# Patient Record
Sex: MALE | Race: WHITE | NOT HISPANIC OR LATINO | Employment: OTHER | URBAN - METROPOLITAN AREA
[De-identification: names, ages, dates, MRNs, and addresses within clinical notes are randomized per-mention and may not be internally consistent; named-entity substitution may affect disease eponyms.]

---

## 2017-05-10 ENCOUNTER — HISTORICAL (OUTPATIENT)
Dept: ADMINISTRATIVE | Facility: HOSPITAL | Age: 62
End: 2017-05-10

## 2017-05-10 LAB
ALBUMIN SERPL-MCNC: 3.9 GM/DL (ref 3.4–5)
ALBUMIN/GLOB SERPL: 1.2 {RATIO}
ALP SERPL-CCNC: 57 UNIT/L (ref 50–136)
ALT SERPL-CCNC: 56 UNIT/L (ref 12–78)
AST SERPL-CCNC: 36 UNIT/L (ref 15–37)
BILIRUB SERPL-MCNC: 0.5 MG/DL (ref 0.2–1)
BILIRUBIN DIRECT+TOT PNL SERPL-MCNC: 0.1 MG/DL (ref 0–0.2)
BILIRUBIN DIRECT+TOT PNL SERPL-MCNC: 0.4 MG/DL (ref 0–0.8)
BUN SERPL-MCNC: 18 MG/DL (ref 7–18)
CALCIUM SERPL-MCNC: 8.6 MG/DL (ref 8.5–10.1)
CHLORIDE SERPL-SCNC: 105 MMOL/L (ref 98–107)
CHOLEST SERPL-MCNC: 149 MG/DL (ref 0–200)
CHOLEST/HDLC SERPL: 4.7 {RATIO} (ref 0–5)
CO2 SERPL-SCNC: 31 MMOL/L (ref 21–32)
CREAT SERPL-MCNC: 1.21 MG/DL (ref 0.7–1.3)
EST. AVERAGE GLUCOSE BLD GHB EST-MCNC: 148 MG/DL
GLOBULIN SER-MCNC: 3.2 GM/DL (ref 2.4–3.5)
GLUCOSE SERPL-MCNC: 112 MG/DL (ref 74–106)
HBA1C MFR BLD: 6.8 % (ref 4.2–6.3)
HDLC SERPL-MCNC: 32 MG/DL (ref 35–60)
LDLC SERPL CALC-MCNC: 81 MG/DL (ref 0–129)
POTASSIUM SERPL-SCNC: 3.9 MMOL/L (ref 3.5–5.1)
PROT SERPL-MCNC: 7.1 GM/DL (ref 6.4–8.2)
SODIUM SERPL-SCNC: 142 MMOL/L (ref 136–145)
TRIGL SERPL-MCNC: 181 MG/DL (ref 30–150)
VLDLC SERPL CALC-MCNC: 36 MG/DL

## 2017-11-29 ENCOUNTER — HISTORICAL (OUTPATIENT)
Dept: ADMINISTRATIVE | Facility: HOSPITAL | Age: 62
End: 2017-11-29

## 2017-11-29 LAB
ALBUMIN SERPL-MCNC: 3.7 GM/DL (ref 3.4–5)
ALBUMIN/GLOB SERPL: 1 RATIO (ref 1.1–2)
ALP SERPL-CCNC: 58 UNIT/L (ref 50–136)
ALT SERPL-CCNC: 47 UNIT/L (ref 12–78)
APPEARANCE, UA: CLEAR
AST SERPL-CCNC: 29 UNIT/L (ref 15–37)
BACTERIA SPEC CULT: ABNORMAL /HPF
BILIRUB SERPL-MCNC: 0.5 MG/DL (ref 0.2–1)
BILIRUB UR QL STRIP: NEGATIVE
BILIRUBIN DIRECT+TOT PNL SERPL-MCNC: 0.1 MG/DL (ref 0–0.5)
BILIRUBIN DIRECT+TOT PNL SERPL-MCNC: 0.4 MG/DL (ref 0–0.8)
BUN SERPL-MCNC: 23 MG/DL (ref 7–18)
CALCIUM SERPL-MCNC: 8.9 MG/DL (ref 8.5–10.1)
CHLORIDE SERPL-SCNC: 106 MMOL/L (ref 98–107)
CHOLEST SERPL-MCNC: 148 MG/DL (ref 0–200)
CHOLEST/HDLC SERPL: 4.9 {RATIO} (ref 0–5)
CO2 SERPL-SCNC: 28 MMOL/L (ref 21–32)
COLOR UR: YELLOW
CREAT SERPL-MCNC: 1.34 MG/DL (ref 0.7–1.3)
CREAT UR-MCNC: 85.2 MG/DL
ERYTHROCYTE [DISTWIDTH] IN BLOOD BY AUTOMATED COUNT: 12.4 % (ref 11.5–17)
EST. AVERAGE GLUCOSE BLD GHB EST-MCNC: 148 MG/DL
GLOBULIN SER-MCNC: 3.6 GM/DL (ref 2.4–3.5)
GLUCOSE (UA): ABNORMAL
GLUCOSE SERPL-MCNC: 146 MG/DL (ref 74–106)
HBA1C MFR BLD: 6.8 % (ref 4.2–6.3)
HCT VFR BLD AUTO: 49.4 % (ref 42–52)
HDLC SERPL-MCNC: 30 MG/DL (ref 35–60)
HGB BLD-MCNC: 16.2 GM/DL (ref 14–18)
HGB UR QL STRIP: ABNORMAL
KETONES UR QL STRIP: NEGATIVE
LDLC SERPL CALC-MCNC: 82 MG/DL (ref 0–129)
LEUKOCYTE ESTERASE UR QL STRIP: NEGATIVE
MCH RBC QN AUTO: 30.2 PG (ref 27–31)
MCHC RBC AUTO-ENTMCNC: 32.8 GM/DL (ref 33–36)
MCV RBC AUTO: 92 FL (ref 80–94)
MICROALBUMIN UR-MCNC: 1.2 MG/DL
MICROALBUMIN/CREAT RATIO PNL UR: 13.8 MG/GM CR (ref 0–30)
NITRITE UR QL STRIP: NEGATIVE
PH UR STRIP: 5.5 [PH] (ref 5–9)
PLATELET # BLD AUTO: 258 X10(3)/MCL (ref 130–400)
PMV BLD AUTO: 9.2 FL (ref 9.4–12.4)
POTASSIUM SERPL-SCNC: 4.1 MMOL/L (ref 3.5–5.1)
PROT SERPL-MCNC: 7.3 GM/DL (ref 6.4–8.2)
PROT UR QL STRIP: NEGATIVE
PSA SERPL-MCNC: 0.36 NG/ML (ref 0–4)
RBC # BLD AUTO: 5.37 X10(6)/MCL (ref 4.7–6.1)
RBC #/AREA URNS HPF: ABNORMAL /[HPF]
SODIUM SERPL-SCNC: 140 MMOL/L (ref 136–145)
SP GR UR STRIP: 1.02 (ref 1–1.03)
SQUAMOUS EPITHELIAL, UA: ABNORMAL
TRIGL SERPL-MCNC: 178 MG/DL (ref 30–150)
UROBILINOGEN UR STRIP-ACNC: 0.2
VLDLC SERPL CALC-MCNC: 36 MG/DL
WBC # SPEC AUTO: 8.6 X10(3)/MCL (ref 4.5–11.5)
WBC #/AREA URNS HPF: ABNORMAL /HPF

## 2018-10-08 ENCOUNTER — HISTORICAL (OUTPATIENT)
Dept: LAB | Facility: HOSPITAL | Age: 63
End: 2018-10-08

## 2018-10-08 LAB
ABS NEUT (OLG): 4.65 X10(3)/MCL (ref 2.1–9.2)
ALBUMIN SERPL-MCNC: 4.1 GM/DL (ref 3.4–5)
ALBUMIN/GLOB SERPL: 1 RATIO (ref 1.1–2)
ALP SERPL-CCNC: 64 UNIT/L (ref 50–136)
ALT SERPL-CCNC: 82 UNIT/L (ref 12–78)
APPEARANCE, UA: CLEAR
AST SERPL-CCNC: 63 UNIT/L (ref 15–37)
BACTERIA SPEC CULT: ABNORMAL /HPF
BASOPHILS # BLD AUTO: 0.1 X10(3)/MCL (ref 0–0.2)
BASOPHILS NFR BLD AUTO: 1 %
BILIRUB SERPL-MCNC: 0.6 MG/DL (ref 0.2–1)
BILIRUB UR QL STRIP: NEGATIVE
BILIRUBIN DIRECT+TOT PNL SERPL-MCNC: 0.2 MG/DL (ref 0–0.5)
BILIRUBIN DIRECT+TOT PNL SERPL-MCNC: 0.4 MG/DL (ref 0–0.8)
BUN SERPL-MCNC: 17 MG/DL (ref 7–18)
CALCIUM SERPL-MCNC: 9.6 MG/DL (ref 8.5–10.1)
CHLORIDE SERPL-SCNC: 104 MMOL/L (ref 98–107)
CHOLEST SERPL-MCNC: 146 MG/DL (ref 0–200)
CHOLEST/HDLC SERPL: 4.9 {RATIO} (ref 0–5)
CO2 SERPL-SCNC: 29 MMOL/L (ref 21–32)
COLOR UR: YELLOW
CREAT SERPL-MCNC: 1.46 MG/DL (ref 0.7–1.3)
CREAT UR-MCNC: 295 MG/DL
EOSINOPHIL # BLD AUTO: 0.4 X10(3)/MCL (ref 0–0.9)
EOSINOPHIL NFR BLD AUTO: 4 %
ERYTHROCYTE [DISTWIDTH] IN BLOOD BY AUTOMATED COUNT: 12.2 % (ref 11.5–17)
EST. AVERAGE GLUCOSE BLD GHB EST-MCNC: 180 MG/DL
GLOBULIN SER-MCNC: 4.1 GM/DL (ref 2.4–3.5)
GLUCOSE (UA): ABNORMAL
GLUCOSE SERPL-MCNC: 137 MG/DL (ref 74–106)
HBA1C MFR BLD: 7.9 % (ref 4.2–6.3)
HCT VFR BLD AUTO: 53.7 % (ref 42–52)
HDLC SERPL-MCNC: 30 MG/DL (ref 35–60)
HGB BLD-MCNC: 17.7 GM/DL (ref 14–18)
HGB UR QL STRIP: ABNORMAL
KETONES UR QL STRIP: NEGATIVE
LDLC SERPL CALC-MCNC: 80 MG/DL (ref 0–129)
LEUKOCYTE ESTERASE UR QL STRIP: NEGATIVE
LYMPHOCYTES # BLD AUTO: 2.8 X10(3)/MCL (ref 0.6–4.6)
LYMPHOCYTES NFR BLD AUTO: 32 %
MCH RBC QN AUTO: 30.6 PG (ref 27–31)
MCHC RBC AUTO-ENTMCNC: 33 GM/DL (ref 33–36)
MCV RBC AUTO: 92.9 FL (ref 80–94)
MICROALBUMIN UR-MCNC: 9.2 MG/DL
MICROALBUMIN/CREAT RATIO PNL UR: 31.2 MG/GM CR (ref 0–30)
MONOCYTES # BLD AUTO: 0.9 X10(3)/MCL (ref 0.1–1.3)
MONOCYTES NFR BLD AUTO: 11 %
NEUTROPHILS # BLD AUTO: 4.65 X10(3)/MCL (ref 2.1–9.2)
NEUTROPHILS NFR BLD AUTO: 52 %
NITRITE UR QL STRIP: NEGATIVE
PH UR STRIP: 5 [PH] (ref 5–9)
PLATELET # BLD AUTO: 287 X10(3)/MCL (ref 130–400)
PMV BLD AUTO: 10 FL (ref 9.4–12.4)
POTASSIUM SERPL-SCNC: 4.2 MMOL/L (ref 3.5–5.1)
PROT SERPL-MCNC: 8.2 GM/DL (ref 6.4–8.2)
PROT UR QL STRIP: ABNORMAL
PSA SERPL-MCNC: 0.38 NG/ML (ref 0–4)
RBC # BLD AUTO: 5.78 X10(6)/MCL (ref 4.7–6.1)
RBC #/AREA URNS HPF: ABNORMAL /[HPF]
SODIUM SERPL-SCNC: 140 MMOL/L (ref 136–145)
SP GR UR STRIP: 1.02 (ref 1–1.03)
SQUAMOUS EPITHELIAL, UA: ABNORMAL
TRIGL SERPL-MCNC: 178 MG/DL (ref 30–150)
UROBILINOGEN UR STRIP-ACNC: 0.2
VLDLC SERPL CALC-MCNC: 36 MG/DL
WBC # SPEC AUTO: 8.9 X10(3)/MCL (ref 4.5–11.5)
WBC #/AREA URNS HPF: ABNORMAL /HPF

## 2019-01-09 ENCOUNTER — HISTORICAL (OUTPATIENT)
Dept: LAB | Facility: HOSPITAL | Age: 64
End: 2019-01-09

## 2019-01-09 LAB
ALBUMIN SERPL-MCNC: 3.9 GM/DL (ref 3.4–5)
ALBUMIN/GLOB SERPL: 1.1 {RATIO}
ALP SERPL-CCNC: 61 UNIT/L (ref 50–136)
ALT SERPL-CCNC: 59 UNIT/L (ref 12–78)
AST SERPL-CCNC: 51 UNIT/L (ref 15–37)
BILIRUB SERPL-MCNC: 0.6 MG/DL (ref 0.2–1)
BILIRUBIN DIRECT+TOT PNL SERPL-MCNC: 0.2 MG/DL (ref 0–0.2)
BILIRUBIN DIRECT+TOT PNL SERPL-MCNC: 0.4 MG/DL (ref 0–0.8)
BUN SERPL-MCNC: 14 MG/DL (ref 7–18)
CALCIUM SERPL-MCNC: 8.9 MG/DL (ref 8.5–10.1)
CHLORIDE SERPL-SCNC: 104 MMOL/L (ref 98–107)
CO2 SERPL-SCNC: 30 MMOL/L (ref 21–32)
CREAT SERPL-MCNC: 1.45 MG/DL (ref 0.7–1.3)
EST. AVERAGE GLUCOSE BLD GHB EST-MCNC: 154 MG/DL
GLOBULIN SER-MCNC: 3.5 GM/DL (ref 2.4–3.5)
GLUCOSE SERPL-MCNC: 117 MG/DL (ref 74–106)
HBA1C MFR BLD: 7 % (ref 4.2–6.3)
POTASSIUM SERPL-SCNC: 4.2 MMOL/L (ref 3.5–5.1)
PROT SERPL-MCNC: 7.4 GM/DL (ref 6.4–8.2)
SODIUM SERPL-SCNC: 140 MMOL/L (ref 136–145)

## 2019-07-18 LAB — NONINV COLON CA DNA+OCC BLD SCRN STL QL: NEGATIVE

## 2019-08-05 ENCOUNTER — HISTORICAL (OUTPATIENT)
Dept: LAB | Facility: HOSPITAL | Age: 64
End: 2019-08-05

## 2019-08-05 LAB
ABS NEUT (OLG): 4.35 X10(3)/MCL (ref 2.1–9.2)
ALBUMIN SERPL-MCNC: 3.8 GM/DL (ref 3.4–5)
ALBUMIN/GLOB SERPL: 1 RATIO (ref 1.1–2)
ALP SERPL-CCNC: 64 UNIT/L (ref 50–136)
ALT SERPL-CCNC: 80 UNIT/L (ref 12–78)
AST SERPL-CCNC: 49 UNIT/L (ref 15–37)
BASOPHILS # BLD AUTO: 0.1 X10(3)/MCL (ref 0–0.2)
BASOPHILS NFR BLD AUTO: 1 %
BILIRUB SERPL-MCNC: 0.5 MG/DL (ref 0.2–1)
BILIRUBIN DIRECT+TOT PNL SERPL-MCNC: 0.2 MG/DL (ref 0–0.5)
BILIRUBIN DIRECT+TOT PNL SERPL-MCNC: 0.3 MG/DL (ref 0–0.8)
BUN SERPL-MCNC: 15 MG/DL (ref 7–18)
CALCIUM SERPL-MCNC: 9.3 MG/DL (ref 8.5–10.1)
CHLORIDE SERPL-SCNC: 107 MMOL/L (ref 98–107)
CHOLEST SERPL-MCNC: 152 MG/DL (ref 0–200)
CHOLEST/HDLC SERPL: 4.5 {RATIO} (ref 0–5)
CO2 SERPL-SCNC: 26 MMOL/L (ref 21–32)
CREAT SERPL-MCNC: 1.38 MG/DL (ref 0.7–1.3)
CREAT UR-MCNC: 113 MG/DL
EOSINOPHIL # BLD AUTO: 0.4 X10(3)/MCL (ref 0–0.9)
EOSINOPHIL NFR BLD AUTO: 4 %
ERYTHROCYTE [DISTWIDTH] IN BLOOD BY AUTOMATED COUNT: 12.5 % (ref 11.5–17)
EST. AVERAGE GLUCOSE BLD GHB EST-MCNC: 131 MG/DL
GLOBULIN SER-MCNC: 3.7 GM/DL (ref 2.4–3.5)
GLUCOSE SERPL-MCNC: 95 MG/DL (ref 74–106)
HBA1C MFR BLD: 6.2 % (ref 4.2–6.3)
HCT VFR BLD AUTO: 52.5 % (ref 42–52)
HDLC SERPL-MCNC: 34 MG/DL (ref 35–60)
HGB BLD-MCNC: 17.2 GM/DL (ref 14–18)
LDLC SERPL CALC-MCNC: 79 MG/DL (ref 0–129)
LYMPHOCYTES # BLD AUTO: 3.1 X10(3)/MCL (ref 0.6–4.6)
LYMPHOCYTES NFR BLD AUTO: 35 %
MCH RBC QN AUTO: 30.2 PG (ref 27–31)
MCHC RBC AUTO-ENTMCNC: 32.8 GM/DL (ref 33–36)
MCV RBC AUTO: 92.1 FL (ref 80–94)
MICROALBUMIN UR-MCNC: 2.6 MG/DL
MICROALBUMIN/CREAT RATIO PNL UR: 23 MG/GM CR (ref 0–30)
MONOCYTES # BLD AUTO: 0.9 X10(3)/MCL (ref 0.1–1.3)
MONOCYTES NFR BLD AUTO: 10 %
NEUTROPHILS # BLD AUTO: 4.35 X10(3)/MCL (ref 2.1–9.2)
NEUTROPHILS NFR BLD AUTO: 49 %
NRBC BLD AUTO-RTO: 0 % (ref 0–0.2)
PLATELET # BLD AUTO: 274 X10(3)/MCL (ref 130–400)
PMV BLD AUTO: 10.1 FL (ref 9.4–12.4)
POTASSIUM SERPL-SCNC: 4 MMOL/L (ref 3.5–5.1)
PROT SERPL-MCNC: 7.5 GM/DL (ref 6.4–8.2)
RBC # BLD AUTO: 5.7 X10(6)/MCL (ref 4.7–6.1)
SODIUM SERPL-SCNC: 142 MMOL/L (ref 136–145)
TRIGL SERPL-MCNC: 196 MG/DL (ref 30–150)
VLDLC SERPL CALC-MCNC: 39 MG/DL
WBC # SPEC AUTO: 8.9 X10(3)/MCL (ref 4.5–11.5)

## 2020-05-14 ENCOUNTER — HOSPITAL ENCOUNTER (OUTPATIENT)
Dept: MEDSURG UNIT | Facility: HOSPITAL | Age: 65
End: 2020-05-16
Attending: SURGERY | Admitting: SURGERY

## 2020-05-14 LAB
ABS NEUT (OLG): 7.16 X10(3)/MCL (ref 2.1–9.2)
ALBUMIN SERPL-MCNC: 4.3 GM/DL (ref 3.4–4.8)
ALBUMIN/GLOB SERPL: 1.3 RATIO (ref 1.1–2)
ALP SERPL-CCNC: 76 UNIT/L (ref 40–150)
ALT SERPL-CCNC: 35 UNIT/L (ref 0–55)
APPEARANCE, UA: CLEAR
AST SERPL-CCNC: 30 UNIT/L (ref 5–34)
BACTERIA SPEC CULT: ABNORMAL /HPF
BASOPHILS # BLD AUTO: 0.1 X10(3)/MCL (ref 0–0.2)
BASOPHILS NFR BLD AUTO: 1 %
BILIRUB SERPL-MCNC: 0.6 MG/DL
BILIRUB UR QL STRIP: NEGATIVE
BILIRUBIN DIRECT+TOT PNL SERPL-MCNC: 0.3 MG/DL (ref 0–0.5)
BILIRUBIN DIRECT+TOT PNL SERPL-MCNC: 0.3 MG/DL (ref 0–0.8)
BUN SERPL-MCNC: 16.2 MG/DL (ref 8.4–25.7)
CALCIUM SERPL-MCNC: 9.3 MG/DL (ref 8.8–10)
CHLORIDE SERPL-SCNC: 102 MMOL/L (ref 98–107)
CO2 SERPL-SCNC: 27 MMOL/L (ref 23–31)
COLOR UR: YELLOW
CREAT SERPL-MCNC: 1.17 MG/DL (ref 0.73–1.18)
EOSINOPHIL # BLD AUTO: 0 X10(3)/MCL (ref 0–0.9)
EOSINOPHIL NFR BLD AUTO: 0 %
ERYTHROCYTE [DISTWIDTH] IN BLOOD BY AUTOMATED COUNT: 13 % (ref 11.5–17)
GLOBULIN SER-MCNC: 3.4 GM/DL (ref 2.4–3.5)
GLUCOSE (UA): ABNORMAL
GLUCOSE SERPL-MCNC: 128 MG/DL (ref 82–115)
HCT VFR BLD AUTO: 52.3 % (ref 42–52)
HGB BLD-MCNC: 16.7 GM/DL (ref 14–18)
HGB UR QL STRIP: ABNORMAL
KETONES UR QL STRIP: NEGATIVE
LEUKOCYTE ESTERASE UR QL STRIP: NEGATIVE
LIPASE SERPL-CCNC: 17 U/L
LYMPHOCYTES # BLD AUTO: 1.1 X10(3)/MCL (ref 0.6–4.6)
LYMPHOCYTES NFR BLD AUTO: 12 %
MCH RBC QN AUTO: 29.9 PG (ref 27–31)
MCHC RBC AUTO-ENTMCNC: 31.9 GM/DL (ref 33–36)
MCV RBC AUTO: 93.7 FL (ref 80–94)
MONOCYTES # BLD AUTO: 0.7 X10(3)/MCL (ref 0.1–1.3)
MONOCYTES NFR BLD AUTO: 8 %
NEUTROPHILS # BLD AUTO: 7.16 X10(3)/MCL (ref 2.1–9.2)
NEUTROPHILS NFR BLD AUTO: 79 %
NITRITE UR QL STRIP: NEGATIVE
PH UR STRIP: 5 [PH] (ref 5–9)
PLATELET # BLD AUTO: 274 X10(3)/MCL (ref 130–400)
PMV BLD AUTO: 9.6 FL (ref 9.4–12.4)
POTASSIUM SERPL-SCNC: 4.2 MMOL/L (ref 3.5–5.1)
PROT SERPL-MCNC: 7.7 GM/DL (ref 5.8–7.6)
PROT UR QL STRIP: ABNORMAL
RBC # BLD AUTO: 5.58 X10(6)/MCL (ref 4.7–6.1)
RBC #/AREA URNS HPF: ABNORMAL /[HPF]
SODIUM SERPL-SCNC: 138 MMOL/L (ref 136–145)
SP GR UR STRIP: 1.02 (ref 1–1.03)
SQUAMOUS EPITHELIAL, UA: ABNORMAL
TROPONIN I SERPL-MCNC: 0 NG/ML (ref 0–0.04)
UROBILINOGEN UR STRIP-ACNC: 0.2
WBC # SPEC AUTO: 9.1 X10(3)/MCL (ref 4.5–11.5)
WBC #/AREA URNS HPF: ABNORMAL /HPF

## 2020-05-15 LAB
ABS NEUT (OLG): 15.69 X10(3)/MCL (ref 2.1–9.2)
ALBUMIN SERPL-MCNC: 3.9 GM/DL (ref 3.4–4.8)
ALBUMIN/GLOB SERPL: 1.3 RATIO (ref 1.1–2)
ALP SERPL-CCNC: 69 UNIT/L (ref 40–150)
ALT SERPL-CCNC: 71 UNIT/L (ref 0–55)
AST SERPL-CCNC: 57 UNIT/L (ref 5–34)
BASOPHILS # BLD AUTO: 0 X10(3)/MCL (ref 0–0.2)
BASOPHILS NFR BLD AUTO: 0 %
BILIRUB SERPL-MCNC: 1 MG/DL
BILIRUBIN DIRECT+TOT PNL SERPL-MCNC: 0.5 MG/DL (ref 0–0.5)
BILIRUBIN DIRECT+TOT PNL SERPL-MCNC: 0.5 MG/DL (ref 0–0.8)
BUN SERPL-MCNC: 17.9 MG/DL (ref 8.4–25.7)
CALCIUM SERPL-MCNC: 8.9 MG/DL (ref 8.8–10)
CHLORIDE SERPL-SCNC: 100 MMOL/L (ref 98–107)
CO2 SERPL-SCNC: 26 MMOL/L (ref 23–31)
CREAT SERPL-MCNC: 1.27 MG/DL (ref 0.73–1.18)
EOSINOPHIL # BLD AUTO: 0.1 X10(3)/MCL (ref 0–0.9)
EOSINOPHIL NFR BLD AUTO: 1 %
ERYTHROCYTE [DISTWIDTH] IN BLOOD BY AUTOMATED COUNT: 13.1 % (ref 11.5–17)
GLOBULIN SER-MCNC: 2.9 GM/DL (ref 2.4–3.5)
GLUCOSE SERPL-MCNC: 102 MG/DL (ref 82–115)
HCT VFR BLD AUTO: 48.3 % (ref 42–52)
HGB BLD-MCNC: 15.8 GM/DL (ref 14–18)
LYMPHOCYTES # BLD AUTO: 0.7 X10(3)/MCL (ref 0.6–4.6)
LYMPHOCYTES NFR BLD AUTO: 4 %
MCH RBC QN AUTO: 29.8 PG (ref 27–31)
MCHC RBC AUTO-ENTMCNC: 32.7 GM/DL (ref 33–36)
MCV RBC AUTO: 91.1 FL (ref 80–94)
MONOCYTES # BLD AUTO: 1.2 X10(3)/MCL (ref 0.1–1.3)
MONOCYTES NFR BLD AUTO: 6 %
NEUTROPHILS # BLD AUTO: 15.69 X10(3)/MCL (ref 2.1–9.2)
NEUTROPHILS NFR BLD AUTO: 88 %
PLATELET # BLD AUTO: 265 X10(3)/MCL (ref 130–400)
PMV BLD AUTO: 9.9 FL (ref 9.4–12.4)
POTASSIUM SERPL-SCNC: 3.8 MMOL/L (ref 3.5–5.1)
PROT SERPL-MCNC: 6.8 GM/DL (ref 5.8–7.6)
RBC # BLD AUTO: 5.3 X10(6)/MCL (ref 4.7–6.1)
SODIUM SERPL-SCNC: 137 MMOL/L (ref 136–145)
WBC # SPEC AUTO: 17.8 X10(3)/MCL (ref 4.5–11.5)

## 2020-07-16 ENCOUNTER — HISTORICAL (OUTPATIENT)
Dept: RADIOLOGY | Facility: HOSPITAL | Age: 65
End: 2020-07-16

## 2020-08-26 ENCOUNTER — HISTORICAL (OUTPATIENT)
Dept: ADMINISTRATIVE | Facility: HOSPITAL | Age: 65
End: 2020-08-26

## 2020-08-26 LAB
ABS NEUT (OLG): 4.91 X10(3)/MCL (ref 2.1–9.2)
ALBUMIN SERPL-MCNC: 4 GM/DL (ref 3.4–5)
ALBUMIN/GLOB SERPL: 1.4 RATIO (ref 1.1–2)
ALP SERPL-CCNC: 68 UNIT/L (ref 40–150)
ALT SERPL-CCNC: 31 UNIT/L (ref 0–55)
AST SERPL-CCNC: 29 UNIT/L (ref 5–34)
BASOPHILS # BLD AUTO: 0.1 X10(3)/MCL (ref 0–0.2)
BASOPHILS NFR BLD AUTO: 1 %
BILIRUB SERPL-MCNC: 0.7 MG/DL
BILIRUBIN DIRECT+TOT PNL SERPL-MCNC: 0.3 MG/DL (ref 0–0.5)
BILIRUBIN DIRECT+TOT PNL SERPL-MCNC: 0.4 MG/DL (ref 0–0.8)
BUN SERPL-MCNC: 17.9 MG/DL (ref 8.4–25.7)
CALCIUM SERPL-MCNC: 8.7 MG/DL (ref 8.8–10)
CHLORIDE SERPL-SCNC: 105 MMOL/L (ref 98–107)
CHOLEST SERPL-MCNC: 122 MG/DL
CHOLEST/HDLC SERPL: 4 {RATIO} (ref 0–5)
CO2 SERPL-SCNC: 28 MMOL/L (ref 23–31)
CREAT SERPL-MCNC: 1.33 MG/DL (ref 0.73–1.18)
CREAT UR-MCNC: 172.1 MG/DL (ref 58–161)
EOSINOPHIL # BLD AUTO: 0.4 X10(3)/MCL (ref 0–0.9)
EOSINOPHIL NFR BLD AUTO: 5 %
ERYTHROCYTE [DISTWIDTH] IN BLOOD BY AUTOMATED COUNT: 12.8 % (ref 11.5–17)
EST. AVERAGE GLUCOSE BLD GHB EST-MCNC: 125.5 MG/DL
GLOBULIN SER-MCNC: 2.9 GM/DL (ref 2.4–3.5)
GLUCOSE SERPL-MCNC: 94 MG/DL (ref 82–115)
HBA1C MFR BLD: 6 %
HCT VFR BLD AUTO: 48.5 % (ref 42–52)
HDLC SERPL-MCNC: 33 MG/DL (ref 35–60)
HGB BLD-MCNC: 15.7 GM/DL (ref 14–18)
LDLC SERPL CALC-MCNC: 74 MG/DL (ref 50–140)
LYMPHOCYTES # BLD AUTO: 2.3 X10(3)/MCL (ref 0.6–4.6)
LYMPHOCYTES NFR BLD AUTO: 27 %
MCH RBC QN AUTO: 30.2 PG (ref 27–31)
MCHC RBC AUTO-ENTMCNC: 32.4 GM/DL (ref 33–36)
MCV RBC AUTO: 93.3 FL (ref 80–94)
MICROALBUMIN UR-MCNC: 13 UG/ML
MICROALBUMIN/CREAT RATIO PNL UR: 7.6 MG/GM CR (ref 0–30)
MONOCYTES # BLD AUTO: 0.8 X10(3)/MCL (ref 0.1–1.3)
MONOCYTES NFR BLD AUTO: 10 %
NEUTROPHILS # BLD AUTO: 4.91 X10(3)/MCL (ref 2.1–9.2)
NEUTROPHILS NFR BLD AUTO: 58 %
PLATELET # BLD AUTO: 266 X10(3)/MCL (ref 130–400)
PMV BLD AUTO: 9.8 FL (ref 9.4–12.4)
POTASSIUM SERPL-SCNC: 3.8 MMOL/L (ref 3.5–5.1)
PROT SERPL-MCNC: 6.9 GM/DL (ref 5.8–7.6)
PSA SERPL-MCNC: 0.34 NG/ML
RBC # BLD AUTO: 5.2 X10(6)/MCL (ref 4.7–6.1)
SODIUM SERPL-SCNC: 141 MMOL/L (ref 136–145)
TRIGL SERPL-MCNC: 76 MG/DL (ref 34–140)
VLDLC SERPL CALC-MCNC: 15 MG/DL
WBC # SPEC AUTO: 8.5 X10(3)/MCL (ref 4.5–11.5)

## 2021-02-23 ENCOUNTER — HISTORICAL (OUTPATIENT)
Dept: ADMINISTRATIVE | Facility: HOSPITAL | Age: 66
End: 2021-02-23

## 2021-02-23 LAB
ABS NEUT (OLG): 4.03 X10(3)/MCL (ref 2.1–9.2)
ALBUMIN SERPL-MCNC: 4.2 GM/DL (ref 3.4–4.8)
ALBUMIN/GLOB SERPL: 1.2 RATIO (ref 1.1–2)
ALP SERPL-CCNC: 62 UNIT/L (ref 40–150)
ALT SERPL-CCNC: 53 UNIT/L (ref 0–55)
APPEARANCE, UA: CLEAR
AST SERPL-CCNC: 36 UNIT/L (ref 5–34)
BACTERIA SPEC CULT: ABNORMAL /HPF
BASOPHILS # BLD AUTO: 0.1 X10(3)/MCL (ref 0–0.2)
BASOPHILS NFR BLD AUTO: 1 %
BILIRUB SERPL-MCNC: 0.8 MG/DL
BILIRUB UR QL STRIP: NEGATIVE
BILIRUBIN DIRECT+TOT PNL SERPL-MCNC: 0.3 MG/DL (ref 0–0.5)
BILIRUBIN DIRECT+TOT PNL SERPL-MCNC: 0.5 MG/DL (ref 0–0.8)
BUN SERPL-MCNC: 14.9 MG/DL (ref 8.4–25.7)
CALCIUM SERPL-MCNC: 9.5 MG/DL (ref 8.8–10)
CHLORIDE SERPL-SCNC: 104 MMOL/L (ref 98–107)
CHOLEST SERPL-MCNC: 143 MG/DL
CHOLEST/HDLC SERPL: 4 {RATIO} (ref 0–5)
CO2 SERPL-SCNC: 29 MMOL/L (ref 23–31)
COLOR UR: YELLOW
CREAT SERPL-MCNC: 1.39 MG/DL (ref 0.73–1.18)
CREAT UR-MCNC: 107.4 MG/DL (ref 58–161)
EOSINOPHIL # BLD AUTO: 0.5 X10(3)/MCL (ref 0–0.9)
EOSINOPHIL NFR BLD AUTO: 6 %
ERYTHROCYTE [DISTWIDTH] IN BLOOD BY AUTOMATED COUNT: 12.7 % (ref 11.5–17)
EST. AVERAGE GLUCOSE BLD GHB EST-MCNC: 131.2 MG/DL
GLOBULIN SER-MCNC: 3.4 GM/DL (ref 2.4–3.5)
GLUCOSE (UA): NEGATIVE
GLUCOSE SERPL-MCNC: 97 MG/DL (ref 82–115)
HBA1C MFR BLD: 6.2 %
HCT VFR BLD AUTO: 56.3 % (ref 42–52)
HDLC SERPL-MCNC: 32 MG/DL (ref 35–60)
HGB BLD-MCNC: 17.9 GM/DL (ref 14–18)
HGB UR QL STRIP: ABNORMAL
KETONES UR QL STRIP: NEGATIVE
LDLC SERPL CALC-MCNC: 76 MG/DL (ref 50–140)
LEUKOCYTE ESTERASE UR QL STRIP: NEGATIVE
LYMPHOCYTES # BLD AUTO: 2.7 X10(3)/MCL (ref 0.6–4.6)
LYMPHOCYTES NFR BLD AUTO: 34 %
MCH RBC QN AUTO: 30.6 PG (ref 27–31)
MCHC RBC AUTO-ENTMCNC: 31.8 GM/DL (ref 33–36)
MCV RBC AUTO: 96.2 FL (ref 80–94)
MICROALBUMIN UR-MCNC: 24.2 UG/ML
MICROALBUMIN/CREAT RATIO PNL UR: 22.5 MG/GM CR (ref 0–30)
MONOCYTES # BLD AUTO: 0.8 X10(3)/MCL (ref 0.1–1.3)
MONOCYTES NFR BLD AUTO: 10 %
NEUTROPHILS # BLD AUTO: 4.03 X10(3)/MCL (ref 2.1–9.2)
NEUTROPHILS NFR BLD AUTO: 49 %
NITRITE UR QL STRIP: NEGATIVE
PH UR STRIP: 6 [PH] (ref 5–9)
PLATELET # BLD AUTO: 294 X10(3)/MCL (ref 130–400)
PMV BLD AUTO: 10.3 FL (ref 9.4–12.4)
POTASSIUM SERPL-SCNC: 4.5 MMOL/L (ref 3.5–5.1)
PROT SERPL-MCNC: 7.6 GM/DL (ref 5.8–7.6)
PROT UR QL STRIP: NEGATIVE
PSA SERPL-MCNC: 0.42 NG/ML
RBC # BLD AUTO: 5.85 X10(6)/MCL (ref 4.7–6.1)
RBC #/AREA URNS HPF: 7 /HPF (ref 0–2)
SODIUM SERPL-SCNC: 142 MMOL/L (ref 136–145)
SP GR UR STRIP: 1.02 (ref 1–1.03)
SQUAMOUS EPITHELIAL, UA: ABNORMAL
TRIGL SERPL-MCNC: 173 MG/DL (ref 34–140)
UROBILINOGEN UR STRIP-ACNC: 0.2
VLDLC SERPL CALC-MCNC: 35 MG/DL
WBC # SPEC AUTO: 8.2 X10(3)/MCL (ref 4.5–11.5)
WBC #/AREA URNS HPF: ABNORMAL /HPF

## 2021-10-14 LAB
LEFT EYE DM RETINOPATHY: NEGATIVE
RIGHT EYE DM RETINOPATHY: NEGATIVE

## 2021-11-04 ENCOUNTER — HISTORICAL (OUTPATIENT)
Dept: ADMINISTRATIVE | Facility: HOSPITAL | Age: 66
End: 2021-11-04

## 2021-11-04 LAB
ABS NEUT (OLG): 4.35 X10(3)/MCL (ref 2.1–9.2)
ALBUMIN SERPL-MCNC: 4.1 GM/DL (ref 3.4–4.8)
ALBUMIN/GLOB SERPL: 1.2 RATIO (ref 1.1–2)
ALP SERPL-CCNC: 79 UNIT/L (ref 40–150)
ALT SERPL-CCNC: 93 UNIT/L (ref 0–55)
AST SERPL-CCNC: 68 UNIT/L (ref 5–34)
BASOPHILS # BLD AUTO: 0.1 X10(3)/MCL (ref 0–0.2)
BASOPHILS NFR BLD AUTO: 1 %
BILIRUB SERPL-MCNC: 1.1 MG/DL
BILIRUBIN DIRECT+TOT PNL SERPL-MCNC: 0.5 MG/DL (ref 0–0.5)
BILIRUBIN DIRECT+TOT PNL SERPL-MCNC: 0.6 MG/DL (ref 0–0.8)
BUN SERPL-MCNC: 16.4 MG/DL (ref 8.4–25.7)
CALCIUM SERPL-MCNC: 10 MG/DL (ref 8.7–10.5)
CHLORIDE SERPL-SCNC: 102 MMOL/L (ref 98–107)
CO2 SERPL-SCNC: 26 MMOL/L (ref 23–31)
CREAT SERPL-MCNC: 1.37 MG/DL (ref 0.73–1.18)
EOSINOPHIL # BLD AUTO: 0.4 X10(3)/MCL (ref 0–0.9)
EOSINOPHIL NFR BLD AUTO: 4 %
ERYTHROCYTE [DISTWIDTH] IN BLOOD BY AUTOMATED COUNT: 12.6 % (ref 11.5–17)
EST. AVERAGE GLUCOSE BLD GHB EST-MCNC: 142.7 MG/DL
GLOBULIN SER-MCNC: 3.3 GM/DL (ref 2.4–3.5)
GLUCOSE SERPL-MCNC: 120 MG/DL (ref 82–115)
HBA1C MFR BLD: 6.6 %
HCT VFR BLD AUTO: 52.5 % (ref 42–52)
HGB BLD-MCNC: 17.6 GM/DL (ref 14–18)
LYMPHOCYTES # BLD AUTO: 3.3 X10(3)/MCL (ref 0.6–4.6)
LYMPHOCYTES NFR BLD AUTO: 37 %
MCH RBC QN AUTO: 30.6 PG (ref 27–31)
MCHC RBC AUTO-ENTMCNC: 33.5 GM/DL (ref 33–36)
MCV RBC AUTO: 91.3 FL (ref 80–94)
MONOCYTES # BLD AUTO: 0.8 X10(3)/MCL (ref 0.1–1.3)
MONOCYTES NFR BLD AUTO: 9 %
NEUTROPHILS # BLD AUTO: 4.35 X10(3)/MCL (ref 2.1–9.2)
NEUTROPHILS NFR BLD AUTO: 49 %
PLATELET # BLD AUTO: 289 X10(3)/MCL (ref 130–400)
PMV BLD AUTO: 10 FL (ref 9.4–12.4)
POTASSIUM SERPL-SCNC: 4.3 MMOL/L (ref 3.5–5.1)
PROT SERPL-MCNC: 7.4 GM/DL (ref 5.8–7.6)
RBC # BLD AUTO: 5.75 X10(6)/MCL (ref 4.7–6.1)
SODIUM SERPL-SCNC: 140 MMOL/L (ref 136–145)
WBC # SPEC AUTO: 8.9 X10(3)/MCL (ref 4.5–11.5)

## 2021-12-02 ENCOUNTER — HISTORICAL (OUTPATIENT)
Dept: RADIOLOGY | Facility: HOSPITAL | Age: 66
End: 2021-12-02

## 2022-04-11 ENCOUNTER — HISTORICAL (OUTPATIENT)
Dept: ADMINISTRATIVE | Facility: HOSPITAL | Age: 67
End: 2022-04-11

## 2022-04-26 VITALS
BODY MASS INDEX: 26.13 KG/M2 | SYSTOLIC BLOOD PRESSURE: 139 MMHG | DIASTOLIC BLOOD PRESSURE: 72 MMHG | OXYGEN SATURATION: 97 % | WEIGHT: 162.56 LBS | HEIGHT: 66 IN

## 2022-04-30 NOTE — ED PROVIDER NOTES
Patient:   Franklin Macias            MRN: [id number]            FIN: [id number]               Age:   65 years     Sex:  Male     :  1955   Associated Diagnoses:   Epigastric abdominal pain; Epigastric abdominal pain   Author:   Moshe Elise PA-C      Basic Information   Time seen: Date & time 2020 10:07:00.   History source: Patient.   Arrival mode: Private vehicle.   Additional information: Chief Complaint from Nursing Triage Note : Chief Complaint   2020 10:02 CDT      Chief Complaint           c/o epigastric pain N/V x1 started 0330 this morning, hx GERD and states this feels the same, states he missed a dose of his omeprazole yesterday but took it this morning. denies cardiac hx.  .      History of Present Illness   The patient presents with 66 y/o male who presents with epigastric pain which started 0330 this AM with one episode vomiting after taking meds on empty stomach. hx gerd, on omeprazole and missed dose yesterday. no sob. harpal orlando and   I, Moshe Elise PA-C have assumed care of the patient. 65 year old male with hx of GERD presents to ED for epigastric pain with nausea and vomiting that started at 3:30 AM today. Patient reports he missed his dose of Omeprazole yesterday, but took it this AM. Patient reports pain radiates to back. Patient reports mild dysuria. Patient denies chest pain, fever, chills, diarrhea. Patient denies caridac history .  The onset was 3:30 AM today .  The course/duration of symptoms is fluctuating in intensity.  The character of symptoms is crampy.  Associated symptoms: nausea, vomiting, back pain and denies fever.        Review of Systems   Constitutional symptoms:  No fever, no chills.    Skin symptoms:  No rash,    Eye symptoms:  Negative except as documented in HPI.   ENMT symptoms:  Negative except as documented in HPI.   Respiratory symptoms:  No shortness of breath, no cough.    Cardiovascular symptoms:  No chest pain,    Gastrointestinal  symptoms:  Abdominal pain, epigastric, nausea, vomiting, no diarrhea, no constipation.    Genitourinary symptoms:  Dysuria, No hematuria,    Musculoskeletal symptoms:  Back pain.   Neurologic symptoms:  No headache, no dizziness, no altered level of consciousness.    Psychiatric symptoms:  Negative except as documented in HPI.   Endocrine symptoms:  Negative except as documented in HPI.   Hematologic/Lymphatic symptoms:  Negative except as documented in HPI.   Allergy/immunologic symptoms:  Negative except as documented in HPI.             Additional review of systems information: All other systems reviewed and otherwise negative.      Health Status   Allergies:    Allergic Reactions (Selected)  No Known Medication Allergies,    Allergies (1) Active Reaction  No Known Medication Allergies None Documented  .   Medications:  (Selected)   Prescriptions  Prescribed  Glucometer: Glucometer, See Instructions, Glucometer to check blood sugar and treat hyperglycemia in DM-II (ICD-10 E11.65). Give one glucometer allowed by insurance., # 1 EA, 0 Refill(s), Pharmacy: [facility]/pharmacy #[id number]  Glucose Testing Strips: Glucose Testing Strips, See Instructions, testing strips to check blood sugar and treat hyperglycemia in DM-II (ICD-10 E11.65). Give maximum amount allowed by insurance, # 100 kit(s), 11 Refill(s), Pharmacy: [facility]/pharmacy #[id number]  Lancets: Lancets, See Instructions, testing strips to check blood sugar and treat hyperglycemia in DM-II (ICD-10 E11.65). Give maximum amount allowed by insurance, # 100 kit(s), 11 Refill(s), Pharmacy: [facility]/pharmacy #[id number]  Wellbutrin  mg/24 hours oral tablet, extended release: 150 mg = 1 tab(s), Oral, q24hr, # 30 tab(s), 0 Refill(s), Pharmacy: [facility]  amlodipine-benazepril 10 mg-20 mg oral capsule: 1 cap(s), Oral, Daily, # 90 cap(s), 3 Refill(s), Pharmacy: [facility]  aspirin 81 mg oral Delayed Release (EC) tablet: 81 mg = 1 tab(s), Oral, Daily, # 90  tab(s), 3 Refill(s), Pharmacy: [facility]  atorvastatin 20 mg oral tablet: 20 mg = 1 tab(s), Oral, Daily, # 90 tab(s), 3 Refill(s), Pharmacy: [facility]  fenofibrate 145 mg oral tablet: 145 mg = 1 tab(s), Oral, Daily, # 90 tab(s), 3 Refill(s), Pharmacy: [facility]  glimepiride 2 mg oral tablet: See Instructions, TAKE 1 TABLET EVERY DAY, # 90 tab(s), 3 Refill(s), Pharmacy: [facility]  metoprolol succinate 25 mg oral tablet extended release: 25 mg = 1 tab(s), Oral, Daily, # 90 tab(s), 3 Refill(s), Pharmacy: [facility]  omeprazole 20 mg oral DR capsule: 20 mg = 1 cap(s), Oral, Daily, # 90 cap(s), 3 Refill(s), Pharmacy: [facility]  Documented Medications  Documented  BD SINGLE USE SWAB: .      Past Medical/ Family/ Social History   Medical history:    No active or resolved past medical history items have been selected or recorded..   Surgical history:    Colonoscopy (SNOMED CT 103205696) performed by Brandon Flores MD.   Family history:    Heart disease  Mother  Primary malignant neoplasm of lung  Father  .   Social history: Reviewed as documented in chart.   Problem list:    Active Problems (5)  Cigarette smoker   Diabetes mellitus   GERD - Gastro-esophageal reflux disease   Hyperlipidemia   Hypertension   .      Physical Examination               Vital Signs      No qualifying data available.   General:  Alert, no acute distress.    Skin:  Warm, dry.    Ears, nose, mouth and throat:  Oral mucosa moist.   Neck:  Supple, trachea midline.    Cardiovascular:  Regular rate and rhythm, Normal peripheral perfusion, No edema.    Respiratory:  Lungs are clear to auscultation, respirations are non-labored, breath sounds are equal, Symmetrical chest wall expansion.    Gastrointestinal:  Tenderness: Mild, epigastric, Guarding: Negative, Rebound: Negative, Bowel sounds: Normal.    Musculoskeletal:  Normal  strength.   Neurological:  Alert and oriented to person, place, time, and situation.   Psychiatric:  Cooperative.      Medical Decision Making   Differential Diagnosis:  Abdominal pain.   Documents reviewed:  Emergency department nurses' notes.   Orders  Launch Orders   Laboratory:  Lipase Level (Order): Stat collect, 5/14/2020 10:09 CDT, Blood, Lab Collect, Print Label By Order Location, 5/14/2020 10:09 CDT  Troponin-I (Order): Stat collect, 5/14/2020 10:09 CDT, Blood, Lab Collect, Print Label By Order Location, 5/14/2020 10:09 CDT  CMP (Order): Stat collect, 5/14/2020 10:09 CDT, Blood, Lab Collect, Print Label By Order Location, 5/14/2020 10:09 CDT  CBC w/ Auto Diff (Order): Now collect, 5/14/2020 10:09 CDT, Blood, Lab Collect, Print Label By Order Location, 5/14/2020 10:09 CDT  Patient Care:  GI Cocktail - 50mL (ED ONLY) (Order)  Pharmacy:  [facility] with Simethicone  (400/400/40mg per 5mL) UD Susp (Order): 30 mL, form: Susp, Oral, Once, first dose 5/14/2020 10:09 CDT, stop date 5/14/2020 10:09 CDT  Lidocaine Viscous (Order): 10 mL, form: Soln, Oral, Once, first dose 5/14/2020 10:09 CDT, stop date 5/14/2020 10:09 CDT  hyoscyamine 0.25mg/10mL UD syringe (Order): 0.25 mg, form: Liquid, Oral, Once, first dose 5/14/2020 10:09 CDT, stop date 5/14/2020 10:09 CDT, STAT  Cardiology:  EKG (Order): 5/14/2020 10:08 CDT, NOW, -1, -1, 5/14/2020 10:08 CDT, Launch Orders   Pharmacy:  Bentyl (Order): 20 mg, form: Injection, IM, Once, first dose 5/14/2020 11:06 CDT, stop date 5/14/2020 11:06 CDT, STAT  , Launch Orders   Laboratory:  Urinalysis with Reflex (Order): Stat collect, Urine, 5/14/2020 11:16 CDT, Nurse collect, Print Label By Order Location  , Launch Orders   Radiology:  US Abdomen Limited (Order): Stat, 5/14/2020 11:47 CDT, Epigastric Pain, None, Stretcher, epigastric pain, nausea, vomiting, history of GERD, Rad Type, Schedule this test  , Launch Orders   Pharmacy:  Protonix (Order): 40 mg, IV Piggyback, Once, first  dose 5/14/2020 12:31 CDT, stop date 5/14/2020 12:31 CDT, STAT  , Launch Orders   Radiology:  CT Abdomen and Pelvis W Contrast (Order): Stat, 5/14/2020 13:32 CDT, Abdominal Pain, None, Stretcher, upper abdominal pain, calcified GB on ultrasound, Rad Type  .    Results review:  Lab results : Lab View   5/14/2020 11:25 CDT      UA Appear                 CLEAR                             UA Color                  YELLOW                             UA Spec Grav              1.018                             UA Bili                   Negative                             UA pH                     5.0                             UA Urobilinogen           0.2                             UA Blood                  1+                             UA Glucose                Trace                             UA Ketones                Negative                             UA Protein                Trace                             UA Nitrite                Negative                             UA Leuk Est               Negative                             UA WBC                    NONE SEEN /HPF                             UA RBC                    NONE SEEN                             UA Bacteria               NONE SEEN /HPF                             UA Squam Epithelial       NONE SEEN    5/14/2020 10:38 CDT      Sodium Lvl                138 mmol/L                             Potassium Lvl             4.2 mmol/L                             Chloride                  102 mmol/L                             CO2                       27 mmol/L                             Calcium Lvl               9.3 mg/dL                             Glucose Lvl               128 mg/dL  HI                             BUN                       16.2 mg/dL                             Creatinine                1.17 mg/dL                             eGFR-AA                   >60  NA                             eGFR-JULIUS                  >60  NA                              Bili Total                0.6 mg/dL                             Bili Direct               0.3 mg/dL                             Bili Indirect             0.30 mg/dL                             AST                       30 unit/L                             ALT                       35 unit/L                             Alk Phos                  76 unit/L                             Total Protein             7.7 gm/dL  HI                             Albumin Lvl               4.3 gm/dL                             Globulin                  3.4 gm/dL                             A/G Ratio                 1.3 ratio                             Lipase Lvl                17 U/L                             Troponin-I                0.003 ng/mL                             WBC                       9.1 x10(3)/mcL                             RBC                       5.58 x10(6)/mcL                             Hgb                       16.7 gm/dL                             Hct                       52.3 %  HI                             Platelet                  274 x10(3)/mcL                             MCV                       93.7 fL                             MCH                       29.9 pg                             MCHC                      31.9 gm/dL  LOW                             RDW                       13.0 %                             MPV                       9.6 fL                             Abs Neut                  7.16 x10(3)/mcL                             Neutro Auto               79 %  NA                             Lymph Auto                12 %  NA                             Mono Auto                 8 %  NA                             Eos Auto                  0 %  NA                             Abs Eos                   0.0 x10(3)/mcL                             Basophil Auto             1 %  NA                             Abs Neutro                7.16 x10(3)/mcL                              Abs Lymph                 1.1 x10(3)/mcL                             Abs Mono                  0.7 x10(3)/mcL                             Abs Baso                  0.1 x10(3)/mcL    ,    No qualifying data available.    Radiology results:  Rad Results (ST)  < 12 hrs   Accession: [id number]  Order: CT Abdomen and Pelvis W Contrast  Report Dt/Tm: 05/14/2020 13:57  Report:   CT Abdomen and Pelvis W Contrast     REASON FOR STUDY: Abdominal Pain      TECHNIQUE: CT imaging was performed of the abdomen and pelvis after  the administration of intravenous contrast. Dose length product is 489  mGycm. Automatic exposure control, adjustment of mA/kV or iterative  reconstruction technique was used to limit radiation dose.     COMPARISON: CT chest abdomen pelvis dated 8/5/2011, abdominal  ultrasound dated 5/14/2020      FINDINGS:   Evaluation is limited by motion artifact.     Liver: No focal liver mass identified.     Gallbladder and biliary tree: The gallbladder is nondistended and  contains multiple calcified stones. The gallbladder wall appears  thickened with an increased density. There are no significant  surrounding inflammatory changes.     Pancreas: Normal.     Spleen: Normal.     Adrenals: Normal.     Kidneys and ureters: Normal.     Bladder: Normal.     Reproductive organs: No pelvic masses.     Stomach/bowel: There is scattered diverticula without inflammatory  changes. There is no bowel obstruction. The appendix is not definitely  visualized.     Lymph nodes: No pathologically enlarged lymph node identified.     Peritoneum: No ascites or free air. No fluid collection.     Vessels: Atherosclerotic changes of the abdominal aorta without  aneurysm.     Abdominal wall: Normal.     Lung bases: No consolidation or pleural effusion.     Bones: No destructive bone lesion.     IMPRESSION:   1.  Nondistended gallbladder with cholelithiasis and thickened  gallbladder wall. No significant surrounding inflammatory  changes.  2.  Diverticulosis without evidence of acute diverticulitis.    Accession: [id number]  Order: US Abdomen Limited  Report Dt/Tm: 05/14/2020 12:41  Report:   Clinical History  Pain     Technique  Limited abdominal Ultrasound Images obtained in grayscale and color.     Comparison  CT dated 8/5/2011     Findings  The LIVER is normal in size and contour at 16.3 cm (sagittal right  lobe). Hepatic parenchyma has normal echogenicity with normal  delineation of the periportal triads. No definite intrahepatic masses  are noted. The portal vein is patent with hepatopetal flow.     The BILIARY SYSTEM is normal in caliber; the common hepatic duct  measures 3.5 mm. The gallbladder wall appears calcified with  surrounding. Pericholecystic fluid. Calcifications limit sonographic  evaluation. Acute cholecystitis is not excluded, however recommend  further evaluation with CT the abdomen for better visualization. There  were no gallbladder wall calcifications on prior CT, however this is  from 2011.     The PANCREATIC head and body are partially visualized but grossly  normal in appearance. The pancreatic duct is not dilated.     The RIGHT KIDNEY is normal in size at 10.5 x 4.8 x 4 cm and  echogenicity/texture. No stones or hydronephrosis seen. No solid  masses are noted.      The AORTA is obscured by gas and IVC is partially visualized and  unremarkable.     Impression  The gallbladder wall appears calcified with surrounding.  Pericholecystic fluid. Calcifications limit sonographic evaluation.  Acute cholecystitis is not excluded, however recommend further  evaluation with CT the abdomen for better visualization. There were no  gallbladder wall calcifications on prior CT, however this is from  2011.           .      Impression and Plan   Diagnosis   Epigastric abdominal pain (PAL21-TM R10.13)    Diagnosis code search       Calls-Consults   -  Surgery consulted- recommend CT to further evaluate gallbladder. CT showed no  inflammatory surrounding changes- surgery recommends GI cocktail and if pain not improved/worsens patient will be taken for gallbladder removal. After morphine and GI cocktail no improvement in symptoms- surgery to admit     Spoke with Dr. Rodgers-she is aware of the patient's admission and has made face to face contact with the patient. .   Plan   Condition: Stable.    Disposition: Admit time  5/14/2020 15:00:00, Gilbert HUBBARD MD, Osiel RODRIGUEZ    Counseled      Addendum      Teaching-Supervisory Addendum-Brief   I participated in the following activities of this patients care: the medical history, the physical exam, medical decision making.   I personally performed: supervision of the patient's care, the medical history, the physical exam, the medical decision making.   The case was discussed with: the physician assistant.   Evaluation and management service: I agree with the evaluation and management decisions made in this patient's care.   Results interpretation: I agree with the study interpretation in this patient's care.   Notes: I conducted my own face-to-face evaluation of the patient and performed an independent history and physical examination of this patient. I discussed the MDM and reviewed the results with the PA.     Briefly, this is a 75-year-old male who presented to the emergency department for evaluation of epigastric abdominal pain.  States that he missed his omeprazole yesterday.  On exam, he has moderate epigastric tenderness to palpation.  Labs unrevealing.  Ultrasound obtained tonight if possible cholelithiasis/biliary disease demonstrates findings concerning for calcifications or cholecystic fluid, consider wall echo shadow sign.  Case discussed with surgical hospitalist requested that we obtain a CT scan of the abdomen and pelvis to further evaluate.  CT scan similarly with cholelithiasis though reported no evidence of cholecystitis.  They requested that we give the patient GI cocktail see if this  improved his symptoms.  The patient was given the medications and states his pain actually worsened significantly after GI cocktail in the emergency department.  Surgical hospitalist has agreed to admit the patient for further evaluation/management. Findings and plan discussed with the patient, and he is agreeable to admission at this time.     Zenia Rodgers MD  .

## 2022-05-03 ENCOUNTER — APPOINTMENT (OUTPATIENT)
Dept: LAB | Facility: HOSPITAL | Age: 67
End: 2022-05-03
Attending: FAMILY MEDICINE
Payer: MEDICARE

## 2022-05-03 DIAGNOSIS — E07.9 DISORDER OF THYROID GLAND: ICD-10-CM

## 2022-05-03 DIAGNOSIS — E78.5 HYPERLIPIDEMIA, UNSPECIFIED HYPERLIPIDEMIA TYPE: ICD-10-CM

## 2022-05-03 DIAGNOSIS — E55.9 VITAMIN D DEFICIENCY DISEASE: ICD-10-CM

## 2022-05-03 DIAGNOSIS — E11.9 DIABETES MELLITUS WITHOUT COMPLICATION: Primary | ICD-10-CM

## 2022-05-03 DIAGNOSIS — Z12.5 SPECIAL SCREENING FOR MALIGNANT NEOPLASM OF PROSTATE: ICD-10-CM

## 2022-05-03 DIAGNOSIS — Z00.00 WELL ADULT EXAM: ICD-10-CM

## 2022-05-03 LAB
ALBUMIN SERPL-MCNC: 4.2 GM/DL (ref 3.4–4.8)
ALBUMIN/GLOB SERPL: 1.2 RATIO (ref 1.1–2)
ALP SERPL-CCNC: 78 UNIT/L (ref 40–150)
ALT SERPL-CCNC: 68 UNIT/L (ref 0–55)
APPEARANCE UR: CLEAR
AST SERPL-CCNC: 61 UNIT/L (ref 5–34)
BACTERIA #/AREA URNS AUTO: NORMAL /HPF
BASOPHILS # BLD AUTO: 0.07 X10(3)/MCL (ref 0–0.2)
BASOPHILS NFR BLD AUTO: 0.8 %
BILIRUB UR QL STRIP.AUTO: NEGATIVE MG/DL
BILIRUBIN DIRECT+TOT PNL SERPL-MCNC: 0.4 MG/DL (ref 0–0.5)
BILIRUBIN DIRECT+TOT PNL SERPL-MCNC: 0.5 MG/DL (ref 0–0.8)
BILIRUBIN DIRECT+TOT PNL SERPL-MCNC: 0.9 MG/DL
BUN SERPL-MCNC: 18.4 MG/DL (ref 8.4–25.7)
CALCIUM SERPL-MCNC: 10 MG/DL (ref 8.8–10)
CHLORIDE SERPL-SCNC: 103 MMOL/L (ref 98–107)
CHOLEST SERPL-MCNC: 149 MG/DL
CHOLEST/HDLC SERPL: 5 {RATIO} (ref 0–5)
CO2 SERPL-SCNC: 30 MMOL/L (ref 23–31)
COLOR UR AUTO: YELLOW
CREAT SERPL-MCNC: 1.37 MG/DL (ref 0.73–1.18)
DEPRECATED CALCIDIOL+CALCIFEROL SERPL-MC: 17.2 NG/ML (ref 30–80)
EOSINOPHIL # BLD AUTO: 0.28 X10(3)/MCL (ref 0–0.9)
EOSINOPHIL NFR BLD AUTO: 3.2 %
ERYTHROCYTE [DISTWIDTH] IN BLOOD BY AUTOMATED COUNT: 12.4 % (ref 11.5–17)
EST. AVERAGE GLUCOSE BLD GHB EST-MCNC: 148.5 MG/DL
GLOBULIN SER-MCNC: 3.5 GM/DL (ref 2.4–3.5)
GLUCOSE SERPL-MCNC: 109 MG/DL (ref 82–115)
GLUCOSE UR QL STRIP.AUTO: NEGATIVE MG/DL
HBA1C MFR BLD: 6.8 %
HCT VFR BLD AUTO: 54.9 % (ref 42–52)
HDLC SERPL-MCNC: 33 MG/DL (ref 35–60)
HGB BLD-MCNC: 17.7 GM/DL (ref 14–18)
IMM GRANULOCYTES # BLD AUTO: 0.02 X10(3)/MCL (ref 0–0.02)
IMM GRANULOCYTES NFR BLD AUTO: 0.2 % (ref 0–0.43)
KETONES UR QL STRIP.AUTO: NEGATIVE MG/DL
LDLC SERPL CALC-MCNC: 82 MG/DL (ref 50–140)
LEUKOCYTE ESTERASE UR QL STRIP.AUTO: NEGATIVE UNIT/L
LYMPHOCYTES # BLD AUTO: 2.58 X10(3)/MCL (ref 0.6–4.6)
LYMPHOCYTES NFR BLD AUTO: 29.7 %
MCH RBC QN AUTO: 30.5 PG (ref 27–31)
MCHC RBC AUTO-ENTMCNC: 32.2 MG/DL (ref 33–36)
MCV RBC AUTO: 94.5 FL (ref 80–94)
MONOCYTES # BLD AUTO: 0.89 X10(3)/MCL (ref 0.1–1.3)
MONOCYTES NFR BLD AUTO: 10.3 %
NEUTROPHILS # BLD AUTO: 4.8 X10(3)/MCL (ref 2.1–9.2)
NEUTROPHILS NFR BLD AUTO: 55.8 %
NITRITE UR QL STRIP.AUTO: NEGATIVE
NRBC BLD AUTO-RTO: 0 %
PH UR STRIP.AUTO: 5.5 [PH]
PLATELET # BLD AUTO: 308 X10(3)/MCL (ref 130–400)
PMV BLD AUTO: 9.8 FL (ref 9.4–12.4)
POTASSIUM SERPL-SCNC: 4.7 MMOL/L (ref 3.5–5.1)
PROT SERPL-MCNC: 7.7 GM/DL (ref 5.8–7.6)
PROT UR QL STRIP.AUTO: NEGATIVE MG/DL
PSA SERPL-MCNC: 0.59 NG/ML
RBC # BLD AUTO: 5.81 X10(6)/MCL (ref 4.7–6.1)
RBC #/AREA URNS AUTO: 1 /HPF
RBC UR QL AUTO: ABNORMAL UNIT/L
SODIUM SERPL-SCNC: 142 MMOL/L (ref 136–145)
SP GR UR STRIP.AUTO: 1.02 (ref 1–1.03)
SQUAMOUS #/AREA URNS AUTO: 1 /LPF
TRIGL SERPL-MCNC: 172 MG/DL (ref 34–140)
TSH SERPL-ACNC: 1.35 UIU/ML (ref 0.35–4.94)
UROBILINOGEN UR STRIP-ACNC: 0.2 MG/DL
VLDLC SERPL CALC-MCNC: 34 MG/DL
WBC # SPEC AUTO: 8.7 X10(3)/MCL (ref 4.5–11.5)
WBC #/AREA URNS AUTO: 1 /HPF

## 2022-05-03 PROCEDURE — 84153 ASSAY OF PSA TOTAL: CPT

## 2022-05-03 PROCEDURE — 81015 MICROSCOPIC EXAM OF URINE: CPT | Mod: XB

## 2022-05-03 PROCEDURE — 81003 URINALYSIS AUTO W/O SCOPE: CPT

## 2022-05-03 PROCEDURE — 84443 ASSAY THYROID STIM HORMONE: CPT

## 2022-05-03 PROCEDURE — 80053 COMPREHEN METABOLIC PANEL: CPT

## 2022-05-03 PROCEDURE — 36415 COLL VENOUS BLD VENIPUNCTURE: CPT

## 2022-05-03 PROCEDURE — 82306 VITAMIN D 25 HYDROXY: CPT

## 2022-05-03 PROCEDURE — 80061 LIPID PANEL: CPT

## 2022-05-03 PROCEDURE — 85025 COMPLETE CBC W/AUTO DIFF WBC: CPT

## 2022-05-03 PROCEDURE — 82043 UR ALBUMIN QUANTITATIVE: CPT

## 2022-05-03 PROCEDURE — 83036 HEMOGLOBIN GLYCOSYLATED A1C: CPT

## 2022-05-04 LAB
CREAT UR-MCNC: 191.8 MG/DL (ref 63–166)
MICROALBUMIN UR-MCNC: 23.4 UG/ML
MICROALBUMIN/CREAT RATIO PNL UR: 12.2 MG/GM CR (ref 0–30)

## 2022-05-04 NOTE — HISTORICAL [facility]ER
This is a historical note converted from Adam. Formatting and pictures may have been removed.  Please reference Adam for original formatting and attached multimedia. Chief Complaint  c/o epigastric pain N/V x1 started 0330 this morning, hx GERD and states this feels the same, states he missed a dose of his omeprazole yesterday but took it this morning. denies cardiac hx.  History of Present Illness  65-year-old male with a history of diabetes, hypertension, and GERD presents emergency department with?complaint of epigastric pain?since early this morning.? States that it feels similar to?his normal GERD pain?but has not relented.? One episode of emesis?early morning with initiation of pain. ?Urinating without issue. ?No bowel movement since?pain began.? No fevers or chills.? Denies any recent weight loss.? States that he was due for his next colonoscopy last year.  Review of Systems  Gen: Denies any weight loss, fevers, or chills  Skin: No rashes or skin changes  HEENT: Denies any headaches or dysphagia  CV: Denies any chest pain, palpitations, or orthopnea  Resp: Denies any shortness of breath, wheezing, or cough  GI: As above  : Denies any pain or difficulty urinating. No hematuria  MSK: Denies any weakness or joint pain  Neuro: No seizures or tingling in extremities  Physical Exam  Vitals & Measurements  T:?36.4? ?C (Oral)? HR:?61(Peripheral)? RR:?18? BP:?160/73? SpO2:?98%? WT:?73?kg?  Gen: Awake, alert, oriented; appears in NAD  HEENT: Atraumatic, MMM, EOMI  CV: Regular rate?and rhythm on monitor, radial pulses 2+, no murmurs appreciated  Resp: CTAB, no wheezes or rales appreciated  Abd: Soft, nondistended.? Tender to palpation in epigastric region. ?Negative Baker sign.  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 ?N.p.o. at midnight  -Plan for?surgery  tomorrow  -Discussed risk, benefits, and alternatives of procedure with patient. ?Patient agrees to proceed with surgery.  -IV fluids  -Morphine for pain control while n.p.o.  ?  Dennis Sanchez MD  PGY-1, [employer] [facility]   Problem List/Past Medical History  Ongoing  Cigarette smoker  Diabetes mellitus  GERD - Gastro-esophageal reflux disease  Hyperlipidemia  Hypertension  Historical  No qualifying data  Procedure/Surgical History  Colonoscopy   Medications  Inpatient  Lactated Ringers Injection intravenous solution 1,000 mL, 1000 mL, IV  Lovenox, 40 mg= 0.4 mL, Subcutaneous, Daily  metoprolol succinate 25 mg oral tablet, extended release, 25 mg= 1 tab(s), Oral, Daily  morphine 4 mg/mL preservative-free injectable solution, 4 mg= 1 mL, IV, q2hr, PRN  morphine 4 mg/mL preservative-free injectable solution, 2 mg= 0.5 mL, IV, q2hr, PRN  Norvasc 5 mg oral tablet, 5 mg= 1 tab(s), Oral, Daily  Wellbutrin XL, 150 mg= 1 tab(s), Oral, q24hr  Zestril 10 mg oral tablet, 10 mg= 1 tab(s), Oral, Daily  Home  acetaminophen-hydrocodone 325 mg-10 mg oral tablet, 1 tab(s), Oral, q4hr  amlodipine-benazepril 10 mg-20 mg oral capsule, 1 cap(s), Oral, Daily, 3 refills  aspirin 81 mg oral Delayed Release (EC) tablet, 81 mg= 1 tab(s), Oral, Daily, 3 refills  atorvastatin 20 mg oral tablet, 20 mg= 1 tab(s), Oral, Daily, 3 refills  BD SINGLE USE SWAB  dextromethorphan-promethazine 15 mg-6.25 mg/5 mL oral syrup, 5 mL, Oral, q6hr,? ?Not Taking, Completed Rx  fenofibrate 145 mg oral tablet, 145 mg= 1 tab(s), Oral, Daily, 3 refills  glimepiride 2 mg oral tablet, See Instructions, 3 refills  Glucometer, See Instructions  Glucose Testing Strips, See Instructions, 11 refills  Lancets, See Instructions, 11 refills  metoprolol succinate 25 mg oral tablet extended release, 25 mg= 1 tab(s), Oral, Daily, 3 refills  omeprazole 20 mg oral DR capsule, 20 mg= 1 cap(s), Oral, Daily, 3 refills  Wellbutrin  mg/24 hours oral tablet, extended release, 150 mg= 1  tab(s), Oral, q24hr  Allergies  No Known Medication Allergies  Social History  Abuse/Neglect  No, No, Yes, 03/17/2020  Alcohol  Never, 03/17/2020  Employment/School  disabled, 04/13/2018  Exercise  Exercise duration: 0., 04/13/2018  Home/Environment  Lives with Spouse. Living situation: Home/Independent., 01/15/2019  Nutrition/Health  Type of diet: no fried foods. Regular, 04/13/2018  Sexual  Sexually active: Yes. Gender Identity Identifies as male., 01/15/2019  Sexually active: No., 04/13/2018  Substance Use  Never, 04/13/2018  Tobacco  10 or more cigarettes (1/2 pack or more)/day in last 30 days, No, 03/17/2020  Family History  Endometriosis: Negative: Father.  Heart disease: Mother.  Primary malignant neoplasm of lung: Father.  Immunizations  Vaccine Date Status Comments   influenza virus vaccine, inactivated 10/21/2019 Recorded [facility] pharmacy   influenza virus vaccine, inactivated 10/15/2018 Given    influenza virus vaccine, inactivated 10/15/2018 Given Manuf: ?Seqirus   Lab Results  Labs Last 24 Hours?  ?Chemistry? Hematology/Coagulation?   Sodium Lvl: 138 mmol/L (05/14/20 10:38:00) WBC: 9.1 x10(3)/mcL (05/14/20 10:38:00)   Potassium Lvl: 4.2 mmol/L (05/14/20 10:38:00) RBC: 5.58 x10(6)/mcL (05/14/20 10:38:00)   Chloride: 102 mmol/L (05/14/20 10:38:00) Hgb: 16.7 gm/dL (05/14/20 10:38:00)   CO2: 27 mmol/L (05/14/20 10:38:00) Hct:?52.3 %?High (05/14/20 10:38:00)   Calcium Lvl: 9.3 mg/dL (05/14/20 10:38:00) Platelet: 274 x10(3)/mcL (05/14/20 10:38:00)   Glucose Lvl:?128 mg/dL?High (05/14/20 10:38:00) MCV: 93.7 fL (05/14/20 10:38:00)   BUN: 16.2 mg/dL (05/14/20 10:38:00) MCH: 29.9 pg (05/14/20 10:38:00)   Creatinine: 1.17 mg/dL (05/14/20 10:38:00) MCHC:?31.9 gm/dL?Low (05/14/20 10:38:00)   eGFR-AA: >60 (05/14/20 10:38:00) RDW: 13 % (05/14/20 10:38:00)   eGFR-JULIUS: >60 (05/14/20 10:38:00) MPV: 9.6 fL (05/14/20 10:38:00)   Bili Total: 0.6 mg/dL (05/14/20 10:38:00) Abs Neut: 7.16 x10(3)/mcL (05/14/20 10:38:00)   Bili  Direct: 0.3 mg/dL (05/14/20 10:38:00) Neutro Auto: 79 % (05/14/20 10:38:00)   Bili Indirect: 0.3 mg/dL (05/14/20 10:38:00) Lymph Auto: 12 % (05/14/20 10:38:00)   AST: 30 unit/L (05/14/20 10:38:00) Mono Auto: 8 % (05/14/20 10:38:00)   ALT: 35 unit/L (05/14/20 10:38:00) Eos Auto: 0 % (05/14/20 10:38:00)   Alk Phos: 76 unit/L (05/14/20 10:38:00) Abs Eos: 0 x10(3)/mcL (05/14/20 10:38:00)   Total Protein:?7.7 gm/dL?High (05/14/20 10:38:00) Basophil Auto: 1 % (05/14/20 10:38:00)   Albumin Lvl: 4.3 gm/dL (05/14/20 10:38:00) Abs Neutro: 7.16 x10(3)/mcL (05/14/20 10:38:00)   Globulin: 3.4 gm/dL (05/14/20 10:38:00) Abs Lymph: 1.1 x10(3)/mcL (05/14/20 10:38:00)   A/G Ratio: 1.3 ratio (05/14/20 10:38:00) Abs Mono: 0.7 x10(3)/mcL (05/14/20 10:38:00)   Lipase Lvl: 17 U/L (05/14/20 10:38:00) Abs Baso: 0.1 x10(3)/mcL (05/14/20 10:38:00)   Troponin-I: 0.003 ng/mL (05/14/20 10:38:00)    Diagnostic Results  Accession:?[id number]  Order:?CT Abdomen and Pelvis W Contrast  Report Dt/Tm:?05/14/2020 13:57  Report:?  CT Abdomen and Pelvis W Contrast  ?  REASON FOR STUDY: Abdominal Pain?  ?  TECHNIQUE: CT imaging was performed of the abdomen and pelvis after  the administration of intravenous contrast. Dose length product is 489  mGycm. Automatic exposure control, adjustment of mA/kV or iterative  reconstruction technique was used to limit radiation dose.  ?  COMPARISON: CT chest abdomen pelvis dated 8/5/2011, abdominal  ultrasound dated 5/14/2020?  ?  FINDINGS:?  Evaluation is limited by motion artifact.  ?  Liver: No focal liver mass identified.  ?  Gallbladder and biliary tree: The gallbladder is nondistended and  contains multiple calcified stones. The gallbladder wall appears  thickened with an increased density. There are no significant  surrounding inflammatory changes.  ?  Pancreas: Normal.  ?  Spleen: Normal.  ?  Adrenals: Normal.  ?  Kidneys and ureters: Normal.  ?  Bladder: Normal.  ?  Reproductive organs: No pelvic  masses.  ?  Stomach/bowel: There is scattered diverticula without inflammatory  changes. There is no bowel obstruction. The appendix is not definitely  visualized.  ?  Lymph nodes: No pathologically enlarged lymph node identified.  ?  Peritoneum: No ascites or free air. No fluid collection.  ?  Vessels: Atherosclerotic changes of the abdominal aorta without  aneurysm.  ?  Abdominal wall: Normal.  ?  Lung bases: No consolidation or pleural effusion.  ?  Bones: No destructive bone lesion.  ?  IMPRESSION:?  1. ?Nondistended gallbladder with cholelithiasis and thickened  gallbladder wall. No significant surrounding inflammatory changes.  2. ?Diverticulosis without evidence of acute diverticulitis.  ?  Accession:?[id number]  Order:?US Abdomen Limited  Report Dt/Tm:?05/14/2020 12:41  Report:?  Clinical History  Pain  ?  Technique  Limited abdominal Ultrasound Images obtained in grayscale and color.  ?  Comparison  CT dated 8/5/2011  ?  Findings  The LIVER is normal in size and contour at 16.3 cm (sagittal right  lobe). Hepatic parenchyma has normal echogenicity with normal  delineation of the periportal triads. No definite intrahepatic masses  are noted. The portal vein is patent with hepatopetal flow.  ?  The BILIARY SYSTEM is normal in caliber; the common hepatic duct  measures 3.5 mm. The gallbladder wall appears calcified with  surrounding. Pericholecystic fluid. Calcifications limit sonographic  evaluation. Acute cholecystitis is not excluded, however recommend  further evaluation with CT the abdomen for better visualization. There  were no gallbladder wall calcifications on prior CT, however this is  from 2011.  ?  The PANCREATIC head and body are partially visualized but grossly  normal in appearance. The pancreatic duct is not dilated.  ?  The RIGHT KIDNEY is normal in size at 10.5 x 4.8 x 4 cm and  echogenicity/texture. No stones or hydronephrosis seen. No solid  masses are noted.?  ?  The AORTA is obscured by  gas and IVC is partially visualized and  unremarkable.  ?  Impression  The gallbladder wall appears calcified with surrounding.  Pericholecystic fluid. Calcifications limit sonographic evaluation.  Acute cholecystitis is not excluded, however recommend further  evaluation with CT the abdomen for better visualization. There were no  gallbladder wall calcifications on prior CT, however this is from  2011.  ?  ?  ?      Agree with above assessment and plan. Symptomatic cholelithiasis vs. cholecystitis. To OR in AM for cholecystectomy.

## 2022-05-04 NOTE — HISTORICAL [facility] C[facility]
This is a historical note converted from Adam. Formatting and pictures may have been removed.  Please reference [facility] for original formatting and attached multimedia. Admit and Discharge Dates  Admit Date: 05/14/2020  Discharge Date: 05/16/2020  Physicians  Attending Physician - Felice HUBBARD MD, Osiel DON  Admitting Physician - Felice HUBBARD MD, Osiel DON  Primary Care Physician - Elvis PULIDO, Trevor SEGOVIA  Discharge Diagnosis  Abdominal pain?[id number]  Cigarette smoker?F17.210  Epigastric abdominal pain?R10.13,?Epigastric abdominal pain?R10.13  Hypertension?I10  Surgical Procedures  05/15/2020 - [id number] - Cholecystectomy Laparoscopic  Immunizations  No immunizations recorded for this visit.  Admission Information  65-year-old male with a history of diabetes, hypertension, and GERD presents emergency department with?complaint of epigastric pain?since early this morning.? States that it feels similar to?his normal GERD pain?but has not relented.? One episode of emesis?early morning with initiation of pain. ?Urinating without issue. ?No bowel movement since?pain began.? No fevers or chills.? Denies any recent weight loss.? States that he was due for his next colonoscopy last year.  Hospital Course  65-year-old male?admitted to Surgery with?porcelain gallbladder and symptomatic cholelithiasis. He was provided supportive care and taken to the OR the next day for laparoscopic cholecystectomy (see operative note for full details). The patient did well postoperatively, was tolerating a regular diet ambulate. ?His pain was well controlled.? At this time he had received maximum benefit from hospital stay and was deemed stable for discharge.  Significant Findings  (05/14/2020 13:54 CDT CT Abdomen and Pelvis W Contrast)  IMPRESSION:?  1. ?Nondistended gallbladder with cholelithiasis and thickened  gallbladder wall. No significant surrounding inflammatory changes.  2. ?Diverticulosis without evidence of  acute diverticulitis. [1]  (05/14/2020 12:41 CDT US Abdomen Limited)  Impression  The gallbladder wall appears calcified with surrounding.  Pericholecystic fluid. Calcifications limit sonographic evaluation.  Acute cholecystitis is not excluded, however recommend further  evaluation with CT the abdomen for better visualization. There were no  gallbladder wall calcifications on prior CT, however this is from  2011. [2]  Time Spent on discharge  Greater than 30 minutes  Objective  Vitals & Measurements  T:?36.9? ?C (Oral)? TMIN:?36.7? ?C (Oral)? TMAX:?37.3? ?C (Oral)? HR:?95(Peripheral)? RR:?16? BP:?167/87? SpO2:?90%?  Physical Exam  Gen: Awake, alert, oriented; appears in NAD  HEENT: Atraumatic, MMM, EOMI  CV: Regular rate?and rhythm on monitor, radial pulses 2+, no murmurs appreciated  Resp: CTAB, no wheezes or rales appreciated  Abd: Soft, nondistended.? Tender to palpation in midline. ?Negative Baker sign. Lap incisions with steristrips in place c/d/i  Ext: Strength 5/5 throughout bilateral upper and lower extremities  Patient Discharge Condition  Stable  Discharge Disposition  Home   Discharge Medication Reconciliation  Prescribed  oxyCODONE (oxycodone 5 mg oral tablet)?5 mg, Oral, q6hr, PRN as needed for pain  Continue  Misc Prescription (Glucometer)?See Instructions  Misc Prescription (Glucose Testing Strips)?See Instructions  Misc Prescription (Lancets)?See Instructions  acetaminophen-HYDROcodone (acetaminophen-hydrocodone 325 mg-10 mg oral tablet)?1 tab(s), Oral, q4hr  amlodipine-benazepril (amlodipine-benazepril 10 mg-20 mg oral capsule)?1 cap(s), Oral, Daily  aspirin (aspirin 81 mg oral Delayed Release (EC) tablet)?81 mg, Oral, Daily  atorvastatin (atorvastatin 20 mg oral tablet)?20 mg, Oral, Daily  buPROPion (Wellbutrin  mg/24 hours oral tablet, extended release)?150 mg, Oral, q24hr  dextromethorphan-promethazine (dextromethorphan-promethazine 15 mg-6.25 mg/5 mL oral syrup)?5 mL, Oral,  q6hr  fenofibrate (fenofibrate 145 mg oral tablet)?145 mg, Oral, Daily  glimepiride (glimepiride 2 mg oral tablet)?See Instructions  isopropyl alcohol topical (BD SINGLE USE SWAB)  metoprolol (metoprolol succinate 25 mg oral tablet extended release)?25 mg, Oral, Daily  omeprazole (omeprazole 20 mg oral DR capsule)?20 mg, Oral, Daily  Education and Orders Provided  Laparoscopic Cholecystectomy, Care After, Easy-to-Read  Gallbladder Eating Plan  Discharge - 05/16/20 11:37:00 CDT, Home, Give all scheduled vaccinations prior to discharge.?  Discharge Activity - No Heavy Lifting, No heavy lifting for 4 weeks?  Discharge Diet - Regular?  Discharge Wound Care - 05/16/20 11:37:00 CDT, 1-2x/Day, Okay to shower. Let bandaids fall off on their own. No baths for 2 weeks?  Follow up  Surgical Hospitalist Clinic Appointment  ????  Office will call to check on you. Please call with any questions or problems  Trevor Leal, within 1 to 2 weeks  ????  Due to your afterhours discharge, we were unable to schedule your follow up appointment at this time. Someone from [address] [place] [address] will call your physician office to schedule your appointment and notify you on the next business day.     [1]?CT Abdomen and Pelvis W Contrast; Chelsey Munoz MD 05/14/2020 13:54 CDT  [2]?US Abdomen Limited; Marco PULIDO, Cristhian Naylor 05/14/2020 12:41 CDT

## 2022-05-07 DIAGNOSIS — Z12.5 SCREENING FOR PROSTATE CANCER: ICD-10-CM

## 2022-05-07 DIAGNOSIS — E78.5 HYPERLIPIDEMIA, UNSPECIFIED HYPERLIPIDEMIA TYPE: ICD-10-CM

## 2022-05-07 DIAGNOSIS — E11.9 DIABETES MELLITUS: ICD-10-CM

## 2022-05-07 DIAGNOSIS — E07.9 THYROID DYSFUNCTION: ICD-10-CM

## 2022-05-07 DIAGNOSIS — Z00.00 WELLNESS EXAMINATION: Primary | ICD-10-CM

## 2022-05-10 RX ORDER — METOPROLOL SUCCINATE 25 MG/1
TABLET, EXTENDED RELEASE ORAL
COMMUNITY
Start: 2022-01-18 | End: 2023-02-20

## 2022-05-10 RX ORDER — FENOFIBRATE 145 MG/1
TABLET, FILM COATED ORAL
COMMUNITY
Start: 2022-01-17 | End: 2022-10-10 | Stop reason: SDUPTHER

## 2022-05-10 RX ORDER — GLIMEPIRIDE 2 MG/1
TABLET ORAL
COMMUNITY
Start: 2022-03-23 | End: 2022-08-17

## 2022-05-10 RX ORDER — ATORVASTATIN CALCIUM 20 MG/1
TABLET, FILM COATED ORAL
COMMUNITY
Start: 2022-03-23 | End: 2022-08-17

## 2022-05-10 RX ORDER — AMLODIPINE AND BENAZEPRIL HYDROCHLORIDE 10; 20 MG/1; MG/1
CAPSULE ORAL
COMMUNITY
Start: 2021-10-27 | End: 2022-08-17

## 2022-05-10 RX ORDER — OMEPRAZOLE 20 MG/1
CAPSULE, DELAYED RELEASE ORAL
COMMUNITY
Start: 2022-04-21 | End: 2022-10-10 | Stop reason: SDUPTHER

## 2022-05-10 RX ORDER — FLUTICASONE PROPIONATE 50 MCG
SPRAY, SUSPENSION (ML) NASAL
COMMUNITY
Start: 2021-05-12 | End: 2023-08-08 | Stop reason: SDUPTHER

## 2022-07-14 ENCOUNTER — PATIENT OUTREACH (OUTPATIENT)
Dept: ADMINISTRATIVE | Facility: HOSPITAL | Age: 67
End: 2022-07-14
Payer: MEDICARE

## 2022-07-14 NOTE — PROGRESS NOTES
Population Health. Out Reach.  The following record(s)  below were uploaded for Health Maintenance .    7/10/19 GEORGE

## 2022-08-01 ENCOUNTER — OFFICE VISIT (OUTPATIENT)
Dept: PRIMARY CARE CLINIC | Facility: CLINIC | Age: 67
End: 2022-08-01
Payer: MEDICARE

## 2022-08-01 VITALS
BODY MASS INDEX: 25.23 KG/M2 | HEART RATE: 82 BPM | WEIGHT: 157 LBS | SYSTOLIC BLOOD PRESSURE: 120 MMHG | OXYGEN SATURATION: 95 % | DIASTOLIC BLOOD PRESSURE: 71 MMHG

## 2022-08-01 DIAGNOSIS — N18.31 CHRONIC KIDNEY DISEASE, STAGE 3A: ICD-10-CM

## 2022-08-01 DIAGNOSIS — E55.9 VITAMIN D DEFICIENCY: ICD-10-CM

## 2022-08-01 DIAGNOSIS — M25.511 CHRONIC RIGHT SHOULDER PAIN: ICD-10-CM

## 2022-08-01 DIAGNOSIS — Z12.11 COLON CANCER SCREENING: ICD-10-CM

## 2022-08-01 DIAGNOSIS — G89.29 CHRONIC RIGHT SHOULDER PAIN: ICD-10-CM

## 2022-08-01 DIAGNOSIS — E11.9 TYPE 2 DIABETES MELLITUS WITHOUT COMPLICATION, WITHOUT LONG-TERM CURRENT USE OF INSULIN: Primary | ICD-10-CM

## 2022-08-01 PROCEDURE — 3066F PR DOCUMENTATION OF TREATMENT FOR NEPHROPATHY: ICD-10-PCS | Mod: CPTII,,, | Performed by: FAMILY MEDICINE

## 2022-08-01 PROCEDURE — 4010F ACE/ARB THERAPY RXD/TAKEN: CPT | Mod: CPTII,,, | Performed by: FAMILY MEDICINE

## 2022-08-01 PROCEDURE — 3061F PR NEG MICROALBUMINURIA RESULT DOCUMENTED/REVIEW: ICD-10-PCS | Mod: CPTII,,, | Performed by: FAMILY MEDICINE

## 2022-08-01 PROCEDURE — 3066F NEPHROPATHY DOC TX: CPT | Mod: CPTII,,, | Performed by: FAMILY MEDICINE

## 2022-08-01 PROCEDURE — 1160F RVW MEDS BY RX/DR IN RCRD: CPT | Mod: CPTII,,, | Performed by: FAMILY MEDICINE

## 2022-08-01 PROCEDURE — 3061F NEG MICROALBUMINURIA REV: CPT | Mod: CPTII,,, | Performed by: FAMILY MEDICINE

## 2022-08-01 PROCEDURE — 3074F PR MOST RECENT SYSTOLIC BLOOD PRESSURE < 130 MM HG: ICD-10-PCS | Mod: CPTII,,, | Performed by: FAMILY MEDICINE

## 2022-08-01 PROCEDURE — 1126F AMNT PAIN NOTED NONE PRSNT: CPT | Mod: CPTII,,, | Performed by: FAMILY MEDICINE

## 2022-08-01 PROCEDURE — 99214 PR OFFICE/OUTPT VISIT, EST, LEVL IV, 30-39 MIN: ICD-10-PCS | Mod: ,,, | Performed by: FAMILY MEDICINE

## 2022-08-01 PROCEDURE — 4010F PR ACE/ARB THEARPY RXD/TAKEN: ICD-10-PCS | Mod: CPTII,,, | Performed by: FAMILY MEDICINE

## 2022-08-01 PROCEDURE — 1159F PR MEDICATION LIST DOCUMENTED IN MEDICAL RECORD: ICD-10-PCS | Mod: CPTII,,, | Performed by: FAMILY MEDICINE

## 2022-08-01 PROCEDURE — 1126F PR PAIN SEVERITY QUANTIFIED, NO PAIN PRESENT: ICD-10-PCS | Mod: CPTII,,, | Performed by: FAMILY MEDICINE

## 2022-08-01 PROCEDURE — 3288F PR FALLS RISK ASSESSMENT DOCUMENTED: ICD-10-PCS | Mod: CPTII,,, | Performed by: FAMILY MEDICINE

## 2022-08-01 PROCEDURE — 3008F PR BODY MASS INDEX (BMI) DOCUMENTED: ICD-10-PCS | Mod: CPTII,,, | Performed by: FAMILY MEDICINE

## 2022-08-01 PROCEDURE — 1159F MED LIST DOCD IN RCRD: CPT | Mod: CPTII,,, | Performed by: FAMILY MEDICINE

## 2022-08-01 PROCEDURE — 3078F PR MOST RECENT DIASTOLIC BLOOD PRESSURE < 80 MM HG: ICD-10-PCS | Mod: CPTII,,, | Performed by: FAMILY MEDICINE

## 2022-08-01 PROCEDURE — 3288F FALL RISK ASSESSMENT DOCD: CPT | Mod: CPTII,,, | Performed by: FAMILY MEDICINE

## 2022-08-01 PROCEDURE — 1101F PR PT FALLS ASSESS DOC 0-1 FALLS W/OUT INJ PAST YR: ICD-10-PCS | Mod: CPTII,,, | Performed by: FAMILY MEDICINE

## 2022-08-01 PROCEDURE — 3078F DIAST BP <80 MM HG: CPT | Mod: CPTII,,, | Performed by: FAMILY MEDICINE

## 2022-08-01 PROCEDURE — 99214 OFFICE O/P EST MOD 30 MIN: CPT | Mod: ,,, | Performed by: FAMILY MEDICINE

## 2022-08-01 PROCEDURE — 1160F PR REVIEW ALL MEDS BY PRESCRIBER/CLIN PHARMACIST DOCUMENTED: ICD-10-PCS | Mod: CPTII,,, | Performed by: FAMILY MEDICINE

## 2022-08-01 PROCEDURE — 1101F PT FALLS ASSESS-DOCD LE1/YR: CPT | Mod: CPTII,,, | Performed by: FAMILY MEDICINE

## 2022-08-01 PROCEDURE — 3008F BODY MASS INDEX DOCD: CPT | Mod: CPTII,,, | Performed by: FAMILY MEDICINE

## 2022-08-01 PROCEDURE — 3074F SYST BP LT 130 MM HG: CPT | Mod: CPTII,,, | Performed by: FAMILY MEDICINE

## 2022-08-01 RX ORDER — HYDROCODONE BITARTRATE AND ACETAMINOPHEN 10; 325 MG/1; MG/1
1 TABLET ORAL EVERY 4 HOURS PRN
Qty: 42 TABLET | Refills: 0 | Status: SHIPPED | OUTPATIENT
Start: 2022-08-01 | End: 2022-08-08

## 2022-08-01 RX ORDER — HYDROCODONE BITARTRATE AND ACETAMINOPHEN 10; 325 MG/1; MG/1
1 TABLET ORAL EVERY 4 HOURS PRN
Qty: 42 TABLET | Refills: 0 | Status: SHIPPED | OUTPATIENT
Start: 2022-08-01 | End: 2022-08-01 | Stop reason: SDUPTHER

## 2022-08-01 RX ORDER — ERGOCALCIFEROL 1.25 MG/1
50000 CAPSULE ORAL
Qty: 12 CAPSULE | Refills: 3 | Status: SHIPPED | OUTPATIENT
Start: 2022-08-01 | End: 2022-08-04 | Stop reason: SDUPTHER

## 2022-08-01 NOTE — ASSESSMENT & PLAN NOTE
Stable renal indices.  No significant proteinuria.  Continue risk factor management and periodic monitoring with renal sparing activities including:  -Follow low sodium diet (2 grams a day)   -Control high blood pressure ( goal BP < 130/80, please record BP at home every day and bring log to next office visit to assure that home cuff is calibrated at minimum every 12 months)   -Exercise at least 30 minutes a day, 5 days a week.   -Maintain healthy weight.   -Stay well hydrated   -Receive Pneumovax, Flu, and HBV vaccines if indicated.   -Do not take NSAIDs (Ibuprofen, Naproxen, Aleve, Advil, Toradol, Mobic), may take only Tylenol as needed for pain/headaches.   -Take cholesterol-lowering medications as prescribed (LDL goal <100)

## 2022-08-01 NOTE — ASSESSMENT & PLAN NOTE
Continue ADA diet with repeat hemoglobin A1c at routine interval, recommend yearly eye exams to screen for diabetic retinopathy, yearly microalbumin/creatinine ratio with urine to screen for nephropathy and/or renal protection with ACE-I/ARB, and yearly foot exams with monofilament to screen for neuropathy or progression of any of these issues.  Continue current medication regimen.

## 2022-08-01 NOTE — PROGRESS NOTES
Chief Complaint  Chief Complaint   Patient presents with    Medicare AWV       History of Present Illness  Patient presents to clinic for routine follow-up with blood work performed 3 months ago at the time of the original scheduled follow-up appointment which had to be missed at the time.  Labs included CBC with no overly concerning findings with CMP revealing elevated creatinine at 1.37 which was identical to prior check in the year before.  Other labs include FLP with triglycerides elevated at 172 with LDL at 82 with vitamin-D decreased at 17 and 1 hemoglobin A1c at 6.8% which is slightly higher than previous check of 6.6%.  PSA within normal limits.  TSH within normal limits. He continues to complain of chronic pain in the right shoulder without acute trauma or injury and is avoidant of NSAIDs d/t renal dysfunction.    PMH:  Hypertension  Hyperlipidemia  Jose Alberto  CKD 3-creatinine average 1.15-1.45  Tobacco abuse -45 pack year previously intolerant to Chantix  Diabetes mellitus    Review of Systems  Review of Systems   Constitutional: Negative for fatigue and fever.   HENT: Negative for congestion and sore throat.    Eyes: Negative for redness and visual disturbance.   Respiratory: Negative for cough and shortness of breath.    Cardiovascular: Negative for chest pain and palpitations.   Gastrointestinal: Negative for nausea and vomiting.   Musculoskeletal: Positive for arthralgias. Negative for myalgias.   Neurological: Negative for dizziness and headaches.   Psychiatric/Behavioral: Negative for agitation and confusion.       Physical Exam  Physical Exam  Constitutional:       Appearance: Normal appearance.   HENT:      Head: Normocephalic and atraumatic.   Eyes:      General: No scleral icterus.     Conjunctiva/sclera: Conjunctivae normal.   Cardiovascular:      Rate and Rhythm: Normal rate and regular rhythm.   Pulmonary:      Effort: Pulmonary effort is normal.      Breath sounds: Normal breath sounds.    Abdominal:      Palpations: Abdomen is soft.      Tenderness: There is no abdominal tenderness.   Musculoskeletal:         General: No swelling or deformity.   Skin:     General: Skin is warm and dry.   Neurological:      General: No focal deficit present.      Mental Status: He is alert and oriented to person, place, and time.   Psychiatric:         Mood and Affect: Mood normal.         Behavior: Behavior normal.         Problem List/Past Medical History  Past Medical History:   Diagnosis Date    DM (diabetes mellitus)     GERD (gastroesophageal reflux disease)     HLD (hyperlipidemia)     HTN (hypertension)        Procedure/Surgical History  Past Surgical History:   Procedure Laterality Date    CHOLECYSTECTOMY         Medications  Current Outpatient Medications on File Prior to Visit   Medication Sig Dispense Refill    amlodipine-benazepril 10-20mg (LOTREL) 10-20 mg per capsule   See Instructions, TAKE 1 CAPSULE EVERY DAY, # 90 cap(s), 3 Refill(s), Pharmacy: [facility], 168, cm, Height/Length Dosing, 03/04/21 9:57:00 CST, 73, kg, Weight Dosing, 03/04/21 9:57:00 CST      atorvastatin (LIPITOR) 20 MG tablet       fenofibrate (TRICOR) 145 MG tablet   See Instructions, TAKE 1 TABLET EVERY DAY, # 90 tab(s), 3 Refill(s), Pharmacy: [facility], 168, cm, Height/Length Dosing, 11/11/21 9:32:00 CST, 73.75, kg, Weight Dosing, 11/11/21 9:32:00 CST      fluticasone propionate (FLONASE) 50 mcg/actuation nasal spray   See Instructions, USE 1 SPRAY IN EACH NOSTRIL TWICE DAILY, # 48 gm, 3 Refill(s), Pharmacy: [facility], 168, cm, Height/Length Dosing, 11/11/21 9:32:00 CST, 73.75, kg, Weight Dosing, 11/11/21 9:32:00 CST      glimepiride (AMARYL) 2 MG tablet       metoprolol succinate (TOPROL-XL) 12.5 MG 24 hr split tablet   See Instructions, TAKE 1 TABLET EVERY DAY, # 90 tab(s), 3 Refill(s), Pharmacy: [facility], 168, cm, Height/Length Dosing,  03/04/21 9:57:00 CST, 73, kg, Weight Dosing, 03/04/21 9:57:00 CST      metoprolol succinate (TOPROL-XL) 25 MG 24 hr tablet   See Instructions, TAKE 1 TABLET EVERY DAY, # 90 tab(s), 3 Refill(s), Pharmacy: [facility], 168, cm, Height/Length Dosing, 11/11/21 9:32:00 CST, 73.75, kg, Weight Dosing, 11/11/21 9:32:00 CST      omeprazole (PRILOSEC) 20 MG capsule        No current facility-administered medications on file prior to visit.       Allegies  Review of patient's allergies indicates:  Not on File     Social History  Social History     Socioeconomic History    Marital status:    Tobacco Use    Smoking status: Never Smoker    Smokeless tobacco: Never Used       Family History  History reviewed. No pertinent family history.      Immunizations    There is no immunization history on file for this patient.    Health Maintenance  Health Maintenance   Topic Date Due    Hepatitis C Screening  Never done    Foot Exam  Never done    TETANUS VACCINE  Never done    Eye Exam  10/18/2022    Hemoglobin A1c  11/03/2022    Lipid Panel  05/03/2023    Low Dose Statin  08/01/2023        Assessment / Plan  Problem List Items Addressed This Visit        Renal/    Chronic kidney disease, stage 3a    Current Assessment & Plan     Stable renal indices.  No significant proteinuria.  Continue risk factor management and periodic monitoring with renal sparing activities including:  -Follow low sodium diet (2 grams a day)   -Control high blood pressure ( goal BP < 130/80, please record BP at home every day and bring log to next office visit to assure that home cuff is calibrated at minimum every 12 months)   -Exercise at least 30 minutes a day, 5 days a week.   -Maintain healthy weight.   -Stay well hydrated   -Receive Pneumovax, Flu, and HBV vaccines if indicated.   -Do not take NSAIDs (Ibuprofen, Naproxen, Aleve, Advil, Toradol, Mobic), may take only Tylenol as needed for pain/headaches.   -Take  cholesterol-lowering medications as prescribed (LDL goal <100)              Endocrine    Type 2 diabetes mellitus without complication, without long-term current use of insulin - Primary    Current Assessment & Plan     Continue ADA diet with repeat hemoglobin A1c at routine interval, recommend yearly eye exams to screen for diabetic retinopathy, yearly microalbumin/creatinine ratio with urine to screen for nephropathy and/or renal protection with ACE-I/ARB, and yearly foot exams with monofilament to screen for neuropathy or progression of any of these issues.  Continue current medication regimen.              Orthopedic    Chronic right shoulder pain    Overview     Continue otc tylenol for pain management with short course hydrocodone only PRN for BT pain for short course             Other Visit Diagnoses     Vitamin D deficiency        Colon cancer screening          Vitamin D Def  - 50K IU qweek and recheck level at f/u.    R Shoulder pain    RTC 6 months wellness    Trevor Leal

## 2022-08-04 ENCOUNTER — TELEPHONE (OUTPATIENT)
Dept: PRIMARY CARE CLINIC | Facility: CLINIC | Age: 67
End: 2022-08-04

## 2022-08-04 DIAGNOSIS — E55.9 VITAMIN D DEFICIENCY: Primary | ICD-10-CM

## 2022-08-04 RX ORDER — ERGOCALCIFEROL 1.25 MG/1
50000 CAPSULE ORAL
Qty: 12 CAPSULE | Refills: 3 | Status: SHIPPED | OUTPATIENT
Start: 2022-08-04 | End: 2022-08-04 | Stop reason: SDUPTHER

## 2022-08-04 RX ORDER — ERGOCALCIFEROL 1.25 MG/1
50000 CAPSULE ORAL
Qty: 12 CAPSULE | Refills: 3 | Status: SHIPPED | OUTPATIENT
Start: 2022-08-04 | End: 2022-11-29

## 2022-08-28 LAB — NONINV COLON CA DNA+OCC BLD SCRN STL QL: NEGATIVE

## 2022-10-10 DIAGNOSIS — K21.9 GASTROESOPHAGEAL REFLUX DISEASE, UNSPECIFIED WHETHER ESOPHAGITIS PRESENT: ICD-10-CM

## 2022-10-10 DIAGNOSIS — E78.5 HYPERLIPIDEMIA, UNSPECIFIED HYPERLIPIDEMIA TYPE: Primary | ICD-10-CM

## 2022-10-10 RX ORDER — FENOFIBRATE 145 MG/1
TABLET, FILM COATED ORAL
Qty: 90 TABLET | Refills: 3 | Status: SHIPPED | OUTPATIENT
Start: 2022-10-10 | End: 2023-10-10 | Stop reason: SDUPTHER

## 2022-10-10 RX ORDER — OMEPRAZOLE 20 MG/1
20 CAPSULE, DELAYED RELEASE ORAL DAILY
Qty: 90 CAPSULE | Refills: 3 | Status: SHIPPED | OUTPATIENT
Start: 2022-10-10 | End: 2023-10-10 | Stop reason: SDUPTHER

## 2022-12-22 ENCOUNTER — DOCUMENTATION ONLY (OUTPATIENT)
Dept: ADMINISTRATIVE | Facility: HOSPITAL | Age: 67
End: 2022-12-22
Payer: MEDICARE

## 2023-01-10 ENCOUNTER — TELEPHONE (OUTPATIENT)
Dept: PRIMARY CARE CLINIC | Facility: CLINIC | Age: 68
End: 2023-01-10
Payer: MEDICARE

## 2023-01-10 RX ORDER — DIPHENOXYLATE HYDROCHLORIDE AND ATROPINE SULFATE 2.5; .025 MG/1; MG/1
1 TABLET ORAL 4 TIMES DAILY PRN
Qty: 20 TABLET | Refills: 0 | Status: SHIPPED | OUTPATIENT
Start: 2023-01-10 | End: 2023-01-20

## 2023-01-10 NOTE — TELEPHONE ENCOUNTER
Pt stated that he is recovering from Covid, he stated that he does not have a fever, however he does have diarrhea. He would like to know if he can something sent in to his pharmacy. -Becca

## 2023-01-30 ENCOUNTER — LAB VISIT (OUTPATIENT)
Dept: LAB | Facility: HOSPITAL | Age: 68
End: 2023-01-30
Attending: FAMILY MEDICINE
Payer: MEDICARE

## 2023-01-30 DIAGNOSIS — E11.9 TYPE 2 DIABETES MELLITUS WITHOUT COMPLICATION, WITHOUT LONG-TERM CURRENT USE OF INSULIN: ICD-10-CM

## 2023-01-30 DIAGNOSIS — N18.31 CHRONIC KIDNEY DISEASE, STAGE 3A: ICD-10-CM

## 2023-01-30 DIAGNOSIS — E55.9 VITAMIN D DEFICIENCY: ICD-10-CM

## 2023-01-30 LAB
ALBUMIN SERPL-MCNC: 3.8 G/DL (ref 3.4–4.8)
ALBUMIN/GLOB SERPL: 1.2 RATIO (ref 1.1–2)
ALP SERPL-CCNC: 72 UNIT/L (ref 40–150)
ALT SERPL-CCNC: 61 UNIT/L (ref 0–55)
APPEARANCE UR: CLEAR
AST SERPL-CCNC: 51 UNIT/L (ref 5–34)
BACTERIA #/AREA URNS AUTO: NORMAL /HPF
BASOPHILS # BLD AUTO: 0.08 X10(3)/MCL (ref 0–0.2)
BASOPHILS NFR BLD AUTO: 1 %
BILIRUB UR QL STRIP.AUTO: NEGATIVE MG/DL
BILIRUBIN DIRECT+TOT PNL SERPL-MCNC: 0.7 MG/DL
BUN SERPL-MCNC: 15.4 MG/DL (ref 8.4–25.7)
CALCIUM SERPL-MCNC: 9.7 MG/DL (ref 8.8–10)
CHLORIDE SERPL-SCNC: 104 MMOL/L (ref 98–107)
CO2 SERPL-SCNC: 29 MMOL/L (ref 23–31)
COLOR UR AUTO: YELLOW
CREAT SERPL-MCNC: 1.33 MG/DL (ref 0.73–1.18)
CREAT UR-MCNC: 129 MG/DL (ref 63–166)
DEPRECATED CALCIDIOL+CALCIFEROL SERPL-MC: 38.3 NG/ML (ref 30–80)
EOSINOPHIL # BLD AUTO: 0.35 X10(3)/MCL (ref 0–0.9)
EOSINOPHIL NFR BLD AUTO: 4.2 %
ERYTHROCYTE [DISTWIDTH] IN BLOOD BY AUTOMATED COUNT: 12.9 % (ref 11.5–17)
EST. AVERAGE GLUCOSE BLD GHB EST-MCNC: 134.1 MG/DL
GFR SERPLBLD CREATININE-BSD FMLA CKD-EPI: 59 MLS/MIN/1.73/M2
GLOBULIN SER-MCNC: 3.2 GM/DL (ref 2.4–3.5)
GLUCOSE SERPL-MCNC: 121 MG/DL (ref 82–115)
GLUCOSE UR QL STRIP.AUTO: NEGATIVE MG/DL
HBA1C MFR BLD: 6.3 %
HCT VFR BLD AUTO: 50.6 % (ref 42–52)
HGB BLD-MCNC: 16.3 GM/DL (ref 14–18)
IMM GRANULOCYTES # BLD AUTO: 0.04 X10(3)/MCL (ref 0–0.04)
IMM GRANULOCYTES NFR BLD AUTO: 0.5 %
KETONES UR QL STRIP.AUTO: NEGATIVE MG/DL
LEUKOCYTE ESTERASE UR QL STRIP.AUTO: NEGATIVE UNIT/L
LYMPHOCYTES # BLD AUTO: 2.89 X10(3)/MCL (ref 0.6–4.6)
LYMPHOCYTES NFR BLD AUTO: 35 %
MCH RBC QN AUTO: 29.9 PG
MCHC RBC AUTO-ENTMCNC: 32.2 MG/DL (ref 33–36)
MCV RBC AUTO: 92.7 FL (ref 80–94)
MICROALBUMIN UR-MCNC: 20.4 UG/ML
MICROALBUMIN/CREAT RATIO PNL UR: 15.8 MG/GM CR (ref 0–30)
MONOCYTES # BLD AUTO: 0.9 X10(3)/MCL (ref 0.1–1.3)
MONOCYTES NFR BLD AUTO: 10.9 %
NEUTROPHILS # BLD AUTO: 4 X10(3)/MCL (ref 2.1–9.2)
NEUTROPHILS NFR BLD AUTO: 48.4 %
NITRITE UR QL STRIP.AUTO: NEGATIVE
NRBC BLD AUTO-RTO: 0 %
PH UR STRIP.AUTO: 6.5 [PH]
PLATELET # BLD AUTO: 261 X10(3)/MCL (ref 130–400)
PMV BLD AUTO: 9.7 FL (ref 7.4–10.4)
POTASSIUM SERPL-SCNC: 4.2 MMOL/L (ref 3.5–5.1)
PROT SERPL-MCNC: 7 GM/DL (ref 5.8–7.6)
PROT UR QL STRIP.AUTO: NEGATIVE MG/DL
RBC # BLD AUTO: 5.46 X10(6)/MCL (ref 4.7–6.1)
RBC #/AREA URNS AUTO: 5 /HPF
RBC UR QL AUTO: ABNORMAL UNIT/L
SODIUM SERPL-SCNC: 142 MMOL/L (ref 136–145)
SP GR UR STRIP.AUTO: 1.02 (ref 1–1.03)
SQUAMOUS #/AREA URNS AUTO: <5 /HPF
UROBILINOGEN UR STRIP-ACNC: 1 MG/DL
WBC # SPEC AUTO: 8.3 X10(3)/MCL (ref 4.5–11.5)
WBC #/AREA URNS AUTO: <5 /HPF

## 2023-01-30 PROCEDURE — 85025 COMPLETE CBC W/AUTO DIFF WBC: CPT

## 2023-01-30 PROCEDURE — 82043 UR ALBUMIN QUANTITATIVE: CPT

## 2023-01-30 PROCEDURE — 81001 URINALYSIS AUTO W/SCOPE: CPT

## 2023-01-30 PROCEDURE — 83036 HEMOGLOBIN GLYCOSYLATED A1C: CPT

## 2023-01-30 PROCEDURE — 36415 COLL VENOUS BLD VENIPUNCTURE: CPT

## 2023-01-30 PROCEDURE — 82306 VITAMIN D 25 HYDROXY: CPT

## 2023-01-30 PROCEDURE — 80053 COMPREHEN METABOLIC PANEL: CPT

## 2023-02-03 ENCOUNTER — OFFICE VISIT (OUTPATIENT)
Dept: PRIMARY CARE CLINIC | Facility: CLINIC | Age: 68
End: 2023-02-03
Payer: MEDICARE

## 2023-02-03 VITALS
HEART RATE: 109 BPM | OXYGEN SATURATION: 95 % | SYSTOLIC BLOOD PRESSURE: 108 MMHG | DIASTOLIC BLOOD PRESSURE: 74 MMHG | WEIGHT: 155.69 LBS | TEMPERATURE: 99 F | BODY MASS INDEX: 25.02 KG/M2

## 2023-02-03 DIAGNOSIS — Z00.00 WELLNESS EXAMINATION: Primary | ICD-10-CM

## 2023-02-03 DIAGNOSIS — N18.31 CHRONIC KIDNEY DISEASE, STAGE 3A: ICD-10-CM

## 2023-02-03 DIAGNOSIS — N18.31 TYPE 2 DIABETES MELLITUS WITH STAGE 3A CHRONIC KIDNEY DISEASE, WITHOUT LONG-TERM CURRENT USE OF INSULIN: ICD-10-CM

## 2023-02-03 DIAGNOSIS — R11.0 NAUSEA: ICD-10-CM

## 2023-02-03 DIAGNOSIS — N34.2 INFECTIVE URETHRITIS: ICD-10-CM

## 2023-02-03 DIAGNOSIS — N34.2 INFECTIVE URETHRITIS: Primary | ICD-10-CM

## 2023-02-03 DIAGNOSIS — E11.22 TYPE 2 DIABETES MELLITUS WITH STAGE 3A CHRONIC KIDNEY DISEASE, WITHOUT LONG-TERM CURRENT USE OF INSULIN: ICD-10-CM

## 2023-02-03 DIAGNOSIS — E78.5 HYPERLIPIDEMIA, UNSPECIFIED HYPERLIPIDEMIA TYPE: ICD-10-CM

## 2023-02-03 LAB
APPEARANCE UR: ABNORMAL
BACTERIA #/AREA URNS AUTO: ABNORMAL /HPF
BILIRUB UR QL STRIP.AUTO: ABNORMAL MG/DL
COLOR UR AUTO: ABNORMAL
GLUCOSE UR QL STRIP.AUTO: ABNORMAL MG/DL
KETONES UR QL STRIP.AUTO: NEGATIVE MG/DL
LEUKOCYTE ESTERASE UR QL STRIP.AUTO: ABNORMAL UNIT/L
NITRITE UR QL STRIP.AUTO: NEGATIVE
PH UR STRIP.AUTO: 5.5 [PH]
PROT UR QL STRIP.AUTO: ABNORMAL MG/DL
RBC #/AREA URNS AUTO: <5 /HPF
RBC UR QL AUTO: ABNORMAL UNIT/L
SP GR UR STRIP.AUTO: 1.02 (ref 1–1.03)
SQUAMOUS #/AREA URNS AUTO: <5 /HPF
UROBILINOGEN UR STRIP-ACNC: 1 MG/DL
WBC #/AREA URNS AUTO: 76 /HPF

## 2023-02-03 PROCEDURE — 1160F RVW MEDS BY RX/DR IN RCRD: CPT | Mod: CPTII,,, | Performed by: FAMILY MEDICINE

## 2023-02-03 PROCEDURE — 1125F PR PAIN SEVERITY QUANTIFIED, PAIN PRESENT: ICD-10-PCS | Mod: CPTII,,, | Performed by: FAMILY MEDICINE

## 2023-02-03 PROCEDURE — 3066F NEPHROPATHY DOC TX: CPT | Mod: CPTII,,, | Performed by: FAMILY MEDICINE

## 2023-02-03 PROCEDURE — 3078F PR MOST RECENT DIASTOLIC BLOOD PRESSURE < 80 MM HG: ICD-10-PCS | Mod: CPTII,,, | Performed by: FAMILY MEDICINE

## 2023-02-03 PROCEDURE — 3288F FALL RISK ASSESSMENT DOCD: CPT | Mod: CPTII,,, | Performed by: FAMILY MEDICINE

## 2023-02-03 PROCEDURE — 1159F MED LIST DOCD IN RCRD: CPT | Mod: CPTII,,, | Performed by: FAMILY MEDICINE

## 2023-02-03 PROCEDURE — 3078F DIAST BP <80 MM HG: CPT | Mod: CPTII,,, | Performed by: FAMILY MEDICINE

## 2023-02-03 PROCEDURE — 4010F ACE/ARB THERAPY RXD/TAKEN: CPT | Mod: CPTII,,, | Performed by: FAMILY MEDICINE

## 2023-02-03 PROCEDURE — 1101F PT FALLS ASSESS-DOCD LE1/YR: CPT | Mod: CPTII,,, | Performed by: FAMILY MEDICINE

## 2023-02-03 PROCEDURE — 3061F PR NEG MICROALBUMINURIA RESULT DOCUMENTED/REVIEW: ICD-10-PCS | Mod: CPTII,,, | Performed by: FAMILY MEDICINE

## 2023-02-03 PROCEDURE — 1159F PR MEDICATION LIST DOCUMENTED IN MEDICAL RECORD: ICD-10-PCS | Mod: CPTII,,, | Performed by: FAMILY MEDICINE

## 2023-02-03 PROCEDURE — 3008F BODY MASS INDEX DOCD: CPT | Mod: CPTII,,, | Performed by: FAMILY MEDICINE

## 2023-02-03 PROCEDURE — 1160F PR REVIEW ALL MEDS BY PRESCRIBER/CLIN PHARMACIST DOCUMENTED: ICD-10-PCS | Mod: CPTII,,, | Performed by: FAMILY MEDICINE

## 2023-02-03 PROCEDURE — 3074F PR MOST RECENT SYSTOLIC BLOOD PRESSURE < 130 MM HG: ICD-10-PCS | Mod: CPTII,,, | Performed by: FAMILY MEDICINE

## 2023-02-03 PROCEDURE — 3061F NEG MICROALBUMINURIA REV: CPT | Mod: CPTII,,, | Performed by: FAMILY MEDICINE

## 2023-02-03 PROCEDURE — 3288F PR FALLS RISK ASSESSMENT DOCUMENTED: ICD-10-PCS | Mod: CPTII,,, | Performed by: FAMILY MEDICINE

## 2023-02-03 PROCEDURE — 4010F PR ACE/ARB THEARPY RXD/TAKEN: ICD-10-PCS | Mod: CPTII,,, | Performed by: FAMILY MEDICINE

## 2023-02-03 PROCEDURE — 1125F AMNT PAIN NOTED PAIN PRSNT: CPT | Mod: CPTII,,, | Performed by: FAMILY MEDICINE

## 2023-02-03 PROCEDURE — G0439 PR MEDICARE ANNUAL WELLNESS SUBSEQUENT VISIT: ICD-10-PCS | Mod: ,,, | Performed by: FAMILY MEDICINE

## 2023-02-03 PROCEDURE — 3074F SYST BP LT 130 MM HG: CPT | Mod: CPTII,,, | Performed by: FAMILY MEDICINE

## 2023-02-03 PROCEDURE — 3008F PR BODY MASS INDEX (BMI) DOCUMENTED: ICD-10-PCS | Mod: CPTII,,, | Performed by: FAMILY MEDICINE

## 2023-02-03 PROCEDURE — 1101F PR PT FALLS ASSESS DOC 0-1 FALLS W/OUT INJ PAST YR: ICD-10-PCS | Mod: CPTII,,, | Performed by: FAMILY MEDICINE

## 2023-02-03 PROCEDURE — G0439 PPPS, SUBSEQ VISIT: HCPCS | Mod: ,,, | Performed by: FAMILY MEDICINE

## 2023-02-03 PROCEDURE — 3066F PR DOCUMENTATION OF TREATMENT FOR NEPHROPATHY: ICD-10-PCS | Mod: CPTII,,, | Performed by: FAMILY MEDICINE

## 2023-02-03 RX ORDER — ONDANSETRON 4 MG/1
4 TABLET, ORALLY DISINTEGRATING ORAL EVERY 8 HOURS PRN
Qty: 15 TABLET | Refills: 0 | Status: SHIPPED | OUTPATIENT
Start: 2023-02-03 | End: 2023-02-03 | Stop reason: SDUPTHER

## 2023-02-03 RX ORDER — ONDANSETRON 4 MG/1
4 TABLET, ORALLY DISINTEGRATING ORAL EVERY 8 HOURS PRN
Qty: 15 TABLET | Refills: 0 | Status: SHIPPED | OUTPATIENT
Start: 2023-02-03 | End: 2023-09-26 | Stop reason: SDUPTHER

## 2023-02-03 RX ORDER — CIPROFLOXACIN 500 MG/1
500 TABLET ORAL 2 TIMES DAILY
Qty: 10 TABLET | Refills: 0 | Status: SHIPPED | OUTPATIENT
Start: 2023-02-03 | End: 2023-02-03 | Stop reason: SDUPTHER

## 2023-02-03 RX ORDER — CIPROFLOXACIN 500 MG/1
500 TABLET ORAL 2 TIMES DAILY
Qty: 10 TABLET | Refills: 0 | OUTPATIENT
Start: 2023-02-03 | End: 2023-08-04

## 2023-02-03 NOTE — PROGRESS NOTES
Patient ID: [id number]     Chief Complaint: Follow-up (Covid pos 2 weeks ago, throwing up water last night, dry vomit, urnine burning , throwing up meds this morning , coughing , generally feeling bad. Everything hurts. Head hurts, joints pain, goes to restroom often.  Fever yesterday low grade today.), Fatigue, Cough, Fever, Headache, and Nausea        HPI:     Franklin Macias is a 68 y.o. male here today for a Medicare Wellness. Lab work performed prior to this visit includes CBC with no concerning findings and CMP with elevated creatinine at 1.33 with EGFR at 59 with a known history of CKD 3A with findings similar to previous checks.  Fasting glucose at 121 with hemoglobin A1c at 6.3% improved from previous check of 6.8%.  Vitamin-D now within normal range at 38.3 after initiating of high-dose vitamin-D supplement and negative Cologuard since last visit with LFTs mildly 51/61 respectively AST/AST with no other concerning findings on lab review.  Blood pressure within normal limits in clinic at this time. Of note his tested positive at home for COVID19 on 1/20/23 and is still having a residual; cough and congestion and last night experienced burning with urination.        A separate E/M code has been provided to evaluate additional complaints that the patient would like addressed during the dedicated Medicare Wellness Exam.        ----------------------------  DM (diabetes mellitus)  GERD (gastroesophageal reflux disease)  HLD (hyperlipidemia)  HTN (hypertension)     Past Surgical History:   Procedure Laterality Date    CHOLECYSTECTOMY         Review of patient's allergies indicates:  No Known Allergies    Outpatient Medications Marked as Taking for the 2/3/23 encounter (Office Visit) with Trevor Leal MD   Medication Sig Dispense Refill    amlodipine-benazepril 10-20mg (LOTREL) 10-20 mg per capsule TAKE 1 CAPSULE EVERY DAY 90 capsule 3    atorvastatin (LIPITOR) 20 MG tablet TAKE 1 TABLET EVERY DAY 90 tablet  3    ergocalciferol (ERGOCALCIFEROL) 50,000 unit Cap Take 1 capsule (50,000 Units total) by mouth every 7 days. 30 capsule 0    fenofibrate (TRICOR) 145 MG tablet See Instructions, TAKE 1 TABLET EVERY DAY, # 90 tab(s), 3 Refill(s), Pharmacy: [facility], 168, cm, Height/Length Dosing, 11/11/21 9:32:00 CST, 73.75, kg, Weight Dosing, 11/11/21 9:32:00 CST Strength: 145 mg 90 tablet 3    fluticasone propionate (FLONASE) 50 mcg/actuation nasal spray   See Instructions, USE 1 SPRAY IN EACH NOSTRIL TWICE DAILY, # 48 gm, 3 Refill(s), Pharmacy: [facility], 168, cm, Height/Length Dosing, 11/11/21 9:32:00 CST, 73.75, kg, Weight Dosing, 11/11/21 9:32:00 CST      glimepiride (AMARYL) 2 MG tablet TAKE 1 TABLET EVERY DAY 90 tablet 3    metoprolol succinate (TOPROL-XL) 12.5 MG 24 hr split tablet   See Instructions, TAKE 1 TABLET EVERY DAY, # 90 tab(s), 3 Refill(s), Pharmacy: [facility], 168, cm, Height/Length Dosing, 03/04/21 9:57:00 CST, 73, kg, Weight Dosing, 03/04/21 9:57:00 CST      metoprolol succinate (TOPROL-XL) 25 MG 24 hr tablet   See Instructions, TAKE 1 TABLET EVERY DAY, # 90 tab(s), 3 Refill(s), Pharmacy: [facility], 168, cm, Height/Length Dosing, 11/11/21 9:32:00 CST, 73.75, kg, Weight Dosing, 11/11/21 9:32:00 CST      omeprazole (PRILOSEC) 20 MG capsule Take 1 capsule (20 mg total) by mouth once daily. 90 capsule 3       Social History     Socioeconomic History    Marital status:    Tobacco Use    Smoking status: Every Day     Packs/day: 1.00     Types: Cigarettes    Smokeless tobacco: Never   Substance and Sexual Activity    Alcohol use: Never    Drug use: Never    Sexual activity: Yes        History reviewed. No pertinent family history.     Patient Care Team:  Trevor Leal MD as PCP - General (Family Medicine)       Subjective:     Review of Systems   Constitutional:  Negative for chills and fever.   HENT:  Negative for  congestion and sore throat.    Respiratory:  Negative for cough and wheezing.    Cardiovascular:  Negative for chest pain and palpitations.   Gastrointestinal:  Negative for nausea and vomiting.   Genitourinary:  Positive for dysuria and urgency.   Musculoskeletal:  Positive for joint pain. Negative for myalgias.   Skin:  Negative for itching and rash.   Neurological:  Negative for dizziness and weakness.   Psychiatric/Behavioral:  Negative for depression. The patient is not nervous/anxious.        Patient Reported Health Risk Assessments:       Objective:     /74   Pulse 109   Temp 99.3 °F (37.4 °C)   Wt 70.6 kg (155 lb 11.2 oz)   SpO2 95%   BMI 25.02 kg/m²     Physical Exam  Constitutional:       Appearance: Normal appearance.   HENT:      Head: Normocephalic and atraumatic.   Eyes:      General:         Right eye: No discharge.         Left eye: No discharge.      Conjunctiva/sclera: Conjunctivae normal.   Cardiovascular:      Rate and Rhythm: Normal rate and regular rhythm.   Pulmonary:      Effort: Pulmonary effort is normal.      Breath sounds: Normal breath sounds.   Abdominal:      Palpations: Abdomen is soft.      Tenderness: There is no abdominal tenderness.   Skin:     General: Skin is warm and dry.   Neurological:      General: No focal deficit present.      Mental Status: He is alert and oriented to person, place, and time.   Psychiatric:         Mood and Affect: Mood normal.         Behavior: Behavior normal.         Assessment:       ICD-10-CM ICD-9-CM   1. Wellness examination  Z00.00 V70.0   2. Chronic kidney disease, stage 3a  N18.31 585.3   3. Type 2 diabetes mellitus with stage 3a chronic kidney disease, without long-term current use of insulin  E11.22 250.40    N18.31 585.3   4. Hyperlipidemia, unspecified hyperlipidemia type  E78.5 272.4   5. Infective urethritis  N34.2 597.80        Plan:       Medicare Annual Wellness and Personalized Prevention Plan:     Fall Risk + Home Safety +  Living Situation + Whisper Test + Depression Screen + CAGE + Cognitive Impairment Screen + ADL Screen + Timed Get Up and Go + Nutrition Screen + PAQ Screen + Health Risk Assessment all reviewed.     No flowsheet data found.  Fall Risk Assessment - Outpatient 2/3/2023 8/1/2022   Mobility Status Ambulatory Ambulatory   Number of falls 0 0   Identified as fall risk 0 0                             Alcohol/Tobacco Use - Stressed importance of smoking cessation and limiting alcohol intake.  CVD Risk Factors - Reviewed  Obesity/Physical Activity - Normal BMI. Encouraged daily 30 minute physical activity x 5 days per week.    Opioid Screening: Patient medication list reviewed, patient is not taking prescription opioids. Patient is not using additional opioids than prescribed. Patient is at low risk of substance abuse based on this opioid use history.       Health Maintenance Topics with due status: Not Due       Topic Last Completion Date    Lipid Panel 05/03/2022    Low Dose Statin 08/17/2022    Colorectal Cancer Screening 08/20/2022    Eye Exam 08/25/2022    Diabetes Urine Screening 01/30/2023    Hemoglobin A1c 01/30/2023        AAA Screening - No prior screening. Will order  AAA for Welcome to Medicare.  Prostate Cancer Screening - No prior screening. Will order PSA today. 1/2018. Follow up annually.  Cervical Cancer Screening - Last Pap on - 1/2018. NIL. Follow up with GYN Clinic for Pap/Pelvic.   Breast Cancer Screening - Last Mammogram on 1/2018. Normal. Follow up in 1 year    Colon Cancer Screening - Colonoscopy on 1/2018. Follow up in 1 year . FIT Negative on 1/2018.  Osteoporosis Screening - Last DEXA on 1/2018. Results show Normal Bone Density. Follow up in 1 year.  Eye Exam - Last Eye Exam - 1/2018.  Dental Exam - Recommend biannually  Vaccinations -   There is no immunization history on file for this patient.     Advance Directives:   Living Will: No        Oral Declaration: No    [place]: No    Do Not  Resuscitate Status: No    Medical Power of : No        Oral Declaration: No      Decision Making:  Patient answered questions  Goals of Care: The patient endorses that what is most important right now is to focus on symptom/pain control    Accordingly, we have decided that the best plan to meet the patient's goals includes continuing with treatment  Advance Care Planning   Advanced Care Planning: I offered to discuss Advanced Care Planning, which consists of how you would like to be cared for as you end the near of your natural life.  This includes how to pick a person who would make decisions for you if you were unable to make them for yourself, which is called a Medical Power of . These discussions include what kind of decisions you will make regarding life sustaining measures, such as ventilators and feeding tubes, when faced with a life limiting illness recorded on a living will that they will need to know. Advanced Directive discussion declined by patient.  Spent 20 minutes with the patient/family on the importance of Advanced Care Planning.          1. Wellness examination  Overview:  Discussed routine annual health maintenance and screening in addition to acute issues noted below.      2. Chronic kidney disease, stage 3a  Overview:  Stable renal indices.  No significant proteinuria.  Continue risk factor management and periodic monitoring with renal sparing activities including:  -Follow low sodium diet (2 grams a day)   -Control high blood pressure ( goal BP < 130/80, please record BP at home every day and bring log to next office visit to assure that home cuff is calibrated at minimum every 12 months)   -Exercise at least 30 minutes a day, 5 days a week.   -Maintain healthy weight.   -Stay well hydrated   -Receive Pneumovax, Flu, and HBV vaccines if indicated.   -Do not take NSAIDs (Ibuprofen, Naproxen, Aleve, Advil, Toradol, Mobic), may take only Tylenol as needed for pain/headaches.    -Take cholesterol-lowering medications as prescribed (LDL goal <100)    Orders:  -     Comprehensive Metabolic Panel; Future; Expected date: 08/03/2023  -     Urinalysis; Future; Expected date: 08/03/2023    3. Type 2 diabetes mellitus with stage 3a chronic kidney disease, without long-term current use of insulin  Overview:  Continue ADA diet with repeat hemoglobin A1c at routine interval, recommend yearly eye exams to screen for diabetic retinopathy, yearly microalbumin/creatinine ratio with urine to screen for nephropathy and/or renal protection with ACE-I/ARB, and yearly foot exams with monofilament to screen for neuropathy or progression of any of these issues.  Continue current medication regimen.    Orders:  -     Hemoglobin A1C; Future; Expected date: 08/03/2023    4. Hyperlipidemia, unspecified hyperlipidemia type  Overview:  Recommend continuation of dyslipidemia diet with statin medication and repeat FLP at routine interval.      5. Infective urethritis  Overview:  Dip performed in clinic today reveals trace microscopic hematuria with leukocytes present.  We will treat for UTI empirically      Other orders  -     ciprofloxacin HCl (CIPRO) 500 MG tablet; Take 1 tablet (500 mg total) by mouth 2 (two) times daily.  Dispense: 10 tablet; Refill: 0  -     ondansetron (ZOFRAN-ODT) 4 MG TbDL; Take 1 tablet (4 mg total) by mouth every 8 (eight) hours as needed (nausea).  Dispense: 15 tablet; Refill: 0           Medication List with Changes/Refills   New Medications    CIPROFLOXACIN HCL (CIPRO) 500 MG TABLET    Take 1 tablet (500 mg total) by mouth 2 (two) times daily.       Start Date: 2/3/2023  End Date: --    ONDANSETRON (ZOFRAN-ODT) 4 MG TBDL    Take 1 tablet (4 mg total) by mouth every 8 (eight) hours as needed (nausea).       Start Date: 2/3/2023  End Date: --   Current Medications    AMLODIPINE-BENAZEPRIL 10-20MG (LOTREL) 10-20 MG PER CAPSULE    TAKE 1 CAPSULE EVERY DAY       Start Date: 8/17/2022 End Date:  --    ATORVASTATIN (LIPITOR) 20 MG TABLET    TAKE 1 TABLET EVERY DAY       Start Date: 8/17/2022 End Date: --    ERGOCALCIFEROL (ERGOCALCIFEROL) 50,000 UNIT CAP    Take 1 capsule (50,000 Units total) by mouth every 7 days.       Start Date: 11/29/2022End Date: --    FENOFIBRATE (TRICOR) 145 MG TABLET    See Instructions, TAKE 1 TABLET EVERY DAY, # 90 tab(s), 3 Refill(s), Pharmacy: [facility], 168, cm, Height/Length Dosing, 11/11/21 9:32:00 CST, 73.75, kg, Weight Dosing, 11/11/21 9:32:00 CST Strength: 145 mg       Start Date: 10/10/2022End Date: --    FLUTICASONE PROPIONATE (FLONASE) 50 MCG/ACTUATION NASAL SPRAY      See Instructions, USE 1 SPRAY IN EACH NOSTRIL TWICE DAILY, # 48 gm, 3 Refill(s), Pharmacy: [facility], 168, cm, Height/Length Dosing, 11/11/21 9:32:00 CST, 73.75, kg, Weight Dosing, 11/11/21 9:32:00 CST       Start Date: 5/12/2021 End Date: --    GLIMEPIRIDE (AMARYL) 2 MG TABLET    TAKE 1 TABLET EVERY DAY       Start Date: 8/17/2022 End Date: --    METOPROLOL SUCCINATE (TOPROL-XL) 12.5 MG 24 HR SPLIT TABLET      See Instructions, TAKE 1 TABLET EVERY DAY, # 90 tab(s), 3 Refill(s), Pharmacy: [facility], 168, cm, Height/Length Dosing, 03/04/21 9:57:00 CST, 73, kg, Weight Dosing, 03/04/21 9:57:00 CST       Start Date: 10/20/2021End Date: --    METOPROLOL SUCCINATE (TOPROL-XL) 25 MG 24 HR TABLET      See Instructions, TAKE 1 TABLET EVERY DAY, # 90 tab(s), 3 Refill(s), Pharmacy: [facility], 168, cm, Height/Length Dosing, 11/11/21 9:32:00 CST, 73.75, kg, Weight Dosing, 11/11/21 9:32:00 CST       Start Date: 1/18/2022 End Date: --    OMEPRAZOLE (PRILOSEC) 20 MG CAPSULE    Take 1 capsule (20 mg total) by mouth once daily.       Start Date: 10/10/2022End Date: --          The patient's Health Maintenance was reviewed and the following appears to be due at this time:   Health Maintenance Due   Topic Date Due    Hepatitis C Screening  Never  done    Foot Exam  Never done    TETANUS VACCINE  Never done    Shingles Vaccine (1 of 2) Never done    COVID-19 Vaccine (3 - Booster for Pfizer series) 05/21/2021           Follow up in about 6 months (around 8/3/2023) for wellness, lab review. In addition to their scheduled follow up, the patient has also been instructed to follow up on as needed basis.     Provided patient with a 5-10 year written screening schedule and personal prevention plan. Recommendations were developed using the USPSTF age appropriate recommendations. Education, counseling, and referrals were provided as needed. After Visit Summary printed and given to patient which includes a list of additional screenings\tests needed.

## 2023-02-05 LAB — BACTERIA UR CULT: ABNORMAL

## 2023-05-08 PROBLEM — N34.2 INFECTIVE URETHRITIS: Status: RESOLVED | Noted: 2023-02-03 | Resolved: 2023-05-08

## 2023-05-08 PROBLEM — Z00.00 WELLNESS EXAMINATION: Status: RESOLVED | Noted: 2023-02-03 | Resolved: 2023-05-08

## 2023-06-08 DIAGNOSIS — E11.22 TYPE 2 DIABETES MELLITUS WITH STAGE 3A CHRONIC KIDNEY DISEASE, WITHOUT LONG-TERM CURRENT USE OF INSULIN: Primary | ICD-10-CM

## 2023-06-08 DIAGNOSIS — E78.5 HYPERLIPIDEMIA, UNSPECIFIED HYPERLIPIDEMIA TYPE: ICD-10-CM

## 2023-06-08 DIAGNOSIS — I10 HYPERTENSION, UNSPECIFIED TYPE: ICD-10-CM

## 2023-06-08 DIAGNOSIS — N18.31 TYPE 2 DIABETES MELLITUS WITH STAGE 3A CHRONIC KIDNEY DISEASE, WITHOUT LONG-TERM CURRENT USE OF INSULIN: Primary | ICD-10-CM

## 2023-06-08 RX ORDER — AMLODIPINE AND BENAZEPRIL HYDROCHLORIDE 10; 20 MG/1; MG/1
CAPSULE ORAL
Qty: 90 CAPSULE | Refills: 3 | Status: SHIPPED | OUTPATIENT
Start: 2023-06-08 | End: 2023-10-19 | Stop reason: SDUPTHER

## 2023-06-08 RX ORDER — ATORVASTATIN CALCIUM 20 MG/1
TABLET, FILM COATED ORAL
Qty: 90 TABLET | Refills: 3 | Status: SHIPPED | OUTPATIENT
Start: 2023-06-08 | End: 2023-10-19 | Stop reason: SDUPTHER

## 2023-06-08 RX ORDER — GLIMEPIRIDE 2 MG/1
TABLET ORAL
Qty: 90 TABLET | Refills: 3 | Status: SHIPPED | OUTPATIENT
Start: 2023-06-08 | End: 2023-10-19 | Stop reason: SDUPTHER

## 2023-07-31 ENCOUNTER — LAB VISIT (OUTPATIENT)
Dept: LAB | Facility: HOSPITAL | Age: 68
End: 2023-07-31
Attending: STUDENT IN AN ORGANIZED HEALTH CARE EDUCATION/TRAINING PROGRAM
Payer: MEDICARE

## 2023-07-31 DIAGNOSIS — N18.31 TYPE 2 DIABETES MELLITUS WITH STAGE 3A CHRONIC KIDNEY DISEASE, WITHOUT LONG-TERM CURRENT USE OF INSULIN: ICD-10-CM

## 2023-07-31 DIAGNOSIS — N18.31 CHRONIC KIDNEY DISEASE, STAGE 3A: ICD-10-CM

## 2023-07-31 DIAGNOSIS — E11.22 TYPE 2 DIABETES MELLITUS WITH STAGE 3A CHRONIC KIDNEY DISEASE, WITHOUT LONG-TERM CURRENT USE OF INSULIN: ICD-10-CM

## 2023-07-31 LAB
ALBUMIN SERPL-MCNC: 4.1 G/DL (ref 3.4–4.8)
ALBUMIN/GLOB SERPL: 1.2 RATIO (ref 1.1–2)
ALP SERPL-CCNC: 61 UNIT/L (ref 40–150)
ALT SERPL-CCNC: 65 UNIT/L (ref 0–55)
AST SERPL-CCNC: 49 UNIT/L (ref 5–34)
BILIRUBIN DIRECT+TOT PNL SERPL-MCNC: 1.1 MG/DL
BUN SERPL-MCNC: 17.1 MG/DL (ref 8.4–25.7)
CALCIUM SERPL-MCNC: 9.7 MG/DL (ref 8.8–10)
CHLORIDE SERPL-SCNC: 105 MMOL/L (ref 98–107)
CO2 SERPL-SCNC: 29 MMOL/L (ref 23–31)
CREAT SERPL-MCNC: 1.56 MG/DL (ref 0.73–1.18)
EST. AVERAGE GLUCOSE BLD GHB EST-MCNC: 131.2 MG/DL
GFR SERPLBLD CREATININE-BSD FMLA CKD-EPI: 48 MLS/MIN/1.73/M2
GLOBULIN SER-MCNC: 3.3 GM/DL (ref 2.4–3.5)
GLUCOSE SERPL-MCNC: 101 MG/DL (ref 82–115)
HBA1C MFR BLD: 6.2 %
POTASSIUM SERPL-SCNC: 4.3 MMOL/L (ref 3.5–5.1)
PROT SERPL-MCNC: 7.4 GM/DL (ref 5.8–7.6)
SODIUM SERPL-SCNC: 142 MMOL/L (ref 136–145)

## 2023-07-31 PROCEDURE — 80053 COMPREHEN METABOLIC PANEL: CPT

## 2023-07-31 PROCEDURE — 36415 COLL VENOUS BLD VENIPUNCTURE: CPT

## 2023-07-31 PROCEDURE — 83036 HEMOGLOBIN GLYCOSYLATED A1C: CPT

## 2023-08-04 ENCOUNTER — HOSPITAL ENCOUNTER (EMERGENCY)
Facility: HOSPITAL | Age: 68
Discharge: HOME | End: 2023-08-04
Attending: EMERGENCY MEDICINE
Payer: MEDICARE

## 2023-08-04 VITALS
BODY MASS INDEX: 25.71 KG/M2 | OXYGEN SATURATION: 98 % | HEIGHT: 66 IN | RESPIRATION RATE: 20 BRPM | WEIGHT: 160 LBS | TEMPERATURE: 98 F | DIASTOLIC BLOOD PRESSURE: 87 MMHG | HEART RATE: 89 BPM | SYSTOLIC BLOOD PRESSURE: 135 MMHG

## 2023-08-04 DIAGNOSIS — N30.01 ACUTE CYSTITIS WITH HEMATURIA: Primary | ICD-10-CM

## 2023-08-04 LAB
ALBUMIN SERPL-MCNC: 3.9 G/DL (ref 3.4–4.8)
ALBUMIN/GLOB SERPL: 1.1 RATIO (ref 1.1–2)
ALP SERPL-CCNC: 64 UNIT/L (ref 40–150)
ALT SERPL-CCNC: 36 UNIT/L (ref 0–55)
APPEARANCE UR: ABNORMAL
AST SERPL-CCNC: 31 UNIT/L (ref 5–34)
BACTERIA #/AREA URNS AUTO: ABNORMAL /HPF
BASOPHILS # BLD AUTO: 0.04 X10(3)/MCL
BASOPHILS NFR BLD AUTO: 0.3 %
BILIRUB UR QL STRIP.AUTO: ABNORMAL
BILIRUBIN DIRECT+TOT PNL SERPL-MCNC: 1.6 MG/DL
BUN SERPL-MCNC: 20.6 MG/DL (ref 8.4–25.7)
CALCIUM SERPL-MCNC: 9.6 MG/DL (ref 8.8–10)
CHLORIDE SERPL-SCNC: 104 MMOL/L (ref 98–107)
CO2 SERPL-SCNC: 23 MMOL/L (ref 23–31)
COLOR UR: ABNORMAL
CREAT SERPL-MCNC: 1.35 MG/DL (ref 0.73–1.18)
EOSINOPHIL # BLD AUTO: 0.12 X10(3)/MCL (ref 0–0.9)
EOSINOPHIL NFR BLD AUTO: 0.9 %
ERYTHROCYTE [DISTWIDTH] IN BLOOD BY AUTOMATED COUNT: 12.8 % (ref 11.5–17)
GFR SERPLBLD CREATININE-BSD FMLA CKD-EPI: 58 MLS/MIN/1.73/M2
GLOBULIN SER-MCNC: 3.7 GM/DL (ref 2.4–3.5)
GLUCOSE SERPL-MCNC: 137 MG/DL (ref 82–115)
GLUCOSE UR QL STRIP.AUTO: NEGATIVE
HCT VFR BLD AUTO: 49.4 % (ref 42–52)
HGB BLD-MCNC: 16.6 G/DL (ref 14–18)
IMM GRANULOCYTES # BLD AUTO: 0.06 X10(3)/MCL (ref 0–0.04)
IMM GRANULOCYTES NFR BLD AUTO: 0.5 %
KETONES UR QL STRIP.AUTO: ABNORMAL
LEUKOCYTE ESTERASE UR QL STRIP.AUTO: ABNORMAL
LYMPHOCYTES # BLD AUTO: 1.65 X10(3)/MCL (ref 0.6–4.6)
LYMPHOCYTES NFR BLD AUTO: 12.5 %
MCH RBC QN AUTO: 30.1 PG (ref 27–31)
MCHC RBC AUTO-ENTMCNC: 33.6 G/DL (ref 33–36)
MCV RBC AUTO: 89.7 FL (ref 80–94)
MONOCYTES # BLD AUTO: 1.1 X10(3)/MCL (ref 0.1–1.3)
MONOCYTES NFR BLD AUTO: 8.3 %
NEUTROPHILS # BLD AUTO: 10.24 X10(3)/MCL (ref 2.1–9.2)
NEUTROPHILS NFR BLD AUTO: 77.5 %
NITRITE UR QL STRIP.AUTO: POSITIVE
NRBC BLD AUTO-RTO: 0 %
PH UR STRIP.AUTO: 5.5 [PH]
PLATELET # BLD AUTO: 248 X10(3)/MCL (ref 130–400)
PMV BLD AUTO: 9.4 FL (ref 7.4–10.4)
POTASSIUM SERPL-SCNC: 3.8 MMOL/L (ref 3.5–5.1)
PROT SERPL-MCNC: 7.6 GM/DL (ref 5.8–7.6)
PROT UR QL STRIP.AUTO: ABNORMAL
RBC # BLD AUTO: 5.51 X10(6)/MCL (ref 4.7–6.1)
RBC #/AREA URNS AUTO: >100 /HPF
RBC UR QL AUTO: ABNORMAL
RENAL EPI CELLS #/AREA UR COMP ASSIST: ABNORMAL /HPF
SODIUM SERPL-SCNC: 138 MMOL/L (ref 136–145)
SP GR UR STRIP.AUTO: >=1.03 (ref 1–1.03)
SQUAMOUS #/AREA URNS AUTO: ABNORMAL /HPF
UROBILINOGEN UR STRIP-ACNC: 2
WBC # SPEC AUTO: 13.21 X10(3)/MCL (ref 4.5–11.5)
WBC #/AREA URNS AUTO: ABNORMAL /HPF

## 2023-08-04 PROCEDURE — 87088 URINE BACTERIA CULTURE: CPT | Performed by: EMERGENCY MEDICINE

## 2023-08-04 PROCEDURE — 81001 URINALYSIS AUTO W/SCOPE: CPT | Performed by: EMERGENCY MEDICINE

## 2023-08-04 PROCEDURE — P9612 CATHETERIZE FOR URINE SPEC: HCPCS

## 2023-08-04 PROCEDURE — 85025 COMPLETE CBC W/AUTO DIFF WBC: CPT | Performed by: EMERGENCY MEDICINE

## 2023-08-04 PROCEDURE — 99284 EMERGENCY DEPT VISIT MOD MDM: CPT | Mod: 25

## 2023-08-04 PROCEDURE — 87077 CULTURE AEROBIC IDENTIFY: CPT | Mod: 59 | Performed by: EMERGENCY MEDICINE

## 2023-08-04 PROCEDURE — 80053 COMPREHEN METABOLIC PANEL: CPT | Performed by: EMERGENCY MEDICINE

## 2023-08-04 RX ORDER — CIPROFLOXACIN 500 MG/1
500 TABLET ORAL 2 TIMES DAILY
Qty: 20 TABLET | Refills: 0 | Status: SHIPPED | OUTPATIENT
Start: 2023-08-04 | End: 2023-08-11

## 2023-08-04 NOTE — PROGRESS NOTES
Date: 08/08/2023  Patient ID: [id number]   Chief Complaint: Medicare AWV (Was in hospital this past Friday, he was peeing blood and had a bladder infection. )    HPI:   Franklin Macias is a 68 y.o. male here today for a Medicare Wellness.     Opioid Screening: Patient medication list reviewed, patient is not taking prescription opioids. Patient is using additional opioids than prescribed. Patient is not at low risk of substance abuse based on this opioid use history.     Labs wnl and stable. Of note, patient went to ER for gross hematuria, and pt was treated with Cipro for ESBL UTI. Patient is now feeling well. Patient has decreased smoking on his own. Pt didn't tolerate oral medication previously.    Diet: healthy but had teeth removed, so diet is restricted. Drinks water  Activity level: stays active. Enjoys swimming. Pt would like CT chest screening    Patient Active Problem List   Diagnosis    Type 2 diabetes mellitus with stage 3a chronic kidney disease, without long-term current use of insulin    Chronic kidney disease, stage 3a    Chronic right shoulder pain    Wellness examination    Hyperlipidemia     Outpatient Medications Marked as Taking for the 8/8/23 encounter (Office Visit) with Snehal Arroyo MD   Medication Sig Dispense Refill    amlodipine-benazepril 10-20mg (LOTREL) 10-20 mg per capsule TAKE 1 CAPSULE EVERY DAY 90 capsule 3    atorvastatin (LIPITOR) 20 MG tablet TAKE 1 TABLET EVERY DAY 90 tablet 3    ergocalciferol (ERGOCALCIFEROL) 50,000 unit Cap Take 1 capsule (50,000 Units total) by mouth every 7 days. 30 capsule 0    fenofibrate (TRICOR) 145 MG tablet See Instructions, TAKE 1 TABLET EVERY DAY, # 90 tab(s), 3 Refill(s), Pharmacy: [facility], 168, cm, Height/Length Dosing, 11/11/21 9:32:00 CST, 73.75, kg, Weight Dosing, 11/11/21 9:32:00 CST Strength: 145 mg 90 tablet 3    glimepiride (AMARYL) 2 MG tablet TAKE 1 TABLET EVERY DAY 90 tablet 3    metoprolol succinate (TOPROL-XL)  12.5 MG 24 hr split tablet   See Instructions, TAKE 1 TABLET EVERY DAY, # 90 tab(s), 3 Refill(s), Pharmacy: [facility], 168, cm, Height/Length Dosing, 03/04/21 9:57:00 CST, 73, kg, Weight Dosing, 03/04/21 9:57:00 CST      metoprolol succinate (TOPROL-XL) 25 MG 24 hr tablet TAKE 1 TABLET EVERY DAY 90 tablet 3    omeprazole (PRILOSEC) 20 MG capsule Take 1 capsule (20 mg total) by mouth once daily. 90 capsule 3    ondansetron (ZOFRAN-ODT) 4 MG TbDL Take 1 tablet (4 mg total) by mouth every 8 (eight) hours as needed (nausea). 15 tablet 0    [DISCONTINUED] fluticasone propionate (FLONASE) 50 mcg/actuation nasal spray   See Instructions, USE 1 SPRAY IN EACH NOSTRIL TWICE DAILY, # 48 gm, 3 Refill(s), Pharmacy: [facility], 168, cm, Height/Length Dosing, 11/11/21 9:32:00 CST, 73.75, kg, Weight Dosing, 11/11/21 9:32:00 CST       Past Medical History:   Diagnosis Date    DM (diabetes mellitus)     GERD (gastroesophageal reflux disease)     HLD (hyperlipidemia)     HTN (hypertension)      Past Surgical History:   Procedure Laterality Date    CHOLECYSTECTOMY       Review of patient's allergies indicates:  No Known Allergies  History reviewed. No pertinent family history.   Social History     Socioeconomic History    Marital status:    Tobacco Use    Smoking status: Every Day     Current packs/day: 1.00     Types: Cigarettes    Smokeless tobacco: Never   Substance and Sexual Activity    Alcohol use: Never    Drug use: Never    Sexual activity: Yes     Social Determinants of Health     Financial Resource Strain: Low Risk  (8/8/2023)    Overall Financial Resource Strain (CARDIA)     Difficulty of Paying Living Expenses: Not hard at all   Food Insecurity: No Food Insecurity (8/8/2023)    Hunger Vital Sign     Worried About Running Out of Food in the Last Year: Never true     Ran Out of Food in the Last Year: Never true   Transportation Needs: No Transportation Needs (8/8/2023)    [place]  - Transportation     Lack of Transportation (Medical): No     Lack of Transportation (Non-Medical): No   Stress: No Stress Concern Present (8/8/2023)    [employer] - Occupational Stress Questionnaire     Feeling of Stress : Only a little   Housing Stability: Low Risk  (8/8/2023)    Housing Stability Vital Sign     Unable to Pay for Housing in the Last Year: No     Number of Places Lived in the Last Year: 1     Unstable Housing in the Last Year: No     Patient Care Team:  Snehal Arroyo MD as PCP - General (Family Medicine)   Subjective:   Review of Systems   Constitutional: Negative.  Negative for fever and weight loss.   HENT:  Negative for congestion, hearing loss and sore throat.    Eyes:  Negative for blurred vision.   Respiratory:  Negative for cough and shortness of breath.    Cardiovascular:  Negative for chest pain, palpitations and leg swelling.   Gastrointestinal:  Negative for abdominal pain, blood in stool, constipation, diarrhea, nausea and vomiting.   Genitourinary:  Negative for dysuria, frequency, hematuria and urgency.   Musculoskeletal:  Negative for joint pain.   Skin:  Negative for rash.   Neurological:  Negative for weakness and headaches.   Psychiatric/Behavioral:  Negative for depression. The patient is not nervous/anxious and does not have insomnia.      See HPI for details  All Other ROS: Negative except as stated in HPI  Patient Reported Health Risk Assessment  What [age] 65-69  Are you male or female?: Male  During the past four weeks, how much have you been bothered by emotional problems such as feeling anxious, depressed, irritable, sad, or downhearted and blue?: Not at all  During the past five weeks, has your physical and/or emotional health limited your social activities with family, friends, neighbors, or groups?: Not at all  During the past four weeks, how much bodily pain have you generally had?: No pain  During the past four weeks, was  someone available to help if you needed and wanted help?: Yes, as much as I wanted  During the past four weeks, what was the hardest physical activity you could do for at least two minutes?: Moderate  Can you get to places out of walking distance without help?  (For example, can you travel alone on buses or taxis, or drive your own car?): Yes  Can you go shopping for groceries or clothes without someone's help?: Yes  Can you prepare your own meals?: Yes  Can you do your own housework without help?: Yes  Because of any health problems, do you need the help of another person with your personal care needs such as eating, bathing, dressing, or getting around the house?: No  Can you handle your own money without help?: Yes  During the past four weeks, how would you rate your health in general?: Very good  How have things been going for you during the past four weeks?: Pretty well  Are you having difficulties driving your car?: No  Do you always fasten your seat belt when you are in a car?: Yes, usually  How often in the past four weeks have you been bothered by falling or dizzy when standing up?: Never  How often in the past four weeks have you been bothered by sexual problems?: Never  How often in the past four weeks have you been bothered by trouble eating well?: Never  How often in the past four weeks have you been bothered by teeth or denture problems?: Never  How often in the past four weeks have you been bothered with problems using the telephone?: Never  How often in the past four weeks have you been bothered by tiredness or fatigue?: Never  Have you fallen two or more times in the past year?: No  Are you afraid of falling?: No  Are you a smoker?: No  During the past four weeks, how many drinks of wine, beer, or other alcoholic beverages did you have?: No alcohol at all  Do you exercise for about 20 minutes three or more days a week?: Yes, most of the time  Have you been given any information to help you with  "hazards in your house that might hurt you?: Yes  Have you been given any information to help you with keeping track of your medications?: Yes  How often do you have trouble taking medicines the way you've been told to take them?: I always take them as prescribed  How confident are you that you can control and manage most of your health problems?: Very confident  What is your race? (Check all that apply.):   Objective:   /82   Pulse 89   Ht 5' 6" (1.676 m)   Wt 68.1 kg (150 lb 3.2 oz)   SpO2 97%   BMI 24.24 kg/m²   Physical Exam  Constitutional:       Appearance: Normal appearance.   HENT:      Head: Normocephalic and atraumatic.      Right Ear: External ear normal.      Left Ear: External ear normal.      Nose: No congestion or rhinorrhea.      Mouth/Throat:      Pharynx: No oropharyngeal exudate or posterior oropharyngeal erythema.   Eyes:      General: No scleral icterus.        Right eye: No discharge.         Left eye: No discharge.      Extraocular Movements: Extraocular movements intact.      Conjunctiva/sclera: Conjunctivae normal.   Cardiovascular:      Rate and Rhythm: Normal rate and regular rhythm.      Pulses: Normal pulses.      Heart sounds: Normal heart sounds.   Pulmonary:      Effort: Pulmonary effort is normal.      Breath sounds: Normal breath sounds.   Abdominal:      General: Abdomen is flat. Bowel sounds are normal.      Palpations: Abdomen is soft.   Musculoskeletal:         General: No swelling or deformity. Normal range of motion.      Cervical back: Normal range of motion and neck supple.   Skin:     General: Skin is warm and dry.   Neurological:      Mental Status: He is alert and oriented to person, place, and time.   Psychiatric:         Mood and Affect: Mood normal.         Behavior: Behavior normal.         Thought Content: Thought content normal.         Judgment: Judgment normal.       No flowsheet data found.  Fall Risk Assessment - Outpatient 8/8/2023 2/3/2023 " 8/1/2022   Mobility Status Ambulatory Ambulatory Ambulatory   Number of falls 0 0 0   Identified as fall risk 0 0 0           Depression Screening  Over the past two weeks, has the patient felt down, depressed, or hopeless?: No  Over the past two weeks, has the patient felt little interest or pleasure in doing things?: No  Functional Ability/Safety Screening  Was the patient's timed Up & Go test unsteady or longer than 30 seconds?: No  Does the patient need help with phone, transportation, shopping, preparing meals, housework, laundry, meds, or managing money?: No  Does the patient's home have rugs in the hallway, lack grab bars in the bathroom, lack handrails on the stairs or have poor lighting?: No  Have you noticed any hearing difficulties?: No  Cognitive Function (Assessed through direct observation with due consideration of information obtained by way of patient reports and/or concerns raised by family, friends, caretakers, or others)    Does the patient repeat questions/statements in the same day?: No  Does the patient have trouble remembering the date, year, and time?: No  Does the patient have difficulty managing finances?: No  Does the patient have a decreased sense of direction?: No  Assessment:       ICD-10-CM ICD-9-CM   1. Wellness examination  Z00.00 V70.0   2. Rhinitis, unspecified type  J31.0 472.0   3. Personal history of nicotine dependence  Z87.891 V15.82      Plan:   1. Wellness examination  Overview:  Routine labs and preventative care discussed.  Continue healthy lifestyle modifications        2. Rhinitis, unspecified type  -     fluticasone propionate (FLONASE) 50 mcg/actuation nasal spray; 2 sprays (100 mcg total) by Each Nostril route once daily.  Dispense: 16 g; Refill: 11    3. Personal history of nicotine dependence  -     CT Chest Lung Screening Low Dose; Future; Expected date: 08/08/2023         Medicare Annual Wellness and Personalized Prevention Plan:   Fall Risk + Home Safety +  Hearing Impairment + Depression Screen + Opioid and Substance Abuse Screening + Cognitive Impairment Screen + Health Risk Assessment all reviewed.     Health Maintenance Topics with due status: Not Due       Topic Last Completion Date    Colorectal Cancer Screening 08/20/2022    Influenza Vaccine 10/13/2022    Diabetes Urine Screening 01/30/2023    Low Dose Statin 06/08/2023    Hemoglobin A1c 07/31/2023      The patient's Health Maintenance was reviewed and the following appears to be due at this time:   Health Maintenance Due   Topic Date Due    Hepatitis C Screening  Never done    Foot Exam  Never done    TETANUS VACCINE  Never done    Shingles Vaccine (1 of 2) Never done    COVID-19 Vaccine (3 - Pfizer series) 05/21/2021    Lipid Panel  05/03/2023    Eye Exam  08/25/2023       Advance Care Planning     Date: 08/04/2023  future       Follow up in about 6 months (around 2/8/2024) for DM, a1c, needs foot exam and eye exam. In addition to their scheduled follow up, the patient has also been instructed to follow up on as needed basis.

## 2023-08-04 NOTE — ED PROVIDER NOTES
Encounter Date: 8/4/2023    SCRIBE #1 NOTE: I, Candicenajma Valenzuela, am scribing for, and in the presence of,  Dewayne Mas III, MD. I have scribed the following portions of the note - Other sections scribed: HPI, ROS, PE.       History     Chief Complaint   Patient presents with    Hematuria     Pt reports dysuria, retention, hematuria x 2 days. Pt report chills, sweats but unsure if he was running fever. Denies n/v or other symptoms. Also reports LLQ pain x 2 days.      68 year old male with a history of GERD, DM, HLD, HTN, and cholecystectomy presents to ED complaining of hematuria and dysuria that began 2 days ago.  Pt is also complaining of sweats, chills, left flank pain and neck pain.  He does not think he takes a blood thinner.    The history is provided by the patient. No  was used.   Hematuria  This is a new problem. Episode onset: 2 days ago. The problem is unchanged. He describes the hematuria as gross hematuria. Associated symptoms include flank pain. Pertinent negatives include no abdominal pain, dysuria, fever, nausea or vomiting.     Review of patient's allergies indicates:  No Known Allergies  Past Medical History:   Diagnosis Date    DM (diabetes mellitus)     GERD (gastroesophageal reflux disease)     HLD (hyperlipidemia)     HTN (hypertension)      Past Surgical History:   Procedure Laterality Date    CHOLECYSTECTOMY       No family history on file.  Social History     Tobacco Use    Smoking status: Every Day     Current packs/day: 1.00     Types: Cigarettes    Smokeless tobacco: Never   Substance Use Topics    Alcohol use: Never    Drug use: Never     Review of Systems   Constitutional:  Negative for fatigue, fever and unexpected weight change.   HENT:  Negative for congestion and rhinorrhea.    Eyes:  Negative for pain.   Respiratory:  Negative for chest tightness, shortness of breath and wheezing.    Cardiovascular:  Negative for chest pain.   Gastrointestinal:  Negative  for abdominal pain, constipation, diarrhea, nausea and vomiting.   Genitourinary:  Positive for flank pain and hematuria. Negative for dysuria.   Musculoskeletal:  Negative for back pain and neck pain.   Skin:  Negative for rash.   Allergic/Immunologic: Negative for environmental allergies, food allergies and immunocompromised state.   Neurological:  Negative for dizziness and speech difficulty.   Hematological:  Does not bruise/bleed easily.   Psychiatric/Behavioral:  Negative for sleep disturbance and suicidal ideas.        Physical Exam     Initial Vitals [08/04/23 0817]   BP Pulse Resp Temp SpO2   135/87 89 20 97.5 °F (36.4 °C) 98 %      MAP       --         Physical Exam    Nursing note and vitals reviewed.  Constitutional: No distress.   HENT:   Head: Normocephalic and atraumatic.   Neck: Trachea normal.   Cardiovascular:  Normal rate and regular rhythm.           No murmur heard.  Pulmonary/Chest: Breath sounds normal. No respiratory distress.   Abdominal: Abdomen is soft. Bowel sounds are normal. He exhibits no distension. There is no abdominal tenderness.   Musculoskeletal:         General: Normal range of motion.      Lumbar back: Normal.     Neurological: He is alert and oriented to person, place, and time. He has normal strength. No cranial nerve deficit.   Skin: Skin is warm and dry. No rash noted.   Psychiatric: He has a normal mood and affect. Judgment normal.         ED Course   Procedures  Labs Reviewed   COMPREHENSIVE METABOLIC PANEL - Abnormal; Notable for the following components:       Result Value    Glucose Level 137 (*)     Creatinine 1.35 (*)     Globulin 3.7 (*)     Bilirubin Total 1.6 (*)     All other components within normal limits   URINALYSIS, REFLEX TO URINE CULTURE - Abnormal; Notable for the following components:    Color, UA Red-brown (*)     Appearance, UA Cloudy (*)     Protein, UA 3+ (*)     Ketones, UA Trace (*)     Blood, UA 3+ (*)     Bilirubin, UA 2+ (*)     Urobilinogen, UA  2.0 (*)     Nitrites, UA Positive (*)     Leukocyte Esterase, UA 1+ (*)     All other components within normal limits   CBC WITH DIFFERENTIAL - Abnormal; Notable for the following components:    WBC 13.21 (*)     Neut # 10.24 (*)     IG# 0.06 (*)     All other components within normal limits   URINALYSIS, MICROSCOPIC - Abnormal; Notable for the following components:    RBC, UA >100 (*)     WBC, UA 21-50 (*)     Squamous Epithelial Cells, UA 5-10 (*)     Bacteria, UA 1+ (*)     Renal Epithelial Cells, UA Few (*)     All other components within normal limits   CULTURE, URINE   CBC W/ AUTO DIFFERENTIAL    Narrative:     The following orders were created for panel order CBC auto differential.  Procedure                               Abnormality         Status                     ---------                               -----------         ------                     CBC with Differential[[id number]]        Abnormal            Final result                 Please view results for these tests on the individual orders.          Imaging Results               CT Abdomen Pelvis  Without Contrast (Final result)  Result time 08/04/23 11:04:43      Final result by Frederic Chou MD (08/04/23 11:04:43)                   Narrative:    EXAMINATION  CT ABDOMEN PELVIS WITHOUT CONTRAST    CLINICAL HISTORY  Flank pain, kidney stone suspected;LLQ abdominal pain;    TECHNIQUE  Non-contrast helical-acquisition CT images were obtained and multiplanar reformats accomplished by a CT technologist at a separate workstation, pushed to PACS for physician review.    Enteric contrast: none    COMPARISON  14 May 2020    FINDINGS  Images were reviewed in soft tissue, lung, and bone windows.    Exam quality: Inherently limited evaluation of the abdominopelvic organs and vasculature secondary to non-contrast protocol.    Lines/tubes: none visualized    No acute or suspicious focal abnormality of the included lower thoracic cavity.  Chronic bilateral  lung parenchymal changes are similar.  The lower thoracic and abdominopelvic vascular structures are similar in appearance, with widespread atherosclerotic calcification and mildly ectatic infrarenal aorta.    Interval changes of cholecystectomy noted.  No evidence of high-grade biliary obstruction.  However, there is notable density within the distal CBD suggestive of choledocholithiasis (series 4, image 53; series 5, image 69).  Diffuse hypoattenuation of the liver suggests steatosis.  No convincing acute abnormality involving the upper abdominal solid organs.    No radiodense caliceal or ureteral stone is identified.  There are no findings of distal obstructive uropathy.  The urinary bladder is minimally distended, but the bladder wall is diffusely thickened greater than expected for degree of luminal expansion.  There is similar enlarged, heterogeneous appearance of the prostate.    The stomach is nondistended, limiting overall assessment.  No evidence of high-grade mechanical small bowel obstruction or acute gastrointestinal inflammatory changes.  There is no focal mural lesion involving the large bowel.  Scattered, uncomplicated appearance of descending and sigmoid colon diverticulosis is similar in comparison.  No free intra-abdominal air or fluid.  No drainable collections.    No pathologic lymph node enlargement or necrotic adenopathy.    Body wall subcutaneous tissues and regional muscular structures are without acute abnormality.  Degenerative alterations of the spinal column and bony pelvis are again appreciated.  No acute or destructive osseous process.    IMPRESSION  1. Diffusely thickened appearance of the urinary bladder wall is greater than expected for degree of luminal distension, element of cystitis suspected.  2. No visualized radiodense urolithiasis or findings of obstructive uropathy.  3. Interval changes of cholecystectomy with incidental appearance of distal choledocholithiasis; no CT  findings of high-grade biliary obstruction.  4. Additional chronic secondary details discussed above, similar in the interval.  ==========    This report was flagged in Epic as abnormal.    RADIATION DOSE  Automated tube current modulation, weight-based exposure dosing, and/or iterative reconstruction technique utilized to reach lowest reasonably achievable exposure rate.    DLP: 488 mGy*cm      Electronically signed by: Frederic Chou  Date:    08/04/2023  Time:    11:04                                     Medications - No data to display  Medical Decision Making  Differential diagnosis includes, but is not limited to:  UTI, kidney stones, hemorrhagic cystitis    Some element of renal colic patient abdomen soft with hematuria.  CT without abnormality other than edematous bladder and combined with the UA patient most likely has cystitis will place on ciprofloxacin CBC chemistry without abnormality patient nontoxic outpatient management would be preferable for patient    Problems Addressed:  Acute cystitis with hematuria: complicated acute illness or injury    Amount and/or Complexity of Data Reviewed  Labs: ordered.  Radiology: ordered.    Risk  Prescription drug management.                Scribe Attestation:   Scribe #1: I performed the above scribed service and the documentation accurately describes the services I performed. I attest to the accuracy of the note.    Attending Attestation:           Physician Attestation for Scribe:  Physician Attestation Statement for Scribe #1: I, Dewayne Mas III, MD, reviewed documentation, as scribed by Candice Valenzuela in my presence, and it is both accurate and complete.                          Clinical Impression:   Final diagnoses:  [N30.01] Acute cystitis with hematuria (Primary)        ED Disposition Condition    Discharge Stable          ED Prescriptions       Medication Sig Dispense Start Date End Date Auth. Provider    ciprofloxacin HCl (CIPRO) 500 MG tablet Take  1 tablet (500 mg total) by mouth 2 (two) times daily. for 7 days 20 tablet 8/4/2023 8/11/2023 Dewayne Mas III, MD          Follow-up Information       Follow up With Specialties Details Why Contact Info    Snehal Arroyo MD Family Medicine In 1 week  [address] [address]  [phone]      Walter Mansfield MD Urology Schedule an appointment as soon as possible for a visit in 2 weeks  [address] [facility] [address]  [place] [id number]  [phone]               Dewayne Mas III, MD  08/04/23 1901

## 2023-08-06 LAB — BACTERIA UR CULT: ABNORMAL

## 2023-08-08 ENCOUNTER — OFFICE VISIT (OUTPATIENT)
Dept: PRIMARY CARE CLINIC | Facility: CLINIC | Age: 68
End: 2023-08-08
Payer: MEDICARE

## 2023-08-08 VITALS
SYSTOLIC BLOOD PRESSURE: 131 MMHG | OXYGEN SATURATION: 97 % | WEIGHT: 150.19 LBS | BODY MASS INDEX: 24.14 KG/M2 | DIASTOLIC BLOOD PRESSURE: 82 MMHG | HEIGHT: 66 IN | HEART RATE: 89 BPM

## 2023-08-08 DIAGNOSIS — Z00.00 WELLNESS EXAMINATION: Primary | ICD-10-CM

## 2023-08-08 DIAGNOSIS — Z87.891 PERSONAL HISTORY OF NICOTINE DEPENDENCE: ICD-10-CM

## 2023-08-08 DIAGNOSIS — J31.0 RHINITIS, UNSPECIFIED TYPE: ICD-10-CM

## 2023-08-08 PROCEDURE — 3288F PR FALLS RISK ASSESSMENT DOCUMENTED: ICD-10-PCS | Mod: CPTII,,, | Performed by: STUDENT IN AN ORGANIZED HEALTH CARE EDUCATION/TRAINING PROGRAM

## 2023-08-08 PROCEDURE — 3075F SYST BP GE 130 - 139MM HG: CPT | Mod: CPTII,,, | Performed by: STUDENT IN AN ORGANIZED HEALTH CARE EDUCATION/TRAINING PROGRAM

## 2023-08-08 PROCEDURE — 1101F PT FALLS ASSESS-DOCD LE1/YR: CPT | Mod: CPTII,,, | Performed by: STUDENT IN AN ORGANIZED HEALTH CARE EDUCATION/TRAINING PROGRAM

## 2023-08-08 PROCEDURE — 3008F BODY MASS INDEX DOCD: CPT | Mod: CPTII,,, | Performed by: STUDENT IN AN ORGANIZED HEALTH CARE EDUCATION/TRAINING PROGRAM

## 2023-08-08 PROCEDURE — 3288F FALL RISK ASSESSMENT DOCD: CPT | Mod: CPTII,,, | Performed by: STUDENT IN AN ORGANIZED HEALTH CARE EDUCATION/TRAINING PROGRAM

## 2023-08-08 PROCEDURE — 3075F PR MOST RECENT SYSTOLIC BLOOD PRESS GE 130-139MM HG: ICD-10-PCS | Mod: CPTII,,, | Performed by: STUDENT IN AN ORGANIZED HEALTH CARE EDUCATION/TRAINING PROGRAM

## 2023-08-08 PROCEDURE — 1101F PR PT FALLS ASSESS DOC 0-1 FALLS W/OUT INJ PAST YR: ICD-10-PCS | Mod: CPTII,,, | Performed by: STUDENT IN AN ORGANIZED HEALTH CARE EDUCATION/TRAINING PROGRAM

## 2023-08-08 PROCEDURE — 4010F ACE/ARB THERAPY RXD/TAKEN: CPT | Mod: CPTII,,, | Performed by: STUDENT IN AN ORGANIZED HEALTH CARE EDUCATION/TRAINING PROGRAM

## 2023-08-08 PROCEDURE — 3079F PR MOST RECENT DIASTOLIC BLOOD PRESSURE 80-89 MM HG: ICD-10-PCS | Mod: CPTII,,, | Performed by: STUDENT IN AN ORGANIZED HEALTH CARE EDUCATION/TRAINING PROGRAM

## 2023-08-08 PROCEDURE — 1160F PR REVIEW ALL MEDS BY PRESCRIBER/CLIN PHARMACIST DOCUMENTED: ICD-10-PCS | Mod: CPTII,,, | Performed by: STUDENT IN AN ORGANIZED HEALTH CARE EDUCATION/TRAINING PROGRAM

## 2023-08-08 PROCEDURE — 3066F NEPHROPATHY DOC TX: CPT | Mod: CPTII,,, | Performed by: STUDENT IN AN ORGANIZED HEALTH CARE EDUCATION/TRAINING PROGRAM

## 2023-08-08 PROCEDURE — 3079F DIAST BP 80-89 MM HG: CPT | Mod: CPTII,,, | Performed by: STUDENT IN AN ORGANIZED HEALTH CARE EDUCATION/TRAINING PROGRAM

## 2023-08-08 PROCEDURE — 3044F HG A1C LEVEL LT 7.0%: CPT | Mod: CPTII,,, | Performed by: STUDENT IN AN ORGANIZED HEALTH CARE EDUCATION/TRAINING PROGRAM

## 2023-08-08 PROCEDURE — 1160F RVW MEDS BY RX/DR IN RCRD: CPT | Mod: CPTII,,, | Performed by: STUDENT IN AN ORGANIZED HEALTH CARE EDUCATION/TRAINING PROGRAM

## 2023-08-08 PROCEDURE — 4010F PR ACE/ARB THEARPY RXD/TAKEN: ICD-10-PCS | Mod: CPTII,,, | Performed by: STUDENT IN AN ORGANIZED HEALTH CARE EDUCATION/TRAINING PROGRAM

## 2023-08-08 PROCEDURE — 3061F PR NEG MICROALBUMINURIA RESULT DOCUMENTED/REVIEW: ICD-10-PCS | Mod: CPTII,,, | Performed by: STUDENT IN AN ORGANIZED HEALTH CARE EDUCATION/TRAINING PROGRAM

## 2023-08-08 PROCEDURE — 1159F MED LIST DOCD IN RCRD: CPT | Mod: CPTII,,, | Performed by: STUDENT IN AN ORGANIZED HEALTH CARE EDUCATION/TRAINING PROGRAM

## 2023-08-08 PROCEDURE — G0439 PR MEDICARE ANNUAL WELLNESS SUBSEQUENT VISIT: ICD-10-PCS | Mod: ,,, | Performed by: STUDENT IN AN ORGANIZED HEALTH CARE EDUCATION/TRAINING PROGRAM

## 2023-08-08 PROCEDURE — G0439 PPPS, SUBSEQ VISIT: HCPCS | Mod: ,,, | Performed by: STUDENT IN AN ORGANIZED HEALTH CARE EDUCATION/TRAINING PROGRAM

## 2023-08-08 PROCEDURE — 3044F PR MOST RECENT HEMOGLOBIN A1C LEVEL <7.0%: ICD-10-PCS | Mod: CPTII,,, | Performed by: STUDENT IN AN ORGANIZED HEALTH CARE EDUCATION/TRAINING PROGRAM

## 2023-08-08 PROCEDURE — 3066F PR DOCUMENTATION OF TREATMENT FOR NEPHROPATHY: ICD-10-PCS | Mod: CPTII,,, | Performed by: STUDENT IN AN ORGANIZED HEALTH CARE EDUCATION/TRAINING PROGRAM

## 2023-08-08 PROCEDURE — 3061F NEG MICROALBUMINURIA REV: CPT | Mod: CPTII,,, | Performed by: STUDENT IN AN ORGANIZED HEALTH CARE EDUCATION/TRAINING PROGRAM

## 2023-08-08 PROCEDURE — 1159F PR MEDICATION LIST DOCUMENTED IN MEDICAL RECORD: ICD-10-PCS | Mod: CPTII,,, | Performed by: STUDENT IN AN ORGANIZED HEALTH CARE EDUCATION/TRAINING PROGRAM

## 2023-08-08 PROCEDURE — 3008F PR BODY MASS INDEX (BMI) DOCUMENTED: ICD-10-PCS | Mod: CPTII,,, | Performed by: STUDENT IN AN ORGANIZED HEALTH CARE EDUCATION/TRAINING PROGRAM

## 2023-08-08 RX ORDER — FLUTICASONE PROPIONATE 50 MCG
2 SPRAY, SUSPENSION (ML) NASAL DAILY
Qty: 16 G | Refills: 11 | Status: SHIPPED | OUTPATIENT
Start: 2023-08-08 | End: 2024-03-06

## 2023-08-15 ENCOUNTER — HOSPITAL ENCOUNTER (OUTPATIENT)
Dept: RADIOLOGY | Facility: HOSPITAL | Age: 68
Discharge: HOME | End: 2023-08-15
Attending: STUDENT IN AN ORGANIZED HEALTH CARE EDUCATION/TRAINING PROGRAM
Payer: MEDICARE

## 2023-08-15 DIAGNOSIS — Z87.891 PERSONAL HISTORY OF NICOTINE DEPENDENCE: ICD-10-CM

## 2023-08-15 PROCEDURE — 71271 CT THORAX LUNG CANCER SCR C-: CPT | Mod: TC

## 2023-08-21 ENCOUNTER — TELEPHONE (OUTPATIENT)
Dept: PRIMARY CARE CLINIC | Facility: CLINIC | Age: 68
End: 2023-08-21
Payer: MEDICARE

## 2023-08-21 NOTE — TELEPHONE ENCOUNTER
----- Message from Snehal Arroyo MD sent at 8/21/2023  8:39 AM CDT -----  Please inform patient that lung cancer screening was benign. It did show severe COPD and coronary atherosclerosis (plaque buildup in heart vessels). Repeat in 1yr    Thank you,    Dr. ALEXIS

## 2023-09-25 ENCOUNTER — TELEPHONE (OUTPATIENT)
Dept: PRIMARY CARE CLINIC | Facility: CLINIC | Age: 68
End: 2023-09-25
Payer: MEDICARE

## 2023-09-25 DIAGNOSIS — E55.9 VITAMIN D DEFICIENCY: ICD-10-CM

## 2023-09-25 RX ORDER — ERGOCALCIFEROL 1.25 MG/1
50000 CAPSULE ORAL
Qty: 30 CAPSULE | Refills: 0 | Status: SHIPPED | OUTPATIENT
Start: 2023-09-25 | End: 2024-03-06

## 2023-09-25 NOTE — TELEPHONE ENCOUNTER
----- Message from [facility] sent at 9/25/2023  1:34 PM CDT -----  .Type:  RX Refill Request    Who Called:  pt    Refill or New Rx: VITAMIN D-2     RX Name and Strength:    How is the patient currently taking it? (ex. 1XDay) one week      Is this a 30 day or 90 day RX: 30    Preferred Pharmacy with phone number: [facility]     Local or Mail Order: mail    Ordering Provider: Elvis    Would the patient rather a call back? Yes    Best Call Back Number: [phone]    Additional Information:  pt is trying to refill this I did not see it in his chart

## 2023-09-26 ENCOUNTER — TELEPHONE (OUTPATIENT)
Dept: PRIMARY CARE CLINIC | Facility: CLINIC | Age: 68
End: 2023-09-26
Payer: MEDICARE

## 2023-09-26 DIAGNOSIS — R11.0 NAUSEA: ICD-10-CM

## 2023-09-26 RX ORDER — ONDANSETRON 4 MG/1
4 TABLET, ORALLY DISINTEGRATING ORAL EVERY 8 HOURS PRN
Qty: 15 TABLET | Refills: 0 | Status: ON HOLD | OUTPATIENT
Start: 2023-09-26 | End: 2024-01-04 | Stop reason: HOSPADM

## 2023-09-28 ENCOUNTER — TELEPHONE (OUTPATIENT)
Dept: PRIMARY CARE CLINIC | Facility: CLINIC | Age: 68
End: 2023-09-28
Payer: MEDICARE

## 2023-09-28 NOTE — TELEPHONE ENCOUNTER
----- Message from Eufemia Jeronimo sent at 9/28/2023 10:01 AM CDT -----  Regarding: refill  .Type:  RX Refill Request    Who Called: pt  Refill or New Rx:refill  RX Name and Strength:ciprofloxacin HCl (CIPRO) 500 MG tablet  How is the patient currently taking it? (ex. 1XDay):Take 1 tablet (500 mg total) by mouth 2 (two) times daily. for 7 days -   Is this a 30 day or 90 day RX:  Preferred Pharmacy with phone number:[facility] - [place], [place] - [address]   Phone:  [phone]  Fax:  [phone]  Local or Mail Order:  Ordering Provider:  Would the patient rather a call back or a response via [facility]?   Best Call Back Number:  Additional Information:

## 2023-10-10 ENCOUNTER — TELEPHONE (OUTPATIENT)
Dept: PRIMARY CARE CLINIC | Facility: CLINIC | Age: 68
End: 2023-10-10
Payer: MEDICARE

## 2023-10-10 DIAGNOSIS — E78.5 HYPERLIPIDEMIA, UNSPECIFIED HYPERLIPIDEMIA TYPE: ICD-10-CM

## 2023-10-10 DIAGNOSIS — K21.9 GASTROESOPHAGEAL REFLUX DISEASE, UNSPECIFIED WHETHER ESOPHAGITIS PRESENT: ICD-10-CM

## 2023-10-10 RX ORDER — OMEPRAZOLE 20 MG/1
20 CAPSULE, DELAYED RELEASE ORAL DAILY
Qty: 90 CAPSULE | Refills: 3 | Status: ON HOLD | OUTPATIENT
Start: 2023-10-10 | End: 2024-01-04 | Stop reason: HOSPADM

## 2023-10-10 RX ORDER — FENOFIBRATE 145 MG/1
TABLET, FILM COATED ORAL
Qty: 90 TABLET | Refills: 3 | Status: SHIPPED | OUTPATIENT
Start: 2023-10-10

## 2023-10-10 NOTE — TELEPHONE ENCOUNTER
----- Message from Jackie Urias sent at 10/10/2023 11:41 AM CDT -----  Regarding: Refill  .Type:  RX Refill Request    Who Called: Teena  Refill or New Rx:Refill  RX Name and Strength:omeprazole (PRILOSEC) 20 MG capsule  How is the patient currently taking it? (ex. 1XDay):1/ day  Is this a 30 day or 90 day RX:90  Preferred Pharmacy with phone number:[facility] or Mail Order:[facility] Order  Ordering Provider:Cruz  Would the patient rather a call back or a response via [facility]? CB  Best Call Back Number:[phone]  Additional Information: omeprazole (PRILOSEC) 20 MG capsule       .Type:  RX Refill Request    Who Called: Teena  Refill or New Rx:Refill  RX Name and Strength:fenofibrate (TRICOR) 145 MG tablet  How is the patient currently taking it? (ex. 1XDay):1/ day  Is this a 30 day or 90 day RX:90  Preferred Pharmacy with phone number:[facility] or Mail Order:Mail Order  Ordering Provider:Cruz  Would the patient rather a call back or a response via [facility]? CB  Best Call Back Number:[phone]  Additional Information: fenofibrate (TRICOR) 145 MG tablet

## 2023-10-19 DIAGNOSIS — N18.31 TYPE 2 DIABETES MELLITUS WITH STAGE 3A CHRONIC KIDNEY DISEASE, WITHOUT LONG-TERM CURRENT USE OF INSULIN: ICD-10-CM

## 2023-10-19 DIAGNOSIS — E11.22 TYPE 2 DIABETES MELLITUS WITH STAGE 3A CHRONIC KIDNEY DISEASE, WITHOUT LONG-TERM CURRENT USE OF INSULIN: ICD-10-CM

## 2023-10-19 DIAGNOSIS — E78.5 HYPERLIPIDEMIA, UNSPECIFIED HYPERLIPIDEMIA TYPE: ICD-10-CM

## 2023-10-19 DIAGNOSIS — I10 HYPERTENSION, UNSPECIFIED TYPE: ICD-10-CM

## 2023-10-19 RX ORDER — METOPROLOL SUCCINATE 25 MG/1
25 TABLET, EXTENDED RELEASE ORAL DAILY
Qty: 90 TABLET | Refills: 3 | Status: SHIPPED | OUTPATIENT
Start: 2023-10-19

## 2023-10-19 RX ORDER — ATORVASTATIN CALCIUM 20 MG/1
20 TABLET, FILM COATED ORAL DAILY
Qty: 90 TABLET | Refills: 3 | Status: ON HOLD | OUTPATIENT
Start: 2023-10-19 | End: 2024-01-26 | Stop reason: HOSPADM

## 2023-10-19 RX ORDER — AMLODIPINE AND BENAZEPRIL HYDROCHLORIDE 10; 20 MG/1; MG/1
1 CAPSULE ORAL DAILY
Qty: 90 CAPSULE | Refills: 3 | Status: SHIPPED | OUTPATIENT
Start: 2023-10-19 | End: 2024-03-06

## 2023-10-19 RX ORDER — GLIMEPIRIDE 2 MG/1
2 TABLET ORAL DAILY
Qty: 90 TABLET | Refills: 3 | Status: SHIPPED | OUTPATIENT
Start: 2023-10-19 | End: 2024-03-06

## 2023-11-13 PROBLEM — Z00.00 WELLNESS EXAMINATION: Status: RESOLVED | Noted: 2023-02-03 | Resolved: 2023-11-13

## 2023-12-16 ENCOUNTER — HOSPITAL ENCOUNTER (INPATIENT)
Facility: HOSPITAL | Age: 68
LOS: 19 days | Discharge: HOME | DRG: 871 | End: 2024-01-04
Attending: EMERGENCY MEDICINE | Admitting: INTERNAL MEDICINE
Payer: MEDICARE

## 2023-12-16 DIAGNOSIS — R10.13 EPIGASTRIC PAIN: ICD-10-CM

## 2023-12-16 DIAGNOSIS — M54.2 CHRONIC NECK AND BACK PAIN: ICD-10-CM

## 2023-12-16 DIAGNOSIS — N17.9 AKI (ACUTE KIDNEY INJURY): ICD-10-CM

## 2023-12-16 DIAGNOSIS — K80.50 CHOLEDOCHOLITHIASIS: ICD-10-CM

## 2023-12-16 DIAGNOSIS — N50.89 SCROTAL SWELLING: ICD-10-CM

## 2023-12-16 DIAGNOSIS — N18.31 TYPE 2 DIABETES MELLITUS WITH STAGE 3A CHRONIC KIDNEY DISEASE, WITHOUT LONG-TERM CURRENT USE OF INSULIN: ICD-10-CM

## 2023-12-16 DIAGNOSIS — R09.02 HYPOXIA: ICD-10-CM

## 2023-12-16 DIAGNOSIS — K85.10 ACUTE BILIARY PANCREATITIS, UNSPECIFIED COMPLICATION STATUS: Primary | ICD-10-CM

## 2023-12-16 DIAGNOSIS — R65.10 SIRS (SYSTEMIC INFLAMMATORY RESPONSE SYNDROME): ICD-10-CM

## 2023-12-16 DIAGNOSIS — K85.90 ACUTE PANCREATITIS, UNSPECIFIED COMPLICATION STATUS, UNSPECIFIED PANCREATITIS TYPE: ICD-10-CM

## 2023-12-16 DIAGNOSIS — R07.9 CHEST PAIN: ICD-10-CM

## 2023-12-16 DIAGNOSIS — E11.69 HYPERLIPIDEMIA ASSOCIATED WITH TYPE 2 DIABETES MELLITUS: ICD-10-CM

## 2023-12-16 DIAGNOSIS — G89.29 CHRONIC NECK AND BACK PAIN: ICD-10-CM

## 2023-12-16 DIAGNOSIS — N43.3 HYDROCELE OF TESTIS: ICD-10-CM

## 2023-12-16 DIAGNOSIS — E11.22 TYPE 2 DIABETES MELLITUS WITH STAGE 3A CHRONIC KIDNEY DISEASE, WITHOUT LONG-TERM CURRENT USE OF INSULIN: ICD-10-CM

## 2023-12-16 DIAGNOSIS — R74.01 TRANSAMINITIS: ICD-10-CM

## 2023-12-16 DIAGNOSIS — M54.9 CHRONIC NECK AND BACK PAIN: ICD-10-CM

## 2023-12-16 DIAGNOSIS — E78.5 HYPERLIPIDEMIA ASSOCIATED WITH TYPE 2 DIABETES MELLITUS: ICD-10-CM

## 2023-12-16 DIAGNOSIS — K85.90 ACUTE PANCREATITIS: ICD-10-CM

## 2023-12-16 LAB
ABS NEUT (OLG): 21.22 X10(3)/MCL (ref 2.1–9.2)
ALBUMIN SERPL-MCNC: 4.1 G/DL (ref 3.4–4.8)
ALBUMIN/GLOB SERPL: 1.1 RATIO (ref 1.1–2)
ALP SERPL-CCNC: 85 UNIT/L (ref 40–150)
ALT SERPL-CCNC: 147 UNIT/L (ref 0–55)
APPEARANCE UR: ABNORMAL
AST SERPL-CCNC: 145 UNIT/L (ref 5–34)
BACTERIA #/AREA URNS AUTO: ABNORMAL /HPF
BILIRUB SERPL-MCNC: 2.7 MG/DL
BILIRUB UR QL STRIP.AUTO: NEGATIVE
BILIRUBIN DIRECT+TOT PNL SERPL-MCNC: 1.9 MG/DL (ref 0–?)
BUN SERPL-MCNC: 16.5 MG/DL (ref 8.4–25.7)
CALCIUM SERPL-MCNC: 9.8 MG/DL (ref 8.8–10)
CHLORIDE SERPL-SCNC: 101 MMOL/L (ref 98–107)
CO2 SERPL-SCNC: 26 MMOL/L (ref 23–31)
COLOR UR AUTO: YELLOW
CREAT SERPL-MCNC: 2 MG/DL (ref 0.73–1.18)
ERYTHROCYTE [DISTWIDTH] IN BLOOD BY AUTOMATED COUNT: 12.8 % (ref 11.5–17)
GFR SERPLBLD CREATININE-BSD FMLA CKD-EPI: 36 MLS/MIN/1.73/M2
GLOBULIN SER-MCNC: 3.6 GM/DL (ref 2.4–3.5)
GLUCOSE SERPL-MCNC: 234 MG/DL (ref 82–115)
GLUCOSE UR QL STRIP.AUTO: ABNORMAL
GRAN CASTS #/AREA URNS LPF: >20 /LPF
HCT VFR BLD AUTO: 55.3 % (ref 42–52)
HGB BLD-MCNC: 18.2 G/DL (ref 14–18)
HYALINE CASTS #/AREA URNS LPF: ABNORMAL /LPF
INSTRUMENT WBC (OLG): 24.97 X10(3)/MCL
KETONES UR QL STRIP.AUTO: NEGATIVE
LEUKOCYTE ESTERASE UR QL STRIP.AUTO: NEGATIVE
LIPASE SERPL-CCNC: >3000 U/L
LYMPHOCYTES NFR BLD MANUAL: 1.75 X10(3)/MCL
LYMPHOCYTES NFR BLD MANUAL: 7 %
MCH RBC QN AUTO: 30.3 PG (ref 27–31)
MCHC RBC AUTO-ENTMCNC: 32.9 G/DL (ref 33–36)
MCV RBC AUTO: 92 FL (ref 80–94)
MONOCYTES NFR BLD MANUAL: 2 X10(3)/MCL (ref 0.1–1.3)
MONOCYTES NFR BLD MANUAL: 8 %
MUCOUS THREADS URNS QL MICRO: ABNORMAL /LPF
NEUTROPHILS NFR BLD MANUAL: 85 %
NITRITE UR QL STRIP.AUTO: NEGATIVE
NRBC BLD AUTO-RTO: 0 %
PH UR STRIP.AUTO: 5 [PH]
PLATELET # BLD AUTO: 321 X10(3)/MCL (ref 130–400)
PLATELET # BLD EST: NORMAL 10*3/UL
PMV BLD AUTO: 9.7 FL (ref 7.4–10.4)
POTASSIUM SERPL-SCNC: 3.7 MMOL/L (ref 3.5–5.1)
PROT SERPL-MCNC: 7.7 GM/DL (ref 5.8–7.6)
PROT UR QL STRIP.AUTO: ABNORMAL
RBC # BLD AUTO: 6.01 X10(6)/MCL (ref 4.7–6.1)
RBC #/AREA URNS AUTO: ABNORMAL /HPF
RBC MORPH BLD: NORMAL
RBC UR QL AUTO: ABNORMAL
SODIUM SERPL-SCNC: 143 MMOL/L (ref 136–145)
SP GR UR STRIP.AUTO: 1.01 (ref 1–1.03)
SQUAMOUS #/AREA URNS LPF: ABNORMAL /HPF
TROPONIN I SERPL-MCNC: 0.02 NG/ML (ref 0–0.04)
UROBILINOGEN UR STRIP-ACNC: NORMAL
WBC # SPEC AUTO: 24.97 X10(3)/MCL (ref 4.5–11.5)
WBC #/AREA URNS AUTO: ABNORMAL /HPF

## 2023-12-16 PROCEDURE — 96365 THER/PROPH/DIAG IV INF INIT: CPT

## 2023-12-16 PROCEDURE — 80053 COMPREHEN METABOLIC PANEL: CPT | Performed by: NURSE PRACTITIONER

## 2023-12-16 PROCEDURE — 63600175 PHARM REV CODE 636 W HCPCS: Performed by: INTERNAL MEDICINE

## 2023-12-16 PROCEDURE — 11000001 HC ACUTE MED/SURG PRIVATE ROOM

## 2023-12-16 PROCEDURE — 96376 TX/PRO/DX INJ SAME DRUG ADON: CPT

## 2023-12-16 PROCEDURE — 87154 CUL TYP ID BLD PTHGN 6+ TRGT: CPT | Performed by: NURSE PRACTITIONER

## 2023-12-16 PROCEDURE — 25000003 PHARM REV CODE 250: Performed by: NURSE PRACTITIONER

## 2023-12-16 PROCEDURE — 84484 ASSAY OF TROPONIN QUANT: CPT | Performed by: NURSE PRACTITIONER

## 2023-12-16 PROCEDURE — 96375 TX/PRO/DX INJ NEW DRUG ADDON: CPT

## 2023-12-16 PROCEDURE — 83690 ASSAY OF LIPASE: CPT | Performed by: NURSE PRACTITIONER

## 2023-12-16 PROCEDURE — 93005 ELECTROCARDIOGRAM TRACING: CPT

## 2023-12-16 PROCEDURE — 63600175 PHARM REV CODE 636 W HCPCS: Performed by: NURSE PRACTITIONER

## 2023-12-16 PROCEDURE — 96361 HYDRATE IV INFUSION ADD-ON: CPT

## 2023-12-16 PROCEDURE — 81001 URINALYSIS AUTO W/SCOPE: CPT | Performed by: NURSE PRACTITIONER

## 2023-12-16 PROCEDURE — 87077 CULTURE AEROBIC IDENTIFY: CPT | Performed by: NURSE PRACTITIONER

## 2023-12-16 PROCEDURE — 84478 ASSAY OF TRIGLYCERIDES: CPT | Performed by: INTERNAL MEDICINE

## 2023-12-16 PROCEDURE — 85027 COMPLETE CBC AUTOMATED: CPT | Performed by: NURSE PRACTITIONER

## 2023-12-16 PROCEDURE — 99291 CRITICAL CARE FIRST HOUR: CPT

## 2023-12-16 PROCEDURE — 93010 ELECTROCARDIOGRAM REPORT: CPT | Mod: ,,, | Performed by: INTERNAL MEDICINE

## 2023-12-16 PROCEDURE — 82248 BILIRUBIN DIRECT: CPT | Performed by: NURSE PRACTITIONER

## 2023-12-16 PROCEDURE — 25500020 PHARM REV CODE 255: Performed by: NURSE PRACTITIONER

## 2023-12-16 RX ORDER — METRONIDAZOLE 500 MG/100ML
500 INJECTION, SOLUTION INTRAVENOUS
Status: DISCONTINUED | OUTPATIENT
Start: 2023-12-17 | End: 2023-12-26

## 2023-12-16 RX ORDER — ONDANSETRON 2 MG/ML
4 INJECTION INTRAMUSCULAR; INTRAVENOUS
Status: COMPLETED | OUTPATIENT
Start: 2023-12-16 | End: 2023-12-16

## 2023-12-16 RX ORDER — GLUCAGON 1 MG
1 KIT INJECTION
Status: DISCONTINUED | OUTPATIENT
Start: 2023-12-17 | End: 2024-01-04 | Stop reason: HOSPADM

## 2023-12-16 RX ORDER — ACETAMINOPHEN 500 MG
1000 TABLET ORAL EVERY 6 HOURS PRN
Status: DISCONTINUED | OUTPATIENT
Start: 2023-12-17 | End: 2024-01-04 | Stop reason: HOSPADM

## 2023-12-16 RX ORDER — PROCHLORPERAZINE EDISYLATE 5 MG/ML
5 INJECTION INTRAMUSCULAR; INTRAVENOUS EVERY 6 HOURS PRN
Status: DISCONTINUED | OUTPATIENT
Start: 2023-12-17 | End: 2024-01-04 | Stop reason: HOSPADM

## 2023-12-16 RX ORDER — HYDRALAZINE HYDROCHLORIDE 20 MG/ML
10 INJECTION INTRAMUSCULAR; INTRAVENOUS EVERY 4 HOURS PRN
Status: DISCONTINUED | OUTPATIENT
Start: 2023-12-16 | End: 2024-01-04 | Stop reason: HOSPADM

## 2023-12-16 RX ORDER — HYDROMORPHONE HYDROCHLORIDE 2 MG/ML
1 INJECTION, SOLUTION INTRAMUSCULAR; INTRAVENOUS; SUBCUTANEOUS
Status: COMPLETED | OUTPATIENT
Start: 2023-12-16 | End: 2023-12-16

## 2023-12-16 RX ORDER — HYDROMORPHONE HYDROCHLORIDE 2 MG/ML
1 INJECTION, SOLUTION INTRAMUSCULAR; INTRAVENOUS; SUBCUTANEOUS EVERY 4 HOURS PRN
Status: DISCONTINUED | OUTPATIENT
Start: 2023-12-17 | End: 2024-01-04 | Stop reason: HOSPADM

## 2023-12-16 RX ORDER — OXYCODONE HYDROCHLORIDE 5 MG/1
5 TABLET ORAL EVERY 4 HOURS PRN
Status: DISCONTINUED | OUTPATIENT
Start: 2023-12-17 | End: 2024-01-04 | Stop reason: HOSPADM

## 2023-12-16 RX ORDER — MAG HYDROX/ALUMINUM HYD/SIMETH 200-200-20
30 SUSPENSION, ORAL (FINAL DOSE FORM) ORAL 4 TIMES DAILY PRN
Status: DISCONTINUED | OUTPATIENT
Start: 2023-12-17 | End: 2024-01-04 | Stop reason: HOSPADM

## 2023-12-16 RX ORDER — METOPROLOL SUCCINATE 25 MG/1
25 TABLET, EXTENDED RELEASE ORAL DAILY
Status: DISCONTINUED | OUTPATIENT
Start: 2023-12-17 | End: 2024-01-04 | Stop reason: HOSPADM

## 2023-12-16 RX ORDER — SODIUM CHLORIDE 9 MG/ML
1000 INJECTION, SOLUTION INTRAVENOUS
Status: COMPLETED | OUTPATIENT
Start: 2023-12-16 | End: 2023-12-16

## 2023-12-16 RX ORDER — AMOXICILLIN 250 MG
2 CAPSULE ORAL 2 TIMES DAILY PRN
Status: DISCONTINUED | OUTPATIENT
Start: 2023-12-17 | End: 2024-01-04 | Stop reason: HOSPADM

## 2023-12-16 RX ORDER — MORPHINE SULFATE 4 MG/ML
4 INJECTION, SOLUTION INTRAMUSCULAR; INTRAVENOUS
Status: COMPLETED | OUTPATIENT
Start: 2023-12-16 | End: 2023-12-16

## 2023-12-16 RX ORDER — INSULIN ASPART 100 [IU]/ML
1-10 INJECTION, SOLUTION INTRAVENOUS; SUBCUTANEOUS EVERY 6 HOURS PRN
Status: DISCONTINUED | OUTPATIENT
Start: 2023-12-17 | End: 2024-01-04 | Stop reason: HOSPADM

## 2023-12-16 RX ORDER — PANTOPRAZOLE SODIUM 40 MG/1
40 TABLET, DELAYED RELEASE ORAL DAILY
Status: DISCONTINUED | OUTPATIENT
Start: 2023-12-17 | End: 2023-12-17

## 2023-12-16 RX ORDER — ENOXAPARIN SODIUM 100 MG/ML
40 INJECTION SUBCUTANEOUS EVERY 24 HOURS
Status: DISCONTINUED | OUTPATIENT
Start: 2023-12-17 | End: 2023-12-18

## 2023-12-16 RX ORDER — ACETAMINOPHEN 325 MG/1
650 TABLET ORAL EVERY 4 HOURS PRN
Status: DISCONTINUED | OUTPATIENT
Start: 2023-12-17 | End: 2024-01-04 | Stop reason: HOSPADM

## 2023-12-16 RX ORDER — TALC
6 POWDER (GRAM) TOPICAL NIGHTLY PRN
Status: DISCONTINUED | OUTPATIENT
Start: 2023-12-17 | End: 2024-01-04 | Stop reason: HOSPADM

## 2023-12-16 RX ORDER — SODIUM CHLORIDE, SODIUM LACTATE, POTASSIUM CHLORIDE, CALCIUM CHLORIDE 600; 310; 30; 20 MG/100ML; MG/100ML; MG/100ML; MG/100ML
INJECTION, SOLUTION INTRAVENOUS CONTINUOUS
Status: DISCONTINUED | OUTPATIENT
Start: 2023-12-16 | End: 2023-12-18

## 2023-12-16 RX ORDER — ONDANSETRON 2 MG/ML
4 INJECTION INTRAMUSCULAR; INTRAVENOUS EVERY 4 HOURS PRN
Status: DISCONTINUED | OUTPATIENT
Start: 2023-12-17 | End: 2024-01-04 | Stop reason: HOSPADM

## 2023-12-16 RX ORDER — SODIUM CHLORIDE 0.9 % (FLUSH) 0.9 %
10 SYRINGE (ML) INJECTION
Status: DISCONTINUED | OUTPATIENT
Start: 2023-12-17 | End: 2024-01-04 | Stop reason: HOSPADM

## 2023-12-16 RX ORDER — POLYETHYLENE GLYCOL 3350 17 G/17G
17 POWDER, FOR SOLUTION ORAL 2 TIMES DAILY PRN
Status: DISCONTINUED | OUTPATIENT
Start: 2023-12-17 | End: 2024-01-04 | Stop reason: HOSPADM

## 2023-12-16 RX ORDER — LABETALOL HYDROCHLORIDE 5 MG/ML
10 INJECTION, SOLUTION INTRAVENOUS EVERY 4 HOURS PRN
Status: DISCONTINUED | OUTPATIENT
Start: 2023-12-16 | End: 2024-01-04 | Stop reason: HOSPADM

## 2023-12-16 RX ADMIN — HYDROMORPHONE HYDROCHLORIDE 1 MG: 2 INJECTION INTRAMUSCULAR; INTRAVENOUS; SUBCUTANEOUS at 05:12

## 2023-12-16 RX ADMIN — METRONIDAZOLE 500 MG: 5 INJECTION, SOLUTION INTRAVENOUS at 11:12

## 2023-12-16 RX ADMIN — PIPERACILLIN AND TAZOBACTAM 4.5 G: 4; .5 INJECTION, POWDER, LYOPHILIZED, FOR SOLUTION INTRAVENOUS; PARENTERAL at 08:12

## 2023-12-16 RX ADMIN — SODIUM CHLORIDE 1000 ML: 9 INJECTION, SOLUTION INTRAVENOUS at 07:12

## 2023-12-16 RX ADMIN — SODIUM CHLORIDE, POTASSIUM CHLORIDE, SODIUM LACTATE AND CALCIUM CHLORIDE 1000 ML: 600; 310; 30; 20 INJECTION, SOLUTION INTRAVENOUS at 11:12

## 2023-12-16 RX ADMIN — IOPAMIDOL 100 ML: 755 INJECTION, SOLUTION INTRAVENOUS at 07:12

## 2023-12-16 RX ADMIN — MORPHINE SULFATE 4 MG: 4 INJECTION, SOLUTION INTRAMUSCULAR; INTRAVENOUS at 07:12

## 2023-12-16 RX ADMIN — HYDROMORPHONE HYDROCHLORIDE 1 MG: 2 INJECTION INTRAMUSCULAR; INTRAVENOUS; SUBCUTANEOUS at 08:12

## 2023-12-16 RX ADMIN — ONDANSETRON 4 MG: 2 INJECTION INTRAMUSCULAR; INTRAVENOUS at 05:12

## 2023-12-16 RX ADMIN — HYDROMORPHONE HYDROCHLORIDE 1 MG: 2 INJECTION INTRAMUSCULAR; INTRAVENOUS; SUBCUTANEOUS at 11:12

## 2023-12-16 RX ADMIN — SODIUM CHLORIDE 1000 ML: 9 INJECTION, SOLUTION INTRAVENOUS at 05:12

## 2023-12-16 NOTE — ED PROVIDER NOTES
Encounter Date: 12/16/2023       History     Chief Complaint   Patient presents with    Abdominal Pain     C/o generalized abdominal pain radiating to lower back with vomiting since this afternoon; denies dysuria, diarrhea, constipation, or known fever.      See MDM    The history is provided by the patient. No  was used.     Review of patient's allergies indicates:  No Known Allergies  Past Medical History:   Diagnosis Date    DM (diabetes mellitus)     GERD (gastroesophageal reflux disease)     HLD (hyperlipidemia)     HTN (hypertension)      Past Surgical History:   Procedure Laterality Date    APPENDECTOMY      CHOLECYSTECTOMY       History reviewed. No pertinent family history.  Social History     Tobacco Use    Smoking status: Every Day     Current packs/day: 1.00     Types: Cigarettes    Smokeless tobacco: Never   Substance Use Topics    Alcohol use: Never    Drug use: Never     Review of Systems   Constitutional:  Negative for fever.   Respiratory:  Negative for cough and shortness of breath.    Cardiovascular:  Negative for chest pain.   Gastrointestinal:  Positive for abdominal pain and diarrhea.   Genitourinary:  Negative for difficulty urinating and dysuria.   Musculoskeletal:  Negative for gait problem.   Skin:  Negative for color change.   Neurological:  Negative for dizziness, speech difficulty and headaches.   Psychiatric/Behavioral:  Negative for hallucinations and suicidal ideas.    All other systems reviewed and are negative.      Physical Exam     Initial Vitals [12/16/23 1651]   BP Pulse Resp Temp SpO2   112/66 104 20 97.5 °F (36.4 °C) 96 %      MAP       --         Physical Exam    Nursing note and vitals reviewed.  Constitutional: He appears well-developed and well-nourished. He is not diaphoretic. No distress.   HENT:   Head: Normocephalic and atraumatic.   Nose: Nose normal.   Mouth/Throat: Oropharynx is clear and moist.   Eyes: Conjunctivae and EOM are normal. Pupils are  equal, round, and reactive to light.   Neck: Trachea normal. Neck supple.   Normal range of motion.  Cardiovascular:  Normal rate, regular rhythm, normal heart sounds and intact distal pulses.     Exam reveals no gallop and no friction rub.       No murmur heard.  Pulmonary/Chest: Breath sounds normal. No respiratory distress. He has no wheezes. He has no rhonchi. He has no rales. He exhibits no tenderness.   Abdominal: Abdomen is soft. Bowel sounds are normal. He exhibits no distension and no mass. There is abdominal tenderness. There is guarding. There is no rebound.   Genitourinary:    Genitourinary Comments: Chaperone: Camella  Scrotal swelling     Musculoskeletal:         General: No tenderness or edema. Normal range of motion.      Cervical back: Normal range of motion and neck supple.      Lumbar back: Normal. No tenderness. Normal range of motion.     Neurological: He is alert and oriented to person, place, and time. He has normal strength. No cranial nerve deficit or sensory deficit.   Skin: Skin is warm and dry. Capillary refill takes less than 2 seconds. No rash and no abscess noted. No erythema. No pallor.   Psychiatric: He has a normal mood and affect. His behavior is normal. Judgment and thought content normal.         ED Course   Critical Care    Date/Time: 12/17/2023 12:22 AM    Performed by: Lily Daly MD  Authorized by: Lily Daly MD  Direct patient critical care time: 45 minutes  Total critical care time (exclusive of procedural time) : 45 minutes  Critical care was necessary to treat or prevent imminent or life-threatening deterioration of the following conditions: dehydration, sepsis and renal failure.  Critical care was time spent personally by me on the following activities: development of treatment plan with patient or surrogate, discussions with consultants, evaluation of patient's response to treatment, examination of patient, obtaining history from patient or surrogate,  ordering and performing treatments and interventions, ordering and review of radiographic studies, ordering and review of laboratory studies, pulse oximetry, re-evaluation of patient's condition and review of old charts.        Labs Reviewed   COMPREHENSIVE METABOLIC PANEL - Abnormal; Notable for the following components:       Result Value    Glucose Level 234 (*)     Creatinine 2.00 (*)     Protein Total 7.7 (*)     Globulin 3.6 (*)     Bilirubin Total 2.7 (*)     Alanine Aminotransferase 147 (*)     Aspartate Aminotransferase 145 (*)     All other components within normal limits   URINALYSIS, REFLEX TO URINE CULTURE - Abnormal; Notable for the following components:    Appearance, UA Turbid (*)     Protein, UA 1+ (*)     Glucose, UA 2+ (*)     Blood, UA 1+ (*)     WBC, UA 6-10 (*)     Mucous, UA Trace (*)     Hyaline Casts, UA 6-10 (*)     Granular Casts >20 (*)     All other components within normal limits   LIPASE - Abnormal; Notable for the following components:    Lipase Level >3,000 (*)     All other components within normal limits   CBC WITH DIFFERENTIAL - Abnormal; Notable for the following components:    WBC 24.97 (*)     Hgb 18.2 (*)     Hct 55.3 (*)     MCHC 32.9 (*)     All other components within normal limits   MANUAL DIFFERENTIAL - Abnormal; Notable for the following components:    Neutrophils Abs 21.2245 (*)     Monocytes Abs 1.9976 (*)     All other components within normal limits   BILIRUBIN, DIRECT - Abnormal; Notable for the following components:    Bilirubin Direct 1.9 (*)     All other components within normal limits   TROPONIN I - Normal   BLOOD CULTURE [facility]   BLOOD CULTURE [facility]   CBC W/ AUTO DIFFERENTIAL    Narrative:     The following orders were created for panel order CBC Auto Differential.  Procedure                               Abnormality         Status                     ---------                               -----------         ------                     CBC with  Differential[[id number]]       Abnormal            Final result               Manual Differential[[id number]]         Abnormal            Final result                 Please view results for these tests on the individual orders.   TRIGLYCERIDES   POCT GLUCOSE MONITORING CONTINUOUS     EKG Readings: (Independently Interpreted)   Rhythm: Normal Sinus Rhythm. Ectopy: No Ectopy. Conduction: Normal. ST Segments: Normal ST Segments. T Waves: Normal. Axis: Normal. Clinical Impression: Normal Sinus Rhythm       Imaging Results              CT Abdomen Pelvis With IV Contrast NO Oral Contrast (Final result)  Result time 12/16/23 20:08:18      Final result by Denver Wagner MD (12/16/23 20:08:18)                   Impression:      1. Severe acute pancreatitis.    2. Gastric antrum and duodenal mural thickening likely represents reactive change for acute pancreatitis.    3. Right large hydrocele.      Electronically signed by: Denver Wagner  Date:    12/16/2023  Time:    20:08               Narrative:    EXAMINATION:  CT ABDOMEN PELVIS WITH IV CONTRAST    CLINICAL HISTORY:  Abdominal pain, acute, nonlocalized;    TECHNIQUE:  Multidetector axial images were obtained of the abdomen and pelvis following the administration of IV contrast. Oral contrast was not administered.    Dose length product of 578 mGycm. Automated exposure control was utilized to minimize radiation dose.    COMPARISON:  August 4, 2023.    FINDINGS:  Included portion of the lungs show dependent hypoventilatory changes without acute air space infiltrates or fluid within the pleural spaces.    Liver is remarkable for steatosis without focal space occupying lesion.  There is small amount of free fluid around the anterolateral surface of the liver.  Gallbladder is surgically absent.  Pancreas is surrounded by considerable phlegmons and fluid which extend into the anterior pararenal spaces and further into the lower abdomen consistent with acute pancreatitis.   There is no suggestion of pancreatic necrosis, pseudocyst or abscess formation.  Spleen is unremarkable.    The adrenal glands appear within normal limits. The kidneys are unremarkable in size and contour. No solid or cystic renal lesion identified. There is no hydronephrosis. No perinephric fluid strandings or collections identified.    Stomach is nondistended.  There is distal esophageal mural thickening which probably result of reflux disease with about similar appearance.  There is some mural thickening of the gastric antrum and the descending colon which may represent reactive change from acute pancreatitis.  Possible primary gastritis and duodenitis is not excluded.  No abnormal dilatation of loops of small bowel.  Colon is nondistended.  No bowel obstruction.  Distal descending and sigmoid colon noninflamed diverticulosis coli.    Urinary bladder appears within normal limits.  Prostate is enlarged in size and contains few calcifications.  There is large right hydrocele which extends into right inguinal canal.  Is no pelvic free fluid.    No acute or otherwise osseous abnormality identified.                                       US Scrotum And Testicles (Final result)  Result time 12/16/23 19:21:53      Final result by Denver Wagner MD (12/16/23 19:21:53)                   Impression:      1. Unremarkable right testicle    2. Large right hydrocele which crosses the midline and causes compressive effect upon the left testicle which is inferiorly deviated.  Due to pressure caused by the hydrocele upon the left testicle optimal Doppler flow to the left testicle could not be obtained.  Only limited arterial flow to the peripheral aspect left testicle could be visualized.  Please correlate clinically.  Oneoption is to perform a follow-up study post drainage of large right hydrocele.      Electronically signed by: Denver Wagner  Date:    12/16/2023  Time:    19:21               Narrative:    EXAMINATION:  US  SCROTUM AND TESTICLES    CLINICAL HISTORY:  Other specified disorders of the male genital organs    TECHNIQUE:  Multiple real-time images were performed of the scrotum in various planes by the sonographer.    COMPARISON:  None available    FINDINGS:  Right testicle measures 3.3 x 2.4 x 2.4 cm.  There is unremarkable echotexture of the right testicle.  Unremarkable arteriovenous flow to the right testicle.    There is large right scrotal hydrocele which measures 10.6 x 7.2 x 8.5 cm.  Right hydrocele causes mass effect upon left testicle which is deviated inferiorly.  This made it difficult to optimally assess vascularity to the left testicle due to pressure caused by the hydrocele.  Some vascularity along the peripheral aspect of less testicle was visualized.  Left testicle measures 3.4 x 2.0 x 2.0 cm and no abnormality of the parenchymal echotexture identified.                                       Medications   lactated ringers bolus 1,000 mL (1,000 mLs Intravenous New Bag 12/16/23 8326)   lactated ringers infusion (has no administration in time range)   sodium chloride 0.9% flush 10 mL (has no administration in time range)   melatonin tablet 6 mg (has no administration in time range)   ondansetron injection 4 mg (has no administration in time range)   prochlorperazine injection Soln 5 mg (has no administration in time range)   polyethylene glycol packet 17 g (has no administration in time range)   senna-docusate 8.6-50 mg per tablet 2 tablet (has no administration in time range)   acetaminophen tablet 650 mg (has no administration in time range)   aluminum-magnesium hydroxide-simethicone 200-200-20 mg/5 mL suspension 30 mL (has no administration in time range)   acetaminophen tablet 1,000 mg (has no administration in time range)   HYDROmorphone (PF) injection 1 mg (1 mg Intravenous Given 12/16/23 1347)   oxyCODONE immediate release tablet 5 mg (has no administration in time range)   insulin aspart U-100 injection  1-10 Units (has no administration in time range)   glucagon (human recombinant) injection 1 mg (has no administration in time range)   dextrose 10% bolus 125 mL 125 mL (has no administration in time range)   dextrose 10% bolus 250 mL 250 mL (has no administration in time range)   ceFEPIme (MAXIPIME) 2 g in dextrose 5 % in water (D5W) 100 mL IVPB (MB+) (has no administration in time range)   metronidazole IVPB 500 mg (500 mg Intravenous New Bag 12/16/23 2352)   hydrALAZINE injection 10 mg (has no administration in time range)   labetaloL injection 10 mg (has no administration in time range)   enoxaparin injection 40 mg (has no administration in time range)   metoprolol succinate (TOPROL-XL) 24 hr tablet 25 mg (has no administration in time range)   pantoprazole EC tablet 40 mg (has no administration in time range)   ondansetron injection 4 mg (4 mg Intravenous Given 12/16/23 1711)   0.9%  NaCl infusion ( Intravenous Stopped 12/16/23 1823)   HYDROmorphone (PF) injection 1 mg (1 mg Intravenous Given 12/16/23 1711)   piperacillin-tazobactam (ZOSYN) 4.5 g in dextrose 5 % in water (D5W) 100 mL IVPB (MB+) (0 g Intravenous Stopped 12/16/23 2049)   morphine injection 4 mg (4 mg Intravenous Given 12/16/23 1941)   sodium chloride 0.9% bolus 1,000 mL 1,000 mL (0 mLs Intravenous Stopped 12/16/23 2041)   iopamidoL (ISOVUE-370) injection 100 mL (100 mLs Intravenous Given 12/16/23 1928)   HYDROmorphone (PF) injection 1 mg (1 mg Intravenous Given 12/16/23 2055)     Medical Decision Making  Historian:  Patient.  Patient is a 68-year-old male  that presents with abdominal pain that has been present today. Associated symptoms vomiting. Surrounding information is nothing. Exacerbated by nothing. Relieved by nothing. Patient treatment prior to arrival none. Risk factors include none. Other history pertaining to this complaint previous cholecystectomy and appendectomy.   Assessment:  See physical exam.  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     Details: Reviewed PCP records from 08/08/2023 and 08/21/2023.   Labs: ordered. Decision-making details documented in ED Course.     Details: Labs are charted under ED course  Radiology: ordered.     Details: Large hydrocele noted on testicular ultrasound.  Acute pancreatitis noted on CT scan  Discussion of management or test interpretation with external provider(s): History was obtained.  Physical was performed.  I did speak to Urology Dr. Blake.  He feels the patient does not need any intervention for the hydrocele.  He also feels he does not need to be consulted on the patient.  I did speak to Dr. Daly, emergency room physician, she agrees with admission of this patient and performed a face-to-face interaction with the patient.  Hospital Medicine was paged.  Spoke to Karla nurse practitioner with Hospital Medicine and she accepts the admission.  No social determinants that affect healthcare were noted.    Risk  OTC drugs.  Prescription drug management.  Parenteral controlled substances.  Decision regarding hospitalization.    Critical Care  Total time providing critical care: 45 minutes               Attending Attestation:     Physician Attestation Statement for NP/PA:   I have directed and reviewed the workup performed by the PA/NP.  I performed the substantive portion of the medical decision making.     Other NP/PA Attestation Additions:    History of Present Illness: See ed course for attestation               ED Course as of 12/17/23 0024   Sat Dec 16, 2023   2009 Lipase(!): >3,000 [CL]   2009 WBC(!): 24.97 [CL]   2009 Creatinine(!): 2.00 [CL]   2009 BILIRUBIN TOTAL(!): 2.7 [CL]   2009 ALT(!): 147 [CL]   2009 AST(!): 145 [CL]   2015 Paged urology. [CL]   2049 Dr. Daly supervisory attestation  I performed substantive portion of MDM. I performed a history, physical exam, reviewed pertinent available previous records and discussed plan of care with NP I had face to face time evaluation of the patient    HPI:  68-year-old male with a history of cholecystectomy presents the ED for evaluation of sudden-onset epigastric and then generalized abdominal pain with nausea beginning late this morning. He has had no fever, chills, no previous episodes of similar symptoms since cholecystectomy. Denies new medications or etoh use  PE:normocephalic, atraumatic  Regular rate, lungs clear  Abd soft, normal bs, generalizd tenderness to palpation without rebound or guarding  Large hydrocele without any surrounding skin changes, intact cremasteric reflex  MDM:large hydrocele sounds chronic, can be f/u as outpaient  Pt has severe acute pancreatitis with leukocytosis and transaminitis, may be choledocho although no evidence of that on imaging. Ivf resuscitation, iv analgesia and admit   [BS]   2056 Repaged urology. [CL]   2120 Sepsis perfusion exam performed [BS]      ED Course User Index  [BS] Lily Daly MD  [CL] Bharathi Pat, MALI                             Clinical Impression:  Final diagnoses:  [R10.13] Epigastric pain  [N50.89] Scrotal swelling  [N43.3] Hydrocele of testis - right (Primary)  [K85.90]  Acute pancreatitis, unspecified complication status, unspecified pancreatitis type  [R65.10] SIRS (systemic inflammatory response syndrome)  [R74.01] Transaminitis  [N17.9] SHA (acute kidney injury)          ED Disposition Condition    Admit Stable                Bharathi Pat FNP  12/16/23 2115       Bharathi Pat FNP  12/16/23 0641       Lily Daly MD  12/17/23 0024

## 2023-12-16 NOTE — FIRST PROVIDER EVALUATION
Medical screening examination initiated.  I have conducted a focused provider triage encounter, findings are as follows:    Brief history of present illness:  Patient states abdominal pain starting this afternoon. States nausea and vomiting.     There were no vitals filed for this visit.    Pertinent physical exam:  Awake, alert    Brief workup plan:  Labs    Preliminary workup initiated; this workup will be continued and followed by the physician or advanced practice provider that is assigned to the patient when roomed.

## 2023-12-17 ENCOUNTER — ANESTHESIA EVENT (OUTPATIENT)
Dept: SURGERY | Facility: HOSPITAL | Age: 68
DRG: 871 | End: 2023-12-17
Payer: MEDICARE

## 2023-12-17 ENCOUNTER — ANESTHESIA (OUTPATIENT)
Dept: SURGERY | Facility: HOSPITAL | Age: 68
DRG: 871 | End: 2023-12-17
Payer: MEDICARE

## 2023-12-17 LAB
ACINETOBACTER CALCOACETICUS-BAUMANNII COMPLEX (OHS): NOT DETECTED
ALBUMIN SERPL-MCNC: 3.4 G/DL (ref 3.4–4.8)
ALBUMIN/GLOB SERPL: 1.1 RATIO (ref 1.1–2)
ALP SERPL-CCNC: 77 UNIT/L (ref 40–150)
ALT SERPL-CCNC: 217 UNIT/L (ref 0–55)
APTT PPP: 25 SECONDS (ref 23.2–33.7)
AST SERPL-CCNC: 229 UNIT/L (ref 5–34)
BACTEROIDES FRAGILIS (OHS): NOT DETECTED
BASOPHILS # BLD AUTO: 0.04 X10(3)/MCL
BASOPHILS NFR BLD AUTO: 0.2 %
BILIRUB SERPL-MCNC: 3.5 MG/DL
BILIRUBIN DIRECT+TOT PNL SERPL-MCNC: 2.7 MG/DL (ref 0–?)
BUN SERPL-MCNC: 19.9 MG/DL (ref 8.4–25.7)
C AURIS DNA BLD POS QL NAA+NON-PROBE: NOT DETECTED
C GATTII+NEOFOR DNA CSF QL NAA+NON-PROBE: NOT DETECTED
CALCIUM SERPL-MCNC: 8.5 MG/DL (ref 8.8–10)
CANDIDA ALBICANS (OHS): NOT DETECTED
CANDIDA GLABRATA (OHS): NOT DETECTED
CANDIDA KRUSEI (OHS): NOT DETECTED
CANDIDA PARAPSILOSIS (OHS): NOT DETECTED
CANDIDA TROPICALIS (OHS): NOT DETECTED
CHLORIDE SERPL-SCNC: 105 MMOL/L (ref 98–107)
CO2 SERPL-SCNC: 19 MMOL/L (ref 23–31)
CREAT SERPL-MCNC: 2.29 MG/DL (ref 0.73–1.18)
CTX-M (OHS): ABNORMAL
ENTEROBACTER CLOACAE COMPLEX (OHS): NOT DETECTED
ENTEROBACTERALES (OHS): NOT DETECTED
ENTEROCOCCUS FAECALIS (OHS): DETECTED
ENTEROCOCCUS FAECIUM (OHS): NOT DETECTED
EOSINOPHIL # BLD AUTO: 0 X10(3)/MCL (ref 0–0.9)
EOSINOPHIL NFR BLD AUTO: 0 %
ERYTHROCYTE [DISTWIDTH] IN BLOOD BY AUTOMATED COUNT: 13 % (ref 11.5–17)
ESCHERICHIA COLI (OHS): NOT DETECTED
EST. AVERAGE GLUCOSE BLD GHB EST-MCNC: 131.2 MG/DL
GFR SERPLBLD CREATININE-BSD FMLA CKD-EPI: 31 MLS/MIN/1.73/M2
GLOBULIN SER-MCNC: 3 GM/DL (ref 2.4–3.5)
GLUCOSE SERPL-MCNC: 269 MG/DL (ref 82–115)
GP B STREP DNA CSF QL NAA+NON-PROBE: NOT DETECTED
HAEM INFLU DNA CSF QL NAA+NON-PROBE: NOT DETECTED
HBA1C MFR BLD: 6.2 %
HCT VFR BLD AUTO: 54.3 % (ref 42–52)
HGB BLD-MCNC: 18.1 G/DL (ref 14–18)
IMM GRANULOCYTES # BLD AUTO: 0.1 X10(3)/MCL (ref 0–0.04)
IMM GRANULOCYTES NFR BLD AUTO: 0.5 %
IMP (OHS): ABNORMAL
INR PPP: 1.1
KLEBSIELLA AEROGENES (OHS): NOT DETECTED
KLEBSIELLA OXYTOCA (OHS): NOT DETECTED
KLEBSIELLA PNEUMONIAE GROUP (OHS): NOT DETECTED
KPC (OHS): ABNORMAL
L MONOCYTOG DNA CSF QL NAA+NON-PROBE: NOT DETECTED
LIPASE SERPL-CCNC: 1330 U/L
LYMPHOCYTES # BLD AUTO: 0.82 X10(3)/MCL (ref 0.6–4.6)
LYMPHOCYTES NFR BLD AUTO: 4.4 %
MAGNESIUM SERPL-MCNC: 1.6 MG/DL (ref 1.6–2.6)
MCH RBC QN AUTO: 30.9 PG (ref 27–31)
MCHC RBC AUTO-ENTMCNC: 33.3 G/DL (ref 33–36)
MCR-1 (OHS): ABNORMAL
MCV RBC AUTO: 92.7 FL (ref 80–94)
MECA/C (OHS): ABNORMAL
MECA/C AND MREJ (MRSA)(OHS): ABNORMAL
MONOCYTES # BLD AUTO: 1.14 X10(3)/MCL (ref 0.1–1.3)
MONOCYTES NFR BLD AUTO: 6.1 %
N MEN DNA CSF QL NAA+NON-PROBE: NOT DETECTED
NDM (OHS): ABNORMAL
NEUTROPHILS # BLD AUTO: 16.44 X10(3)/MCL (ref 2.1–9.2)
NEUTROPHILS NFR BLD AUTO: 88.8 %
NRBC BLD AUTO-RTO: 0 %
OXA-48-LIKE (OHS): ABNORMAL
PHOSPHATE SERPL-MCNC: 4 MG/DL (ref 2.3–4.7)
PLATELET # BLD AUTO: 262 X10(3)/MCL (ref 130–400)
PMV BLD AUTO: 9.3 FL (ref 7.4–10.4)
POCT GLUCOSE: 166 MG/DL (ref 70–110)
POTASSIUM SERPL-SCNC: 3.9 MMOL/L (ref 3.5–5.1)
PROT SERPL-MCNC: 6.4 GM/DL (ref 5.8–7.6)
PROTEUS SPP. (OHS): NOT DETECTED
PROTHROMBIN TIME: 13.7 SECONDS (ref 12.5–14.5)
PSEUDOMONAS AERUGINOSA (OHS): NOT DETECTED
RBC # BLD AUTO: 5.86 X10(6)/MCL (ref 4.7–6.1)
S ENT+BONG DNA STL QL NAA+NON-PROBE: NOT DETECTED
S PNEUM DNA CSF QL NAA+NON-PROBE: NOT DETECTED
SERRATIA MARCESCENS (OHS): NOT DETECTED
SODIUM SERPL-SCNC: 141 MMOL/L (ref 136–145)
STAPHYLOCOCCUS AUREUS (OHS): NOT DETECTED
STAPHYLOCOCCUS EPIDERMIDIS (OHS): NOT DETECTED
STAPHYLOCOCCUS LUGDUNENSIS (OHS): NOT DETECTED
STAPHYLOCOCCUS SPP. (OHS): NOT DETECTED
STENOTROPHOMONAS MALTOPHILIA (OHS): NOT DETECTED
STREPTOCOCCUS PYOGENES (GROUP A)(OHS): NOT DETECTED
STREPTOCOCCUS SPP. (OHS): NOT DETECTED
TRIGL SERPL-MCNC: 183 MG/DL (ref 34–140)
VANA/B (OHS): NOT DETECTED
VIM (OHS): ABNORMAL
WBC # SPEC AUTO: 18.54 X10(3)/MCL (ref 4.5–11.5)

## 2023-12-17 PROCEDURE — 0FC98ZZ EXTIRPATION OF MATTER FROM COMMON BILE DUCT, VIA NATURAL OR ARTIFICIAL OPENING ENDOSCOPIC: ICD-10-PCS | Performed by: INTERNAL MEDICINE

## 2023-12-17 PROCEDURE — 82248 BILIRUBIN DIRECT: CPT | Performed by: INTERNAL MEDICINE

## 2023-12-17 PROCEDURE — 25500020 PHARM REV CODE 255: Performed by: INTERNAL MEDICINE

## 2023-12-17 PROCEDURE — 63600175 PHARM REV CODE 636 W HCPCS

## 2023-12-17 PROCEDURE — 85730 THROMBOPLASTIN TIME PARTIAL: CPT | Performed by: INTERNAL MEDICINE

## 2023-12-17 PROCEDURE — 43262 ENDO CHOLANGIOPANCREATOGRAPH: CPT | Performed by: INTERNAL MEDICINE

## 2023-12-17 PROCEDURE — 80053 COMPREHEN METABOLIC PANEL: CPT | Performed by: INTERNAL MEDICINE

## 2023-12-17 PROCEDURE — C9113 INJ PANTOPRAZOLE SODIUM, VIA: HCPCS

## 2023-12-17 PROCEDURE — 25000003 PHARM REV CODE 250: Performed by: INTERNAL MEDICINE

## 2023-12-17 PROCEDURE — 85025 COMPLETE CBC W/AUTO DIFF WBC: CPT | Performed by: INTERNAL MEDICINE

## 2023-12-17 PROCEDURE — 43264 ERCP REMOVE DUCT CALCULI: CPT | Performed by: INTERNAL MEDICINE

## 2023-12-17 PROCEDURE — 11000001 HC ACUTE MED/SURG PRIVATE ROOM

## 2023-12-17 PROCEDURE — 83690 ASSAY OF LIPASE: CPT | Performed by: INTERNAL MEDICINE

## 2023-12-17 PROCEDURE — D9220A PRA ANESTHESIA: ICD-10-PCS | Mod: ANES,,, | Performed by: ANESTHESIOLOGY

## 2023-12-17 PROCEDURE — D9220A PRA ANESTHESIA: Mod: ANES,,, | Performed by: ANESTHESIOLOGY

## 2023-12-17 PROCEDURE — 63600175 PHARM REV CODE 636 W HCPCS: Performed by: INTERNAL MEDICINE

## 2023-12-17 PROCEDURE — 21400001 HC TELEMETRY ROOM

## 2023-12-17 PROCEDURE — 74328 X-RAY BILE DUCT ENDOSCOPY: CPT | Mod: TC | Performed by: INTERNAL MEDICINE

## 2023-12-17 PROCEDURE — 37000009 HC ANESTHESIA EA ADD 15 MINS: Performed by: INTERNAL MEDICINE

## 2023-12-17 PROCEDURE — 27201423 OPTIME MED/SURG SUP & DEVICES STERILE SUPPLY: Performed by: INTERNAL MEDICINE

## 2023-12-17 PROCEDURE — 83735 ASSAY OF MAGNESIUM: CPT | Performed by: INTERNAL MEDICINE

## 2023-12-17 PROCEDURE — 63600175 PHARM REV CODE 636 W HCPCS: Performed by: HOSPITALIST

## 2023-12-17 PROCEDURE — 25000003 PHARM REV CODE 250

## 2023-12-17 PROCEDURE — 83036 HEMOGLOBIN GLYCOSYLATED A1C: CPT | Performed by: INTERNAL MEDICINE

## 2023-12-17 PROCEDURE — 84100 ASSAY OF PHOSPHORUS: CPT | Performed by: INTERNAL MEDICINE

## 2023-12-17 PROCEDURE — D9220A PRA ANESTHESIA: ICD-10-PCS | Mod: CRNA,,,

## 2023-12-17 PROCEDURE — 37000008 HC ANESTHESIA 1ST 15 MINUTES: Performed by: INTERNAL MEDICINE

## 2023-12-17 PROCEDURE — D9220A PRA ANESTHESIA: Mod: CRNA,,,

## 2023-12-17 PROCEDURE — C1748 ENDOSCOPE, SINGLE, UGI: HCPCS | Performed by: INTERNAL MEDICINE

## 2023-12-17 PROCEDURE — 85610 PROTHROMBIN TIME: CPT | Performed by: INTERNAL MEDICINE

## 2023-12-17 RX ORDER — ONDANSETRON 2 MG/ML
INJECTION INTRAMUSCULAR; INTRAVENOUS
Status: DISCONTINUED | OUTPATIENT
Start: 2023-12-17 | End: 2023-12-17

## 2023-12-17 RX ORDER — PANTOPRAZOLE SODIUM 40 MG/10ML
40 INJECTION, POWDER, LYOPHILIZED, FOR SOLUTION INTRAVENOUS
Status: DISCONTINUED | OUTPATIENT
Start: 2023-12-17 | End: 2024-01-04 | Stop reason: HOSPADM

## 2023-12-17 RX ORDER — PROCHLORPERAZINE EDISYLATE 5 MG/ML
5 INJECTION INTRAMUSCULAR; INTRAVENOUS EVERY 30 MIN PRN
Status: CANCELLED | OUTPATIENT
Start: 2023-12-17

## 2023-12-17 RX ORDER — DEXAMETHASONE SODIUM PHOSPHATE 4 MG/ML
INJECTION, SOLUTION INTRA-ARTICULAR; INTRALESIONAL; INTRAMUSCULAR; INTRAVENOUS; SOFT TISSUE
Status: DISCONTINUED | OUTPATIENT
Start: 2023-12-17 | End: 2023-12-17

## 2023-12-17 RX ORDER — LIDOCAINE HYDROCHLORIDE 20 MG/ML
INJECTION INTRAVENOUS
Status: DISCONTINUED | OUTPATIENT
Start: 2023-12-17 | End: 2023-12-17

## 2023-12-17 RX ORDER — ESMOLOL HYDROCHLORIDE 10 MG/ML
INJECTION INTRAVENOUS
Status: DISCONTINUED | OUTPATIENT
Start: 2023-12-17 | End: 2023-12-17

## 2023-12-17 RX ORDER — ROCURONIUM BROMIDE 10 MG/ML
INJECTION, SOLUTION INTRAVENOUS
Status: DISCONTINUED | OUTPATIENT
Start: 2023-12-17 | End: 2023-12-17

## 2023-12-17 RX ORDER — LIDOCAINE HYDROCHLORIDE 10 MG/ML
1 INJECTION, SOLUTION EPIDURAL; INFILTRATION; INTRACAUDAL; PERINEURAL ONCE
Status: DISCONTINUED | OUTPATIENT
Start: 2023-12-17 | End: 2023-12-17 | Stop reason: HOSPADM

## 2023-12-17 RX ORDER — FENTANYL CITRATE 50 UG/ML
INJECTION, SOLUTION INTRAMUSCULAR; INTRAVENOUS
Status: DISCONTINUED | OUTPATIENT
Start: 2023-12-17 | End: 2023-12-17

## 2023-12-17 RX ORDER — DICYCLOMINE HYDROCHLORIDE 10 MG/ML
20 INJECTION INTRAMUSCULAR 4 TIMES DAILY
Status: DISCONTINUED | OUTPATIENT
Start: 2023-12-17 | End: 2023-12-22

## 2023-12-17 RX ORDER — INDOMETHACIN 50 MG/1
100 SUPPOSITORY RECTAL ONCE
Status: COMPLETED | OUTPATIENT
Start: 2023-12-17 | End: 2023-12-17

## 2023-12-17 RX ORDER — PHENYLEPHRINE HYDROCHLORIDE 10 MG/ML
INJECTION INTRAVENOUS
Status: DISCONTINUED | OUTPATIENT
Start: 2023-12-17 | End: 2023-12-17

## 2023-12-17 RX ORDER — HYDROMORPHONE HYDROCHLORIDE 2 MG/ML
0.2 INJECTION, SOLUTION INTRAMUSCULAR; INTRAVENOUS; SUBCUTANEOUS EVERY 5 MIN PRN
Status: CANCELLED | OUTPATIENT
Start: 2023-12-17

## 2023-12-17 RX ORDER — MIDAZOLAM HYDROCHLORIDE 1 MG/ML
2 INJECTION INTRAMUSCULAR; INTRAVENOUS
Status: DISCONTINUED | OUTPATIENT
Start: 2023-12-17 | End: 2023-12-17

## 2023-12-17 RX ORDER — DIPHENHYDRAMINE HYDROCHLORIDE 50 MG/ML
25 INJECTION, SOLUTION INTRAMUSCULAR; INTRAVENOUS EVERY 6 HOURS PRN
Status: CANCELLED | OUTPATIENT
Start: 2023-12-17

## 2023-12-17 RX ORDER — ONDANSETRON 2 MG/ML
4 INJECTION INTRAMUSCULAR; INTRAVENOUS DAILY PRN
Status: CANCELLED | OUTPATIENT
Start: 2023-12-17

## 2023-12-17 RX ORDER — SODIUM CITRATE AND CITRIC ACID MONOHYDRATE 334; 500 MG/5ML; MG/5ML
30 SOLUTION ORAL ONCE
Status: DISCONTINUED | OUTPATIENT
Start: 2023-12-17 | End: 2023-12-17 | Stop reason: HOSPADM

## 2023-12-17 RX ORDER — IPRATROPIUM BROMIDE AND ALBUTEROL SULFATE 2.5; .5 MG/3ML; MG/3ML
3 SOLUTION RESPIRATORY (INHALATION)
Status: CANCELLED | OUTPATIENT
Start: 2023-12-17

## 2023-12-17 RX ORDER — MEPERIDINE HYDROCHLORIDE 25 MG/ML
12.5 INJECTION INTRAMUSCULAR; INTRAVENOUS; SUBCUTANEOUS EVERY 10 MIN PRN
Status: CANCELLED | OUTPATIENT
Start: 2023-12-17 | End: 2023-12-18

## 2023-12-17 RX ORDER — SODIUM CHLORIDE, SODIUM GLUCONATE, SODIUM ACETATE, POTASSIUM CHLORIDE AND MAGNESIUM CHLORIDE 30; 37; 368; 526; 502 MG/100ML; MG/100ML; MG/100ML; MG/100ML; MG/100ML
INJECTION, SOLUTION INTRAVENOUS CONTINUOUS
Status: DISCONTINUED | OUTPATIENT
Start: 2023-12-17 | End: 2023-12-18

## 2023-12-17 RX ORDER — PROPOFOL 10 MG/ML
VIAL (ML) INTRAVENOUS
Status: DISCONTINUED | OUTPATIENT
Start: 2023-12-17 | End: 2023-12-17

## 2023-12-17 RX ADMIN — DICYCLOMINE HYDROCHLORIDE 20 MG: 20 INJECTION, SOLUTION INTRAMUSCULAR at 09:12

## 2023-12-17 RX ADMIN — ONDANSETRON 4 MG: 2 INJECTION INTRAMUSCULAR; INTRAVENOUS at 12:12

## 2023-12-17 RX ADMIN — CEFEPIME 2 G: 2 INJECTION, POWDER, FOR SOLUTION INTRAVENOUS at 11:12

## 2023-12-17 RX ADMIN — HYDROMORPHONE HYDROCHLORIDE 1 MG: 2 INJECTION INTRAMUSCULAR; INTRAVENOUS; SUBCUTANEOUS at 09:12

## 2023-12-17 RX ADMIN — ENOXAPARIN SODIUM 40 MG: 40 INJECTION SUBCUTANEOUS at 04:12

## 2023-12-17 RX ADMIN — PROCHLORPERAZINE EDISYLATE 5 MG: 5 INJECTION INTRAMUSCULAR; INTRAVENOUS at 03:12

## 2023-12-17 RX ADMIN — DEXAMETHASONE SODIUM PHOSPHATE 4 MG: 4 INJECTION, SOLUTION INTRA-ARTICULAR; INTRALESIONAL; INTRAMUSCULAR; INTRAVENOUS; SOFT TISSUE at 12:12

## 2023-12-17 RX ADMIN — INSULIN ASPART 4 UNITS: 100 INJECTION, SOLUTION INTRAVENOUS; SUBCUTANEOUS at 06:12

## 2023-12-17 RX ADMIN — LIDOCAINE HYDROCHLORIDE 80 MG: 20 INJECTION INTRAVENOUS at 12:12

## 2023-12-17 RX ADMIN — PANTOPRAZOLE SODIUM 40 MG: 40 INJECTION, POWDER, FOR SOLUTION INTRAVENOUS at 04:12

## 2023-12-17 RX ADMIN — DICYCLOMINE HYDROCHLORIDE 20 MG: 20 INJECTION, SOLUTION INTRAMUSCULAR at 08:12

## 2023-12-17 RX ADMIN — ROCURONIUM BROMIDE 50 MG: 10 SOLUTION INTRAVENOUS at 12:12

## 2023-12-17 RX ADMIN — SODIUM CHLORIDE, POTASSIUM CHLORIDE, SODIUM LACTATE AND CALCIUM CHLORIDE: 600; 310; 30; 20 INJECTION, SOLUTION INTRAVENOUS at 02:12

## 2023-12-17 RX ADMIN — PHENYLEPHRINE HYDROCHLORIDE 100 MCG: 10 INJECTION INTRAVENOUS at 12:12

## 2023-12-17 RX ADMIN — FENTANYL CITRATE 100 MCG: 50 INJECTION, SOLUTION INTRAMUSCULAR; INTRAVENOUS at 12:12

## 2023-12-17 RX ADMIN — PHENYLEPHRINE HYDROCHLORIDE 200 MCG: 10 INJECTION INTRAVENOUS at 12:12

## 2023-12-17 RX ADMIN — HYDROMORPHONE HYDROCHLORIDE 1 MG: 2 INJECTION INTRAMUSCULAR; INTRAVENOUS; SUBCUTANEOUS at 03:12

## 2023-12-17 RX ADMIN — CEFEPIME 2 G: 2 INJECTION, POWDER, FOR SOLUTION INTRAVENOUS at 01:12

## 2023-12-17 RX ADMIN — PANTOPRAZOLE SODIUM 40 MG: 40 INJECTION, POWDER, FOR SOLUTION INTRAVENOUS at 09:12

## 2023-12-17 RX ADMIN — CEFEPIME 2 G: 2 INJECTION, POWDER, FOR SOLUTION INTRAVENOUS at 02:12

## 2023-12-17 RX ADMIN — SODIUM CHLORIDE, SODIUM GLUCONATE, SODIUM ACETATE, POTASSIUM CHLORIDE AND MAGNESIUM CHLORIDE: 526; 502; 368; 37; 30 INJECTION, SOLUTION INTRAVENOUS at 11:12

## 2023-12-17 RX ADMIN — METRONIDAZOLE 500 MG: 5 INJECTION, SOLUTION INTRAVENOUS at 04:12

## 2023-12-17 RX ADMIN — METOPROLOL SUCCINATE 25 MG: 25 TABLET, EXTENDED RELEASE ORAL at 08:12

## 2023-12-17 RX ADMIN — METRONIDAZOLE 500 MG: 5 INJECTION, SOLUTION INTRAVENOUS at 08:12

## 2023-12-17 RX ADMIN — ESMOLOL HYDROCHLORIDE 20 MG: 100 INJECTION, SOLUTION INTRAVENOUS at 12:12

## 2023-12-17 RX ADMIN — PROPOFOL 120 MG: 10 INJECTION, EMULSION INTRAVENOUS at 12:12

## 2023-12-17 RX ADMIN — SODIUM CHLORIDE, POTASSIUM CHLORIDE, SODIUM LACTATE AND CALCIUM CHLORIDE: 600; 310; 30; 20 INJECTION, SOLUTION INTRAVENOUS at 09:12

## 2023-12-17 RX ADMIN — INDOMETHACIN 100 MG: 50 SUPPOSITORY RECTAL at 12:12

## 2023-12-17 RX ADMIN — HYDROMORPHONE HYDROCHLORIDE 1 MG: 2 INJECTION INTRAMUSCULAR; INTRAVENOUS; SUBCUTANEOUS at 05:12

## 2023-12-17 RX ADMIN — SUGAMMADEX 200 MG: 100 INJECTION, SOLUTION INTRAVENOUS at 12:12

## 2023-12-17 NOTE — PROVATION PATIENT INSTRUCTIONS
Discharge Summary/Instructions after an Endoscopic Procedure  Patient Name: Franklin Macias  Patient MRN: [id number]  Patient YOB: 1955 Sunday, December 17, 2023  Flako Kerns MD  Dear patient,  As a result of recent federal legislation (The Federal Cures Act), you may   receive lab or pathology results from your procedure in your [facility]   account before your physician is able to contact you. Your physician or   their representative will relay the results to you with their   recommendations at their soonest availability.  Thank you,  RESTRICTIONS:  During your procedure today, you received medications for sedation.  These   medications may affect your judgment, balance and coordination.  Therefore,   for 24 hours, you have the following restrictions:   - DO NOT drive a car, operate machinery, make legal/financial decisions,   sign important papers or drink alcohol.    ACTIVITY:  Today: no heavy lifting, straining or running due to procedural   sedation/anesthesia.  The following day: return to full activity including work.  DIET:  Eat and drink normally unless instructed otherwise.     TREATMENT FOR COMMON SIDE EFFECTS:  - Mild abdominal pain, nausea, belching, bloating or excessive gas:  rest,   eat lightly and use a heating pad.  - Sore Throat: treat with throat lozenges and/or gargle with warm salt   water.  - Because air was used during the procedure, expelling large amounts of air   from your rectum or belching is normal.  - If a bowel prep was taken, you may not have a bowel movement for 1-3 days.    This is normal.  SYMPTOMS TO WATCH FOR AND REPORT TO YOUR PHYSICIAN:  1. Abdominal pain or bloating, other than gas cramps.  2. Chest pain.  3. Back pain.  4. Signs of infection such as: chills or fever occurring within 24 hours   after the procedure.  5. Rectal bleeding, which would show as bright red, maroon, or black stools.   (A tablespoon of blood from the rectum is not serious, especially  if   hemorrhoids are present.)  6. Vomiting.  7. Weakness or dizziness.  GO DIRECTLY TO THE NEAREST EMERGENCY ROOM IF YOU HAVE ANY OF THE FOLLOWING:      Difficulty breathing              Chills and/or fever over 101 F   Persistent vomiting and/or vomiting blood   Severe abdominal pain   Severe chest pain   Black, tarry stools   Bleeding- more than one tablespoon   Any other symptom or condition that you feel may need urgent attention  Your doctor recommends these additional instructions:  If any biopsies were taken, your doctors clinic will contact you in 1 to 2   weeks with any results.  Watch post-ercp pancreatitis  Ok for clears if no pain in4-6 hours then ok advance to bland diet  - Return patient to hospital [facility] for ongoing care.  For questions, problems or results please call your physician - Flako Kerns MD at Work:  [phone].  [facility] PHONE NUMBER: [phone]  IF A COMPLICATION OR EMERGENCY SITUATION ARISES AND YOU ARE UNABLE TO REACH   YOUR PHYSICIAN - GO DIRECTLY TO THE EMERGENCY ROOM.  MD Flako Linder MD  12/17/2023 1:23:10 PM  This report has been verified and signed electronically.  Dear patient,  As a result of recent federal legislation (The Federal Cures Act), you may   receive lab or pathology results from your procedure in your [facility]   account before your physician is able to contact you. Your physician or   their representative will relay the results to you with their   recommendations at their soonest availability.  Thank you,  PROVATION

## 2023-12-17 NOTE — ANESTHESIA POSTPROCEDURE EVALUATION
Anesthesia Post Evaluation    Patient: Franklin Macias    Procedure(s) Performed: Procedure(s) (LRB):  ERCP (ENDOSCOPIC RETROGRADE CHOLANGIOPANCREATOGRAPHY) (N/A)  ERCP, WITH SPHINCTEROTOMY  ERCP, WITH CALCULUS REMOVAL    Final Anesthesia Type: general      Patient location during evaluation: PACU  Patient participation: Yes- Able to Participate  Level of consciousness: awake and alert and oriented  Post-procedure vital signs: reviewed and stable  Pain management: adequate  Airway patency: patent    PONV status at discharge: No PONV  Anesthetic complications: no      Cardiovascular status: hemodynamically stable  Respiratory status: unassisted  Hydration status: euvolemic  Follow-up not needed.              Vitals Value Taken Time   /96 12/17/23 1332   Temp 37.3 °C (99.1 °F) 12/17/23 1303   Pulse 104 12/17/23 1332   Resp 20 12/17/23 1332   SpO2 96 % 12/17/23 1332         Event Time   Out of Recovery 12/17/2023 13:17:07         Pain/Lamonte Score: Pain Rating Prior to Med Admin: 8 (12/17/2023  5:33 AM)  Lamonte Score: 9 (12/17/2023  1:03 PM)

## 2023-12-17 NOTE — NURSING
Nurses Note -- 4 Eyes      12/17/2023   2:17 PM      Skin assessed during: Admit      [x] No Altered Skin Integrity Present    []Prevention Measures Documented      [] Yes- Altered Skin Integrity Present or Discovered   [] LDA Added if Not in Epic (Describe Wound)   [] New Altered Skin Integrity was Present on Admit and Documented in LDA   [] Wound Image Taken    Wound Care Consulted? No    Attending Nurse:  Rekha Harrell RN/Staff Member:   Anita Escudero LPN

## 2023-12-17 NOTE — TRANSFER OF CARE
"Anesthesia Transfer of Care Note    Patient: Franklin Macias    Procedure(s) Performed: Procedure(s) (LRB):  ERCP (ENDOSCOPIC RETROGRADE CHOLANGIOPANCREATOGRAPHY) (N/A)  ERCP, WITH SPHINCTEROTOMY  ERCP, WITH CALCULUS REMOVAL    Patient location: PACU    Anesthesia Type: general    Transport from OR: Transported from OR on room air with adequate spontaneous ventilation    Post pain: adequate analgesia    Post assessment: no apparent anesthetic complications    Post vital signs: stable    Level of consciousness: awake    Nausea/Vomiting: no nausea/vomiting    Complications: none    Transfer of care protocol was followed      Last vitals: Visit Vitals  BP (!) 151/100   Pulse (!) 112   Temp 37.3 °C (99.1 °F)   Resp 20   Ht 5' 6" (1.676 m)   Wt 68 kg (150 lb)   SpO2 (!) 92%   BMI 24.21 kg/m²     "

## 2023-12-17 NOTE — PROGRESS NOTES
[facility] Hospital Medicine Progress Note        Chief Complaint: Inpatient Follow-up     Interval Hx:  Patient with continued abdominal pain.  Reports that Dilaudid is not really working for him.  Discussed with nursing and states that he was getting 1 mg every 4 hour.  Still complaining of cramping.  Nausea is improved though.  No vomiting episodes in the last few hours.  His wife is at the bedside.  We discussed the findings of his MRCP.  Recommending GI follow up.  Patient reports that he had his gallbladder out about a year and a half ago.    Objective/physical exam:  General: In no acute distress, afebrile  Chest: Clear to auscultation bilaterally  Heart: RRR, +S1, S2, no appreciable murmur  Abdomen: Soft, nontender, BS +  MSK: Warm, no lower extremity edema, no clubbing or cyanosis  Neurologic: Alert and oriented x4, Cranial nerve II-XII intact, Strength 5/5 in all 4 extremities    VITAL SIGNS: 24 HRS MIN & MAX LAST   Temp  Min: 97.5 °F (36.4 °C)  Max: 98.4 °F (36.9 °C) 98.4 °F (36.9 °C)   BP  Min: 112/66  Max: 165/87 (!) 147/95   Pulse  Min: 87  Max: 123  (!) 116   Resp  Min: 16  Max: 22 20   SpO2  Min: 94 %  Max: 99 % 95 %       Recent Labs   Lab 12/16/23  1710 12/17/23  0352   WBC 24.97  24.97* 18.54*   RBC 6.01 5.86   HGB 18.2* 18.1*   HCT 55.3* 54.3*   MCV 92.0 92.7   MCH 30.3 30.9   MCHC 32.9* 33.3   RDW 12.8 13.0    262   MPV 9.7 9.3       Recent Labs   Lab 12/16/23  1710 12/17/23  0352    141   K 3.7 3.9   CO2 26 19*   BUN 16.5 19.9   CREATININE 2.00* 2.29*   CALCIUM 9.8 8.5*   MG  --  1.60   ALBUMIN 4.1 3.4   ALKPHOS 85 77   * 217*   * 229*   BILITOT 2.7* 3.5*          Microbiology Results (last 7 days)       Procedure Component Value Units Date/Time    Blood culture #1 **CANNOT BE ORDERED STAT** [[id number]] Collected: 12/16/23 2017    Order Status: Resulted Specimen: Blood from Antecubital, Right Updated: 12/16/23 2017    Blood culture #2  **CANNOT BE ORDERED STAT** [[id number]] Collected: 12/16/23 2017    Order Status: Resulted Specimen: Blood from Arm, Right Updated: 12/16/23 2017             Radiology:  MRI MRCP Without Contrast  START OF REPORT:  Technique: Multiplanar, multisequence magnetic resonance imaging of the abdomen without intravenous contrast and with mrcp.    Comparison: Correlation with study dated 2023-12-16 19:18:17.    Clinical History: Abdomen pain.    Findings:  Lung bases: Moderate streaky is present in the visualized lung bases consistent with nonspecific dependent changes and scarring.  Liver: Liver appears normal in size.  Portal venous system: Normal.  Gallbladder: Gallbladder is surgically absent.  Biliary tree intrahepatic ducts: Unremarkable.  Biliary tree extrahepatic ducts: There appear to be numerous intraluminal filling defects in the mid to distal common bile duct series image 16 series 3.  Ampullary region: No obvious obstructive mass or stone.  Spleen: Unremarkable.  Pancreas: There is intermediate intrasubstance T2 signal in the pancreatic body (image 19 series 4 and 6) with corresponding restricted diffusion signal on DWI (image 136 series 7), relating to edema changes. There is stable free fluid collection in the bilateral anterior pararenal space extending to the adjacent mesocolon, small bowel mesentery, perihepatic and perisplenic regions. T2 stranding intensities are seen along the lesser sac. These findings are reflective of acute interstitial pancreatitis.  Pancreatic duct: Unremarkable.  Adrenal glands: Unremarkable.  Kidneys: There are probable cysts in both kidneys, with the larger seen in the right mid pole region measuring 1.0 cm (image 29 series 4).  Ureters: Unremarkable.  Stomach and distal esophagus: Normal.  Small bowel: Normal.  Colon: Normal.  Lymph nodes: A 1cm lymph node is demonstrated in the mo hepatis region (image 18 series 4), likely reactive in nature.  Abdominal wall:  Normal.  Systemic arteries and veins: Unremarkable.  Osseous structures: There is mild spondylolytic changes in the imaged spine.    Miscellaneous: There are motion artifacts in some of the sequences limiting its evaluation.    Impression:  1. There appear to be numerous intraluminal filling defects in the mid to distal common bile duct series image 16 series 3. These are suggestive of impacted gallstones and sludge. Correlate clinically as regards additional evaluation and follow-up possibly with ERCP.  2. There is intermediate intrasubstance T2 signal in the pancreatic body (image 19 series 4 and 6) with corresponding restricted diffusion signal on DWI (image 136 series 7), relating to edema changes. There is stable free fluid collection in the bilateral anterior pararenal space extending to the adjacent mesocolon, small bowel mesentery, perihepatic and perisplenic regions. T2 stranding intensities are seen along the lesser sac. These findings are reflective of acute interstitial pancreatitis. Correlate with clinical and laboratory findings as regards additional evaluation and follow-up to full resolution as indicated.  3. A 1cm lymph node is demonstrated in the mo hepatis region (image 18 series 4), likely reactive in nature.      Scheduled Med:   ceFEPime (MAXIPIME) IVPB  2 g Intravenous Q12H    enoxparin  40 mg Subcutaneous Q24H (prophylaxis, 1700)    metoprolol succinate  25 mg Oral Daily    metronidazole  500 mg Intravenous Q8H    pantoprazole  40 mg Oral Daily        Continuous Infusions:   lactated ringers 150 mL/hr at 12/17/23 0225        PRN Meds:  acetaminophen, acetaminophen, aluminum-magnesium hydroxide-simethicone, dextrose 10%, dextrose 10%, glucagon (human recombinant), hydrALAZINE, HYDROmorphone, insulin aspart U-100, labetalol, melatonin, ondansetron, oxyCODONE, polyethylene glycol, prochlorperazine, senna-docusate 8.6-50 mg, sodium chloride 0.9%       Assessment/Plan:    Choledocholithiasis  Severe sepsis   Acute Cholangitis     Will keep the patient NPO for GI evaluation.  He does have a history of a cholecystectomy but looks like he may have a stone or stricture on MRCP.  Will likely need ERCP.    Trending on his bilirubin which is up this morning.  Also transaminases and elevated lipase.    Continue with IV Dilaudid for pain control while NPO.  Will add some IV Bentyl as well.    Agree with empiric antibiotics with cefepime and Flagyl for now.  His leukocytosis is somewhat improved.  Continue to monitor his vital signs closely.  Continue with IV fluids.  Monitoring electrolytes.      Critical care diagnosis: sepsis, iv antibiotics  Critical care interventions: hands on evaluation, review of labs/radiographs/records and discussions with family  Critical care time spent: >32 minutes        Kingsley Blank MD   12/17/2023     All diagnosis and differential diagnosis have been reviewed; assessment and plan has been documented; I have personally reviewed the labs and test results that are presently available; I have reviewed the patients medication list; I have reviewed the consulting providers response and recommendations. I have reviewed or attempted to review medical records based upon their availability    All of the patient's questions have been  addressed and answered. Patient's is agreeable to the above stated plan. I will continue to monitor closely and make adjustments to medical management as needed.  _____________________________________________________________________

## 2023-12-17 NOTE — CONSULTS
Gastroenterology Consultation Note    Reason for Consult:  choledocholithiasis    PCP:   Snehal Arroyo MD    Referring MD:  Orion Morel MD    Hospital Day: 1     Initial History of Present Illness (HPI):  This is a 68 y.o. male unknown to our group with PMH of T2DM, HTN, HLD, chronic scrotal hydrocele, vitamin D deficiency, CKD 3a. Presented to the ED 12/16/23 with severe epigastric pain onset same day with radiation to back, associated n/v and chills also reported.     Upon arrival, VSS - afebrile. Labs revealed WBC 24, Tbili 1.9, , , lipase >3000.   CT abd/pel with IV: severe acute pancreatitis, gastric antrum and duodenal mural thickening likely represents reactive change for acute pancreatitis.  Preliminary MRCP: numerous intraluminal filling defects in mid to distal CBD suggestive of impacted gallstones and sludge; acute interstitial pancreatitis, lymph node in mo hepatis region likely reactive in nature.  LR, cefepime, flagyl initiated. GI consulted for choledocholithiasis.    Tbili and transaminases increased today. Tbili 3.5, Dbili 2.7. Transaminases in the 200s.    Patient resting in bed. He states symptoms started before eating, however after eating shrimp stew and potato salad his symptoms worsened. Severe abd pain, n/v - denies hematemesis or coffee ground emesis. He states even sipping water would cause vomiting. He has never had anything like this happen previously. He had his gallbladder removed around 2020 or so for cholelithiasis and biliary colic but he had no issues post op until now. Bms are normal and without any evidence of overt bleeding. Even now he gags with sips of water, he is c/o abd cramping unrelieved by dilaudid.     He is unsure if he takes blood thinners - per chart review there are none listed in home meds. He denies frequent NSAID use. Denies ETOH or recreational drug use. He smokes 1PPD for many years. Surgical history includes cholecystectomy as above and  "appendectomy - he denies gastric bypass history.     When asked about previous endoscopy/GI history he states he saw a doctor off of [facility] but when asked about any workup he states he was told he had hematuria. No records of him being seen in either of our offices although he was referred to us in 2016 for screening colonoscopy - unable to contact patient despite multiple attempts to schedule.    Per chart review, patient saw PCP in Feb 2023 for wellness visit. He was being treated for vitamin D deficiency. He had a negative cologuard in the past year or so. Some mild transaminitis noted on routine labs.    ROS:  Review of Systems   Constitutional:  Negative for malaise/fatigue.   Respiratory:  Negative for cough and shortness of breath.    Cardiovascular:  Negative for chest pain.   Gastrointestinal:  Positive for abdominal pain, nausea and vomiting. Negative for blood in stool, constipation, diarrhea and melena.   Neurological:  Negative for weakness.       Medical History:   Past Medical History:   Diagnosis Date    DM (diabetes mellitus)     GERD (gastroesophageal reflux disease)     HLD (hyperlipidemia)     HTN (hypertension)        Surgical History:   Past Surgical History:   Procedure Laterality Date    APPENDECTOMY      CHOLECYSTECTOMY         Family History:   History reviewed. No pertinent family history..     Social History:   Social History     Tobacco Use    Smoking status: Every Day     Current packs/day: 1.00     Types: Cigarettes    Smokeless tobacco: Never   Substance Use Topics    Alcohol use: Never       Allergies:  Review of patient's allergies indicates:  No Known Allergies    (Not in a hospital admission)        Objective Findings:    Vital Signs:  BP (!) 147/95   Pulse (!) 116   Temp 98.4 °F (36.9 °C) (Oral)   Resp 20   Ht 5' 6" (1.676 m)   Wt 68 kg (150 lb)   SpO2 95%   BMI 24.21 kg/m²   Body mass index is 24.21 kg/m².    Physical Exam:  Physical Exam  Constitutional:       " General: He is not in acute distress.     Appearance: He is normal weight. He is not ill-appearing.   HENT:      Head: Normocephalic and atraumatic.      Right Ear: External ear normal.      Left Ear: External ear normal.      Nose: Nose normal.      Mouth/Throat:      Pharynx: Oropharynx is clear.   Eyes:      General: No scleral icterus.     Conjunctiva/sclera: Conjunctivae normal.   Pulmonary:      Effort: Pulmonary effort is normal. No respiratory distress.   Abdominal:      General: Abdomen is flat. Bowel sounds are normal. There is no distension.      Palpations: Abdomen is soft.      Tenderness: There is generalized abdominal tenderness. There is guarding.   Musculoskeletal:         General: Normal range of motion.      Cervical back: Normal range of motion.   Skin:     General: Skin is warm and dry.      Coloration: Skin is not jaundiced or pale.   Neurological:      Mental Status: He is alert and oriented to person, place, and time. Mental status is at baseline.      Motor: No weakness.   Psychiatric:         Mood and Affect: Mood normal.         Behavior: Behavior normal.         Thought Content: Thought content normal.         Labs:  Recent Results (from the past 24 hour(s))   Comprehensive Metabolic Panel    Collection Time: 12/16/23  5:10 PM   Result Value Ref Range    Sodium Level 143 136 - 145 mmol/L    Potassium Level 3.7 3.5 - 5.1 mmol/L    Chloride 101 98 - 107 mmol/L    Carbon Dioxide 26 23 - 31 mmol/L    Glucose Level 234 (H) 82 - 115 mg/dL    Blood Urea Nitrogen 16.5 8.4 - 25.7 mg/dL    Creatinine 2.00 (H) 0.73 - 1.18 mg/dL    Calcium Level Total 9.8 8.8 - 10.0 mg/dL    Protein Total 7.7 (H) 5.8 - 7.6 gm/dL    Albumin Level 4.1 3.4 - 4.8 g/dL    Globulin 3.6 (H) 2.4 - 3.5 gm/dL    Albumin/Globulin Ratio 1.1 1.1 - 2.0 ratio    Bilirubin Total 2.7 (H) <=1.5 mg/dL    Alkaline Phosphatase 85 40 - 150 unit/L    Alanine Aminotransferase 147 (H) 0 - 55 unit/L    Aspartate Aminotransferase 145 (H) 5 -  34 unit/L    eGFR 36 mls/min/1.73/m2   Lipase    Collection Time: 12/16/23  5:10 PM   Result Value Ref Range    Lipase Level >3,000 (H) <=60 U/L   CBC with Differential    Collection Time: 12/16/23  5:10 PM   Result Value Ref Range    WBC 24.97 (H) 4.50 - 11.50 x10(3)/mcL    RBC 6.01 4.70 - 6.10 x10(6)/mcL    Hgb 18.2 (H) 14.0 - 18.0 g/dL    Hct 55.3 (H) 42.0 - 52.0 %    MCV 92.0 80.0 - 94.0 fL    MCH 30.3 27.0 - 31.0 pg    MCHC 32.9 (L) 33.0 - 36.0 g/dL    RDW 12.8 11.5 - 17.0 %    Platelet 321 130 - 400 x10(3)/mcL    MPV 9.7 7.4 - 10.4 fL    NRBC% 0.0 %   Troponin I    Collection Time: 12/16/23  5:10 PM   Result Value Ref Range    Troponin-I 0.024 0.000 - 0.045 ng/mL   Manual Differential    Collection Time: 12/16/23  5:10 PM   Result Value Ref Range    WBC 24.97 x10(3)/mcL    Neutrophils % 85 %    Lymphs % 7 %    Monocytes % 8 %    Neutrophils Abs 21.2245 (H) 2.1 - 9.2 x10(3)/mcL    Lymphs Abs 1.7479 0.6 - 4.6 x10(3)/mcL    Monocytes Abs 1.9976 (H) 0.1 - 1.3 x10(3)/mcL    Platelets Normal Normal, Adequate    RBC Morph Normal Normal   Bilirubin, Direct    Collection Time: 12/16/23  5:10 PM   Result Value Ref Range    Bilirubin Direct 1.9 (H) 0.0 - <0.5 mg/dL   Triglycerides    Collection Time: 12/16/23  5:10 PM   Result Value Ref Range    Triglyceride 183 (H) 34 - 140 mg/dL   Urinalysis, Reflex to Urine Culture    Collection Time: 12/16/23  6:32 PM    Specimen: Urine   Result Value Ref Range    Color, UA Yellow Yellow, Light-Yellow, Colorless, Straw, Dark-Yellow    Appearance, UA Turbid (A) Clear    Specific Gravity, UA 1.010 1.005 - 1.030    pH, UA 5.0 5.0 - 8.5    Protein, UA 1+ (A) Negative    Glucose, UA 2+ (A) Negative, Normal    Ketones, UA Negative Negative    Blood, UA 1+ (A) Negative    Bilirubin, UA Negative Negative    Urobilinogen, UA Normal 0.2, 1.0, Normal    Nitrites, UA Negative Negative    Leukocyte Esterase, UA Negative Negative    WBC, UA 6-10 (A) None Seen, 0-2, 3-5, 0-5 /HPF    Bacteria, UA  None Seen None Seen, Trace /HPF    Squamous Epithelial Cells, UA Trace None Seen /HPF    Mucous, UA Trace (A) None Seen /LPF    Hyaline Casts, UA 6-10 (A) None Seen /lpf    Granular Casts >20 (A) None Seen /lpf    RBC, UA 0-5 None Seen, 0-2, 3-5, 0-5 /HPF   Hemoglobin A1c if not done in past 3 months    Collection Time: 12/17/23  3:52 AM   Result Value Ref Range    Hemoglobin A1c 6.2 <=7.0 %    Estimated Average Glucose 131.2 mg/dL   Comprehensive Metabolic Panel    Collection Time: 12/17/23  3:52 AM   Result Value Ref Range    Sodium Level 141 136 - 145 mmol/L    Potassium Level 3.9 3.5 - 5.1 mmol/L    Chloride 105 98 - 107 mmol/L    Carbon Dioxide 19 (L) 23 - 31 mmol/L    Glucose Level 269 (H) 82 - 115 mg/dL    Blood Urea Nitrogen 19.9 8.4 - 25.7 mg/dL    Creatinine 2.29 (H) 0.73 - 1.18 mg/dL    Calcium Level Total 8.5 (L) 8.8 - 10.0 mg/dL    Protein Total 6.4 5.8 - 7.6 gm/dL    Albumin Level 3.4 3.4 - 4.8 g/dL    Globulin 3.0 2.4 - 3.5 gm/dL    Albumin/Globulin Ratio 1.1 1.1 - 2.0 ratio    Bilirubin Total 3.5 (H) <=1.5 mg/dL    Alkaline Phosphatase 77 40 - 150 unit/L    Alanine Aminotransferase 217 (H) 0 - 55 unit/L    Aspartate Aminotransferase 229 (H) 5 - 34 unit/L    eGFR 31 mls/min/1.73/m2   Magnesium    Collection Time: 12/17/23  3:52 AM   Result Value Ref Range    Magnesium Level 1.60 1.60 - 2.60 mg/dL   Phosphorus    Collection Time: 12/17/23  3:52 AM   Result Value Ref Range    Phosphorus Level 4.0 2.3 - 4.7 mg/dL   Bilirubin, Direct    Collection Time: 12/17/23  3:52 AM   Result Value Ref Range    Bilirubin Direct 2.7 (H) 0.0 - <0.5 mg/dL   APTT    Collection Time: 12/17/23  3:52 AM   Result Value Ref Range    PTT 25.0 23.2 - 33.7 seconds   Protime-INR    Collection Time: 12/17/23  3:52 AM   Result Value Ref Range    PT 13.7 12.5 - 14.5 seconds    INR 1.1 <=1.3   Lipase    Collection Time: 12/17/23  3:52 AM   Result Value Ref Range    Lipase Level 1,330 (H) <=60 U/L   CBC with Differential    Collection  Time: 12/17/23  3:52 AM   Result Value Ref Range    WBC 18.54 (H) 4.50 - 11.50 x10(3)/mcL    RBC 5.86 4.70 - 6.10 x10(6)/mcL    Hgb 18.1 (H) 14.0 - 18.0 g/dL    Hct 54.3 (H) 42.0 - 52.0 %    MCV 92.7 80.0 - 94.0 fL    MCH 30.9 27.0 - 31.0 pg    MCHC 33.3 33.0 - 36.0 g/dL    RDW 13.0 11.5 - 17.0 %    Platelet 262 130 - 400 x10(3)/mcL    MPV 9.3 7.4 - 10.4 fL    Neut % 88.8 %    Lymph % 4.4 %    Mono % 6.1 %    Eos % 0.0 %    Basophil % 0.2 %    Lymph # 0.82 0.6 - 4.6 x10(3)/mcL    Neut # 16.44 (H) 2.1 - 9.2 x10(3)/mcL    Mono # 1.14 0.1 - 1.3 x10(3)/mcL    Eos # 0.00 0 - 0.9 x10(3)/mcL    Baso # 0.04 <=0.2 x10(3)/mcL    IG# 0.10 (H) 0 - 0.04 x10(3)/mcL    IG% 0.5 %    NRBC% 0.0 %       MRI MRCP Without Contrast         CT Abdomen Pelvis With IV Contrast NO Oral Contrast   Final Result      1. Severe acute pancreatitis.      2. Gastric antrum and duodenal mural thickening likely represents reactive change for acute pancreatitis.      3. Right large hydrocele.         Electronically signed by: Denver Wagner   Date:    12/16/2023   Time:    20:08      US Scrotum And Testicles   Final Result      1. Unremarkable right testicle      2. Large right hydrocele which crosses the midline and causes compressive effect upon the left testicle which is inferiorly deviated.  Due to pressure caused by the hydrocele upon the left testicle optimal Doppler flow to the left testicle could not be obtained.  Only limited arterial flow to the peripheral aspect left testicle could be visualized.  Please correlate clinically.  Oneoption is to perform a follow-up study post drainage of large right hydrocele.         Electronically signed by: Denver Wagner   Date:    12/16/2023   Time:    19:21            Assessment/Plan:  This is a 68 y.o. male unknown to our group with PMH of T2DM, HTN, HLD, chronic scrotal hydrocele, vitamin D deficiency, CKD 3a. Presented to the ED 12/16/23 with severe epigastric pain onset same day with radiation to back,  associated n/v and chills also reported.   GI consulted for choledocholithiasis.    Choledocholithiasis  Gallstone pancreatitis  Hyperbilirubinemia  Transaminitis  - patient had cholecystectomy around 2020 d/t gallstones and biliary colic. No issues following cholecystectomy until current events  - CT abd/pel with IV: severe acute pancreatitis, gastric antrum and duodenal mural thickening likely represents reactive change for acute pancreatitis  - Preliminary MRCP: numerous intraluminal filling defects in mid to distal CBD suggestive of impacted gallstones and sludge; acute interstitial pancreatitis, lymph node in mo hepatis region likely reactive in nature  - Tbili 2.7 -- 3.5; direct predominant  -  -- 229  -  -- 217  - alk phos wnl  - lipase >3000  - WBC 24 upon arrival - on cefepime and flagyl. Afebrile  - supportive care, continue LR  - continue bentyl for abd cramping  - PPI BID  - NPO  - ERCP today    Thank you for allowing us to participate in the care of Franklin Macias.    Oneida Ellison PAAfshanC  Gastroenterology  [facility]

## 2023-12-17 NOTE — PLAN OF CARE
Problem: Adult Inpatient Plan of Care  Goal: Plan of Care Review  Outcome: Ongoing, Progressing  Goal: Patient-Specific Goal (Individualized)  Outcome: Ongoing, Progressing  Goal: Absence of Hospital-Acquired Illness or Injury  Outcome: Ongoing, Progressing  Goal: Optimal Comfort and Wellbeing  Outcome: Ongoing, Progressing  Goal: Readiness for Transition of Care  Outcome: Ongoing, Progressing     Problem: Diabetes Comorbidity  Goal: Blood Glucose Level Within Targeted Range  Outcome: Ongoing, Progressing     Problem: Pain Acute  Goal: Acceptable Pain Control and Functional Ability  Outcome: Ongoing, Progressing     Problem: Fluid Imbalance (Pancreatitis)  Goal: Fluid Balance  Outcome: Ongoing, Progressing     Problem: Infection (Pancreatitis)  Goal: Infection Symptom Resolution  Outcome: Ongoing, Progressing     Problem: Nutrition Impaired (Pancreatitis)  Goal: Optimal Nutrition Intake  Outcome: Ongoing, Progressing     Problem: Pain (Pancreatitis)  Goal: Acceptable Pain Control  Outcome: Ongoing, Progressing     Problem: Respiratory Compromise (Pancreatitis)  Goal: Effective Oxygenation and Ventilation  Outcome: Ongoing, Progressing

## 2023-12-17 NOTE — ANESTHESIA PROCEDURE NOTES
Intubation    Date/Time: 12/17/2023 12:04 PM    Performed by: Prudencio Abreu CRNA  Authorized by: Holger Corbett MD    Intubation:     Induction:  Rapid sequence induction    Intubated:  Postinduction    Mask Ventilation:  Not attempted    Attempts:  1    Attempted By:  CRNA    Method of Intubation:  Direct    Blade:  Urias 2    Laryngeal View Grade: Grade I - full view of cords      Difficult Airway Encountered?: No      Complications:  None    Airway Device:  Oral endotracheal tube    Airway Device Size:  7.5    Style/Cuff Inflation:  Cuffed    Inflation Amount (mL):  10    Tube secured:  22    Secured at:  The lips    Placement Verified By:  Capnometry    Complicating Factors:  None    Findings Post-Intubation:  BS equal bilateral and atraumatic/condition of teeth unchanged

## 2023-12-17 NOTE — H&P
[facility] Hospital Medicine - H&P Note    Patient Name: Franklin Macias  MRN: [id number]  PCP: Snehal Arroyo MD  Admitting Physician: Orion Morel MD  Admission Class: IP- Inpatient   Date of Service: 12/16/2023  Code status: Full    Chief Complaint   Abdominal pain    History of Present Illness   This is a 68-year-old male with medical history of T2DM, hypertension, hyperlipidemia and prior cholecystectomy present to the ED with complaint of severe epigastric abdominal pain that started today around 9:00 a.m. after breakfast, the pain is severe 10/10 and radiate to his back associated with nausea and vomiting.  Report last bowel movement was this morning.  Reports chills but denies fever.  He has chronic scrotal hydrocele that has not changed or painful.    On arrival to ED he was afebrile, tachycardic, normotensive and saturating 96% on room air.  Labs notable for WBC 24.97, hemoglobin 18.2, creatinine 2.0, BUN 16.5, total bilirubin 2.7, direct 1.9, , , lipase more than 3000, triglyceride 183.  CT abdomen and pelvis show finding of severe acute pancreatitis without evidence of necrosis or abscess or fluid collection.  Liver remarkable for steatosis, gallbladder surgically absent, no comment on biliary tree.    He was given 2 L of IV fluids, IV Zosyn, IV analgesics and referred to hospital medicine service for further evaluation and management.    ROS   Except as documented, all other systems reviewed and negative     Past Medical History     Past Medical History:   Diagnosis Date    DM (diabetes mellitus)     GERD (gastroesophageal reflux disease)     HLD (hyperlipidemia)     HTN (hypertension)        Past Surgical History     Past Surgical History:   Procedure Laterality Date    APPENDECTOMY      CHOLECYSTECTOMY         Social History     Social History     Tobacco Use    Smoking status: Every Day     Current packs/day: 1.00     Types: Cigarettes    Smokeless  tobacco: Never   Substance Use Topics    Alcohol use: Never        Family History   Reviewed and negative    Allergies   Patient has no known allergies.    Home Medications     Prior to Admission medications    Medication Sig Start Date End Date Taking? Authorizing Provider   amlodipine-benazepril 10-20mg (LOTREL) 10-20 mg per capsule Take 1 capsule by mouth once daily. 10/19/23  Yes Snehal Arroyo MD   atorvastatin (LIPITOR) 20 MG tablet Take 1 tablet (20 mg total) by mouth once daily. 10/19/23  Yes Snehal Arroyo MD   ergocalciferol (ERGOCALCIFEROL) 50,000 unit Cap Take 1 capsule (50,000 Units total) by mouth every 7 days. 9/25/23  Yes Snehal Arroyo MD   fenofibrate (TRICOR) 145 MG tablet See Instructions, TAKE 1 TABLET EVERY DAY, # 90 tab(s), 3 Refill(s), Pharmacy: [facility], 168, cm, Height/Length Dosing, 11/11/21 9:32:00 CST, 73.75, kg, Weight Dosing, 11/11/21 9:32:00 CST Strength: 145 mg 10/10/23  Yes Snehal Arroyo MD   fluticasone propionate (FLONASE) 50 mcg/actuation nasal spray 2 sprays (100 mcg total) by Each Nostril route once daily. 8/8/23  Yes Snehal Arroyo MD   glimepiride (AMARYL) 2 MG tablet Take 1 tablet (2 mg total) by mouth once daily. 10/19/23  Yes Snehal Arroyo MD   metoprolol succinate (TOPROL-XL) 25 MG 24 hr tablet Take 1 tablet (25 mg total) by mouth once daily. 10/19/23  Yes Snehal Arroyo MD   omeprazole (PRILOSEC) 20 MG capsule Take 1 capsule (20 mg total) by mouth once daily. 10/10/23  Yes Snehal Arroyo MD   metoprolol succinate (TOPROL-XL) 12.5 MG 24 hr split tablet   See Instructions, TAKE 1 TABLET EVERY DAY, # 90 tab(s), 3 Refill(s), Pharmacy: [facility], 168, cm, Height/Length Dosing, 03/04/21 9:57:00 CST, 73, kg, Weight Dosing, 03/04/21 9:57:00 CST 10/20/21   Provider, Historical   ondansetron (ZOFRAN-ODT) 4 MG TbDL Take 1 tablet (4 mg total) by mouth every 8 (eight) hours as needed (nausea). 9/26/23   Arroyo,  "MD Snehal        Physical Exam   Vital Signs  Temp:  [97.5 °F (36.4 °C)]   Pulse:  []   Resp:  [18-20]   BP: (112-159)/(66-97)   SpO2:  [94 %-99 %]    General: Appears restless  HEENT: NC/AT  Neck:  No JVD  Chest: CTABL  CVS: Regular rhythm. Normal S1/S2.  Abdomen: nondistended, normoactive BS, soft and severe epigastric and right upper quadrant tenderness with guarding and rebound  MSK: No obvious deformity or joint swelling  Skin: Warm and dry  Neuro: AAOx3, no focal neurological deficit  Psych: Cooperative    Labs     Recent Labs     12/16/23  1710   WBC 24.97  24.97*   RBC 6.01   HGB 18.2*   HCT 55.3*   MCV 92.0   MCH 30.3   MCHC 32.9*   RDW 12.8      ABSNEUT 21.2245*        Recent Labs     12/16/23  1710      K 3.7   CHLORIDE 101   CO2 26   BUN 16.5   CREATININE 2.00*   EGFRNORACEVR 36   GLUCOSE 234*   CALCIUM 9.8   ALBUMIN 4.1   GLOBULIN 3.6*   ALKPHOS 85   *   *   BILITOT 2.7*   BILIDIR 1.9*   LIPASE >3,000*     No results for input(s): "LACTIC" in the last 72 hours.  Recent Labs     12/16/23  1710   TROPONINI 0.024        Microbiology Results (last 7 days)       Procedure Component Value Units Date/Time    Blood culture #1 **CANNOT BE ORDERED STAT** [[id number]] Collected: 12/16/23 2017    Order Status: Resulted Specimen: Blood from Antecubital, Right Updated: 12/16/23 2017    Blood culture #2 **CANNOT BE ORDERED STAT** [[id number]] Collected: 12/16/23 2017    Order Status: Resulted Specimen: Blood from Arm, Right Updated: 12/16/23 2017           Imaging     CT Abdomen Pelvis With IV Contrast NO Oral Contrast   Final Result      1. Severe acute pancreatitis.      2. Gastric antrum and duodenal mural thickening likely represents reactive change for acute pancreatitis.      3. Right large hydrocele.         Electronically signed by: Denver Wagner   Date:    12/16/2023   Time:    20:08      US Scrotum And Testicles   Final Result      1. Unremarkable right testicle      2. " Large right hydrocele which crosses the midline and causes compressive effect upon the left testicle which is inferiorly deviated.  Due to pressure caused by the hydrocele upon the left testicle optimal Doppler flow to the left testicle could not be obtained.  Only limited arterial flow to the peripheral aspect left testicle could be visualized.  Please correlate clinically.  Oneoption is to perform a follow-up study post drainage of large right hydrocele.         Electronically signed by: Denver Wagner   Date:    12/16/2023   Time:    19:21      MRI MRCP Without Contrast    (Results Pending)     Assessment   Acute severe pancreatitis- suspect biliary  Suspect choledocholithiasis  SIRS/sepsis secondary to pancreatitis and possible cholangitis  SHA superimposed on CKD 3A    History of T2DM, hypertension, hyperlipidemia, GERD    Plan   Aggressive IV fluid resuscitation, additional LR 1 L bolus followed by 150 mL/hour  Multimodal pain control  Blood cultures x2  Given prior ESBL with intermediate sensitivity to Zosyn, will change antibiotic to cefepime and metronidazole  MRCP W/O -- if positive for choledocholithiasis will consult GI.  Keep NPO except ice chips, sips/medications  VTE Prophylaxis:  Enoxaparin 40 mg subQ daily     Critical care time:  35 minutes  Critical care diagnosis:  Sepsis secondary to possible cholangitis/pancreatitis    Orion Morel MD  Internal Medicine

## 2023-12-17 NOTE — ANESTHESIA PREPROCEDURE EVALUATION
12/17/2023  Franklin Macias is a 68 y.o., male.    Pre-op Diagnosis: Acute biliary pancreatitis, unspecified complication status [K85.10]  Choledocholithiasis [K80.50]    Procedure(s):  ERCP (ENDOSCOPIC RETROGRADE CHOLANGIOPANCREATOGRAPHY)     Assessment/Plan:This is a 68 y.o. male unknown to our group with PMH of T2DM, HTN, HLD, chronic scrotal hydrocele, vitamin D deficiency, CKD 3a. Presented to the ED 12/16/23 with severe epigastric pain onset same day with radiation to back, associated n/v and chills also reported.   GI consulted for choledocholithiasis.   Choledocholithiasis  Gallstone pancreatitis  Hyperbilirubinemia  Transaminitis  - patient had cholecystectomy around 2020 d/t gallstones and biliary colic. No issues following cholecystectomy until current events  - CT abd/pel with IV: severe acute pancreatitis, gastric antrum and duodenal mural thickening likely represents reactive change for acute pancreatitis  - Preliminary MRCP: numerous intraluminal filling defects in mid to distal CBD suggestive of impacted gallstones and sludge; acute interstitial pancreatitis, lymph node in mo hepatis region likely reactive in nature  - Tbili 2.7 -- 3.5; direct predominant  -  -- 229  -  -- 217  - alk phos wnl  - lipase >3000  - WBC 24 upon arrival - on cefepime and flagyl. Afebrile  - supportive care, continue LR  - continue bentyl for abd cramping  - PPI BID  - NPO  - ERCP today  Review of patient's allergies indicates:  No Known Allergies    Current Outpatient Medications   Medication Instructions    amlodipine-benazepril 10-20mg (LOTREL) 10-20 mg per capsule 1 capsule, Oral, Daily    atorvastatin (LIPITOR) 20 mg, Oral, Daily    ergocalciferol (ERGOCALCIFEROL) 50,000 Units, Oral, Every 7 days    fenofibrate (TRICOR) 145 MG tablet See Instructions, TAKE 1 TABLET EVERY DAY, # 90 tab(s), 3  Refill(s), Pharmacy: [facility], 168, cm, Height/Length Dosing, 11/11/21 9:32:00 CST, 73.75, kg, Weight Dosing, 11/11/21 9:32:00 CST Strength: 145 mg    fluticasone propionate (FLONASE) 100 mcg, Each Nostril, Daily    glimepiride (AMARYL) 2 mg, Oral, Daily    metoprolol succinate (TOPROL-XL) 12.5 MG 24 hr split tablet   See Instructions, TAKE 1 TABLET EVERY DAY, # 90 tab(s), 3 Refill(s), Pharmacy: [facility], 168, cm, Height/Length Dosing, 03/04/21 9:57:00 CST, 73, kg, Weight Dosing, 03/04/21 9:57:00 CST    metoprolol succinate (TOPROL-XL) 25 mg, Oral, Daily    omeprazole (PRILOSEC) 20 mg, Oral, Daily    ondansetron (ZOFRAN-ODT) 4 mg, Oral, Every 8 hours PRN           Past Medical History:   Diagnosis Date    DM (diabetes mellitus)     GERD (gastroesophageal reflux disease)     HLD (hyperlipidemia)     HTN (hypertension)        Past Surgical History:   Procedure Laterality Date    APPENDECTOMY      CHOLECYSTECTOMY       Recent Labs   Lab 12/16/23  1710 12/17/23  0352   WBC 24.97  24.97* 18.54*   RBC 6.01 5.86   HGB 18.2* 18.1*   HCT 55.3* 54.3*   MCV 92.0 92.7   MCH 30.3 30.9   MCHC 32.9* 33.3   RDW 12.8 13.0    262   MPV 9.7 9.3     ECG - NSR non specific ST T changes (consider inferior ischemia)    Recent Labs   Lab 12/16/23  1710 12/17/23  0352    141   K 3.7 3.9   CO2 26 19*   BUN 16.5 19.9   CREATININE 2.00* 2.29*   CALCIUM 9.8 8.5*   MG  --  1.60   ALBUMIN 4.1 3.4   ALKPHOS 85 77   * 217*   * 229*   BILITOT 2.7* 3.5*       Recent Labs   Lab 12/17/23  0352   PTT 25.0   INR 1.1      Pre-op Assessment    I have reviewed the Patient Summary Reports.    I have reviewed the NPO Status.   I have reviewed the Medications.     Review of Systems  Anesthesia Hx:  No problems with previous Anesthesia             Denies Family Hx of Anesthesia complications.    Denies Personal Hx of Anesthesia complications.                    Social:  Non-Smoker        Cardiovascular:  Exercise tolerance: good   Hypertension       Denies Angina.  Orthopnea (Occasional orthopnea) PND (occasional PND)  hyperlipidemia  Denies STEPHEN.    Functional Capacity good / => 4 METS                         Renal/:  Chronic Renal Disease, CKD                Hepatic/GI:     GERD             Musculoskeletal:  Musculoskeletal Normal                Neurological:  Denies TIA.  Denies CVA.                                    Endocrine:  Diabetes           Psych:  Psychiatric Normal                    Physical Exam  General: Well nourished, Alert and Oriented    Airway:  Mallampati: III   Mouth Opening: Normal  TM Distance: Normal  Tongue: Normal  Neck ROM: Normal ROM    Dental:  Edentulous    Chest/Lungs:  Clear to auscultation  Decreased Breath Sounds: left and right  DECREASED BS BASES  Heart:  Rate: Normal  Rhythm: Regular Rhythm  No pretibial edema  No carotid bruits      Anesthesia Plan  Type of Anesthesia, risks & benefits discussed:    Anesthesia Type: Gen ETT  Intra-op Monitoring Plan: Standard ASA Monitors  Post Op Pain Control Plan: multimodal analgesia  Induction:  IV and rapid sequence  Airway Plan: Direct, Post-Induction  Informed Consent: Informed consent signed with the Patient and all parties understand the risks and agree with anesthesia plan.  All questions answered. Patient consented to blood products? Yes  ASA Score: 3 Emergent  Day of Surgery Review of History & Physical: H&P Update referred to the surgeon/provider.    Ready For Surgery From Anesthesia Perspective.     .

## 2023-12-18 LAB
ALBUMIN SERPL-MCNC: 2.4 G/DL (ref 3.4–4.8)
ALBUMIN SERPL-MCNC: 2.6 G/DL (ref 3.4–4.8)
ALBUMIN/GLOB SERPL: 0.8 RATIO (ref 1.1–2)
ALBUMIN/GLOB SERPL: 1 RATIO (ref 1.1–2)
ALLENS TEST BLOOD GAS (OHS): YES
ALP SERPL-CCNC: 66 UNIT/L (ref 40–150)
ALP SERPL-CCNC: 66 UNIT/L (ref 40–150)
ALT SERPL-CCNC: 182 UNIT/L (ref 0–55)
ALT SERPL-CCNC: 231 UNIT/L (ref 0–55)
AST SERPL-CCNC: 151 UNIT/L (ref 5–34)
AST SERPL-CCNC: 225 UNIT/L (ref 5–34)
BASE EXCESS BLD CALC-SCNC: -3.7 MMOL/L (ref -2–2)
BASOPHILS # BLD AUTO: 0.05 X10(3)/MCL
BASOPHILS NFR BLD AUTO: 0.2 %
BILIRUB SERPL-MCNC: 4.8 MG/DL
BILIRUB SERPL-MCNC: 6 MG/DL
BLOOD GAS SAMPLE TYPE (OHS): ABNORMAL
BUN SERPL-MCNC: 42.5 MG/DL (ref 8.4–25.7)
BUN SERPL-MCNC: 49.6 MG/DL (ref 8.4–25.7)
CA-I BLD-SCNC: 0.93 MMOL/L (ref 1.12–1.23)
CALCIUM SERPL-MCNC: 7.5 MG/DL (ref 8.8–10)
CALCIUM SERPL-MCNC: 7.6 MG/DL (ref 8.8–10)
CHLORIDE SERPL-SCNC: 104 MMOL/L (ref 98–107)
CHLORIDE SERPL-SCNC: 107 MMOL/L (ref 98–107)
CO2 BLDA-SCNC: 22.7 MMOL/L
CO2 SERPL-SCNC: 20 MMOL/L (ref 23–31)
CO2 SERPL-SCNC: 24 MMOL/L (ref 23–31)
COHGB MFR BLDA: 2.7 % (ref 0.5–1.5)
CREAT SERPL-MCNC: 2.88 MG/DL (ref 0.73–1.18)
CREAT SERPL-MCNC: 2.92 MG/DL (ref 0.73–1.18)
DRAWN BY BLOOD GAS (OHS): ABNORMAL
EOSINOPHIL # BLD AUTO: 0.09 X10(3)/MCL (ref 0–0.9)
EOSINOPHIL NFR BLD AUTO: 0.4 %
ERYTHROCYTE [DISTWIDTH] IN BLOOD BY AUTOMATED COUNT: 13.8 % (ref 11.5–17)
GFR SERPLBLD CREATININE-BSD FMLA CKD-EPI: 23 MLS/MIN/1.73/M2
GFR SERPLBLD CREATININE-BSD FMLA CKD-EPI: 23 MLS/MIN/1.73/M2
GLOBULIN SER-MCNC: 2.6 GM/DL (ref 2.4–3.5)
GLOBULIN SER-MCNC: 3.2 GM/DL (ref 2.4–3.5)
GLUCOSE SERPL-MCNC: 129 MG/DL (ref 82–115)
GLUCOSE SERPL-MCNC: 153 MG/DL (ref 82–115)
HCO3 BLDA-SCNC: 21.5 MMOL/L (ref 22–26)
HCT VFR BLD AUTO: 48.9 % (ref 42–52)
HGB BLD-MCNC: 16.4 G/DL (ref 14–18)
IMM GRANULOCYTES # BLD AUTO: 0.12 X10(3)/MCL (ref 0–0.04)
IMM GRANULOCYTES NFR BLD AUTO: 0.6 %
LIPASE SERPL-CCNC: 1236 U/L
LYMPHOCYTES # BLD AUTO: 1.26 X10(3)/MCL (ref 0.6–4.6)
LYMPHOCYTES NFR BLD AUTO: 5.9 %
MCH RBC QN AUTO: 30.8 PG (ref 27–31)
MCHC RBC AUTO-ENTMCNC: 33.5 G/DL (ref 33–36)
MCV RBC AUTO: 91.7 FL (ref 80–94)
METHGB MFR BLDA: 1.1 % (ref 0.4–1.5)
MONOCYTES # BLD AUTO: 1.71 X10(3)/MCL (ref 0.1–1.3)
MONOCYTES NFR BLD AUTO: 8 %
NEUTROPHILS # BLD AUTO: 18.22 X10(3)/MCL (ref 2.1–9.2)
NEUTROPHILS NFR BLD AUTO: 84.9 %
NRBC BLD AUTO-RTO: 0 %
O2 HB BLOOD GAS (OHS): 89.3 % (ref 94–97)
OXYGEN DEVICE BLOOD GAS (OHS): ABNORMAL
OXYHGB MFR BLDA: 16.5 G/DL (ref 12–16)
PCO2 BLDA: 39 MMHG (ref 35–45)
PH BLDA: 7.35 [PH] (ref 7.35–7.45)
PLATELET # BLD AUTO: 163 X10(3)/MCL (ref 130–400)
PLATELETS.RETICULATED NFR BLD AUTO: 3.2 % (ref 0.9–11.2)
PMV BLD AUTO: 10.3 FL (ref 7.4–10.4)
PO2 BLDA: 59 MMHG (ref 80–100)
POCT GLUCOSE: 134 MG/DL (ref 70–110)
POCT GLUCOSE: 136 MG/DL (ref 70–110)
POCT GLUCOSE: 138 MG/DL (ref 70–110)
POCT GLUCOSE: 145 MG/DL (ref 70–110)
POCT GLUCOSE: 237 MG/DL (ref 70–110)
POTASSIUM BLOOD GAS (OHS): 3.8 MMOL/L (ref 3.5–5)
POTASSIUM SERPL-SCNC: 3.9 MMOL/L (ref 3.5–5.1)
POTASSIUM SERPL-SCNC: 4.1 MMOL/L (ref 3.5–5.1)
PROT SERPL-MCNC: 5.2 GM/DL (ref 5.8–7.6)
PROT SERPL-MCNC: 5.6 GM/DL (ref 5.8–7.6)
RBC # BLD AUTO: 5.33 X10(6)/MCL (ref 4.7–6.1)
SAMPLE SITE BLOOD GAS (OHS): ABNORMAL
SAO2 % BLDA: 88.7 %
SODIUM BLOOD GAS (OHS): 132 MMOL/L (ref 137–145)
SODIUM SERPL-SCNC: 138 MMOL/L (ref 136–145)
SODIUM SERPL-SCNC: 140 MMOL/L (ref 136–145)
WBC # SPEC AUTO: 21.45 X10(3)/MCL (ref 4.5–11.5)

## 2023-12-18 PROCEDURE — 83690 ASSAY OF LIPASE: CPT | Performed by: HOSPITALIST

## 2023-12-18 PROCEDURE — 11000001 HC ACUTE MED/SURG PRIVATE ROOM

## 2023-12-18 PROCEDURE — 63600175 PHARM REV CODE 636 W HCPCS: Performed by: HOSPITALIST

## 2023-12-18 PROCEDURE — 36600 WITHDRAWAL OF ARTERIAL BLOOD: CPT

## 2023-12-18 PROCEDURE — 85025 COMPLETE CBC W/AUTO DIFF WBC: CPT | Performed by: HOSPITALIST

## 2023-12-18 PROCEDURE — 82803 BLOOD GASES ANY COMBINATION: CPT

## 2023-12-18 PROCEDURE — C9113 INJ PANTOPRAZOLE SODIUM, VIA: HCPCS

## 2023-12-18 PROCEDURE — 27000221 HC OXYGEN, UP TO 24 HOURS

## 2023-12-18 PROCEDURE — 63600175 PHARM REV CODE 636 W HCPCS: Performed by: INTERNAL MEDICINE

## 2023-12-18 PROCEDURE — 25000003 PHARM REV CODE 250: Performed by: HOSPITALIST

## 2023-12-18 PROCEDURE — 21400001 HC TELEMETRY ROOM

## 2023-12-18 PROCEDURE — 25000003 PHARM REV CODE 250: Performed by: INTERNAL MEDICINE

## 2023-12-18 PROCEDURE — 63600175 PHARM REV CODE 636 W HCPCS

## 2023-12-18 PROCEDURE — 99900035 HC TECH TIME PER 15 MIN (STAT)

## 2023-12-18 PROCEDURE — 80053 COMPREHEN METABOLIC PANEL: CPT | Performed by: HOSPITALIST

## 2023-12-18 RX ORDER — FUROSEMIDE 10 MG/ML
40 INJECTION INTRAMUSCULAR; INTRAVENOUS DAILY
Status: DISCONTINUED | OUTPATIENT
Start: 2023-12-19 | End: 2023-12-20

## 2023-12-18 RX ORDER — ENOXAPARIN SODIUM 100 MG/ML
30 INJECTION SUBCUTANEOUS EVERY 24 HOURS
Status: DISCONTINUED | OUTPATIENT
Start: 2023-12-18 | End: 2024-01-02

## 2023-12-18 RX ORDER — FUROSEMIDE 10 MG/ML
40 INJECTION INTRAMUSCULAR; INTRAVENOUS ONCE
Status: COMPLETED | OUTPATIENT
Start: 2023-12-18 | End: 2023-12-18

## 2023-12-18 RX ADMIN — AMPICILLIN 2 G: 2 INJECTION, POWDER, FOR SOLUTION INTRAVENOUS at 10:12

## 2023-12-18 RX ADMIN — ENOXAPARIN SODIUM 30 MG: 30 INJECTION SUBCUTANEOUS at 04:12

## 2023-12-18 RX ADMIN — FUROSEMIDE 40 MG: 10 INJECTION, SOLUTION INTRAMUSCULAR; INTRAVENOUS at 11:12

## 2023-12-18 RX ADMIN — SODIUM CHLORIDE, POTASSIUM CHLORIDE, SODIUM LACTATE AND CALCIUM CHLORIDE: 600; 310; 30; 20 INJECTION, SOLUTION INTRAVENOUS at 05:12

## 2023-12-18 RX ADMIN — DICYCLOMINE HYDROCHLORIDE 20 MG: 20 INJECTION, SOLUTION INTRAMUSCULAR at 01:12

## 2023-12-18 RX ADMIN — PANTOPRAZOLE SODIUM 40 MG: 40 INJECTION, POWDER, FOR SOLUTION INTRAVENOUS at 05:12

## 2023-12-18 RX ADMIN — HYDROMORPHONE HYDROCHLORIDE 1 MG: 2 INJECTION INTRAMUSCULAR; INTRAVENOUS; SUBCUTANEOUS at 09:12

## 2023-12-18 RX ADMIN — DICYCLOMINE HYDROCHLORIDE 20 MG: 20 INJECTION, SOLUTION INTRAMUSCULAR at 08:12

## 2023-12-18 RX ADMIN — METRONIDAZOLE 500 MG: 5 INJECTION, SOLUTION INTRAVENOUS at 12:12

## 2023-12-18 RX ADMIN — DICYCLOMINE HYDROCHLORIDE 20 MG: 20 INJECTION, SOLUTION INTRAMUSCULAR at 09:12

## 2023-12-18 RX ADMIN — AMPICILLIN 2 G: 2 INJECTION, POWDER, FOR SOLUTION INTRAVENOUS at 04:12

## 2023-12-18 RX ADMIN — METRONIDAZOLE 500 MG: 5 INJECTION, SOLUTION INTRAVENOUS at 05:12

## 2023-12-18 RX ADMIN — METOPROLOL SUCCINATE 25 MG: 25 TABLET, EXTENDED RELEASE ORAL at 08:12

## 2023-12-18 RX ADMIN — DICYCLOMINE HYDROCHLORIDE 20 MG: 20 INJECTION, SOLUTION INTRAMUSCULAR at 05:12

## 2023-12-18 RX ADMIN — HYDROMORPHONE HYDROCHLORIDE 1 MG: 2 INJECTION INTRAMUSCULAR; INTRAVENOUS; SUBCUTANEOUS at 10:12

## 2023-12-18 RX ADMIN — AMPICILLIN 2 G: 2 INJECTION, POWDER, FOR SOLUTION INTRAVENOUS at 09:12

## 2023-12-18 RX ADMIN — HYDROMORPHONE HYDROCHLORIDE 1 MG: 2 INJECTION INTRAMUSCULAR; INTRAVENOUS; SUBCUTANEOUS at 05:12

## 2023-12-18 RX ADMIN — PANTOPRAZOLE SODIUM 40 MG: 40 INJECTION, POWDER, FOR SOLUTION INTRAVENOUS at 04:12

## 2023-12-18 RX ADMIN — OXYCODONE HYDROCHLORIDE 5 MG: 5 TABLET ORAL at 08:12

## 2023-12-18 RX ADMIN — METRONIDAZOLE 500 MG: 5 INJECTION, SOLUTION INTRAVENOUS at 08:12

## 2023-12-18 NOTE — CONSULTS
[facility] - Observation Unit  Nephrology  Consult Note    Patient Name: Franklin Macias  MRN: [id number]  Admission Date: 12/16/2023  Hospital Length of Stay: 2 days  Attending Provider: Kingsley Blank MD   Primary Care Physician: Snehal Arroyo MD  Principal Problem:Acute pancreatitis    Consults  Subjective:     HPI:  This is a 68-year-old male admitted on 12/16/2023 with acute pancreatitis. He underwent emergent ERCP on 12/17 for choledocholithiasis. Patient did have some renal insufficiency on admission which has continued to worsen. He has been hydrated and now noted to have pulmonary vascular congestion per CXR done this morning. Patient is voiding well. He was evaluated by nephrology once many years ago for gross hematuria which has not been a recurring issue. No stones, UTI or NSAID use. He is now endorsing 7/10 pain which is improving. No respiratory distress. NC at 3L, SPO2 87%. Lungs CTA. Current smoker. PMH also significant for HTN and DM.      Past Medical History:   Diagnosis Date    DM (diabetes mellitus)     GERD (gastroesophageal reflux disease)     HLD (hyperlipidemia)     HTN (hypertension)        Past Surgical History:   Procedure Laterality Date    APPENDECTOMY      CHOLECYSTECTOMY      ERCP N/A 12/17/2023    Procedure: ERCP (ENDOSCOPIC RETROGRADE CHOLANGIOPANCREATOGRAPHY);  Surgeon: Flako Kerns MD;  Location: [facility];  Service: Gastroenterology;  Laterality: N/A;    ERCP W/ SPHICTEROTOMY  12/17/2023    Procedure: ERCP, WITH SPHINCTEROTOMY;  Surgeon: Flako Kerns MD;  Location: [facility] OR;  Service: Gastroenterology;;    ERCP, WITH CALCULUS REMOVAL  12/17/2023    Procedure: ERCP, WITH CALCULUS REMOVAL;  Surgeon: Flako Kerns MD;  Location: [facility] OR;  Service: Gastroenterology;;       Review of patient's allergies indicates:  No Known Allergies  Current Facility-Administered Medications   Medication Frequency    acetaminophen tablet 1,000 mg Q6H PRN     acetaminophen tablet 650 mg Q4H PRN    aluminum-magnesium hydroxide-simethicone 200-200-20 mg/5 mL suspension 30 mL QID PRN    ampicillin (OMNIPEN) 2 g in sodium chloride 0.9 % 100 mL IVPB (MB+) Q6H    dextrose 10% bolus 125 mL 125 mL PRN    dextrose 10% bolus 250 mL 250 mL PRN    dicyclomine injection 20 mg QID    enoxaparin injection 40 mg Q24H (prophylaxis, 1700)    glucagon (human recombinant) injection 1 mg PRN    hydrALAZINE injection 10 mg Q4H PRN    HYDROmorphone (PF) injection 1 mg Q4H PRN    insulin aspart U-100 injection 1-10 Units Q6H PRN    labetaloL injection 10 mg Q4H PRN    melatonin tablet 6 mg Nightly PRN    metoprolol succinate (TOPROL-XL) 24 hr tablet 25 mg Daily    metronidazole IVPB 500 mg Q8H    ondansetron injection 4 mg Q4H PRN    oxyCODONE immediate release tablet 5 mg Q4H PRN    pantoprazole injection 40 mg BID AC    polyethylene glycol packet 17 g BID PRN    prochlorperazine injection Soln 5 mg Q6H PRN    senna-docusate 8.6-50 mg per tablet 2 tablet BID PRN    sodium chloride 0.9% flush 10 mL PRN     Family History    None       Tobacco Use    Smoking status: Every Day     Current packs/day: 1.00     Types: Cigarettes    Smokeless tobacco: Never   Substance and Sexual Activity    Alcohol use: Never    Drug use: Never    Sexual activity: Yes     Review of Systems   Cardiovascular:  Negative for leg swelling.   Gastrointestinal:  Positive for abdominal pain, nausea and vomiting.   Genitourinary: Negative.      Objective:     Vital Signs (Most Recent):  Temp: 98 °F (36.7 °C) (12/18/23 0705)  Pulse: (!) 114 (12/18/23 1052)  Resp: 18 (12/18/23 1051)  BP: 133/75 (12/18/23 1052)  SpO2: (!) 90 % (12/18/23 1052) Vital Signs (24h Range):  Temp:  [98 °F (36.7 °C)-99.1 °F (37.3 °C)] 98 °F (36.7 °C)  Pulse:  [102-117] 114  Resp:  [13-20] 18  SpO2:  [88 %-97 %] 90 %  BP: (125-149)/() 133/75     Weight: 68 kg (150 lb) (12/17/23 1747)  Body mass index is 24.21 kg/m².  Body surface area is 1.78  meters squared.    I/O last 3 completed shifts:  In: 3290 [I.V.:3002.5; IV Piggyback:287.4]  Out: -     Physical Exam  Constitutional:       General: He is not in acute distress.  HENT:      Head: Atraumatic.      Nose: Nose normal.      Mouth/Throat:      Mouth: Mucous membranes are dry.   Eyes:      Extraocular Movements: Extraocular movements intact.   Cardiovascular:      Rate and Rhythm: Regular rhythm. Tachycardia present.   Pulmonary:      Effort: Pulmonary effort is normal.      Breath sounds: Normal breath sounds.      Comments: NC  Abdominal:      General: There is no distension.      Palpations: Abdomen is soft.   Musculoskeletal:         General: No swelling.   Skin:     General: Skin is warm.   Neurological:      Mental Status: He is alert and oriented to person, place, and time.         Significant Labs:  BMP:   Recent Labs   Lab 12/17/23 0352 12/18/23  0357    138   K 3.9 4.1   CO2 19* 20*   BUN 19.9 42.5*   CREATININE 2.29* 2.88*   CALCIUM 8.5* 7.5*   MG 1.60  --      CBC:   Recent Labs   Lab 12/18/23 0357   WBC 21.45*   RBC 5.33   HGB 16.4   HCT 48.9      MCV 91.7   MCH 30.8   MCHC 33.5     Microbiology Results (last 7 days)       Procedure Component Value Units Date/Time    Blood culture #2 **CANNOT BE ORDERED STAT** [[id number]]  (Abnormal) Collected: 12/16/23 2017    Order Status: Completed Specimen: Blood from Arm, Right Updated: 12/18/23 0637     CULTURE, BLOOD ([facility]) Susceptibility To Follow      Gram-positive coccus probable strep     GRAM STAIN Gram Positive Cocci, probable Streptococcus      Seen in gram stain of broth only      1 of 2 Anaerobic bottles positive    Blood culture #1 **CANNOT BE ORDERED STAT** [[id number]]  (Normal) Collected: 12/16/23 2017    Order Status: Completed Specimen: Blood from Antecubital, Right Updated: 12/17/23 2101     CULTURE, BLOOD ([facility]) No Growth At 24 Hours    BCID2 Panel [[id number]]  (Abnormal) Collected: 12/16/23 2017    Order Status:  Completed Specimen: Blood from Arm, Right Updated: 12/17/23 4020     CTX-M (ESBL ) N/A     IMP (Cabapenemase ) N/A     KPC resistance gene (Carbapenemase ) N/A     mcr-1 N/A     mecA ID N/A     Comment: Note: Antimicrobial resistance can occur via multiple mechanisms. A Not Detected result for antimicrobial resistance gene(s) does not indicate antimicrobial susceptibility. Subculturing is required for species identification and susceptibility testing of   isolates.        mecA/C and MREJ (MRSA) gene N/A     NDM (Carbapenemase ) N/A     OXA-48-like (Carbapenemase ) N/A     Yola/B (VRE gene) Not Detected     VIM (Carbapenemase ) N/A     Enterococcus faecalis Detected     Enterococcus faecium Not Detected     Listeria monocytogenes Not Detected     Staphylococcus spp. Not Detected     Staphylococcus aureus Not Detected     Staphylococcus epidermidis Not Detected     Staphylococcus lugdunensis Not Detected     Streptococcus spp. Not Detected     Streptococcus agalactiae (Group B) Not Detected     Streptococcus pneumoniae Not Detected     Streptococcus pyogenes (Group A) Not Detected     Acinetobacter calcoaceticus/baumannii complex Not Detected     Bacteroides fragilis Not Detected     Enterobacterales Not Detected     Enterobacter cloacae complex Not Detected     Escherichia coli Not Detected     Klebsiella aerogenes Not Detected     Klebsiella oxytoca Not Detected     Klebsiella pneumoniae group Not Detected     Proteus spp. Not Detected     Salmonella spp. Not Detected     Serratia marcescens Not Detected     Haemophilus influenzae Not Detected     Neisseria meningitidis Not Detected     Pseudomonas aeruginosa Not Detected     Stenotrophomonas maltophilia Not Detected     Candida albicans Not Detected     Candida auris Not Detected     Candida glabrata Not Detected     Candida krusei Not Detected     Candida parapsilosis Not Detected     Candida tropicalis Not Detected      Cryptococcus neoformans/gattii Not Detected    Narrative:      The [employer] BCID2 Panel is a multiplexed nucleic acid test intended for the use with [employer]® 2.0 or BioFire® FilmArray® [employer] Systems for the simultaneous qualitative detection and identification of multiple bacterial and yeast nucleic acids and select genetic determinants associated with antimicrobial resistance.  The BioFire BCID2 Panel test is performed directly on blood culture samples identified as positive by a continuous monitoring blood culture system.  Results are intended to be interpreted in conjunction with Gram stain results.          Recent Labs   Lab 12/16/23  1832   PROTEINUA 1+*   BACTERIA None Seen   LEUKOCYTESUR Negative   UROBILINOGEN Normal   HYALINECASTS 6-10*       Significant Imaging:  Procedure Component Value Units Date/Time   CT Abdomen Pelvis Without Contrast [[id number]] Resulted: 12/18/23 1212   Order Status: Completed Updated: 12/18/23 1215   Narrative:     EXAMINATION:  CT ABDOMEN PELVIS WITHOUT CONTRAST    CLINICAL HISTORY:  rise in tbili and pain s/p ERCP;    TECHNIQUE:  Low dose axial images, sagittal and coronal reformations were obtained from the lung bases to the pubic symphysis.  No contrast was administered.  Automatic exposure control is utilized to reduce patient radiation exposure.    COMPARISON:  12/16/2023    FINDINGS:  There are changes seen consistent with emphysema and COPD in the lungs.  There is  bibasilar atelectasis and bilateral pleural effusions.  This is a new finding..    The liver is hypoattenuated consistent with hepatic steatosis.  The patient is status post cholecystectomy.  There is evidence of ascites.  This is more prominent than the prior examination.    There are inflammatory changes seen around the pancreas consistent with pancreatitis.  This was seen on the prior examination as well.  Previous examination showed incomplete enhancement of the pancreas concerning for  developing necrotizing pancreatitis.  No free intraperitoneal air is seen on this examination.    Adrenal glands appear normal.    Kidneys appear normal.  No hydronephrosis is seen.  No hydroureter seen.    No colitis is seen.  No diverticulitis is seen.  No colonic mass is seen.    Urinary bladder appears grossly unremarkable.    Atherosclerotic changes are seen within the abdominal aorta and its branches.    Bones appear grossly unremarkable.   Impression:       Interval worsening of the ascites    Persistent inflammatory changes around the pancreas consistent patient's history of pancreatitis.  Previous examination showed incomplete enhancement of the pancreas suggesting possible developing necrotizing pancreatitis.    Interval development of bibasilar atelectasis and moderate to large pleural effusions      Electronically signed by: Wayne Gee  Date: 12/18/2023  Time: 12:12       Assessment/Plan:   SHA s/t ATN s/t bacteremia and acute pancreatitis   Acute pancreatitis   Choledocholithiasis s/p emergent ERCP and sphincterotomy   Bacteremia     -Agree with IV Lasix today. I will order to repeat daily   -Patient still encouraged to drink water. Discussed with patient and family.   -Hold benazepril for now until SHA resolved         BASIL Montiel  Nephrology  [facility] - [facility]

## 2023-12-18 NOTE — AI DETERIORATION ALERT
Artificial Intelligence Notification  [facility]  [address] [place] [address]  Phone: [phone]    This documentation was triggered by an Artificial Intelligence Notification:    Admit Date: 2023   LOS: 2  Code Status: Full Code  : 1955  Age: 68 y.o.  Weight:   Wt Readings from Last 1 Encounters:   23 68 kg (150 lb)        Sex: male  Bed: [address] [place] [address] A  MRN: [id number]  Attending Physician: Kingsley Blank MD     Date of Alert: 2023  Time AI Alert Received: 2023 @ 1620            Vitals:    23 1558   BP: 115/73   Pulse: (!) 123   Resp:    Temp: 97.8 °F (36.6 °C)     SpO2: (!) 92 %      Patient Condition: AI alert completed this morning for similar vitals signs. Spoke with nurse, no major changes in patient's status; she states pt actually looks better than he did this morning. CXR from this morning showed pulmonary congestion and bilateral pleural effusions; MD aware. ABGs unremarkable, not requiring O2 changes. Nephrology has since been consulted on the patient and initiated diuresis. Labs are ordered for tomorrow morning. Advised nurse to call with any changes.

## 2023-12-18 NOTE — AI DETERIORATION ALERT
Artificial Intelligence Notification  [facility]  [address] [place] [address]  Phone: [phone]    This documentation was triggered by an Artificial Intelligence Notification:    Admit Date: 2023   LOS: 2  Code Status: Full Code  : 1955  Age: 68 y.o.  Weight:   Wt Readings from Last 1 Encounters:   23 68 kg (150 lb)        Sex: male  Bed: [facility] A  MRN: [id number]  Attending Physician: Kingsley Blank MD     Date of Alert: 2023  Time AI Alert Received: 2023 at 0720            Vitals:    23 0707   BP:    Pulse: (!) 113   Resp:    Temp:      SpO2: (!) 90 %      Artificial Intelligence alert discussed with Provider:     Name: Kingsley Blank MD   Date/Time of Provider Notification:   2023 @ 0756      Patient Condition: Patient admitted with acute pancreatitis, post ERCP and active sepsis on cefepime and Flagyl. Alert received by ICU response nurse regarding patient's elevated heart rate (113) and low SpO2 (90%). Upon seeing patient, HR was actually higher in the 120s and SpO2 89-90% while present in the patient's room. Patient appeared to be in no acute distress at that time. Review of patient's current labs showed elevated WBC at 21.45, which is increased from 18.54 yesterday. Patient also with elevated kidney function and multiple positive organisms in his blood culture. After speaking with patient's nurse, she has reached out to call the hospitalist seeing the patient and update him on this current situation. Will continue to monitor patient and necessary interventions as needed. Please call response nurse at [phone] if needed.     Dr. Blank notified. He has ordered CXR and ABGs and states pt is likely in pain contributing to his current vitals. Please call response nurse as needed.

## 2023-12-18 NOTE — PROGRESS NOTES
Pharmacist Renal Dose Adjustment Note    Franklin Macias is a 68 y.o. male being treated with the medication enoxaparin.    Patient Data:    Vital Signs (Most Recent):  Temp: 98 °F (36.7 °C) (12/18/23 0705)  Pulse: (!) 114 (12/18/23 1052)  Resp: 18 (12/18/23 1051)  BP: 133/75 (12/18/23 1052)  SpO2: (!) 90 % (12/18/23 1052) Vital Signs (72h Range):  Temp:  [97.5 °F (36.4 °C)-99.1 °F (37.3 °C)]   Pulse:  []   Resp:  [13-22]   BP: (112-165)/()   SpO2:  [88 %-99 %]      Recent Labs   Lab 12/17/23  0352 12/18/23  0357 12/18/23  1253   CREATININE 2.29* 2.88* 2.92*     Serum creatinine: 2.92 mg/dL (H) 12/18/23 1253  Estimated creatinine clearance: 22.2 mL/min (A)    Enoxaparin 40 mg will be changed to 30mg q24h for creatinine clearance of 22.2 ml/min.    Pharmacist's Name: Alyson Estrada

## 2023-12-18 NOTE — PROGRESS NOTES
"Gastroenterology Progress Note    Subjective:    Patient with persistent abdominal pain that is slightly worse today. No BM since yesterday but passing flatus. Not eating due to abdominal pain. Some nausea but no vomiting. Daughter concerned about scrotal swelling and dyspnea.    Leukocytosis worsened today to 21,450. Tbili at 6.0. AST/ALT Hypocalcemia worsening to 7.5. BUN 42.5/ Cr 2.88. Tachycardic at 114, O2 sat 90% on RA, otherwise afebrile and VSS. On ampicillin and flagyl.     ROS:    Review of Systems   Constitutional:  Negative for chills, fever and malaise/fatigue.   HENT:  Negative for sinus pain.    Eyes:  Negative for redness.   Respiratory:  Positive for shortness of breath. Negative for wheezing.    Cardiovascular:  Negative for chest pain.   Gastrointestinal:  Positive for abdominal pain and nausea. Negative for blood in stool, constipation, diarrhea, heartburn, melena and vomiting.   Genitourinary:  Positive for hematuria.   Musculoskeletal:  Negative for neck pain.   Skin:  Negative for rash.   Neurological:  Negative for dizziness, weakness and headaches.   Psychiatric/Behavioral:  Negative for memory loss.          Vital Signs:  /75   Pulse (!) 114   Temp 98 °F (36.7 °C) (Oral)   Resp 18   Ht 5' 6" (1.676 m)   Wt 68 kg (150 lb)   SpO2 (!) 90%   BMI 24.21 kg/m²   Body mass index is 24.21 kg/m².    Physical Exam:    Physical Exam  Constitutional:       General: He is not in acute distress.     Appearance: Normal appearance. He is not ill-appearing.   HENT:      Head: Normocephalic and atraumatic.   Eyes:      General: Scleral icterus present.      Extraocular Movements: Extraocular movements intact.   Cardiovascular:      Rate and Rhythm: Normal rate and regular rhythm.   Pulmonary:      Effort: No respiratory distress.      Breath sounds: No wheezing or rales.   Abdominal:      General: Bowel sounds are normal.      Comments: Slightly firm.   Musculoskeletal:      Cervical back: Normal " range of motion.      Right lower leg: No edema.      Left lower leg: No edema.   Skin:     General: Skin is warm and dry.      Coloration: Skin is not jaundiced.   Neurological:      Mental Status: He is alert and oriented to person, place, and time.   Psychiatric:         Mood and Affect: Mood normal.         Behavior: Behavior normal.         Labs:  Recent Results (from the past 24 hour(s))   POCT glucose    Collection Time: 12/17/23  8:07 PM   Result Value Ref Range    POCT Glucose 166 (H) 70 - 110 mg/dL   Comprehensive Metabolic Panel    Collection Time: 12/18/23  3:57 AM   Result Value Ref Range    Sodium Level 138 136 - 145 mmol/L    Potassium Level 4.1 3.5 - 5.1 mmol/L    Chloride 107 98 - 107 mmol/L    Carbon Dioxide 20 (L) 23 - 31 mmol/L    Glucose Level 129 (H) 82 - 115 mg/dL    Blood Urea Nitrogen 42.5 (H) 8.4 - 25.7 mg/dL    Creatinine 2.88 (H) 0.73 - 1.18 mg/dL    Calcium Level Total 7.5 (L) 8.8 - 10.0 mg/dL    Protein Total 5.2 (L) 5.8 - 7.6 gm/dL    Albumin Level 2.6 (L) 3.4 - 4.8 g/dL    Globulin 2.6 2.4 - 3.5 gm/dL    Albumin/Globulin Ratio 1.0 (L) 1.1 - 2.0 ratio    Bilirubin Total 6.0 (H) <=1.5 mg/dL    Alkaline Phosphatase 66 40 - 150 unit/L    Alanine Aminotransferase 231 (H) 0 - 55 unit/L    Aspartate Aminotransferase 225 (H) 5 - 34 unit/L    eGFR 23 mls/min/1.73/m2   Lipase    Collection Time: 12/18/23  3:57 AM   Result Value Ref Range    Lipase Level 1,236 (H) <=60 U/L   CBC with Differential    Collection Time: 12/18/23  3:57 AM   Result Value Ref Range    WBC 21.45 (H) 4.50 - 11.50 x10(3)/mcL    RBC 5.33 4.70 - 6.10 x10(6)/mcL    Hgb 16.4 14.0 - 18.0 g/dL    Hct 48.9 42.0 - 52.0 %    MCV 91.7 80.0 - 94.0 fL    MCH 30.8 27.0 - 31.0 pg    MCHC 33.5 33.0 - 36.0 g/dL    RDW 13.8 11.5 - 17.0 %    Platelet 163 130 - 400 x10(3)/mcL    MPV 10.3 7.4 - 10.4 fL    Neut % 84.9 %    Lymph % 5.9 %    Mono % 8.0 %    Eos % 0.4 %    Basophil % 0.2 %    Lymph # 1.26 0.6 - 4.6 x10(3)/mcL    Neut # 18.22 (H)  2.1 - 9.2 x10(3)/mcL    Mono # 1.71 (H) 0.1 - 1.3 x10(3)/mcL    Eos # 0.09 0 - 0.9 x10(3)/mcL    Baso # 0.05 <=0.2 x10(3)/mcL    IG# 0.12 (H) 0 - 0.04 x10(3)/mcL    IG% 0.6 %    NRBC% 0.0 %    IPF 3.2 0.9 - 11.2 %   POCT glucose    Collection Time: 12/18/23  4:31 AM   Result Value Ref Range    POCT Glucose 136 (H) 70 - 110 mg/dL   RT Blood Gas    Collection Time: 12/18/23  8:59 AM   Result Value Ref Range    Sample Type Arterial Blood     Sample site Right Brachial Artery     Drawn by cw rrt     pH, Blood gas 7.350 7.350 - 7.450    pCO2, Blood gas 39.0 35.0 - 45.0 mmHg    pO2, Blood gas 59.0 (L) 80.0 - 100.0 mmHg    Sodium, Blood Gas 132 (L) 137 - 145 mmol/L    Potassium, Blood Gas 3.8 3.5 - 5.0 mmol/L    Calcium Level Ionized 0.93 (L) 1.12 - 1.23 mmol/L    TOC2, Blood gas 22.7 mmol/L    Base Excess, Blood gas -3.70 (L) -2.00 - 2.00 mmol/L    sO2, Blood gas 88.7 %    HCO3, Blood gas 21.5 (L) 22.0 - 26.0 mmol/L    THb, Blood gas 16.5 (H) 12 - 16 g/dL    O2 Hb, Blood Gas 89.3 (L) 94.0 - 97.0 %    CO Hgb 2.7 (H) 0.5 - 1.5 %    Met Hgb 1.1 0.4 - 1.5 %    Allens Test Yes     Oxygen Device, Blood gas Cannula        Assessment/Plan:  This is a 68 y.o. male unknown to our group with PMH of T2DM, HTN, HLD, chronic scrotal hydrocele, vitamin D deficiency, CKD 3a. Presented to the ED 12/16/23 with severe epigastric pain onset same day with radiation to back, associated n/v and chills also reported.   GI consulted for choledocholithiasis. Underwent ERCP yesterday with sludge and stone removal.     Choledocholithiasis  Gallstone pancreatitis  Hyperbilirubinemia  Transaminitis  Tbili 6.0   -- 229--225   -- 217--231  alk phos wnl    S/p ERCP 12/17: lower 3rd of main bile duct mildky dilated with stone causing obstruiction. Choledocholithiasis found. Complete removal with biliary sphincterotomy and balloon extraction. Biliary tree swept and sludge found.     - supportive care, continue LR  - continue bentyl for abd  cramping  - PPI BID  - CT abdomen ordered due to increased tbili and pain s/p ERCP. Will F/u with recs to follow  - replace calcium  - would recommend renal consultation    Kalyn Willard PA-C  Gastroenterology  [facility]

## 2023-12-18 NOTE — PROGRESS NOTES
[facility]  Hospital Medicine Progress Note        Chief Complaint: Inpatient Follow-up     HPI:    68-year-old male with medical history of T2DM, hypertension, hyperlipidemia and prior cholecystectomy present to the ED with complaint of severe epigastric abdominal pain that started today around 9:00 a.m. after breakfast, the pain is severe 10/10 and radiate to his back associated with nausea and vomiting.  Report last bowel movement was this morning.  Reports chills but denies fever.  He has chronic scrotal hydrocele that has not changed or painful.     On arrival to ED he was afebrile, tachycardic, normotensive and saturating 96% on room air.  Labs notable for WBC 24.97, hemoglobin 18.2, creatinine 2.0, BUN 16.5, total bilirubin 2.7, direct 1.9, , , lipase more than 3000, triglyceride 183.  CT abdomen and pelvis show finding of severe acute pancreatitis without evidence of necrosis or abscess or fluid collection.  Liver remarkable for steatosis, gallbladder surgically absent, no comment on biliary tree.     He was given 2 L of IV fluids, IV Zosyn, IV analgesics and referred to hospital medicine service for further evaluation and management.  Patient was taken for ERCP on 12/17.  Choledocholithiasis was resolved.  Also had sphincterotomy performed.  Returned to the floor in stable condition.  GI recommending advancement of diet to clear liquids in 4-6 hours    Interval Hx:  Patient doing okay this morning.  He does still have a significant amount of pain requiring IV narcotics.  He was not had a bowel movement but reports passing flatus.  His wife is at the bedside.  He was not tried to eat anything yet.  He has been cleared for clear liquids though.    Objective/physical exam:  General: In no acute distress, afebrile  Chest: Clear to auscultation bilaterally  Heart: RRR, +S1, S2, no appreciable murmur  Abdomen: Soft, nontender, BS +  MSK: Warm, no lower extremity edema,  no clubbing or cyanosis  Neurologic: Alert and oriented x4, Cranial nerve II-XII intact, Strength 5/5 in all 4 extremities    VITAL SIGNS: 24 HRS MIN & MAX LAST   Temp  Min: 98.1 °F (36.7 °C)  Max: 99.1 °F (37.3 °C) 98.4 °F (36.9 °C)   BP  Min: 125/79  Max: 165/102 (!) 149/85   Pulse  Min: 102  Max: 119  (!) 111   Resp  Min: 13  Max: 20 16   SpO2  Min: 91 %  Max: 97 % (!) 92 %       Recent Labs   Lab 12/16/23  1710 12/17/23  0352   WBC 24.97  24.97* 18.54*   RBC 6.01 5.86   HGB 18.2* 18.1*   HCT 55.3* 54.3*   MCV 92.0 92.7   MCH 30.3 30.9   MCHC 32.9* 33.3   RDW 12.8 13.0    262   MPV 9.7 9.3       Recent Labs   Lab 12/16/23 1710 12/17/23  0352 12/18/23  0357    141 138   K 3.7 3.9 4.1   CO2 26 19* 20*   BUN 16.5 19.9 42.5*   CREATININE 2.00* 2.29* 2.88*   CALCIUM 9.8 8.5* 7.5*   MG  --  1.60  --    ALBUMIN 4.1 3.4 2.6*   ALKPHOS 85 77 66   * 217* 231*   * 229* 225*   BILITOT 2.7* 3.5* 6.0*          Microbiology Results (last 7 days)       Procedure Component Value Units Date/Time    Blood culture #1 **CANNOT BE ORDERED STAT** [[id number]]  (Normal) Collected: 12/16/23 2017    Order Status: Completed Specimen: Blood from Antecubital, Right Updated: 12/17/23 2101     CULTURE, BLOOD ([facility]) No Growth At 24 Hours    BCID2 Panel [[id number]]  (Abnormal) Collected: 12/16/23 2017    Order Status: Completed Specimen: Blood from Arm, Right Updated: 12/17/23 1720     CTX-M (ESBL ) N/A     IMP (Cabapenemase ) N/A     KPC resistance gene (Carbapenemase ) N/A     mcr-1 N/A     mecA ID N/A     Comment: Note: Antimicrobial resistance can occur via multiple mechanisms. A Not Detected result for antimicrobial resistance gene(s) does not indicate antimicrobial susceptibility. Subculturing is required for species identification and susceptibility testing of   isolates.        mecA/C and MREJ (MRSA) gene N/A     NDM (Carbapenemase ) N/A     OXA-48-like (Carbapenemase )  N/A     Yola/B (VRE gene) Not Detected     VIM (Carbapenemase ) N/A     Enterococcus faecalis Detected     Enterococcus faecium Not Detected     Listeria monocytogenes Not Detected     Staphylococcus spp. Not Detected     Staphylococcus aureus Not Detected     Staphylococcus epidermidis Not Detected     Staphylococcus lugdunensis Not Detected     Streptococcus spp. Not Detected     Streptococcus agalactiae (Group B) Not Detected     Streptococcus pneumoniae Not Detected     Streptococcus pyogenes (Group A) Not Detected     Acinetobacter calcoaceticus/baumannii complex Not Detected     Bacteroides fragilis Not Detected     Enterobacterales Not Detected     Enterobacter cloacae complex Not Detected     Escherichia coli Not Detected     Klebsiella aerogenes Not Detected     Klebsiella oxytoca Not Detected     Klebsiella pneumoniae group Not Detected     Proteus spp. Not Detected     Salmonella spp. Not Detected     Serratia marcescens Not Detected     Haemophilus influenzae Not Detected     Neisseria meningitidis Not Detected     Pseudomonas aeruginosa Not Detected     Stenotrophomonas maltophilia Not Detected     Candida albicans Not Detected     Candida auris Not Detected     Candida glabrata Not Detected     Candida krusei Not Detected     Candida parapsilosis Not Detected     Candida tropicalis Not Detected     Cryptococcus neoformans/gattii Not Detected    Narrative:      The [employer] BCID2 Panel is a multiplexed nucleic acid test intended for the use with Grouper® 2.0 or Grouper® Gemvara Systems for the simultaneous qualitative detection and identification of multiple bacterial and yeast nucleic acids and select genetic determinants associated with antimicrobial resistance.  The [employer] BCID2 Panel test is performed directly on blood culture samples identified as positive by a continuous monitoring blood culture system.  Results are intended to be interpreted in conjunction with Gram  stain results.    Blood culture #2 **CANNOT BE ORDERED STAT** [[id number]]  (Abnormal) Collected: 12/16/23 2017    Order Status: Completed Specimen: Blood from Arm, Right Updated: 12/17/23 8777     GRAM STAIN Gram Positive Cocci, probable Streptococcus      Seen in gram stain of broth only      1 of 2 Anaerobic bottles positive             Radiology:  FL ERCP Biliary And Pancreatic By Rad Tech  Narrative: EXAMINATION:  FL ERCP BILIARY AND PANCREATIC    CLINICAL HISTORY:  biliary stone;    TECHNIQUE:  52.8mGy of fluoroscopic support was utilized for procedural support during procedure.  Please refer to performing provider dictation.    COMPARISON:  None    FINDINGS:  Fluoroscopic support. Please refer to procedure of this dictation.  Impression: Fluoroscopic support.  Please refer to procedure of this dictation.    Electronically signed by: Cristhian Lara  Date:    12/17/2023  Time:    13:04  MRI MRCP Without Contrast  Narrative: EXAMINATION:  MRI ABDOMEN WITHOUT CONTRAST MRCP    CLINICAL HISTORY:  Post cholecystectomy pancreatitis -- ?  Choledocholithiases;    TECHNIQUE:  Multiplanar, multisequence images are performed through the liver and upper abdomen.  Additionally 2D and 3D MRCP sequences are performed as well as MIP images.    COMPARISON:  None.    FINDINGS:  Lung bases: Moderate streaky is present in the visualized lung bases consistent with nonspecific dependent changes and scarring.    Liver: Liver appears normal in size.    Portal venous system: Normal.    Gallbladder: Gallbladder is surgically absent.    Biliary tree intrahepatic ducts: Unremarkable.    Biliary tree extrahepatic ducts: There appear to be numerous intraluminal filling defects in the mid to distal common bile duct series image 16 series 3.    Ampullary region: No obvious obstructive mass or stone.    Spleen: Unremarkable.    Pancreas: There is intermediate intrasubstance T2 signal in the pancreatic body (image 19 series 4 and 6) with  corresponding restricted diffusion signal on DWI (image 136 series 7), relating to edema changes. There is stable free fluid collection in the bilateral anterior pararenal space extending to the adjacent mesocolon, small bowel mesentery, perihepatic and perisplenic regions. T2 stranding intensities are seen along the lesser sac. These findings are reflective of acute interstitial pancreatitis.    Pancreatic duct: Unremarkable.    Adrenal glands: Unremarkable.    Kidneys: There are probable cysts in both kidneys, with the larger seen in the right mid pole region measuring 1.0 cm (image 29 series 4).Ureters: Unremarkable.    Stomach and distal esophagus: Normal.    Small bowel: Normal.    Colon: Normal.    Lymph nodes: A 1cm lymph node is demonstrated in the mo hepatis region (image 18 series 4), likely reactive in nature.    Abdominal wall: Normal.    Systemic arteries and veins: Unremarkable.    Osseous structures: There is mild spondylolytic changes in the imaged spine.    Miscellaneous: There are motion artifacts in some of the sequences limiting its evaluation.  Impression: 1. There appear to be numerous intraluminal filling defects in the mid to distal common bile duct series image 16 series 3. These are suggestive of impacted gallstones and sludge. Correlate clinically as regards additional evaluation and follow-up possibly with ERCP.    2. There is intermediate intrasubstance T2 signal in the pancreatic body (image 19 series 4 and 6) with corresponding restricted diffusion signal on DWI (image 136 series 7), relating to edema changes. There is stable free fluid collection in the bilateral anterior pararenal space extending to the adjacent mesocolon, small bowel mesentery, perihepatic and perisplenic regions. T2 stranding intensities are seen along the lesser sac. These findings are reflective of acute interstitial pancreatitis. Correlate with clinical and laboratory findings as regards additional evaluation  and follow-up to full resolution as indicated.    3. A 1cm lymph node is demonstrated in the mo hepatis region (image 18 series 4), likely reactive in nature.    I concur with the preliminary report above.    Electronically signed by: Wayne Gee  Date:    12/17/2023  Time:    12:12      Scheduled Med:   ceFEPime (MAXIPIME) IVPB  2 g Intravenous Q12H    dicyclomine  20 mg Intramuscular QID    enoxparin  40 mg Subcutaneous Q24H (prophylaxis, 1700)    metoprolol succinate  25 mg Oral Daily    metronidazole  500 mg Intravenous Q8H    pantoprazole  40 mg Intravenous BID AC        Continuous Infusions:   electrolyte-A      lactated ringers 150 mL/hr at 12/18/23 0528        PRN Meds:  acetaminophen, acetaminophen, aluminum-magnesium hydroxide-simethicone, dextrose 10%, dextrose 10%, glucagon (human recombinant), hydrALAZINE, HYDROmorphone, insulin aspart U-100, labetalol, melatonin, ondansetron, oxyCODONE, polyethylene glycol, prochlorperazine, senna-docusate 8.6-50 mg, sodium chloride 0.9%       Assessment/Plan:   Acute severe pancreatitis- suspect biliary  Cholangitis, acute  choledocholithiasis  S severe sepsis  SHA superimposed on CKD 3A     History of T2DM, hypertension, hyperlipidemia, GERD    Patient with choledocholithiasis status post ERCP with sphincterotomy.  Stones have been relieved but still significant amount of abdominal pain.  His transaminases are essentially unchanged this morning but this can be expected within 24 hours of the procedure.  His T bili is significantly more elevated at 6.0 though.  Follow up further GI recommendations.    He is currently on a clear liquid diet.  Creatinine unfortunately bumped up again.  He was still on IV fluids at 150 cc/hour.  Will get a renal ultrasound today as well.  No urine output since admission.  Looks like he did not get IV fluids through most of the day possibly secondary to his procedure.  Will continue with lactated ringer for now.    Continue empiric  cefepime as well.  CBC pending.  Will follow up leukocytosis.  No bowel movement.  A bowel regimen is already ordered.  Still little tachycardic, possibly secondary to pain.  He does still meet sepsis criteria.    Critical care diagnosis: sepsis, iv antibiotics  Critical care interventions: hands on evaluation, review of labs/radiographs/records and discussions with family  Critical care time spent: >32 minutes      Kingsley Blank MD   12/18/2023     All diagnosis and differential diagnosis have been reviewed; assessment and plan has been documented; I have personally reviewed the labs and test results that are presently available; I have reviewed the patients medication list; I have reviewed the consulting providers response and recommendations. I have reviewed or attempted to review medical records based upon their availability    All of the patient's questions have been  addressed and answered. Patient's is agreeable to the above stated plan. I will continue to monitor closely and make adjustments to medical management as needed.  _____________________________________________________________________

## 2023-12-19 LAB
ALBUMIN SERPL-MCNC: 2.3 G/DL (ref 3.4–4.8)
ALBUMIN/GLOB SERPL: 0.9 RATIO (ref 1.1–2)
ALP SERPL-CCNC: 70 UNIT/L (ref 40–150)
ALT SERPL-CCNC: 129 UNIT/L (ref 0–55)
AST SERPL-CCNC: 94 UNIT/L (ref 5–34)
BACTERIA BLD CULT: ABNORMAL
BASOPHILS # BLD AUTO: 0.05 X10(3)/MCL
BASOPHILS NFR BLD AUTO: 0.3 %
BILIRUB SERPL-MCNC: 4.3 MG/DL
BUN SERPL-MCNC: 53 MG/DL (ref 8.4–25.7)
CALCIUM SERPL-MCNC: 6.9 MG/DL (ref 8.8–10)
CHLORIDE SERPL-SCNC: 103 MMOL/L (ref 98–107)
CO2 SERPL-SCNC: 23 MMOL/L (ref 23–31)
CREAT SERPL-MCNC: 2.16 MG/DL (ref 0.73–1.18)
EOSINOPHIL # BLD AUTO: 0.06 X10(3)/MCL (ref 0–0.9)
EOSINOPHIL NFR BLD AUTO: 0.3 %
ERYTHROCYTE [DISTWIDTH] IN BLOOD BY AUTOMATED COUNT: 14 % (ref 11.5–17)
GFR SERPLBLD CREATININE-BSD FMLA CKD-EPI: 33 MLS/MIN/1.73/M2
GLOBULIN SER-MCNC: 2.6 GM/DL (ref 2.4–3.5)
GLUCOSE SERPL-MCNC: 139 MG/DL (ref 82–115)
GRAM STN SPEC: ABNORMAL
HCT VFR BLD AUTO: 48.1 % (ref 42–52)
HGB BLD-MCNC: 15.7 G/DL (ref 14–18)
IMM GRANULOCYTES # BLD AUTO: 0.26 X10(3)/MCL (ref 0–0.04)
IMM GRANULOCYTES NFR BLD AUTO: 1.5 %
INR PPP: 1.4
LIPASE SERPL-CCNC: 315 U/L
LIPASE SERPL-CCNC: 378 U/L
LYMPHOCYTES # BLD AUTO: 0.97 X10(3)/MCL (ref 0.6–4.6)
LYMPHOCYTES NFR BLD AUTO: 5.4 %
MCH RBC QN AUTO: 30.4 PG (ref 27–31)
MCHC RBC AUTO-ENTMCNC: 32.6 G/DL (ref 33–36)
MCV RBC AUTO: 93 FL (ref 80–94)
MONOCYTES # BLD AUTO: 1.3 X10(3)/MCL (ref 0.1–1.3)
MONOCYTES NFR BLD AUTO: 7.3 %
NEUTROPHILS # BLD AUTO: 15.22 X10(3)/MCL (ref 2.1–9.2)
NEUTROPHILS NFR BLD AUTO: 85.2 %
NRBC BLD AUTO-RTO: 0 %
PLATELET # BLD AUTO: 148 X10(3)/MCL (ref 130–400)
PLATELETS.RETICULATED NFR BLD AUTO: 5.6 % (ref 0.9–11.2)
PMV BLD AUTO: 10.8 FL (ref 7.4–10.4)
POCT GLUCOSE: 127 MG/DL (ref 70–110)
POCT GLUCOSE: 144 MG/DL (ref 70–110)
POCT GLUCOSE: 148 MG/DL (ref 70–110)
POTASSIUM SERPL-SCNC: 3.5 MMOL/L (ref 3.5–5.1)
PROT SERPL-MCNC: 4.9 GM/DL (ref 5.8–7.6)
PROTHROMBIN TIME: 16.6 SECONDS (ref 12.5–14.5)
RBC # BLD AUTO: 5.17 X10(6)/MCL (ref 4.7–6.1)
SODIUM SERPL-SCNC: 137 MMOL/L (ref 136–145)
WBC # SPEC AUTO: 17.86 X10(3)/MCL (ref 4.5–11.5)

## 2023-12-19 PROCEDURE — C9113 INJ PANTOPRAZOLE SODIUM, VIA: HCPCS

## 2023-12-19 PROCEDURE — 25000003 PHARM REV CODE 250: Performed by: INTERNAL MEDICINE

## 2023-12-19 PROCEDURE — 63600175 PHARM REV CODE 636 W HCPCS: Performed by: HOSPITALIST

## 2023-12-19 PROCEDURE — 27000221 HC OXYGEN, UP TO 24 HOURS

## 2023-12-19 PROCEDURE — 25000003 PHARM REV CODE 250: Performed by: HOSPITALIST

## 2023-12-19 PROCEDURE — 21400001 HC TELEMETRY ROOM

## 2023-12-19 PROCEDURE — 85610 PROTHROMBIN TIME: CPT | Performed by: HOSPITALIST

## 2023-12-19 PROCEDURE — 85025 COMPLETE CBC W/AUTO DIFF WBC: CPT | Performed by: HOSPITALIST

## 2023-12-19 PROCEDURE — 80053 COMPREHEN METABOLIC PANEL: CPT | Performed by: HOSPITALIST

## 2023-12-19 PROCEDURE — 25000003 PHARM REV CODE 250: Performed by: NURSE PRACTITIONER

## 2023-12-19 PROCEDURE — 63600175 PHARM REV CODE 636 W HCPCS: Performed by: NURSE PRACTITIONER

## 2023-12-19 PROCEDURE — 83690 ASSAY OF LIPASE: CPT | Performed by: HOSPITALIST

## 2023-12-19 PROCEDURE — 63600175 PHARM REV CODE 636 W HCPCS: Performed by: INTERNAL MEDICINE

## 2023-12-19 PROCEDURE — 63600175 PHARM REV CODE 636 W HCPCS

## 2023-12-19 PROCEDURE — 11000001 HC ACUTE MED/SURG PRIVATE ROOM

## 2023-12-19 RX ORDER — CALCIUM GLUCONATE 20 MG/ML
1 INJECTION, SOLUTION INTRAVENOUS ONCE
Status: COMPLETED | OUTPATIENT
Start: 2023-12-19 | End: 2023-12-19

## 2023-12-19 RX ADMIN — MELATONIN TAB 3 MG 6 MG: 3 TAB at 12:12

## 2023-12-19 RX ADMIN — HYDROMORPHONE HYDROCHLORIDE 1 MG: 2 INJECTION INTRAMUSCULAR; INTRAVENOUS; SUBCUTANEOUS at 02:12

## 2023-12-19 RX ADMIN — METRONIDAZOLE 500 MG: 5 INJECTION, SOLUTION INTRAVENOUS at 09:12

## 2023-12-19 RX ADMIN — MELATONIN TAB 3 MG 6 MG: 3 TAB at 10:12

## 2023-12-19 RX ADMIN — METRONIDAZOLE 500 MG: 5 INJECTION, SOLUTION INTRAVENOUS at 04:12

## 2023-12-19 RX ADMIN — METOPROLOL SUCCINATE 25 MG: 25 TABLET, EXTENDED RELEASE ORAL at 09:12

## 2023-12-19 RX ADMIN — OXYCODONE HYDROCHLORIDE 5 MG: 5 TABLET ORAL at 06:12

## 2023-12-19 RX ADMIN — PANTOPRAZOLE SODIUM 40 MG: 40 INJECTION, POWDER, FOR SOLUTION INTRAVENOUS at 04:12

## 2023-12-19 RX ADMIN — DICYCLOMINE HYDROCHLORIDE 20 MG: 20 INJECTION, SOLUTION INTRAMUSCULAR at 09:12

## 2023-12-19 RX ADMIN — CALCIUM GLUCONATE 1 G: 20 INJECTION, SOLUTION INTRAVENOUS at 07:12

## 2023-12-19 RX ADMIN — ENOXAPARIN SODIUM 30 MG: 30 INJECTION SUBCUTANEOUS at 04:12

## 2023-12-19 RX ADMIN — AMPICILLIN 2 G: 2 INJECTION, POWDER, FOR SOLUTION INTRAVENOUS at 05:12

## 2023-12-19 RX ADMIN — DICYCLOMINE HYDROCHLORIDE 20 MG: 20 INJECTION, SOLUTION INTRAMUSCULAR at 12:12

## 2023-12-19 RX ADMIN — OXYCODONE HYDROCHLORIDE 5 MG: 5 TABLET ORAL at 12:12

## 2023-12-19 RX ADMIN — PROCHLORPERAZINE EDISYLATE 5 MG: 5 INJECTION INTRAMUSCULAR; INTRAVENOUS at 12:12

## 2023-12-19 RX ADMIN — METRONIDAZOLE 500 MG: 5 INJECTION, SOLUTION INTRAVENOUS at 12:12

## 2023-12-19 RX ADMIN — HYDROMORPHONE HYDROCHLORIDE 1 MG: 2 INJECTION INTRAMUSCULAR; INTRAVENOUS; SUBCUTANEOUS at 09:12

## 2023-12-19 RX ADMIN — DICYCLOMINE HYDROCHLORIDE 20 MG: 20 INJECTION, SOLUTION INTRAMUSCULAR at 04:12

## 2023-12-19 RX ADMIN — AMPICILLIN 2 G: 2 INJECTION, POWDER, FOR SOLUTION INTRAVENOUS at 12:12

## 2023-12-19 RX ADMIN — HYDROMORPHONE HYDROCHLORIDE 1 MG: 2 INJECTION INTRAMUSCULAR; INTRAVENOUS; SUBCUTANEOUS at 10:12

## 2023-12-19 RX ADMIN — AMPICILLIN 2 G: 2 INJECTION, POWDER, FOR SOLUTION INTRAVENOUS at 06:12

## 2023-12-19 RX ADMIN — FUROSEMIDE 40 MG: 10 INJECTION, SOLUTION INTRAMUSCULAR; INTRAVENOUS at 09:12

## 2023-12-19 RX ADMIN — PANTOPRAZOLE SODIUM 40 MG: 40 INJECTION, POWDER, FOR SOLUTION INTRAVENOUS at 05:12

## 2023-12-19 NOTE — NURSING
Artificial Intelligence Notification  [facility]  [address]  [place] [address]  Phone: [phone]     This documentation was triggered by an Artificial Intelligence Notification.     Admit Date: 2023   LOS: 3  Code Status: Full Code  : 1955  Age: 68 y.o.  Weight:   Wt Readings from Last 1 Encounters:   23 68 kg (150 lb)        Sex: male  Bed: [address] [place] [address] A  MRN: [id number]  Attending Physician: Kingsley Blank MD     Date of Alert: 2023  Time AI Alert Received: 313             Vitals:    23 0320   BP: 127/77   Pulse: (!) 115   Resp:    Temp: 97.9 °F (36.6 °C)       Artificial Intelligence alert discussed with Provider:     Name: Daphne Ahn RN   Date/Time of Provider Notification: 2023 @ 0316      Patient Condition: Pt admitted w/acute pancreatitis s/p ERCP. Two AI alerts done on patient yesterday. At this time, HR is slightly elevated and O2 is decreased. After speaking with patient's nurse she informed me that the patients is having excruciating flank pain, which is being treated with dilaudid. O2 has been increased to 5L at this time. Otherwise, patient is stable. Call ICU with further concerns or if patient's condition deteriorates.

## 2023-12-19 NOTE — PROGRESS NOTES
"Gastroenterology Progress Note    Subjective:  Patient with less abdominal pain today. One BM yesterday. Nausea with water but no vomiting.     Leukocytosis improved to 17.86. AST and ALT improving. T bili lower at 4.3. Alk phos remains wnl. Some tachycardia up to 115. On 5L O2.    CT abdomen w/o 12/18 showed Interval worsening of ascites. Persistent inflammatory changes around pancreas consistent patient's history of pancreatitis. Interval development of bibasilar atelectasis and moderate to large pleural effusions.     Objective:  ROS:    Review of Systems   Constitutional:  Negative for chills, fever and malaise/fatigue.   HENT:  Negative for sinus pain.    Eyes:  Negative for redness.   Respiratory:  Positive for shortness of breath. Negative for wheezing.    Cardiovascular:  Negative for chest pain.   Gastrointestinal:  Positive for abdominal pain and nausea. Negative for blood in stool, constipation, diarrhea, heartburn, melena and vomiting.   Musculoskeletal:  Negative for neck pain.   Skin:  Negative for rash.   Neurological:  Negative for dizziness, weakness and headaches.   Psychiatric/Behavioral:  Negative for memory loss.          Vital Signs:  /87   Pulse 104   Temp 97.5 °F (36.4 °C) (Oral)   Resp 18   Ht 5' 6" (1.676 m)   Wt 68 kg (150 lb)   SpO2 (!) 91%   BMI 24.21 kg/m²   Body mass index is 24.21 kg/m².    Physical Exam:    Physical Exam  Constitutional:       General: He is not in acute distress.     Appearance: Normal appearance. He is not ill-appearing.   Eyes:      General: Scleral icterus (improving) present.      Extraocular Movements: Extraocular movements intact.      Conjunctiva/sclera: Conjunctivae normal.      Pupils: Pupils are equal, round, and reactive to light.   Cardiovascular:      Rate and Rhythm: Normal rate and regular rhythm.   Pulmonary:      Effort: No respiratory distress.      Breath sounds: Normal breath sounds. No wheezing.   Abdominal:      General: Bowel " sounds are normal. There is no distension.      Palpations: Abdomen is soft. There is no mass.      Tenderness: There is abdominal tenderness (mild to mid epigastrium). There is no guarding.   Musculoskeletal:      Right lower leg: No edema.      Left lower leg: No edema.   Skin:     General: Skin is warm and dry.      Coloration: Skin is not jaundiced.   Neurological:      Mental Status: He is alert and oriented to person, place, and time.   Psychiatric:         Mood and Affect: Mood normal.         Behavior: Behavior normal.         Labs:  Recent Results (from the past 24 hour(s))   POCT glucose    Collection Time: 12/18/23  4:29 PM   Result Value Ref Range    POCT Glucose 134 (H) 70 - 110 mg/dL   POCT glucose    Collection Time: 12/18/23  8:14 PM   Result Value Ref Range    POCT Glucose 145 (H) 70 - 110 mg/dL   Comprehensive Metabolic Panel    Collection Time: 12/19/23  4:29 AM   Result Value Ref Range    Sodium Level 137 136 - 145 mmol/L    Potassium Level 3.5 3.5 - 5.1 mmol/L    Chloride 103 98 - 107 mmol/L    Carbon Dioxide 23 23 - 31 mmol/L    Glucose Level 139 (H) 82 - 115 mg/dL    Blood Urea Nitrogen 53.0 (H) 8.4 - 25.7 mg/dL    Creatinine 2.16 (H) 0.73 - 1.18 mg/dL    Calcium Level Total 6.9 (LL) 8.8 - 10.0 mg/dL    Protein Total 4.9 (L) 5.8 - 7.6 gm/dL    Albumin Level 2.3 (L) 3.4 - 4.8 g/dL    Globulin 2.6 2.4 - 3.5 gm/dL    Albumin/Globulin Ratio 0.9 (L) 1.1 - 2.0 ratio    Bilirubin Total 4.3 (H) <=1.5 mg/dL    Alkaline Phosphatase 70 40 - 150 unit/L    Alanine Aminotransferase 129 (H) 0 - 55 unit/L    Aspartate Aminotransferase 94 (H) 5 - 34 unit/L    eGFR 33 mls/min/1.73/m2   Protime-INR    Collection Time: 12/19/23  4:29 AM   Result Value Ref Range    PT 16.6 (H) 12.5 - 14.5 seconds    INR 1.4 (H) <=1.3   Lipase    Collection Time: 12/19/23  4:29 AM   Result Value Ref Range    Lipase Level 378 (H) <=60 U/L   CBC with Differential    Collection Time: 12/19/23  4:29 AM   Result Value Ref Range    WBC  17.86 (H) 4.50 - 11.50 x10(3)/mcL    RBC 5.17 4.70 - 6.10 x10(6)/mcL    Hgb 15.7 14.0 - 18.0 g/dL    Hct 48.1 42.0 - 52.0 %    MCV 93.0 80.0 - 94.0 fL    MCH 30.4 27.0 - 31.0 pg    MCHC 32.6 (L) 33.0 - 36.0 g/dL    RDW 14.0 11.5 - 17.0 %    Platelet 148 130 - 400 x10(3)/mcL    MPV 10.8 (H) 7.4 - 10.4 fL    Neut % 85.2 %    Lymph % 5.4 %    Mono % 7.3 %    Eos % 0.3 %    Basophil % 0.3 %    Lymph # 0.97 0.6 - 4.6 x10(3)/mcL    Neut # 15.22 (H) 2.1 - 9.2 x10(3)/mcL    Mono # 1.30 0.1 - 1.3 x10(3)/mcL    Eos # 0.06 0 - 0.9 x10(3)/mcL    Baso # 0.05 <=0.2 x10(3)/mcL    IG# 0.26 (H) 0 - 0.04 x10(3)/mcL    IG% 1.5 %    NRBC% 0.0 %    IPF 5.6 0.9 - 11.2 %   POCT glucose    Collection Time: 12/19/23  5:48 AM   Result Value Ref Range    POCT Glucose 148 (H) 70 - 110 mg/dL   Lipase    Collection Time: 12/19/23  7:25 AM   Result Value Ref Range    Lipase Level 315 (H) <=60 U/L   POCT glucose    Collection Time: 12/19/23 11:37 AM   Result Value Ref Range    POCT Glucose 144 (H) 70 - 110 mg/dL         Assessment/Plan:  This is a 68 y.o. male unknown to our group with PMH of T2DM, HTN, HLD, chronic scrotal hydrocele, vitamin D deficiency, CKD 3a. Presented to the ED 12/16/23 with severe epigastric pain onset same day with radiation to back, associated n/v and chills also reported.   GI consulted for choledocholithiasis. Underwent ERCP yesterday with sludge and stone removal.     Choledocholithiasis  Gallstone pancreatitis  Hyperbilirubinemia  Transaminitis--improving  Tbili 6.0--4.3   -- 229--225--151--94   -- 217--231--182--129  alk phos wnl     S/p ERCP 12/17: lower 3rd of main bile duct mildky dilated with stone causing obstruiction. Choledocholithiasis found. Complete removal with biliary sphincterotomy and balloon extraction. Biliary tree swept and sludge found.      - supportive care, continue LR when able  - continue bentyl for abd cramping  - PPI BID  - agree with calcium replacement   - recommend incentive  spirometer in setting of worsening atelectasis and pleural effusions     Kalyn Willard PA-C  Gastroenterology  [facility]

## 2023-12-19 NOTE — PROGRESS NOTES
[facility] Hospital Medicine Progress Note        Chief Complaint: Inpatient Follow-up     HPI:   68-year-old male with medical history of T2DM, hypertension, hyperlipidemia and prior cholecystectomy present to the ED with complaint of severe epigastric abdominal pain that started today around 9:00 a.m. after breakfast, the pain is severe 10/10 and radiate to his back associated with nausea and vomiting.  Report last bowel movement was this morning.  Reports chills but denies fever.  He has chronic scrotal hydrocele that has not changed or painful.     On arrival to ED he was afebrile, tachycardic, normotensive and saturating 96% on room air.  Labs notable for WBC 24.97, hemoglobin 18.2, creatinine 2.0, BUN 16.5, total bilirubin 2.7, direct 1.9, , , lipase more than 3000, triglyceride 183.  CT abdomen and pelvis show finding of severe acute pancreatitis without evidence of necrosis or abscess or fluid collection.  Liver remarkable for steatosis, gallbladder surgically absent, no comment on biliary tree.     He was given 2 L of IV fluids, IV Zosyn, IV analgesics and referred to hospital medicine service for further evaluation and management.  Patient was taken for ERCP on 12/17.  Choledocholithiasis was resolved.  Also had sphincterotomy performed.  Returned to the floor in stable condition.  GI recommending advancement of diet to clear liquids in 4-6 hours     Interval Hx:  Patient looks little better this morning.  He was still on 4 L nasal cannula.  He did have some moderate diuresis with getting IV Lasix yesterday.  Abdominal pain is little bit better as well.  Currently rates it a 6 or 7.  Only taken some sips of water.  He was not tried to eat or drink anything else.     Objective/physical exam:  General: In no acute distress, afebrile  Chest: Clear to auscultation bilaterally  Heart: RRR, +S1, S2, no appreciable murmur  Abdomen: Soft, nontender, BS +  MSK: Warm, no  lower extremity edema, no clubbing or cyanosis  Neurologic: Alert and oriented x4, Cranial nerve II-XII intact, Strength 5/5 in all 4 extremities       VITAL SIGNS: 24 HRS MIN & MAX LAST   Temp  Min: 97.8 °F (36.6 °C)  Max: 98.1 °F (36.7 °C) 97.9 °F (36.6 °C)   BP  Min: 115/73  Max: 161/76 127/77   Pulse  Min: 105  Max: 123  (!) 115   Resp  Min: 18  Max: 22 20   SpO2  Min: 88 %  Max: 92 % (!) 90 %       Recent Labs   Lab 12/17/23  0352 12/18/23  0357 12/19/23  0429   WBC 18.54* 21.45* 17.86*   RBC 5.86 5.33 5.17   HGB 18.1* 16.4 15.7   HCT 54.3* 48.9 48.1   MCV 92.7 91.7 93.0   MCH 30.9 30.8 30.4   MCHC 33.3 33.5 32.6*   RDW 13.0 13.8 14.0    163 148   MPV 9.3 10.3 10.8*       Recent Labs   Lab 12/17/23  0352 12/18/23  0357 12/18/23  0859 12/18/23  1253 12/19/23  0429    138  --  140 137   K 3.9 4.1  --  3.9 3.5   CO2 19* 20*  --  24 23   BUN 19.9 42.5*  --  49.6* 53.0*   CREATININE 2.29* 2.88*  --  2.92* 2.16*   CALCIUM 8.5* 7.5*  --  7.6* 6.9*   PH  --   --  7.350  --   --    MG 1.60  --   --   --   --    ALBUMIN 3.4 2.6*  --  2.4* 2.3*   ALKPHOS 77 66  --  66 70   * 231*  --  182* 129*   * 225*  --  151* 94*   BILITOT 3.5* 6.0*  --  4.8* 4.3*          Microbiology Results (last 7 days)       Procedure Component Value Units Date/Time    Blood culture #2 **CANNOT BE ORDERED STAT** [[id number]]  (Abnormal)  (Susceptibility) Collected: 12/16/23 2017    Order Status: Completed Specimen: Blood from Arm, Right Updated: 12/19/23 0658     CULTURE, BLOOD ([facility]) Enterococcus faecalis     Comment: Ampicillin results may be used to predict susceptibility to amoxicillin-clavulanate, ampicillin-sulbactam, and piperacillin-tazobactam.        GRAM STAIN Gram Positive Cocci, probable Streptococcus      Seen in gram stain of broth only      1 of 2 Anaerobic bottles positive    Blood culture #1 **CANNOT BE ORDERED STAT** [[id number]]  (Normal) Collected: 12/16/23 2017    Order Status: Completed Specimen:  Blood from Antecubital, Right Updated: 12/18/23 2100     CULTURE, BLOOD ([facility]) No Growth At 48 Hours    BCID2 Panel [[id number]]  (Abnormal) Collected: 12/16/23 2017    Order Status: Completed Specimen: Blood from Arm, Right Updated: 12/17/23 1720     CTX-M (ESBL ) N/A     IMP (Cabapenemase ) N/A     KPC resistance gene (Carbapenemase ) N/A     mcr-1 N/A     mecA ID N/A     Comment: Note: Antimicrobial resistance can occur via multiple mechanisms. A Not Detected result for antimicrobial resistance gene(s) does not indicate antimicrobial susceptibility. Subculturing is required for species identification and susceptibility testing of   isolates.        mecA/C and MREJ (MRSA) gene N/A     NDM (Carbapenemase ) N/A     OXA-48-like (Carbapenemase ) N/A     Yola/B (VRE gene) Not Detected     VIM (Carbapenemase ) N/A     Enterococcus faecalis Detected     Enterococcus faecium Not Detected     Listeria monocytogenes Not Detected     Staphylococcus spp. Not Detected     Staphylococcus aureus Not Detected     Staphylococcus epidermidis Not Detected     Staphylococcus lugdunensis Not Detected     Streptococcus spp. Not Detected     Streptococcus agalactiae (Group B) Not Detected     Streptococcus pneumoniae Not Detected     Streptococcus pyogenes (Group A) Not Detected     Acinetobacter calcoaceticus/baumannii complex Not Detected     Bacteroides fragilis Not Detected     Enterobacterales Not Detected     Enterobacter cloacae complex Not Detected     Escherichia coli Not Detected     Klebsiella aerogenes Not Detected     Klebsiella oxytoca Not Detected     Klebsiella pneumoniae group Not Detected     Proteus spp. Not Detected     Salmonella spp. Not Detected     Serratia marcescens Not Detected     Haemophilus influenzae Not Detected     Neisseria meningitidis Not Detected     Pseudomonas aeruginosa Not Detected     Stenotrophomonas maltophilia Not Detected     Candida albicans  Not Detected     Candida auris Not Detected     Candida glabrata Not Detected     Candida krusei Not Detected     Candida parapsilosis Not Detected     Candida tropicalis Not Detected     Cryptococcus neoformans/gattii Not Detected    Narrative:      The [employer] BCID2 Panel is a multiplexed nucleic acid test intended for the use with [employer]® 2.0 or BioFire® Film[employer]® [employer] Systems for the simultaneous qualitative detection and identification of multiple bacterial and yeast nucleic acids and select genetic determinants associated with antimicrobial resistance.  The [employer] BCID2 Panel test is performed directly on blood culture samples identified as positive by a continuous monitoring blood culture system.  Results are intended to be interpreted in conjunction with Gram stain results.             Radiology:  CT Abdomen Pelvis  Without Contrast  Narrative: EXAMINATION:  CT ABDOMEN PELVIS WITHOUT CONTRAST    CLINICAL HISTORY:  rise in tbili and pain s/p ERCP;    TECHNIQUE:  Low dose axial images, sagittal and coronal reformations were obtained from the lung bases to the pubic symphysis.  No contrast was administered.  Automatic exposure control is utilized to reduce patient radiation exposure.    COMPARISON:  12/16/2023    FINDINGS:  There are changes seen consistent with emphysema and COPD in the lungs.  There is  bibasilar atelectasis and bilateral pleural effusions.  This is a new finding..    The liver is hypoattenuated consistent with hepatic steatosis.  The patient is status post cholecystectomy.  There is evidence of ascites.  This is more prominent than the prior examination.    There are inflammatory changes seen around the pancreas consistent with pancreatitis.  This was seen on the prior examination as well.  Previous examination showed incomplete enhancement of the pancreas concerning for developing necrotizing pancreatitis.  No free intraperitoneal air is seen on this examination.    Adrenal  glands appear normal.    Kidneys appear normal.  No hydronephrosis is seen.  No hydroureter seen.    No colitis is seen.  No diverticulitis is seen.  No colonic mass is seen.    Urinary bladder appears grossly unremarkable.    Atherosclerotic changes are seen within the abdominal aorta and its branches.    Bones appear grossly unremarkable.  Impression: Interval worsening of the ascites    Persistent inflammatory changes around the pancreas consistent patient's history of pancreatitis.  Previous examination showed incomplete enhancement of the pancreas suggesting possible developing necrotizing pancreatitis.    Interval development of bibasilar atelectasis and moderate to large pleural effusions    Electronically signed by: Wayne Gee  Date:    12/18/2023  Time:    12:12  X-Ray Chest 1 View  Narrative: EXAMINATION:  XR CHEST 1 VIEW    CPT 99974    CLINICAL HISTORY:  Hypoxemia;    FINDINGS:  Examination reveals mediastinal silhouette to be within normal limits cardiac silhouette is not enlarged.  There is some increase interstitial and pulmonary vascular markings which may indicate a degree of pulmonary vascular congestion.    There is blunting of both costophrenic angles indicating the presence of bilateral pleural effusions.    Slightly more confluent opacities identified at the left base superimposed infiltrate cannot be completely excluded.    Atelectatic changes identified at the right base  Impression: Changes of pulmonary vascular congestion.    Bilateral pleural effusions    Electronically signed by: Radu Edwards  Date:    12/18/2023  Time:    09:43  US Retroperitoneal Complete  Narrative: EXAMINATION:  US RETROPERITONEAL COMPLETE    CLINICAL HISTORY:  Acute on chronic kidney disease.    COMPARISON:  CT 16 December 2023    FINDINGS:  Grayscale and color Doppler sonographic evaluation of the kidneys and urinary bladder.    The right kidney measures 11 cm. The left kidney measures 11 cm.   No  hydronephrosis.  Grossly normal renal parenchymal echogenicity.    No significant abnormality of the urinary bladder.    There is small volume ascites.  Impression: No significant sonographic abnormality of the kidneys.    Electronically signed by: Osiel Whitehead  Date:    12/18/2023  Time:    09:09      Scheduled Med:   ampicillin IVPB  2 g Intravenous Q6H    calcium gluconate IVPB  1 g Intravenous Once    dicyclomine  20 mg Intramuscular QID    enoxparin  30 mg Subcutaneous Q24H (prophylaxis, 1700)    furosemide (LASIX) injection  40 mg Intravenous Daily    metoprolol succinate  25 mg Oral Daily    metronidazole  500 mg Intravenous Q8H    pantoprazole  40 mg Intravenous BID AC        Continuous Infusions:       PRN Meds:  acetaminophen, acetaminophen, aluminum-magnesium hydroxide-simethicone, dextrose 10%, dextrose 10%, glucagon (human recombinant), hydrALAZINE, HYDROmorphone, insulin aspart U-100, labetalol, melatonin, ondansetron, oxyCODONE, polyethylene glycol, prochlorperazine, senna-docusate 8.6-50 mg, sodium chloride 0.9%       Assessment/Plan:   Acute severe pancreatitis- suspect biliary  Cholangitis, acute  choledocholithiasis  S severe sepsis  SHA superimposed on CKD 3A     History of T2DM, hypertension, hyperlipidemia, GERD    Leukocytosis trending down.  T bili and transaminases also improving.  Continue to monitor his lipase.    He got a dose of IV Lasix yesterday.  Nephrology was consulted as well.  Will get another dose today.  Noted low calcium.  Will give a g calcium gluconate this morning.  He remains on ampicillin for GI coverage.  Possible cholangitis.  GI continues to follow along as well.    Renal function slightly improved.  Will follow up any further recommendations from Nephrology.    Critical care diagnosis: sepsis, iv antibiotics  Critical care interventions: hands on evaluation, review of labs/radiographs/records and discussions with family  Critical care time spent: >32  minutes        Kingsley Blank MD   12/19/2023     All diagnosis and differential diagnosis have been reviewed; assessment and plan has been documented; I have personally reviewed the labs and test results that are presently available; I have reviewed the patients medication list; I have reviewed the consulting providers response and recommendations. I have reviewed or attempted to review medical records based upon their availability    All of the patient's questions have been  addressed and answered. Patient's is agreeable to the above stated plan. I will continue to monitor closely and make adjustments to medical management as needed.  _____________________________________________________________________

## 2023-12-19 NOTE — PLAN OF CARE
Will disch home when stable, wife Yaz resides with him, she is employed, however pt is retired.  Pt remains on O@, will need to wean off or arrange home O2.  He has no equip. At home, indep. With ADL's

## 2023-12-19 NOTE — PLAN OF CARE
12/19/23 1159   Discharge Assessment   Assessment Type Discharge Planning Assessment   Confirmed/corrected address, phone number and insurance Yes   Confirmed Demographics Correct on Facesheet   Source of Information patient   Does patient/caregiver understand observation status   (inpatient)   Communicated CARMITA with patient/caregiver Date not available/Unable to determine   Reason For Admission acute pancreatitis   People in Home spouse   Facility Arrived From: home   Do you expect to return to your current living situation? Yes   Do you have help at home or someone to help you manage your care at home? Yes   Who are your caregiver(s) and their phone number(s)? wife Melvina/[phone] ([phone]   Prior to hospitilization cognitive status: Unable to Assess   Current cognitive status: Alert/Oriented   Walking or Climbing Stairs Difficulty no   Dressing/Bathing Difficulty no   Equipment Currently Used at Home none   Readmission within 30 days? No   Patient currently being followed by outpatient case management? No   Do you currently have service(s) that help you manage your care at home? No   Do you take prescription medications? Yes   Do you have prescription coverage? Yes   Coverage [facility] order pharmacy.    [employer]   Do you have any problems affording any of your prescribed medications? No   Who is going to help you get home at discharge? wife, Melvina   How do you get to doctors appointments? car, drives self;family or friend will provide   Are you on dialysis? No   Discharge Plan A Home with family   Discharge Plan B Home with family   DME Needed Upon Discharge  none   Transition of Care Barriers None   Housing Stability   In the last 12 months, was there a time when you did not have a steady place to sleep or slept in a shelter (including now)? N   Transportation Needs   In the past 12 months, has lack of transportation kept you from medical appointments or from getting medications? no   In the past 12 months, has  lack of transportation kept you from meetings, work, or from getting things needed for daily living? No   OTHER   Name(s) of People in Home Yaz, pt     Will disch home when stable, wife Yaz resides with him, she is employed, however pt is retired.  Pt remains on O@, will need to wean off or arrange home O2.  He has no equip. At home, indep. With ADL's

## 2023-12-19 NOTE — PROGRESS NOTES
Nephrology consult follow up note    HPI:      Franklin Macias is a 68 y.o. male admitted on 12/16/2023 with acute pancreatitis. He underwent emergent ERCP on 12/17 for choledocholithiasis. Patient did have some renal insufficiency on admission which has continued to worsen. He has been hydrated and now noted to have pulmonary vascular congestion per CXR done this morning. Patient is voiding well. He was evaluated by nephrology once many years ago for gross hematuria which has not been a recurring issue. No stones, UTI or NSAID use. He was given IVF, but developed pulmonary edema. Nephrology consulted for SHA.     Interval history:     No acute events overnight. Pt continues to have abdominal pain and nausea. He is able to drink water but has not been able to eat yet. He is still requiring 5L NC O2 and having SOB. No CP, vomiting, or LE edema.      Review of Systems:       Past medical, family, surgical, and social history reviewed and unchanged from initial consult note.     Objective:       VITAL SIGNS: 24 HR MIN & MAX LAST    Temp  Min: 97.7 °F (36.5 °C)  Max: 98.1 °F (36.7 °C)  97.7 °F (36.5 °C)        BP  Min: 115/73  Max: 161/76  (!) 160/88     Pulse  Min: 105  Max: 123  110     Resp  Min: 18  Max: 22  18    SpO2  Min: 90 %  Max: 92 %  (!) 91 %      GEN: Well appearing WM  HEENT: Conjunctiva anicteric, pupils equal   CV: RRR +S1,S2 without murmur  PULM: CTAB, unlabored, 5L NC O2  ABD: Soft, tender abdomen with hypoactive bowel sounds  EXT: No cyanosis or edema  SKIN: Warm and dry  PSYCH: Awake, alert and appropriately conversant.   Dialysis access: No dialysis access            Component Value Date/Time     12/19/2023 0429     12/18/2023 1253    K 3.5 12/19/2023 0429    K 3.9 12/18/2023 1253    CHLORIDE 103 12/19/2023 0429    CHLORIDE 104 12/18/2023 1253    CO2 23 12/19/2023 0429    CO2 24 12/18/2023 1253    BUN 53.0 (H) 12/19/2023 0429    BUN 49.6 (H) 12/18/2023 1253    CREATININE 2.16 (H)  12/19/2023 0429    CREATININE 2.92 (H) 12/18/2023 1253    CALCIUM 6.9 (LL) 12/19/2023 0429    CALCIUM 7.6 (L) 12/18/2023 1253    PHOS 4.0 12/17/2023 0352            Component Value Date/Time    WBC 17.86 (H) 12/19/2023 0429    WBC 21.45 (H) 12/18/2023 0357    WBC 24.97 12/16/2023 1710    HGB 15.7 12/19/2023 0429    HGB 16.4 12/18/2023 0357    HCT 48.1 12/19/2023 0429    HCT 48.9 12/18/2023 0357     12/19/2023 0429     12/18/2023 0357         Imaging reviewed      Assessment / Plan:       Active Hospital Problems    Diagnosis  POA    *Acute pancreatitis [K85.90]  Yes    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     SHA s/t ATN s/t bacteremia and acute pancreatitis   Acute pancreatitis   Choledocholithiasis s/p emergent ERCP and sphincterotomy   Bacteremia   Pulmonary edema    Plan:  Renal function improved. He still appears hypervolemic on exam. Continue IV lasix 40mg daily for now. Labs in AM. Will follow.     Erik Hummel DO  Nephrology  [facility]  Clinic number: [phone]

## 2023-12-20 LAB
ALBUMIN SERPL-MCNC: 2.1 G/DL (ref 3.4–4.8)
ALBUMIN/GLOB SERPL: 0.8 RATIO (ref 1.1–2)
ALLENS TEST BLOOD GAS (OHS): YES
ALP SERPL-CCNC: 67 UNIT/L (ref 40–150)
ALT SERPL-CCNC: 75 UNIT/L (ref 0–55)
AST SERPL-CCNC: 63 UNIT/L (ref 5–34)
AV INDEX (PROSTH): 0.81
AV MEAN GRADIENT: 4 MMHG
AV PEAK GRADIENT: 8 MMHG
AV VALVE AREA BY VELOCITY RATIO: 2.61 CM²
AV VALVE AREA: 3.07 CM²
AV VELOCITY RATIO: 0.69
BASE EXCESS BLD CALC-SCNC: 4.2 MMOL/L
BASOPHILS # BLD AUTO: 0.11 X10(3)/MCL
BASOPHILS NFR BLD AUTO: 0.6 %
BILIRUB SERPL-MCNC: 3.6 MG/DL
BLOOD GAS SAMPLE TYPE (OHS): ABNORMAL
BSA FOR ECHO PROCEDURE: 1.78 M2
BUN SERPL-MCNC: 44.9 MG/DL (ref 8.4–25.7)
CA-I BLD-SCNC: 0.99 MMOL/L (ref 1.12–1.23)
CALCIUM SERPL-MCNC: 7.3 MG/DL (ref 8.8–10)
CHLORIDE SERPL-SCNC: 100 MMOL/L (ref 98–107)
CO2 BLDA-SCNC: 28.4 MMOL/L
CO2 SERPL-SCNC: 26 MMOL/L (ref 23–31)
CREAT SERPL-MCNC: 1.41 MG/DL (ref 0.73–1.18)
CV ECHO LV RWT: 0.41 CM
DOP CALC AO PEAK VEL: 1.4 M/S
DOP CALC AO VTI: 19.2 CM
DOP CALC LVOT AREA: 3.8 CM2
DOP CALC LVOT DIAMETER: 2.2 CM
DOP CALC LVOT PEAK VEL: 0.96 M/S
DOP CALC LVOT STROKE VOLUME: 58.89 CM3
DOP CALC MV VTI: 28.1 CM
DOP CALCLVOT PEAK VEL VTI: 15.5 CM
DRAWN BY BLOOD GAS (OHS): ABNORMAL
E WAVE DECELERATION TIME: 145 MSEC
E/A RATIO: 0.69
E/E' RATIO: 8 M/S
ECHO LV POSTERIOR WALL: 1 CM (ref 0.6–1.1)
EOSINOPHIL # BLD AUTO: 0.01 X10(3)/MCL (ref 0–0.9)
EOSINOPHIL NFR BLD AUTO: 0.1 %
ERYTHROCYTE [DISTWIDTH] IN BLOOD BY AUTOMATED COUNT: 13.9 % (ref 11.5–17)
FRACTIONAL SHORTENING: 24 % (ref 28–44)
GFR SERPLBLD CREATININE-BSD FMLA CKD-EPI: 55 MLS/MIN/1.73/M2
GLOBULIN SER-MCNC: 2.7 GM/DL (ref 2.4–3.5)
GLUCOSE SERPL-MCNC: 120 MG/DL (ref 82–115)
HCO3 BLDA-SCNC: 27.3 MMOL/L (ref 22–26)
HCT VFR BLD AUTO: 42.4 % (ref 42–52)
HGB BLD-MCNC: 13.9 G/DL (ref 14–18)
IMM GRANULOCYTES # BLD AUTO: 0.14 X10(3)/MCL (ref 0–0.04)
IMM GRANULOCYTES NFR BLD AUTO: 0.8 %
INTERVENTRICULAR SEPTUM: 1 CM (ref 0.6–1.1)
LEFT ATRIUM SIZE: 2.9 CM
LEFT ATRIUM VOLUME INDEX MOD: 21.1 ML/M2
LEFT ATRIUM VOLUME MOD: 37.3 CM3
LEFT INTERNAL DIMENSION IN SYSTOLE: 3.7 CM (ref 2.1–4)
LEFT VENTRICLE DIASTOLIC VOLUME INDEX: 63.84 ML/M2
LEFT VENTRICLE DIASTOLIC VOLUME: 113 ML
LEFT VENTRICLE MASS INDEX: 99 G/M2
LEFT VENTRICLE SYSTOLIC VOLUME INDEX: 32.8 ML/M2
LEFT VENTRICLE SYSTOLIC VOLUME: 58.1 ML
LEFT VENTRICULAR INTERNAL DIMENSION IN DIASTOLE: 4.9 CM (ref 3.5–6)
LEFT VENTRICULAR MASS: 176.04 G
LIPASE SERPL-CCNC: 81 U/L
LPM (OHS): 5
LV LATERAL E/E' RATIO: 6.55 M/S
LV SEPTAL E/E' RATIO: 10.29 M/S
LVOT MG: 2 MMHG
LVOT MV: 0.66 CM/S
LYMPHOCYTES # BLD AUTO: 0.95 X10(3)/MCL (ref 0.6–4.6)
LYMPHOCYTES NFR BLD AUTO: 5.2 %
MCH RBC QN AUTO: 30 PG (ref 27–31)
MCHC RBC AUTO-ENTMCNC: 32.8 G/DL (ref 33–36)
MCV RBC AUTO: 91.6 FL (ref 80–94)
MONOCYTES # BLD AUTO: 1.82 X10(3)/MCL (ref 0.1–1.3)
MONOCYTES NFR BLD AUTO: 9.9 %
MV MEAN GRADIENT: 2 MMHG
MV PEAK A VEL: 1.04 M/S
MV PEAK E VEL: 0.72 M/S
MV PEAK GRADIENT: 5 MMHG
MV STENOSIS PRESSURE HALF TIME: 141 MS
MV VALVE AREA BY CONTINUITY EQUATION: 2.1 CM2
MV VALVE AREA P 1/2 METHOD: 1.56 CM2
NEUTROPHILS # BLD AUTO: 15.31 X10(3)/MCL (ref 2.1–9.2)
NEUTROPHILS NFR BLD AUTO: 83.4 %
NRBC BLD AUTO-RTO: 0 %
OHS LV EJECTION FRACTION SIMPSONS BIPLANE MOD: 59 %
OXYGEN DEVICE BLOOD GAS (OHS): ABNORMAL
PCO2 BLDA: 35 MMHG (ref 35–45)
PH BLDA: 7.5 [PH] (ref 7.35–7.45)
PISA TR MAX VEL: 1.36 M/S
PLATELET # BLD AUTO: 136 X10(3)/MCL (ref 130–400)
PMV BLD AUTO: 10.5 FL (ref 7.4–10.4)
PO2 BLDA: 59 MMHG (ref 80–100)
POCT GLUCOSE: 110 MG/DL (ref 70–110)
POCT GLUCOSE: 115 MG/DL (ref 70–110)
POCT GLUCOSE: 118 MG/DL (ref 70–110)
POTASSIUM BLOOD GAS (OHS): 3.1 MMOL/L (ref 3.5–5)
POTASSIUM SERPL-SCNC: 3.3 MMOL/L (ref 3.5–5.1)
PROT SERPL-MCNC: 4.8 GM/DL (ref 5.8–7.6)
PV PEAK GRADIENT: 6 MMHG
PV PEAK VELOCITY: 1.18 M/S
RBC # BLD AUTO: 4.63 X10(6)/MCL (ref 4.7–6.1)
SAMPLE SITE BLOOD GAS (OHS): ABNORMAL
SAO2 % BLDA: 93 %
SODIUM BLOOD GAS (OHS): 131 MMOL/L (ref 137–145)
SODIUM SERPL-SCNC: 137 MMOL/L (ref 136–145)
TDI LATERAL: 0.11 M/S
TDI SEPTAL: 0.07 M/S
TDI: 0.09 M/S
TR MAX PG: 7 MMHG
TRICUSPID ANNULAR PLANE SYSTOLIC EXCURSION: 1.88 CM
WBC # SPEC AUTO: 18.34 X10(3)/MCL (ref 4.5–11.5)
Z-SCORE OF LEFT VENTRICULAR DIMENSION IN END DIASTOLE: 0.01
Z-SCORE OF LEFT VENTRICULAR DIMENSION IN END SYSTOLE: 1.59

## 2023-12-20 PROCEDURE — 87040 BLOOD CULTURE FOR BACTERIA: CPT | Performed by: INTERNAL MEDICINE

## 2023-12-20 PROCEDURE — C9113 INJ PANTOPRAZOLE SODIUM, VIA: HCPCS

## 2023-12-20 PROCEDURE — 63600175 PHARM REV CODE 636 W HCPCS: Performed by: NURSE PRACTITIONER

## 2023-12-20 PROCEDURE — 11000001 HC ACUTE MED/SURG PRIVATE ROOM

## 2023-12-20 PROCEDURE — 83690 ASSAY OF LIPASE: CPT | Performed by: INTERNAL MEDICINE

## 2023-12-20 PROCEDURE — 25000003 PHARM REV CODE 250: Performed by: INTERNAL MEDICINE

## 2023-12-20 PROCEDURE — 25000003 PHARM REV CODE 250: Performed by: NURSE PRACTITIONER

## 2023-12-20 PROCEDURE — 99900035 HC TECH TIME PER 15 MIN (STAT)

## 2023-12-20 PROCEDURE — 82803 BLOOD GASES ANY COMBINATION: CPT

## 2023-12-20 PROCEDURE — 63600175 PHARM REV CODE 636 W HCPCS: Performed by: INTERNAL MEDICINE

## 2023-12-20 PROCEDURE — P9047 ALBUMIN (HUMAN), 25%, 50ML: HCPCS | Mod: JZ,JG | Performed by: NURSE PRACTITIONER

## 2023-12-20 PROCEDURE — 63600175 PHARM REV CODE 636 W HCPCS: Performed by: HOSPITALIST

## 2023-12-20 PROCEDURE — 85025 COMPLETE CBC W/AUTO DIFF WBC: CPT | Performed by: HOSPITALIST

## 2023-12-20 PROCEDURE — 25000003 PHARM REV CODE 250: Performed by: HOSPITALIST

## 2023-12-20 PROCEDURE — 63600175 PHARM REV CODE 636 W HCPCS

## 2023-12-20 PROCEDURE — 21400001 HC TELEMETRY ROOM

## 2023-12-20 PROCEDURE — 36600 WITHDRAWAL OF ARTERIAL BLOOD: CPT

## 2023-12-20 PROCEDURE — 80053 COMPREHEN METABOLIC PANEL: CPT | Performed by: HOSPITALIST

## 2023-12-20 RX ORDER — POTASSIUM CHLORIDE 20 MEQ/1
40 TABLET, EXTENDED RELEASE ORAL ONCE
Status: COMPLETED | OUTPATIENT
Start: 2023-12-20 | End: 2023-12-20

## 2023-12-20 RX ORDER — FUROSEMIDE 10 MG/ML
40 INJECTION INTRAMUSCULAR; INTRAVENOUS
Status: DISCONTINUED | OUTPATIENT
Start: 2023-12-20 | End: 2023-12-20

## 2023-12-20 RX ORDER — ALBUMIN HUMAN 250 G/1000ML
12.5 SOLUTION INTRAVENOUS EVERY 12 HOURS
Status: DISCONTINUED | OUTPATIENT
Start: 2023-12-20 | End: 2023-12-20

## 2023-12-20 RX ORDER — FUROSEMIDE 10 MG/ML
20 INJECTION INTRAMUSCULAR; INTRAVENOUS 2 TIMES DAILY
Status: DISCONTINUED | OUTPATIENT
Start: 2023-12-20 | End: 2023-12-21

## 2023-12-20 RX ADMIN — FUROSEMIDE 20 MG: 10 INJECTION, SOLUTION INTRAMUSCULAR; INTRAVENOUS at 02:12

## 2023-12-20 RX ADMIN — AMPICILLIN 2 G: 2 INJECTION, POWDER, FOR SOLUTION INTRAVENOUS at 01:12

## 2023-12-20 RX ADMIN — METRONIDAZOLE 500 MG: 5 INJECTION, SOLUTION INTRAVENOUS at 02:12

## 2023-12-20 RX ADMIN — AMPICILLIN 2 G: 2 INJECTION, POWDER, FOR SOLUTION INTRAVENOUS at 12:12

## 2023-12-20 RX ADMIN — PANTOPRAZOLE SODIUM 40 MG: 40 INJECTION, POWDER, FOR SOLUTION INTRAVENOUS at 05:12

## 2023-12-20 RX ADMIN — POTASSIUM CHLORIDE 40 MEQ: 1500 TABLET, EXTENDED RELEASE ORAL at 09:12

## 2023-12-20 RX ADMIN — DICYCLOMINE HYDROCHLORIDE 20 MG: 20 INJECTION, SOLUTION INTRAMUSCULAR at 09:12

## 2023-12-20 RX ADMIN — HYDROMORPHONE HYDROCHLORIDE 1 MG: 2 INJECTION INTRAMUSCULAR; INTRAVENOUS; SUBCUTANEOUS at 03:12

## 2023-12-20 RX ADMIN — FUROSEMIDE 40 MG: 10 INJECTION, SOLUTION INTRAMUSCULAR; INTRAVENOUS at 09:12

## 2023-12-20 RX ADMIN — FUROSEMIDE 20 MG: 10 INJECTION, SOLUTION INTRAMUSCULAR; INTRAVENOUS at 09:12

## 2023-12-20 RX ADMIN — AMPICILLIN 2 G: 2 INJECTION, POWDER, FOR SOLUTION INTRAVENOUS at 05:12

## 2023-12-20 RX ADMIN — METOPROLOL SUCCINATE 25 MG: 25 TABLET, EXTENDED RELEASE ORAL at 09:12

## 2023-12-20 RX ADMIN — MELATONIN TAB 3 MG 6 MG: 3 TAB at 09:12

## 2023-12-20 RX ADMIN — METRONIDAZOLE 500 MG: 5 INJECTION, SOLUTION INTRAVENOUS at 09:12

## 2023-12-20 RX ADMIN — ALBUMIN (HUMAN) 12.5 G: 12.5 SOLUTION INTRAVENOUS at 11:12

## 2023-12-20 RX ADMIN — HYDROMORPHONE HYDROCHLORIDE 1 MG: 2 INJECTION INTRAMUSCULAR; INTRAVENOUS; SUBCUTANEOUS at 09:12

## 2023-12-20 NOTE — PROGRESS NOTES
[facility]  Hospital Medicine Progress Note        Chief Complaint: Inpatient Follow-up abdominal pain    HPI:   68-year-old male with medical history of T2DM, hypertension, hyperlipidemia and prior cholecystectomy present to the ED with complaint of severe epigastric abdominal pain that started today around 9:00 a.m. after breakfast, the pain is severe 10/10 and radiate to his back associated with nausea and vomiting.  Report last bowel movement was this morning.  Reports chills but denies fever.  He has chronic scrotal hydrocele that has not changed or painful.     On arrival to ED he was afebrile, tachycardic, normotensive and saturating 96% on room air.  Labs notable for WBC 24.97, hemoglobin 18.2, creatinine 2.0, BUN 16.5, total bilirubin 2.7, direct 1.9, , , lipase more than 3000, triglyceride 183.  CT abdomen and pelvis show finding of severe acute pancreatitis without evidence of necrosis or abscess or fluid collection.  Liver remarkable for steatosis, gallbladder surgically absent, no comment on biliary tree.     He was given 2 L of IV fluids, IV Zosyn, IV analgesics and referred to hospital medicine service for further evaluation and management.  Patient was taken for ERCP on 12/17.  Choledocholithiasis was resolved.  Also had sphincterotomy performed.  Returned to the floor in stable condition.  GI recommending advancement of diet to clear liquids in 4-6 hours     Interval Hx:  Patient was seen and examined at bedside, denied any complaints of chest pain or dyspnea currently denies any fevers or chills, has some distress on ambulation, continues to be requiring oxygen.  Abdominal discomfort is improving, able to tolerate the above.  Able to pass gas.  No nausea/vomiting or diarrhea.  Continues to be on IV Lasix.  No acute overnight events reported by the staff    Chart was reviewed, afebrile, slight intermittent elevation in heart rate and blood pressure noted.   Most recent labs were reviewed.  WBC-17> 18.  Hemoglobin 13.9.  Potassium 3.8.  Creatinine 2.1> 1.4 transaminitis improving.    Objective/physical exam:  General: In no acute distress, afebrile  Chest:  Crackles  Heart: RRR, +S1, S2, no appreciable murmur  Abdomen: Soft, nontender, BS +  MSK: Warm, no lower extremity edema, no clubbing or cyanosis  Neurologic: Alert and oriented x4, Cranial nerve II-XII intact, Strength 5/5 in all 4 extremities       VITAL SIGNS: 24 HRS MIN & MAX LAST   Temp  Min: 97.4 °F (36.3 °C)  Max: 98.6 °F (37 °C) 98 °F (36.7 °C)   BP  Min: 135/77  Max: 170/89 (!) 143/75   Pulse  Min: 99  Max: 115  (!) 115   Resp  Min: 16  Max: 20 20   SpO2  Min: 93 %  Max: 98 % (!) 93 %       Recent Labs   Lab 12/18/23  0357 12/19/23  0429 12/20/23  0645   WBC 21.45* 17.86* 18.34*   RBC 5.33 5.17 4.63*   HGB 16.4 15.7 13.9*   HCT 48.9 48.1 42.4   MCV 91.7 93.0 91.6   MCH 30.8 30.4 30.0   MCHC 33.5 32.6* 32.8*   RDW 13.8 14.0 13.9    148 136   MPV 10.3 10.8* 10.5*       Recent Labs   Lab 12/17/23  0352 12/18/23  0357 12/18/23  0859 12/18/23  1253 12/19/23  0429 12/20/23  0645 12/20/23  1015      < >  --  140 137 137  --    K 3.9   < >  --  3.9 3.5 3.3*  --    CO2 19*   < >  --  24 23 26  --    BUN 19.9   < >  --  49.6* 53.0* 44.9*  --    CREATININE 2.29*   < >  --  2.92* 2.16* 1.41*  --    CALCIUM 8.5*   < >  --  7.6* 6.9* 7.3*  --    PH  --   --  7.350  --   --   --  7.500*   MG 1.60  --   --   --   --   --   --    ALBUMIN 3.4   < >  --  2.4* 2.3* 2.1*  --    ALKPHOS 77   < >  --  66 70 67  --    *   < >  --  182* 129* 75*  --    *   < >  --  151* 94* 63*  --    BILITOT 3.5*   < >  --  4.8* 4.3* 3.6*  --     < > = values in this interval not displayed.          Microbiology Results (last 7 days)       Procedure Component Value Units Date/Time    Blood Culture [[id number]] Collected: 12/20/23 1046    Order Status: Resulted Specimen: Blood from Arm, Right Updated: 12/20/23 1057     Blood Culture [[id number]] Collected: 12/20/23 1045    Order Status: Resulted Specimen: Blood from Antecubital, Left Updated: 12/20/23 1051    Blood culture #1 **CANNOT BE ORDERED STAT** [[id number]]  (Normal) Collected: 12/16/23 2017    Order Status: Completed Specimen: Blood from Antecubital, Right Updated: 12/19/23 2100     CULTURE, BLOOD ([facility]) No Growth At 72 Hours    Blood culture #2 **CANNOT BE ORDERED STAT** [[id number]]  (Abnormal)  (Susceptibility) Collected: 12/16/23 2017    Order Status: Completed Specimen: Blood from Arm, Right Updated: 12/19/23 0658     CULTURE, BLOOD ([facility]) Enterococcus faecalis     Comment: Ampicillin results may be used to predict susceptibility to amoxicillin-clavulanate, ampicillin-sulbactam, and piperacillin-tazobactam.        GRAM STAIN Gram Positive Cocci, probable Streptococcus      Seen in gram stain of broth only      1 of 2 Anaerobic bottles positive    BCID2 Panel [[id number]]  (Abnormal) Collected: 12/16/23 2017    Order Status: Completed Specimen: Blood from Arm, Right Updated: 12/17/23 1720     CTX-M (ESBL ) N/A     IMP (Cabapenemase ) N/A     KPC resistance gene (Carbapenemase ) N/A     mcr-1 N/A     mecA ID N/A     Comment: Note: Antimicrobial resistance can occur via multiple mechanisms. A Not Detected result for antimicrobial resistance gene(s) does not indicate antimicrobial susceptibility. Subculturing is required for species identification and susceptibility testing of   isolates.        mecA/C and MREJ (MRSA) gene N/A     NDM (Carbapenemase ) N/A     OXA-48-like (Carbapenemase ) N/A     Yola/B (VRE gene) Not Detected     VIM (Carbapenemase ) N/A     Enterococcus faecalis Detected     Enterococcus faecium Not Detected     Listeria monocytogenes Not Detected     Staphylococcus spp. Not Detected     Staphylococcus aureus Not Detected     Staphylococcus epidermidis Not Detected     Staphylococcus lugdunensis Not Detected      Streptococcus spp. Not Detected     Streptococcus agalactiae (Group B) Not Detected     Streptococcus pneumoniae Not Detected     Streptococcus pyogenes (Group A) Not Detected     Acinetobacter calcoaceticus/baumannii complex Not Detected     Bacteroides fragilis Not Detected     Enterobacterales Not Detected     Enterobacter cloacae complex Not Detected     Escherichia coli Not Detected     Klebsiella aerogenes Not Detected     Klebsiella oxytoca Not Detected     Klebsiella pneumoniae group Not Detected     Proteus spp. Not Detected     Salmonella spp. Not Detected     Serratia marcescens Not Detected     Haemophilus influenzae Not Detected     Neisseria meningitidis Not Detected     Pseudomonas aeruginosa Not Detected     Stenotrophomonas maltophilia Not Detected     Candida albicans Not Detected     Candida auris Not Detected     Candida glabrata Not Detected     Candida krusei Not Detected     Candida parapsilosis Not Detected     Candida tropicalis Not Detected     Cryptococcus neoformans/gattii Not Detected    Narrative:      The [employer] BCID2 Panel is a multiplexed nucleic acid test intended for the use with [employer]® 2.0 or [employer]® [employer] for the simultaneous qualitative detection and identification of multiple bacterial and yeast nucleic acids and select genetic determinants associated with antimicrobial resistance.  The [employer] BCID2 Panel test is performed directly on blood culture samples identified as positive by a continuous monitoring blood culture system.  Results are intended to be interpreted in conjunction with Gram stain results.             Radiology:  Echo    Left Ventricle: The left ventricle is normal in size. Normal wall   thickness. Normal wall motion. There is normal systolic function with a   visually estimated ejection fraction of 55 - 60%. Grade I diastolic   dysfunction.    Right Ventricle: Normal right ventricular cavity size. Systolic   function is  normal.    Aortic Valve: The aortic valve is a trileaflet valve.    Pericardium: There is a trivial effusion. No indication of cardiac   tamponade.    Technically difficult study due to lung interference.  X-Ray Chest 1 View  Narrative: EXAMINATION:  XR CHEST 1 VIEW    CLINICAL HISTORY:  follow up;    TECHNIQUE:  Single view of the chest    COMPARISON:  12/18/2023    FINDINGS:  Cardiomegaly is unchanged.  Small right effusion is slightly increased in the interval.  No acute osseous abnormality.  Impression: As above.    Electronically signed by: Cristhian Lara  Date:    12/20/2023  Time:    09:53      Scheduled Med:   ampicillin IVPB  2 g Intravenous Q6H    dicyclomine  20 mg Intramuscular QID    enoxparin  30 mg Subcutaneous Q24H (prophylaxis, 1700)    furosemide (LASIX) injection  20 mg Intravenous BID    metoprolol succinate  25 mg Oral Daily    metronidazole  500 mg Intravenous Q8H    pantoprazole  40 mg Intravenous BID AC        Continuous Infusions:       PRN Meds:  acetaminophen, acetaminophen, aluminum-magnesium hydroxide-simethicone, dextrose 10%, dextrose 10%, glucagon (human recombinant), hydrALAZINE, HYDROmorphone, insulin aspart U-100, labetalol, melatonin, ondansetron, oxyCODONE, polyethylene glycol, prochlorperazine, senna-docusate 8.6-50 mg, sodium chloride 0.9%       Assessment/Plan:   Acute severe pancreatitis- suspect biliary  Cholangitis, acute  choledocholithiasis  Severe sepsis  SHA superimposed on CKD 3A  Acute hypoxic respiratory failure requiring supplemental oxygen secondary to pulmonary edema/pleural effusions  Bacteremia     History of T2DM, hypertension, hyperlipidemia, GERD    Continue current antibiotics.  Monitor fever curve.  Continue to monitor his lipase.  Continue to monitor leukocytosis.  Transaminitis improving.  GI following .  Advance diet as on tolerated  Nephrology following.  On IV diuretics.  Strict Is&Os, monitor urine output, daily weights.  Monitor electrolytes and  replete them as needed while on IV diuretics..  Continue oxygen supplementation.  Encourage incentive spirometry. Follow up on echocardiogram.  Follow up Chest x-ray. Follow up on ABG  Blood cultures noted to be positive, ordered repeat cultures.   Continue supportive care appropriate home medications.  On Sliding scale with fingersticks a.c. HS for management of diabetes in-house.      Critical care diagnosis: sepsis, iv antibiotics  Critical care interventions: hands on evaluation, review of labs/radiographs/records and discussions with family  Critical care time spent: >32 minutes        Teresa Anthony MD   12/20/2023     All diagnosis and differential diagnosis have been reviewed; assessment and plan has been documented; I have personally reviewed the labs and test results that are presently available; I have reviewed the patients medication list; I have reviewed the consulting providers response and recommendations. I have reviewed or attempted to review medical records based upon their availability    All of the patient's questions have been  addressed and answered. Patient's is agreeable to the above stated plan. I will continue to monitor closely and make adjustments to medical management as needed.  _____________________________________________________________________

## 2023-12-20 NOTE — PROGRESS NOTES
"Gastroenterology Progress Note    Subjective:  Patient with significantly less abdominal pain today. Tolerated chicken broth last night with abdominal pain or n/v. Main complaint now is "kidney pain".     AST and ALT improving. T bili lower at 3.6. Alk phos remains wnl. Leukocytosis slightly worse at 18.34. Hypocalcemia improving. Some hypertension up to 170/89. On 5L O2. Afebrile.     Objective:  ROS:    Review of Systems   Constitutional:  Negative for chills, fever and malaise/fatigue.   HENT:  Negative for sinus pain.    Eyes:  Negative for redness.   Respiratory:  Positive for shortness of breath. Negative for wheezing.    Cardiovascular:  Negative for chest pain.   Gastrointestinal:  Negative for abdominal pain, blood in stool, constipation, diarrhea, heartburn, melena, nausea and vomiting.   Musculoskeletal:  Negative for neck pain.   Skin:  Negative for rash.   Neurological:  Negative for dizziness, weakness and headaches.   Psychiatric/Behavioral:  Negative for memory loss.          Vital Signs:  BP (!) 160/87   Pulse 106   Temp 98.6 °F (37 °C) (Oral)   Resp 20   Ht 5' 6" (1.676 m)   Wt 68 kg (150 lb)   SpO2 98%   BMI 24.21 kg/m²   Body mass index is 24.21 kg/m².    Physical Exam:    Physical Exam  Constitutional:       General: He is not in acute distress.     Appearance: Normal appearance. He is not ill-appearing.   Eyes:      General: Scleral icterus: improving.      Extraocular Movements: Extraocular movements intact.      Conjunctiva/sclera: Conjunctivae normal.      Pupils: Pupils are equal, round, and reactive to light.   Cardiovascular:      Rate and Rhythm: Normal rate and regular rhythm.   Pulmonary:      Effort: No respiratory distress.      Breath sounds: Normal breath sounds. No wheezing.   Abdominal:      General: Bowel sounds are normal. There is no distension.      Palpations: Abdomen is soft. There is no mass.      Tenderness: There is no abdominal tenderness (mild to mid " epigastrium). There is no guarding.   Musculoskeletal:      Right lower leg: No edema.      Left lower leg: No edema.   Skin:     General: Skin is warm and dry.      Coloration: Skin is not jaundiced.   Neurological:      Mental Status: He is alert and oriented to person, place, and time.   Psychiatric:         Mood and Affect: Mood normal.         Behavior: Behavior normal.         Labs:  Recent Results (from the past 24 hour(s))   POCT glucose    Collection Time: 12/19/23  4:24 PM   Result Value Ref Range    POCT Glucose 127 (H) 70 - 110 mg/dL   POCT glucose    Collection Time: 12/20/23 12:44 AM   Result Value Ref Range    POCT Glucose 118 (H) 70 - 110 mg/dL   POCT glucose    Collection Time: 12/20/23  6:04 AM   Result Value Ref Range    POCT Glucose 110 70 - 110 mg/dL   Comprehensive Metabolic Panel    Collection Time: 12/20/23  6:45 AM   Result Value Ref Range    Sodium Level 137 136 - 145 mmol/L    Potassium Level 3.3 (L) 3.5 - 5.1 mmol/L    Chloride 100 98 - 107 mmol/L    Carbon Dioxide 26 23 - 31 mmol/L    Glucose Level 120 (H) 82 - 115 mg/dL    Blood Urea Nitrogen 44.9 (H) 8.4 - 25.7 mg/dL    Creatinine 1.41 (H) 0.73 - 1.18 mg/dL    Calcium Level Total 7.3 (L) 8.8 - 10.0 mg/dL    Protein Total 4.8 (L) 5.8 - 7.6 gm/dL    Albumin Level 2.1 (L) 3.4 - 4.8 g/dL    Globulin 2.7 2.4 - 3.5 gm/dL    Albumin/Globulin Ratio 0.8 (L) 1.1 - 2.0 ratio    Bilirubin Total 3.6 (H) <=1.5 mg/dL    Alkaline Phosphatase 67 40 - 150 unit/L    Alanine Aminotransferase 75 (H) 0 - 55 unit/L    Aspartate Aminotransferase 63 (H) 5 - 34 unit/L    eGFR 55 mls/min/1.73/m2   CBC with Differential    Collection Time: 12/20/23  6:45 AM   Result Value Ref Range    WBC 18.34 (H) 4.50 - 11.50 x10(3)/mcL    RBC 4.63 (L) 4.70 - 6.10 x10(6)/mcL    Hgb 13.9 (L) 14.0 - 18.0 g/dL    Hct 42.4 42.0 - 52.0 %    MCV 91.6 80.0 - 94.0 fL    MCH 30.0 27.0 - 31.0 pg    MCHC 32.8 (L) 33.0 - 36.0 g/dL    RDW 13.9 11.5 - 17.0 %    Platelet 136 130 - 400  x10(3)/mcL    MPV 10.5 (H) 7.4 - 10.4 fL    Neut % 83.4 %    Lymph % 5.2 %    Mono % 9.9 %    Eos % 0.1 %    Basophil % 0.6 %    Lymph # 0.95 0.6 - 4.6 x10(3)/mcL    Neut # 15.31 (H) 2.1 - 9.2 x10(3)/mcL    Mono # 1.82 (H) 0.1 - 1.3 x10(3)/mcL    Eos # 0.01 0 - 0.9 x10(3)/mcL    Baso # 0.11 <=0.2 x10(3)/mcL    IG# 0.14 (H) 0 - 0.04 x10(3)/mcL    IG% 0.8 %    NRBC% 0.0 %   RT Blood Gas    Collection Time: 12/20/23 10:15 AM   Result Value Ref Range    Sample Type Arterial Blood     Sample site Left Radial Artery     Drawn by sd rrt     pH, Blood gas 7.500 (H) 7.350 - 7.450    pCO2, Blood gas 35.0 35.0 - 45.0 mmHg    pO2, Blood gas 59.0 (L) 80.0 - 100.0 mmHg    Sodium, Blood Gas 131 (L) 137 - 145 mmol/L    Potassium, Blood Gas 3.1 (L) 3.5 - 5.0 mmol/L    Calcium Level Ionized 0.99 (L) 1.12 - 1.23 mmol/L    TOC2, Blood gas 28.4 mmol/L    Base Excess, Blood gas 4.20 mmol/L    sO2, Blood gas 93.0 %    HCO3, Blood gas 27.3 (H) 22.0 - 26.0 mmol/L    Allens Test Yes     Oxygen Device, Blood gas Cannula     LPM 5    Echo    Collection Time: 12/20/23 11:45 AM   Result Value Ref Range    BSA 1.78 m2    Giles's Biplane MOD Ejection Fraction 59 %    LVOT stroke volume 58.89 cm3    LVIDd 4.90 3.5 - 6.0 cm    LV Systolic Volume 58.10 mL    LV Systolic Volume Index 32.8 mL/m2    LVIDs 3.70 2.1 - 4.0 cm    LV Diastolic Volume 113.00 mL    LV Diastolic Volume Index 63.84 mL/m2    IVS 1.00 0.6 - 1.1 cm    LVOT diameter 2.20 cm    LVOT area 3.8 cm2    FS 24 (A) 28 - 44 %    Left Ventricle Relative Wall Thickness 0.41 cm    Posterior Wall 1.00 0.6 - 1.1 cm    LV mass 176.04 g    LV Mass Index 99 g/m2    MV Peak E Luciano 0.72 m/s    TDI LATERAL 0.11 m/s    TDI SEPTAL 0.07 m/s    E/E' ratio 8.00 m/s    MV Peak A Luciano 1.04 m/s    TR Max Luciano 1.36 m/s    E/A ratio 0.69     E wave deceleration time 145.00 msec    LV SEPTAL E/E' RATIO 10.29 m/s    LV LATERAL E/E' RATIO 6.55 m/s    LVOT peak luciano 0.96 m/s    Left Ventricular Outflow Tract Mean  Velocity 0.66 cm/s    Left Ventricular Outflow Tract Mean Gradient 2.00 mmHg    TAPSE 1.88 cm    LA size 2.90 cm    LA volume (mod) 37.30 cm3    LA Volume Index (Mod) 21.1 mL/m2    AV mean gradient 4 mmHg    AV peak gradient 8 mmHg    Ao peak harshil 1.40 m/s    Ao VTI 19.20 cm    LVOT peak VTI 15.50 cm    AV valve area 3.07 cm²    AV Velocity Ratio 0.69     AV index (prosthetic) 0.81     FREDI by Velocity Ratio 2.61 cm²    MV mean gradient 2 mmHg    MV peak gradient 5 mmHg    MV stenosis pressure 1/2 time 141.00 ms    MV valve area p 1/2 method 1.56 cm2    MV valve area by continuity eq 2.10 cm2    MV VTI 28.1 cm    Triscuspid Valve Regurgitation Peak Gradient 7 mmHg    PV PEAK VELOCITY 1.18 m/s    PV peak gradient 6 mmHg    Mean e' 0.09 m/s    ZLVIDS 1.59     ZLVIDD 0.01          Assessment/Plan:  This is a 68 y.o. male unknown to our group with PMH of T2DM, HTN, HLD, chronic scrotal hydrocele, vitamin D deficiency, CKD 3a. Presented to the ED 12/16/23 with severe epigastric pain onset same day with radiation to back, associated n/v and chills also reported.   GI consulted for choledocholithiasis. Underwent ERCP yesterday with sludge and stone removal.     Choledocholithiasis s/p ERCP  Gallstone pancreatitis  Hyperbilirubinemia  Transaminitis--improving  Tbili 6.0--4.3--3.6   -- 229--225--151--94--63   -- 217--231--182--129--75  alk phos wnl     S/p ERCP 12/17: lower 3rd of main bile duct mildky dilated with stone causing obstruiction. Choledocholithiasis found. Complete removal with biliary sphincterotomy and balloon extraction. Biliary tree swept and sludge found.      - supportive care, continue LR when able  - continue bentyl for abd cramping  - PPI BID  - agree with calcium replacement   - recommend incentive spirometer in setting of worsening atelectasis and pleural effusions     Kalyn Willard PA-C  Gastroenterology  [facility]

## 2023-12-20 NOTE — PROGRESS NOTES
Nephrology consult follow up note    HPI:      Franklin Macias is a 68 y.o. male admitted on 12/16/2023 with acute pancreatitis. He underwent emergent ERCP on 12/17 for choledocholithiasis. Patient did have some renal insufficiency on admission which has continued to worsen. He has been hydrated and now noted to have pulmonary vascular congestion per CXR done this morning. Patient is voiding well. He was evaluated by nephrology once many years ago for gross hematuria which has not been a recurring issue. No stones, UTI or NSAID use. He was given IVF, but developed pulmonary edema. Nephrology consulted for SHA.     Interval history:      Patient was able to eat chicken broth last night with no resulting abdominal pain. He is still requiring 5 LPM NC. No home oxygen prior to admission. Ambulating around room without distress. Standing weight at time of my exam 165 # with baseline about 150 #. Evidence of hypervolemia on exam.   Also complains of gagging every time he likes to drink water and some back pain.    Objective:       VITAL SIGNS: 24 HR MIN & MAX LAST    Temp  Min: 97.4 °F (36.3 °C)  Max: 98.4 °F (36.9 °C)  97.4 °F (36.3 °C)        BP  Min: 135/77  Max: 170/89  (!) 170/89     Pulse  Min: 104  Max: 115  108     Resp  Min: 16  Max: 20  20    SpO2  Min: 91 %  Max: 95 %  95 %      GEN: Well appearing WM, wife present.  HEENT: Conjunctiva anicteric, pupils equal   CV: RRR  without rub.  PULM: CTAB, unlabored, 5L NC O2  ABD: Soft, tender abdomen with hypoactive bowel sounds  EXT: no edema to BLE, edematous to lower back/hips  SKIN: Warm and dry  PSYCH: Awake, alert and appropriately conversant.               Component Value Date/Time     12/20/2023 0645     12/19/2023 0429    K 3.3 (L) 12/20/2023 0645    K 3.5 12/19/2023 0429    CHLORIDE 100 12/20/2023 0645    CHLORIDE 103 12/19/2023 0429    CO2 26 12/20/2023 0645    CO2 23 12/19/2023 0429    BUN 44.9 (H) 12/20/2023 0645    BUN 53.0 (H) 12/19/2023 0429     CREATININE 1.41 (H) 12/20/2023 0645    CREATININE 2.16 (H) 12/19/2023 0429    CALCIUM 7.3 (L) 12/20/2023 0645    CALCIUM 6.9 (LL) 12/19/2023 0429    PHOS 4.0 12/17/2023 0352            Component Value Date/Time    WBC 18.34 (H) 12/20/2023 0645    WBC 17.86 (H) 12/19/2023 0429    WBC 24.97 12/16/2023 1710    HGB 13.9 (L) 12/20/2023 0645    HGB 15.7 12/19/2023 0429    HCT 42.4 12/20/2023 0645    HCT 48.1 12/19/2023 0429     12/20/2023 0645     12/19/2023 0429         Imaging reviewed      Assessment / Plan:     SHA s/t ATN s/t bacteremia and acute pancreatitis   Acute pancreatitis   Choledocholithiasis s/p emergent ERCP and sphincterotomy   Bacteremia   Pulmonary edema    Plan:  Renal function improving well.   Will continue Lasix 20 mg IV push b.i.d..  Check labs tomorrow.

## 2023-12-21 LAB
ALBUMIN SERPL-MCNC: 2.2 G/DL (ref 3.4–4.8)
ALBUMIN/GLOB SERPL: 0.8 RATIO (ref 1.1–2)
ALP SERPL-CCNC: 74 UNIT/L (ref 40–150)
ALT SERPL-CCNC: 55 UNIT/L (ref 0–55)
AST SERPL-CCNC: 58 UNIT/L (ref 5–34)
BACTERIA BLD CULT: NORMAL
BASOPHILS # BLD AUTO: 0.07 X10(3)/MCL
BASOPHILS NFR BLD AUTO: 0.4 %
BILIRUB SERPL-MCNC: 2.8 MG/DL
BUN SERPL-MCNC: 44.6 MG/DL (ref 8.4–25.7)
C DIFF TOX A+B STL QL IA: NEGATIVE
CALCIUM SERPL-MCNC: 7.5 MG/DL (ref 8.8–10)
CHLORIDE SERPL-SCNC: 97 MMOL/L (ref 98–107)
CLOSTRIDIUM DIFFICILE GDH ANTIGEN (OHS): NEGATIVE
CO2 SERPL-SCNC: 27 MMOL/L (ref 23–31)
CREAT SERPL-MCNC: 1.32 MG/DL (ref 0.73–1.18)
EOSINOPHIL # BLD AUTO: 0.02 X10(3)/MCL (ref 0–0.9)
EOSINOPHIL NFR BLD AUTO: 0.1 %
ERYTHROCYTE [DISTWIDTH] IN BLOOD BY AUTOMATED COUNT: 14 % (ref 11.5–17)
GFR SERPLBLD CREATININE-BSD FMLA CKD-EPI: 59 MLS/MIN/1.73/M2
GLOBULIN SER-MCNC: 2.6 GM/DL (ref 2.4–3.5)
GLUCOSE SERPL-MCNC: 119 MG/DL (ref 82–115)
HCT VFR BLD AUTO: 39.2 % (ref 42–52)
HGB BLD-MCNC: 13.6 G/DL (ref 14–18)
IMM GRANULOCYTES # BLD AUTO: 0.37 X10(3)/MCL (ref 0–0.04)
IMM GRANULOCYTES NFR BLD AUTO: 2 %
LYMPHOCYTES # BLD AUTO: 1.24 X10(3)/MCL (ref 0.6–4.6)
LYMPHOCYTES NFR BLD AUTO: 6.7 %
MCH RBC QN AUTO: 30.7 PG (ref 27–31)
MCHC RBC AUTO-ENTMCNC: 34.7 G/DL (ref 33–36)
MCV RBC AUTO: 88.5 FL (ref 80–94)
MONOCYTES # BLD AUTO: 1.97 X10(3)/MCL (ref 0.1–1.3)
MONOCYTES NFR BLD AUTO: 10.7 %
NEUTROPHILS # BLD AUTO: 14.75 X10(3)/MCL (ref 2.1–9.2)
NEUTROPHILS NFR BLD AUTO: 80.1 %
NRBC BLD AUTO-RTO: 0 %
PLATELET # BLD AUTO: 139 X10(3)/MCL (ref 130–400)
PMV BLD AUTO: 10.8 FL (ref 7.4–10.4)
POCT GLUCOSE: 123 MG/DL (ref 70–110)
POCT GLUCOSE: 127 MG/DL (ref 70–110)
POCT GLUCOSE: 129 MG/DL (ref 70–110)
POTASSIUM SERPL-SCNC: 3.2 MMOL/L (ref 3.5–5.1)
PROT SERPL-MCNC: 4.8 GM/DL (ref 5.8–7.6)
RBC # BLD AUTO: 4.43 X10(6)/MCL (ref 4.7–6.1)
SODIUM SERPL-SCNC: 136 MMOL/L (ref 136–145)
WBC # SPEC AUTO: 18.42 X10(3)/MCL (ref 4.5–11.5)

## 2023-12-21 PROCEDURE — 63600175 PHARM REV CODE 636 W HCPCS: Performed by: HOSPITALIST

## 2023-12-21 PROCEDURE — 80053 COMPREHEN METABOLIC PANEL: CPT

## 2023-12-21 PROCEDURE — 25000003 PHARM REV CODE 250: Performed by: HOSPITALIST

## 2023-12-21 PROCEDURE — 25000003 PHARM REV CODE 250: Performed by: STUDENT IN AN ORGANIZED HEALTH CARE EDUCATION/TRAINING PROGRAM

## 2023-12-21 PROCEDURE — 21400001 HC TELEMETRY ROOM

## 2023-12-21 PROCEDURE — 11000001 HC ACUTE MED/SURG PRIVATE ROOM

## 2023-12-21 PROCEDURE — 63600175 PHARM REV CODE 636 W HCPCS: Performed by: INTERNAL MEDICINE

## 2023-12-21 PROCEDURE — 86318 IA INFECTIOUS AGENT ANTIBODY: CPT | Performed by: NURSE PRACTITIONER

## 2023-12-21 PROCEDURE — 85025 COMPLETE CBC W/AUTO DIFF WBC: CPT | Performed by: NURSE PRACTITIONER

## 2023-12-21 PROCEDURE — 25000003 PHARM REV CODE 250: Performed by: INTERNAL MEDICINE

## 2023-12-21 PROCEDURE — 63600175 PHARM REV CODE 636 W HCPCS

## 2023-12-21 PROCEDURE — 27000207 HC ISOLATION

## 2023-12-21 PROCEDURE — C9113 INJ PANTOPRAZOLE SODIUM, VIA: HCPCS

## 2023-12-21 PROCEDURE — 25000003 PHARM REV CODE 250: Performed by: NURSE PRACTITIONER

## 2023-12-21 RX ORDER — LOPERAMIDE HYDROCHLORIDE 2 MG/1
2 CAPSULE ORAL ONCE
Status: COMPLETED | OUTPATIENT
Start: 2023-12-21 | End: 2023-12-21

## 2023-12-21 RX ORDER — POTASSIUM CHLORIDE 20 MEQ/1
40 TABLET, EXTENDED RELEASE ORAL 2 TIMES DAILY
Status: COMPLETED | OUTPATIENT
Start: 2023-12-21 | End: 2023-12-21

## 2023-12-21 RX ORDER — MAGNESIUM SULFATE 1 G/100ML
1 INJECTION INTRAVENOUS ONCE
Status: COMPLETED | OUTPATIENT
Start: 2023-12-21 | End: 2023-12-21

## 2023-12-21 RX ORDER — FUROSEMIDE 40 MG/1
40 TABLET ORAL DAILY
Status: DISCONTINUED | OUTPATIENT
Start: 2023-12-21 | End: 2023-12-24

## 2023-12-21 RX ORDER — POTASSIUM CHLORIDE 14.9 MG/ML
20 INJECTION INTRAVENOUS
Status: COMPLETED | OUTPATIENT
Start: 2023-12-21 | End: 2023-12-22

## 2023-12-21 RX ORDER — POTASSIUM CHLORIDE 20 MEQ/1
20 TABLET, EXTENDED RELEASE ORAL ONCE
Status: COMPLETED | OUTPATIENT
Start: 2023-12-21 | End: 2023-12-21

## 2023-12-21 RX ADMIN — POTASSIUM CHLORIDE 40 MEQ: 1500 TABLET, EXTENDED RELEASE ORAL at 12:12

## 2023-12-21 RX ADMIN — LOPERAMIDE HYDROCHLORIDE 2 MG: 2 CAPSULE ORAL at 11:12

## 2023-12-21 RX ADMIN — FUROSEMIDE 20 MG: 10 INJECTION, SOLUTION INTRAMUSCULAR; INTRAVENOUS at 08:12

## 2023-12-21 RX ADMIN — DICYCLOMINE HYDROCHLORIDE 20 MG: 20 INJECTION, SOLUTION INTRAMUSCULAR at 08:12

## 2023-12-21 RX ADMIN — DICYCLOMINE HYDROCHLORIDE 20 MG: 20 INJECTION, SOLUTION INTRAMUSCULAR at 04:12

## 2023-12-21 RX ADMIN — ENOXAPARIN SODIUM 30 MG: 30 INJECTION SUBCUTANEOUS at 04:12

## 2023-12-21 RX ADMIN — MELATONIN TAB 3 MG 6 MG: 3 TAB at 10:12

## 2023-12-21 RX ADMIN — AMPICILLIN 2 G: 2 INJECTION, POWDER, FOR SOLUTION INTRAVENOUS at 06:12

## 2023-12-21 RX ADMIN — PANTOPRAZOLE SODIUM 40 MG: 40 INJECTION, POWDER, FOR SOLUTION INTRAVENOUS at 05:12

## 2023-12-21 RX ADMIN — METRONIDAZOLE 500 MG: 5 INJECTION, SOLUTION INTRAVENOUS at 02:12

## 2023-12-21 RX ADMIN — PANTOPRAZOLE SODIUM 40 MG: 40 INJECTION, POWDER, FOR SOLUTION INTRAVENOUS at 04:12

## 2023-12-21 RX ADMIN — POTASSIUM CHLORIDE 20 MEQ: 14.9 INJECTION, SOLUTION INTRAVENOUS at 11:12

## 2023-12-21 RX ADMIN — AMPICILLIN 2 G: 2 INJECTION, POWDER, FOR SOLUTION INTRAVENOUS at 12:12

## 2023-12-21 RX ADMIN — DICYCLOMINE HYDROCHLORIDE 20 MG: 20 INJECTION, SOLUTION INTRAMUSCULAR at 09:12

## 2023-12-21 RX ADMIN — METRONIDAZOLE 500 MG: 5 INJECTION, SOLUTION INTRAVENOUS at 08:12

## 2023-12-21 RX ADMIN — OXYCODONE HYDROCHLORIDE 5 MG: 5 TABLET ORAL at 10:12

## 2023-12-21 RX ADMIN — OXYCODONE HYDROCHLORIDE 5 MG: 5 TABLET ORAL at 02:12

## 2023-12-21 RX ADMIN — DICYCLOMINE HYDROCHLORIDE 20 MG: 20 INJECTION, SOLUTION INTRAMUSCULAR at 01:12

## 2023-12-21 RX ADMIN — MAGNESIUM SULFATE IN DEXTROSE 1 G: 10 INJECTION, SOLUTION INTRAVENOUS at 09:12

## 2023-12-21 RX ADMIN — METOPROLOL SUCCINATE 25 MG: 25 TABLET, EXTENDED RELEASE ORAL at 08:12

## 2023-12-21 RX ADMIN — AMPICILLIN 2 G: 2 INJECTION, POWDER, FOR SOLUTION INTRAVENOUS at 05:12

## 2023-12-21 RX ADMIN — POTASSIUM CHLORIDE 40 MEQ: 1500 TABLET, EXTENDED RELEASE ORAL at 09:12

## 2023-12-21 RX ADMIN — OXYCODONE HYDROCHLORIDE 5 MG: 5 TABLET ORAL at 12:12

## 2023-12-21 RX ADMIN — AMPICILLIN 2 G: 2 INJECTION, POWDER, FOR SOLUTION INTRAVENOUS at 01:12

## 2023-12-21 RX ADMIN — METRONIDAZOLE 500 MG: 5 INJECTION, SOLUTION INTRAVENOUS at 04:12

## 2023-12-21 RX ADMIN — FUROSEMIDE 40 MG: 40 TABLET ORAL at 04:12

## 2023-12-21 RX ADMIN — POTASSIUM CHLORIDE 20 MEQ: 1500 TABLET, EXTENDED RELEASE ORAL at 09:12

## 2023-12-21 NOTE — PROGRESS NOTES
Inpatient Nutrition Evaluation    Admit Date: 12/16/2023   Total duration of encounter: 5 days   Patient Age: 68 y.o.    Nutrition Recommendation/Prescription     -Continue CLD as tolerated. Goal Diet: Diabetic Diet    Nutrition Assessment     Chart Review    Reason Seen: continuous nutrition monitoring    Malnutrition Screening Tool Results   Have you recently lost weight without trying?: No  Have you been eating poorly because of a decreased appetite?: No   MST Score: 0   Diagnosis:  Acute severe pancreatitis- suspect biliary  Suspect choledocholithiasis  SIRS/sepsis secondary to pancreatitis and possible cholangitis  SHA superimposed on CKD 3A    Relevant Medical History:   T2DM, HTN, HLD, GERD    Scheduled Medications:  ampicillin IVPB, 2 g, Q6H  dicyclomine, 20 mg, QID  enoxparin, 30 mg, Q24H (prophylaxis, 1700)  furosemide, 40 mg, Daily  metoprolol succinate, 25 mg, Daily  metronidazole, 500 mg, Q8H  pantoprazole, 40 mg, BID AC  potassium chloride, 40 mEq, BID    Continuous Infusions:   PRN Medications: acetaminophen, acetaminophen, aluminum-magnesium hydroxide-simethicone, dextrose 10%, dextrose 10%, glucagon (human recombinant), hydrALAZINE, HYDROmorphone, insulin aspart U-100, labetalol, melatonin, ondansetron, oxyCODONE, polyethylene glycol, prochlorperazine, senna-docusate 8.6-50 mg, sodium chloride 0.9%    Recent Labs   Lab 12/16/23  1710 12/17/23  0352 12/18/23  0357 12/18/23  1253 12/19/23  0429 12/19/23  0725 12/20/23  0645 12/21/23  0443    141 138 140 137  --  137 136   K 3.7 3.9 4.1 3.9 3.5  --  3.3* 3.2*   CALCIUM 9.8 8.5* 7.5* 7.6* 6.9*  --  7.3* 7.5*   PHOS  --  4.0  --   --   --   --   --   --    MG  --  1.60  --   --   --   --   --   --    CHLORIDE 101 105 107 104 103  --  100 97*   CO2 26 19* 20* 24 23  --  26 27   BUN 16.5 19.9 42.5* 49.6* 53.0*  --  44.9* 44.6*   CREATININE 2.00* 2.29* 2.88* 2.92* 2.16*  --  1.41* 1.32*   EGFRNORACEVR 36 31 23 23 33  --  55 59   GLUCOSE 234* 269* 129*  "153* 139*  --  120* 119*   BILITOT 2.7* 3.5* 6.0* 4.8* 4.3*  --  3.6* 2.8*   ALKPHOS 85 77 66 66 70  --  67 74   * 217* 231* 182* 129*  --  75* 55   * 229* 225* 151* 94*  --  63* 58*   ALBUMIN 4.1 3.4 2.6* 2.4* 2.3*  --  2.1* 2.2*   TRIG 183*  --   --   --   --   --   --   --    HGBA1C  --  6.2  --   --   --   --   --   --    LIPASE >3,000* 1,330* 1,236*  --  378* 315* 81*  --    WBC 24.97  24.97* 18.54* 21.45*  --  17.86*  --  18.34* 18.42*   HGB 18.2* 18.1* 16.4  --  15.7  --  13.9* 13.6*   HCT 55.3* 54.3* 48.9  --  48.1  --  42.4 39.2*     Nutrition Orders:  Diet clear liquid      Appetite/Oral Intake: poor/0-25% of meals  Factors Affecting Nutritional Intake: altered gastrointestinal function, clear liquid diet, decreased appetite, and diarrhea  Food/Congregational/Cultural Preferences: none reported  Food Allergies: none reported  Last Bowel Movement: 23  Wound(s):  Skin intact per EMR    Comments    23: Pt reports very poor appetite for the past week but states he had no issues prior. Currently he is only drinking water d/t ongoing diarrhea. He states that N/V subsided on . He denies any unintended wt loss. Appears well nourished per [facility].     Anthropometrics    Height: 5' 6" (167.6 cm), Height Method: Stated  Last Weight: 73.2 kg (161 lb 6 oz) (23 0600), Weight Method: Standard Scale  BMI (Calculated): 26.1  BMI Classification: overweight (BMI 25-29.9)        Ideal Body Weight (IBW), Male: 142 lb     % Ideal Body Weight, Male (lb): 105.63 %                 Usual Body Weight (UBW), k.7 kg  % Usual Body Weight: 100.9     Usual Weight Provided By: patient    Wt Readings from Last 5 Encounters:   23 73.2 kg (161 lb 6 oz)   23 68.1 kg (150 lb 3.2 oz)   23 72.6 kg (160 lb)   23 70.6 kg (155 lb 11.2 oz)   22 71.2 kg (157 lb)     Weight Change(s) Since Admission: 11 lb wt gain noted  Wt Readings from Last 1 Encounters:   23 0600 73.2 kg (161 lb " 6 oz)   12/17/23 1747 68 kg (150 lb)   12/16/23 1651 68 kg (150 lb)   Admit Weight: 68 kg (150 lb) (12/16/23 1651), Weight Method: Stated    Patient Education     Not applicable.    Nutrition Goals & Monitoring     Dietitian will monitor: energy intake and weight change    Nutrition Risk/Follow-Up: low (follow-up in 5-7 days)  Patients assigned 'low nutrition risk' status do not qualify for a full nutritional assessment but will be monitored and re-evaluated in a 5-7 day time period. Please consult if re-evaluation needed sooner.

## 2023-12-21 NOTE — NURSING
Patient having multiple episodes of diarrhea. Patient requesting Imodium. NP Kate Estes was notified. NP was also asked for stool collection order for next diarrhea episode. NP ordered Stool sample to be collected to test for C.Diff.

## 2023-12-21 NOTE — CONSULTS
Infectious Diseases Consultation       Consults    Inpatient consult to Infectious Diseases  Consult performed by: Crystal Pedersen MD  Consult ordered by: Teresa Anthony MD      HPI:   68-year-old male with past medical history of GERD, HTN, HLD, DM type 2, is admitted to [facility], presenting through the ED on 12/16/2023 with abdominal pain.  On admission he was noted to be afebrile with leukocytosis of 24.97.  Lipase >3,000,  and .  Urinalysis not impressive.  Blood cultures revealed Enterococcus in 1 of 2 sets.  CT scan of the abdomen and pelvis with contrast revealed CVA otitis, gastric antrum and duodenal mural thickening likely representing reactive change for acute pancreatitis, right large hydrocele.  Scrotal ultrasound with unremarkable right testicle, large right hydrocele which crosses the midline causes compressive effect upon the left testicle which is inferiorly deviated.  MRI of the abdomen without contrast showed what appears to be numerous intraluminal filling defects in the mild to distal common bile duct, suggestive of impacted gallstones and sludge.  On 12/17 he had ERCP with sludge and stone removal.  Repeat CT of abdomen and pelvis without contrast showed interval worsening of the ascites, persistent inflammatory changes around the pancreas consistent with patient's history of pancreatitis, previous examination showed incomplete enhancement of the pancreas suggesting possible developing necrotizing pancreatitis, interval development of bibasilar atelectasis and moderate-to-large pleural effusions.  Renal ultrasound unremarkable.  2D echocardiogram with no mention of vegetation.  Stool C difficile testing negative.  He is currently on antibiotic coverage with ampicillin and Flagyl.    Past Medical and Surgical History  Allergies :   Patient has no known allergies.    Medical :   He has a past medical history of DM (diabetes mellitus), GERD  (gastroesophageal reflux disease), HLD (hyperlipidemia), and HTN (hypertension).    Surgical :   He has a past surgical history that includes Cholecystectomy; Appendectomy; ERCP (N/A, 12/17/2023); ERCP w/ sphicterotomy (12/17/2023); and ercp, with calculus removal (12/17/2023).     Family History  His family history is not on file.    Social History  He reports that he has been smoking cigarettes. He has never used smokeless tobacco. He reports that he does not drink alcohol and does not use drugs.     Review of Systems   Constitutional:  Positive for malaise/fatigue.   HENT: Negative.     Respiratory: Negative.     Gastrointestinal:  Positive for abdominal pain.   Genitourinary: Negative.    Musculoskeletal: Negative.    Neurological:  Positive for weakness.   Endo/Heme/Allergies: Negative.    Psychiatric/Behavioral: Negative.     All other Systems review done and negative.    Objective   Physical Exam  Vitals reviewed.   Constitutional:       General: He is not in acute distress.     Appearance: He is not toxic-appearing.      Comments: Lying in bed.  Wife at the bedside   HENT:      Head: Normocephalic and atraumatic.   Eyes:      Pupils: Pupils are equal, round, and reactive to light.   Cardiovascular:      Rate and Rhythm: Normal rate and regular rhythm.      Heart sounds: Normal heart sounds.   Pulmonary:      Effort: No respiratory distress.      Breath sounds: Normal breath sounds.      Comments: O2 via NC  Abdominal:      General: Bowel sounds are normal. There is no distension.      Palpations: Abdomen is soft.      Tenderness: There is abdominal tenderness.      Comments: RUQ and epigastric tenderness   Genitourinary:     Comments: No suprapubic tenderness  Musculoskeletal:         General: No deformity.      Cervical back: Neck supple.   Skin:     Findings: No erythema or rash.   Neurological:      Mental Status: He is alert and oriented to person, place, and time.   Psychiatric:      Comments: Calm and  "cooperative       VITAL SIGNS: 24 HR MIN & MAX LAST    Temp  Min: 97.4 °F (36.3 °C)  Max: 98.6 °F (37 °C)  98.4 °F (36.9 °C)        BP  Min: 131/82  Max: 170/89  131/82     Pulse  Min: 99  Max: 115  109     Resp  Min: 16  Max: 20  18    SpO2  Min: 93 %  Max: 98 %  (!) 93 %      HT: 5' 6" (167.6 cm)  WT: 68 kg (150 lb)  BMI: 24.2     Recent Results (from the past 24 hour(s))   POCT glucose    Collection Time: 12/20/23 12:44 AM   Result Value Ref Range    POCT Glucose 118 (H) 70 - 110 mg/dL   POCT glucose    Collection Time: 12/20/23  6:04 AM   Result Value Ref Range    POCT Glucose 110 70 - 110 mg/dL   Comprehensive Metabolic Panel    Collection Time: 12/20/23  6:45 AM   Result Value Ref Range    Sodium Level 137 136 - 145 mmol/L    Potassium Level 3.3 (L) 3.5 - 5.1 mmol/L    Chloride 100 98 - 107 mmol/L    Carbon Dioxide 26 23 - 31 mmol/L    Glucose Level 120 (H) 82 - 115 mg/dL    Blood Urea Nitrogen 44.9 (H) 8.4 - 25.7 mg/dL    Creatinine 1.41 (H) 0.73 - 1.18 mg/dL    Calcium Level Total 7.3 (L) 8.8 - 10.0 mg/dL    Protein Total 4.8 (L) 5.8 - 7.6 gm/dL    Albumin Level 2.1 (L) 3.4 - 4.8 g/dL    Globulin 2.7 2.4 - 3.5 gm/dL    Albumin/Globulin Ratio 0.8 (L) 1.1 - 2.0 ratio    Bilirubin Total 3.6 (H) <=1.5 mg/dL    Alkaline Phosphatase 67 40 - 150 unit/L    Alanine Aminotransferase 75 (H) 0 - 55 unit/L    Aspartate Aminotransferase 63 (H) 5 - 34 unit/L    eGFR 55 mls/min/1.73/m2   CBC with Differential    Collection Time: 12/20/23  6:45 AM   Result Value Ref Range    WBC 18.34 (H) 4.50 - 11.50 x10(3)/mcL    RBC 4.63 (L) 4.70 - 6.10 x10(6)/mcL    Hgb 13.9 (L) 14.0 - 18.0 g/dL    Hct 42.4 42.0 - 52.0 %    MCV 91.6 80.0 - 94.0 fL    MCH 30.0 27.0 - 31.0 pg    MCHC 32.8 (L) 33.0 - 36.0 g/dL    RDW 13.9 11.5 - 17.0 %    Platelet 136 130 - 400 x10(3)/mcL    MPV 10.5 (H) 7.4 - 10.4 fL    Neut % 83.4 %    Lymph % 5.2 %    Mono % 9.9 %    Eos % 0.1 %    Basophil % 0.6 %    Lymph # 0.95 0.6 - 4.6 x10(3)/mcL    Neut # 15.31 (H) " 2.1 - 9.2 x10(3)/mcL    Mono # 1.82 (H) 0.1 - 1.3 x10(3)/mcL    Eos # 0.01 0 - 0.9 x10(3)/mcL    Baso # 0.11 <=0.2 x10(3)/mcL    IG# 0.14 (H) 0 - 0.04 x10(3)/mcL    IG% 0.8 %    NRBC% 0.0 %   Lipase    Collection Time: 12/20/23  6:45 AM   Result Value Ref Range    Lipase Level 81 (H) <=60 U/L   RT Blood Gas    Collection Time: 12/20/23 10:15 AM   Result Value Ref Range    Sample Type Arterial Blood     Sample site Left Radial Artery     Drawn by sd rrt     pH, Blood gas 7.500 (H) 7.350 - 7.450    pCO2, Blood gas 35.0 35.0 - 45.0 mmHg    pO2, Blood gas 59.0 (L) 80.0 - 100.0 mmHg    Sodium, Blood Gas 131 (L) 137 - 145 mmol/L    Potassium, Blood Gas 3.1 (L) 3.5 - 5.0 mmol/L    Calcium Level Ionized 0.99 (L) 1.12 - 1.23 mmol/L    TOC2, Blood gas 28.4 mmol/L    Base Excess, Blood gas 4.20 mmol/L    sO2, Blood gas 93.0 %    HCO3, Blood gas 27.3 (H) 22.0 - 26.0 mmol/L    Allens Test Yes     Oxygen Device, Blood gas Cannula     LPM 5    Echo    Collection Time: 12/20/23 11:45 AM   Result Value Ref Range    BSA 1.78 m2    Giles's Biplane MOD Ejection Fraction 59 %    LVOT stroke volume 58.89 cm3    LVIDd 4.90 3.5 - 6.0 cm    LV Systolic Volume 58.10 mL    LV Systolic Volume Index 32.8 mL/m2    LVIDs 3.70 2.1 - 4.0 cm    LV Diastolic Volume 113.00 mL    LV Diastolic Volume Index 63.84 mL/m2    IVS 1.00 0.6 - 1.1 cm    LVOT diameter 2.20 cm    LVOT area 3.8 cm2    FS 24 (A) 28 - 44 %    Left Ventricle Relative Wall Thickness 0.41 cm    Posterior Wall 1.00 0.6 - 1.1 cm    LV mass 176.04 g    LV Mass Index 99 g/m2    MV Peak E Luciano 0.72 m/s    TDI LATERAL 0.11 m/s    TDI SEPTAL 0.07 m/s    E/E' ratio 8.00 m/s    MV Peak A Luciano 1.04 m/s    TR Max Luciano 1.36 m/s    E/A ratio 0.69     E wave deceleration time 145.00 msec    LV SEPTAL E/E' RATIO 10.29 m/s    LV LATERAL E/E' RATIO 6.55 m/s    LVOT peak luciano 0.96 m/s    Left Ventricular Outflow Tract Mean Velocity 0.66 cm/s    Left Ventricular Outflow Tract Mean Gradient 2.00 mmHg     TAPSE 1.88 cm    LA size 2.90 cm    LA volume (mod) 37.30 cm3    LA Volume Index (Mod) 21.1 mL/m2    AV mean gradient 4 mmHg    AV peak gradient 8 mmHg    Ao peak harshil 1.40 m/s    Ao VTI 19.20 cm    LVOT peak VTI 15.50 cm    AV valve area 3.07 cm²    AV Velocity Ratio 0.69     AV index (prosthetic) 0.81     FREDI by Velocity Ratio 2.61 cm²    MV mean gradient 2 mmHg    MV peak gradient 5 mmHg    MV stenosis pressure 1/2 time 141.00 ms    MV valve area p 1/2 method 1.56 cm2    MV valve area by continuity eq 2.10 cm2    MV VTI 28.1 cm    Triscuspid Valve Regurgitation Peak Gradient 7 mmHg    PV PEAK VELOCITY 1.18 m/s    PV peak gradient 6 mmHg    Mean e' 0.09 m/s    ZLVIDS 1.59     ZLVIDD 0.01    POCT glucose    Collection Time: 12/20/23  5:09 PM   Result Value Ref Range    POCT Glucose 115 (H) 70 - 110 mg/dL       Imaging  Imaging Results              MRI MRCP Without Contrast (Final result)  Result time 12/17/23 12:12:07      Final result by George Gee MD (12/17/23 12:12:07)                   Impression:      1. There appear to be numerous intraluminal filling defects in the mid to distal common bile duct series image 16 series 3. These are suggestive of impacted gallstones and sludge. Correlate clinically as regards additional evaluation and follow-up possibly with ERCP.    2. There is intermediate intrasubstance T2 signal in the pancreatic body (image 19 series 4 and 6) with corresponding restricted diffusion signal on DWI (image 136 series 7), relating to edema changes. There is stable free fluid collection in the bilateral anterior pararenal space extending to the adjacent mesocolon, small bowel mesentery, perihepatic and perisplenic regions. T2 stranding intensities are seen along the lesser sac. These findings are reflective of acute interstitial pancreatitis. Correlate with clinical and laboratory findings as regards additional evaluation and follow-up to full resolution as indicated.    3. A 1cm  lymph node is demonstrated in the mo hepatis region (image 18 series 4), likely reactive in nature.    I concur with the preliminary report above.      Electronically signed by: Wayne Gee  Date:    12/17/2023  Time:    12:12               Narrative:    EXAMINATION:  MRI ABDOMEN WITHOUT CONTRAST MRCP    CLINICAL HISTORY:  Post cholecystectomy pancreatitis -- ?  Choledocholithiases;    TECHNIQUE:  Multiplanar, multisequence images are performed through the liver and upper abdomen.  Additionally 2D and 3D MRCP sequences are performed as well as MIP images.    COMPARISON:  None.    FINDINGS:  Lung bases: Moderate streaky is present in the visualized lung bases consistent with nonspecific dependent changes and scarring.    Liver: Liver appears normal in size.    Portal venous system: Normal.    Gallbladder: Gallbladder is surgically absent.    Biliary tree intrahepatic ducts: Unremarkable.    Biliary tree extrahepatic ducts: There appear to be numerous intraluminal filling defects in the mid to distal common bile duct series image 16 series 3.    Ampullary region: No obvious obstructive mass or stone.    Spleen: Unremarkable.    Pancreas: There is intermediate intrasubstance T2 signal in the pancreatic body (image 19 series 4 and 6) with corresponding restricted diffusion signal on DWI (image 136 series 7), relating to edema changes. There is stable free fluid collection in the bilateral anterior pararenal space extending to the adjacent mesocolon, small bowel mesentery, perihepatic and perisplenic regions. T2 stranding intensities are seen along the lesser sac. These findings are reflective of acute interstitial pancreatitis.    Pancreatic duct: Unremarkable.    Adrenal glands: Unremarkable.    Kidneys: There are probable cysts in both kidneys, with the larger seen in the right mid pole region measuring 1.0 cm (image 29 series 4).Ureters: Unremarkable.    Stomach and distal esophagus: Normal.    Small bowel:  Normal.    Colon: Normal.    Lymph nodes: A 1cm lymph node is demonstrated in the mo hepatis region (image 18 series 4), likely reactive in nature.    Abdominal wall: Normal.    Systemic arteries and veins: Unremarkable.    Osseous structures: There is mild spondylolytic changes in the imaged spine.    Miscellaneous: There are motion artifacts in some of the sequences limiting its evaluation.                        Preliminary result by Phil Cazares MD (12/17/23 02:47:18)                   Impression:    1. There appear to be numerous intraluminal filling defects in the mid to distal common bile duct series image 16 series 3. These are suggestive of impacted gallstones and sludge. Correlate clinically as regards additional evaluation and follow-up possibly with ERCP.  2. There is intermediate intrasubstance T2 signal in the pancreatic body (image 19 series 4 and 6) with corresponding restricted diffusion signal on DWI (image 136 series 7), relating to edema changes. There is stable free fluid collection in the bilateral anterior pararenal space extending to the adjacent mesocolon, small bowel mesentery, perihepatic and perisplenic regions. T2 stranding intensities are seen along the lesser sac. These findings are reflective of acute interstitial pancreatitis. Correlate with clinical and laboratory findings as regards additional evaluation and follow-up to full resolution as indicated.  3. A 1cm lymph node is demonstrated in the mo hepatis region (image 18 series 4), likely reactive in nature.               Narrative:    START OF REPORT:  Technique: Multiplanar, multisequence magnetic resonance imaging of the abdomen without intravenous contrast and with mrcp.    Comparison: Correlation with study dated 2023-12-16 19:18:17.    Clinical History: Abdomen pain.    Findings:  Lung bases: Moderate streaky is present in the visualized lung bases consistent with nonspecific dependent changes and scarring.  Liver:  Liver appears normal in size.  Portal venous system: Normal.  Gallbladder: Gallbladder is surgically absent.  Biliary tree intrahepatic ducts: Unremarkable.  Biliary tree extrahepatic ducts: There appear to be numerous intraluminal filling defects in the mid to distal common bile duct series image 16 series 3.  Ampullary region: No obvious obstructive mass or stone.  Spleen: Unremarkable.  Pancreas: There is intermediate intrasubstance T2 signal in the pancreatic body (image 19 series 4 and 6) with corresponding restricted diffusion signal on DWI (image 136 series 7), relating to edema changes. There is stable free fluid collection in the bilateral anterior pararenal space extending to the adjacent mesocolon, small bowel mesentery, perihepatic and perisplenic regions. T2 stranding intensities are seen along the lesser sac. These findings are reflective of acute interstitial pancreatitis.  Pancreatic duct: Unremarkable.  Adrenal glands: Unremarkable.  Kidneys: There are probable cysts in both kidneys, with the larger seen in the right mid pole region measuring 1.0 cm (image 29 series 4).  Ureters: Unremarkable.  Stomach and distal esophagus: Normal.  Small bowel: Normal.  Colon: Normal.  Lymph nodes: A 1cm lymph node is demonstrated in the mo hepatis region (image 18 series 4), likely reactive in nature.  Abdominal wall: Normal.  Systemic arteries and veins: Unremarkable.  Osseous structures: There is mild spondylolytic changes in the imaged spine.    Miscellaneous: There are motion artifacts in some of the sequences limiting its evaluation.                                         CT Abdomen Pelvis With IV Contrast NO Oral Contrast (Final result)  Result time 12/16/23 20:08:18      Final result by Denver Wagner MD (12/16/23 20:08:18)                   Impression:      1. Severe acute pancreatitis.    2. Gastric antrum and duodenal mural thickening likely represents reactive change for acute pancreatitis.    3.  Right large hydrocele.      Electronically signed by: Denver Wagner  Date:    12/16/2023  Time:    20:08               Narrative:    EXAMINATION:  CT ABDOMEN PELVIS WITH IV CONTRAST    CLINICAL HISTORY:  Abdominal pain, acute, nonlocalized;    TECHNIQUE:  Multidetector axial images were obtained of the abdomen and pelvis following the administration of IV contrast. Oral contrast was not administered.    Dose length product of 578 mGycm. Automated exposure control was utilized to minimize radiation dose.    COMPARISON:  August 4, 2023.    FINDINGS:  Included portion of the lungs show dependent hypoventilatory changes without acute air space infiltrates or fluid within the pleural spaces.    Liver is remarkable for steatosis without focal space occupying lesion.  There is small amount of free fluid around the anterolateral surface of the liver.  Gallbladder is surgically absent.  Pancreas is surrounded by considerable phlegmons and fluid which extend into the anterior pararenal spaces and further into the lower abdomen consistent with acute pancreatitis.  There is no suggestion of pancreatic necrosis, pseudocyst or abscess formation.  Spleen is unremarkable.    The adrenal glands appear within normal limits. The kidneys are unremarkable in size and contour. No solid or cystic renal lesion identified. There is no hydronephrosis. No perinephric fluid strandings or collections identified.    Stomach is nondistended.  There is distal esophageal mural thickening which probably result of reflux disease with about similar appearance.  There is some mural thickening of the gastric antrum and the descending colon which may represent reactive change from acute pancreatitis.  Possible primary gastritis and duodenitis is not excluded.  No abnormal dilatation of loops of small bowel.  Colon is nondistended.  No bowel obstruction.  Distal descending and sigmoid colon noninflamed diverticulosis coli.    Urinary bladder appears within  normal limits.  Prostate is enlarged in size and contains few calcifications.  There is large right hydrocele which extends into right inguinal canal.  Is no pelvic free fluid.    No acute or otherwise osseous abnormality identified.                                       US Scrotum And Testicles (Final result)  Result time 12/16/23 19:21:53      Final result by Denver Wagner MD (12/16/23 19:21:53)                   Impression:      1. Unremarkable right testicle    2. Large right hydrocele which crosses the midline and causes compressive effect upon the left testicle which is inferiorly deviated.  Due to pressure caused by the hydrocele upon the left testicle optimal Doppler flow to the left testicle could not be obtained.  Only limited arterial flow to the peripheral aspect left testicle could be visualized.  Please correlate clinically.  Oneoption is to perform a follow-up study post drainage of large right hydrocele.      Electronically signed by: Denver Wagner  Date:    12/16/2023  Time:    19:21               Narrative:    EXAMINATION:  US SCROTUM AND TESTICLES    CLINICAL HISTORY:  Other specified disorders of the male genital organs    TECHNIQUE:  Multiple real-time images were performed of the scrotum in various planes by the sonographer.    COMPARISON:  None available    FINDINGS:  Right testicle measures 3.3 x 2.4 x 2.4 cm.  There is unremarkable echotexture of the right testicle.  Unremarkable arteriovenous flow to the right testicle.    There is large right scrotal hydrocele which measures 10.6 x 7.2 x 8.5 cm.  Right hydrocele causes mass effect upon left testicle which is deviated inferiorly.  This made it difficult to optimally assess vascularity to the left testicle due to pressure caused by the hydrocele.  Some vascularity along the peripheral aspect of less testicle was visualized.  Left testicle measures 3.4 x 2.0 x 2.0 cm and no abnormality of the parenchymal echotexture identified.                                        Impression  1. Enterococcus sepsis  2. Severe acute pancreatitis  3.  Cholelithiasis/cholangitis   4.  Obstructive jaundice  5.  Acute kidney injury/chronic kidney disease  6.  Severe leukocytosis   7. Ascites/pleural effusions      Recommendations  I agree with the management of this patient.  History of multiple medical problems, presenting with abdominal pain and associated sepsis syndrome with severe leukocytosis as well as findings of cholestasis and CT imaging of pancreatitis, consistent with a likely cholangitis as a source of infection.  He has been seen by the GI team and is status post source control by way of ERCP with removal of stones and sludge.  We will follow repeat blood cultures and continue antibiotic coverage with ampicillin with a optimized dosing and can discontinue Flagyl.  Renal impairment noted and he has been seen by Nephrology as well with inputs noted.  We will follow with labs in a.m.  Guarded prognosis.  Case is discussed at length with patient and wife, questions solicited and answered.  I have also discussed the case with nursing staff.  I would like to thank the team very much for the opportunity to assist in the care of this patient.

## 2023-12-21 NOTE — PROGRESS NOTES
"Gastroenterology Progress Note    Subjective:  Patient with continues with improvement in abdominal pain. Ate broth today with no n/v or pain. Complaining of hiccups.     AST and ALT improving. T bili lower at 2.8. Alk phos remains wnl. Leukocytosis stable at 18.42. Hypocalcemia improving. Some hypertension up to 156/83. On 5L O2. Afebrile.     Objective:  ROS:    Review of Systems   Constitutional:  Negative for chills, fever and malaise/fatigue.   HENT:  Negative for sinus pain.    Eyes:  Negative for redness.   Respiratory:  Positive for shortness of breath. Negative for wheezing.    Cardiovascular:  Negative for chest pain.   Gastrointestinal:  Negative for abdominal pain, blood in stool, constipation, diarrhea, heartburn, melena, nausea and vomiting.   Musculoskeletal:  Negative for neck pain.   Skin:  Negative for rash.   Neurological:  Negative for dizziness, weakness and headaches.   Psychiatric/Behavioral:  Negative for memory loss.          Vital Signs:  BP (!) 154/78   Pulse 98   Temp 98.1 °F (36.7 °C) (Oral)   Resp 20   Ht 5' 6" (1.676 m)   Wt 73.2 kg (161 lb 6 oz)   SpO2 96%   BMI 26.05 kg/m²   Body mass index is 26.05 kg/m².    Physical Exam:    Physical Exam  Constitutional:       General: He is not in acute distress.     Appearance: Normal appearance. He is not ill-appearing.   Eyes:      General: No scleral icterus.     Extraocular Movements: Extraocular movements intact.      Conjunctiva/sclera: Conjunctivae normal.      Pupils: Pupils are equal, round, and reactive to light.   Cardiovascular:      Rate and Rhythm: Normal rate and regular rhythm.   Pulmonary:      Effort: No respiratory distress.      Breath sounds: Normal breath sounds. No wheezing.   Abdominal:      General: Bowel sounds are normal. There is no distension.      Palpations: Abdomen is soft. There is no mass.      Tenderness: There is no abdominal tenderness. There is no guarding.   Musculoskeletal:      Right lower leg: No " edema.      Left lower leg: No edema.   Skin:     General: Skin is warm and dry.      Coloration: Skin is not jaundiced.   Neurological:      Mental Status: He is alert and oriented to person, place, and time.   Psychiatric:         Mood and Affect: Mood normal.         Behavior: Behavior normal.         Labs:  Recent Results (from the past 24 hour(s))   POCT glucose    Collection Time: 12/20/23  5:09 PM   Result Value Ref Range    POCT Glucose 115 (H) 70 - 110 mg/dL   POCT glucose    Collection Time: 12/21/23  1:05 AM   Result Value Ref Range    POCT Glucose 123 (H) 70 - 110 mg/dL   Comprehensive Metabolic Panel    Collection Time: 12/21/23  4:43 AM   Result Value Ref Range    Sodium Level 136 136 - 145 mmol/L    Potassium Level 3.2 (L) 3.5 - 5.1 mmol/L    Chloride 97 (L) 98 - 107 mmol/L    Carbon Dioxide 27 23 - 31 mmol/L    Glucose Level 119 (H) 82 - 115 mg/dL    Blood Urea Nitrogen 44.6 (H) 8.4 - 25.7 mg/dL    Creatinine 1.32 (H) 0.73 - 1.18 mg/dL    Calcium Level Total 7.5 (L) 8.8 - 10.0 mg/dL    Protein Total 4.8 (L) 5.8 - 7.6 gm/dL    Albumin Level 2.2 (L) 3.4 - 4.8 g/dL    Globulin 2.6 2.4 - 3.5 gm/dL    Albumin/Globulin Ratio 0.8 (L) 1.1 - 2.0 ratio    Bilirubin Total 2.8 (H) <=1.5 mg/dL    Alkaline Phosphatase 74 40 - 150 unit/L    Alanine Aminotransferase 55 0 - 55 unit/L    Aspartate Aminotransferase 58 (H) 5 - 34 unit/L    eGFR 59 mls/min/1.73/m2   CBC with Differential    Collection Time: 12/21/23  4:43 AM   Result Value Ref Range    WBC 18.42 (H) 4.50 - 11.50 x10(3)/mcL    RBC 4.43 (L) 4.70 - 6.10 x10(6)/mcL    Hgb 13.6 (L) 14.0 - 18.0 g/dL    Hct 39.2 (L) 42.0 - 52.0 %    MCV 88.5 80.0 - 94.0 fL    MCH 30.7 27.0 - 31.0 pg    MCHC 34.7 33.0 - 36.0 g/dL    RDW 14.0 11.5 - 17.0 %    Platelet 139 130 - 400 x10(3)/mcL    MPV 10.8 (H) 7.4 - 10.4 fL    Neut % 80.1 %    Lymph % 6.7 %    Mono % 10.7 %    Eos % 0.1 %    Basophil % 0.4 %    Lymph # 1.24 0.6 - 4.6 x10(3)/mcL    Neut # 14.75 (H) 2.1 - 9.2  x10(3)/mcL    Mono # 1.97 (H) 0.1 - 1.3 x10(3)/mcL    Eos # 0.02 0 - 0.9 x10(3)/mcL    Baso # 0.07 <=0.2 x10(3)/mcL    IG# 0.37 (H) 0 - 0.04 x10(3)/mcL    IG% 2.0 %    NRBC% 0.0 %   Clostridium Diff Toxin, A & B, EIA    Collection Time: 12/21/23  9:02 AM    Specimen: Stool   Result Value Ref Range    Clostridium Difficile GDH Antigen Negative Negative    Clostridium Difficile Toxin A/B Negative Negative   POCT glucose    Collection Time: 12/21/23 11:54 AM   Result Value Ref Range    POCT Glucose 129 (H) 70 - 110 mg/dL         Assessment/Plan:  This is a 68 y.o. male unknown to our group with PMH of T2DM, HTN, HLD, chronic scrotal hydrocele, vitamin D deficiency, CKD 3a. Presented to the ED 12/16/23 with severe epigastric pain onset same day with radiation to back, associated n/v and chills also reported.   GI consulted for choledocholithiasis. Underwent ERCP yesterday with sludge and stone removal.     Choledocholithiasis s/p ERCP  Gallstone pancreatitis  Hyperbilirubinemia  Transaminitis--improving  Tbili 6.0--4.3--3.6--2.8   -- 229--225--151--94--63--58   -- 217--231--182--129--75--55  alk phos wnl     S/p ERCP 12/17: lower 3rd of main bile duct mildky dilated with stone causing obstruiction. Choledocholithiasis found. Complete removal with biliary sphincterotomy and balloon extraction. Biliary tree swept and sludge found.     - continue supportive care  - PPI BID  - ADAT  - doing much better in regards to pancreatitis. LFTs almost normalized.    No further GI recs at this time. GI will sign off. Please call us back as needed.      Kalyn Willard PA-C  Gastroenterology  [facility]

## 2023-12-22 LAB
ABS NEUT (OLG): 16.01 X10(3)/MCL (ref 2.1–9.2)
ALBUMIN SERPL-MCNC: 2 G/DL (ref 3.4–4.8)
BASOPHILS NFR BLD MANUAL: 0.2 X10(3)/MCL (ref 0–0.2)
BASOPHILS NFR BLD MANUAL: 1 %
BUN SERPL-MCNC: 33.3 MG/DL (ref 8.4–25.7)
CALCIUM SERPL-MCNC: 7.3 MG/DL (ref 8.8–10)
CHLORIDE SERPL-SCNC: 99 MMOL/L (ref 98–107)
CO2 SERPL-SCNC: 26 MMOL/L (ref 23–31)
CREAT SERPL-MCNC: 1.05 MG/DL (ref 0.73–1.18)
ERYTHROCYTE [DISTWIDTH] IN BLOOD BY AUTOMATED COUNT: 14.3 % (ref 11.5–17)
GFR SERPLBLD CREATININE-BSD FMLA CKD-EPI: >60 MLS/MIN/1.73/M2
GLUCOSE SERPL-MCNC: 110 MG/DL (ref 82–115)
HCT VFR BLD AUTO: 41 % (ref 42–52)
HGB BLD-MCNC: 13.8 G/DL (ref 14–18)
INSTRUMENT WBC (OLG): 19.53 X10(3)/MCL
LYMPHOCYTES NFR BLD MANUAL: 0.59 X10(3)/MCL
LYMPHOCYTES NFR BLD MANUAL: 3 %
MACROCYTES BLD QL SMEAR: ABNORMAL
MAGNESIUM SERPL-MCNC: 2.1 MG/DL (ref 1.6–2.6)
MCH RBC QN AUTO: 31 PG (ref 27–31)
MCHC RBC AUTO-ENTMCNC: 33.7 G/DL (ref 33–36)
MCV RBC AUTO: 92.1 FL (ref 80–94)
MONOCYTES NFR BLD MANUAL: 11 %
MONOCYTES NFR BLD MANUAL: 2.15 X10(3)/MCL (ref 0.1–1.3)
MYELOCYTES NFR BLD MANUAL: 4 %
NEUTROPHILS NFR BLD MANUAL: 82 %
NRBC BLD AUTO-RTO: 0 %
PHOSPHATE SERPL-MCNC: 2 MG/DL (ref 2.3–4.7)
PLATELET # BLD AUTO: 168 X10(3)/MCL (ref 130–400)
PLATELET # BLD EST: ADEQUATE 10*3/UL
PMV BLD AUTO: 10.9 FL (ref 7.4–10.4)
POCT GLUCOSE: 110 MG/DL (ref 70–110)
POCT GLUCOSE: 119 MG/DL (ref 70–110)
POTASSIUM SERPL-SCNC: 4.5 MMOL/L (ref 3.5–5.1)
RBC # BLD AUTO: 4.45 X10(6)/MCL (ref 4.7–6.1)
RBC MORPH BLD: ABNORMAL
SODIUM SERPL-SCNC: 135 MMOL/L (ref 136–145)
WBC # SPEC AUTO: 19.53 X10(3)/MCL (ref 4.5–11.5)

## 2023-12-22 PROCEDURE — 85027 COMPLETE CBC AUTOMATED: CPT | Performed by: INTERNAL MEDICINE

## 2023-12-22 PROCEDURE — 25000003 PHARM REV CODE 250: Performed by: HOSPITALIST

## 2023-12-22 PROCEDURE — 21400001 HC TELEMETRY ROOM

## 2023-12-22 PROCEDURE — 11000001 HC ACUTE MED/SURG PRIVATE ROOM

## 2023-12-22 PROCEDURE — 63600175 PHARM REV CODE 636 W HCPCS: Performed by: HOSPITALIST

## 2023-12-22 PROCEDURE — 63600175 PHARM REV CODE 636 W HCPCS

## 2023-12-22 PROCEDURE — 80069 RENAL FUNCTION PANEL: CPT | Performed by: STUDENT IN AN ORGANIZED HEALTH CARE EDUCATION/TRAINING PROGRAM

## 2023-12-22 PROCEDURE — C9113 INJ PANTOPRAZOLE SODIUM, VIA: HCPCS

## 2023-12-22 PROCEDURE — 27000221 HC OXYGEN, UP TO 24 HOURS

## 2023-12-22 PROCEDURE — 27000207 HC ISOLATION

## 2023-12-22 PROCEDURE — 25000003 PHARM REV CODE 250: Performed by: INTERNAL MEDICINE

## 2023-12-22 PROCEDURE — 25000003 PHARM REV CODE 250: Performed by: STUDENT IN AN ORGANIZED HEALTH CARE EDUCATION/TRAINING PROGRAM

## 2023-12-22 PROCEDURE — 83735 ASSAY OF MAGNESIUM: CPT | Performed by: INTERNAL MEDICINE

## 2023-12-22 PROCEDURE — 25000003 PHARM REV CODE 250: Performed by: NURSE PRACTITIONER

## 2023-12-22 PROCEDURE — 63600175 PHARM REV CODE 636 W HCPCS: Performed by: INTERNAL MEDICINE

## 2023-12-22 RX ORDER — LOPERAMIDE HYDROCHLORIDE 2 MG/1
2 CAPSULE ORAL ONCE
Status: COMPLETED | OUTPATIENT
Start: 2023-12-22 | End: 2023-12-22

## 2023-12-22 RX ORDER — DICYCLOMINE HYDROCHLORIDE 10 MG/ML
20 INJECTION INTRAMUSCULAR 4 TIMES DAILY PRN
Status: DISCONTINUED | OUTPATIENT
Start: 2023-12-22 | End: 2024-01-04 | Stop reason: HOSPADM

## 2023-12-22 RX ORDER — DICYCLOMINE HYDROCHLORIDE 10 MG/ML
20 INJECTION INTRAMUSCULAR 4 TIMES DAILY
Status: DISCONTINUED | OUTPATIENT
Start: 2023-12-22 | End: 2023-12-22

## 2023-12-22 RX ADMIN — AMPICILLIN 2 G: 2 INJECTION, POWDER, FOR SOLUTION INTRAVENOUS at 01:12

## 2023-12-22 RX ADMIN — METRONIDAZOLE 500 MG: 5 INJECTION, SOLUTION INTRAVENOUS at 02:12

## 2023-12-22 RX ADMIN — METRONIDAZOLE 500 MG: 5 INJECTION, SOLUTION INTRAVENOUS at 06:12

## 2023-12-22 RX ADMIN — AMPICILLIN 2 G: 2 INJECTION, POWDER, FOR SOLUTION INTRAVENOUS at 06:12

## 2023-12-22 RX ADMIN — METOPROLOL SUCCINATE 25 MG: 25 TABLET, EXTENDED RELEASE ORAL at 09:12

## 2023-12-22 RX ADMIN — PANTOPRAZOLE SODIUM 40 MG: 40 INJECTION, POWDER, FOR SOLUTION INTRAVENOUS at 05:12

## 2023-12-22 RX ADMIN — AMPICILLIN 2 G: 2 INJECTION, POWDER, FOR SOLUTION INTRAVENOUS at 11:12

## 2023-12-22 RX ADMIN — FUROSEMIDE 40 MG: 40 TABLET ORAL at 09:12

## 2023-12-22 RX ADMIN — OXYCODONE HYDROCHLORIDE 5 MG: 5 TABLET ORAL at 01:12

## 2023-12-22 RX ADMIN — POTASSIUM CHLORIDE 20 MEQ: 14.9 INJECTION, SOLUTION INTRAVENOUS at 04:12

## 2023-12-22 RX ADMIN — AMPICILLIN 2 G: 2 INJECTION, POWDER, FOR SOLUTION INTRAVENOUS at 07:12

## 2023-12-22 RX ADMIN — TRAZODONE HYDROCHLORIDE 25 MG: 50 TABLET ORAL at 09:12

## 2023-12-22 RX ADMIN — METRONIDAZOLE 500 MG: 5 INJECTION, SOLUTION INTRAVENOUS at 09:12

## 2023-12-22 RX ADMIN — PANTOPRAZOLE SODIUM 40 MG: 40 INJECTION, POWDER, FOR SOLUTION INTRAVENOUS at 06:12

## 2023-12-22 RX ADMIN — LOPERAMIDE HYDROCHLORIDE 2 MG: 2 CAPSULE ORAL at 05:12

## 2023-12-22 NOTE — PHYSICIAN QUERY
PT Name: Franklin Macias  MR #: [id number]     DOCUMENTATION CLARIFICATION      CDS/: Liudmila Ramirez               Contact information: [email or web address]  This form is a permanent document in the medical record.    Query Date: December 22, 2023    By submitting this query, we are merely seeking further clarification of documentation to reflect the severity of illness of your patient. Please utilize your independent clinical judgment when addressing the question(s) below.     The Medical Record contains the following:   Indicators   Supporting Clinical Findings Location in Medical Record   x Documentation of Sepsis, Septic Shock Severe sepsis   Bacteremia, probable Streptococcus    [facility] PN 12/22     x Blood Culture Blood cultures noted to be positive, ordered repeat cultures, so far negative       Blood Culture: Enterococcus faecalis  Gram Stain: Gram Positive Cocci, probable Streptococcus  Seen in gram stain of broth only  1 of 2 Anaerobic bottles positive   [facility] PN 12/22      Blood Culture              Respiratory Culture      Urine Culture      Other Culture      Acute/Chronic Illness     x Medication/Treatment Continue current antibiotics.  Monitor fever curve.  Continue to monitor leukocytosis.   Infectious Disease was consulted    [facility] PN 12/22      Other        Provider, please further specify the _______ diagnosis.  Select all that apply:   [   ] Due to Enterococcus faecalis   [   ] Due to Streptococcus   [   ] Due to (please specify): __________________________________   [   ] Other specification (please specify): ____________________   [   x] Clinically Undetermined         Please document in your progress notes daily for the duration of treatment until resolved, and include in your discharge summary.  Form No. [id number]

## 2023-12-22 NOTE — NURSING
Patient had diarrhea accident on himself and asked for Imodium. Patient is negative for C.Diff. NP Kate Estes was notified. NP ordered Imodium x 1 dose.

## 2023-12-22 NOTE — PROGRESS NOTES
[facility]  Hospital Medicine Progress Note        Chief Complaint: Inpatient Follow-up abdominal pain    HPI:   68-year-old male with medical history of T2DM, hypertension, hyperlipidemia and prior cholecystectomy present to the ED with complaint of severe epigastric abdominal pain that started today around 9:00 a.m. after breakfast, the pain is severe 10/10 and radiate to his back associated with nausea and vomiting.  Report last bowel movement was this morning.  Reports chills but denies fever.  He has chronic scrotal hydrocele that has not changed or painful.     On arrival to ED he was afebrile, tachycardic, normotensive and saturating 96% on room air.  Labs notable for WBC 24.97, hemoglobin 18.2, creatinine 2.0, BUN 16.5, total bilirubin 2.7, direct 1.9, , , lipase more than 3000, triglyceride 183.  CT abdomen and pelvis show finding of severe acute pancreatitis without evidence of necrosis or abscess or fluid collection.  Liver remarkable for steatosis, gallbladder surgically absent, no comment on biliary tree.     He was given 2 L of IV fluids, IV Zosyn, IV analgesics and referred to hospital medicine service for further evaluation and management.  Patient was taken for ERCP on 12/17.  Choledocholithiasis was resolved.  Also had sphincterotomy performed.  Returned to the floor in stable condition.  GI recommending advancement of diet to clear liquids in 4-6 hours     Interval Hx:  Patient was seen and examined.  Abdominal discomfort is improving, no nausea or vomiting, able to have bowel movements.  GI continues to follow.  Denies any fevers or chills.  Continues to be requiring oxygen.  Nephrology continues to follow,  Continues to be on  Lasix.    Echocardiogram ordered-EF 55- 60 %, grade 1 diastolic dysfunction.  Follow up chest x-ray-Small right effusion is slightly increased in the interval.  Repeat blood cultures so far negative.      Chart was reviewed,  afebrile, slight intermittent elevation in heart rate and blood pressure noted.  Most recent labs were reviewed.  WBC-17> 18.  Hemoglobin 13.  Potassium 3.2.  Creatinine 2.1> 1.3; transaminitis improving.    Objective/physical exam:  General: In no acute distress, afebrile  Chest:  Crackles  Heart: RRR, +S1, S2, no appreciable murmur  Abdomen: Soft, nontender, BS +  MSK: Warm, no lower extremity edema, no clubbing or cyanosis  Neurologic: Alert and oriented x4, Cranial nerve II-XII intact, Strength 5/5 in all 4 extremities       VITAL SIGNS: 24 HRS MIN & MAX LAST   Temp  Min: 98.1 °F (36.7 °C)  Max: 98.4 °F (36.9 °C) 98.1 °F (36.7 °C)   BP  Min: 131/82  Max: 156/83 (!) 154/78   Pulse  Min: 95  Max: 109  98   Resp  Min: 18  Max: 20 20   SpO2  Min: 93 %  Max: 97 % 96 %       Recent Labs   Lab 12/19/23  0429 12/20/23  0645 12/21/23  0443   WBC 17.86* 18.34* 18.42*   RBC 5.17 4.63* 4.43*   HGB 15.7 13.9* 13.6*   HCT 48.1 42.4 39.2*   MCV 93.0 91.6 88.5   MCH 30.4 30.0 30.7   MCHC 32.6* 32.8* 34.7   RDW 14.0 13.9 14.0    136 139   MPV 10.8* 10.5* 10.8*       Recent Labs   Lab 12/17/23  0352 12/18/23  0357 12/18/23  0859 12/18/23  1253 12/19/23  0429 12/20/23  0645 12/20/23  1015 12/21/23  0443      < >  --    < > 137 137  --  136   K 3.9   < >  --    < > 3.5 3.3*  --  3.2*   CO2 19*   < >  --    < > 23 26  --  27   BUN 19.9   < >  --    < > 53.0* 44.9*  --  44.6*   CREATININE 2.29*   < >  --    < > 2.16* 1.41*  --  1.32*   CALCIUM 8.5*   < >  --    < > 6.9* 7.3*  --  7.5*   PH  --   --  7.350  --   --   --  7.500*  --    MG 1.60  --   --   --   --   --   --   --    ALBUMIN 3.4   < >  --    < > 2.3* 2.1*  --  2.2*   ALKPHOS 77   < >  --    < > 70 67  --  74   *   < >  --    < > 129* 75*  --  55   *   < >  --    < > 94* 63*  --  58*   BILITOT 3.5*   < >  --    < > 4.3* 3.6*  --  2.8*    < > = values in this interval not displayed.          Microbiology Results (last 7 days)       Procedure  Component Value Units Date/Time    Blood Culture [[id number]]  (Normal) Collected: 12/20/23 1046    Order Status: Completed Specimen: Blood from Arm, Right Updated: 12/21/23 1201     CULTURE, BLOOD ([facility]) No Growth At 24 Hours    Blood Culture [[id number]]  (Normal) Collected: 12/20/23 1045    Order Status: Completed Specimen: Blood from Antecubital, Left Updated: 12/21/23 1201     CULTURE, BLOOD ([facility]) No Growth At 24 Hours    Clostridium Diff Toxin, A & B, EIA [[id number]]  (Normal) Collected: 12/21/23 0902    Order Status: Completed Specimen: Stool Updated: 12/21/23 1101     Clostridium Difficile GDH Antigen Negative     Clostridium Difficile Toxin A/B Negative    Blood culture #1 **CANNOT BE ORDERED STAT** [[id number]]  (Normal) Collected: 12/16/23 2017    Order Status: Completed Specimen: Blood from Antecubital, Right Updated: 12/20/23 2100     CULTURE, BLOOD ([facility]) No Growth At 96 Hours    Blood culture #2 **CANNOT BE ORDERED STAT** [[id number]]  (Abnormal)  (Susceptibility) Collected: 12/16/23 2017    Order Status: Completed Specimen: Blood from Arm, Right Updated: 12/19/23 0658     CULTURE, BLOOD ([facility]) Enterococcus faecalis     Comment: Ampicillin results may be used to predict susceptibility to amoxicillin-clavulanate, ampicillin-sulbactam, and piperacillin-tazobactam.        GRAM STAIN Gram Positive Cocci, probable Streptococcus      Seen in gram stain of broth only      1 of 2 Anaerobic bottles positive    BCID2 Panel [[id number]]  (Abnormal) Collected: 12/16/23 2017    Order Status: Completed Specimen: Blood from Arm, Right Updated: 12/17/23 1720     CTX-M (ESBL ) N/A     IMP (Cabapenemase ) N/A     KPC resistance gene (Carbapenemase ) N/A     mcr-1 N/A     mecA ID N/A     Comment: Note: Antimicrobial resistance can occur via multiple mechanisms. A Not Detected result for antimicrobial resistance gene(s) does not indicate antimicrobial susceptibility. Subculturing is required for  species identification and susceptibility testing of   isolates.        mecA/C and MREJ (MRSA) gene N/A     NDM (Carbapenemase ) N/A     OXA-48-like (Carbapenemase ) N/A     Yola/B (VRE gene) Not Detected     VIM (Carbapenemase ) N/A     Enterococcus faecalis Detected     Enterococcus faecium Not Detected     Listeria monocytogenes Not Detected     Staphylococcus spp. Not Detected     Staphylococcus aureus Not Detected     Staphylococcus epidermidis Not Detected     Staphylococcus lugdunensis Not Detected     Streptococcus spp. Not Detected     Streptococcus agalactiae (Group B) Not Detected     Streptococcus pneumoniae Not Detected     Streptococcus pyogenes (Group A) Not Detected     Acinetobacter calcoaceticus/baumannii complex Not Detected     Bacteroides fragilis Not Detected     Enterobacterales Not Detected     Enterobacter cloacae complex Not Detected     Escherichia coli Not Detected     Klebsiella aerogenes Not Detected     Klebsiella oxytoca Not Detected     Klebsiella pneumoniae group Not Detected     Proteus spp. Not Detected     Salmonella spp. Not Detected     Serratia marcescens Not Detected     Haemophilus influenzae Not Detected     Neisseria meningitidis Not Detected     Pseudomonas aeruginosa Not Detected     Stenotrophomonas maltophilia Not Detected     Candida albicans Not Detected     Candida auris Not Detected     Candida glabrata Not Detected     Candida krusei Not Detected     Candida parapsilosis Not Detected     Candida tropicalis Not Detected     Cryptococcus neoformans/gattii Not Detected    Narrative:      The [facility] eto BCID2 Panel is a multiplexed nucleic acid test intended for the use with [employer]® 2.0 or [employer]® [employer] for the simultaneous qualitative detection and identification of multiple bacterial and yeast nucleic acids and select genetic determinants associated with antimicrobial resistance.  The [facility] eto BCID2 Panel test is  performed directly on blood culture samples identified as positive by a continuous monitoring blood culture system.  Results are intended to be interpreted in conjunction with Gram stain results.             Radiology:  Echo    Left Ventricle: The left ventricle is normal in size. Normal wall   thickness. Normal wall motion. There is normal systolic function with a   visually estimated ejection fraction of 55 - 60%. Grade I diastolic   dysfunction.    Right Ventricle: Normal right ventricular cavity size. Systolic   function is normal.    Aortic Valve: The aortic valve is a trileaflet valve.    Pericardium: There is a trivial effusion. No indication of cardiac   tamponade.    Technically difficult study due to lung interference.  X-Ray Chest 1 View  Narrative: EXAMINATION:  XR CHEST 1 VIEW    CLINICAL HISTORY:  follow up;    TECHNIQUE:  Single view of the chest    COMPARISON:  12/18/2023    FINDINGS:  Cardiomegaly is unchanged.  Small right effusion is slightly increased in the interval.  No acute osseous abnormality.  Impression: As above.    Electronically signed by: Cristhian Lara  Date:    12/20/2023  Time:    09:53      Scheduled Med:   ampicillin IVPB  2 g Intravenous Q6H    dicyclomine  20 mg Intramuscular QID    enoxparin  30 mg Subcutaneous Q24H (prophylaxis, 1700)    furosemide  40 mg Oral Daily    metoprolol succinate  25 mg Oral Daily    metronidazole  500 mg Intravenous Q8H    pantoprazole  40 mg Intravenous BID AC    potassium chloride  40 mEq Oral BID        Continuous Infusions:       PRN Meds:  acetaminophen, acetaminophen, aluminum-magnesium hydroxide-simethicone, dextrose 10%, dextrose 10%, glucagon (human recombinant), hydrALAZINE, HYDROmorphone, insulin aspart U-100, labetalol, melatonin, ondansetron, oxyCODONE, polyethylene glycol, prochlorperazine, senna-docusate 8.6-50 mg, sodium chloride 0.9%       Assessment/Plan:   Acute severe pancreatitis- suspect biliary  Cholangitis,  acute  choledocholithiasis status post ERCP  Severe sepsis  SHA superimposed on CKD 3A  Acute hypoxic respiratory failure requiring supplemental oxygen secondary to pulmonary edema/pleural effusions  Bacteremia, probable Streptococcus     History of T2DM, hypertension, hyperlipidemia, GERD    GI following.  Underwent ERCP with sludge and stone removal.  Lipase improving.  Symptoms improving.  Able to tolerate the diet.    Transaminitis improving.Continue current antibiotics.  Monitor fever curve.  Continue to monitor leukocytosis.    Advance diet as on tolerated  Nephrology following.  On diuretics, now switching to oral.  Strict Is&Os, monitor urine output, daily weights.  Monitor electrolytes and replete them as needed while on diuretics..  Ordered potassium and magnesium today.  Continue oxygen supplementation.  Encourage incentive spirometry. Followed up on echocardiogram.  Followed up Chest x-ray.   Blood cultures noted to be positive, ordered repeat cultures, so far negative..  Infectious Disease was consulted, on further recommendations/investigations if any  Continue supportive care appropriate home medications.  On Sliding scale with fingersticks a.c. HS for management of diabetes in-house.  Therapy services if able to wean down on oxygen    DVT prophylaxis-Lovenox      Anticipated discharge-to be decided, once able to wean off of oxygen and able to tolerate the diet, pending ID recommendations and normalization of leukocytosis.  Per PT evaluation    Critical care diagnosis: sepsis, iv antibiotics, acute hypoxic respiratory failure requiring oxygen  Critical care interventions: hands on evaluation, review of labs/radiographs/records and discussions with family  Critical care time spent: >32 minutes        Teresa Anthony MD   12/21/2023     All diagnosis and differential diagnosis have been reviewed; assessment and plan has been documented; I have personally reviewed the labs and test results that are  presently available; I have reviewed the patients medication list; I have reviewed the consulting providers response and recommendations. I have reviewed or attempted to review medical records based upon their availability    All of the patient's questions have been  addressed and answered. Patient's is agreeable to the above stated plan. I will continue to monitor closely and make adjustments to medical management as needed.  _____________________________________________________________________

## 2023-12-22 NOTE — NURSING
Patient had another diarrhea episode and requested more than one dose Imodium prn. PETERSON Estes was notified. NP ordered Imodium x 1 dose.

## 2023-12-22 NOTE — PROGRESS NOTES
Nephrology consult follow up note    HPI:      Franklin Macias is a 68 y.o. male admitted on 12/16/2023 with acute pancreatitis. He underwent emergent ERCP on 12/17 for choledocholithiasis. Patient did have some renal insufficiency on admission which has continued to worsen. He has been hydrated and now noted to have pulmonary vascular congestion per CXR done this morning. Patient is voiding well. He was evaluated by nephrology once many years ago for gross hematuria which has not been a recurring issue. No stones, UTI or NSAID use. He was given IVF, but developed pulmonary edema. Nephrology consulted for SHA.     Interval history:     No acute events overnight.  No new complaint is still making fair urine output.  Feels like his respiratory status has improved, however, he remains on 5 L nasal cannula O2.  No chest pain, abdominal pain, nausea, vomiting.    Objective:       VITAL SIGNS: 24 HR MIN & MAX LAST    Temp  Min: 98 °F (36.7 °C)  Max: 98.4 °F (36.9 °C)  98.3 °F (36.8 °C)        BP  Min: 129/87  Max: 155/84  (!) 155/84     Pulse  Min: 96  Max: 106  96     Resp  Min: 18  Max: 20  18    SpO2  Min: 95 %  Max: 98 %  98 %      GEN: Well appearing WM in NAD  HEENT: Conjunctiva anicteric, pupils equal   CV: RRR  without rub.  PULM: CTAB, unlabored, 5L NC O2  ABD: Soft, tender abdomen with hypoactive bowel sounds  EXT:  Trace lower extremity edema  SKIN: Warm and dry  PSYCH: Awake, alert and appropriately conversant.               Component Value Date/Time     (L) 12/22/2023 0417     12/21/2023 0443    K 4.5 12/22/2023 0417    K 3.2 (L) 12/21/2023 0443    CHLORIDE 99 12/22/2023 0417    CHLORIDE 97 (L) 12/21/2023 0443    CO2 26 12/22/2023 0417    CO2 27 12/21/2023 0443    BUN 33.3 (H) 12/22/2023 0417    BUN 44.6 (H) 12/21/2023 0443    CREATININE 1.05 12/22/2023 0417    CREATININE 1.32 (H) 12/21/2023 0443    CALCIUM 7.3 (L) 12/22/2023 0417    CALCIUM 7.5 (L) 12/21/2023 0443    PHOS 2.0 (L) 12/22/2023 0417             Component Value Date/Time    WBC 19.53 (H) 12/22/2023 0417    WBC 19.53 12/22/2023 0417    WBC 18.42 (H) 12/21/2023 0443    WBC 24.97 12/16/2023 1710    HGB 13.8 (L) 12/22/2023 0417    HGB 13.6 (L) 12/21/2023 0443    HCT 41.0 (L) 12/22/2023 0417    HCT 39.2 (L) 12/21/2023 0443     12/22/2023 0417     12/21/2023 0443         Imaging reviewed      Assessment / Plan:     SHA s/t ATN s/t bacteremia and acute pancreatitis   Acute pancreatitis   Choledocholithiasis s/p emergent ERCP and sphincterotomy   Bacteremia   Pulmonary edema    Plan:  Renal function continues to improve, now normalized.  Continue p.o. Lasix 40 mg daily.  Recommend discharging with this dose of diuretics.  Okay to discharge from a Nephrology standpoint if his respiratory status improves, or he is able to get oxygen at home..  We will get him follow-up in my clinic in a few weeks.    We will sign off at this time.  Please call if we can be of further assistance.

## 2023-12-22 NOTE — PROGRESS NOTES
[facility]  Hospital Medicine Progress Note        Chief Complaint: Inpatient Follow-up abdominal pain    HPI:   68-year-old male with medical history of T2DM, hypertension, hyperlipidemia and prior cholecystectomy present to the ED with complaint of severe epigastric abdominal pain that started today around 9:00 a.m. after breakfast, the pain is severe 10/10 and radiate to his back associated with nausea and vomiting.  Report last bowel movement was this morning.  Reports chills but denies fever.  He has chronic scrotal hydrocele that has not changed or painful.     On arrival to ED he was afebrile, tachycardic, normotensive and saturating 96% on room air.  Labs notable for WBC 24.97, hemoglobin 18.2, creatinine 2.0, BUN 16.5, total bilirubin 2.7, direct 1.9, , , lipase more than 3000, triglyceride 183.  CT abdomen and pelvis show finding of severe acute pancreatitis without evidence of necrosis or abscess or fluid collection.  Liver remarkable for steatosis, gallbladder surgically absent, no comment on biliary tree.     He was given 2 L of IV fluids, IV Zosyn, IV analgesics and referred to hospital medicine service for further evaluation and management.  Patient was taken for ERCP on 12/17.  Choledocholithiasis was resolved.  Also had sphincterotomy performed.  Returned to the floor in stable condition.  GI recommending advancement of diet to clear liquids in 4-6 hours     Interval Hx:  Patient  continues to be requiring oxygen.  Echo with grade 1 diastolic dysfunction, normal systolic function.  Follow up chest x-ray obtained  with small right pleural effusion slightly increased. Nephrology okay to transition to oral Lasix.  GI signed off.Continues to be on antibiotics.Awaiting ID recommendations  for bacteremia.Repeat blood cultures so far negative.    Patient was seen and examined, continues to feel better in terms of GI symptoms except for intermittent diarrhea.   Denies any respiratory complaints, wishes to go home as early as possible.    Chart was reviewed, afebrile, slight intermittent elevation in blood pressure noted.  Most recent labs were reviewed.  WBC-17> 18>19.  Creatinine normalized. transaminitis improving.    Objective/physical exam:  General: In no acute distress, afebrile  Chest:  Crackles, on nasal cannula  Heart: RRR, +S1, S2, no appreciable murmur  Abdomen: Soft, nontender, BS +  MSK: Warm, no lower extremity edema, no clubbing or cyanosis  Neurologic: Alert and oriented x4, Cranial nerve II-XII intact, Strength 5/5 in all 4 extremities       VITAL SIGNS: 24 HRS MIN & MAX LAST   Temp  Min: 98 °F (36.7 °C)  Max: 98.8 °F (37.1 °C) 98.8 °F (37.1 °C)   BP  Min: 129/87  Max: 155/84 (!) 147/87   Pulse  Min: 96  Max: 104  104   Resp  Min: 18  Max: 20 18   SpO2  Min: 95 %  Max: 98 % 98 %       Recent Labs   Lab 12/20/23  0645 12/21/23  0443 12/22/23  0417   WBC 18.34* 18.42* 19.53  19.53*   RBC 4.63* 4.43* 4.45*   HGB 13.9* 13.6* 13.8*   HCT 42.4 39.2* 41.0*   MCV 91.6 88.5 92.1   MCH 30.0 30.7 31.0   MCHC 32.8* 34.7 33.7   RDW 13.9 14.0 14.3    139 168   MPV 10.5* 10.8* 10.9*       Recent Labs   Lab 12/17/23  0352 12/18/23  0357 12/18/23  0859 12/18/23  1253 12/19/23  0429 12/20/23  0645 12/20/23  1015 12/21/23  0443 12/22/23  0417      < >  --    < > 137 137  --  136 135*   K 3.9   < >  --    < > 3.5 3.3*  --  3.2* 4.5   CO2 19*   < >  --    < > 23 26  --  27 26   BUN 19.9   < >  --    < > 53.0* 44.9*  --  44.6* 33.3*   CREATININE 2.29*   < >  --    < > 2.16* 1.41*  --  1.32* 1.05   CALCIUM 8.5*   < >  --    < > 6.9* 7.3*  --  7.5* 7.3*   PH  --   --  7.350  --   --   --  7.500*  --   --    MG 1.60  --   --   --   --   --   --   --  2.10   ALBUMIN 3.4   < >  --    < > 2.3* 2.1*  --  2.2* 2.0*   ALKPHOS 77   < >  --    < > 70 67  --  74  --    *   < >  --    < > 129* 75*  --  55  --    *   < >  --    < > 94* 63*  --  58*  --    BILITOT  3.5*   < >  --    < > 4.3* 3.6*  --  2.8*  --     < > = values in this interval not displayed.          Microbiology Results (last 7 days)       Procedure Component Value Units Date/Time    Blood Culture [[id number]]  (Normal) Collected: 12/20/23 1046    Order Status: Completed Specimen: Blood from Arm, Right Updated: 12/22/23 1201     CULTURE, BLOOD ([facility]) No Growth At 48 Hours    Blood Culture [[id number]]  (Normal) Collected: 12/20/23 1045    Order Status: Completed Specimen: Blood from Antecubital, Left Updated: 12/22/23 1201     CULTURE, BLOOD ([facility]) No Growth At 48 Hours    Stool Culture [[id number]]     Order Status: Sent Specimen: Stool     Blood culture #1 **CANNOT BE ORDERED STAT** [[id number]]  (Normal) Collected: 12/16/23 2017    Order Status: Completed Specimen: Blood from Antecubital, Right Updated: 12/21/23 2100     CULTURE, BLOOD ([facility]) No Growth at 5 days    Clostridium Diff Toxin, A & B, EIA [[id number]]  (Normal) Collected: 12/21/23 0902    Order Status: Completed Specimen: Stool Updated: 12/21/23 1101     Clostridium Difficile GDH Antigen Negative     Clostridium Difficile Toxin A/B Negative    Blood culture #2 **CANNOT BE ORDERED STAT** [[id number]]  (Abnormal)  (Susceptibility) Collected: 12/16/23 2017    Order Status: Completed Specimen: Blood from Arm, Right Updated: 12/19/23 0658     CULTURE, BLOOD ([facility]) Enterococcus faecalis     Comment: Ampicillin results may be used to predict susceptibility to amoxicillin-clavulanate, ampicillin-sulbactam, and piperacillin-tazobactam.        GRAM STAIN Gram Positive Cocci, probable Streptococcus      Seen in gram stain of broth only      1 of 2 Anaerobic bottles positive    BCID2 Panel [[id number]]  (Abnormal) Collected: 12/16/23 2017    Order Status: Completed Specimen: Blood from Arm, Right Updated: 12/17/23 1720     CTX-M (ESBL ) N/A     IMP (Cabapenemase ) N/A     KPC resistance gene (Carbapenemase ) N/A     mcr-1 N/A     mecA ID  N/A     Comment: Note: Antimicrobial resistance can occur via multiple mechanisms. A Not Detected result for antimicrobial resistance gene(s) does not indicate antimicrobial susceptibility. Subculturing is required for species identification and susceptibility testing of   isolates.        mecA/C and MREJ (MRSA) gene N/A     NDM (Carbapenemase ) N/A     OXA-48-like (Carbapenemase ) N/A     Yola/B (VRE gene) Not Detected     VIM (Carbapenemase ) N/A     Enterococcus faecalis Detected     Enterococcus faecium Not Detected     Listeria monocytogenes Not Detected     Staphylococcus spp. Not Detected     Staphylococcus aureus Not Detected     Staphylococcus epidermidis Not Detected     Staphylococcus lugdunensis Not Detected     Streptococcus spp. Not Detected     Streptococcus agalactiae (Group B) Not Detected     Streptococcus pneumoniae Not Detected     Streptococcus pyogenes (Group A) Not Detected     Acinetobacter calcoaceticus/baumannii complex Not Detected     Bacteroides fragilis Not Detected     Enterobacterales Not Detected     Enterobacter cloacae complex Not Detected     Escherichia coli Not Detected     Klebsiella aerogenes Not Detected     Klebsiella oxytoca Not Detected     Klebsiella pneumoniae group Not Detected     Proteus spp. Not Detected     Salmonella spp. Not Detected     Serratia marcescens Not Detected     Haemophilus influenzae Not Detected     Neisseria meningitidis Not Detected     Pseudomonas aeruginosa Not Detected     Stenotrophomonas maltophilia Not Detected     Candida albicans Not Detected     Candida auris Not Detected     Candida glabrata Not Detected     Candida krusei Not Detected     Candida parapsilosis Not Detected     Candida tropicalis Not Detected     Cryptococcus neoformans/gattii Not Detected    Narrative:      The [employer] BCID2 Panel is a multiplexed nucleic acid test intended for the use with [employer]® 2.0 or EidoSearch® Bimb[employer]  Systems for the simultaneous qualitative detection and identification of multiple bacterial and yeast nucleic acids and select genetic determinants associated with antimicrobial resistance.  The [employer] BCID2 Panel test is performed directly on blood culture samples identified as positive by a continuous monitoring blood culture system.  Results are intended to be interpreted in conjunction with Gram stain results.             Radiology:  Echo    Left Ventricle: The left ventricle is normal in size. Normal wall   thickness. Normal wall motion. There is normal systolic function with a   visually estimated ejection fraction of 55 - 60%. Grade I diastolic   dysfunction.    Right Ventricle: Normal right ventricular cavity size. Systolic   function is normal.    Aortic Valve: The aortic valve is a trileaflet valve.    Pericardium: There is a trivial effusion. No indication of cardiac   tamponade.    Technically difficult study due to lung interference.  X-Ray Chest 1 View  Narrative: EXAMINATION:  XR CHEST 1 VIEW    CLINICAL HISTORY:  follow up;    TECHNIQUE:  Single view of the chest    COMPARISON:  12/18/2023    FINDINGS:  Cardiomegaly is unchanged.  Small right effusion is slightly increased in the interval.  No acute osseous abnormality.  Impression: As above.    Electronically signed by: Cristhian Lara  Date:    12/20/2023  Time:    09:53      Scheduled Med:   ampicillin IVPB  2 g Intravenous Q6H    dicyclomine  20 mg Intramuscular QID    enoxparin  30 mg Subcutaneous Q24H (prophylaxis, 1700)    furosemide  40 mg Oral Daily    metoprolol succinate  25 mg Oral Daily    metronidazole  500 mg Intravenous Q8H    pantoprazole  40 mg Intravenous BID AC        Continuous Infusions:       PRN Meds:  acetaminophen, acetaminophen, aluminum-magnesium hydroxide-simethicone, dextrose 10%, dextrose 10%, glucagon (human recombinant), hydrALAZINE, HYDROmorphone, insulin aspart U-100, labetalol, melatonin, ondansetron, oxyCODONE,  polyethylene glycol, prochlorperazine, senna-docusate 8.6-50 mg, sodium chloride 0.9%       Assessment/Plan:   Acute severe pancreatitis- suspect biliary  Cholangitis, acute  choledocholithiasis status post ERCP  Severe sepsis  SHA superimposed on CKD 3A  Acute hypoxic respiratory failure requiring supplemental oxygen secondary to pulmonary edema/pleural effusion  , history of smoking, questionable COPD?  Bacteremia, probable Streptococcus     History of T2DM, hypertension, hyperlipidemia, GERD    Plan  GI following.  Underwent ERCP with sludge and stone removal.  Lipase improving.  Symptoms improving.  Able to tolerate the diet.    Transaminitis improving.Continue current antibiotics.  Monitor fever curve.  Continue to monitor leukocytosis.    Advance diet as on tolerated  Nephrology following for SHA, creatinine improved.  On diuretics, now switching to oral.  Strict Is&Os, monitor urine output, daily weights.  Monitor electrolytes and replete them as needed while on diuretics.   Wean oxygen as tolerated.  Consider bronchodilators p.r.n..  Encourage incentive spirometry. Followed up on echocardiogram.  Followed up Chest x-ray.   Blood cultures noted to be positive, ordered repeat cultures, so far negative..  Infectious Disease was consulted, on further recommendations/investigations if any  Continue supportive care appropriate home medications.  On Sliding scale with fingersticks a.c. HS for management of diabetes in-house.  Therapy services    DVT prophylaxis-Lovenox      Anticipated discharge-to be decided, once able to wean off of oxygen and able to tolerate the diet, pending ID recommendations and normalization of leukocytosis.  Per PT evaluation    Critical care diagnosis: sepsis, iv antibiotics, acute hypoxic respiratory failure requiring oxygen  Critical care interventions: hands on evaluation, review of labs/radiographs/records and discussions with family  Critical care time spent: >32  minutes        Teresa Anthony MD   12/22/2023     All diagnosis and differential diagnosis have been reviewed; assessment and plan has been documented; I have personally reviewed the labs and test results that are presently available; I have reviewed the patients medication list; I have reviewed the consulting providers response and recommendations. I have reviewed or attempted to review medical records based upon their availability    All of the patient's questions have been  addressed and answered. Patient's is agreeable to the above stated plan. I will continue to monitor closely and make adjustments to medical management as needed.  _____________________________________________________________________

## 2023-12-23 LAB
ABS NEUT (OLG): 22.7 X10(3)/MCL (ref 2.1–9.2)
ANION GAP SERPL CALC-SCNC: 11 MEQ/L
ANISOCYTOSIS BLD QL SMEAR: SLIGHT
APPEARANCE UR: CLEAR
BACTERIA #/AREA URNS AUTO: ABNORMAL /HPF
BILIRUB UR QL STRIP.AUTO: NEGATIVE
BUN SERPL-MCNC: 26.2 MG/DL (ref 8.4–25.7)
CALCIUM SERPL-MCNC: 7.4 MG/DL (ref 8.8–10)
CHLORIDE SERPL-SCNC: 98 MMOL/L (ref 98–107)
CO2 SERPL-SCNC: 26 MMOL/L (ref 23–31)
COLOR UR AUTO: YELLOW
CREAT SERPL-MCNC: 1.03 MG/DL (ref 0.73–1.18)
CREAT/UREA NIT SERPL: 25
ERYTHROCYTE [DISTWIDTH] IN BLOOD BY AUTOMATED COUNT: 14.6 % (ref 11.5–17)
GFR SERPLBLD CREATININE-BSD FMLA CKD-EPI: >60 MLS/MIN/1.73/M2
GLUCOSE SERPL-MCNC: 109 MG/DL (ref 82–115)
GLUCOSE UR QL STRIP.AUTO: ABNORMAL
HCT VFR BLD AUTO: 37.1 % (ref 42–52)
HGB BLD-MCNC: 12.5 G/DL (ref 14–18)
INSTRUMENT WBC (OLG): 25.22 X10(3)/MCL
KETONES UR QL STRIP.AUTO: NEGATIVE
LEUKOCYTE ESTERASE UR QL STRIP.AUTO: NEGATIVE
LYMPHOCYTES NFR BLD MANUAL: 0.5 X10(3)/MCL
LYMPHOCYTES NFR BLD MANUAL: 2 %
MACROCYTES BLD QL SMEAR: SLIGHT
MAGNESIUM SERPL-MCNC: 1.8 MG/DL (ref 1.6–2.6)
MCH RBC QN AUTO: 30.6 PG (ref 27–31)
MCHC RBC AUTO-ENTMCNC: 33.7 G/DL (ref 33–36)
MCV RBC AUTO: 90.7 FL (ref 80–94)
MONOCYTES NFR BLD MANUAL: 2.27 X10(3)/MCL (ref 0.1–1.3)
MONOCYTES NFR BLD MANUAL: 9 %
MRSA PCR SCRN (OHS): NOT DETECTED
NEUTROPHILS NFR BLD MANUAL: 90 %
NITRITE UR QL STRIP.AUTO: NEGATIVE
NRBC BLD AUTO-RTO: 0 %
PH UR STRIP.AUTO: 5.5 [PH]
PHOSPHATE SERPL-MCNC: 2.2 MG/DL (ref 2.3–4.7)
PLATELET # BLD AUTO: 208 X10(3)/MCL (ref 130–400)
PLATELET # BLD EST: NORMAL 10*3/UL
PMV BLD AUTO: 10.6 FL (ref 7.4–10.4)
POCT GLUCOSE: 115 MG/DL (ref 70–110)
POCT GLUCOSE: 133 MG/DL (ref 70–110)
POCT GLUCOSE: 165 MG/DL (ref 70–110)
POTASSIUM SERPL-SCNC: 4.2 MMOL/L (ref 3.5–5.1)
PROT UR QL STRIP.AUTO: NEGATIVE
RBC # BLD AUTO: 4.09 X10(6)/MCL (ref 4.7–6.1)
RBC #/AREA URNS AUTO: ABNORMAL /HPF
RBC MORPH BLD: ABNORMAL
RBC UR QL AUTO: ABNORMAL
SODIUM SERPL-SCNC: 135 MMOL/L (ref 136–145)
SP GR UR STRIP.AUTO: 1.02 (ref 1–1.03)
SQUAMOUS #/AREA URNS LPF: ABNORMAL /HPF
UROBILINOGEN UR STRIP-ACNC: NORMAL
WBC # SPEC AUTO: 25.22 X10(3)/MCL (ref 4.5–11.5)
WBC #/AREA URNS AUTO: ABNORMAL /HPF

## 2023-12-23 PROCEDURE — 63600175 PHARM REV CODE 636 W HCPCS: Performed by: INTERNAL MEDICINE

## 2023-12-23 PROCEDURE — 21400001 HC TELEMETRY ROOM

## 2023-12-23 PROCEDURE — 97161 PT EVAL LOW COMPLEX 20 MIN: CPT

## 2023-12-23 PROCEDURE — 80048 BASIC METABOLIC PNL TOTAL CA: CPT | Performed by: INTERNAL MEDICINE

## 2023-12-23 PROCEDURE — 63600175 PHARM REV CODE 636 W HCPCS

## 2023-12-23 PROCEDURE — 27000207 HC ISOLATION

## 2023-12-23 PROCEDURE — 25000003 PHARM REV CODE 250: Performed by: HOSPITALIST

## 2023-12-23 PROCEDURE — 83735 ASSAY OF MAGNESIUM: CPT | Performed by: INTERNAL MEDICINE

## 2023-12-23 PROCEDURE — 87045 FECES CULTURE AEROBIC BACT: CPT | Performed by: INTERNAL MEDICINE

## 2023-12-23 PROCEDURE — C1751 CATH, INF, PER/CENT/MIDLINE: HCPCS

## 2023-12-23 PROCEDURE — C9113 INJ PANTOPRAZOLE SODIUM, VIA: HCPCS

## 2023-12-23 PROCEDURE — 02HV33Z INSERTION OF INFUSION DEVICE INTO SUPERIOR VENA CAVA, PERCUTANEOUS APPROACH: ICD-10-PCS | Performed by: INTERNAL MEDICINE

## 2023-12-23 PROCEDURE — 84100 ASSAY OF PHOSPHORUS: CPT | Performed by: INTERNAL MEDICINE

## 2023-12-23 PROCEDURE — 63600175 PHARM REV CODE 636 W HCPCS: Performed by: HOSPITALIST

## 2023-12-23 PROCEDURE — 85027 COMPLETE CBC AUTOMATED: CPT | Performed by: INTERNAL MEDICINE

## 2023-12-23 PROCEDURE — 11000001 HC ACUTE MED/SURG PRIVATE ROOM

## 2023-12-23 PROCEDURE — 25000003 PHARM REV CODE 250: Performed by: INTERNAL MEDICINE

## 2023-12-23 PROCEDURE — 81001 URINALYSIS AUTO W/SCOPE: CPT | Performed by: INTERNAL MEDICINE

## 2023-12-23 PROCEDURE — 27000221 HC OXYGEN, UP TO 24 HOURS

## 2023-12-23 PROCEDURE — 25000003 PHARM REV CODE 250: Performed by: STUDENT IN AN ORGANIZED HEALTH CARE EDUCATION/TRAINING PROGRAM

## 2023-12-23 PROCEDURE — 94761 N-INVAS EAR/PLS OXIMETRY MLT: CPT

## 2023-12-23 PROCEDURE — 87641 MR-STAPH DNA AMP PROBE: CPT | Performed by: INTERNAL MEDICINE

## 2023-12-23 PROCEDURE — 36569 INSJ PICC 5 YR+ W/O IMAGING: CPT

## 2023-12-23 RX ORDER — FUROSEMIDE 10 MG/ML
10 INJECTION INTRAMUSCULAR; INTRAVENOUS ONCE
Status: COMPLETED | OUTPATIENT
Start: 2023-12-23 | End: 2023-12-23

## 2023-12-23 RX ORDER — SODIUM CHLORIDE 9 MG/ML
INJECTION, SOLUTION INTRAVENOUS
Status: DISCONTINUED | OUTPATIENT
Start: 2023-12-23 | End: 2024-01-04 | Stop reason: HOSPADM

## 2023-12-23 RX ORDER — SODIUM CHLORIDE 0.9 % (FLUSH) 0.9 %
10 SYRINGE (ML) INJECTION EVERY 6 HOURS
Status: DISCONTINUED | OUTPATIENT
Start: 2023-12-24 | End: 2024-01-04 | Stop reason: HOSPADM

## 2023-12-23 RX ORDER — SODIUM CHLORIDE 0.9 % (FLUSH) 0.9 %
10 SYRINGE (ML) INJECTION
Status: DISCONTINUED | OUTPATIENT
Start: 2023-12-23 | End: 2024-01-04 | Stop reason: HOSPADM

## 2023-12-23 RX ADMIN — FUROSEMIDE 10 MG: 10 INJECTION, SOLUTION INTRAMUSCULAR; INTRAVENOUS at 12:12

## 2023-12-23 RX ADMIN — MEROPENEM 500 MG: 500 INJECTION, POWDER, FOR SOLUTION INTRAVENOUS at 09:12

## 2023-12-23 RX ADMIN — SODIUM CHLORIDE: 9 INJECTION, SOLUTION INTRAVENOUS at 12:12

## 2023-12-23 RX ADMIN — POTASSIUM PHOSPHATE, MONOBASIC AND POTASSIUM PHOSPHATE, DIBASIC 30 MMOL: 224; 236 INJECTION, SOLUTION, CONCENTRATE INTRAVENOUS at 02:12

## 2023-12-23 RX ADMIN — AMPICILLIN 2 G: 2 INJECTION, POWDER, FOR SOLUTION INTRAVENOUS at 12:12

## 2023-12-23 RX ADMIN — PANTOPRAZOLE SODIUM 40 MG: 40 INJECTION, POWDER, FOR SOLUTION INTRAVENOUS at 06:12

## 2023-12-23 RX ADMIN — METOPROLOL SUCCINATE 25 MG: 25 TABLET, EXTENDED RELEASE ORAL at 08:12

## 2023-12-23 RX ADMIN — TRAZODONE HYDROCHLORIDE 25 MG: 50 TABLET ORAL at 09:12

## 2023-12-23 RX ADMIN — OXYCODONE HYDROCHLORIDE 5 MG: 5 TABLET ORAL at 05:12

## 2023-12-23 RX ADMIN — FUROSEMIDE 40 MG: 40 TABLET ORAL at 08:12

## 2023-12-23 RX ADMIN — SODIUM CHLORIDE: 9 INJECTION, SOLUTION INTRAVENOUS at 06:12

## 2023-12-23 RX ADMIN — METRONIDAZOLE 500 MG: 5 INJECTION, SOLUTION INTRAVENOUS at 01:12

## 2023-12-23 RX ADMIN — METRONIDAZOLE 500 MG: 5 INJECTION, SOLUTION INTRAVENOUS at 06:12

## 2023-12-23 NOTE — PROGRESS NOTES
Infectious Diseases Progress Note  69 y/o male with Hx of GERD, HTN, HLD and DM Type II who presented to ER on 12/16 with abdominal pain. On admit he was afebrile with leukocytosis, WBC 24.97. Lipase >3,000,  and . U/A not impressive. Blood cultures revealed Enterococcus 1/2 sets. CT abdomen/pelvis with contrast revealed severe acute pancreatitis, gastric antrum and duodenal mural thickening likely represents reactive change for acute pancreatitis, right large hydrocele. Scrotal ultrasound with unremarkable right testicle, large right hydrocele which crosses the midline and causes compressive effect upon the left testicle which is inferiorly deviated. MRI Abdomen without contrast showed there appears to be numerous intraluminal filling defects in the mid to distal common bile duct series image 16 series 3, these are suggestive of impacted gallstones and sludge. On 12/17 he underwent ERCP with sludge and stone removal. Repeat CT abdomen/pelvis without contrast showed interval worsening of the ascites, persistent inflammatory changes around the pancreas consistent patient's history of pancreatitis, previous examination showed incomplete enhancement of the pancreas suggesting possible developing necrotizing pancreatitis, interval development of bibasilar atelectasis and moderate to large pleural effusions. Renal ultrasound unremarkable. 2D Echo with no mention of vegetations. 12/20 blood cultures negative thus far. During his stay he was noted to have diarrhea, stool for C-Diff PCR (-).   He is currently on Ampicillin and Flagyl     Subjective:  Lying in bed in no acute distress. No new complaints voiced. Afebrile    Review of Systems   Constitutional:  Positive for malaise/fatigue.   HENT: Negative.     Eyes: Negative.    Respiratory: Negative.     Cardiovascular: Negative.    Gastrointestinal:  Positive for abdominal pain.   Genitourinary: Negative.    Musculoskeletal: Negative.    Skin: Negative.     Neurological: Negative.    All other systems reviewed and are negative.      Review of patient's allergies indicates:  No Known Allergies    Past Medical History:   Diagnosis Date    DM (diabetes mellitus)     GERD (gastroesophageal reflux disease)     HLD (hyperlipidemia)     HTN (hypertension)        Past Surgical History:   Procedure Laterality Date    APPENDECTOMY      CHOLECYSTECTOMY      ERCP N/A 12/17/2023    Procedure: ERCP (ENDOSCOPIC RETROGRADE CHOLANGIOPANCREATOGRAPHY);  Surgeon: Flako Kerns MD;  Location: [facility];  Service: Gastroenterology;  Laterality: N/A;    ERCP W/ SPHICTEROTOMY  12/17/2023    Procedure: ERCP, WITH SPHINCTEROTOMY;  Surgeon: Flako Kerns MD;  Location: [facility] OR;  Service: Gastroenterology;;    ERCP, WITH CALCULUS REMOVAL  12/17/2023    Procedure: ERCP, WITH CALCULUS REMOVAL;  Surgeon: Flako Kerns MD;  Location: [facility];  Service: Gastroenterology;;       Social History     Socioeconomic History    Marital status:    Tobacco Use    Smoking status: Every Day     Current packs/day: 1.00     Types: Cigarettes    Smokeless tobacco: Never   Substance and Sexual Activity    Alcohol use: Never    Drug use: Never    Sexual activity: Yes     Social Determinants of Health     Financial Resource Strain: Low Risk  (8/8/2023)    Overall Financial Resource Strain (CARDIA)     Difficulty of Paying Living Expenses: Not hard at all   Food Insecurity: No Food Insecurity (8/8/2023)    Hunger Vital Sign     Worried About Running Out of Food in the Last Year: Never true     Ran Out of Food in the Last Year: Never true   Transportation Needs: No Transportation Needs (12/19/2023)    PRAPARE - Transportation     Lack of Transportation (Medical): No     Lack of Transportation (Non-Medical): No   Stress: No Stress Concern Present (8/8/2023)    [employer] [place] [employer] - Occupational Stress Questionnaire     Feeling of Stress : Only a little   Housing  "Stability: Low Risk  (12/19/2023)    Housing Stability Vital Sign     Unable to Pay for Housing in the Last Year: No     Number of Places Lived in the Last Year: 1     Unstable Housing in the Last Year: No         Scheduled Meds:   ampicillin IVPB  2 g Intravenous Q6H    enoxparin  30 mg Subcutaneous Q24H (prophylaxis, 1700)    furosemide  40 mg Oral Daily    metoprolol succinate  25 mg Oral Daily    metronidazole  500 mg Intravenous Q8H    pantoprazole  40 mg Intravenous BID AC    potassium phosphate IVPB  30 mmol Intravenous Once    traZODone  25 mg Oral QHS     Continuous Infusions:  PRN Meds:acetaminophen, acetaminophen, aluminum-magnesium hydroxide-simethicone, dextrose 10%, dextrose 10%, dicyclomine, glucagon (human recombinant), hydrALAZINE, HYDROmorphone, insulin aspart U-100, labetalol, melatonin, ondansetron, oxyCODONE, polyethylene glycol, prochlorperazine, senna-docusate 8.6-50 mg, sodium chloride 0.9%    Objective:  /76   Pulse 102   Temp 98.4 °F (36.9 °C) (Oral)   Resp 18   Ht 5' 6" (1.676 m)   Wt 73.2 kg (161 lb 6 oz)   SpO2 95%   BMI 26.05 kg/m²     Physical Exam:   Physical Exam  Vitals reviewed.   HENT:      Head: Normocephalic.   Cardiovascular:      Rate and Rhythm: Normal rate and regular rhythm.   Pulmonary:      Effort: Pulmonary effort is normal. No respiratory distress.      Breath sounds: Normal breath sounds. No wheezing.   Abdominal:      General: Bowel sounds are normal. There is no distension.      Palpations: Abdomen is soft.      Tenderness: There is no abdominal tenderness.   Musculoskeletal:         General: Normal range of motion.      Cervical back: Normal range of motion.   Skin:     Findings: No rash.   Neurological:      Mental Status: He is alert and oriented to person, place, and time.   Psychiatric:         Mood and Affect: Mood normal.         Behavior: Behavior normal.       Imaging      Lab Review   Recent Results (from the past 24 hour(s))   POCT glucose    " Collection Time: 12/22/23  8:09 PM   Result Value Ref Range    POCT Glucose 110 70 - 110 mg/dL   Basic Metabolic Panel    Collection Time: 12/23/23  5:21 AM   Result Value Ref Range    Sodium Level 135 (L) 136 - 145 mmol/L    Potassium Level 4.2 3.5 - 5.1 mmol/L    Chloride 98 98 - 107 mmol/L    Carbon Dioxide 26 23 - 31 mmol/L    Glucose Level 109 82 - 115 mg/dL    Blood Urea Nitrogen 26.2 (H) 8.4 - 25.7 mg/dL    Creatinine 1.03 0.73 - 1.18 mg/dL    BUN/Creatinine Ratio 25     Calcium Level Total 7.4 (L) 8.8 - 10.0 mg/dL    Anion Gap 11.0 mEq/L    eGFR >60 mls/min/1.73/m2   Magnesium    Collection Time: 12/23/23  5:21 AM   Result Value Ref Range    Magnesium Level 1.80 1.60 - 2.60 mg/dL   Phosphorus    Collection Time: 12/23/23  5:21 AM   Result Value Ref Range    Phosphorus Level 2.2 (L) 2.3 - 4.7 mg/dL   CBC with Differential    Collection Time: 12/23/23  5:21 AM   Result Value Ref Range    WBC 25.22 (H) 4.50 - 11.50 x10(3)/mcL    RBC 4.09 (L) 4.70 - 6.10 x10(6)/mcL    Hgb 12.5 (L) 14.0 - 18.0 g/dL    Hct 37.1 (L) 42.0 - 52.0 %    MCV 90.7 80.0 - 94.0 fL    MCH 30.6 27.0 - 31.0 pg    MCHC 33.7 33.0 - 36.0 g/dL    RDW 14.6 11.5 - 17.0 %    Platelet 208 130 - 400 x10(3)/mcL    MPV 10.6 (H) 7.4 - 10.4 fL    NRBC% 0.0 %   Manual Differential    Collection Time: 12/23/23  5:21 AM   Result Value Ref Range    WBC 25.22 x10(3)/mcL    Neutrophils % 90 %    Lymphs % 2 %    Monocytes % 9 %    Neutrophils Abs 22.698 (H) 2.1 - 9.2 x10(3)/mcL    Lymphs Abs 0.5044 (L) 0.6 - 4.6 x10(3)/mcL    Monocytes Abs 2.2698 (H) 0.1 - 1.3 x10(3)/mcL    Platelets Normal Normal, Adequate    RBC Morph Abnormal (A) Normal    Anisocytosis Slight (A) (none)    Macrocytosis Slight (A) (none)   POCT glucose    Collection Time: 12/23/23  5:33 AM   Result Value Ref Range    POCT Glucose 115 (H) 70 - 110 mg/dL       Assessment/Plan:  Enterococcus sepsis  Severe acute pancreatitis  Cholelithiasis / Cholangitis  Obstructive jaundice  SHA / CKD  Severe  leukocytosis  Ascites / Pleural effusions    -Continue Ampicillin #5 and Flagyl #7  -Will need a 14 day course from the negative blood cultures  -Afebrile with leukocytosis trending up, follow  -Blood cultures from admit revealed Enterococcus 1/2 sets. Repeat blood cultures on 12/20 negative thus far, follow  -2D Echo with no mention of vegetations  -Renal dysfunction improved with Crea normalized  -Transaminases trending down  -Stool for c-diff PCR and toxin negative  -Discussed with patient and nursing     conducted a detailed discussion... I had a detailed discussion with the patient and/or guardian regarding the historical points, exam findings, and any diagnostic results supporting the discharge/admit diagnosis.

## 2023-12-23 NOTE — CONSULTS
Franklin Macias   MRN: [id number]   ADMISSION DATE: 2023  : 1955  AGE: 68 y.o.    DATE :  2023     PROVIDER: ROMAN ROJAS    REASON FOR REFERRAL   This is a 68 y.o. male unknown to our group with PMH of T2DM, HTN, HLD, chronic scrotal hydrocele, vitamin D deficiency, CKD 3a. Presented to the ED 23 with severe epigastric pain onset same day with radiation to back, associated n/v and chills also reported.      Upon arrival, VSS - afebrile. Labs revealed WBC 24, Tbili 1.9, , , lipase >3000.   CT abd/pel with IV: severe acute pancreatitis, gastric antrum and duodenal mural thickening likely represents reactive change for acute pancreatitis.  Preliminary MRCP: numerous intraluminal filling defects in mid to distal CBD suggestive of impacted gallstones and sludge; acute interstitial pancreatitis, lymph node in mo hepatis region likely reactive in nature.  LR, cefepime, flagyl initiated. GI consulted for choledocholithiasis.     Tbili and transaminases increased today. Tbili 3.5, Dbili 2.7. Transaminases in the 200s.     Patient resting in bed. He states symptoms started before eating, however after eating shrimp stew and potato salad his symptoms worsened. Severe abd pain, n/v - denies hematemesis or coffee ground emesis. He states even sipping water would cause vomiting. He has never had anything like this happen previously. He had his gallbladder removed around  or so for cholelithiasis and biliary colic but he had no issues post op until now. Bms are normal and without any evidence of overt bleeding. Even now he gags with sips of water, he is c/o abd cramping unrelieved by dilaudid.      He is unsure if he takes blood thinners - per chart review there are none listed in home meds. He denies frequent NSAID use. Denies ETOH or recreational drug use. He smokes 1PPD for many years. Surgical history includes cholecystectomy as above and appendectomy - he denies gastric bypass  history.      When asked about previous endoscopy/GI history he states he saw a doctor off of [facility] but when asked about any workup he states he was told he had hematuria. No records of him being seen in either of our offices although he was referred to us in 2016 for screening colonoscopy - unable to contact patient despite multiple attempts to schedule.     Per chart review, patient saw PCP in Feb 2023 for wellness visit. He was being treated for vitamin D deficiency. He had a negative cologuard in the past year or so. Some mild transaminitis noted on routine labs.     GI was reconsulted today for worsening pancreatitis imaging study.  As per my discussion with Dr. Anthony earlier today, he was requiring supplemental oxygenation up to 5 L. He was also noted to have worsening leukocytosis.  Infectious disease service on follow up.  He was on ampicillin.  Merrem was added per primary service.    SUBJECTIVE:    Review of Systems   Constitutional:  Negative for chills and fever.   HENT:  Negative for congestion and nosebleeds.    Eyes:  Negative for redness.   Respiratory:  Negative for cough, chest tightness and shortness of breath.    Cardiovascular:  Negative for chest pain, palpitations and leg swelling.   Gastrointestinal:  Negative for abdominal distention, abdominal pain, blood in stool, constipation, diarrhea, nausea and vomiting.        Patient reported some abdominal discomfort after he ate breakfast.  Feeling somewhat better now.     Endocrine: Negative for cold intolerance and polydipsia.   Genitourinary:  Negative for dysuria and flank pain.   Musculoskeletal:  Negative for arthralgias.   Skin:  Negative for pallor.   Allergic/Immunologic: Negative for food allergies.   Neurological:  Negative for dizziness and headaches.   Hematological:  Does not bruise/bleed easily.   Psychiatric/Behavioral:  Negative for agitation and confusion.         Review of patient's allergies indicates:  No Known Allergies       ampicillin IVPB  2 g Intravenous Q6H    enoxparin  30 mg Subcutaneous Q24H (prophylaxis, 1700)    furosemide  40 mg Oral Daily    meropenem (MERREM) IVPB  500 mg Intravenous Q8H    metoprolol succinate  25 mg Oral Daily    metronidazole  500 mg Intravenous Q8H    pantoprazole  40 mg Intravenous BID AC    potassium phosphate IVPB  30 mmol Intravenous Once    traZODone  25 mg Oral QHS       Medications Discontinued During This Encounter   Medication Reason    pantoprazole EC tablet 40 mg     iohexoL (OMNIPAQUE 300) injection Patient Discharge    LIDOcaine (PF) 10 mg/ml (1%) injection 10 mg Patient Transfer    sodium citrate-citric acid 500-334 mg/5 ml solution 30 mL Patient Transfer    midazolam (VERSED) 1 mg/mL injection 2 mg     electrolyte-A infusion     ceFEPIme (MAXIPIME) 2 g in dextrose 5 % in water (D5W) 100 mL IVPB (MB+)     lactated ringers infusion     enoxaparin injection 40 mg     furosemide injection 40 mg     furosemide injection 40 mg     albumin human 25% bottle 12.5 g     furosemide injection 20 mg     dicyclomine injection 20 mg     dicyclomine injection 20 mg              Past Medical History:   Diagnosis Date    DM (diabetes mellitus)     GERD (gastroesophageal reflux disease)     HLD (hyperlipidemia)     HTN (hypertension)       Social History     Socioeconomic History    Marital status:    Tobacco Use    Smoking status: Every Day     Current packs/day: 1.00     Types: Cigarettes    Smokeless tobacco: Never   Substance and Sexual Activity    Alcohol use: Never    Drug use: Never    Sexual activity: Yes     Social Determinants of Health     Financial Resource Strain: Low Risk  (8/8/2023)    Overall Financial Resource Strain (CARDIA)     Difficulty of Paying Living Expenses: Not hard at all   Food Insecurity: No Food Insecurity (8/8/2023)    Hunger Vital Sign     Worried About Running Out of Food in the Last Year: Never true     Ran Out of Food in the Last Year: Never true    Transportation Needs: No Transportation Needs (12/19/2023)    PRAPARE - Transportation     Lack of Transportation (Medical): No     Lack of Transportation (Non-Medical): No   Stress: No Stress Concern Present (8/8/2023)    [employer] - Occupational Stress Questionnaire     Feeling of Stress : Only a little   Housing Stability: Low Risk  (12/19/2023)    Housing Stability Vital Sign     Unable to Pay for Housing in the Last Year: No     Number of Places Lived in the Last Year: 1     Unstable Housing in the Last Year: No      Past Surgical History:   Procedure Laterality Date    APPENDECTOMY      CHOLECYSTECTOMY      ERCP N/A 12/17/2023    Procedure: ERCP (ENDOSCOPIC RETROGRADE CHOLANGIOPANCREATOGRAPHY);  Surgeon: Flako Kerns MD;  Location: [facility] OR;  Service: Gastroenterology;  Laterality: N/A;    ERCP W/ SPHICTEROTOMY  12/17/2023    Procedure: ERCP, WITH SPHINCTEROTOMY;  Surgeon: Flako Kerns MD;  Location: [facility] OR;  Service: Gastroenterology;;    ERCP, WITH CALCULUS REMOVAL  12/17/2023    Procedure: ERCP, WITH CALCULUS REMOVAL;  Surgeon: Flako Kerns MD;  Location: [facility] OR;  Service: Gastroenterology;;        History reviewed. No pertinent family history.       OBJECTIVE:     Vitals:    12/23/23 0559 12/23/23 0719 12/23/23 0900 12/23/23 1146   BP:  138/76  128/79   Pulse:  102  100   Resp: 18      Temp:  98.4 °F (36.9 °C)  98.8 °F (37.1 °C)   TempSrc:  Oral  Oral   SpO2:  95% 95% 95%   Weight: 73.2 kg (161 lb 6 oz)      Height:             Physical Exam  HENT:      Head: Normocephalic and atraumatic.      Nose: Nose normal.      Mouth/Throat:      Pharynx: Oropharynx is clear.   Eyes:      Extraocular Movements: Extraocular movements intact.      Conjunctiva/sclera: Conjunctivae normal.      Pupils: Pupils are equal, round, and reactive to light.   Cardiovascular:      Rate and Rhythm: Normal rate and regular rhythm.   Pulmonary:      Effort: Pulmonary  "effort is normal.      Breath sounds: Normal breath sounds.   Abdominal:      General: Bowel sounds are normal.      Palpations: Abdomen is soft.      Tenderness: There is abdominal tenderness (Mild generalized abdominal tenderness.  Limited exam due to body habitus.  Obese.).   Musculoskeletal:         General: Normal range of motion.      Cervical back: Neck supple.   Skin:     General: Skin is warm and dry.   Neurological:      Mental Status: He is alert and oriented to person, place, and time.   Psychiatric:         Mood and Affect: Mood normal.            LABS    Recent Labs   Lab 12/21/23 0443 12/22/23 0417 12/23/23  0521   WBC 18.42* 19.53  19.53* 25.22  25.22*   HGB 13.6* 13.8* 12.5*   HCT 39.2* 41.0* 37.1*    168 208      Recent Labs   Lab 12/19/23 0429 12/20/23  0645 12/21/23 0443 12/22/23 0417 12/23/23  0521    137 136 135* 135*   K 3.5 3.3* 3.2* 4.5 4.2   CO2 23 26 27 26 26   BUN 53.0* 44.9* 44.6* 33.3* 26.2*   CREATININE 2.16* 1.41* 1.32* 1.05 1.03   CALCIUM 6.9* 7.3* 7.5* 7.3* 7.4*   BILITOT 4.3* 3.6* 2.8*  --   --    ALKPHOS 70 67 74  --   --    * 75* 55  --   --    AST 94* 63* 58*  --   --    GLUCOSE 139* 120* 119* 110 109      Recent Labs   Lab 12/19/23 0429   INR 1.4*    No results for input(s): "AMYLASE" in the last 168 hours.   Recent Labs   Lab 12/17/23  0352 12/19/23 0429   INR 1.1 1.4*   PTT 25.0  --            RESULTS: CT Chest Abdomen Pelvis Without Contrast (XPD)    Result Date: 12/23/2023  EXAMINATION: CT CHEST ABDOMEN PELVIS WITHOUT CONTRAST(XPD) CLINICAL HISTORY: follow up; TECHNIQUE: Helical acquisition from the thoracic inlet through the ischia without IV contrast.  Lack of contrast limits evaluation of solid organs and vascular structures.  Three plane reconstructions made available for review. DLP 1042 mGycm. Automatic exposure control, adjustment of mA/kV or iterative reconstruction technique was used to reduce radiation. COMPARISON: 18 December 2023, 15 " August 2023 FINDINGS: Chest. Heart is not significantly enlarged.  There are coronary artery calcifications.  No pericardial effusion. No mediastinal, hilar or axillary adenopathy by CT size criteria. There are small bilateral pleural effusions slightly improved compared to prior.  Mild bibasilar atelectasis.  Moderate to severe emphysema. Abdomen and pelvis. No acute findings noncontrast liver, spleen, adrenals or kidneys.  Gallbladder surgically absent. There is increased peripancreatic inflammation compared to prior.  Question a developing collection anterior and superior to the portion of the pancreas which had suggestion of necrosis on prior imaging.  This collection is seen on images 104-110 series 2.  There is small volume ascites. There is no bowel obstruction or free air.  There is colonic diverticulosis. Urinary bladder unremarkable.  Prostate mildly enlarged.  Abdominal aorta mildly ectatic with moderate atherosclerotic disease. There are no acute osseous findings.     1. Worsened appearance of pancreatitis with possible collection developing anterior and superior to the pancreas. 2. Small volume ascites. 3. Small bilateral pleural effusions. Electronically signed by: Osiel Whitehead Date:    12/23/2023 Time:    10:59    Echo    Result Date: 12/20/2023    Left Ventricle: The left ventricle is normal in size. Normal wall thickness. Normal wall motion. There is normal systolic function with a visually estimated ejection fraction of 55 - 60%. Grade I diastolic dysfunction.   Right Ventricle: Normal right ventricular cavity size. Systolic function is normal.   Aortic Valve: The aortic valve is a trileaflet valve.   Pericardium: There is a trivial effusion. No indication of cardiac tamponade.   Technically difficult study due to lung interference.     X-Ray Chest 1 View    Result Date: 12/20/2023  EXAMINATION: XR CHEST 1 VIEW CLINICAL HISTORY: follow up; TECHNIQUE: Single view of the chest COMPARISON: 12/18/2023  FINDINGS: Cardiomegaly is unchanged.  Small right effusion is slightly increased in the interval.  No acute osseous abnormality.     As above. Electronically signed by: Cristhian Lara Date:    12/20/2023 Time:    09:53    CT Abdomen Pelvis  Without Contrast    Result Date: 12/18/2023  EXAMINATION: CT ABDOMEN PELVIS WITHOUT CONTRAST CLINICAL HISTORY: rise in tbili and pain s/p ERCP; TECHNIQUE: Low dose axial images, sagittal and coronal reformations were obtained from the lung bases to the pubic symphysis.  No contrast was administered.  Automatic exposure control is utilized to reduce patient radiation exposure. COMPARISON: 12/16/2023 FINDINGS: There are changes seen consistent with emphysema and COPD in the lungs.  There is  bibasilar atelectasis and bilateral pleural effusions.  This is a new finding.. The liver is hypoattenuated consistent with hepatic steatosis.  The patient is status post cholecystectomy.  There is evidence of ascites.  This is more prominent than the prior examination. There are inflammatory changes seen around the pancreas consistent with pancreatitis.  This was seen on the prior examination as well.  Previous examination showed incomplete enhancement of the pancreas concerning for developing necrotizing pancreatitis.  No free intraperitoneal air is seen on this examination. Adrenal glands appear normal. Kidneys appear normal.  No hydronephrosis is seen.  No hydroureter seen. No colitis is seen.  No diverticulitis is seen.  No colonic mass is seen. Urinary bladder appears grossly unremarkable. Atherosclerotic changes are seen within the abdominal aorta and its branches. Bones appear grossly unremarkable.     Interval worsening of the ascites Persistent inflammatory changes around the pancreas consistent patient's history of pancreatitis.  Previous examination showed incomplete enhancement of the pancreas suggesting possible developing necrotizing pancreatitis. Interval development of  bibasilar atelectasis and moderate to large pleural effusions Electronically signed by: Wayne Gee Date:    12/18/2023 Time:    12:12    X-Ray Chest 1 View    Result Date: 12/18/2023  EXAMINATION: XR CHEST 1 VIEW CPT 02787 CLINICAL HISTORY: Hypoxemia; FINDINGS: Examination reveals mediastinal silhouette to be within normal limits cardiac silhouette is not enlarged.  There is some increase interstitial and pulmonary vascular markings which may indicate a degree of pulmonary vascular congestion. There is blunting of both costophrenic angles indicating the presence of bilateral pleural effusions. Slightly more confluent opacities identified at the left base superimposed infiltrate cannot be completely excluded. Atelectatic changes identified at the right base     Changes of pulmonary vascular congestion. Bilateral pleural effusions Electronically signed by: Radu Edwards Date:    12/18/2023 Time:    09:43    US Retroperitoneal Complete    Result Date: 12/18/2023  EXAMINATION: US RETROPERITONEAL COMPLETE CLINICAL HISTORY: Acute on chronic kidney disease. COMPARISON: CT 16 December 2023 FINDINGS: Grayscale and color Doppler sonographic evaluation of the kidneys and urinary bladder. The right kidney measures 11 cm. The left kidney measures 11 cm.   No hydronephrosis.  Grossly normal renal parenchymal echogenicity. No significant abnormality of the urinary bladder. There is small volume ascites.     No significant sonographic abnormality of the kidneys. Electronically signed by: Osiel Whitehead Date:    12/18/2023 Time:    09:09    FL ERCP Biliary And Pancreatic By Rad Tech    Result Date: 12/17/2023  EXAMINATION: FL ERCP BILIARY AND PANCREATIC CLINICAL HISTORY: biliary stone; TECHNIQUE: 52.8mGy of fluoroscopic support was utilized for procedural support during procedure.  Please refer to performing provider dictation. COMPARISON: None FINDINGS: Fluoroscopic support. Please refer to procedure of this dictation.      Fluoroscopic support.  Please refer to procedure of this dictation. Electronically signed by: Cristhian Lara Date:    12/17/2023 Time:    13:04    MRI MRCP Without Contrast    Result Date: 12/17/2023  EXAMINATION: MRI ABDOMEN WITHOUT CONTRAST MRCP CLINICAL HISTORY: Post cholecystectomy pancreatitis -- ?  Choledocholithiases; TECHNIQUE: Multiplanar, multisequence images are performed through the liver and upper abdomen.  Additionally 2D and 3D MRCP sequences are performed as well as MIP images. COMPARISON: None. FINDINGS: Lung bases: Moderate streaky is present in the visualized lung bases consistent with nonspecific dependent changes and scarring. Liver: Liver appears normal in size. Portal venous system: Normal. Gallbladder: Gallbladder is surgically absent. Biliary tree intrahepatic ducts: Unremarkable. Biliary tree extrahepatic ducts: There appear to be numerous intraluminal filling defects in the mid to distal common bile duct series image 16 series 3. Ampullary region: No obvious obstructive mass or stone. Spleen: Unremarkable. Pancreas: There is intermediate intrasubstance T2 signal in the pancreatic body (image 19 series 4 and 6) with corresponding restricted diffusion signal on DWI (image 136 series 7), relating to edema changes. There is stable free fluid collection in the bilateral anterior pararenal space extending to the adjacent mesocolon, small bowel mesentery, perihepatic and perisplenic regions. T2 stranding intensities are seen along the lesser sac. These findings are reflective of acute interstitial pancreatitis. Pancreatic duct: Unremarkable. Adrenal glands: Unremarkable. Kidneys: There are probable cysts in both kidneys, with the larger seen in the right mid pole region measuring 1.0 cm (image 29 series 4).Ureters: Unremarkable. Stomach and distal esophagus: Normal. Small bowel: Normal. Colon: Normal. Lymph nodes: A 1cm lymph node is demonstrated in the mo hepatis region (image 18 series  4), likely reactive in nature. Abdominal wall: Normal. Systemic arteries and veins: Unremarkable. Osseous structures: There is mild spondylolytic changes in the imaged spine. Miscellaneous: There are motion artifacts in some of the sequences limiting its evaluation.     1. There appear to be numerous intraluminal filling defects in the mid to distal common bile duct series image 16 series 3. These are suggestive of impacted gallstones and sludge. Correlate clinically as regards additional evaluation and follow-up possibly with ERCP. 2. There is intermediate intrasubstance T2 signal in the pancreatic body (image 19 series 4 and 6) with corresponding restricted diffusion signal on DWI (image 136 series 7), relating to edema changes. There is stable free fluid collection in the bilateral anterior pararenal space extending to the adjacent mesocolon, small bowel mesentery, perihepatic and perisplenic regions. T2 stranding intensities are seen along the lesser sac. These findings are reflective of acute interstitial pancreatitis. Correlate with clinical and laboratory findings as regards additional evaluation and follow-up to full resolution as indicated. 3. A 1cm lymph node is demonstrated in the mo hepatis region (image 18 series 4), likely reactive in nature. I concur with the preliminary report above. Electronically signed by: Wayne Gee Date:    12/17/2023 Time:    12:12    CT Abdomen Pelvis With IV Contrast NO Oral Contrast    Result Date: 12/16/2023  EXAMINATION: CT ABDOMEN PELVIS WITH IV CONTRAST CLINICAL HISTORY: Abdominal pain, acute, nonlocalized; TECHNIQUE: Multidetector axial images were obtained of the abdomen and pelvis following the administration of IV contrast. Oral contrast was not administered. Dose length product of 578 mGycm. Automated exposure control was utilized to minimize radiation dose. COMPARISON: August 4, 2023. FINDINGS: Included portion of the lungs show dependent hypoventilatory  changes without acute air space infiltrates or fluid within the pleural spaces. Liver is remarkable for steatosis without focal space occupying lesion.  There is small amount of free fluid around the anterolateral surface of the liver.  Gallbladder is surgically absent.  Pancreas is surrounded by considerable phlegmons and fluid which extend into the anterior pararenal spaces and further into the lower abdomen consistent with acute pancreatitis.  There is no suggestion of pancreatic necrosis, pseudocyst or abscess formation.  Spleen is unremarkable. The adrenal glands appear within normal limits. The kidneys are unremarkable in size and contour. No solid or cystic renal lesion identified. There is no hydronephrosis. No perinephric fluid strandings or collections identified. Stomach is nondistended.  There is distal esophageal mural thickening which probably result of reflux disease with about similar appearance.  There is some mural thickening of the gastric antrum and the descending colon which may represent reactive change from acute pancreatitis.  Possible primary gastritis and duodenitis is not excluded.  No abnormal dilatation of loops of small bowel.  Colon is nondistended.  No bowel obstruction.  Distal descending and sigmoid colon noninflamed diverticulosis coli. Urinary bladder appears within normal limits.  Prostate is enlarged in size and contains few calcifications.  There is large right hydrocele which extends into right inguinal canal.  Is no pelvic free fluid. No acute or otherwise osseous abnormality identified.     1. Severe acute pancreatitis. 2. Gastric antrum and duodenal mural thickening likely represents reactive change for acute pancreatitis. 3. Right large hydrocele. Electronically signed by: Denver Wagner Date:    12/16/2023 Time:    20:08    US Scrotum And Testicles    Result Date: 12/16/2023  EXAMINATION: US SCROTUM AND TESTICLES CLINICAL HISTORY: Other specified disorders of the male  genital organs TECHNIQUE: Multiple real-time images were performed of the scrotum in various planes by the sonographer. COMPARISON: None available FINDINGS: Right testicle measures 3.3 x 2.4 x 2.4 cm.  There is unremarkable echotexture of the right testicle.  Unremarkable arteriovenous flow to the right testicle. There is large right scrotal hydrocele which measures 10.6 x 7.2 x 8.5 cm.  Right hydrocele causes mass effect upon left testicle which is deviated inferiorly.  This made it difficult to optimally assess vascularity to the left testicle due to pressure caused by the hydrocele.  Some vascularity along the peripheral aspect of less testicle was visualized.  Left testicle measures 3.4 x 2.0 x 2.0 cm and no abnormality of the parenchymal echotexture identified.     1. Unremarkable right testicle 2. Large right hydrocele which crosses the midline and causes compressive effect upon the left testicle which is inferiorly deviated.  Due to pressure caused by the hydrocele upon the left testicle optimal Doppler flow to the left testicle could not be obtained.  Only limited arterial flow to the peripheral aspect left testicle could be visualized.  Please correlate clinically.  Oneoption is to perform a follow-up study post drainage of large right hydrocele. Electronically signed by: Denver Wagner Date:    12/16/2023 Time:    19:21      ICD-10-CM ICD-9-CM   1. Hydrocele of testis  N43.3 603.9   2. Epigastric pain  R10.13 789.06   3. Scrotal swelling  N50.89 608.86   4. Acute pancreatitis, unspecified complication status, unspecified pancreatitis type  K85.90 577.0   5. Chest pain  R07.9 786.50   6. SIRS (systemic inflammatory response syndrome)  R65.10 995.90   7. Transaminitis  R74.01 790.4   8. SHA (acute kidney injury)  N17.9 584.9   9. Acute biliary pancreatitis, unspecified complication status  K85.10 577.0   10. Choledocholithiasis  K80.50 574.50   11. Hyperlipidemia associated with type 2 diabetes mellitus   E11.69 250.80    E78.5 272.4   12. Hypoxia  R09.02 799.02     ASSESSMENT & PLAN:   This is a 68 y.o. male unknown to our group with PMH of T2DM, HTN, HLD, chronic scrotal hydrocele, vitamin D deficiency, CKD 3a. Presented to the ED 12/16/23 with severe epigastric pain onset same day with radiation to back, associated n/v and chills also reported.      Upon arrival, VSS - afebrile. Labs revealed WBC 24, Tbili 1.9, , , lipase >3000.   CT abd/pel with IV: severe acute pancreatitis, gastric antrum and duodenal mural thickening likely represents reactive change for acute pancreatitis.  Preliminary MRCP: numerous intraluminal filling defects in mid to distal CBD suggestive of impacted gallstones and sludge; acute interstitial pancreatitis, lymph node in mo hepatis region likely reactive in nature.  LR, cefepime, flagyl initiated. GI consulted for choledocholithiasis.     Tbili and transaminases increased today. Tbili 3.5, Dbili 2.7. Transaminases in the 200s.     Patient resting in bed. He states symptoms started before eating, however after eating shrimp stew and potato salad his symptoms worsened. Severe abd pain, n/v - denies hematemesis or coffee ground emesis. He states even sipping water would cause vomiting. He has never had anything like this happen previously. He had his gallbladder removed around 2020 or so for cholelithiasis and biliary colic but he had no issues post op until now. Bms are normal and without any evidence of overt bleeding. Even now he gags with sips of water, he is c/o abd cramping unrelieved by dilaudid.      He is unsure if he takes blood thinners - per chart review there are none listed in home meds. He denies frequent NSAID use. Denies ETOH or recreational drug use. He smokes 1PPD for many years. Surgical history includes cholecystectomy as above and appendectomy - he denies gastric bypass history.      When asked about previous endoscopy/GI history he states he saw a  doctor off of [facility] but when asked about any workup he states he was told he had hematuria. No records of him being seen in either of our offices although he was referred to us in 2016 for screening colonoscopy - unable to contact patient despite multiple attempts to schedule.     Per chart review, patient saw PCP in Feb 2023 for wellness visit. He was being treated for vitamin D deficiency. He had a negative cologuard in the past year or so. Some mild transaminitis noted on routine labs.     Patient underwent ERCP with Dr. Kerns on 12/17/2023.  At least 3 stones removed from common bile duct.  There was complete clearance of the duct reported.  Liver and pancreatic enzymes significantly improved since then.  GI was reconsulted today for worsening pancreatitis imaging study.  As per my discussion with Dr. Anthony earlier today, he was requiring.  supplemental oxygenation up to 5 L. He was also noted to have worsening leukocytosis.  Infectious disease service on follow up.  He was on ampicillin.  Merrem was added per primary service.  Findings were discussed in detail with the Radiology on call, Dr. Whitehead who read the abdominal and pelvic CT scan today.    Recommendations:  1. Continue IV antibiotics.  Infectious disease service on follow up.    2. Clear liquid diet.  3. Serial abdominal exams.  Discussed imaging study with Dr. Whitehead, radiologist on call.  Concern for necrosis around neck of the pancreas.  Increase fluid collection with inflammatory response.  Would be difficult to reach via image guided approach per Dr. Whitehead.  Recommend surgery consult in view of above findings.  May need repeat imaging study in next 7-10 days pending course in hospital.   4. Patient needs close monitoring of cardiopulmonary status in view of severe necrotizing pancreatitis with some hypoxemia reported.  These patients are at high risk of going into ARDS.    Discussed with the primary service, Dr. Anthony today.  Thank  you for the consult.  Patient is known to Dr. Kerns

## 2023-12-23 NOTE — PROGRESS NOTES
[facility]  Hospital Medicine Progress Note        Chief Complaint: Inpatient Follow-up abdominal pain    HPI:   68-year-old male with medical history of T2DM, hypertension, hyperlipidemia and prior cholecystectomy present to the ED with complaint of severe epigastric abdominal pain that started today around 9:00 a.m. after breakfast, the pain is severe 10/10 and radiate to his back associated with nausea and vomiting.  Report last bowel movement was this morning.  Reports chills but denies fever.  He has chronic scrotal hydrocele that has not changed or painful.     On arrival to ED he was afebrile, tachycardic, normotensive and saturating 96% on room air.  Labs notable for WBC 24.97, hemoglobin 18.2, creatinine 2.0, BUN 16.5, total bilirubin 2.7, direct 1.9, , , lipase more than 3000, triglyceride 183.  CT abdomen and pelvis show finding of severe acute pancreatitis without evidence of necrosis or abscess or fluid collection.  Liver remarkable for steatosis, gallbladder surgically absent, no comment on biliary tree.     He was given 2 L of IV fluids, IV Zosyn, IV analgesics and referred to hospital medicine service for further evaluation and management.  Patient was taken for ERCP on 12/17.  Choledocholithiasis was resolved.  Also had sphincterotomy performed.  Returned to the floor in stable condition.  GI recommending advancement of diet to clear liquids in 4-6 hours     Patient  continues to be requiring oxygen.  Echo with grade 1 diastolic dysfunction, normal systolic function.  Follow up chest x-ray obtained  with small right pleural effusion slightly increased. Nephrology okay to transition to oral Lasix.  GI signed off.    Interval Hx:  Continues to be on antibiotics.Awaiting ID recommendations  for bacteremia.Repeat blood cultures so far negative.  Continues to be requiring oxygen.  No overnight events reported by the staff, however staff thinks that he has not  able to tolerate the diet after advancing.    Patient was seen and examined, has abdominal discomfort again, denies any fevers or chills, denies any nausea, diarrhea has improved.  Denies any respiratory complaints    Chart was reviewed, T-max of 98°.9, .  Most recent labs were reviewed.  Leukocytosis worsening.  Creatinine normalized. transaminitis improving.  Phosphorus is low    Objective/physical exam:  General: In no acute distress, afebrile  Chest:  Crackles, on nasal cannula  Heart: RRR, +S1, S2, no appreciable murmur  Abdomen: Soft, nontender, BS +  MSK: Warm, no lower extremity edema, no clubbing or cyanosis  Neurologic: Alert and oriented x4, Cranial nerve II-XII intact, Strength 5/5 in all 4 extremities       VITAL SIGNS: 24 HRS MIN & MAX LAST   Temp  Min: 98 °F (36.7 °C)  Max: 98.9 °F (37.2 °C) 98.4 °F (36.9 °C)   BP  Min: 136/82  Max: 154/67 138/76   Pulse  Min: 94  Max: 104  102   Resp  Min: 16  Max: 20 18   SpO2  Min: 89 %  Max: 98 % 95 %       Recent Labs   Lab 12/21/23  0443 12/22/23  0417 12/23/23  0521   WBC 18.42* 19.53  19.53* 25.22  25.22*   RBC 4.43* 4.45* 4.09*   HGB 13.6* 13.8* 12.5*   HCT 39.2* 41.0* 37.1*   MCV 88.5 92.1 90.7   MCH 30.7 31.0 30.6   MCHC 34.7 33.7 33.7   RDW 14.0 14.3 14.6    168 208   MPV 10.8* 10.9* 10.6*       Recent Labs   Lab 12/17/23  0352 12/18/23  0357 12/18/23  0859 12/18/23  1253 12/19/23  0429 12/20/23  0645 12/20/23  1015 12/21/23  0443 12/22/23  0417 12/23/23  0521      < >  --    < > 137 137  --  136 135* 135*   K 3.9   < >  --    < > 3.5 3.3*  --  3.2* 4.5 4.2   CO2 19*   < >  --    < > 23 26  --  27 26 26   BUN 19.9   < >  --    < > 53.0* 44.9*  --  44.6* 33.3* 26.2*   CREATININE 2.29*   < >  --    < > 2.16* 1.41*  --  1.32* 1.05 1.03   CALCIUM 8.5*   < >  --    < > 6.9* 7.3*  --  7.5* 7.3* 7.4*   PH  --   --  7.350  --   --   --  7.500*  --   --   --    MG 1.60  --   --   --   --   --   --   --  2.10 1.80   ALBUMIN 3.4   < >  --    < > 2.3*  2.1*  --  2.2* 2.0*  --    ALKPHOS 77   < >  --    < > 70 67  --  74  --   --    *   < >  --    < > 129* 75*  --  55  --   --    *   < >  --    < > 94* 63*  --  58*  --   --    BILITOT 3.5*   < >  --    < > 4.3* 3.6*  --  2.8*  --   --     < > = values in this interval not displayed.          Microbiology Results (last 7 days)       Procedure Component Value Units Date/Time    Respiratory Culture [[id number]]     Order Status: Sent Specimen: Sputum, Expectorated     Stool Culture [[id number]] Collected: 12/23/23 0554    Order Status: Sent Specimen: Stool Updated: 12/23/23 0554    Blood Culture [[id number]]  (Normal) Collected: 12/20/23 1046    Order Status: Completed Specimen: Blood from Arm, Right Updated: 12/22/23 1201     CULTURE, BLOOD ([facility]) No Growth At 48 Hours    Blood Culture [[id number]]  (Normal) Collected: 12/20/23 1045    Order Status: Completed Specimen: Blood from Antecubital, Left Updated: 12/22/23 1201     CULTURE, BLOOD ([facility]) No Growth At 48 Hours    Blood culture #1 **CANNOT BE ORDERED STAT** [[id number]]  (Normal) Collected: 12/16/23 2017    Order Status: Completed Specimen: Blood from Antecubital, Right Updated: 12/21/23 2100     CULTURE, BLOOD ([facility]) No Growth at 5 days    Clostridium Diff Toxin, A & B, EIA [[id number]]  (Normal) Collected: 12/21/23 0902    Order Status: Completed Specimen: Stool Updated: 12/21/23 1101     Clostridium Difficile GDH Antigen Negative     Clostridium Difficile Toxin A/B Negative    Blood culture #2 **CANNOT BE ORDERED STAT** [[id number]]  (Abnormal)  (Susceptibility) Collected: 12/16/23 2017    Order Status: Completed Specimen: Blood from Arm, Right Updated: 12/19/23 0658     CULTURE, BLOOD ([facility]) Enterococcus faecalis     Comment: Ampicillin results may be used to predict susceptibility to amoxicillin-clavulanate, ampicillin-sulbactam, and piperacillin-tazobactam.        GRAM STAIN Gram Positive Cocci, probable Streptococcus      Seen in gram stain of  broth only      1 of 2 Anaerobic bottles positive    BCID2 Panel [[id number]]  (Abnormal) Collected: 12/16/23 2017    Order Status: Completed Specimen: Blood from Arm, Right Updated: 12/17/23 1720     CTX-M (ESBL ) N/A     IMP (Cabapenemase ) N/A     KPC resistance gene (Carbapenemase ) N/A     mcr-1 N/A     mecA ID N/A     Comment: Note: Antimicrobial resistance can occur via multiple mechanisms. A Not Detected result for antimicrobial resistance gene(s) does not indicate antimicrobial susceptibility. Subculturing is required for species identification and susceptibility testing of   isolates.        mecA/C and MREJ (MRSA) gene N/A     NDM (Carbapenemase ) N/A     OXA-48-like (Carbapenemase ) N/A     Yola/B (VRE gene) Not Detected     VIM (Carbapenemase ) N/A     Enterococcus faecalis Detected     Enterococcus faecium Not Detected     Listeria monocytogenes Not Detected     Staphylococcus spp. Not Detected     Staphylococcus aureus Not Detected     Staphylococcus epidermidis Not Detected     Staphylococcus lugdunensis Not Detected     Streptococcus spp. Not Detected     Streptococcus agalactiae (Group B) Not Detected     Streptococcus pneumoniae Not Detected     Streptococcus pyogenes (Group A) Not Detected     Acinetobacter calcoaceticus/baumannii complex Not Detected     Bacteroides fragilis Not Detected     Enterobacterales Not Detected     Enterobacter cloacae complex Not Detected     Escherichia coli Not Detected     Klebsiella aerogenes Not Detected     Klebsiella oxytoca Not Detected     Klebsiella pneumoniae group Not Detected     Proteus spp. Not Detected     Salmonella spp. Not Detected     Serratia marcescens Not Detected     Haemophilus influenzae Not Detected     Neisseria meningitidis Not Detected     Pseudomonas aeruginosa Not Detected     Stenotrophomonas maltophilia Not Detected     Candida albicans Not Detected     Candida auris Not Detected      Marissa glabrata Not Detected     Candida krusei Not Detected     Candida parapsilosis Not Detected     Candida tropicalis Not Detected     Cryptococcus neoformans/gattii Not Detected    Narrative:      The [employer] BCID2 Panel is a multiplexed nucleic acid test intended for the use with Tracab® 2.0 or Tracab® kaleo Systems for the simultaneous qualitative detection and identification of multiple bacterial and yeast nucleic acids and select genetic determinants associated with antimicrobial resistance.  The [employer] BCID2 Panel test is performed directly on blood culture samples identified as positive by a continuous monitoring blood culture system.  Results are intended to be interpreted in conjunction with Gram stain results.             Radiology:  Echo    Left Ventricle: The left ventricle is normal in size. Normal wall   thickness. Normal wall motion. There is normal systolic function with a   visually estimated ejection fraction of 55 - 60%. Grade I diastolic   dysfunction.    Right Ventricle: Normal right ventricular cavity size. Systolic   function is normal.    Aortic Valve: The aortic valve is a trileaflet valve.    Pericardium: There is a trivial effusion. No indication of cardiac   tamponade.    Technically difficult study due to lung interference.  X-Ray Chest 1 View  Narrative: EXAMINATION:  XR CHEST 1 VIEW    CLINICAL HISTORY:  follow up;    TECHNIQUE:  Single view of the chest    COMPARISON:  12/18/2023    FINDINGS:  Cardiomegaly is unchanged.  Small right effusion is slightly increased in the interval.  No acute osseous abnormality.  Impression: As above.    Electronically signed by: Cristhian Lara  Date:    12/20/2023  Time:    09:53      Scheduled Med:   ampicillin IVPB  2 g Intravenous Q6H    enoxparin  30 mg Subcutaneous Q24H (prophylaxis, 1700)    furosemide  40 mg Oral Daily    metoprolol succinate  25 mg Oral Daily    metronidazole  500 mg Intravenous Q8H    pantoprazole   40 mg Intravenous BID AC    potassium phosphate IVPB  30 mmol Intravenous Once    traZODone  25 mg Oral QHS        Continuous Infusions:       PRN Meds:  acetaminophen, acetaminophen, aluminum-magnesium hydroxide-simethicone, dextrose 10%, dextrose 10%, dicyclomine, glucagon (human recombinant), hydrALAZINE, HYDROmorphone, insulin aspart U-100, labetalol, melatonin, ondansetron, oxyCODONE, polyethylene glycol, prochlorperazine, senna-docusate 8.6-50 mg, sodium chloride 0.9%       Assessment/Plan:   Acute severe pancreatitis- suspect biliary  Cholangitis, acute  Choledocholithiasis status post ERCP  Severe sepsis  Enterococcus faecalis bacteremia  SHA superimposed on CKD 3A  Acute hypoxic respiratory failure requiring supplemental oxygen secondary to pulmonary edema/pleural effusion  , history of smoking, questionable COPD?       History of T2DM, hypertension, hyperlipidemia, GERD    Plan  GI following.  Underwent ERCP with sludge and stone removal.  Lipase improving.    Transaminitis improving.Continue current antibiotics-Flagyl, Ampicillin.  Advance diet as tolerated.  If unable to tolerate diet, my request GI re-evaluation, Blood cultures noted to be positive, ordered repeat cultures, so far negative.Infectious Disease was consulted, on further recommendations/investigations if any.Given worsening leukocytosis, ordered a scan, follow up if he needs any escalation of antibiotics. F/u UA, f/u resp culture, MRSA pcr.stool cultures  Nephrology following for SHA, creatinine improved.  On diuretics, now switching to oral.  Strict Is&Os, monitor urine output, daily weights.  Monitor electrolytes and replete them as needed while on diuretics.  Avoid nephrotoxins.  Wean oxygen as tolerated.  Consider bronchodilators p.r.n..  Encourage incentive spirometry. Followed up on echocardiogram-EF 50-55.Grade I diastolic dysfunction.  Followed up Chest x-ray.  Commenting on small pleural effusion, slightly worsened.  Consider IV  Lasix if worsening, we will keep a close eye as there is a concern for ARDS in such patients.  Continue supportive care appropriate home medications.  On Sliding scale with fingersticks a.c. HS for management of diabetes in-house.  Therapy services    DVT prophylaxis-Lovenox      Anticipated discharge-to be decided, once able to wean off of oxygen and able to tolerate the diet, pending ID recommendations and normalization of leukocytosis if scan looks ok.  And per PT evaluation    Critical care diagnosis: sepsis, iv antibiotics, acute hypoxic respiratory failure requiring oxygen  Critical care interventions: hands on evaluation, review of labs/radiographs/records and discussions with family  Critical care time spent: >32 minutes        Teresa Anthony MD   12/23/2023     All diagnosis and differential diagnosis have been reviewed; assessment and plan has been documented; I have personally reviewed the labs and test results that are presently available; I have reviewed the patients medication list; I have reviewed the consulting providers response and recommendations. I have reviewed or attempted to review medical records based upon their availability    All of the patient's questions have been  addressed and answered. Patient's is agreeable to the above stated plan. I will continue to monitor closely and make adjustments to medical management as needed.  _____________________________________________________________________

## 2023-12-23 NOTE — PLAN OF CARE
Problem: Adult Inpatient Plan of Care  Goal: Plan of Care Review  Outcome: Ongoing, Progressing  Goal: Patient-Specific Goal (Individualized)  Outcome: Ongoing, Progressing  Goal: Absence of Hospital-Acquired Illness or Injury  Outcome: Ongoing, Progressing  Goal: Optimal Comfort and Wellbeing  Outcome: Ongoing, Progressing  Goal: Readiness for Transition of Care  Outcome: Ongoing, Progressing     Problem: Diabetes Comorbidity  Goal: Blood Glucose Level Within Targeted Range  Outcome: Ongoing, Progressing     Problem: Pain Acute  Goal: Acceptable Pain Control and Functional Ability  Outcome: Ongoing, Progressing     Problem: Fluid Imbalance (Pancreatitis)  Goal: Fluid Balance  Outcome: Ongoing, Progressing     Problem: Infection (Pancreatitis)  Goal: Infection Symptom Resolution  Outcome: Ongoing, Progressing     Problem: Pain (Pancreatitis)  Goal: Acceptable Pain Control  Outcome: Ongoing, Progressing

## 2023-12-23 NOTE — PT/OT/SLP EVAL
Physical Therapy Evaluation and Discharge Note    Patient Name:  Franklin Macias   MRN:  [id number]    Recommendations:     Discharge therapy intensity: No Therapy Indicated   Discharge Equipment Recommendations: none   Barriers to discharge: None    Assessment:     Franklin Macias is a 68 y.o. male admitted with a medical diagnosis of Acute pancreatitis. .  At this time, patient is functioning at their prior level of function and does not require further acute PT services.     Recent Surgery: Procedure(s) (LRB):  ERCP (ENDOSCOPIC RETROGRADE CHOLANGIOPANCREATOGRAPHY) (N/A)  ERCP, WITH SPHINCTEROTOMY  ERCP, WITH CALCULUS REMOVAL 6 Days Post-Op    Plan:     During this hospitalization, patient does not require further acute PT services.  Please re-consult if situation changes.      Subjective     Chief Complaint: none  Patient/Family Comments/goals: none  Pain/Comfort:  Pain Rating 1: 0/10    Patients cultural, spiritual, Anglican conflicts given the current situation: no    Living Environment:  Lives with spouse in [facility] with 3 steps (bilateral rails) to enter.   Prior to admission, patients level of function was independent.  Equipment used at home: none.  DME owned (not currently used): none.  Upon discharge, patient will have assistance from spouse.    Objective:     Communicated with nurse prior to session.  Patient found supine with telemetry, oxygen upon PT entry to room.    General Precautions: Standard,      Orthopedic Precautions:N/A   Braces: N/A  Respiratory Status: Nasal cannula, flow 4 L/min. Sats 93%-100% during session.     Exams:  Cognitive Exam:  Patient is oriented to Person, Place, Time, and Situation  Sensation: -       Intact  RLE ROM: WFL  RLE Strength: WFL  LLE ROM: WFL  LLE Strength: WFL    Functional Mobility:  Bed Mobility:  Supine to Sit: independence  Sit to Supine: independence  Transfers:  Sit to Stand:  independence with no AD  Gait: 285 ft independently  Balance: good    AM-PAC 6 CLICK  MOBILITY  Total Score:24     Education Provided:  Role and goals of PT, transfer training, bed mobility, gait training, balance training, safety awareness, assistive device, strengthening exercises, and importance of participating in PT to return to PLOF.    Patient left supine with all lines intact, call button in reach, and spouse present.    GOALS:   Multidisciplinary Problems       Physical Therapy Goals       Not on file                    History:     Past Medical History:   Diagnosis Date    DM (diabetes mellitus)     GERD (gastroesophageal reflux disease)     HLD (hyperlipidemia)     HTN (hypertension)        Past Surgical History:   Procedure Laterality Date    APPENDECTOMY      CHOLECYSTECTOMY      ERCP N/A 12/17/2023    Procedure: ERCP (ENDOSCOPIC RETROGRADE CHOLANGIOPANCREATOGRAPHY);  Surgeon: Flako Kerns MD;  Location: [facility] OR;  Service: Gastroenterology;  Laterality: N/A;    ERCP W/ SPHICTEROTOMY  12/17/2023    Procedure: ERCP, WITH SPHINCTEROTOMY;  Surgeon: Flako Kerns MD;  Location: [facility] OR;  Service: Gastroenterology;;    ERCP, WITH CALCULUS REMOVAL  12/17/2023    Procedure: ERCP, WITH CALCULUS REMOVAL;  Surgeon: Flako Kerns MD;  Location: [facility] OR;  Service: Gastroenterology;;       Time Tracking:     PT Received On: 12/23/23  PT Start Time: 0751     PT Stop Time: 0803  PT Total Time (min): 12 min     Billable Minutes: Evaluation 12 minutes      12/23/2023

## 2023-12-23 NOTE — CONSULTS
Franklin Macias   MRN: [id number]   ADMISSION DATE: 2023  : 1955  AGE: 68 y.o.    DATE :  2023     PROVIDER: ROMAN ROJAS    REASON FOR REFERRAL   This is a 68 y.o. male unknown to our group with PMH of T2DM, HTN, HLD, chronic scrotal hydrocele, vitamin D deficiency, CKD 3a. Presented to the ED 23 with severe epigastric pain onset same day with radiation to back, associated n/v and chills also reported.      Upon arrival, VSS - afebrile. Labs revealed WBC 24, Tbili 1.9, , , lipase >3000.   CT abd/pel with IV: severe acute pancreatitis, gastric antrum and duodenal mural thickening likely represents reactive change for acute pancreatitis.  Preliminary MRCP: numerous intraluminal filling defects in mid to distal CBD suggestive of impacted gallstones and sludge; acute interstitial pancreatitis, lymph node in mo hepatis region likely reactive in nature.  LR, cefepime, flagyl initiated. GI consulted for choledocholithiasis.     Tbili and transaminases increased today. Tbili 3.5, Dbili 2.7. Transaminases in the 200s.     Patient resting in bed. He states symptoms started before eating, however after eating shrimp stew and potato salad his symptoms worsened. Severe abd pain, n/v - denies hematemesis or coffee ground emesis. He states even sipping water would cause vomiting. He has never had anything like this happen previously. He had his gallbladder removed around  or so for cholelithiasis and biliary colic but he had no issues post op until now. Bms are normal and without any evidence of overt bleeding. Even now he gags with sips of water, he is c/o abd cramping unrelieved by dilaudid.      He is unsure if he takes blood thinners - per chart review there are none listed in home meds. He denies frequent NSAID use. Denies ETOH or recreational drug use. He smokes 1PPD for many years. Surgical history includes cholecystectomy as above and appendectomy - he denies gastric bypass  history.      When asked about previous endoscopy/GI history he states he saw a doctor off of [facility] but when asked about any workup he states he was told he had hematuria. No records of him being seen in either of our offices although he was referred to us in 2016 for screening colonoscopy - unable to contact patient despite multiple attempts to schedule.     Per chart review, patient saw PCP in Feb 2023 for wellness visit. He was being treated for vitamin D deficiency. He had a negative cologuard in the past year or so. Some mild transaminitis noted on routine labs.     GI was reconsulted today for worsening pancreatitis imaging study.  As per my discussion with Dr. Anthnoy earlier today, he was requiring supplemental oxygenation up to 5 L. He was also noted to have worsening leukocytosis.  Infectious disease service on follow up.  He was on ampicillin.  Merrem was added per primary service.    SUBJECTIVE:    Review of Systems   Constitutional:  Negative for chills and fever.   HENT:  Negative for congestion and nosebleeds.    Eyes:  Negative for redness.   Respiratory:  Negative for cough, chest tightness and shortness of breath.    Cardiovascular:  Negative for chest pain, palpitations and leg swelling.   Gastrointestinal:  Negative for abdominal distention, abdominal pain, blood in stool, constipation, diarrhea, nausea and vomiting.        Patient reported some abdominal discomfort after he ate breakfast.  Feeling somewhat better now.     Endocrine: Negative for cold intolerance and polydipsia.   Genitourinary:  Negative for dysuria and flank pain.   Musculoskeletal:  Negative for arthralgias.   Skin:  Negative for pallor.   Allergic/Immunologic: Negative for food allergies.   Neurological:  Negative for dizziness and headaches.   Hematological:  Does not bruise/bleed easily.   Psychiatric/Behavioral:  Negative for agitation and confusion.         Review of patient's allergies indicates:  No Known Allergies       ampicillin IVPB  2 g Intravenous Q6H    enoxparin  30 mg Subcutaneous Q24H (prophylaxis, 1700)    furosemide  40 mg Oral Daily    meropenem (MERREM) IVPB  500 mg Intravenous Q8H    metoprolol succinate  25 mg Oral Daily    metronidazole  500 mg Intravenous Q8H    pantoprazole  40 mg Intravenous BID AC    potassium phosphate IVPB  30 mmol Intravenous Once    traZODone  25 mg Oral QHS       Medications Discontinued During This Encounter   Medication Reason    pantoprazole EC tablet 40 mg     iohexoL (OMNIPAQUE 300) injection Patient Discharge    LIDOcaine (PF) 10 mg/ml (1%) injection 10 mg Patient Transfer    sodium citrate-citric acid 500-334 mg/5 ml solution 30 mL Patient Transfer    midazolam (VERSED) 1 mg/mL injection 2 mg     electrolyte-A infusion     ceFEPIme (MAXIPIME) 2 g in dextrose 5 % in water (D5W) 100 mL IVPB (MB+)     lactated ringers infusion     enoxaparin injection 40 mg     furosemide injection 40 mg     furosemide injection 40 mg     albumin human 25% bottle 12.5 g     furosemide injection 20 mg     dicyclomine injection 20 mg     dicyclomine injection 20 mg              Past Medical History:   Diagnosis Date    DM (diabetes mellitus)     GERD (gastroesophageal reflux disease)     HLD (hyperlipidemia)     HTN (hypertension)       Social History     Socioeconomic History    Marital status:    Tobacco Use    Smoking status: Every Day     Current packs/day: 1.00     Types: Cigarettes    Smokeless tobacco: Never   Substance and Sexual Activity    Alcohol use: Never    Drug use: Never    Sexual activity: Yes     Social Determinants of Health     Financial Resource Strain: Low Risk  (8/8/2023)    Overall Financial Resource Strain (CARDIA)     Difficulty of Paying Living Expenses: Not hard at all   Food Insecurity: No Food Insecurity (8/8/2023)    Hunger Vital Sign     Worried About Running Out of Food in the Last Year: Never true     Ran Out of Food in the Last Year: Never true    Transportation Needs: No Transportation Needs (12/19/2023)    PRAPARE - Transportation     Lack of Transportation (Medical): No     Lack of Transportation (Non-Medical): No   Stress: No Stress Concern Present (8/8/2023)    [employer] - Occupational Stress Questionnaire     Feeling of Stress : Only a little   Housing Stability: Low Risk  (12/19/2023)    Housing Stability Vital Sign     Unable to Pay for Housing in the Last Year: No     Number of Places Lived in the Last Year: 1     Unstable Housing in the Last Year: No      Past Surgical History:   Procedure Laterality Date    APPENDECTOMY      CHOLECYSTECTOMY      ERCP N/A 12/17/2023    Procedure: ERCP (ENDOSCOPIC RETROGRADE CHOLANGIOPANCREATOGRAPHY);  Surgeon: Flako Kerns MD;  Location: [facility] OR;  Service: Gastroenterology;  Laterality: N/A;    ERCP W/ SPHICTEROTOMY  12/17/2023    Procedure: ERCP, WITH SPHINCTEROTOMY;  Surgeon: Flako Kerns MD;  Location: [facility] OR;  Service: Gastroenterology;;    ERCP, WITH CALCULUS REMOVAL  12/17/2023    Procedure: ERCP, WITH CALCULUS REMOVAL;  Surgeon: Flako Kerns MD;  Location: [facility] OR;  Service: Gastroenterology;;        History reviewed. No pertinent family history.       OBJECTIVE:     Vitals:    12/23/23 0559 12/23/23 0719 12/23/23 0900 12/23/23 1146   BP:  138/76  128/79   Pulse:  102  100   Resp: 18      Temp:  98.4 °F (36.9 °C)  98.8 °F (37.1 °C)   TempSrc:  Oral  Oral   SpO2:  95% 95% 95%   Weight: 73.2 kg (161 lb 6 oz)      Height:             Physical Exam  HENT:      Head: Normocephalic and atraumatic.      Nose: Nose normal.      Mouth/Throat:      Pharynx: Oropharynx is clear.   Eyes:      Extraocular Movements: Extraocular movements intact.      Conjunctiva/sclera: Conjunctivae normal.      Pupils: Pupils are equal, round, and reactive to light.   Cardiovascular:      Rate and Rhythm: Normal rate and regular rhythm.   Pulmonary:      Effort: Pulmonary  "effort is normal.      Breath sounds: Normal breath sounds.   Abdominal:      General: Bowel sounds are normal.      Palpations: Abdomen is soft.      Tenderness: There is abdominal tenderness (Mild generalized abdominal tenderness.  Limited exam due to body habitus.  Obese.).   Musculoskeletal:         General: Normal range of motion.      Cervical back: Neck supple.   Skin:     General: Skin is warm and dry.   Neurological:      Mental Status: He is alert and oriented to person, place, and time.   Psychiatric:         Mood and Affect: Mood normal.            LABS    Recent Labs   Lab 12/21/23 0443 12/22/23 0417 12/23/23  0521   WBC 18.42* 19.53  19.53* 25.22  25.22*   HGB 13.6* 13.8* 12.5*   HCT 39.2* 41.0* 37.1*    168 208        Recent Labs   Lab 12/19/23 0429 12/20/23  0645 12/21/23 0443 12/22/23 0417 12/23/23  0521    137 136 135* 135*   K 3.5 3.3* 3.2* 4.5 4.2   CO2 23 26 27 26 26   BUN 53.0* 44.9* 44.6* 33.3* 26.2*   CREATININE 2.16* 1.41* 1.32* 1.05 1.03   CALCIUM 6.9* 7.3* 7.5* 7.3* 7.4*   BILITOT 4.3* 3.6* 2.8*  --   --    ALKPHOS 70 67 74  --   --    * 75* 55  --   --    AST 94* 63* 58*  --   --    GLUCOSE 139* 120* 119* 110 109        Recent Labs   Lab 12/19/23 0429   INR 1.4*      No results for input(s): "AMYLASE" in the last 168 hours.   Recent Labs   Lab 12/17/23  0352 12/19/23 0429   INR 1.1 1.4*   PTT 25.0  --              RESULTS: CT Chest Abdomen Pelvis Without Contrast (XPD)    Result Date: 12/23/2023  EXAMINATION: CT CHEST ABDOMEN PELVIS WITHOUT CONTRAST(XPD) CLINICAL HISTORY: follow up; TECHNIQUE: Helical acquisition from the thoracic inlet through the ischia without IV contrast.  Lack of contrast limits evaluation of solid organs and vascular structures.  Three plane reconstructions made available for review. DLP 1042 mGycm. Automatic exposure control, adjustment of mA/kV or iterative reconstruction technique was used to reduce radiation. COMPARISON: 18 December " 2023, 15 August 2023 FINDINGS: Chest. Heart is not significantly enlarged.  There are coronary artery calcifications.  No pericardial effusion. No mediastinal, hilar or axillary adenopathy by CT size criteria. There are small bilateral pleural effusions slightly improved compared to prior.  Mild bibasilar atelectasis.  Moderate to severe emphysema. Abdomen and pelvis. No acute findings noncontrast liver, spleen, adrenals or kidneys.  Gallbladder surgically absent. There is increased peripancreatic inflammation compared to prior.  Question a developing collection anterior and superior to the portion of the pancreas which had suggestion of necrosis on prior imaging.  This collection is seen on images 104-110 series 2.  There is small volume ascites. There is no bowel obstruction or free air.  There is colonic diverticulosis. Urinary bladder unremarkable.  Prostate mildly enlarged.  Abdominal aorta mildly ectatic with moderate atherosclerotic disease. There are no acute osseous findings.     1. Worsened appearance of pancreatitis with possible collection developing anterior and superior to the pancreas. 2. Small volume ascites. 3. Small bilateral pleural effusions. Electronically signed by: Osiel Whitehead Date:    12/23/2023 Time:    10:59    Echo    Result Date: 12/20/2023    Left Ventricle: The left ventricle is normal in size. Normal wall thickness. Normal wall motion. There is normal systolic function with a visually estimated ejection fraction of 55 - 60%. Grade I diastolic dysfunction.   Right Ventricle: Normal right ventricular cavity size. Systolic function is normal.   Aortic Valve: The aortic valve is a trileaflet valve.   Pericardium: There is a trivial effusion. No indication of cardiac tamponade.   Technically difficult study due to lung interference.     X-Ray Chest 1 View    Result Date: 12/20/2023  EXAMINATION: XR CHEST 1 VIEW CLINICAL HISTORY: follow up; TECHNIQUE: Single view of the chest COMPARISON:  12/18/2023 FINDINGS: Cardiomegaly is unchanged.  Small right effusion is slightly increased in the interval.  No acute osseous abnormality.     As above. Electronically signed by: Cristhian Lara Date:    12/20/2023 Time:    09:53    CT Abdomen Pelvis  Without Contrast    Result Date: 12/18/2023  EXAMINATION: CT ABDOMEN PELVIS WITHOUT CONTRAST CLINICAL HISTORY: rise in tbili and pain s/p ERCP; TECHNIQUE: Low dose axial images, sagittal and coronal reformations were obtained from the lung bases to the pubic symphysis.  No contrast was administered.  Automatic exposure control is utilized to reduce patient radiation exposure. COMPARISON: 12/16/2023 FINDINGS: There are changes seen consistent with emphysema and COPD in the lungs.  There is  bibasilar atelectasis and bilateral pleural effusions.  This is a new finding.. The liver is hypoattenuated consistent with hepatic steatosis.  The patient is status post cholecystectomy.  There is evidence of ascites.  This is more prominent than the prior examination. There are inflammatory changes seen around the pancreas consistent with pancreatitis.  This was seen on the prior examination as well.  Previous examination showed incomplete enhancement of the pancreas concerning for developing necrotizing pancreatitis.  No free intraperitoneal air is seen on this examination. Adrenal glands appear normal. Kidneys appear normal.  No hydronephrosis is seen.  No hydroureter seen. No colitis is seen.  No diverticulitis is seen.  No colonic mass is seen. Urinary bladder appears grossly unremarkable. Atherosclerotic changes are seen within the abdominal aorta and its branches. Bones appear grossly unremarkable.     Interval worsening of the ascites Persistent inflammatory changes around the pancreas consistent patient's history of pancreatitis.  Previous examination showed incomplete enhancement of the pancreas suggesting possible developing necrotizing pancreatitis. Interval development  of bibasilar atelectasis and moderate to large pleural effusions Electronically signed by: Wayne Gee Date:    12/18/2023 Time:    12:12    X-Ray Chest 1 View    Result Date: 12/18/2023  EXAMINATION: XR CHEST 1 VIEW CPT 99607 CLINICAL HISTORY: Hypoxemia; FINDINGS: Examination reveals mediastinal silhouette to be within normal limits cardiac silhouette is not enlarged.  There is some increase interstitial and pulmonary vascular markings which may indicate a degree of pulmonary vascular congestion. There is blunting of both costophrenic angles indicating the presence of bilateral pleural effusions. Slightly more confluent opacities identified at the left base superimposed infiltrate cannot be completely excluded. Atelectatic changes identified at the right base     Changes of pulmonary vascular congestion. Bilateral pleural effusions Electronically signed by: Radu Edwards Date:    12/18/2023 Time:    09:43    US Retroperitoneal Complete    Result Date: 12/18/2023  EXAMINATION: US RETROPERITONEAL COMPLETE CLINICAL HISTORY: Acute on chronic kidney disease. COMPARISON: CT 16 December 2023 FINDINGS: Grayscale and color Doppler sonographic evaluation of the kidneys and urinary bladder. The right kidney measures 11 cm. The left kidney measures 11 cm.   No hydronephrosis.  Grossly normal renal parenchymal echogenicity. No significant abnormality of the urinary bladder. There is small volume ascites.     No significant sonographic abnormality of the kidneys. Electronically signed by: Osiel Whitehead Date:    12/18/2023 Time:    09:09    FL ERCP Biliary And Pancreatic By Rad Tech    Result Date: 12/17/2023  EXAMINATION: FL ERCP BILIARY AND PANCREATIC CLINICAL HISTORY: biliary stone; TECHNIQUE: 52.8mGy of fluoroscopic support was utilized for procedural support during procedure.  Please refer to performing provider dictation. COMPARISON: None FINDINGS: Fluoroscopic support. Please refer to procedure of this dictation.      Fluoroscopic support.  Please refer to procedure of this dictation. Electronically signed by: Cristhian Lara Date:    12/17/2023 Time:    13:04    MRI MRCP Without Contrast    Result Date: 12/17/2023  EXAMINATION: MRI ABDOMEN WITHOUT CONTRAST MRCP CLINICAL HISTORY: Post cholecystectomy pancreatitis -- ?  Choledocholithiases; TECHNIQUE: Multiplanar, multisequence images are performed through the liver and upper abdomen.  Additionally 2D and 3D MRCP sequences are performed as well as MIP images. COMPARISON: None. FINDINGS: Lung bases: Moderate streaky is present in the visualized lung bases consistent with nonspecific dependent changes and scarring. Liver: Liver appears normal in size. Portal venous system: Normal. Gallbladder: Gallbladder is surgically absent. Biliary tree intrahepatic ducts: Unremarkable. Biliary tree extrahepatic ducts: There appear to be numerous intraluminal filling defects in the mid to distal common bile duct series image 16 series 3. Ampullary region: No obvious obstructive mass or stone. Spleen: Unremarkable. Pancreas: There is intermediate intrasubstance T2 signal in the pancreatic body (image 19 series 4 and 6) with corresponding restricted diffusion signal on DWI (image 136 series 7), relating to edema changes. There is stable free fluid collection in the bilateral anterior pararenal space extending to the adjacent mesocolon, small bowel mesentery, perihepatic and perisplenic regions. T2 stranding intensities are seen along the lesser sac. These findings are reflective of acute interstitial pancreatitis. Pancreatic duct: Unremarkable. Adrenal glands: Unremarkable. Kidneys: There are probable cysts in both kidneys, with the larger seen in the right mid pole region measuring 1.0 cm (image 29 series 4).Ureters: Unremarkable. Stomach and distal esophagus: Normal. Small bowel: Normal. Colon: Normal. Lymph nodes: A 1cm lymph node is demonstrated in the mo hepatis region (image 18 series  4), likely reactive in nature. Abdominal wall: Normal. Systemic arteries and veins: Unremarkable. Osseous structures: There is mild spondylolytic changes in the imaged spine. Miscellaneous: There are motion artifacts in some of the sequences limiting its evaluation.     1. There appear to be numerous intraluminal filling defects in the mid to distal common bile duct series image 16 series 3. These are suggestive of impacted gallstones and sludge. Correlate clinically as regards additional evaluation and follow-up possibly with ERCP. 2. There is intermediate intrasubstance T2 signal in the pancreatic body (image 19 series 4 and 6) with corresponding restricted diffusion signal on DWI (image 136 series 7), relating to edema changes. There is stable free fluid collection in the bilateral anterior pararenal space extending to the adjacent mesocolon, small bowel mesentery, perihepatic and perisplenic regions. T2 stranding intensities are seen along the lesser sac. These findings are reflective of acute interstitial pancreatitis. Correlate with clinical and laboratory findings as regards additional evaluation and follow-up to full resolution as indicated. 3. A 1cm lymph node is demonstrated in the mo hepatis region (image 18 series 4), likely reactive in nature. I concur with the preliminary report above. Electronically signed by: Wayne Gee Date:    12/17/2023 Time:    12:12    CT Abdomen Pelvis With IV Contrast NO Oral Contrast    Result Date: 12/16/2023  EXAMINATION: CT ABDOMEN PELVIS WITH IV CONTRAST CLINICAL HISTORY: Abdominal pain, acute, nonlocalized; TECHNIQUE: Multidetector axial images were obtained of the abdomen and pelvis following the administration of IV contrast. Oral contrast was not administered. Dose length product of 578 mGycm. Automated exposure control was utilized to minimize radiation dose. COMPARISON: August 4, 2023. FINDINGS: Included portion of the lungs show dependent hypoventilatory  changes without acute air space infiltrates or fluid within the pleural spaces. Liver is remarkable for steatosis without focal space occupying lesion.  There is small amount of free fluid around the anterolateral surface of the liver.  Gallbladder is surgically absent.  Pancreas is surrounded by considerable phlegmons and fluid which extend into the anterior pararenal spaces and further into the lower abdomen consistent with acute pancreatitis.  There is no suggestion of pancreatic necrosis, pseudocyst or abscess formation.  Spleen is unremarkable. The adrenal glands appear within normal limits. The kidneys are unremarkable in size and contour. No solid or cystic renal lesion identified. There is no hydronephrosis. No perinephric fluid strandings or collections identified. Stomach is nondistended.  There is distal esophageal mural thickening which probably result of reflux disease with about similar appearance.  There is some mural thickening of the gastric antrum and the descending colon which may represent reactive change from acute pancreatitis.  Possible primary gastritis and duodenitis is not excluded.  No abnormal dilatation of loops of small bowel.  Colon is nondistended.  No bowel obstruction.  Distal descending and sigmoid colon noninflamed diverticulosis coli. Urinary bladder appears within normal limits.  Prostate is enlarged in size and contains few calcifications.  There is large right hydrocele which extends into right inguinal canal.  Is no pelvic free fluid. No acute or otherwise osseous abnormality identified.     1. Severe acute pancreatitis. 2. Gastric antrum and duodenal mural thickening likely represents reactive change for acute pancreatitis. 3. Right large hydrocele. Electronically signed by: eDnver Wagner Date:    12/16/2023 Time:    20:08    US Scrotum And Testicles    Result Date: 12/16/2023  EXAMINATION: US SCROTUM AND TESTICLES CLINICAL HISTORY: Other specified disorders of the male  genital organs TECHNIQUE: Multiple real-time images were performed of the scrotum in various planes by the sonographer. COMPARISON: None available FINDINGS: Right testicle measures 3.3 x 2.4 x 2.4 cm.  There is unremarkable echotexture of the right testicle.  Unremarkable arteriovenous flow to the right testicle. There is large right scrotal hydrocele which measures 10.6 x 7.2 x 8.5 cm.  Right hydrocele causes mass effect upon left testicle which is deviated inferiorly.  This made it difficult to optimally assess vascularity to the left testicle due to pressure caused by the hydrocele.  Some vascularity along the peripheral aspect of less testicle was visualized.  Left testicle measures 3.4 x 2.0 x 2.0 cm and no abnormality of the parenchymal echotexture identified.     1. Unremarkable right testicle 2. Large right hydrocele which crosses the midline and causes compressive effect upon the left testicle which is inferiorly deviated.  Due to pressure caused by the hydrocele upon the left testicle optimal Doppler flow to the left testicle could not be obtained.  Only limited arterial flow to the peripheral aspect left testicle could be visualized.  Please correlate clinically.  Oneoption is to perform a follow-up study post drainage of large right hydrocele. Electronically signed by: Denver Wagner Date:    12/16/2023 Time:    19:21      ICD-10-CM ICD-9-CM   1. Hydrocele of testis  N43.3 603.9   2. Epigastric pain  R10.13 789.06   3. Scrotal swelling  N50.89 608.86   4. Acute pancreatitis, unspecified complication status, unspecified pancreatitis type  K85.90 577.0   5. Chest pain  R07.9 786.50   6. SIRS (systemic inflammatory response syndrome)  R65.10 995.90   7. Transaminitis  R74.01 790.4   8. SHA (acute kidney injury)  N17.9 584.9   9. Acute biliary pancreatitis, unspecified complication status  K85.10 577.0   10. Choledocholithiasis  K80.50 574.50   11. Hyperlipidemia associated with type 2 diabetes mellitus   E11.69 250.80    E78.5 272.4   12. Hypoxia  R09.02 799.02     ASSESSMENT & PLAN:   This is a 68 y.o. male unknown to our group with PMH of T2DM, HTN, HLD, chronic scrotal hydrocele, vitamin D deficiency, CKD 3a. Presented to the ED 12/16/23 with severe epigastric pain onset same day with radiation to back, associated n/v and chills also reported.      Upon arrival, VSS - afebrile. Labs revealed WBC 24, Tbili 1.9, , , lipase >3000.   CT abd/pel with IV: severe acute pancreatitis, gastric antrum and duodenal mural thickening likely represents reactive change for acute pancreatitis.  Preliminary MRCP: numerous intraluminal filling defects in mid to distal CBD suggestive of impacted gallstones and sludge; acute interstitial pancreatitis, lymph node in mo hepatis region likely reactive in nature.  LR, cefepime, flagyl initiated. GI consulted for choledocholithiasis.     Tbili and transaminases increased today. Tbili 3.5, Dbili 2.7. Transaminases in the 200s.     Patient resting in bed. He states symptoms started before eating, however after eating shrimp stew and potato salad his symptoms worsened. Severe abd pain, n/v - denies hematemesis or coffee ground emesis. He states even sipping water would cause vomiting. He has never had anything like this happen previously. He had his gallbladder removed around 2020 or so for cholelithiasis and biliary colic but he had no issues post op until now. Bms are normal and without any evidence of overt bleeding. Even now he gags with sips of water, he is c/o abd cramping unrelieved by dilaudid.      He is unsure if he takes blood thinners - per chart review there are none listed in home meds. He denies frequent NSAID use. Denies ETOH or recreational drug use. He smokes 1PPD for many years. Surgical history includes cholecystectomy as above and appendectomy - he denies gastric bypass history.      When asked about previous endoscopy/GI history he states he saw a  doctor off of [facility] but when asked about any workup he states he was told he had hematuria. No records of him being seen in either of our offices although he was referred to us in 2016 for screening colonoscopy - unable to contact patient despite multiple attempts to schedule.     Per chart review, patient saw PCP in Feb 2023 for wellness visit. He was being treated for vitamin D deficiency. He had a negative cologuard in the past year or so. Some mild transaminitis noted on routine labs.     Patient underwent ERCP with Dr. Kerns on 12/17/2023.  At least 3 stones removed from common bile duct.  There was complete clearance of the duct reported.  Liver and pancreatic enzymes significantly improved since then.  GI was reconsulted today for worsening pancreatitis imaging study.  As per my discussion with Dr. Anthony earlier today, he was requiring.  supplemental oxygenation up to 5 L. He was also noted to have worsening leukocytosis.  Infectious disease service on follow up.  He was on ampicillin.  Merrem was added per primary service.  Findings were discussed in detail with the Radiology on call, Dr. Whitehead who read the abdominal and pelvic CT scan today.    Recommendations:  1. Continue IV antibiotics.  Infectious disease service on follow up.    2. Clear liquid diet.  3. Serial abdominal exams.  Discussed imaging study with Dr. Whitehead, radiologist on call.  Concern for necrosis around neck of the pancreas.  Increase fluid collection with inflammatory response.  Would be difficult to reach via image guided approach per Dr. Whitehead.  Recommend surgery consult in view of above findings.  May need repeat imaging study in next 7-10 days pending course in hospital.   4. Surgery consult to evaluate above findings.    5.  Patient needs close monitoring of cardiopulmonary status in view of severe necrotizing pancreatitis with some hypoxemia reported.  These patients are at high risk of going into ARDS.  Above  findings were discussed in detail with the patient including the severity of condition.  Patient understands and agrees with the plan.    Discussed with the primary service, Dr. Anthony today.  Thank you for the consult.  Patient is known to Dr. Kerns

## 2023-12-23 NOTE — CONSULTS
Acute Care Surgery   History and Physical    Patient Name: Franklin Macias  YOB: 1955  Date: 12/23/2023 5:38 PM  Date of Admission: 12/16/2023  HD#7  POD#6 Days Post-Op    PRESENTING HISTORY   Chief Complaint/Reason for Admission: Acute pancreatitis    History of Present Illness:  Patient presenting with abdominal pain. He has a history of choledocholithiasis which was treated with cholecystectomy about 3 years ago. However he now presents with biliary pancreatitis due to primary stones.     The patient feels much better since admission. Patient reported he tolerated a diet without n/v and abdominal pain minimal. His last bowel movement was yesterday which was loose. He does appear distended. General Surgery consulted regarding jean carlos-pancreatic fluid collection seen on CT.    Review of Systems:  12 point ROS negative except as stated in HPI    PAST HISTORY:   Past medical history:  Past Medical History:   Diagnosis Date    DM (diabetes mellitus)     GERD (gastroesophageal reflux disease)     HLD (hyperlipidemia)     HTN (hypertension)        Past surgical history:  Past Surgical History:   Procedure Laterality Date    APPENDECTOMY      CHOLECYSTECTOMY      ERCP N/A 12/17/2023    Procedure: ERCP (ENDOSCOPIC RETROGRADE CHOLANGIOPANCREATOGRAPHY);  Surgeon: Flako Kerns MD;  Location: [facility];  Service: Gastroenterology;  Laterality: N/A;    ERCP W/ SPHICTEROTOMY  12/17/2023    Procedure: ERCP, WITH SPHINCTEROTOMY;  Surgeon: Flako Kerns MD;  Location: [facility] OR;  Service: Gastroenterology;;    ERCP, WITH CALCULUS REMOVAL  12/17/2023    Procedure: ERCP, WITH CALCULUS REMOVAL;  Surgeon: Flako Kerns MD;  Location: [facility] OR;  Service: Gastroenterology;;       Family history:  History reviewed. No pertinent family history.    Social history:  Social History     Socioeconomic History    Marital status:    Tobacco Use    Smoking status: Every Day     Current packs/day: 1.00      Types: Cigarettes    Smokeless tobacco: Never   Substance and Sexual Activity    Alcohol use: Never    Drug use: Never    Sexual activity: Yes     Social Determinants of Health     Financial Resource Strain: Low Risk  (8/8/2023)    Overall Financial Resource Strain (CARDIA)     Difficulty of Paying Living Expenses: Not hard at all   Food Insecurity: No Food Insecurity (8/8/2023)    Hunger Vital Sign     Worried About Running Out of Food in the Last Year: Never true     Ran Out of Food in the Last Year: Never true   Transportation Needs: No Transportation Needs (12/19/2023)    PRAPARE - Transportation     Lack of Transportation (Medical): No     Lack of Transportation (Non-Medical): No   Stress: No Stress Concern Present (8/8/2023)    [employer] - Occupational Stress Questionnaire     Feeling of Stress : Only a little   Housing Stability: Low Risk  (12/19/2023)    Housing Stability Vital Sign     Unable to Pay for Housing in the Last Year: No     Number of Places Lived in the Last Year: 1     Unstable Housing in the Last Year: No     Social History     Tobacco Use   Smoking Status Every Day    Current packs/day: 1.00    Types: Cigarettes   Smokeless Tobacco Never      Social History     Substance and Sexual Activity   Alcohol Use Never        MEDICATIONS & ALLERGIES:     No current facility-administered medications on file prior to encounter.     Current Outpatient Medications on File Prior to Encounter   Medication Sig    amlodipine-benazepril 10-20mg (LOTREL) 10-20 mg per capsule Take 1 capsule by mouth once daily.    atorvastatin (LIPITOR) 20 MG tablet Take 1 tablet (20 mg total) by mouth once daily.    ergocalciferol (ERGOCALCIFEROL) 50,000 unit Cap Take 1 capsule (50,000 Units total) by mouth every 7 days.    fenofibrate (TRICOR) 145 MG tablet See Instructions, TAKE 1 TABLET EVERY DAY, # 90 tab(s), 3 Refill(s), Pharmacy: [facility], 168, cm, Height/Length  Dosing, 11/11/21 9:32:00 CST, 73.75, kg, Weight Dosing, 11/11/21 9:32:00 CST Strength: 145 mg    fluticasone propionate (FLONASE) 50 mcg/actuation nasal spray 2 sprays (100 mcg total) by Each Nostril route once daily.    glimepiride (AMARYL) 2 MG tablet Take 1 tablet (2 mg total) by mouth once daily.    metoprolol succinate (TOPROL-XL) 25 MG 24 hr tablet Take 1 tablet (25 mg total) by mouth once daily.    omeprazole (PRILOSEC) 20 MG capsule Take 1 capsule (20 mg total) by mouth once daily.    metoprolol succinate (TOPROL-XL) 12.5 MG 24 hr split tablet   See Instructions, TAKE 1 TABLET EVERY DAY, # 90 tab(s), 3 Refill(s), Pharmacy: [facility], 168, cm, Height/Length Dosing, 03/04/21 9:57:00 CST, 73, kg, Weight Dosing, 03/04/21 9:57:00 CST    ondansetron (ZOFRAN-ODT) 4 MG TbDL Take 1 tablet (4 mg total) by mouth every 8 (eight) hours as needed (nausea).       Allergies: Review of patient's allergies indicates:  No Known Allergies    Scheduled Meds:   ampicillin IVPB  2 g Intravenous Q6H    enoxparin  30 mg Subcutaneous Q24H (prophylaxis, 1700)    furosemide  40 mg Oral Daily    meropenem (MERREM) IVPB  500 mg Intravenous Q8H    metoprolol succinate  25 mg Oral Daily    metronidazole  500 mg Intravenous Q8H    pantoprazole  40 mg Intravenous BID AC    potassium phosphate IVPB  30 mmol Intravenous Once    traZODone  25 mg Oral QHS       Continuous Infusions:    PRN Meds:sodium chloride 0.9%, acetaminophen, acetaminophen, aluminum-magnesium hydroxide-simethicone, dextrose 10%, dextrose 10%, dicyclomine, glucagon (human recombinant), hydrALAZINE, HYDROmorphone, insulin aspart U-100, labetalol, melatonin, ondansetron, oxyCODONE, polyethylene glycol, prochlorperazine, senna-docusate 8.6-50 mg, sodium chloride 0.9%    OBJECTIVE:   Vital Signs:  VITAL SIGNS: 24 HR MIN & MAX LAST   Temp  Min: 98.3 °F (36.8 °C)  Max: 98.9 °F (37.2 °C)  98.8 °F (37.1 °C)   BP  Min: 128/79  Max: 154/67  (!) 152/89    Pulse   "Min: 57  Max: 102  (!) 57    Resp  Min: 18  Max: 18  18    SpO2  Min: 89 %  Max: 97 %  96 %      HT: 5' 6" (167.6 cm)  WT: 73.2 kg (161 lb 6 oz)  BMI: 26.1     Intake/output:  Intake/Output - Last 3 Shifts         12/21 0700 12/22 0659 12/22 0700 12/23 0659 12/23 0700 12/24 0659    P.O.  720 560    IV Piggyback       Total Intake(mL/kg)  720 (9.8) 560 (7.7)    Urine (mL/kg/hr) 300 (0.2) 2200 (1.3) 1000 (1.3)    Stool 1  0    Total Output 301 2200 1000    Net -301 -1480 -440           Urine Occurrence 2 x  2 x    Stool Occurrence 1 x  1 x            Intake/Output Summary (Last 24 hours) at 12/23/2023 1738  Last data filed at 12/23/2023 1500  Gross per 24 hour   Intake 560 ml   Output 2400 ml   Net -1840 ml         Physical Exam:  General: Well developed, well nourished, no acute distress  HEENT: Normocephalic, atraumatic, PERRL  CV: RR  Resp: NWOB  GI:  Abdomen soft, non-tender, moderately distended, no guarding, no rebound, normoactive bowel sounds, no masses   :  Deferred  MSK: No muscle atrophy, cyanosis, peripheral edema, moving all extremities spontaneously  Skin/wounds:  No rashes, ulcers, erythema  Neuro:  CNII-XII grossly intact, alert and oriented to person, place, and time    Labs:  Troponin:  No results for input(s): "TROPONINI" in the last 72 hours.  CBC:  Recent Labs     12/21/23  0443 12/21/23  0443 12/22/23  0417 12/23/23  0521   WBC 18.42*   < > 19.53  19.53* 25.22  25.22*   RBC 4.43*  --  4.45* 4.09*   HGB 13.6*  --  13.8* 12.5*   HCT 39.2*  --  41.0* 37.1*     --  168 208   MCV 88.5  --  92.1 90.7   MCH 30.7  --  31.0 30.6   MCHC 34.7  --  33.7 33.7    < > = values in this interval not displayed.     CMP:  Recent Labs     12/21/23  0443 12/22/23  0417 12/23/23  0521   CALCIUM 7.5* 7.3* 7.4*   ALBUMIN 2.2* 2.0*  --     135* 135*   K 3.2* 4.5 4.2   CO2 27 26 26   BUN 44.6* 33.3* 26.2*   CREATININE 1.32* 1.05 1.03   ALKPHOS 74  --   --    ALT 55  --   --    AST 58*  --   --  " "  BILITOT 2.8*  --   --      Lactic Acid:  No results for input(s): "LACTATE" in the last 72 hours.  ETOH:  No results for input(s): "ETHANOL" in the last 72 hours.   Urine Drug Screen:  No results for input(s): "COCAINE", "OPIATE", "BARBITURATE", "AMPHETAMINE", "FENTANYL", "CANNABINOIDS", "MDMA" in the last 72 hours.    Invalid input(s): "BENZODIAZEPINE", "PHENCYCLIDINE"   ABG:  Recent Labs   Lab 12/20/23  1015   PH 7.500*   PO2 59.0*   PCO2 35.0   HCO3 27.3*        Diagnostic Results:  CT Chest Abdomen Pelvis Without Contrast (XPD)   Final Result      1. Worsened appearance of pancreatitis with possible collection developing anterior and superior to the pancreas.   2. Small volume ascites.   3. Small bilateral pleural effusions.         Electronically signed by: Osiel Whitehead   Date:    12/23/2023   Time:    10:59      X-Ray Chest 1 View   Final Result      As above.         Electronically signed by: Cristhian Lara   Date:    12/20/2023   Time:    09:53      CT Abdomen Pelvis  Without Contrast   Final Result      Interval worsening of the ascites      Persistent inflammatory changes around the pancreas consistent patient's history of pancreatitis.  Previous examination showed incomplete enhancement of the pancreas suggesting possible developing necrotizing pancreatitis.      Interval development of bibasilar atelectasis and moderate to large pleural effusions         Electronically signed by: Wayne Gee   Date:    12/18/2023   Time:    12:12      X-Ray Chest 1 View   Final Result      Changes of pulmonary vascular congestion.      Bilateral pleural effusions         Electronically signed by: Radu Edwards   Date:    12/18/2023   Time:    09:43      US Retroperitoneal Complete   Final Result      No significant sonographic abnormality of the kidneys.         Electronically signed by: Osiel Whitehead   Date:    12/18/2023   Time:    09:09      FL ERCP Biliary And Pancreatic By [employer]   Final Result    "   Fluoroscopic support.  Please refer to procedure of this dictation.         Electronically signed by: Cristhian Lara   Date:    12/17/2023   Time:    13:04      MRI MRCP Without Contrast   Final Result      1. There appear to be numerous intraluminal filling defects in the mid to distal common bile duct series image 16 series 3. These are suggestive of impacted gallstones and sludge. Correlate clinically as regards additional evaluation and follow-up possibly with ERCP.      2. There is intermediate intrasubstance T2 signal in the pancreatic body (image 19 series 4 and 6) with corresponding restricted diffusion signal on DWI (image 136 series 7), relating to edema changes. There is stable free fluid collection in the bilateral anterior pararenal space extending to the adjacent mesocolon, small bowel mesentery, perihepatic and perisplenic regions. T2 stranding intensities are seen along the lesser sac. These findings are reflective of acute interstitial pancreatitis. Correlate with clinical and laboratory findings as regards additional evaluation and follow-up to full resolution as indicated.      3. A 1cm lymph node is demonstrated in the mo hepatis region (image 18 series 4), likely reactive in nature.      I concur with the preliminary report above.         Electronically signed by: Wayne Gee   Date:    12/17/2023   Time:    12:12      CT Abdomen Pelvis With IV Contrast NO Oral Contrast   Final Result      1. Severe acute pancreatitis.      2. Gastric antrum and duodenal mural thickening likely represents reactive change for acute pancreatitis.      3. Right large hydrocele.         Electronically signed by: Denver Wagner   Date:    12/16/2023   Time:    20:08      US Scrotum And Testicles   Final Result      1. Unremarkable right testicle      2. Large right hydrocele which crosses the midline and causes compressive effect upon the left testicle which is inferiorly deviated.  Due to pressure caused by the  hydrocele upon the left testicle optimal Doppler flow to the left testicle could not be obtained.  Only limited arterial flow to the peripheral aspect left testicle could be visualized.  Please correlate clinically.  Oneoption is to perform a follow-up study post drainage of large right hydrocele.         Electronically signed by: Denver Wagner   Date:    12/16/2023   Time:    19:21          ASSESSMENT & PLAN:    69 yo M with biliary pancreatitis with concern for necrotic pancreatic fluid collection. Patient is clinically stable and overall improved despite imaging findings.     Continue supportive care and empiric antibiotics  Agree with repeat imaging in 7-10 days  Surgery will follow    Lou Lujan MD  General Surgery [id number]

## 2023-12-24 LAB
ANION GAP SERPL CALC-SCNC: 12 MEQ/L
BASOPHILS # BLD AUTO: 0.12 X10(3)/MCL
BASOPHILS NFR BLD AUTO: 0.5 %
BUN SERPL-MCNC: 20.4 MG/DL (ref 8.4–25.7)
CALCIUM SERPL-MCNC: 7.3 MG/DL (ref 8.8–10)
CHLORIDE SERPL-SCNC: 99 MMOL/L (ref 98–107)
CO2 SERPL-SCNC: 22 MMOL/L (ref 23–31)
CREAT SERPL-MCNC: 0.91 MG/DL (ref 0.73–1.18)
CREAT/UREA NIT SERPL: 22
CRP SERPL-MCNC: 176 MG/L
EOSINOPHIL # BLD AUTO: 0.25 X10(3)/MCL (ref 0–0.9)
EOSINOPHIL NFR BLD AUTO: 1 %
ERYTHROCYTE [DISTWIDTH] IN BLOOD BY AUTOMATED COUNT: 14.6 % (ref 11.5–17)
GFR SERPLBLD CREATININE-BSD FMLA CKD-EPI: >60 MLS/MIN/1.73/M2
GLUCOSE SERPL-MCNC: 125 MG/DL (ref 82–115)
HCT VFR BLD AUTO: 39.4 % (ref 42–52)
HGB BLD-MCNC: 13.2 G/DL (ref 14–18)
IMM GRANULOCYTES # BLD AUTO: 1.23 X10(3)/MCL (ref 0–0.04)
IMM GRANULOCYTES NFR BLD AUTO: 4.7 %
LIPASE SERPL-CCNC: 22 U/L
LYMPHOCYTES # BLD AUTO: 1.34 X10(3)/MCL (ref 0.6–4.6)
LYMPHOCYTES NFR BLD AUTO: 5.1 %
MAGNESIUM SERPL-MCNC: 1.6 MG/DL (ref 1.6–2.6)
MCH RBC QN AUTO: 30.5 PG (ref 27–31)
MCHC RBC AUTO-ENTMCNC: 33.5 G/DL (ref 33–36)
MCV RBC AUTO: 91 FL (ref 80–94)
MONOCYTES # BLD AUTO: 1.67 X10(3)/MCL (ref 0.1–1.3)
MONOCYTES NFR BLD AUTO: 6.4 %
NEUTROPHILS # BLD AUTO: 21.61 X10(3)/MCL (ref 2.1–9.2)
NEUTROPHILS NFR BLD AUTO: 82.3 %
NRBC BLD AUTO-RTO: 0 %
PHOSPHATE SERPL-MCNC: 2.9 MG/DL (ref 2.3–4.7)
PLATELET # BLD AUTO: 252 X10(3)/MCL (ref 130–400)
PMV BLD AUTO: 10.3 FL (ref 7.4–10.4)
POCT GLUCOSE: 122 MG/DL (ref 70–110)
POCT GLUCOSE: 134 MG/DL (ref 70–110)
POTASSIUM SERPL-SCNC: 4.7 MMOL/L (ref 3.5–5.1)
RBC # BLD AUTO: 4.33 X10(6)/MCL (ref 4.7–6.1)
SODIUM SERPL-SCNC: 133 MMOL/L (ref 136–145)
WBC # SPEC AUTO: 26.22 X10(3)/MCL (ref 4.5–11.5)

## 2023-12-24 PROCEDURE — 63600175 PHARM REV CODE 636 W HCPCS

## 2023-12-24 PROCEDURE — 63600175 PHARM REV CODE 636 W HCPCS: Performed by: HOSPITALIST

## 2023-12-24 PROCEDURE — A4216 STERILE WATER/SALINE, 10 ML: HCPCS | Performed by: INTERNAL MEDICINE

## 2023-12-24 PROCEDURE — 25000003 PHARM REV CODE 250: Performed by: INTERNAL MEDICINE

## 2023-12-24 PROCEDURE — 83735 ASSAY OF MAGNESIUM: CPT | Performed by: INTERNAL MEDICINE

## 2023-12-24 PROCEDURE — 25000003 PHARM REV CODE 250: Performed by: HOSPITALIST

## 2023-12-24 PROCEDURE — 63600175 PHARM REV CODE 636 W HCPCS: Performed by: INTERNAL MEDICINE

## 2023-12-24 PROCEDURE — 83690 ASSAY OF LIPASE: CPT | Performed by: INTERNAL MEDICINE

## 2023-12-24 PROCEDURE — 27000207 HC ISOLATION

## 2023-12-24 PROCEDURE — 21400001 HC TELEMETRY ROOM

## 2023-12-24 PROCEDURE — 85025 COMPLETE CBC W/AUTO DIFF WBC: CPT | Performed by: INTERNAL MEDICINE

## 2023-12-24 PROCEDURE — C9113 INJ PANTOPRAZOLE SODIUM, VIA: HCPCS

## 2023-12-24 PROCEDURE — 25000003 PHARM REV CODE 250: Performed by: STUDENT IN AN ORGANIZED HEALTH CARE EDUCATION/TRAINING PROGRAM

## 2023-12-24 PROCEDURE — 80048 BASIC METABOLIC PNL TOTAL CA: CPT | Performed by: INTERNAL MEDICINE

## 2023-12-24 PROCEDURE — 84100 ASSAY OF PHOSPHORUS: CPT | Performed by: INTERNAL MEDICINE

## 2023-12-24 PROCEDURE — 86140 C-REACTIVE PROTEIN: CPT | Performed by: INTERNAL MEDICINE

## 2023-12-24 PROCEDURE — 11000001 HC ACUTE MED/SURG PRIVATE ROOM

## 2023-12-24 RX ORDER — FUROSEMIDE 10 MG/ML
20 INJECTION INTRAMUSCULAR; INTRAVENOUS DAILY
Status: DISCONTINUED | OUTPATIENT
Start: 2023-12-24 | End: 2023-12-24

## 2023-12-24 RX ORDER — FUROSEMIDE 10 MG/ML
10 INJECTION INTRAMUSCULAR; INTRAVENOUS ONCE
Status: COMPLETED | OUTPATIENT
Start: 2023-12-24 | End: 2023-12-24

## 2023-12-24 RX ORDER — FUROSEMIDE 10 MG/ML
20 INJECTION INTRAMUSCULAR; INTRAVENOUS DAILY
Status: DISCONTINUED | OUTPATIENT
Start: 2023-12-25 | End: 2023-12-27

## 2023-12-24 RX ADMIN — PANTOPRAZOLE SODIUM 40 MG: 40 INJECTION, POWDER, FOR SOLUTION INTRAVENOUS at 05:12

## 2023-12-24 RX ADMIN — TRAZODONE HYDROCHLORIDE 25 MG: 50 TABLET ORAL at 09:12

## 2023-12-24 RX ADMIN — AMPICILLIN 2 G: 2 INJECTION, POWDER, FOR SOLUTION INTRAVENOUS at 05:12

## 2023-12-24 RX ADMIN — METRONIDAZOLE 500 MG: 5 INJECTION, SOLUTION INTRAVENOUS at 01:12

## 2023-12-24 RX ADMIN — FUROSEMIDE 40 MG: 40 TABLET ORAL at 08:12

## 2023-12-24 RX ADMIN — METOPROLOL SUCCINATE 25 MG: 25 TABLET, EXTENDED RELEASE ORAL at 08:12

## 2023-12-24 RX ADMIN — AMPICILLIN 2 G: 2 INJECTION, POWDER, FOR SOLUTION INTRAVENOUS at 12:12

## 2023-12-24 RX ADMIN — SODIUM CHLORIDE, PRESERVATIVE FREE 10 ML: 5 INJECTION INTRAVENOUS at 12:12

## 2023-12-24 RX ADMIN — ENOXAPARIN SODIUM 30 MG: 30 INJECTION SUBCUTANEOUS at 09:12

## 2023-12-24 RX ADMIN — METRONIDAZOLE 500 MG: 5 INJECTION, SOLUTION INTRAVENOUS at 10:12

## 2023-12-24 RX ADMIN — AMPICILLIN 2 G: 2 INJECTION, POWDER, FOR SOLUTION INTRAVENOUS at 02:12

## 2023-12-24 RX ADMIN — SODIUM CHLORIDE, PRESERVATIVE FREE 10 ML: 5 INJECTION INTRAVENOUS at 05:12

## 2023-12-24 RX ADMIN — MEROPENEM 500 MG: 500 INJECTION, POWDER, FOR SOLUTION INTRAVENOUS at 09:12

## 2023-12-24 RX ADMIN — MEROPENEM 500 MG: 500 INJECTION, POWDER, FOR SOLUTION INTRAVENOUS at 01:12

## 2023-12-24 RX ADMIN — MEROPENEM 500 MG: 500 INJECTION, POWDER, FOR SOLUTION INTRAVENOUS at 05:12

## 2023-12-24 RX ADMIN — SODIUM CHLORIDE, PRESERVATIVE FREE 10 ML: 5 INJECTION INTRAVENOUS at 11:12

## 2023-12-24 RX ADMIN — OXYCODONE HYDROCHLORIDE 5 MG: 5 TABLET ORAL at 09:12

## 2023-12-24 RX ADMIN — FUROSEMIDE 10 MG: 10 INJECTION, SOLUTION INTRAMUSCULAR; INTRAVENOUS at 01:12

## 2023-12-24 RX ADMIN — AMPICILLIN 2 G: 2 INJECTION, POWDER, FOR SOLUTION INTRAVENOUS at 09:12

## 2023-12-24 RX ADMIN — PANTOPRAZOLE SODIUM 40 MG: 40 INJECTION, POWDER, FOR SOLUTION INTRAVENOUS at 03:12

## 2023-12-24 NOTE — PROGRESS NOTES
Acute Care Surgery   Progress Note  Admit Date: 12/16/2023  HD#8  POD#7 Days Post-Op    Subjective:   Interval history:  AF HDS  Daniela CLD w/out abdominal pain or nausea  +BM  Voiding    Home Meds:  Current Outpatient Medications   Medication Instructions    amlodipine-benazepril 10-20mg (LOTREL) 10-20 mg per capsule 1 capsule, Oral, Daily    atorvastatin (LIPITOR) 20 mg, Oral, Daily    ergocalciferol (ERGOCALCIFEROL) 50,000 Units, Oral, Every 7 days    fenofibrate (TRICOR) 145 MG tablet See Instructions, TAKE 1 TABLET EVERY DAY, # 90 tab(s), 3 Refill(s), Pharmacy: [facility], 168, cm, Height/Length Dosing, 11/11/21 9:32:00 CST, 73.75, kg, Weight Dosing, 11/11/21 9:32:00 CST Strength: 145 mg    fluticasone propionate (FLONASE) 100 mcg, Each Nostril, Daily    glimepiride (AMARYL) 2 mg, Oral, Daily    metoprolol succinate (TOPROL-XL) 12.5 MG 24 hr split tablet   See Instructions, TAKE 1 TABLET EVERY DAY, # 90 tab(s), 3 Refill(s), Pharmacy: [facility], 168, cm, Height/Length Dosing, 03/04/21 9:57:00 CST, 73, kg, Weight Dosing, 03/04/21 9:57:00 CST    metoprolol succinate (TOPROL-XL) 25 mg, Oral, Daily    omeprazole (PRILOSEC) 20 mg, Oral, Daily    ondansetron (ZOFRAN-ODT) 4 mg, Oral, Every 8 hours PRN      Scheduled Meds:   ampicillin IVPB  2 g Intravenous Q6H    enoxparin  30 mg Subcutaneous Q24H (prophylaxis, 1700)    furosemide  40 mg Oral Daily    meropenem (MERREM) IVPB  500 mg Intravenous Q8H    metoprolol succinate  25 mg Oral Daily    metronidazole  500 mg Intravenous Q8H    pantoprazole  40 mg Intravenous BID AC    sodium chloride 0.9%  10 mL Intravenous Q6H    traZODone  25 mg Oral QHS     Continuous Infusions:  PRN Meds:sodium chloride 0.9%, acetaminophen, acetaminophen, aluminum-magnesium hydroxide-simethicone, dextrose 10%, dextrose 10%, dicyclomine, glucagon (human recombinant), hydrALAZINE, HYDROmorphone, insulin aspart U-100, labetalol, melatonin, ondansetron,  "oxyCODONE, polyethylene glycol, prochlorperazine, senna-docusate 8.6-50 mg, sodium chloride 0.9%, Flushing PICC/Midline Protocol **AND** sodium chloride 0.9% **AND** sodium chloride 0.9%     Objective:     VITAL SIGNS: 24 HR MIN & MAX LAST   Temp  Min: 98.3 °F (36.8 °C)  Max: 98.8 °F (37.1 °C)  98.3 °F (36.8 °C)   BP  Min: 128/79  Max: 152/89  (!) 147/78    Pulse  Min: 57  Max: 102  98    Resp  Min: 18  Max: 18  18    SpO2  Min: 95 %  Max: 96 %  95 %      HT: 5' 6" (167.6 cm)  WT: 73.2 kg (161 lb 6.4 oz)  BMI: 26.1     Intake/output:  Intake/Output - Last 3 Shifts         12/22 0700  12/23 0659 12/23 0700  12/24 0659    P.O. 720 710    Total Intake(mL/kg) 720 (9.8) 710 (9.7)    Urine (mL/kg/hr) 2200 (1.3) 1275 (0.7)    Stool  0    Total Output 2200 1275    Net -1480 -565          Urine Occurrence  2 x    Stool Occurrence  1 x            Intake/Output Summary (Last 24 hours) at 12/24/2023 0643  Last data filed at 12/23/2023 2158  Gross per 24 hour   Intake 710 ml   Output 1275 ml   Net -565 ml           Lines/drains/airway:  PICC Double Lumen 12/23/23 1920 right basilic (Active)   Number of days: 0            Peripheral IV - Single Lumen 12/23/23 1130 20 G Anterior;Left Forearm (Active)   Site Assessment Clean;Dry;Intact;No redness 12/23/23 1600   Line Status Flushed;Saline locked 12/23/23 1600   Dressing Status Clean;Dry;Intact 12/23/23 1600   Dressing Intervention Integrity maintained 12/23/23 1600   Number of days: 0       Physical examination:  Gen: NAD, AAOx3, answering questions appropriately  HEENT: PERRLA  CV: RR  Resp: NWOB  Abd: S/NT/ND   Ext: moving all extremities spontaneously and purposefully  Neuro: CN II-XII grossly intact    Labs:  Renal:  Recent Labs     12/22/23  0417 12/23/23 0521   BUN 33.3* 26.2*   CREATININE 1.05 1.03     No results for input(s): "LACTIC" in the last 72 hours.  FENGI:  Recent Labs     12/22/23 0417 12/23/23 0521   * 135*   K 4.5 4.2   CO2 26 26   CALCIUM 7.3* 7.4*   MG " "2.10 1.80   PHOS 2.0* 2.2*   ALBUMIN 2.0*  --      Heme:  Recent Labs     12/22/23 0417 12/23/23 0521 12/24/23  0512   HGB 13.8* 12.5* 13.2*   HCT 41.0* 37.1* 39.4*    208 252     ID:  Recent Labs     12/22/23 0417 12/23/23 0521 12/24/23  0512   WBC 19.53  19.53* 25.22  25.22* 26.22*     CBG:  Recent Labs     12/22/23 0417 12/23/23 0521   GLUCOSE 110 109      Cardiovascular:  No results for input(s): "TROPONINI", "CKTOTAL", "CKMB", "BNP" in the last 168 hours.  ABG:  Recent Labs   Lab 12/20/23  1015   PH 7.500*   PO2 59.0*   PCO2 35.0   HCO3 27.3*      I have reviewed all pertinent lab results within the past 24 hours.    Imaging:  X-Ray Chest 1 View for Line/Tube Placement   Final Result      Optimal placement of the PICC line.         Electronically signed by: Denver Wagner   Date:    12/23/2023   Time:    19:56      CT Chest Abdomen Pelvis Without Contrast (XPD)   Final Result      1. Worsened appearance of pancreatitis with possible collection developing anterior and superior to the pancreas.   2. Small volume ascites.   3. Small bilateral pleural effusions.         Electronically signed by: Osiel Whitehead   Date:    12/23/2023   Time:    10:59      X-Ray Chest 1 View   Final Result      As above.         Electronically signed by: Cristhian Lara   Date:    12/20/2023   Time:    09:53      CT Abdomen Pelvis  Without Contrast   Final Result      Interval worsening of the ascites      Persistent inflammatory changes around the pancreas consistent patient's history of pancreatitis.  Previous examination showed incomplete enhancement of the pancreas suggesting possible developing necrotizing pancreatitis.      Interval development of bibasilar atelectasis and moderate to large pleural effusions         Electronically signed by: Wayne Gee   Date:    12/18/2023   Time:    12:12      X-Ray Chest 1 View   Final Result      Changes of pulmonary vascular congestion.      Bilateral pleural effusions       "   Electronically signed by: Radu Edwards   Date:    12/18/2023   Time:    09:43      US Retroperitoneal Complete   Final Result      No significant sonographic abnormality of the kidneys.         Electronically signed by: Osiel Whitehead   Date:    12/18/2023   Time:    09:09      FL ERCP Biliary And Pancreatic By [facility] Tech   Final Result      Fluoroscopic support.  Please refer to procedure of this dictation.         Electronically signed by: Cristhian Lara   Date:    12/17/2023   Time:    13:04      MRI MRCP Without Contrast   Final Result      1. There appear to be numerous intraluminal filling defects in the mid to distal common bile duct series image 16 series 3. These are suggestive of impacted gallstones and sludge. Correlate clinically as regards additional evaluation and follow-up possibly with ERCP.      2. There is intermediate intrasubstance T2 signal in the pancreatic body (image 19 series 4 and 6) with corresponding restricted diffusion signal on DWI (image 136 series 7), relating to edema changes. There is stable free fluid collection in the bilateral anterior pararenal space extending to the adjacent mesocolon, small bowel mesentery, perihepatic and perisplenic regions. T2 stranding intensities are seen along the lesser sac. These findings are reflective of acute interstitial pancreatitis. Correlate with clinical and laboratory findings as regards additional evaluation and follow-up to full resolution as indicated.      3. A 1cm lymph node is demonstrated in the mo hepatis region (image 18 series 4), likely reactive in nature.      I concur with the preliminary report above.         Electronically signed by: Wayne Gee   Date:    12/17/2023   Time:    12:12      CT Abdomen Pelvis With IV Contrast NO Oral Contrast   Final Result      1. Severe acute pancreatitis.      2. Gastric antrum and duodenal mural thickening likely represents reactive change for acute pancreatitis.      3. Right large  hydrocele.         Electronically signed by: Denver Wagner   Date:    12/16/2023   Time:    20:08      US Scrotum And Testicles   Final Result      1. Unremarkable right testicle      2. Large right hydrocele which crosses the midline and causes compressive effect upon the left testicle which is inferiorly deviated.  Due to pressure caused by the hydrocele upon the left testicle optimal Doppler flow to the left testicle could not be obtained.  Only limited arterial flow to the peripheral aspect left testicle could be visualized.  Please correlate clinically.  Oneoption is to perform a follow-up study post drainage of large right hydrocele.         Electronically signed by: Denver Wagner   Date:    12/16/2023   Time:    19:21         I have reviewed all pertinent imaging results/findings within the past 24 hours.    Micro/Path/Other:  Microbiology Results (last 7 days)       Procedure Component Value Units Date/Time    Blood Culture [[id number]]  (Normal) Collected: 12/20/23 1046    Order Status: Completed Specimen: Blood from Arm, Right Updated: 12/23/23 1200     CULTURE, BLOOD ([facility]) No Growth At 72 Hours    Blood Culture [[id number]]  (Normal) Collected: 12/20/23 1045    Order Status: Completed Specimen: Blood from Antecubital, Left Updated: 12/23/23 1200     CULTURE, BLOOD ([facility]) No Growth At 72 Hours    Respiratory Culture [[id number]]     Order Status: Sent Specimen: Sputum, Expectorated     Stool Culture [[id number]] Collected: 12/23/23 0554    Order Status: Sent Specimen: Stool Updated: 12/23/23 0554    Blood culture #1 **CANNOT BE ORDERED STAT** [[id number]]  (Normal) Collected: 12/16/23 2017    Order Status: Completed Specimen: Blood from Antecubital, Right Updated: 12/21/23 2100     CULTURE, BLOOD ([facility]) No Growth at 5 days    Clostridium Diff Toxin, A & B, EIA [[id number]]  (Normal) Collected: 12/21/23 0902    Order Status: Completed Specimen: Stool Updated: 12/21/23 1101     Clostridium Difficile GDH Antigen  Negative     Clostridium Difficile Toxin A/B Negative    Blood culture #2 **CANNOT BE ORDERED STAT** [[id number]]  (Abnormal)  (Susceptibility) Collected: 12/16/23 2017    Order Status: Completed Specimen: Blood from Arm, Right Updated: 12/19/23 0658     CULTURE, BLOOD ([facility]) Enterococcus faecalis     Comment: Ampicillin results may be used to predict susceptibility to amoxicillin-clavulanate, ampicillin-sulbactam, and piperacillin-tazobactam.        GRAM STAIN Gram Positive Cocci, probable Streptococcus      Seen in gram stain of broth only      1 of 2 Anaerobic bottles positive    BCID2 Panel [[id number]]  (Abnormal) Collected: 12/16/23 2017    Order Status: Completed Specimen: Blood from Arm, Right Updated: 12/17/23 1720     CTX-M (ESBL ) N/A     IMP (Cabapenemase ) N/A     KPC resistance gene (Carbapenemase ) N/A     mcr-1 N/A     mecA ID N/A     Comment: Note: Antimicrobial resistance can occur via multiple mechanisms. A Not Detected result for antimicrobial resistance gene(s) does not indicate antimicrobial susceptibility. Subculturing is required for species identification and susceptibility testing of   isolates.        mecA/C and MREJ (MRSA) gene N/A     NDM (Carbapenemase ) N/A     OXA-48-like (Carbapenemase ) N/A     Yola/B (VRE gene) Not Detected     VIM (Carbapenemase ) N/A     Enterococcus faecalis Detected     Enterococcus faecium Not Detected     Listeria monocytogenes Not Detected     Staphylococcus spp. Not Detected     Staphylococcus aureus Not Detected     Staphylococcus epidermidis Not Detected     Staphylococcus lugdunensis Not Detected     Streptococcus spp. Not Detected     Streptococcus agalactiae (Group B) Not Detected     Streptococcus pneumoniae Not Detected     Streptococcus pyogenes (Group A) Not Detected     Acinetobacter calcoaceticus/baumannii complex Not Detected     Bacteroides fragilis Not Detected     Enterobacterales Not Detected      Enterobacter cloacae complex Not Detected     Escherichia coli Not Detected     Klebsiella aerogenes Not Detected     Klebsiella oxytoca Not Detected     Klebsiella pneumoniae group Not Detected     Proteus spp. Not Detected     Salmonella spp. Not Detected     Serratia marcescens Not Detected     Haemophilus influenzae Not Detected     Neisseria meningitidis Not Detected     Pseudomonas aeruginosa Not Detected     Stenotrophomonas maltophilia Not Detected     Candida albicans Not Detected     Candida auris Not Detected     Candida glabrata Not Detected     Candida krusei Not Detected     Candida parapsilosis Not Detected     Candida tropicalis Not Detected     Cryptococcus neoformans/gattii Not Detected    Narrative:      The [employer] BCID2 Panel is a multiplexed nucleic acid test intended for the use with cortical.io® 2.0 or cortical.io® [employer] Systems for the simultaneous qualitative detection and identification of multiple bacterial and yeast nucleic acids and select genetic determinants associated with antimicrobial resistance.  The [employer] BCID2 Panel test is performed directly on blood culture samples identified as positive by a continuous monitoring blood culture system.  Results are intended to be interpreted in conjunction with Gram stain results.           Specimen (168h ago, onward)      None             Assessment & Plan:   67 yo M with biliary pancreatitis with concern for necrotic pancreatic fluid collection. Patient continues to report improvement in symptoms.     Continue supportive care and empiric antibiotics  Agree with repeat imaging in 7-10 days  Please notify surgery for any acute changes or questions about plan of care    Lou Lujan MD  General Surgery [id number]

## 2023-12-24 NOTE — PROCEDURES
"Franklin Macias is a 68 y.o. male patient.    Temp: 98.8 °F (37.1 °C) (12/23/23 1651)  Pulse: (!) 57 (12/23/23 1651)  Resp: 18 (12/23/23 0559)  BP: (!) 152/89 (12/23/23 1651)  SpO2: 96 % (12/23/23 1651)  Weight: 73.2 kg (161 lb 6 oz) (12/23/23 0559)  Height: 5' 6" (167.6 cm) (12/16/23 1651)    PICC  Date/Time: 12/23/2023 7:19 PM  Performed by: Davion Solis RN  Consent Done: Yes  Time out: Immediately prior to procedure a time out was called to verify the correct patient, procedure, equipment, support staff and site/side marked as required  Indications: med administration and vascular access  Anesthesia: local infiltration  Local anesthetic: lidocaine 1% without epinephrine  Anesthetic Total (mL): 3  Preparation: skin prepped with ChloraPrep  Skin prep agent dried: skin prep agent completely dried prior to procedure  Sterile barriers: all five maximum sterile barriers used - cap, mask, sterile gown, sterile gloves, and large sterile sheet  Hand hygiene: hand hygiene performed prior to central venous catheter insertion  Location details: right basilic  Catheter type: double lumen  Catheter size: 5 Fr  Catheter Length: 37cm    Ultrasound guidance: yes  Vessel Caliber: medium and patent, compressibility normal  Needle advanced into vessel with real time Ultrasound guidance.  Guidewire confirmed in vessel.  Sterile sheath used.  Number of attempts: 1  Post-procedure: blood return through all ports, sterile dressing applied and chlorhexidine patch    Assessment: placement verified by x-ray  Complications: none          Davion Solis RN  12/23/2023    "

## 2023-12-24 NOTE — PROGRESS NOTES
[facility]  Hospital Medicine Progress Note        Chief Complaint: Inpatient Follow-up abdominal pain    HPI:   68-year-old male with medical history of T2DM, hypertension, hyperlipidemia and prior cholecystectomy present to the ED with complaint of severe epigastric abdominal pain that started today around 9:00 a.m. after breakfast, the pain is severe 10/10 and radiate to his back associated with nausea and vomiting.  Report last bowel movement was this morning.  Reports chills but denies fever.  He has chronic scrotal hydrocele that has not changed or painful.     On arrival to ED he was afebrile, tachycardic, normotensive and saturating 96% on room air.  Labs notable for WBC 24.97, hemoglobin 18.2, creatinine 2.0, BUN 16.5, total bilirubin 2.7, direct 1.9, , , lipase more than 3000, triglyceride 183.  CT abdomen and pelvis show finding of severe acute pancreatitis without evidence of necrosis or abscess or fluid collection.  Liver remarkable for steatosis, gallbladder surgically absent, no comment on biliary tree.     He was given 2 L of IV fluids, IV Zosyn, IV analgesics and referred to hospital medicine service for further evaluation and management.  Patient was taken for ERCP on 12/17.  Choledocholithiasis was resolved.  Also had sphincterotomy performed.  Returned to the floor in stable condition.  GI recommending advancement of diet to clear liquids in 4-6 hours     Patient  continues to be requiring oxygen.  Echo with grade 1 diastolic dysfunction, normal systolic function.  Follow up chest x-ray obtained  with small right pleural effusion slightly increased. Nephrology okay to transition to oral Lasix.  GI signed off.    Infectious diseases recommending 14 day course from negative blood cultures of ampicillin and Flagyl.    Interval Hx:  As leukocytosis not improving, ordered a CT scan yesterday which commenting on worsened appearance pancreatitis with possible  collection developing and small volume ascites, small bilateral pleural effusions.  Meropenem was added to his antibiotics.  GI was notified for further recommendations, recommending surgery consult.  Surgery on board.  Plan to continue to monitor and continue the antibiotics and repeat imaging in few days.  Diet changed to clears.Continues to be requiring oxygen.  Requirement going down.      Patient was seen and examined, has abdominal discomfort and intermittent nausea , denies any fevers or chills, diarrhea has improved.  Family at bedside.  Denies any chest pain/cough, dyspnea improving.    Chart was reviewed, T-max of 98°.8, .  Most recent labs were reviewed.  Leukocytosis worsening 25> 26.  Creatinine normalized. transaminitis improving.     Objective/physical exam:  General: In no acute distress, afebrile  Chest:  Crackles, on nasal cannula  Heart: RRR, +S1, S2, no appreciable murmur  Abdomen: Soft, nontender, BS +  MSK: Warm, no lower extremity edema, no clubbing or cyanosis  Neurologic: Alert and oriented x4, Cranial nerve II-XII intact, Strength 5/5 in all 4 extremities       VITAL SIGNS: 24 HRS MIN & MAX LAST   Temp  Min: 98.3 °F (36.8 °C)  Max: 98.8 °F (37.1 °C) 98.6 °F (37 °C)   BP  Min: 128/79  Max: 152/89 133/80   Pulse  Min: 57  Max: 100  92   Resp  Min: 18  Max: 18 18   SpO2  Min: 95 %  Max: 96 % 95 %       Recent Labs   Lab 12/22/23  0417 12/23/23  0521 12/24/23  0512   WBC 19.53  19.53* 25.22  25.22* 26.22*   RBC 4.45* 4.09* 4.33*   HGB 13.8* 12.5* 13.2*   HCT 41.0* 37.1* 39.4*   MCV 92.1 90.7 91.0   MCH 31.0 30.6 30.5   MCHC 33.7 33.7 33.5   RDW 14.3 14.6 14.6    208 252   MPV 10.9* 10.6* 10.3       Recent Labs   Lab 12/18/23  0859 12/18/23  1253 12/19/23  0429 12/20/23  0645 12/20/23  1015 12/21/23  0443 12/22/23  0417 12/23/23  0521 12/24/23  0856   NA  --    < > 137 137  --  136 135* 135* 133*   K  --    < > 3.5 3.3*  --  3.2* 4.5 4.2 4.7   CO2  --    < > 23 26  --  27 26 26 22*    BUN  --    < > 53.0* 44.9*  --  44.6* 33.3* 26.2* 20.4   CREATININE  --    < > 2.16* 1.41*  --  1.32* 1.05 1.03 0.91   CALCIUM  --    < > 6.9* 7.3*  --  7.5* 7.3* 7.4* 7.3*   PH 7.350  --   --   --  7.500*  --   --   --   --    MG  --   --   --   --   --   --  2.10 1.80 1.60   ALBUMIN  --    < > 2.3* 2.1*  --  2.2* 2.0*  --   --    ALKPHOS  --    < > 70 67  --  74  --   --   --    ALT  --    < > 129* 75*  --  55  --   --   --    AST  --    < > 94* 63*  --  58*  --   --   --    BILITOT  --    < > 4.3* 3.6*  --  2.8*  --   --   --     < > = values in this interval not displayed.          Microbiology Results (last 7 days)       Procedure Component Value Units Date/Time    Stool Culture [[id number]]  (Normal) Collected: 12/23/23 0554    Order Status: Completed Specimen: Stool Updated: 12/24/23 0945     Stool Culture Negative for Salmonella, Shigella, Campylobacter, Vibrio, Aeromonas, Pleisiomonas,Yersinia, or Shiga Toxin 1 and 2.    Blood Culture [[id number]]  (Normal) Collected: 12/20/23 1046    Order Status: Completed Specimen: Blood from Arm, Right Updated: 12/23/23 1200     CULTURE, BLOOD ([facility]) No Growth At 72 Hours    Blood Culture [[id number]]  (Normal) Collected: 12/20/23 1045    Order Status: Completed Specimen: Blood from Antecubital, Left Updated: 12/23/23 1200     CULTURE, BLOOD ([facility]) No Growth At 72 Hours    Respiratory Culture [[id number]]     Order Status: Sent Specimen: Sputum, Expectorated     Blood culture #1 **CANNOT BE ORDERED STAT** [[id number]]  (Normal) Collected: 12/16/23 2017    Order Status: Completed Specimen: Blood from Antecubital, Right Updated: 12/21/23 2100     CULTURE, BLOOD ([facility]) No Growth at 5 days    Clostridium Diff Toxin, A & B, EIA [[id number]]  (Normal) Collected: 12/21/23 0902    Order Status: Completed Specimen: Stool Updated: 12/21/23 1101     Clostridium Difficile GDH Antigen Negative     Clostridium Difficile Toxin A/B Negative    Blood culture #2 **CANNOT BE ORDERED STAT**  [[id number]]  (Abnormal)  (Susceptibility) Collected: 12/16/23 2017    Order Status: Completed Specimen: Blood from Arm, Right Updated: 12/19/23 0658     CULTURE, BLOOD ([facility]) Enterococcus faecalis     Comment: Ampicillin results may be used to predict susceptibility to amoxicillin-clavulanate, ampicillin-sulbactam, and piperacillin-tazobactam.        GRAM STAIN Gram Positive Cocci, probable Streptococcus      Seen in gram stain of broth only      1 of 2 Anaerobic bottles positive    BCID2 Panel [[id number]]  (Abnormal) Collected: 12/16/23 2017    Order Status: Completed Specimen: Blood from Arm, Right Updated: 12/17/23 1720     CTX-M (ESBL ) N/A     IMP (Cabapenemase ) N/A     KPC resistance gene (Carbapenemase ) N/A     mcr-1 N/A     mecA ID N/A     Comment: Note: Antimicrobial resistance can occur via multiple mechanisms. A Not Detected result for antimicrobial resistance gene(s) does not indicate antimicrobial susceptibility. Subculturing is required for species identification and susceptibility testing of   isolates.        mecA/C and MREJ (MRSA) gene N/A     NDM (Carbapenemase ) N/A     OXA-48-like (Carbapenemase ) N/A     Yola/B (VRE gene) Not Detected     VIM (Carbapenemase ) N/A     Enterococcus faecalis Detected     Enterococcus faecium Not Detected     Listeria monocytogenes Not Detected     Staphylococcus spp. Not Detected     Staphylococcus aureus Not Detected     Staphylococcus epidermidis Not Detected     Staphylococcus lugdunensis Not Detected     Streptococcus spp. Not Detected     Streptococcus agalactiae (Group B) Not Detected     Streptococcus pneumoniae Not Detected     Streptococcus pyogenes (Group A) Not Detected     Acinetobacter calcoaceticus/baumannii complex Not Detected     Bacteroides fragilis Not Detected     Enterobacterales Not Detected     Enterobacter cloacae complex Not Detected     Escherichia coli Not Detected     Klebsiella  aerogenes Not Detected     Klebsiella oxytoca Not Detected     Klebsiella pneumoniae group Not Detected     Proteus spp. Not Detected     Salmonella spp. Not Detected     Serratia marcescens Not Detected     Haemophilus influenzae Not Detected     Neisseria meningitidis Not Detected     Pseudomonas aeruginosa Not Detected     Stenotrophomonas maltophilia Not Detected     Candida albicans Not Detected     Candida auris Not Detected     Candida glabrata Not Detected     Candida krusei Not Detected     Candida parapsilosis Not Detected     Candida tropicalis Not Detected     Cryptococcus neoformans/gattii Not Detected    Narrative:      The [employer] BCID2 Panel is a multiplexed nucleic acid test intended for the use with [employer]® [employer]0 or [employer]® [employer] Systems for the simultaneous qualitative detection and identification of multiple bacterial and yeast nucleic acids and select genetic determinants associated with antimicrobial resistance.  The [employer] BCID2 Panel test is performed directly on blood culture samples identified as positive by a continuous monitoring blood culture system.  Results are intended to be interpreted in conjunction with Gram stain results.             Radiology:  X-Ray Chest 1 View for Line/Tube Placement  Narrative: EXAMINATION:  XR CHEST 1 VIEW FOR LINE/TUBE PLACEMENT    CLINICAL HISTORY:  PICC;    TECHNIQUE:  One view    COMPARISON:  December 20, 2023.    FINDINGS:  Right upper extremity approach PICC line which terminates about the cavoatrial junction and is optimal.  Cardiopericardial silhouette is within normal limits.  Improved right pleural effusion.  There is new thickened opacity right lower lung zone which may represent atelectasis.  Attention to follow-up exams.  There also new left basilar hypoventilatory changes no pulmonary edema.  No pneumothorax.  Impression: Optimal placement of the PICC line.    Electronically signed by: Denver  Wagner  Date:    12/23/2023  Time:    19:56  CT Chest Abdomen Pelvis Without Contrast (XPD)  Narrative: EXAMINATION:  CT CHEST ABDOMEN PELVIS WITHOUT CONTRAST(XPD)    CLINICAL HISTORY:  follow up;    TECHNIQUE:  Helical acquisition from the thoracic inlet through the ischia without IV contrast.  Lack of contrast limits evaluation of solid organs and vascular structures.  Three plane reconstructions made available for review. DLP 1042 mGycm. Automatic exposure control, adjustment of mA/kV or iterative reconstruction technique was used to reduce radiation.    COMPARISON:  18 December 2023, 15 August 2023    FINDINGS:  Chest.    Heart is not significantly enlarged.  There are coronary artery calcifications.  No pericardial effusion.    No mediastinal, hilar or axillary adenopathy by CT size criteria.    There are small bilateral pleural effusions slightly improved compared to prior.  Mild bibasilar atelectasis.  Moderate to severe emphysema.    Abdomen and pelvis.    No acute findings noncontrast liver, spleen, adrenals or kidneys.  Gallbladder surgically absent.    There is increased peripancreatic inflammation compared to prior.  Question a developing collection anterior and superior to the portion of the pancreas which had suggestion of necrosis on prior imaging.  This collection is seen on images 104-110 series 2.  There is small volume ascites.    There is no bowel obstruction or free air.  There is colonic diverticulosis.    Urinary bladder unremarkable.  Prostate mildly enlarged.  Abdominal aorta mildly ectatic with moderate atherosclerotic disease.    There are no acute osseous findings.  Impression: 1. Worsened appearance of pancreatitis with possible collection developing anterior and superior to the pancreas.  2. Small volume ascites.  3. Small bilateral pleural effusions.    Electronically signed by: Osiel Whitehead  Date:    12/23/2023  Time:    10:59      Scheduled Med:   ampicillin IVPB  2 g Intravenous  Q6H    enoxparin  30 mg Subcutaneous Q24H (prophylaxis, 1700)    furosemide  40 mg Oral Daily    meropenem (MERREM) IVPB  500 mg Intravenous Q8H    metoprolol succinate  25 mg Oral Daily    metronidazole  500 mg Intravenous Q8H    pantoprazole  40 mg Intravenous BID AC    sodium chloride 0.9%  10 mL Intravenous Q6H    traZODone  25 mg Oral QHS        Continuous Infusions:       PRN Meds:  sodium chloride 0.9%, acetaminophen, acetaminophen, aluminum-magnesium hydroxide-simethicone, dextrose 10%, dextrose 10%, dicyclomine, glucagon (human recombinant), hydrALAZINE, HYDROmorphone, insulin aspart U-100, labetalol, melatonin, ondansetron, oxyCODONE, polyethylene glycol, prochlorperazine, senna-docusate 8.6-50 mg, sodium chloride 0.9%, Flushing PICC/Midline Protocol **AND** sodium chloride 0.9% **AND** sodium chloride 0.9%       Assessment/Plan:   Acute severe pancreatitis- suspect biliary  Cholangitis, acute  Choledocholithiasis status post ERCP  Severe sepsis  Enterococcus faecalis bacteremia  SHA superimposed on CKD 3A  Acute hypoxic respiratory failure requiring supplemental oxygen secondary to pulmonary edema/pleural effusion  , history of smoking, questionable COPD?       History of T2DM, hypertension, hyperlipidemia, GERD    Plan  GI following.  Underwent ERCP with sludge and stone removal.  Lipase improving.    Transaminitis improving.Continued antibiotics-Flagyl, Ampicillin.  , Blood cultures noted to be positive, ordered repeat cultures, so far negative.Infectious Disease was consulted, on further recommendations/investigations if any-recommending 14 days after negative culture(last date likely around January 6th per our calculations, no other investigations recommended).Given worsening leukocytosis, ordered a scan (without contrast)showing possible fluid collection around the pancreas and small volume ascites , added meropenem.  Recommended surgery consult.  Plan to continue to monitor and consider repeat  imaging in 7-10 days.  We will continue to follow up with surgery and GI  Nephrology following for SHA, creatinine improved.  On diuretics, now switching to oral.  Strict Is&Os, monitor urine output, daily weights.  Monitor electrolytes and replete them as needed while on diuretics.  Avoid nephrotoxins.  Wean oxygen as tolerated.  Consider bronchodilators p.r.n..  Encourage incentive spirometry. Followed up on echocardiogram-EF 50-55.Grade I diastolic dysfunction.  Consider IV Lasix, we will keep a close eye as there is a concern for ARDS in such patients.  Most recent scan commenting on small bilateral pleural effusions  Continue supportive care appropriate home medications.  On Sliding scale with fingersticks a.c. HS for management of diabetes in-house.  Therapy services    DVT prophylaxis-Lovenox      Anticipated discharge-to be decided, once able to wean off of oxygen and able to tolerate the diet, pending ID recommendations and normalization of leukocytosis if scan looks ok.  And per PT evaluation    Critical care diagnosis: sepsis, iv antibiotics, acute hypoxic respiratory failure requiring oxygen  Critical care interventions: hands on evaluation, review of labs/radiographs/records and discussions with family  Critical care time spent: >32 minutes        Teresa Anthony MD   12/24/2023     All diagnosis and differential diagnosis have been reviewed; assessment and plan has been documented; I have personally reviewed the labs and test results that are presently available; I have reviewed the patients medication list; I have reviewed the consulting providers response and recommendations. I have reviewed or attempted to review medical records based upon their availability    All of the patient's questions have been  addressed and answered. Patient's is agreeable to the above stated plan. I will continue to monitor closely and make adjustments to medical management as  needed.  _____________________________________________________________________

## 2023-12-24 NOTE — PLAN OF CARE
Problem: Adult Inpatient Plan of Care  Goal: Plan of Care Review  Outcome: Ongoing, Progressing  Goal: Patient-Specific Goal (Individualized)  Outcome: Ongoing, Progressing  Goal: Absence of Hospital-Acquired Illness or Injury  Outcome: Ongoing, Progressing  Goal: Optimal Comfort and Wellbeing  Outcome: Ongoing, Progressing  Goal: Readiness for Transition of Care  Outcome: Ongoing, Progressing     Problem: Diabetes Comorbidity  Goal: Blood Glucose Level Within Targeted Range  Outcome: Ongoing, Progressing     Problem: Pain Acute  Goal: Acceptable Pain Control and Functional Ability  Outcome: Ongoing, Progressing     Problem: Fluid Imbalance (Pancreatitis)  Goal: Fluid Balance  Outcome: Ongoing, Progressing

## 2023-12-24 NOTE — PROCEDURES
"Franklin Macias is a 68 y.o. male patient.    Temp: 98.8 °F (37.1 °C) (12/23/23 1651)  Pulse: (!) 57 (12/23/23 1651)  Resp: 18 (12/23/23 0559)  BP: (!) 152/89 (12/23/23 1651)  SpO2: 96 % (12/23/23 1651)  Weight: 73.2 kg (161 lb 6 oz) (12/23/23 0559)  Height: 5' 6" (167.6 cm) (12/16/23 1651)    PICC  Date/Time: 12/23/2023 7:33 PM  Performed by: Davion Solis RN  Consent Done: Yes  Time out: Immediately prior to procedure a time out was called to verify the correct patient, procedure, equipment, support staff and site/side marked as required  Indications: med administration and vascular access  Anesthesia: local infiltration  Local anesthetic: lidocaine 1% without epinephrine  Anesthetic Total (mL): 4  Preparation: skin prepped with ChloraPrep  Skin prep agent dried: skin prep agent completely dried prior to procedure  Sterile barriers: all five maximum sterile barriers used - cap, mask, sterile gown, sterile gloves, and large sterile sheet  Hand hygiene: hand hygiene performed prior to central venous catheter insertion  Location details: right basilic  Catheter type: double lumen  Catheter size: 5 Fr  Catheter Length: 37cm    Ultrasound guidance: yes  Vessel Caliber: medium and patent, compressibility normal  Needle advanced into vessel with real time Ultrasound guidance.  Guidewire confirmed in vessel.  Sterile sheath used.  Number of attempts: 1  Post-procedure: blood return through all ports, sterile dressing applied and chlorhexidine patch    Assessment: placement verified by x-ray  Complications: none          Davion Solis RN  12/23/2023    "

## 2023-12-24 NOTE — PLAN OF CARE
Problem: Adult Inpatient Plan of Care  Goal: Plan of Care Review  Outcome: Ongoing, Progressing  Goal: Patient-Specific Goal (Individualized)  Outcome: Ongoing, Progressing  Goal: Absence of Hospital-Acquired Illness or Injury  Outcome: Ongoing, Progressing  Goal: Optimal Comfort and Wellbeing  Outcome: Ongoing, Progressing  Goal: Readiness for Transition of Care  Outcome: Ongoing, Progressing     Problem: Diabetes Comorbidity  Goal: Blood Glucose Level Within Targeted Range  Outcome: Ongoing, Progressing     Problem: Pain Acute  Goal: Acceptable Pain Control and Functional Ability  Outcome: Ongoing, Progressing     Problem: Fluid Imbalance (Pancreatitis)  Goal: Fluid Balance  Outcome: Ongoing, Progressing     Problem: Infection (Pancreatitis)  Goal: Infection Symptom Resolution  Outcome: Ongoing, Progressing     Problem: Nutrition Impaired (Pancreatitis)  Goal: Optimal Nutrition Intake  Outcome: Ongoing, Progressing     Problem: Pain (Pancreatitis)  Goal: Acceptable Pain Control  Outcome: Ongoing, Progressing     Problem: Respiratory Compromise (Pancreatitis)  Goal: Effective Oxygenation and Ventilation  Outcome: Ongoing, Progressing     Problem: Infection  Goal: Absence of Infection Signs and Symptoms  Outcome: Ongoing, Progressing

## 2023-12-24 NOTE — PROGRESS NOTES
Franklin Macias   MRN: [id number]   ADMISSION DATE: 2023  : 1955  AGE: 68 y.o.    DATE :  2023     PROVIDER: ROMAN ROJAS    SUBJECTIVE:  Patient reports some generalized abdominal discomfort.  On clear liquid diet.  No fever or chills.  No overt GI bleed.  No nausea or vomiting.    Review of Systems   Review of Systems   Constitutional:  Negative for chills and fever.   HENT:  Negative for congestion and nosebleeds.    Eyes:  Negative for redness.   Respiratory:  Negative for cough, chest tightness and shortness of breath.    Cardiovascular:  Negative for chest pain, palpitations and leg swelling.   Gastrointestinal:  Reports generalized abdominal discomfort.  Negative for  blood in stool, constipation, diarrhea, nausea and vomiting.   Endocrine: Negative for cold intolerance and polydipsia.   Genitourinary:  Negative for dysuria and flank pain.   Musculoskeletal:  Negative for arthralgias.   Skin:  Negative for pallor.   Allergic/Immunologic: Negative for food allergies.   Neurological:  Negative for dizziness and headaches.   Hematological:  Does not bruise/bleed easily.   Psychiatric/Behavioral:  Negative for agitation and confusion.       Review of patient's allergies indicates:  No Known Allergies      ampicillin IVPB  2 g Intravenous Q6H    enoxparin  30 mg Subcutaneous Q24H (prophylaxis, 1700)    [START ON 2023] furosemide (LASIX) injection  20 mg Intravenous Daily    meropenem (MERREM) IVPB  500 mg Intravenous Q8H    metoprolol succinate  25 mg Oral Daily    metronidazole  500 mg Intravenous Q8H    pantoprazole  40 mg Intravenous BID AC    sodium chloride 0.9%  10 mL Intravenous Q6H    traZODone  25 mg Oral QHS       Medications Discontinued During This Encounter   Medication Reason    pantoprazole EC tablet 40 mg     iohexoL (OMNIPAQUE 300) injection Patient Discharge    LIDOcaine (PF) 10 mg/ml (1%) injection 10 mg Patient Transfer    sodium citrate-citric acid 500-334 mg/5 ml  solution 30 mL Patient Transfer    midazolam (VERSED) 1 mg/mL injection 2 mg     electrolyte-A infusion     ceFEPIme (MAXIPIME) 2 g in dextrose 5 % in water (D5W) 100 mL IVPB (MB+)     lactated ringers infusion     enoxaparin injection 40 mg     furosemide injection 40 mg     furosemide injection 40 mg     albumin human 25% bottle 12.5 g     furosemide injection 20 mg     dicyclomine injection 20 mg     dicyclomine injection 20 mg     furosemide tablet 40 mg     furosemide injection 20 mg              Past Medical History:   Diagnosis Date    DM (diabetes mellitus)     GERD (gastroesophageal reflux disease)     HLD (hyperlipidemia)     HTN (hypertension)       Social History     Socioeconomic History    Marital status:    Tobacco Use    Smoking status: Every Day     Current packs/day: 1.00     Types: Cigarettes    Smokeless tobacco: Never   Substance and Sexual Activity    Alcohol use: Never    Drug use: Never    Sexual activity: Yes     Social Determinants of Health     Financial Resource Strain: Low Risk  (8/8/2023)    Overall Financial Resource Strain (CARDIA)     Difficulty of Paying Living Expenses: Not hard at all   Food Insecurity: No Food Insecurity (8/8/2023)    Hunger Vital Sign     Worried About Running Out of Food in the Last Year: Never true     Ran Out of Food in the Last Year: Never true   Transportation Needs: No Transportation Needs (12/19/2023)    PRAPARE - Transportation     Lack of Transportation (Medical): No     Lack of Transportation (Non-Medical): No   Stress: No Stress Concern Present (8/8/2023)    [employer] - Occupational Stress Questionnaire     Feeling of Stress : Only a little   Housing Stability: Low Risk  (12/19/2023)    Housing Stability Vital Sign     Unable to Pay for Housing in the Last Year: No     Number of Places Lived in the Last Year: 1     Unstable Housing in the Last Year: No      Past Surgical History:   Procedure Laterality Date     APPENDECTOMY      CHOLECYSTECTOMY      ERCP N/A 12/17/2023    Procedure: ERCP (ENDOSCOPIC RETROGRADE CHOLANGIOPANCREATOGRAPHY);  Surgeon: Flako Kerns MD;  Location: [facility] OR;  Service: Gastroenterology;  Laterality: N/A;    ERCP W/ SPHICTEROTOMY  12/17/2023    Procedure: ERCP, WITH SPHINCTEROTOMY;  Surgeon: Flako Kerns MD;  Location: [facility] OR;  Service: Gastroenterology;;    ERCP, WITH CALCULUS REMOVAL  12/17/2023    Procedure: ERCP, WITH CALCULUS REMOVAL;  Surgeon: Flako Kerns MD;  Location: [facility] OR;  Service: Gastroenterology;;        OBJECTIVE:     Vitals:    12/24/23 0500 12/24/23 0722 12/24/23 0833 12/24/23 1206   BP:  133/80 133/80 133/80   Pulse:  92  92   Resp:    18   Temp:  98.6 °F (37 °C)  98.6 °F (37 °C)   TempSrc:  Oral     SpO2:  95%  95%   Weight: 73.2 kg (161 lb 6.4 oz)      Height:           Physical Exam   Physical Exam  HENT:      Head: Normocephalic and atraumatic.      Nose: Nose normal.      Mouth/Throat:      Pharynx: Oropharynx is clear.   Eyes:      Extraocular Movements: Extraocular movements intact.      Conjunctiva/sclera: Conjunctivae normal.      Pupils: Pupils are equal, round, and reactive to light.   Cardiovascular:      Rate and Rhythm: Normal rate and regular rhythm.   Pulmonary:      Effort: Pulmonary effort is normal.      Breath sounds: Normal breath sounds.   Abdominal:      General: Bowel sounds are normal.      Palpations: Abdomen is soft.      Tenderness: There is abdominal tenderness (Mild generalized abdominal tenderness.  Limited exam due to body habitus.  Obese.).   Musculoskeletal:         General: Normal range of motion.      Cervical back: Neck supple.   Skin:     General: Skin is warm and dry.   Neurological:      Mental Status: He is alert and oriented to person, place, and time.   Psychiatric:         Mood and Affect: Mood normal.     LABS    Recent Labs   Lab 12/22/23  0417 12/23/23  0521 12/24/23  0512   WBC 19.53  19.53* 25.22   "25.22* 26.22*   HGB 13.8* 12.5* 13.2*   HCT 41.0* 37.1* 39.4*    208 252      Recent Labs   Lab 12/19/23  0429 12/20/23  0645 12/21/23  0443 12/22/23  0417 12/23/23  0521 12/24/23  0856    137 136 135* 135* 133*   K 3.5 3.3* 3.2* 4.5 4.2 4.7   CO2 23 26 27 26 26 22*   BUN 53.0* 44.9* 44.6* 33.3* 26.2* 20.4   CREATININE 2.16* 1.41* 1.32* 1.05 1.03 0.91   CALCIUM 6.9* 7.3* 7.5* 7.3* 7.4* 7.3*   BILITOT 4.3* 3.6* 2.8*  --   --   --    ALKPHOS 70 67 74  --   --   --    * 75* 55  --   --   --    AST 94* 63* 58*  --   --   --    GLUCOSE 139* 120* 119* 110 109 125*      Recent Labs   Lab 12/19/23 0429   INR 1.4*    No results for input(s): "AMYLASE" in the last 168 hours.   Recent Labs   Lab 12/19/23 0429   INR 1.4*           RESULTS: X-Ray Chest 1 View for Line/Tube Placement    Result Date: 12/23/2023  EXAMINATION: XR CHEST 1 VIEW FOR LINE/TUBE PLACEMENT CLINICAL HISTORY: PICC; TECHNIQUE: One view COMPARISON: December 20, 2023. FINDINGS: Right upper extremity approach PICC line which terminates about the cavoatrial junction and is optimal.  Cardiopericardial silhouette is within normal limits.  Improved right pleural effusion.  There is new thickened opacity right lower lung zone which may represent atelectasis.  Attention to follow-up exams.  There also new left basilar hypoventilatory changes no pulmonary edema.  No pneumothorax.     Optimal placement of the PICC line. Electronically signed by: Denver Wagner Date:    12/23/2023 Time:    19:56    CT Chest Abdomen Pelvis Without Contrast (XPD)    Result Date: 12/23/2023  EXAMINATION: CT CHEST ABDOMEN PELVIS WITHOUT CONTRAST(XPD) CLINICAL HISTORY: follow up; TECHNIQUE: Helical acquisition from the thoracic inlet through the ischia without IV contrast.  Lack of contrast limits evaluation of solid organs and vascular structures.  Three plane reconstructions made available for review. DLP 1042 mGycm. Automatic exposure control, adjustment of mA/kV or " iterative reconstruction technique was used to reduce radiation. COMPARISON: 18 December 2023, 15 August 2023 FINDINGS: Chest. Heart is not significantly enlarged.  There are coronary artery calcifications.  No pericardial effusion. No mediastinal, hilar or axillary adenopathy by CT size criteria. There are small bilateral pleural effusions slightly improved compared to prior.  Mild bibasilar atelectasis.  Moderate to severe emphysema. Abdomen and pelvis. No acute findings noncontrast liver, spleen, adrenals or kidneys.  Gallbladder surgically absent. There is increased peripancreatic inflammation compared to prior.  Question a developing collection anterior and superior to the portion of the pancreas which had suggestion of necrosis on prior imaging.  This collection is seen on images 104-110 series 2.  There is small volume ascites. There is no bowel obstruction or free air.  There is colonic diverticulosis. Urinary bladder unremarkable.  Prostate mildly enlarged.  Abdominal aorta mildly ectatic with moderate atherosclerotic disease. There are no acute osseous findings.     1. Worsened appearance of pancreatitis with possible collection developing anterior and superior to the pancreas. 2. Small volume ascites. 3. Small bilateral pleural effusions. Electronically signed by: Osiel Whitehead Date:    12/23/2023 Time:    10:59    Echo    Result Date: 12/20/2023    Left Ventricle: The left ventricle is normal in size. Normal wall thickness. Normal wall motion. There is normal systolic function with a visually estimated ejection fraction of 55 - 60%. Grade I diastolic dysfunction.   Right Ventricle: Normal right ventricular cavity size. Systolic function is normal.   Aortic Valve: The aortic valve is a trileaflet valve.   Pericardium: There is a trivial effusion. No indication of cardiac tamponade.   Technically difficult study due to lung interference.     X-Ray Chest 1 View    Result Date: 12/20/2023  EXAMINATION: XR  CHEST 1 VIEW CLINICAL HISTORY: follow up; TECHNIQUE: Single view of the chest COMPARISON: 12/18/2023 FINDINGS: Cardiomegaly is unchanged.  Small right effusion is slightly increased in the interval.  No acute osseous abnormality.     As above. Electronically signed by: Cristhian Lara Date:    12/20/2023 Time:    09:53    CT Abdomen Pelvis  Without Contrast    Result Date: 12/18/2023  EXAMINATION: CT ABDOMEN PELVIS WITHOUT CONTRAST CLINICAL HISTORY: rise in tbili and pain s/p ERCP; TECHNIQUE: Low dose axial images, sagittal and coronal reformations were obtained from the lung bases to the pubic symphysis.  No contrast was administered.  Automatic exposure control is utilized to reduce patient radiation exposure. COMPARISON: 12/16/2023 FINDINGS: There are changes seen consistent with emphysema and COPD in the lungs.  There is  bibasilar atelectasis and bilateral pleural effusions.  This is a new finding.. The liver is hypoattenuated consistent with hepatic steatosis.  The patient is status post cholecystectomy.  There is evidence of ascites.  This is more prominent than the prior examination. There are inflammatory changes seen around the pancreas consistent with pancreatitis.  This was seen on the prior examination as well.  Previous examination showed incomplete enhancement of the pancreas concerning for developing necrotizing pancreatitis.  No free intraperitoneal air is seen on this examination. Adrenal glands appear normal. Kidneys appear normal.  No hydronephrosis is seen.  No hydroureter seen. No colitis is seen.  No diverticulitis is seen.  No colonic mass is seen. Urinary bladder appears grossly unremarkable. Atherosclerotic changes are seen within the abdominal aorta and its branches. Bones appear grossly unremarkable.     Interval worsening of the ascites Persistent inflammatory changes around the pancreas consistent patient's history of pancreatitis.  Previous examination showed incomplete enhancement of  the pancreas suggesting possible developing necrotizing pancreatitis. Interval development of bibasilar atelectasis and moderate to large pleural effusions Electronically signed by: Wayne Gee Date:    12/18/2023 Time:    12:12    X-Ray Chest 1 View    Result Date: 12/18/2023  EXAMINATION: XR CHEST 1 VIEW CPT 41232 CLINICAL HISTORY: Hypoxemia; FINDINGS: Examination reveals mediastinal silhouette to be within normal limits cardiac silhouette is not enlarged.  There is some increase interstitial and pulmonary vascular markings which may indicate a degree of pulmonary vascular congestion. There is blunting of both costophrenic angles indicating the presence of bilateral pleural effusions. Slightly more confluent opacities identified at the left base superimposed infiltrate cannot be completely excluded. Atelectatic changes identified at the right base     Changes of pulmonary vascular congestion. Bilateral pleural effusions Electronically signed by: Radu Edwards Date:    12/18/2023 Time:    09:43    US Retroperitoneal Complete    Result Date: 12/18/2023  EXAMINATION: US RETROPERITONEAL COMPLETE CLINICAL HISTORY: Acute on chronic kidney disease. COMPARISON: CT 16 December 2023 FINDINGS: Grayscale and color Doppler sonographic evaluation of the kidneys and urinary bladder. The right kidney measures 11 cm. The left kidney measures 11 cm.   No hydronephrosis.  Grossly normal renal parenchymal echogenicity. No significant abnormality of the urinary bladder. There is small volume ascites.     No significant sonographic abnormality of the kidneys. Electronically signed by: Osiel Whitehead Date:    12/18/2023 Time:    09:09    FL ERCP Biliary And Pancreatic By Rad Tech    Result Date: 12/17/2023  EXAMINATION: FL ERCP BILIARY AND PANCREATIC CLINICAL HISTORY: biliary stone; TECHNIQUE: 52.8mGy of fluoroscopic support was utilized for procedural support during procedure.  Please refer to performing provider dictation.  COMPARISON: None FINDINGS: Fluoroscopic support. Please refer to procedure of this dictation.     Fluoroscopic support.  Please refer to procedure of this dictation. Electronically signed by: Cristhian Lara Date:    12/17/2023 Time:    13:04    MRI MRCP Without Contrast    Result Date: 12/17/2023  EXAMINATION: MRI ABDOMEN WITHOUT CONTRAST MRCP CLINICAL HISTORY: Post cholecystectomy pancreatitis -- ?  Choledocholithiases; TECHNIQUE: Multiplanar, multisequence images are performed through the liver and upper abdomen.  Additionally 2D and 3D MRCP sequences are performed as well as MIP images. COMPARISON: None. FINDINGS: Lung bases: Moderate streaky is present in the visualized lung bases consistent with nonspecific dependent changes and scarring. Liver: Liver appears normal in size. Portal venous system: Normal. Gallbladder: Gallbladder is surgically absent. Biliary tree intrahepatic ducts: Unremarkable. Biliary tree extrahepatic ducts: There appear to be numerous intraluminal filling defects in the mid to distal common bile duct series image 16 series 3. Ampullary region: No obvious obstructive mass or stone. Spleen: Unremarkable. Pancreas: There is intermediate intrasubstance T2 signal in the pancreatic body (image 19 series 4 and 6) with corresponding restricted diffusion signal on DWI (image 136 series 7), relating to edema changes. There is stable free fluid collection in the bilateral anterior pararenal space extending to the adjacent mesocolon, small bowel mesentery, perihepatic and perisplenic regions. T2 stranding intensities are seen along the lesser sac. These findings are reflective of acute interstitial pancreatitis. Pancreatic duct: Unremarkable. Adrenal glands: Unremarkable. Kidneys: There are probable cysts in both kidneys, with the larger seen in the right mid pole region measuring 1.0 cm (image 29 series 4).Ureters: Unremarkable. Stomach and distal esophagus: Normal. Small bowel: Normal. Colon:  Normal. Lymph nodes: A 1cm lymph node is demonstrated in the mo hepatis region (image 18 series 4), likely reactive in nature. Abdominal wall: Normal. Systemic arteries and veins: Unremarkable. Osseous structures: There is mild spondylolytic changes in the imaged spine. Miscellaneous: There are motion artifacts in some of the sequences limiting its evaluation.     1. There appear to be numerous intraluminal filling defects in the mid to distal common bile duct series image 16 series 3. These are suggestive of impacted gallstones and sludge. Correlate clinically as regards additional evaluation and follow-up possibly with ERCP. 2. There is intermediate intrasubstance T2 signal in the pancreatic body (image 19 series 4 and 6) with corresponding restricted diffusion signal on DWI (image 136 series 7), relating to edema changes. There is stable free fluid collection in the bilateral anterior pararenal space extending to the adjacent mesocolon, small bowel mesentery, perihepatic and perisplenic regions. T2 stranding intensities are seen along the lesser sac. These findings are reflective of acute interstitial pancreatitis. Correlate with clinical and laboratory findings as regards additional evaluation and follow-up to full resolution as indicated. 3. A 1cm lymph node is demonstrated in the mo hepatis region (image 18 series 4), likely reactive in nature. I concur with the preliminary report above. Electronically signed by: Wayne Gee Date:    12/17/2023 Time:    12:12    CT Abdomen Pelvis With IV Contrast NO Oral Contrast    Result Date: 12/16/2023  EXAMINATION: CT ABDOMEN PELVIS WITH IV CONTRAST CLINICAL HISTORY: Abdominal pain, acute, nonlocalized; TECHNIQUE: Multidetector axial images were obtained of the abdomen and pelvis following the administration of IV contrast. Oral contrast was not administered. Dose length product of 578 mGycm. Automated exposure control was utilized to minimize radiation dose.  COMPARISON: August 4, 2023. FINDINGS: Included portion of the lungs show dependent hypoventilatory changes without acute air space infiltrates or fluid within the pleural spaces. Liver is remarkable for steatosis without focal space occupying lesion.  There is small amount of free fluid around the anterolateral surface of the liver.  Gallbladder is surgically absent.  Pancreas is surrounded by considerable phlegmons and fluid which extend into the anterior pararenal spaces and further into the lower abdomen consistent with acute pancreatitis.  There is no suggestion of pancreatic necrosis, pseudocyst or abscess formation.  Spleen is unremarkable. The adrenal glands appear within normal limits. The kidneys are unremarkable in size and contour. No solid or cystic renal lesion identified. There is no hydronephrosis. No perinephric fluid strandings or collections identified. Stomach is nondistended.  There is distal esophageal mural thickening which probably result of reflux disease with about similar appearance.  There is some mural thickening of the gastric antrum and the descending colon which may represent reactive change from acute pancreatitis.  Possible primary gastritis and duodenitis is not excluded.  No abnormal dilatation of loops of small bowel.  Colon is nondistended.  No bowel obstruction.  Distal descending and sigmoid colon noninflamed diverticulosis coli. Urinary bladder appears within normal limits.  Prostate is enlarged in size and contains few calcifications.  There is large right hydrocele which extends into right inguinal canal.  Is no pelvic free fluid. No acute or otherwise osseous abnormality identified.     1. Severe acute pancreatitis. 2. Gastric antrum and duodenal mural thickening likely represents reactive change for acute pancreatitis. 3. Right large hydrocele. Electronically signed by: Denver Wagner Date:    12/16/2023 Time:    20:08    US Scrotum And Testicles    Result Date:  12/16/2023  EXAMINATION: US SCROTUM AND TESTICLES CLINICAL HISTORY: Other specified disorders of the male genital organs TECHNIQUE: Multiple real-time images were performed of the scrotum in various planes by the sonographer. COMPARISON: None available FINDINGS: Right testicle measures 3.3 x 2.4 x 2.4 cm.  There is unremarkable echotexture of the right testicle.  Unremarkable arteriovenous flow to the right testicle. There is large right scrotal hydrocele which measures 10.6 x 7.2 x 8.5 cm.  Right hydrocele causes mass effect upon left testicle which is deviated inferiorly.  This made it difficult to optimally assess vascularity to the left testicle due to pressure caused by the hydrocele.  Some vascularity along the peripheral aspect of less testicle was visualized.  Left testicle measures 3.4 x 2.0 x 2.0 cm and no abnormality of the parenchymal echotexture identified.     1. Unremarkable right testicle 2. Large right hydrocele which crosses the midline and causes compressive effect upon the left testicle which is inferiorly deviated.  Due to pressure caused by the hydrocele upon the left testicle optimal Doppler flow to the left testicle could not be obtained.  Only limited arterial flow to the peripheral aspect left testicle could be visualized.  Please correlate clinically.  Oneoption is to perform a follow-up study post drainage of large right hydrocele. Electronically signed by: Denver Wagner Date:    12/16/2023 Time:    19:21      ICD-10-CM ICD-9-CM   1. Hydrocele of testis  N43.3 603.9   2. Epigastric pain  R10.13 789.06   3. Scrotal swelling  N50.89 608.86   4. Acute pancreatitis, unspecified complication status, unspecified pancreatitis type  K85.90 577.0   5. Chest pain  R07.9 786.50   6. SIRS (systemic inflammatory response syndrome)  R65.10 995.90   7. Transaminitis  R74.01 790.4   8. SHA (acute kidney injury)  N17.9 584.9   9. Acute biliary pancreatitis, unspecified complication status  K85.10 577.0    10. Choledocholithiasis  K80.50 574.50   11. Hyperlipidemia associated with type 2 diabetes mellitus  E11.69 250.80    E78.5 272.4   12. Hypoxia  R09.02 799.02   13. Acute pancreatitis  K85.90 577.0     ASSESSMENT & PLAN:   This is a 68 y.o. male unknown to our group with PMH of T2DM, HTN, HLD, chronic scrotal hydrocele, vitamin D deficiency, CKD 3a. Presented to the ED 12/16/23 with severe epigastric pain onset same day with radiation to back, associated n/v and chills also reported.      Upon arrival, VSS - afebrile. Labs revealed WBC 24, Tbili 1.9, , , lipase >3000.   CT abd/pel with IV: severe acute pancreatitis, gastric antrum and duodenal mural thickening likely represents reactive change for acute pancreatitis.  Preliminary MRCP: numerous intraluminal filling defects in mid to distal CBD suggestive of impacted gallstones and sludge; acute interstitial pancreatitis, lymph node in mo hepatis region likely reactive in nature.  LR, cefepime, flagyl initiated. GI consulted for choledocholithiasis.     Tbili and transaminases increased today. Tbili 3.5, Dbili 2.7. Transaminases in the 200s.     Patient resting in bed. He states symptoms started before eating, however after eating shrimp stew and potato salad his symptoms worsened. Severe abd pain, n/v - denies hematemesis or coffee ground emesis. He states even sipping water would cause vomiting. He has never had anything like this happen previously. He had his gallbladder removed around 2020 or so for cholelithiasis and biliary colic but he had no issues post op until now. Bms are normal and without any evidence of overt bleeding. Even now he gags with sips of water, he is c/o abd cramping unrelieved by dilaudid.      He is unsure if he takes blood thinners - per chart review there are none listed in home meds. He denies frequent NSAID use. Denies ETOH or recreational drug use. He smokes 1PPD for many years. Surgical history includes  cholecystectomy as above and appendectomy - he denies gastric bypass history.      When asked about previous endoscopy/GI history he states he saw a doctor off of [facility] but when asked about any workup he states he was told he had hematuria. No records of him being seen in either of our offices although he was referred to us in 2016 for screening colonoscopy - unable to contact patient despite multiple attempts to schedule.     Per chart review, patient saw PCP in Feb 2023 for wellness visit. He was being treated for vitamin D deficiency. He had a negative cologuard in the past year or so. Some mild transaminitis noted on routine labs.     Patient underwent ERCP with Dr. Kerns on 12/17/2023.  At least 3 stones removed from common bile duct.  There was complete clearance of the duct reported.  Liver and pancreatic enzymes significantly improved since then.  GI was reconsulted today for worsening pancreatitis imaging study.  As per my discussion with Dr. Anthony earlier today, he was requiring.  supplemental oxygenation up to 5 L. He was also noted to have worsening leukocytosis.  Infectious disease service on follow up.  He was on ampicillin.  Merrem was added per primary service.  Findings were discussed in detail with the Radiology on call, Dr. Whitehead who read the abdominal and pelvic CT scan today.     Recommendations:  1. Continue IV antibiotics.  Infectious disease service on follow up.    2. Clear liquid diet.  3. Serial abdominal exams.  Discussed imaging study with Dr. Whitehead, radiologist on call.  Concern for necrosis around neck of the pancreas.  Increase fluid collection with inflammatory response.  Would be difficult to reach via image guided approach per Dr. Whitehead.  Recommend surgery consult in view of above findings.  May need repeat imaging study in next 7-10 days pending course in hospital.   4. Surgery consult to evaluate above findings.    5.  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  These patients are at high risk of going into ARDS.  Above findings were discussed in detail with the patient including the severity of condition.  Patient understands and agrees with the plan.     Discussed with the primary service, Dr. Anthony today.  Thank you for the consult.  Patient is known to Dr. Kerns.    12/24/23  Still reports some generalized abdominal discomfort.  On clear liquid diet.  No fever or chills.  Repeat imaging study with Dr. Chou today.  Patient had abdominal and pelvic CT scan without IV contrast ??.  This was a limited study to assess for underlying pancreatitis.  There seemed to be worsening since previous study and suggestion of pancreatic necrosis on imaging study per Radiology although the studies have been limited because of lack of IV contrast.  Patient would most likely need a repeat abdominal and pelvic CT scan with pancreatic protocol in next 1-2 weeks to evaluate for underlying pancreatitis.

## 2023-12-25 LAB
ALBUMIN SERPL-MCNC: 1.7 G/DL (ref 3.4–4.8)
ALBUMIN/GLOB SERPL: 0.5 RATIO (ref 1.1–2)
ALP SERPL-CCNC: 103 UNIT/L (ref 40–150)
ALT SERPL-CCNC: 23 UNIT/L (ref 0–55)
AST SERPL-CCNC: 37 UNIT/L (ref 5–34)
BACTERIA BLD CULT: NORMAL
BACTERIA BLD CULT: NORMAL
BACTERIA STL CULT: NORMAL
BASOPHILS # BLD AUTO: 0.1 X10(3)/MCL
BASOPHILS NFR BLD AUTO: 0.4 %
BILIRUB SERPL-MCNC: 1.3 MG/DL
BUN SERPL-MCNC: 18.7 MG/DL (ref 8.4–25.7)
CALCIUM SERPL-MCNC: 7.9 MG/DL (ref 8.8–10)
CHLORIDE SERPL-SCNC: 101 MMOL/L (ref 98–107)
CO2 SERPL-SCNC: 25 MMOL/L (ref 23–31)
CREAT SERPL-MCNC: 0.9 MG/DL (ref 0.73–1.18)
EOSINOPHIL # BLD AUTO: 0.22 X10(3)/MCL (ref 0–0.9)
EOSINOPHIL NFR BLD AUTO: 1 %
ERYTHROCYTE [DISTWIDTH] IN BLOOD BY AUTOMATED COUNT: 14.7 % (ref 11.5–17)
GFR SERPLBLD CREATININE-BSD FMLA CKD-EPI: >60 MLS/MIN/1.73/M2
GLOBULIN SER-MCNC: 3.5 GM/DL (ref 2.4–3.5)
GLUCOSE SERPL-MCNC: 129 MG/DL (ref 82–115)
HCT VFR BLD AUTO: 37.4 % (ref 42–52)
HGB BLD-MCNC: 12.5 G/DL (ref 14–18)
IMM GRANULOCYTES # BLD AUTO: 0.84 X10(3)/MCL (ref 0–0.04)
IMM GRANULOCYTES NFR BLD AUTO: 3.6 %
LYMPHOCYTES # BLD AUTO: 1.05 X10(3)/MCL (ref 0.6–4.6)
LYMPHOCYTES NFR BLD AUTO: 4.6 %
MAGNESIUM SERPL-MCNC: 1.7 MG/DL (ref 1.6–2.6)
MCH RBC QN AUTO: 31 PG (ref 27–31)
MCHC RBC AUTO-ENTMCNC: 33.4 G/DL (ref 33–36)
MCV RBC AUTO: 92.8 FL (ref 80–94)
MONOCYTES # BLD AUTO: 1.5 X10(3)/MCL (ref 0.1–1.3)
MONOCYTES NFR BLD AUTO: 6.5 %
NEUTROPHILS # BLD AUTO: 19.33 X10(3)/MCL (ref 2.1–9.2)
NEUTROPHILS NFR BLD AUTO: 83.9 %
NRBC BLD AUTO-RTO: 0 %
PHOSPHATE SERPL-MCNC: 2.7 MG/DL (ref 2.3–4.7)
PLATELET # BLD AUTO: 301 X10(3)/MCL (ref 130–400)
PMV BLD AUTO: 10.1 FL (ref 7.4–10.4)
POCT GLUCOSE: 118 MG/DL (ref 70–110)
POCT GLUCOSE: 121 MG/DL (ref 70–110)
POCT GLUCOSE: 127 MG/DL (ref 70–110)
POTASSIUM SERPL-SCNC: 4 MMOL/L (ref 3.5–5.1)
PROT SERPL-MCNC: 5.2 GM/DL (ref 5.8–7.6)
RBC # BLD AUTO: 4.03 X10(6)/MCL (ref 4.7–6.1)
SODIUM SERPL-SCNC: 133 MMOL/L (ref 136–145)
WBC # SPEC AUTO: 23.04 X10(3)/MCL (ref 4.5–11.5)

## 2023-12-25 PROCEDURE — 63600175 PHARM REV CODE 636 W HCPCS: Performed by: INTERNAL MEDICINE

## 2023-12-25 PROCEDURE — 84100 ASSAY OF PHOSPHORUS: CPT | Performed by: INTERNAL MEDICINE

## 2023-12-25 PROCEDURE — 25000003 PHARM REV CODE 250: Performed by: HOSPITALIST

## 2023-12-25 PROCEDURE — 83735 ASSAY OF MAGNESIUM: CPT | Performed by: INTERNAL MEDICINE

## 2023-12-25 PROCEDURE — 27000207 HC ISOLATION

## 2023-12-25 PROCEDURE — 80053 COMPREHEN METABOLIC PANEL: CPT | Performed by: INTERNAL MEDICINE

## 2023-12-25 PROCEDURE — 11000001 HC ACUTE MED/SURG PRIVATE ROOM

## 2023-12-25 PROCEDURE — 25000003 PHARM REV CODE 250: Performed by: INTERNAL MEDICINE

## 2023-12-25 PROCEDURE — A4216 STERILE WATER/SALINE, 10 ML: HCPCS | Performed by: INTERNAL MEDICINE

## 2023-12-25 PROCEDURE — C9113 INJ PANTOPRAZOLE SODIUM, VIA: HCPCS

## 2023-12-25 PROCEDURE — 85025 COMPLETE CBC W/AUTO DIFF WBC: CPT | Performed by: INTERNAL MEDICINE

## 2023-12-25 PROCEDURE — 21400001 HC TELEMETRY ROOM

## 2023-12-25 PROCEDURE — 63600175 PHARM REV CODE 636 W HCPCS

## 2023-12-25 PROCEDURE — 63600175 PHARM REV CODE 636 W HCPCS: Performed by: HOSPITALIST

## 2023-12-25 RX ORDER — MAGNESIUM SULFATE HEPTAHYDRATE 40 MG/ML
2 INJECTION, SOLUTION INTRAVENOUS ONCE
Status: COMPLETED | OUTPATIENT
Start: 2023-12-25 | End: 2023-12-25

## 2023-12-25 RX ADMIN — OXYCODONE HYDROCHLORIDE 5 MG: 5 TABLET ORAL at 08:12

## 2023-12-25 RX ADMIN — METRONIDAZOLE 500 MG: 5 INJECTION, SOLUTION INTRAVENOUS at 10:12

## 2023-12-25 RX ADMIN — FUROSEMIDE 20 MG: 10 INJECTION, SOLUTION INTRAMUSCULAR; INTRAVENOUS at 09:12

## 2023-12-25 RX ADMIN — METRONIDAZOLE 500 MG: 5 INJECTION, SOLUTION INTRAVENOUS at 02:12

## 2023-12-25 RX ADMIN — PANTOPRAZOLE SODIUM 40 MG: 40 INJECTION, POWDER, FOR SOLUTION INTRAVENOUS at 06:12

## 2023-12-25 RX ADMIN — AMPICILLIN 2 G: 2 INJECTION, POWDER, FOR SOLUTION INTRAVENOUS at 08:12

## 2023-12-25 RX ADMIN — TRAZODONE HYDROCHLORIDE 25 MG: 50 TABLET ORAL at 08:12

## 2023-12-25 RX ADMIN — AMPICILLIN 2 G: 2 INJECTION, POWDER, FOR SOLUTION INTRAVENOUS at 09:12

## 2023-12-25 RX ADMIN — METRONIDAZOLE 500 MG: 5 INJECTION, SOLUTION INTRAVENOUS at 05:12

## 2023-12-25 RX ADMIN — MAGNESIUM SULFATE HEPTAHYDRATE 2 G: 40 INJECTION, SOLUTION INTRAVENOUS at 03:12

## 2023-12-25 RX ADMIN — MEROPENEM 500 MG: 500 INJECTION, POWDER, FOR SOLUTION INTRAVENOUS at 05:12

## 2023-12-25 RX ADMIN — AMPICILLIN 2 G: 2 INJECTION, POWDER, FOR SOLUTION INTRAVENOUS at 03:12

## 2023-12-25 RX ADMIN — OXYCODONE HYDROCHLORIDE 5 MG: 5 TABLET ORAL at 02:12

## 2023-12-25 RX ADMIN — OXYCODONE HYDROCHLORIDE 5 MG: 5 TABLET ORAL at 03:12

## 2023-12-25 RX ADMIN — METOPROLOL SUCCINATE 25 MG: 25 TABLET, EXTENDED RELEASE ORAL at 09:12

## 2023-12-25 RX ADMIN — ENOXAPARIN SODIUM 30 MG: 30 INJECTION SUBCUTANEOUS at 05:12

## 2023-12-25 RX ADMIN — MEROPENEM 500 MG: 500 INJECTION, POWDER, FOR SOLUTION INTRAVENOUS at 08:12

## 2023-12-25 RX ADMIN — PANTOPRAZOLE SODIUM 40 MG: 40 INJECTION, POWDER, FOR SOLUTION INTRAVENOUS at 03:12

## 2023-12-25 RX ADMIN — AMPICILLIN 2 G: 2 INJECTION, POWDER, FOR SOLUTION INTRAVENOUS at 02:12

## 2023-12-25 RX ADMIN — MEROPENEM 500 MG: 500 INJECTION, POWDER, FOR SOLUTION INTRAVENOUS at 02:12

## 2023-12-25 RX ADMIN — SODIUM CHLORIDE, PRESERVATIVE FREE 10 ML: 5 INJECTION INTRAVENOUS at 05:12

## 2023-12-25 NOTE — PROGRESS NOTES
Franklin Macias   MRN: [id number]   ADMISSION DATE: 2023  : 1955  AGE: 68 y.o.    DATE :  2023     PROVIDER: ROMAN ROJAS    SUBJECTIVE:  Patient reports improvement of abdominal pain.  On clear liquid diet.  No fever or chills.  No overt GI bleed.  No nausea or vomiting.    Review of Systems   Review of Systems   Constitutional:  Negative for chills and fever.   HENT:  Negative for congestion and nosebleeds.    Eyes:  Negative for redness.   Respiratory:  Negative for cough, chest tightness and shortness of breath.    Cardiovascular:  Negative for chest pain, palpitations and leg swelling.   Gastrointestinal:  Abdominal pain gradually improving.  Negative for  blood in stool, constipation, diarrhea, nausea and vomiting.   Endocrine: Negative for cold intolerance and polydipsia.   Genitourinary:  Negative for dysuria and flank pain.   Musculoskeletal:  Negative for arthralgias.   Skin:  Negative for pallor.   Allergic/Immunologic: Negative for food allergies.   Neurological:  Negative for dizziness and headaches.   Hematological:  Does not bruise/bleed easily.   Psychiatric/Behavioral:  Negative for agitation and confusion.       Review of patient's allergies indicates:  No Known Allergies      ampicillin IVPB  2 g Intravenous Q6H    enoxparin  30 mg Subcutaneous Q24H (prophylaxis, 1700)    furosemide (LASIX) injection  20 mg Intravenous Daily    magnesium sulfate IVPB  2 g Intravenous Once    meropenem (MERREM) IVPB  500 mg Intravenous Q8H    metoprolol succinate  25 mg Oral Daily    metronidazole  500 mg Intravenous Q8H    pantoprazole  40 mg Intravenous BID AC    sodium chloride 0.9%  10 mL Intravenous Q6H    traZODone  25 mg Oral QHS       Medications Discontinued During This Encounter   Medication Reason    pantoprazole EC tablet 40 mg     iohexoL (OMNIPAQUE 300) injection Patient Discharge    LIDOcaine (PF) 10 mg/ml (1%) injection 10 mg Patient Transfer    sodium citrate-citric acid 500334  mg/5 ml solution 30 mL Patient Transfer    midazolam (VERSED) 1 mg/mL injection 2 mg     electrolyte-A infusion     ceFEPIme (MAXIPIME) 2 g in dextrose 5 % in water (D5W) 100 mL IVPB (MB+)     lactated ringers infusion     enoxaparin injection 40 mg     furosemide injection 40 mg     furosemide injection 40 mg     albumin human 25% bottle 12.5 g     furosemide injection 20 mg     dicyclomine injection 20 mg     dicyclomine injection 20 mg     furosemide tablet 40 mg     furosemide injection 20 mg              Past Medical History:   Diagnosis Date    DM (diabetes mellitus)     GERD (gastroesophageal reflux disease)     HLD (hyperlipidemia)     HTN (hypertension)       Social History     Socioeconomic History    Marital status:    Tobacco Use    Smoking status: Every Day     Current packs/day: 1.00     Types: Cigarettes    Smokeless tobacco: Never   Substance and Sexual Activity    Alcohol use: Never    Drug use: Never    Sexual activity: Yes     Social Determinants of Health     Financial Resource Strain: Low Risk  (8/8/2023)    Overall Financial Resource Strain (CARDIA)     Difficulty of Paying Living Expenses: Not hard at all   Food Insecurity: No Food Insecurity (8/8/2023)    Hunger Vital Sign     Worried About Running Out of Food in the Last Year: Never true     Ran Out of Food in the Last Year: Never true   Transportation Needs: No Transportation Needs (12/19/2023)    PRAPARE - Transportation     Lack of Transportation (Medical): No     Lack of Transportation (Non-Medical): No   Stress: No Stress Concern Present (8/8/2023)    [employer] - Occupational Stress Questionnaire     Feeling of Stress : Only a little   Housing Stability: Low Risk  (12/19/2023)    Housing Stability Vital Sign     Unable to Pay for Housing in the Last Year: No     Number of Places Lived in the Last Year: 1     Unstable Housing in the Last Year: No      Past Surgical History:   Procedure Laterality  Date    APPENDECTOMY      CHOLECYSTECTOMY      ERCP N/A 12/17/2023    Procedure: ERCP (ENDOSCOPIC RETROGRADE CHOLANGIOPANCREATOGRAPHY);  Surgeon: Flako Kerns MD;  Location: [facility] OR;  Service: Gastroenterology;  Laterality: N/A;    ERCP W/ SPHICTEROTOMY  12/17/2023    Procedure: ERCP, WITH SPHINCTEROTOMY;  Surgeon: Flako Kerns MD;  Location: [facility] OR;  Service: Gastroenterology;;    ERCP, WITH CALCULUS REMOVAL  12/17/2023    Procedure: ERCP, WITH CALCULUS REMOVAL;  Surgeon: Flako Kerns MD;  Location: [facility] OR;  Service: Gastroenterology;;        OBJECTIVE:     Vitals:    12/25/23 0755 12/25/23 1128 12/25/23 1543 12/25/23 1544   BP: 127/85 126/75  (!) 145/80   Pulse: 101 95  91   Resp: 20 20 19 18   Temp: 98.4 °F (36.9 °C) 98.7 °F (37.1 °C)  98.7 °F (37.1 °C)   TempSrc: Oral Oral  Oral   SpO2: (!) 93% 95%  97%   Weight:       Height:           Physical Exam   Physical Exam  HENT:      Head: Normocephalic and atraumatic.      Nose: Nose normal.      Mouth/Throat:      Pharynx: Oropharynx is clear.   Eyes:      Extraocular Movements: Extraocular movements intact.      Conjunctiva/sclera: Conjunctivae normal.      Pupils: Pupils are equal, round, and reactive to light.   Cardiovascular:      Rate and Rhythm: Normal rate and regular rhythm.   Pulmonary:      Effort: Pulmonary effort is normal.      Breath sounds: Normal breath sounds.   Abdominal:      General: Bowel sounds are normal.      Palpations: Abdomen is soft.      Tenderness: There is mild upper abdominal tenderness which seems to be decreasing  Musculoskeletal:         General: Normal range of motion.      Cervical back: Neck supple.   Skin:     General: Skin is warm and dry.   Neurological:      Mental Status: He is alert and oriented to person, place, and time.   Psychiatric:         Mood and Affect: Mood normal.     LABS    Recent Labs   Lab 12/23/23  0521 12/24/23  0512 12/25/23  0728   WBC 25.22  25.22* 26.22* 23.04*   HGB  "12.5* 13.2* 12.5*   HCT 37.1* 39.4* 37.4*    252 301        Recent Labs   Lab 12/20/23  0645 12/21/23  0443 12/22/23  0417 12/23/23  0521 12/24/23  0856 12/25/23  0728    136   < > 135* 133* 133*   K 3.3* 3.2*   < > 4.2 4.7 4.0   CO2 26 27   < > 26 22* 25   BUN 44.9* 44.6*   < > 26.2* 20.4 18.7   CREATININE 1.41* 1.32*   < > 1.03 0.91 0.90   CALCIUM 7.3* 7.5*   < > 7.4* 7.3* 7.9*   BILITOT 3.6* 2.8*  --   --   --  1.3   ALKPHOS 67 74  --   --   --  103   ALT 75* 55  --   --   --  23   AST 63* 58*  --   --   --  37*   GLUCOSE 120* 119*   < > 109 125* 129*    < > = values in this interval not displayed.        Recent Labs   Lab 12/19/23  0429   INR 1.4*      No results for input(s): "AMYLASE" in the last 168 hours.   Recent Labs   Lab 12/19/23 0429   INR 1.4*             RESULTS: X-Ray Chest 1 View for Line/Tube Placement    Result Date: 12/23/2023  EXAMINATION: XR CHEST 1 VIEW FOR LINE/TUBE PLACEMENT CLINICAL HISTORY: PICC; TECHNIQUE: One view COMPARISON: December 20, 2023. FINDINGS: Right upper extremity approach PICC line which terminates about the cavoatrial junction and is optimal.  Cardiopericardial silhouette is within normal limits.  Improved right pleural effusion.  There is new thickened opacity right lower lung zone which may represent atelectasis.  Attention to follow-up exams.  There also new left basilar hypoventilatory changes no pulmonary edema.  No pneumothorax.     Optimal placement of the PICC line. Electronically signed by: Denver Wagner Date:    12/23/2023 Time:    19:56    CT Chest Abdomen Pelvis Without Contrast (XPD)    Result Date: 12/23/2023  EXAMINATION: CT CHEST ABDOMEN PELVIS WITHOUT CONTRAST(XPD) CLINICAL HISTORY: follow up; TECHNIQUE: Helical acquisition from the thoracic inlet through the ischia without IV contrast.  Lack of contrast limits evaluation of solid organs and vascular structures.  Three plane reconstructions made available for review. DLP 1042 mGycm. Automatic " exposure control, adjustment of mA/kV or iterative reconstruction technique was used to reduce radiation. COMPARISON: 18 December 2023, 15 August 2023 FINDINGS: Chest. Heart is not significantly enlarged.  There are coronary artery calcifications.  No pericardial effusion. No mediastinal, hilar or axillary adenopathy by CT size criteria. There are small bilateral pleural effusions slightly improved compared to prior.  Mild bibasilar atelectasis.  Moderate to severe emphysema. Abdomen and pelvis. No acute findings noncontrast liver, spleen, adrenals or kidneys.  Gallbladder surgically absent. There is increased peripancreatic inflammation compared to prior.  Question a developing collection anterior and superior to the portion of the pancreas which had suggestion of necrosis on prior imaging.  This collection is seen on images 104-110 series 2.  There is small volume ascites. There is no bowel obstruction or free air.  There is colonic diverticulosis. Urinary bladder unremarkable.  Prostate mildly enlarged.  Abdominal aorta mildly ectatic with moderate atherosclerotic disease. There are no acute osseous findings.     1. Worsened appearance of pancreatitis with possible collection developing anterior and superior to the pancreas. 2. Small volume ascites. 3. Small bilateral pleural effusions. Electronically signed by: Osiel Whitehead Date:    12/23/2023 Time:    10:59    Echo    Result Date: 12/20/2023    Left Ventricle: The left ventricle is normal in size. Normal wall thickness. Normal wall motion. There is normal systolic function with a visually estimated ejection fraction of 55 - 60%. Grade I diastolic dysfunction.   Right Ventricle: Normal right ventricular cavity size. Systolic function is normal.   Aortic Valve: The aortic valve is a trileaflet valve.   Pericardium: There is a trivial effusion. No indication of cardiac tamponade.   Technically difficult study due to lung interference.     X-Ray Chest 1  View    Result Date: 12/20/2023  EXAMINATION: XR CHEST 1 VIEW CLINICAL HISTORY: follow up; TECHNIQUE: Single view of the chest COMPARISON: 12/18/2023 FINDINGS: Cardiomegaly is unchanged.  Small right effusion is slightly increased in the interval.  No acute osseous abnormality.     As above. Electronically signed by: Cristhian Lara Date:    12/20/2023 Time:    09:53    CT Abdomen Pelvis  Without Contrast    Result Date: 12/18/2023  EXAMINATION: CT ABDOMEN PELVIS WITHOUT CONTRAST CLINICAL HISTORY: rise in tbili and pain s/p ERCP; TECHNIQUE: Low dose axial images, sagittal and coronal reformations were obtained from the lung bases to the pubic symphysis.  No contrast was administered.  Automatic exposure control is utilized to reduce patient radiation exposure. COMPARISON: 12/16/2023 FINDINGS: There are changes seen consistent with emphysema and COPD in the lungs.  There is  bibasilar atelectasis and bilateral pleural effusions.  This is a new finding.. The liver is hypoattenuated consistent with hepatic steatosis.  The patient is status post cholecystectomy.  There is evidence of ascites.  This is more prominent than the prior examination. There are inflammatory changes seen around the pancreas consistent with pancreatitis.  This was seen on the prior examination as well.  Previous examination showed incomplete enhancement of the pancreas concerning for developing necrotizing pancreatitis.  No free intraperitoneal air is seen on this examination. Adrenal glands appear normal. Kidneys appear normal.  No hydronephrosis is seen.  No hydroureter seen. No colitis is seen.  No diverticulitis is seen.  No colonic mass is seen. Urinary bladder appears grossly unremarkable. Atherosclerotic changes are seen within the abdominal aorta and its branches. Bones appear grossly unremarkable.     Interval worsening of the ascites Persistent inflammatory changes around the pancreas consistent patient's history of pancreatitis.   Previous examination showed incomplete enhancement of the pancreas suggesting possible developing necrotizing pancreatitis. Interval development of bibasilar atelectasis and moderate to large pleural effusions Electronically signed by: Wayne Gee Date:    12/18/2023 Time:    12:12    X-Ray Chest 1 View    Result Date: 12/18/2023  EXAMINATION: XR CHEST 1 VIEW CPT 49387 CLINICAL HISTORY: Hypoxemia; FINDINGS: Examination reveals mediastinal silhouette to be within normal limits cardiac silhouette is not enlarged.  There is some increase interstitial and pulmonary vascular markings which may indicate a degree of pulmonary vascular congestion. There is blunting of both costophrenic angles indicating the presence of bilateral pleural effusions. Slightly more confluent opacities identified at the left base superimposed infiltrate cannot be completely excluded. Atelectatic changes identified at the right base     Changes of pulmonary vascular congestion. Bilateral pleural effusions Electronically signed by: Radu Edwards Date:    12/18/2023 Time:    09:43    US Retroperitoneal Complete    Result Date: 12/18/2023  EXAMINATION: US RETROPERITONEAL COMPLETE CLINICAL HISTORY: Acute on chronic kidney disease. COMPARISON: CT 16 December 2023 FINDINGS: Grayscale and color Doppler sonographic evaluation of the kidneys and urinary bladder. The right kidney measures 11 cm. The left kidney measures 11 cm.   No hydronephrosis.  Grossly normal renal parenchymal echogenicity. No significant abnormality of the urinary bladder. There is small volume ascites.     No significant sonographic abnormality of the kidneys. Electronically signed by: Osiel Whitehead Date:    12/18/2023 Time:    09:09    FL ERCP Biliary And Pancreatic By Rad Tech    Result Date: 12/17/2023  EXAMINATION: FL ERCP BILIARY AND PANCREATIC CLINICAL HISTORY: biliary stone; TECHNIQUE: 52.8mGy of fluoroscopic support was utilized for procedural support during procedure.   Please refer to performing provider dictation. COMPARISON: None FINDINGS: Fluoroscopic support. Please refer to procedure of this dictation.     Fluoroscopic support.  Please refer to procedure of this dictation. Electronically signed by: Cristhian Lara Date:    12/17/2023 Time:    13:04    MRI MRCP Without Contrast    Result Date: 12/17/2023  EXAMINATION: MRI ABDOMEN WITHOUT CONTRAST MRCP CLINICAL HISTORY: Post cholecystectomy pancreatitis -- ?  Choledocholithiases; TECHNIQUE: Multiplanar, multisequence images are performed through the liver and upper abdomen.  Additionally 2D and 3D MRCP sequences are performed as well as MIP images. COMPARISON: None. FINDINGS: Lung bases: Moderate streaky is present in the visualized lung bases consistent with nonspecific dependent changes and scarring. Liver: Liver appears normal in size. Portal venous system: Normal. Gallbladder: Gallbladder is surgically absent. Biliary tree intrahepatic ducts: Unremarkable. Biliary tree extrahepatic ducts: There appear to be numerous intraluminal filling defects in the mid to distal common bile duct series image 16 series 3. Ampullary region: No obvious obstructive mass or stone. Spleen: Unremarkable. Pancreas: There is intermediate intrasubstance T2 signal in the pancreatic body (image 19 series 4 and 6) with corresponding restricted diffusion signal on DWI (image 136 series 7), relating to edema changes. There is stable free fluid collection in the bilateral anterior pararenal space extending to the adjacent mesocolon, small bowel mesentery, perihepatic and perisplenic regions. T2 stranding intensities are seen along the lesser sac. These findings are reflective of acute interstitial pancreatitis. Pancreatic duct: Unremarkable. Adrenal glands: Unremarkable. Kidneys: There are probable cysts in both kidneys, with the larger seen in the right mid pole region measuring 1.0 cm (image 29 series 4).Ureters: Unremarkable. Stomach and distal  esophagus: Normal. Small bowel: Normal. Colon: Normal. Lymph nodes: A 1cm lymph node is demonstrated in the mo hepatis region (image 18 series 4), likely reactive in nature. Abdominal wall: Normal. Systemic arteries and veins: Unremarkable. Osseous structures: There is mild spondylolytic changes in the imaged spine. Miscellaneous: There are motion artifacts in some of the sequences limiting its evaluation.     1. There appear to be numerous intraluminal filling defects in the mid to distal common bile duct series image 16 series 3. These are suggestive of impacted gallstones and sludge. Correlate clinically as regards additional evaluation and follow-up possibly with ERCP. 2. There is intermediate intrasubstance T2 signal in the pancreatic body (image 19 series 4 and 6) with corresponding restricted diffusion signal on DWI (image 136 series 7), relating to edema changes. There is stable free fluid collection in the bilateral anterior pararenal space extending to the adjacent mesocolon, small bowel mesentery, perihepatic and perisplenic regions. T2 stranding intensities are seen along the lesser sac. These findings are reflective of acute interstitial pancreatitis. Correlate with clinical and laboratory findings as regards additional evaluation and follow-up to full resolution as indicated. 3. A 1cm lymph node is demonstrated in the mo hepatis region (image 18 series 4), likely reactive in nature. I concur with the preliminary report above. Electronically signed by: Wayne Gee Date:    12/17/2023 Time:    12:12    CT Abdomen Pelvis With IV Contrast NO Oral Contrast    Result Date: 12/16/2023  EXAMINATION: CT ABDOMEN PELVIS WITH IV CONTRAST CLINICAL HISTORY: Abdominal pain, acute, nonlocalized; TECHNIQUE: Multidetector axial images were obtained of the abdomen and pelvis following the administration of IV contrast. Oral contrast was not administered. Dose length product of 578 mGycm. Automated exposure  control was utilized to minimize radiation dose. COMPARISON: August 4, 2023. FINDINGS: Included portion of the lungs show dependent hypoventilatory changes without acute air space infiltrates or fluid within the pleural spaces. Liver is remarkable for steatosis without focal space occupying lesion.  There is small amount of free fluid around the anterolateral surface of the liver.  Gallbladder is surgically absent.  Pancreas is surrounded by considerable phlegmons and fluid which extend into the anterior pararenal spaces and further into the lower abdomen consistent with acute pancreatitis.  There is no suggestion of pancreatic necrosis, pseudocyst or abscess formation.  Spleen is unremarkable. The adrenal glands appear within normal limits. The kidneys are unremarkable in size and contour. No solid or cystic renal lesion identified. There is no hydronephrosis. No perinephric fluid strandings or collections identified. Stomach is nondistended.  There is distal esophageal mural thickening which probably result of reflux disease with about similar appearance.  There is some mural thickening of the gastric antrum and the descending colon which may represent reactive change from acute pancreatitis.  Possible primary gastritis and duodenitis is not excluded.  No abnormal dilatation of loops of small bowel.  Colon is nondistended.  No bowel obstruction.  Distal descending and sigmoid colon noninflamed diverticulosis coli. Urinary bladder appears within normal limits.  Prostate is enlarged in size and contains few calcifications.  There is large right hydrocele which extends into right inguinal canal.  Is no pelvic free fluid. No acute or otherwise osseous abnormality identified.     1. Severe acute pancreatitis. 2. Gastric antrum and duodenal mural thickening likely represents reactive change for acute pancreatitis. 3. Right large hydrocele. Electronically signed by: Denver Wagner Date:    12/16/2023 Time:    20:08    US  Scrotum And Testicles    Result Date: 12/16/2023  EXAMINATION: US SCROTUM AND TESTICLES CLINICAL HISTORY: Other specified disorders of the male genital organs TECHNIQUE: Multiple real-time images were performed of the scrotum in various planes by the sonographer. COMPARISON: None available FINDINGS: Right testicle measures 3.3 x 2.4 x 2.4 cm.  There is unremarkable echotexture of the right testicle.  Unremarkable arteriovenous flow to the right testicle. There is large right scrotal hydrocele which measures 10.6 x 7.2 x 8.5 cm.  Right hydrocele causes mass effect upon left testicle which is deviated inferiorly.  This made it difficult to optimally assess vascularity to the left testicle due to pressure caused by the hydrocele.  Some vascularity along the peripheral aspect of less testicle was visualized.  Left testicle measures 3.4 x 2.0 x 2.0 cm and no abnormality of the parenchymal echotexture identified.     1. Unremarkable right testicle 2. Large right hydrocele which crosses the midline and causes compressive effect upon the left testicle which is inferiorly deviated.  Due to pressure caused by the hydrocele upon the left testicle optimal Doppler flow to the left testicle could not be obtained.  Only limited arterial flow to the peripheral aspect left testicle could be visualized.  Please correlate clinically.  Oneoption is to perform a follow-up study post drainage of large right hydrocele. Electronically signed by: Devner Wagner Date:    12/16/2023 Time:    19:21      ICD-10-CM ICD-9-CM   1. Hydrocele of testis  N43.3 603.9   2. Epigastric pain  R10.13 789.06   3. Scrotal swelling  N50.89 608.86   4. Acute pancreatitis, unspecified complication status, unspecified pancreatitis type  K85.90 577.0   5. Chest pain  R07.9 786.50   6. SIRS (systemic inflammatory response syndrome)  R65.10 995.90   7. Transaminitis  R74.01 790.4   8. SHA (acute kidney injury)  N17.9 584.9   9. Acute biliary pancreatitis, unspecified  complication status  K85.10 577.0   10. Choledocholithiasis  K80.50 574.50   11. Hyperlipidemia associated with type 2 diabetes mellitus  E11.69 250.80    E78.5 272.4   12. Hypoxia  R09.02 799.02   13. Acute pancreatitis  K85.90 577.0     ASSESSMENT & PLAN:   This is a 68 y.o. male unknown to our group with PMH of T2DM, HTN, HLD, chronic scrotal hydrocele, vitamin D deficiency, CKD 3a. Presented to the ED 12/16/23 with severe epigastric pain onset same day with radiation to back, associated n/v and chills also reported.      Upon arrival, VSS - afebrile. Labs revealed WBC 24, Tbili 1.9, , , lipase >3000.   CT abd/pel with IV: severe acute pancreatitis, gastric antrum and duodenal mural thickening likely represents reactive change for acute pancreatitis.  Preliminary MRCP: numerous intraluminal filling defects in mid to distal CBD suggestive of impacted gallstones and sludge; acute interstitial pancreatitis, lymph node in mo hepatis region likely reactive in nature.  LR, cefepime, flagyl initiated. GI consulted for choledocholithiasis.     Tbili and transaminases increased today. Tbili 3.5, Dbili 2.7. Transaminases in the 200s.     Patient resting in bed. He states symptoms started before eating, however after eating shrimp stew and potato salad his symptoms worsened. Severe abd pain, n/v - denies hematemesis or coffee ground emesis. He states even sipping water would cause vomiting. He has never had anything like this happen previously. He had his gallbladder removed around 2020 or so for cholelithiasis and biliary colic but he had no issues post op until now. Bms are normal and without any evidence of overt bleeding. Even now he gags with sips of water, he is c/o abd cramping unrelieved by dilaudid.      He is unsure if he takes blood thinners - per chart review there are none listed in home meds. He denies frequent NSAID use. Denies ETOH or recreational drug use. He smokes 1PPD for many years.  Surgical history includes cholecystectomy as above and appendectomy - he denies gastric bypass history.      When asked about previous endoscopy/GI history he states he saw a doctor off of [facility] but when asked about any workup he states he was told he had hematuria. No records of him being seen in either of our offices although he was referred to us in 2016 for screening colonoscopy - unable to contact patient despite multiple attempts to schedule.     Per chart review, patient saw PCP in Feb 2023 for wellness visit. He was being treated for vitamin D deficiency. He had a negative cologuard in the past year or so. Some mild transaminitis noted on routine labs.     Patient underwent ERCP with Dr. Kerns on 12/17/2023.  At least 3 stones removed from common bile duct.  There was complete clearance of the duct reported.  Liver and pancreatic enzymes significantly improved since then.  GI was reconsulted today for worsening pancreatitis imaging study.  As per my discussion with Dr. Anthony earlier today, he was requiring.  supplemental oxygenation up to 5 L. He was also noted to have worsening leukocytosis.  Infectious disease service on follow up.  He was on ampicillin.  Merrem was added per primary service.  Findings were discussed in detail with the Radiology on call, Dr. Whitehead who read the abdominal and pelvic CT scan today.     Recommendations:  1. Continue IV antibiotics.  Infectious disease service on follow up.    2. Clear liquid diet.  3. Serial abdominal exams.  Discussed imaging study with Dr. Whitehead, radiologist on call.  Concern for necrosis around neck of the pancreas.  Increase fluid collection with inflammatory response.  Would be difficult to reach via image guided approach per Dr. Whitehead.  Recommend surgery consult in view of above findings.  May need repeat imaging study in next 7-10 days pending course in hospital.   4. Surgery consult to evaluate above findings.    5.  Patient needs close  monitoring of cardiopulmonary status in view of severe necrotizing pancreatitis with some hypoxemia reported.  These patients are at high risk of going into ARDS.  Above findings were discussed in detail with the patient including the severity of condition.  Patient understands and agrees with the plan.     Discussed with the primary service, Dr. Anthony today.  Thank you for the consult.  Patient is known to Dr. Kerns.    12/24/23  Still reports some generalized abdominal discomfort.  On clear liquid diet.  No fever or chills.  Repeat imaging study with Dr. Chou today.  Patient had abdominal and pelvic CT scan without IV contrast ??.  This was a limited study to assess for underlying pancreatitis.  There seemed to be worsening since previous study and suggestion of pancreatic necrosis on imaging study per Radiology although the studies have been limited because of lack of IV contrast.  Patient would most likely need a repeat abdominal and pelvic CT scan with pancreatic protocol in next 1-2 weeks to evaluate for underlying pancreatitis.    12/25/23  On clear liquid diet.  Patient is reluctant to advance die today.  Can be advanced to full liquid tomorrow as tolerated.  Probably needs repeat imaging study next 1-2 weeks with pancreatic protocol to evaluate for any underlying necrosis etc..  Renal function has normalized.  Liver enzymes improving.  Mild downward trend of leukocytosis.  Respiratory status seems to be improving as well.  Dr. Martin to follow the patient tomorrow

## 2023-12-25 NOTE — PROGRESS NOTES
[facility]  Hospital Medicine Progress Note        Chief Complaint: Inpatient Follow-up abdominal pain    HPI:   68-year-old male with medical history of T2DM, hypertension, hyperlipidemia and prior cholecystectomy present to the ED with complaint of severe epigastric abdominal pain that started today around 9:00 a.m. after breakfast, the pain is severe 10/10 and radiate to his back associated with nausea and vomiting.  Report last bowel movement was this morning.  Reports chills but denies fever.  He has chronic scrotal hydrocele that has not changed or painful.     On arrival to ED he was afebrile, tachycardic, normotensive and saturating 96% on room air.  Labs notable for WBC 24.97, hemoglobin 18.2, creatinine 2.0, BUN 16.5, total bilirubin 2.7, direct 1.9, , , lipase more than 3000, triglyceride 183.  CT abdomen and pelvis show finding of severe acute pancreatitis without evidence of necrosis or abscess or fluid collection.  Liver remarkable for steatosis, gallbladder surgically absent, no comment on biliary tree.     He was given 2 L of IV fluids, IV Zosyn, IV analgesics and referred to hospital medicine service for further evaluation and management.  Patient was taken for ERCP on 12/17.  Choledocholithiasis was resolved.  Also had sphincterotomy performed.  Returned to the floor in stable condition.  GI recommending advancement of diet to clear liquids in 4-6 hours     Patient  continues to be requiring oxygen.  Echo with grade 1 diastolic dysfunction, normal systolic function.  Follow up chest x-ray obtained  with small right pleural effusion slightly increased. Nephrology okay to transition to oral Lasix.  GI signed off.    Infectious diseases recommending 14 day course from negative blood cultures of ampicillin and Flagyl.    As leukocytosis not improving, ordered a CT scan  which commenting on worsened appearance pancreatitis with possible collection developing  and small volume ascites, small bilateral pleural effusions.  Meropenem was added to his antibiotics.  GI was notified for further recommendations, recommending surgery consult.  Surgery on board.  Plan to continue to monitor and continue the antibiotics and repeat imaging in few days.  Diet changed to clears.    Interval Hx:   No acute overnight events reported.  GI and surgery continues follow up. Continues to be requiring oxygen.  Requirement going down .   Changed Lasix to IV    Patient was   Seen and examined.GI symptoms improving  today. Nausea  is better. Diarrhea improved.  Denies any  shortness for breath, chest pain or cough      Chart was reviewed, T-max of 98°.7, .  Most recent labs were reviewed.  Leukocytosis  continues 26>23.  Creatinine normalized. transaminitis improving.     Objective/physical exam:  General: In no acute distress, afebrile  Chest:  Crackles, on nasal cannula  Heart: RRR, +S1, S2, no appreciable murmur  Abdomen: Soft, nontender, BS +  MSK: Warm, no lower extremity edema, no clubbing or cyanosis  Neurologic: Alert and oriented x4, Cranial nerve II-XII intact, Strength 5/5 in all 4 extremities       VITAL SIGNS: 24 HRS MIN & MAX LAST   Temp  Min: 98.3 °F (36.8 °C)  Max: 98.7 °F (37.1 °C) 98.7 °F (37.1 °C)   BP  Min: 108/71  Max: 140/70 126/75   Pulse  Min: 92  Max: 101  95   Resp  Min: 18  Max: 20 20   SpO2  Min: 93 %  Max: 96 % 95 %       Recent Labs   Lab 12/23/23  0521 12/24/23  0512 12/25/23  0728   WBC 25.22  25.22* 26.22* 23.04*   RBC 4.09* 4.33* 4.03*   HGB 12.5* 13.2* 12.5*   HCT 37.1* 39.4* 37.4*   MCV 90.7 91.0 92.8   MCH 30.6 30.5 31.0   MCHC 33.7 33.5 33.4   RDW 14.6 14.6 14.7    252 301   MPV 10.6* 10.3 10.1       Recent Labs   Lab 12/20/23  0645 12/20/23  1015 12/21/23  0443 12/21/23  0443 12/22/23  0417 12/23/23  0521 12/24/23  0856 12/25/23  0728     --  136  --  135* 135* 133* 133*   K 3.3*  --  3.2*  --  4.5 4.2 4.7 4.0   CO2 26  --  27  --  26 26 22*  25   BUN 44.9*  --  44.6*  --  33.3* 26.2* 20.4 18.7   CREATININE 1.41*  --  1.32*  --  1.05 1.03 0.91 0.90   CALCIUM 7.3*  --  7.5*  --  7.3* 7.4* 7.3* 7.9*   PH  --  7.500*  --   --   --   --   --   --    MG  --   --   --    < > 2.10 1.80 1.60 1.70   ALBUMIN 2.1*  --  2.2*  --  2.0*  --   --  1.7*   ALKPHOS 67  --  74  --   --   --   --  103   ALT 75*  --  55  --   --   --   --  23   AST 63*  --  58*  --   --   --   --  37*   BILITOT 3.6*  --  2.8*  --   --   --   --  1.3    < > = values in this interval not displayed.          Microbiology Results (last 7 days)       Procedure Component Value Units Date/Time    Blood Culture [[id number]]  (Normal) Collected: 12/20/23 1046    Order Status: Completed Specimen: Blood from Arm, Right Updated: 12/25/23 1202     CULTURE, BLOOD ([facility]) No Growth at 5 days    Blood Culture [[id number]]  (Normal) Collected: 12/20/23 1045    Order Status: Completed Specimen: Blood from Antecubital, Left Updated: 12/25/23 1202     CULTURE, BLOOD ([facility]) No Growth at 5 days    Stool Culture [[id number]]  (Normal) Collected: 12/23/23 0554    Order Status: Completed Specimen: Stool Updated: 12/25/23 0920     Stool Culture Negative for Salmonella, Shigella, Campylobacter, Vibrio, Aeromonas, Pleisiomonas,Yersinia, or Shiga Toxin 1 and 2.    Respiratory Culture [[id number]]     Order Status: Sent Specimen: Sputum, Expectorated     Blood culture #1 **CANNOT BE ORDERED STAT** [[id number]]  (Normal) Collected: 12/16/23 2017    Order Status: Completed Specimen: Blood from Antecubital, Right Updated: 12/21/23 2100     CULTURE, BLOOD ([facility]) No Growth at 5 days    Clostridium Diff Toxin, A & B, EIA [[id number]]  (Normal) Collected: 12/21/23 0902    Order Status: Completed Specimen: Stool Updated: 12/21/23 1101     Clostridium Difficile GDH Antigen Negative     Clostridium Difficile Toxin A/B Negative    Blood culture #2 **CANNOT BE ORDERED STAT** [[id number]]  (Abnormal)  (Susceptibility) Collected: 12/16/23  2017    Order Status: Completed Specimen: Blood from Arm, Right Updated: 12/19/23 0658     CULTURE, BLOOD ([facility]) Enterococcus faecalis     Comment: Ampicillin results may be used to predict susceptibility to amoxicillin-clavulanate, ampicillin-sulbactam, and piperacillin-tazobactam.        GRAM STAIN Gram Positive Cocci, probable Streptococcus      Seen in gram stain of broth only      1 of 2 Anaerobic bottles positive             Radiology:  X-Ray Chest 1 View for Line/Tube Placement  Narrative: EXAMINATION:  XR CHEST 1 VIEW FOR LINE/TUBE PLACEMENT    CLINICAL HISTORY:  PICC;    TECHNIQUE:  One view    COMPARISON:  December 20, 2023.    FINDINGS:  Right upper extremity approach PICC line which terminates about the cavoatrial junction and is optimal.  Cardiopericardial silhouette is within normal limits.  Improved right pleural effusion.  There is new thickened opacity right lower lung zone which may represent atelectasis.  Attention to follow-up exams.  There also new left basilar hypoventilatory changes no pulmonary edema.  No pneumothorax.  Impression: Optimal placement of the PICC line.    Electronically signed by: Denver Wagner  Date:    12/23/2023  Time:    19:56  CT Chest Abdomen Pelvis Without Contrast (XPD)  Narrative: EXAMINATION:  CT CHEST ABDOMEN PELVIS WITHOUT CONTRAST(XPD)    CLINICAL HISTORY:  follow up;    TECHNIQUE:  Helical acquisition from the thoracic inlet through the ischia without IV contrast.  Lack of contrast limits evaluation of solid organs and vascular structures.  Three plane reconstructions made available for review. DLP 1042 mGycm. Automatic exposure control, adjustment of mA/kV or iterative reconstruction technique was used to reduce radiation.    COMPARISON:  18 December 2023, 15 August 2023    FINDINGS:  Chest.    Heart is not significantly enlarged.  There are coronary artery calcifications.  No pericardial effusion.    No mediastinal, hilar or axillary adenopathy by CT size  criteria.    There are small bilateral pleural effusions slightly improved compared to prior.  Mild bibasilar atelectasis.  Moderate to severe emphysema.    Abdomen and pelvis.    No acute findings noncontrast liver, spleen, adrenals or kidneys.  Gallbladder surgically absent.    There is increased peripancreatic inflammation compared to prior.  Question a developing collection anterior and superior to the portion of the pancreas which had suggestion of necrosis on prior imaging.  This collection is seen on images 104-110 series 2.  There is small volume ascites.    There is no bowel obstruction or free air.  There is colonic diverticulosis.    Urinary bladder unremarkable.  Prostate mildly enlarged.  Abdominal aorta mildly ectatic with moderate atherosclerotic disease.    There are no acute osseous findings.  Impression: 1. Worsened appearance of pancreatitis with possible collection developing anterior and superior to the pancreas.  2. Small volume ascites.  3. Small bilateral pleural effusions.    Electronically signed by: Osiel Whitehead  Date:    12/23/2023  Time:    10:59      Scheduled Med:   ampicillin IVPB  2 g Intravenous Q6H    enoxparin  30 mg Subcutaneous Q24H (prophylaxis, 1700)    furosemide (LASIX) injection  20 mg Intravenous Daily    magnesium sulfate IVPB  2 g Intravenous Once    meropenem (MERREM) IVPB  500 mg Intravenous Q8H    metoprolol succinate  25 mg Oral Daily    metronidazole  500 mg Intravenous Q8H    pantoprazole  40 mg Intravenous BID AC    sodium chloride 0.9%  10 mL Intravenous Q6H    traZODone  25 mg Oral QHS        Continuous Infusions:       PRN Meds:  sodium chloride 0.9%, acetaminophen, acetaminophen, aluminum-magnesium hydroxide-simethicone, dextrose 10%, dextrose 10%, dicyclomine, glucagon (human recombinant), hydrALAZINE, HYDROmorphone, insulin aspart U-100, labetalol, melatonin, ondansetron, oxyCODONE, polyethylene glycol, prochlorperazine, senna-docusate 8.6-50 mg, sodium  chloride 0.9%, Flushing PICC/Midline Protocol **AND** sodium chloride 0.9% **AND** sodium chloride 0.9%       Assessment/Plan:   Acute severe pancreatitis- suspect biliary  Cholangitis, acute  Choledocholithiasis status post ERCP  Severe sepsis  Enterococcus faecalis bacteremia  SHA superimposed on CKD 3A  Acute hypoxic respiratory failure requiring supplemental oxygen secondary to pulmonary edema/pleural effusion  , history of smoking, questionable COPD?       History of T2DM, hypertension, hyperlipidemia, GERD    Plan  GI following.  Underwent ERCP with sludge and stone removal.  Lipase improving.    Transaminitis improving.Continued antibiotics-Flagyl, Ampicillin.  , Blood cultures noted to be positive, ordered repeat cultures, so far negative.Infectious Disease was consulted, on further recommendations/investigations if any-recommending 14 days after negative culture(last date likely around January 6th per our calculations, no other investigations recommended).Given worsening leukocytosis, ordered a scan (without contrast)showing possible fluid collection around the pancreas and small volume ascites , added meropenem.  Recommended surgery consult.  Plan to continue to monitor and consider repeat imaging in 7-10 days.  We will continue to follow up with surgery and GI. Diet change to clears  Nephrology followed for SHA, creatinine improved. okay to switch to oral diuretics.  Continue to monitor renal function.  Wean oxygen as tolerated.  Consider bronchodilators p.r.n..  Encourage incentive spirometry. Followed up on echocardiogram-EF 50-55.Grade I diastolic dysfunction.  Consider IV Lasix, we will keep a close eye as there is a concern for ARDS in such patients.  Most recent scan commenting on small bilateral pleural effusions.  Strict Is&Os  Continue supportive care appropriate home medications.  On Sliding scale with fingersticks a.c. HS for management of diabetes in-house.  Therapy services    DVT  prophylaxis-Lovenox      Anticipated discharge-to be decided,  once off oxygen and pending clinical improvement.  GI to clear the patient      Critical care diagnosis: sepsis, iv antibiotics, acute hypoxic respiratory failure requiring oxygen  Critical care interventions: hands on evaluation, review of labs/radiographs/records and discussions with family  Critical care time spent: >32 minutes        Teresa Anthony MD   12/25/2023     All diagnosis and differential diagnosis have been reviewed; assessment and plan has been documented; I have personally reviewed the labs and test results that are presently available; I have reviewed the patients medication list; I have reviewed the consulting providers response and recommendations. I have reviewed or attempted to review medical records based upon their availability    All of the patient's questions have been  addressed and answered. Patient's is agreeable to the above stated plan. I will continue to monitor closely and make adjustments to medical management as needed.  _____________________________________________________________________

## 2023-12-26 LAB
ALBUMIN SERPL-MCNC: 1.7 G/DL (ref 3.4–4.8)
ALBUMIN/GLOB SERPL: 0.6 RATIO (ref 1.1–2)
ALP SERPL-CCNC: 97 UNIT/L (ref 40–150)
ALT SERPL-CCNC: 23 UNIT/L (ref 0–55)
AST SERPL-CCNC: 36 UNIT/L (ref 5–34)
BASOPHILS # BLD AUTO: 0.11 X10(3)/MCL
BASOPHILS NFR BLD AUTO: 0.5 %
BILIRUB SERPL-MCNC: 1.3 MG/DL
BUN SERPL-MCNC: 17.6 MG/DL (ref 8.4–25.7)
CALCIUM SERPL-MCNC: 7.4 MG/DL (ref 8.8–10)
CHLORIDE SERPL-SCNC: 97 MMOL/L (ref 98–107)
CO2 SERPL-SCNC: 26 MMOL/L (ref 23–31)
CREAT SERPL-MCNC: 0.97 MG/DL (ref 0.73–1.18)
EOSINOPHIL # BLD AUTO: 0.26 X10(3)/MCL (ref 0–0.9)
EOSINOPHIL NFR BLD AUTO: 1.2 %
ERYTHROCYTE [DISTWIDTH] IN BLOOD BY AUTOMATED COUNT: 14.6 % (ref 11.5–17)
GFR SERPLBLD CREATININE-BSD FMLA CKD-EPI: >60 MLS/MIN/1.73/M2
GLOBULIN SER-MCNC: 2.9 GM/DL (ref 2.4–3.5)
GLUCOSE SERPL-MCNC: 122 MG/DL (ref 82–115)
HCT VFR BLD AUTO: 37.2 % (ref 42–52)
HGB BLD-MCNC: 12 G/DL (ref 14–18)
IMM GRANULOCYTES # BLD AUTO: 0.61 X10(3)/MCL (ref 0–0.04)
IMM GRANULOCYTES NFR BLD AUTO: 2.8 %
LYMPHOCYTES # BLD AUTO: 0.99 X10(3)/MCL (ref 0.6–4.6)
LYMPHOCYTES NFR BLD AUTO: 4.6 %
MAGNESIUM SERPL-MCNC: 2 MG/DL (ref 1.6–2.6)
MCH RBC QN AUTO: 30.7 PG (ref 27–31)
MCHC RBC AUTO-ENTMCNC: 32.3 G/DL (ref 33–36)
MCV RBC AUTO: 95.1 FL (ref 80–94)
MONOCYTES # BLD AUTO: 1.52 X10(3)/MCL (ref 0.1–1.3)
MONOCYTES NFR BLD AUTO: 7.1 %
NEUTROPHILS # BLD AUTO: 17.95 X10(3)/MCL (ref 2.1–9.2)
NEUTROPHILS NFR BLD AUTO: 83.8 %
NRBC BLD AUTO-RTO: 0 %
PHOSPHATE SERPL-MCNC: 2.8 MG/DL (ref 2.3–4.7)
PLATELET # BLD AUTO: 340 X10(3)/MCL (ref 130–400)
PMV BLD AUTO: 9.8 FL (ref 7.4–10.4)
POCT GLUCOSE: 129 MG/DL (ref 70–110)
POCT GLUCOSE: 132 MG/DL (ref 70–110)
POCT GLUCOSE: 156 MG/DL (ref 70–110)
POCT GLUCOSE: 159 MG/DL (ref 70–110)
POTASSIUM SERPL-SCNC: 4.3 MMOL/L (ref 3.5–5.1)
PROT SERPL-MCNC: 4.6 GM/DL (ref 5.8–7.6)
RBC # BLD AUTO: 3.91 X10(6)/MCL (ref 4.7–6.1)
SODIUM SERPL-SCNC: 133 MMOL/L (ref 136–145)
WBC # SPEC AUTO: 21.44 X10(3)/MCL (ref 4.5–11.5)

## 2023-12-26 PROCEDURE — A4216 STERILE WATER/SALINE, 10 ML: HCPCS | Performed by: INTERNAL MEDICINE

## 2023-12-26 PROCEDURE — 25000003 PHARM REV CODE 250: Performed by: INTERNAL MEDICINE

## 2023-12-26 PROCEDURE — 63600175 PHARM REV CODE 636 W HCPCS: Performed by: HOSPITALIST

## 2023-12-26 PROCEDURE — C9113 INJ PANTOPRAZOLE SODIUM, VIA: HCPCS

## 2023-12-26 PROCEDURE — 85025 COMPLETE CBC W/AUTO DIFF WBC: CPT | Performed by: INTERNAL MEDICINE

## 2023-12-26 PROCEDURE — 83735 ASSAY OF MAGNESIUM: CPT | Performed by: INTERNAL MEDICINE

## 2023-12-26 PROCEDURE — 11000001 HC ACUTE MED/SURG PRIVATE ROOM

## 2023-12-26 PROCEDURE — 63600175 PHARM REV CODE 636 W HCPCS

## 2023-12-26 PROCEDURE — 25000003 PHARM REV CODE 250: Performed by: HOSPITALIST

## 2023-12-26 PROCEDURE — 63600175 PHARM REV CODE 636 W HCPCS: Performed by: INTERNAL MEDICINE

## 2023-12-26 PROCEDURE — 21400001 HC TELEMETRY ROOM

## 2023-12-26 PROCEDURE — 84100 ASSAY OF PHOSPHORUS: CPT | Performed by: INTERNAL MEDICINE

## 2023-12-26 PROCEDURE — 80053 COMPREHEN METABOLIC PANEL: CPT | Performed by: INTERNAL MEDICINE

## 2023-12-26 PROCEDURE — 27000207 HC ISOLATION

## 2023-12-26 PROCEDURE — 27000221 HC OXYGEN, UP TO 24 HOURS

## 2023-12-26 RX ADMIN — AMPICILLIN 2 G: 2 INJECTION, POWDER, FOR SOLUTION INTRAVENOUS at 03:12

## 2023-12-26 RX ADMIN — AMPICILLIN 2 G: 2 INJECTION, POWDER, FOR SOLUTION INTRAVENOUS at 09:12

## 2023-12-26 RX ADMIN — PANTOPRAZOLE SODIUM 40 MG: 40 INJECTION, POWDER, FOR SOLUTION INTRAVENOUS at 05:12

## 2023-12-26 RX ADMIN — SODIUM CHLORIDE, PRESERVATIVE FREE 10 ML: 5 INJECTION INTRAVENOUS at 12:12

## 2023-12-26 RX ADMIN — MEROPENEM 500 MG: 500 INJECTION, POWDER, FOR SOLUTION INTRAVENOUS at 04:12

## 2023-12-26 RX ADMIN — Medication 10 ML: at 09:12

## 2023-12-26 RX ADMIN — METRONIDAZOLE 500 MG: 5 INJECTION, SOLUTION INTRAVENOUS at 12:12

## 2023-12-26 RX ADMIN — SODIUM CHLORIDE: 9 INJECTION, SOLUTION INTRAVENOUS at 03:12

## 2023-12-26 RX ADMIN — SODIUM CHLORIDE, PRESERVATIVE FREE 10 ML: 5 INJECTION INTRAVENOUS at 04:12

## 2023-12-26 RX ADMIN — OXYCODONE HYDROCHLORIDE 5 MG: 5 TABLET ORAL at 09:12

## 2023-12-26 RX ADMIN — MEROPENEM 500 MG: 500 INJECTION, POWDER, FOR SOLUTION INTRAVENOUS at 12:12

## 2023-12-26 RX ADMIN — TRAZODONE HYDROCHLORIDE 25 MG: 50 TABLET ORAL at 09:12

## 2023-12-26 RX ADMIN — SODIUM CHLORIDE, PRESERVATIVE FREE 10 ML: 5 INJECTION INTRAVENOUS at 05:12

## 2023-12-26 RX ADMIN — METRONIDAZOLE 500 MG: 5 INJECTION, SOLUTION INTRAVENOUS at 05:12

## 2023-12-26 RX ADMIN — METOPROLOL SUCCINATE 25 MG: 25 TABLET, EXTENDED RELEASE ORAL at 09:12

## 2023-12-26 RX ADMIN — ENOXAPARIN SODIUM 30 MG: 30 INJECTION SUBCUTANEOUS at 04:12

## 2023-12-26 RX ADMIN — PANTOPRAZOLE SODIUM 40 MG: 40 INJECTION, POWDER, FOR SOLUTION INTRAVENOUS at 03:12

## 2023-12-26 RX ADMIN — OXYCODONE HYDROCHLORIDE 5 MG: 5 TABLET ORAL at 03:12

## 2023-12-26 RX ADMIN — MEROPENEM 500 MG: 500 INJECTION, POWDER, FOR SOLUTION INTRAVENOUS at 09:12

## 2023-12-26 RX ADMIN — FUROSEMIDE 20 MG: 10 INJECTION, SOLUTION INTRAMUSCULAR; INTRAVENOUS at 09:12

## 2023-12-26 NOTE — PROGRESS NOTES
[facility]  Hospital Medicine Progress Note        Chief Complaint: Inpatient Follow-up abdominal pain    HPI:   68-year-old male with medical history of T2DM, hypertension, hyperlipidemia and prior cholecystectomy present to the ED with complaint of severe epigastric abdominal pain that started today around 9:00 a.m. after breakfast, the pain is severe 10/10 and radiate to his back associated with nausea and vomiting.  Report last bowel movement was this morning.  Reports chills but denies fever.  He has chronic scrotal hydrocele that has not changed or painful.     On arrival to ED he was afebrile, tachycardic, normotensive and saturating 96% on room air.  Labs notable for WBC 24.97, hemoglobin 18.2, creatinine 2.0, BUN 16.5, total bilirubin 2.7, direct 1.9, , , lipase more than 3000, triglyceride 183.  CT abdomen and pelvis show finding of severe acute pancreatitis without evidence of necrosis or abscess or fluid collection.  Liver remarkable for steatosis, gallbladder surgically absent, no comment on biliary tree.     He was given 2 L of IV fluids, IV Zosyn, IV analgesics and referred to hospital medicine service for further evaluation and management.  Patient was taken for ERCP on 12/17.  Choledocholithiasis was resolved.  Also had sphincterotomy performed.  Returned to the floor in stable condition.  GI recommending advancement of diet to clear liquids in 4-6 hours     Patient  continues to be requiring oxygen.  Echo with grade 1 diastolic dysfunction, normal systolic function.  Follow up chest x-ray obtained  with small right pleural effusion slightly increased. Nephrology okay to transition to oral Lasix.  GI signed off.    Infectious diseases recommending 14 day course from negative blood cultures of ampicillin and Flagyl.    As leukocytosis not improving, ordered a CT scan  which commenting on worsened appearance pancreatitis with possible collection developing  and small volume ascites, small bilateral pleural effusions.  Meropenem was added to his antibiotics.  GI was notified for further recommendations, recommending surgery consult.  Surgery on board.  Plan to continue to monitor and continue the antibiotics and repeat imaging in few days.  Diet changed to clears.    Interval Hx:   No acute overnight events reported.  GI and surgery continues follow up. Continues to be requiring oxygen.  Requirement going down .   Changed Lasix to IV    Patient was   Seen and examined.GI symptoms improving  today. Nausea  is better. Diarrhea improved.  Denies any  shortness for breath, chest pain or cough      Chart was reviewed, T-max of 98°.7, .  Most recent labs were reviewed.  Leukocytosis  continues 26>23.  Creatinine normalized. transaminitis improving.     Objective/physical exam:  General: In no acute distress, afebrile  Chest:  Crackles, on nasal cannula  Heart: RRR, +S1, S2, no appreciable murmur  Abdomen: Soft, nontender, BS +  MSK: Warm, no lower extremity edema, no clubbing or cyanosis  Neurologic: Alert and oriented x4, Cranial nerve II-XII intact, Strength 5/5 in all 4 extremities       VITAL SIGNS: 24 HRS MIN & MAX LAST   Temp  Min: 98.2 °F (36.8 °C)  Max: 98.9 °F (37.2 °C) 98.9 °F (37.2 °C)   BP  Min: 114/77  Max: 158/75 120/70   Pulse  Min: 89  Max: 102  93   Resp  Min: 14  Max: 19 18   SpO2  Min: 91 %  Max: 96 % (!) 91 %       Recent Labs   Lab 12/24/23  0512 12/25/23  0728 12/26/23  0435   WBC 26.22* 23.04* 21.44*   RBC 4.33* 4.03* 3.91*   HGB 13.2* 12.5* 12.0*   HCT 39.4* 37.4* 37.2*   MCV 91.0 92.8 95.1*   MCH 30.5 31.0 30.7   MCHC 33.5 33.4 32.3*   RDW 14.6 14.7 14.6    301 340   MPV 10.3 10.1 9.8       Recent Labs   Lab 12/20/23  1015 12/21/23  0443 12/22/23  0417 12/23/23  0521 12/24/23  0856 12/25/23  0728 12/26/23  0434   NA  --  136 135*   < > 133* 133* 133*   K  --  3.2* 4.5   < > 4.7 4.0 4.3   CO2  --  27 26   < > 22* 25 26   BUN  --  44.6* 33.3*   <  > 20.4 18.7 17.6   CREATININE  --  1.32* 1.05   < > 0.91 0.90 0.97   CALCIUM  --  7.5* 7.3*   < > 7.3* 7.9* 7.4*   PH 7.500*  --   --   --   --   --   --    MG  --   --  2.10   < > 1.60 1.70 2.00   ALBUMIN  --  2.2* 2.0*  --   --  1.7* 1.7*   ALKPHOS  --  74  --   --   --  103 97   ALT  --  55  --   --   --  23 23   AST  --  58*  --   --   --  37* 36*   BILITOT  --  2.8*  --   --   --  1.3 1.3    < > = values in this interval not displayed.          Microbiology Results (last 7 days)       Procedure Component Value Units Date/Time    Blood Culture [[id number]]  (Normal) Collected: 12/20/23 1046    Order Status: Completed Specimen: Blood from Arm, Right Updated: 12/25/23 1202     CULTURE, BLOOD ([facility]) No Growth at 5 days    Blood Culture [[id number]]  (Normal) Collected: 12/20/23 1045    Order Status: Completed Specimen: Blood from Antecubital, Left Updated: 12/25/23 1202     CULTURE, BLOOD ([facility]) No Growth at 5 days    Stool Culture [[id number]]  (Normal) Collected: 12/23/23 0554    Order Status: Completed Specimen: Stool Updated: 12/25/23 0920     Stool Culture Negative for Salmonella, Shigella, Campylobacter, Vibrio, Aeromonas, Pleisiomonas,Yersinia, or Shiga Toxin 1 and 2.    Respiratory Culture [[id number]]     Order Status: Sent Specimen: Sputum, Expectorated     Blood culture #1 **CANNOT BE ORDERED STAT** [[id number]]  (Normal) Collected: 12/16/23 2017    Order Status: Completed Specimen: Blood from Antecubital, Right Updated: 12/21/23 2100     CULTURE, BLOOD ([facility]) No Growth at 5 days    Clostridium Diff Toxin, A & B, EIA [[id number]]  (Normal) Collected: 12/21/23 0902    Order Status: Completed Specimen: Stool Updated: 12/21/23 1101     Clostridium Difficile GDH Antigen Negative     Clostridium Difficile Toxin A/B Negative             Radiology:  X-Ray Chest 1 View for Line/Tube Placement  Narrative: EXAMINATION:  XR CHEST 1 VIEW FOR LINE/TUBE PLACEMENT    CLINICAL HISTORY:  PICC;    TECHNIQUE:  One  view    COMPARISON:  December 20, 2023.    FINDINGS:  Right upper extremity approach PICC line which terminates about the cavoatrial junction and is optimal.  Cardiopericardial silhouette is within normal limits.  Improved right pleural effusion.  There is new thickened opacity right lower lung zone which may represent atelectasis.  Attention to follow-up exams.  There also new left basilar hypoventilatory changes no pulmonary edema.  No pneumothorax.  Impression: Optimal placement of the PICC line.    Electronically signed by: Denver Wagner  Date:    12/23/2023  Time:    19:56  CT Chest Abdomen Pelvis Without Contrast (XPD)  Narrative: EXAMINATION:  CT CHEST ABDOMEN PELVIS WITHOUT CONTRAST(XPD)    CLINICAL HISTORY:  follow up;    TECHNIQUE:  Helical acquisition from the thoracic inlet through the ischia without IV contrast.  Lack of contrast limits evaluation of solid organs and vascular structures.  Three plane reconstructions made available for review. DLP 1042 mGycm. Automatic exposure control, adjustment of mA/kV or iterative reconstruction technique was used to reduce radiation.    COMPARISON:  18 December 2023, 15 August 2023    FINDINGS:  Chest.    Heart is not significantly enlarged.  There are coronary artery calcifications.  No pericardial effusion.    No mediastinal, hilar or axillary adenopathy by CT size criteria.    There are small bilateral pleural effusions slightly improved compared to prior.  Mild bibasilar atelectasis.  Moderate to severe emphysema.    Abdomen and pelvis.    No acute findings noncontrast liver, spleen, adrenals or kidneys.  Gallbladder surgically absent.    There is increased peripancreatic inflammation compared to prior.  Question a developing collection anterior and superior to the portion of the pancreas which had suggestion of necrosis on prior imaging.  This collection is seen on images 104-110 series 2.  There is small volume ascites.    There is no bowel obstruction or free  air.  There is colonic diverticulosis.    Urinary bladder unremarkable.  Prostate mildly enlarged.  Abdominal aorta mildly ectatic with moderate atherosclerotic disease.    There are no acute osseous findings.  Impression: 1. Worsened appearance of pancreatitis with possible collection developing anterior and superior to the pancreas.  2. Small volume ascites.  3. Small bilateral pleural effusions.    Electronically signed by: Osiel Whitehead  Date:    12/23/2023  Time:    10:59      Scheduled Med:   ampicillin IVPB  2 g Intravenous Q6H    enoxparin  30 mg Subcutaneous Q24H (prophylaxis, 1700)    furosemide (LASIX) injection  20 mg Intravenous Daily    meropenem (MERREM) IVPB  500 mg Intravenous Q8H    metoprolol succinate  25 mg Oral Daily    pantoprazole  40 mg Intravenous BID AC    sodium chloride 0.9%  10 mL Intravenous Q6H    traZODone  25 mg Oral QHS        Continuous Infusions:       PRN Meds:  sodium chloride 0.9%, acetaminophen, acetaminophen, aluminum-magnesium hydroxide-simethicone, dextrose 10%, dextrose 10%, dicyclomine, glucagon (human recombinant), hydrALAZINE, HYDROmorphone, insulin aspart U-100, labetalol, melatonin, ondansetron, oxyCODONE, polyethylene glycol, prochlorperazine, senna-docusate 8.6-50 mg, sodium chloride 0.9%, Flushing PICC/Midline Protocol **AND** sodium chloride 0.9% **AND** sodium chloride 0.9%       Assessment/Plan:   Acute severe pancreatitis- suspect biliary  Cholangitis, acute  Choledocholithiasis status post ERCP  Severe sepsis  Leukocytosis  Enterococcus faecalis bacteremia  SHA superimposed on CKD 3A  Acute hypoxic respiratory failure requiring supplemental oxygen secondary to pulmonary edema/pleural effusion, for fluid resuscitation/concern for ARDS , from underlying pancreatitis  , history of smoking, questionable COPD?       History of T2DM, hypertension, hyperlipidemia, GERD    Plan  GI following.  Underwent ERCP with sludge and stone removal.  Lipase  improved.Transaminitis improving.Ordered antibiotics-Flagyl, Ampicillin.  , Blood cultures noted to be positive, ordered repeat cultures, so far negative.Infectious Disease was consulted, on further recommendations/investigations if any-recommending 14 days after negative culture(last date likely around January 6th per our calculations, no other investigations recommended).Given worsening leukocytosis, ordered a scan (without contrast)showing possible fluid collection around the pancreas and small volume ascites , added meropenem.  GI was consulted.  Recommended surgery consult.  Plan to continue to monitor and consider repeat imaging in 7-10 days.  (Repeat scan to be ordered).  We will continue to follow up with surgery and GI if patient will need IR drainage if worsening. Diet as tolerated.  Monitor fever curve and WBC  Nephrology followed for SHA, creatinine improved on diuretics.  On 3 L at present.  Wean oxygen as tolerated.  Consider bronchodilators p.r.n..  Encourage incentive spirometry. Followed up on echocardiogram-EF 50-55.Grade I diastolic dysfunction.  Consider IV Lasix in-house while monitoring renal function, we will keep a close eye as there is a concern for ARDS in such patients.  Most recent scan commenting on small bilateral pleural effusions.  Strict Is&Os  Continue supportive care appropriate home medications.  On Sliding scale with fingersticks a.c. HS for management of diabetes in-house.  Therapy services    DVT prophylaxis-Lovenox      Anticipated discharge-to be decided,  once off oxygen and pending clinical improvement.  GI to clear the patient      Critical care diagnosis: sepsis, iv antibiotics, acute hypoxic respiratory failure requiring oxygen  Critical care interventions: hands on evaluation, review of labs/radiographs/records and discussions with family  Critical care time spent: >32 minutes        Teresa Anthony MD   12/26/2023     All diagnosis and differential diagnosis have been  reviewed; assessment and plan has been documented; I have personally reviewed the labs and test results that are presently available; I have reviewed the patients medication list; I have reviewed the consulting providers response and recommendations. I have reviewed or attempted to review medical records based upon their availability    All of the patient's questions have been  addressed and answered. Patient's is agreeable to the above stated plan. I will continue to monitor closely and make adjustments to medical management as needed.  _____________________________________________________________________

## 2023-12-27 LAB
ALBUMIN SERPL-MCNC: 1.7 G/DL (ref 3.4–4.8)
ALBUMIN/GLOB SERPL: 0.5 RATIO (ref 1.1–2)
ALP SERPL-CCNC: 104 UNIT/L (ref 40–150)
ALT SERPL-CCNC: 18 UNIT/L (ref 0–55)
AST SERPL-CCNC: 34 UNIT/L (ref 5–34)
BASOPHILS # BLD AUTO: 0.07 X10(3)/MCL
BASOPHILS NFR BLD AUTO: 0.4 %
BILIRUB SERPL-MCNC: 1.2 MG/DL
BUN SERPL-MCNC: 14.9 MG/DL (ref 8.4–25.7)
CALCIUM SERPL-MCNC: 7.8 MG/DL (ref 8.8–10)
CHLORIDE SERPL-SCNC: 99 MMOL/L (ref 98–107)
CO2 SERPL-SCNC: 23 MMOL/L (ref 23–31)
CREAT SERPL-MCNC: 0.84 MG/DL (ref 0.73–1.18)
EOSINOPHIL # BLD AUTO: 0.22 X10(3)/MCL (ref 0–0.9)
EOSINOPHIL NFR BLD AUTO: 1.2 %
ERYTHROCYTE [DISTWIDTH] IN BLOOD BY AUTOMATED COUNT: 14.5 % (ref 11.5–17)
GFR SERPLBLD CREATININE-BSD FMLA CKD-EPI: >60 MLS/MIN/1.73/M2
GLOBULIN SER-MCNC: 3.6 GM/DL (ref 2.4–3.5)
GLUCOSE SERPL-MCNC: 126 MG/DL (ref 82–115)
HCT VFR BLD AUTO: 35.9 % (ref 42–52)
HGB BLD-MCNC: 11.9 G/DL (ref 14–18)
IMM GRANULOCYTES # BLD AUTO: 0.4 X10(3)/MCL (ref 0–0.04)
IMM GRANULOCYTES NFR BLD AUTO: 2.3 %
LYMPHOCYTES # BLD AUTO: 1.18 X10(3)/MCL (ref 0.6–4.6)
LYMPHOCYTES NFR BLD AUTO: 6.7 %
MAGNESIUM SERPL-MCNC: 1.8 MG/DL (ref 1.6–2.6)
MCH RBC QN AUTO: 30.5 PG (ref 27–31)
MCHC RBC AUTO-ENTMCNC: 33.1 G/DL (ref 33–36)
MCV RBC AUTO: 92.1 FL (ref 80–94)
MONOCYTES # BLD AUTO: 1.57 X10(3)/MCL (ref 0.1–1.3)
MONOCYTES NFR BLD AUTO: 8.9 %
NEUTROPHILS # BLD AUTO: 14.23 X10(3)/MCL (ref 2.1–9.2)
NEUTROPHILS NFR BLD AUTO: 80.5 %
NRBC BLD AUTO-RTO: 0 %
PHOSPHATE SERPL-MCNC: 2.5 MG/DL (ref 2.3–4.7)
PLATELET # BLD AUTO: 389 X10(3)/MCL (ref 130–400)
PMV BLD AUTO: 9.8 FL (ref 7.4–10.4)
POCT GLUCOSE: 188 MG/DL (ref 70–110)
POTASSIUM SERPL-SCNC: 3.9 MMOL/L (ref 3.5–5.1)
PROT SERPL-MCNC: 5.3 GM/DL (ref 5.8–7.6)
RBC # BLD AUTO: 3.9 X10(6)/MCL (ref 4.7–6.1)
SODIUM SERPL-SCNC: 131 MMOL/L (ref 136–145)
WBC # SPEC AUTO: 17.67 X10(3)/MCL (ref 4.5–11.5)

## 2023-12-27 PROCEDURE — 11000001 HC ACUTE MED/SURG PRIVATE ROOM

## 2023-12-27 PROCEDURE — 25000003 PHARM REV CODE 250: Performed by: HOSPITALIST

## 2023-12-27 PROCEDURE — 21400001 HC TELEMETRY ROOM

## 2023-12-27 PROCEDURE — 25000003 PHARM REV CODE 250: Performed by: INTERNAL MEDICINE

## 2023-12-27 PROCEDURE — 25000003 PHARM REV CODE 250: Performed by: NURSE PRACTITIONER

## 2023-12-27 PROCEDURE — A4216 STERILE WATER/SALINE, 10 ML: HCPCS | Performed by: INTERNAL MEDICINE

## 2023-12-27 PROCEDURE — 63600175 PHARM REV CODE 636 W HCPCS: Performed by: INTERNAL MEDICINE

## 2023-12-27 PROCEDURE — 85025 COMPLETE CBC W/AUTO DIFF WBC: CPT | Performed by: INTERNAL MEDICINE

## 2023-12-27 PROCEDURE — 84100 ASSAY OF PHOSPHORUS: CPT | Performed by: INTERNAL MEDICINE

## 2023-12-27 PROCEDURE — 63600175 PHARM REV CODE 636 W HCPCS: Performed by: NURSE PRACTITIONER

## 2023-12-27 PROCEDURE — 80053 COMPREHEN METABOLIC PANEL: CPT | Performed by: INTERNAL MEDICINE

## 2023-12-27 PROCEDURE — 63600175 PHARM REV CODE 636 W HCPCS: Performed by: HOSPITALIST

## 2023-12-27 PROCEDURE — 83735 ASSAY OF MAGNESIUM: CPT | Performed by: INTERNAL MEDICINE

## 2023-12-27 PROCEDURE — C9113 INJ PANTOPRAZOLE SODIUM, VIA: HCPCS

## 2023-12-27 PROCEDURE — 63600175 PHARM REV CODE 636 W HCPCS

## 2023-12-27 RX ORDER — MUPIROCIN 20 MG/G
OINTMENT TOPICAL 2 TIMES DAILY
Status: COMPLETED | OUTPATIENT
Start: 2023-12-27 | End: 2024-01-01

## 2023-12-27 RX ORDER — FUROSEMIDE 10 MG/ML
10 INJECTION INTRAMUSCULAR; INTRAVENOUS DAILY
Status: DISCONTINUED | OUTPATIENT
Start: 2023-12-28 | End: 2023-12-27

## 2023-12-27 RX ORDER — FUROSEMIDE 10 MG/ML
10 INJECTION INTRAMUSCULAR; INTRAVENOUS DAILY
Status: DISCONTINUED | OUTPATIENT
Start: 2023-12-29 | End: 2023-12-28

## 2023-12-27 RX ADMIN — AMPICILLIN 2 G: 2 INJECTION, POWDER, FOR SOLUTION INTRAVENOUS at 03:12

## 2023-12-27 RX ADMIN — PANTOPRAZOLE SODIUM 40 MG: 40 INJECTION, POWDER, FOR SOLUTION INTRAVENOUS at 04:12

## 2023-12-27 RX ADMIN — AMPICILLIN 2 G: 2 INJECTION, POWDER, FOR SOLUTION INTRAVENOUS at 09:12

## 2023-12-27 RX ADMIN — OXYCODONE HYDROCHLORIDE 5 MG: 5 TABLET ORAL at 08:12

## 2023-12-27 RX ADMIN — OXYCODONE HYDROCHLORIDE 5 MG: 5 TABLET ORAL at 06:12

## 2023-12-27 RX ADMIN — ENOXAPARIN SODIUM 30 MG: 30 INJECTION SUBCUTANEOUS at 04:12

## 2023-12-27 RX ADMIN — TRAZODONE HYDROCHLORIDE 25 MG: 50 TABLET ORAL at 08:12

## 2023-12-27 RX ADMIN — MEROPENEM 500 MG: 500 INJECTION, POWDER, FOR SOLUTION INTRAVENOUS at 09:12

## 2023-12-27 RX ADMIN — METOPROLOL SUCCINATE 25 MG: 25 TABLET, EXTENDED RELEASE ORAL at 09:12

## 2023-12-27 RX ADMIN — PANTOPRAZOLE SODIUM 40 MG: 40 INJECTION, POWDER, FOR SOLUTION INTRAVENOUS at 06:12

## 2023-12-27 RX ADMIN — MEROPENEM 500 MG: 500 INJECTION, POWDER, FOR SOLUTION INTRAVENOUS at 12:12

## 2023-12-27 RX ADMIN — AMPICILLIN 2 G: 2 INJECTION, POWDER, FOR SOLUTION INTRAVENOUS at 10:12

## 2023-12-27 RX ADMIN — SODIUM CHLORIDE, PRESERVATIVE FREE 10 ML: 5 INJECTION INTRAVENOUS at 12:12

## 2023-12-27 RX ADMIN — FUROSEMIDE 20 MG: 10 INJECTION, SOLUTION INTRAMUSCULAR; INTRAVENOUS at 09:12

## 2023-12-27 RX ADMIN — SODIUM CHLORIDE, PRESERVATIVE FREE 10 ML: 5 INJECTION INTRAVENOUS at 04:12

## 2023-12-27 RX ADMIN — SODIUM CHLORIDE, PRESERVATIVE FREE 10 ML: 5 INJECTION INTRAVENOUS at 06:12

## 2023-12-27 RX ADMIN — MEROPENEM 500 MG: 500 INJECTION, POWDER, FOR SOLUTION INTRAVENOUS at 06:12

## 2023-12-27 RX ADMIN — MUPIROCIN: 20 OINTMENT TOPICAL at 10:12

## 2023-12-27 NOTE — PROGRESS NOTES
"Gastroenterology Progress Note    Subjective/Interval History:    Pt reports some abdominal cramping when drinking water, otherwise no complaints. No n/v. Tolerating diet but hesitant to advance.    Transaminitis resolved. Leukocytosis improving. VSS.     Vital Signs:  /73   Pulse 89   Temp 97.9 °F (36.6 °C) (Oral)   Resp 18   Ht 5' 6" (1.676 m)   Wt 72.5 kg (159 lb 13.3 oz)   SpO2 96%   BMI 25.80 kg/m²   Body mass index is 25.8 kg/m².    Physical Exam:  Physical Exam  Constitutional:       General: He is not in acute distress.     Appearance: He is not ill-appearing.   HENT:      Head: Normocephalic and atraumatic.   Eyes:      General: No scleral icterus.     Extraocular Movements: Extraocular movements intact.   Cardiovascular:      Rate and Rhythm: Normal rate and regular rhythm.   Pulmonary:      Effort: Pulmonary effort is normal. No respiratory distress.      Comments: On 4L O2 NC.  Abdominal:      General: Bowel sounds are normal. There is no distension.      Palpations: Abdomen is soft. There is no mass.      Tenderness: There is no abdominal tenderness. There is no guarding or rebound.   Musculoskeletal:      Right lower leg: Edema (1+) present.      Left lower leg: Edema (1+) present.   Skin:     General: Skin is warm and dry.      Coloration: Skin is not jaundiced.   Neurological:      Mental Status: He is alert and oriented to person, place, and time.   Psychiatric:         Mood and Affect: Mood normal.         Behavior: Behavior normal.         Labs:  Recent Results (from the past 48 hour(s))   POCT glucose    Collection Time: 12/25/23  5:26 PM   Result Value Ref Range    POCT Glucose 127 (H) 70 - 110 mg/dL   POCT glucose    Collection Time: 12/25/23  7:34 PM   Result Value Ref Range    POCT Glucose 118 (H) 70 - 110 mg/dL   Comprehensive Metabolic Panel    Collection Time: 12/26/23  4:34 AM   Result Value Ref Range    Sodium Level 133 (L) 136 - 145 mmol/L    Potassium Level 4.3 3.5 - 5.1 " mmol/L    Chloride 97 (L) 98 - 107 mmol/L    Carbon Dioxide 26 23 - 31 mmol/L    Glucose Level 122 (H) 82 - 115 mg/dL    Blood Urea Nitrogen 17.6 8.4 - 25.7 mg/dL    Creatinine 0.97 0.73 - 1.18 mg/dL    Calcium Level Total 7.4 (L) 8.8 - 10.0 mg/dL    Protein Total 4.6 (L) 5.8 - 7.6 gm/dL    Albumin Level 1.7 (L) 3.4 - 4.8 g/dL    Globulin 2.9 2.4 - 3.5 gm/dL    Albumin/Globulin Ratio 0.6 (L) 1.1 - 2.0 ratio    Bilirubin Total 1.3 <=1.5 mg/dL    Alkaline Phosphatase 97 40 - 150 unit/L    Alanine Aminotransferase 23 0 - 55 unit/L    Aspartate Aminotransferase 36 (H) 5 - 34 unit/L    eGFR >60 mls/min/1.73/m2   Magnesium    Collection Time: 12/26/23  4:34 AM   Result Value Ref Range    Magnesium Level 2.00 1.60 - 2.60 mg/dL   Phosphorus    Collection Time: 12/26/23  4:34 AM   Result Value Ref Range    Phosphorus Level 2.8 2.3 - 4.7 mg/dL   CBC with Differential    Collection Time: 12/26/23  4:35 AM   Result Value Ref Range    WBC 21.44 (H) 4.50 - 11.50 x10(3)/mcL    RBC 3.91 (L) 4.70 - 6.10 x10(6)/mcL    Hgb 12.0 (L) 14.0 - 18.0 g/dL    Hct 37.2 (L) 42.0 - 52.0 %    MCV 95.1 (H) 80.0 - 94.0 fL    MCH 30.7 27.0 - 31.0 pg    MCHC 32.3 (L) 33.0 - 36.0 g/dL    RDW 14.6 11.5 - 17.0 %    Platelet 340 130 - 400 x10(3)/mcL    MPV 9.8 7.4 - 10.4 fL    Neut % 83.8 %    Lymph % 4.6 %    Mono % 7.1 %    Eos % 1.2 %    Basophil % 0.5 %    Lymph # 0.99 0.6 - 4.6 x10(3)/mcL    Neut # 17.95 (H) 2.1 - 9.2 x10(3)/mcL    Mono # 1.52 (H) 0.1 - 1.3 x10(3)/mcL    Eos # 0.26 0 - 0.9 x10(3)/mcL    Baso # 0.11 <=0.2 x10(3)/mcL    IG# 0.61 (H) 0 - 0.04 x10(3)/mcL    IG% 2.8 %    NRBC% 0.0 %   POCT glucose    Collection Time: 12/26/23  5:06 AM   Result Value Ref Range    POCT Glucose 129 (H) 70 - 110 mg/dL   POCT glucose    Collection Time: 12/26/23 11:26 AM   Result Value Ref Range    POCT Glucose 159 (H) 70 - 110 mg/dL   POCT glucose    Collection Time: 12/26/23  4:51 PM   Result Value Ref Range    POCT Glucose 156 (H) 70 - 110 mg/dL   POCT  glucose    Collection Time: 12/26/23  8:23 PM   Result Value Ref Range    POCT Glucose 132 (H) 70 - 110 mg/dL   Comprehensive Metabolic Panel    Collection Time: 12/27/23  4:32 AM   Result Value Ref Range    Sodium Level 131 (L) 136 - 145 mmol/L    Potassium Level 3.9 3.5 - 5.1 mmol/L    Chloride 99 98 - 107 mmol/L    Carbon Dioxide 23 23 - 31 mmol/L    Glucose Level 126 (H) 82 - 115 mg/dL    Blood Urea Nitrogen 14.9 8.4 - 25.7 mg/dL    Creatinine 0.84 0.73 - 1.18 mg/dL    Calcium Level Total 7.8 (L) 8.8 - 10.0 mg/dL    Protein Total 5.3 (L) 5.8 - 7.6 gm/dL    Albumin Level 1.7 (L) 3.4 - 4.8 g/dL    Globulin 3.6 (H) 2.4 - 3.5 gm/dL    Albumin/Globulin Ratio 0.5 (L) 1.1 - 2.0 ratio    Bilirubin Total 1.2 <=1.5 mg/dL    Alkaline Phosphatase 104 40 - 150 unit/L    Alanine Aminotransferase 18 0 - 55 unit/L    Aspartate Aminotransferase 34 5 - 34 unit/L    eGFR >60 mls/min/1.73/m2   Magnesium    Collection Time: 12/27/23  4:32 AM   Result Value Ref Range    Magnesium Level 1.80 1.60 - 2.60 mg/dL   Phosphorus    Collection Time: 12/27/23  4:32 AM   Result Value Ref Range    Phosphorus Level 2.5 2.3 - 4.7 mg/dL   CBC with Differential    Collection Time: 12/27/23  4:32 AM   Result Value Ref Range    WBC 17.67 (H) 4.50 - 11.50 x10(3)/mcL    RBC 3.90 (L) 4.70 - 6.10 x10(6)/mcL    Hgb 11.9 (L) 14.0 - 18.0 g/dL    Hct 35.9 (L) 42.0 - 52.0 %    MCV 92.1 80.0 - 94.0 fL    MCH 30.5 27.0 - 31.0 pg    MCHC 33.1 33.0 - 36.0 g/dL    RDW 14.5 11.5 - 17.0 %    Platelet 389 130 - 400 x10(3)/mcL    MPV 9.8 7.4 - 10.4 fL    Neut % 80.5 %    Lymph % 6.7 %    Mono % 8.9 %    Eos % 1.2 %    Basophil % 0.4 %    Lymph # 1.18 0.6 - 4.6 x10(3)/mcL    Neut # 14.23 (H) 2.1 - 9.2 x10(3)/mcL    Mono # 1.57 (H) 0.1 - 1.3 x10(3)/mcL    Eos # 0.22 0 - 0.9 x10(3)/mcL    Baso # 0.07 <=0.2 x10(3)/mcL    IG# 0.40 (H) 0 - 0.04 x10(3)/mcL    IG% 2.3 %    NRBC% 0.0 %   POCT glucose    Collection Time: 12/27/23 11:00 AM   Result Value Ref Range    POCT Glucose  188 (H) 70 - 110 mg/dL       Imaging:  X-Ray Chest 1 View for Line/Tube Placement    Result Date: 12/23/2023  EXAMINATION: XR CHEST 1 VIEW FOR LINE/TUBE PLACEMENT CLINICAL HISTORY: PICC; TECHNIQUE: One view COMPARISON: December 20, 2023. FINDINGS: Right upper extremity approach PICC line which terminates about the cavoatrial junction and is optimal.  Cardiopericardial silhouette is within normal limits.  Improved right pleural effusion.  There is new thickened opacity right lower lung zone which may represent atelectasis.  Attention to follow-up exams.  There also new left basilar hypoventilatory changes no pulmonary edema.  No pneumothorax.     Optimal placement of the PICC line. Electronically signed by: Denver Wagner Date:    12/23/2023 Time:    19:56    CT Chest Abdomen Pelvis Without Contrast (XPD)    Result Date: 12/23/2023  EXAMINATION: CT CHEST ABDOMEN PELVIS WITHOUT CONTRAST(XPD) CLINICAL HISTORY: follow up; TECHNIQUE: Helical acquisition from the thoracic inlet through the ischia without IV contrast.  Lack of contrast limits evaluation of solid organs and vascular structures.  Three plane reconstructions made available for review. DLP 1042 mGycm. Automatic exposure control, adjustment of mA/kV or iterative reconstruction technique was used to reduce radiation. COMPARISON: 18 December 2023, 15 August 2023 FINDINGS: Chest. Heart is not significantly enlarged.  There are coronary artery calcifications.  No pericardial effusion. No mediastinal, hilar or axillary adenopathy by CT size criteria. There are small bilateral pleural effusions slightly improved compared to prior.  Mild bibasilar atelectasis.  Moderate to severe emphysema. Abdomen and pelvis. No acute findings noncontrast liver, spleen, adrenals or kidneys.  Gallbladder surgically absent. There is increased peripancreatic inflammation compared to prior.  Question a developing collection anterior and superior to the portion of the pancreas which had  suggestion of necrosis on prior imaging.  This collection is seen on images 104-110 series 2.  There is small volume ascites. There is no bowel obstruction or free air.  There is colonic diverticulosis. Urinary bladder unremarkable.  Prostate mildly enlarged.  Abdominal aorta mildly ectatic with moderate atherosclerotic disease. There are no acute osseous findings.     1. Worsened appearance of pancreatitis with possible collection developing anterior and superior to the pancreas. 2. Small volume ascites. 3. Small bilateral pleural effusions. Electronically signed by: Osiel Whitehead Date:    12/23/2023 Time:    10:59    Echo    Result Date: 12/20/2023    Left Ventricle: The left ventricle is normal in size. Normal wall thickness. Normal wall motion. There is normal systolic function with a visually estimated ejection fraction of 55 - 60%. Grade I diastolic dysfunction.   Right Ventricle: Normal right ventricular cavity size. Systolic function is normal.   Aortic Valve: The aortic valve is a trileaflet valve.   Pericardium: There is a trivial effusion. No indication of cardiac tamponade.   Technically difficult study due to lung interference.     X-Ray Chest 1 View    Result Date: 12/20/2023  EXAMINATION: XR CHEST 1 VIEW CLINICAL HISTORY: follow up; TECHNIQUE: Single view of the chest COMPARISON: 12/18/2023 FINDINGS: Cardiomegaly is unchanged.  Small right effusion is slightly increased in the interval.  No acute osseous abnormality.     As above. Electronically signed by: Cristhian Lara Date:    12/20/2023 Time:    09:53    CT Abdomen Pelvis  Without Contrast    Result Date: 12/18/2023  EXAMINATION: CT ABDOMEN PELVIS WITHOUT CONTRAST CLINICAL HISTORY: rise in tbili and pain s/p ERCP; TECHNIQUE: Low dose axial images, sagittal and coronal reformations were obtained from the lung bases to the pubic symphysis.  No contrast was administered.  Automatic exposure control is utilized to reduce patient radiation exposure.  COMPARISON: 12/16/2023 FINDINGS: There are changes seen consistent with emphysema and COPD in the lungs.  There is  bibasilar atelectasis and bilateral pleural effusions.  This is a new finding.. The liver is hypoattenuated consistent with hepatic steatosis.  The patient is status post cholecystectomy.  There is evidence of ascites.  This is more prominent than the prior examination. There are inflammatory changes seen around the pancreas consistent with pancreatitis.  This was seen on the prior examination as well.  Previous examination showed incomplete enhancement of the pancreas concerning for developing necrotizing pancreatitis.  No free intraperitoneal air is seen on this examination. Adrenal glands appear normal. Kidneys appear normal.  No hydronephrosis is seen.  No hydroureter seen. No colitis is seen.  No diverticulitis is seen.  No colonic mass is seen. Urinary bladder appears grossly unremarkable. Atherosclerotic changes are seen within the abdominal aorta and its branches. Bones appear grossly unremarkable.     Interval worsening of the ascites Persistent inflammatory changes around the pancreas consistent patient's history of pancreatitis.  Previous examination showed incomplete enhancement of the pancreas suggesting possible developing necrotizing pancreatitis. Interval development of bibasilar atelectasis and moderate to large pleural effusions Electronically signed by: Wayne Gee Date:    12/18/2023 Time:    12:12    X-Ray Chest 1 View    Result Date: 12/18/2023  EXAMINATION: XR CHEST 1 VIEW CPT 31333 CLINICAL HISTORY: Hypoxemia; FINDINGS: Examination reveals mediastinal silhouette to be within normal limits cardiac silhouette is not enlarged.  There is some increase interstitial and pulmonary vascular markings which may indicate a degree of pulmonary vascular congestion. There is blunting of both costophrenic angles indicating the presence of bilateral pleural effusions. Slightly more confluent  opacities identified at the left base superimposed infiltrate cannot be completely excluded. Atelectatic changes identified at the right base     Changes of pulmonary vascular congestion. Bilateral pleural effusions Electronically signed by: Radu Edwards Date:    12/18/2023 Time:    09:43    US Retroperitoneal Complete    Result Date: 12/18/2023  EXAMINATION: US RETROPERITONEAL COMPLETE CLINICAL HISTORY: Acute on chronic kidney disease. COMPARISON: CT 16 December 2023 FINDINGS: Grayscale and color Doppler sonographic evaluation of the kidneys and urinary bladder. The right kidney measures 11 cm. The left kidney measures 11 cm.   No hydronephrosis.  Grossly normal renal parenchymal echogenicity. No significant abnormality of the urinary bladder. There is small volume ascites.     No significant sonographic abnormality of the kidneys. Electronically signed by: Osiel Whitehead Date:    12/18/2023 Time:    09:09    FL ERCP Biliary And Pancreatic By Rad Tech    Result Date: 12/17/2023  EXAMINATION: FL ERCP BILIARY AND PANCREATIC CLINICAL HISTORY: biliary stone; TECHNIQUE: 52.8mGy of fluoroscopic support was utilized for procedural support during procedure.  Please refer to performing provider dictation. COMPARISON: None FINDINGS: Fluoroscopic support. Please refer to procedure of this dictation.     Fluoroscopic support.  Please refer to procedure of this dictation. Electronically signed by: Cristhian Lara Date:    12/17/2023 Time:    13:04    MRI MRCP Without Contrast    Result Date: 12/17/2023  EXAMINATION: MRI ABDOMEN WITHOUT CONTRAST MRCP CLINICAL HISTORY: Post cholecystectomy pancreatitis -- ?  Choledocholithiases; TECHNIQUE: Multiplanar, multisequence images are performed through the liver and upper abdomen.  Additionally 2D and 3D MRCP sequences are performed as well as MIP images. COMPARISON: None. FINDINGS: Lung bases: Moderate streaky is present in the visualized lung bases consistent with nonspecific  dependent changes and scarring. Liver: Liver appears normal in size. Portal venous system: Normal. Gallbladder: Gallbladder is surgically absent. Biliary tree intrahepatic ducts: Unremarkable. Biliary tree extrahepatic ducts: There appear to be numerous intraluminal filling defects in the mid to distal common bile duct series image 16 series 3. Ampullary region: No obvious obstructive mass or stone. Spleen: Unremarkable. Pancreas: There is intermediate intrasubstance T2 signal in the pancreatic body (image 19 series 4 and 6) with corresponding restricted diffusion signal on DWI (image 136 series 7), relating to edema changes. There is stable free fluid collection in the bilateral anterior pararenal space extending to the adjacent mesocolon, small bowel mesentery, perihepatic and perisplenic regions. T2 stranding intensities are seen along the lesser sac. These findings are reflective of acute interstitial pancreatitis. Pancreatic duct: Unremarkable. Adrenal glands: Unremarkable. Kidneys: There are probable cysts in both kidneys, with the larger seen in the right mid pole region measuring 1.0 cm (image 29 series 4).Ureters: Unremarkable. Stomach and distal esophagus: Normal. Small bowel: Normal. Colon: Normal. Lymph nodes: A 1cm lymph node is demonstrated in the mo hepatis region (image 18 series 4), likely reactive in nature. Abdominal wall: Normal. Systemic arteries and veins: Unremarkable. Osseous structures: There is mild spondylolytic changes in the imaged spine. Miscellaneous: There are motion artifacts in some of the sequences limiting its evaluation.     1. There appear to be numerous intraluminal filling defects in the mid to distal common bile duct series image 16 series 3. These are suggestive of impacted gallstones and sludge. Correlate clinically as regards additional evaluation and follow-up possibly with ERCP. 2. There is intermediate intrasubstance T2 signal in the pancreatic body (image 19 series  4 and 6) with corresponding restricted diffusion signal on DWI (image 136 series 7), relating to edema changes. There is stable free fluid collection in the bilateral anterior pararenal space extending to the adjacent mesocolon, small bowel mesentery, perihepatic and perisplenic regions. T2 stranding intensities are seen along the lesser sac. These findings are reflective of acute interstitial pancreatitis. Correlate with clinical and laboratory findings as regards additional evaluation and follow-up to full resolution as indicated. 3. A 1cm lymph node is demonstrated in the mo hepatis region (image 18 series 4), likely reactive in nature. I concur with the preliminary report above. Electronically signed by: Wayne Gee Date:    12/17/2023 Time:    12:12    CT Abdomen Pelvis With IV Contrast NO Oral Contrast    Result Date: 12/16/2023  EXAMINATION: CT ABDOMEN PELVIS WITH IV CONTRAST CLINICAL HISTORY: Abdominal pain, acute, nonlocalized; TECHNIQUE: Multidetector axial images were obtained of the abdomen and pelvis following the administration of IV contrast. Oral contrast was not administered. Dose length product of 578 mGycm. Automated exposure control was utilized to minimize radiation dose. COMPARISON: August 4, 2023. FINDINGS: Included portion of the lungs show dependent hypoventilatory changes without acute air space infiltrates or fluid within the pleural spaces. Liver is remarkable for steatosis without focal space occupying lesion.  There is small amount of free fluid around the anterolateral surface of the liver.  Gallbladder is surgically absent.  Pancreas is surrounded by considerable phlegmons and fluid which extend into the anterior pararenal spaces and further into the lower abdomen consistent with acute pancreatitis.  There is no suggestion of pancreatic necrosis, pseudocyst or abscess formation.  Spleen is unremarkable. The adrenal glands appear within normal limits. The kidneys are unremarkable  in size and contour. No solid or cystic renal lesion identified. There is no hydronephrosis. No perinephric fluid strandings or collections identified. Stomach is nondistended.  There is distal esophageal mural thickening which probably result of reflux disease with about similar appearance.  There is some mural thickening of the gastric antrum and the descending colon which may represent reactive change from acute pancreatitis.  Possible primary gastritis and duodenitis is not excluded.  No abnormal dilatation of loops of small bowel.  Colon is nondistended.  No bowel obstruction.  Distal descending and sigmoid colon noninflamed diverticulosis coli. Urinary bladder appears within normal limits.  Prostate is enlarged in size and contains few calcifications.  There is large right hydrocele which extends into right inguinal canal.  Is no pelvic free fluid. No acute or otherwise osseous abnormality identified.     1. Severe acute pancreatitis. 2. Gastric antrum and duodenal mural thickening likely represents reactive change for acute pancreatitis. 3. Right large hydrocele. Electronically signed by: Denver Wagner Date:    12/16/2023 Time:    20:08    US Scrotum And Testicles    Result Date: 12/16/2023  EXAMINATION: US SCROTUM AND TESTICLES CLINICAL HISTORY: Other specified disorders of the male genital organs TECHNIQUE: Multiple real-time images were performed of the scrotum in various planes by the sonographer. COMPARISON: None available FINDINGS: Right testicle measures 3.3 x 2.4 x 2.4 cm.  There is unremarkable echotexture of the right testicle.  Unremarkable arteriovenous flow to the right testicle. There is large right scrotal hydrocele which measures 10.6 x 7.2 x 8.5 cm.  Right hydrocele causes mass effect upon left testicle which is deviated inferiorly.  This made it difficult to optimally assess vascularity to the left testicle due to pressure caused by the hydrocele.  Some vascularity along the peripheral aspect  of less testicle was visualized.  Left testicle measures 3.4 x 2.0 x 2.0 cm and no abnormality of the parenchymal echotexture identified.     1. Unremarkable right testicle 2. Large right hydrocele which crosses the midline and causes compressive effect upon the left testicle which is inferiorly deviated.  Due to pressure caused by the hydrocele upon the left testicle optimal Doppler flow to the left testicle could not be obtained.  Only limited arterial flow to the peripheral aspect left testicle could be visualized.  Please correlate clinically.  Oneoption is to perform a follow-up study post drainage of large right hydrocele. Electronically signed by: Denver Wagner Date:    12/16/2023 Time:    19:21         Assessment/Plan:  68 y.o. male unknown to our group with PMH of T2DM, HTN, HLD, chronic scrotal hydrocele, vitamin D deficiency, CKD 3a. Presented to the ED 12/16/23 with severe epigastric pain onset same day with radiation to back, associated n/v and chills also reported. GI consulted for choledocholithiasis. Underwent ERCP 12/17 with sludge and stone removal.     Choledocholithiasis s/p ERCP  Gallstone pancreatitis  Transaminitis--resolved     - transaminitis resolved and respiratory status improving. Agree with surgery recs to repeat imaging in a week to reassess pancreas.   - continue supportive care  - PPI BID  - TAMMY Franks PA-C

## 2023-12-27 NOTE — PROGRESS NOTES
Infectious Diseases Progress Note  67 y/o male with Hx of GERD, HTN, HLD and DM Type II who presented to ER on 12/16 with abdominal pain. On admit he was afebrile with leukocytosis, WBC 24.97. Lipase >3,000,  and . U/A not impressive. Blood cultures revealed Enterococcus 1/2 sets. CT abdomen/pelvis with contrast revealed severe acute pancreatitis, gastric antrum and duodenal mural thickening likely represents reactive change for acute pancreatitis, right large hydrocele. Scrotal ultrasound with unremarkable right testicle, large right hydrocele which crosses the midline and causes compressive effect upon the left testicle which is inferiorly deviated. MRI Abdomen without contrast showed there appears to be numerous intraluminal filling defects in the mid to distal common bile duct series image 16 series 3, these are suggestive of impacted gallstones and sludge. On 12/17 he underwent ERCP with sludge and stone removal. Repeat CT abdomen/pelvis without contrast showed interval worsening of the ascites, persistent inflammatory changes around the pancreas consistent patient's history of pancreatitis, previous examination showed incomplete enhancement of the pancreas suggesting possible developing necrotizing pancreatitis, interval development of bibasilar atelectasis and moderate to large pleural effusions. Renal ultrasound unremarkable. 2D Echo with no mention of vegetations. 12/20 blood cultures negative thus far. During his stay he was noted to have diarrhea, stool for C-Diff PCR (-).   He is currently on Ampicillin, Flagyl and Merrem     Subjective:  Lying in bed in no acute distress. No new complaints voiced. Afebrile    Review of Systems   Constitutional:  Positive for malaise/fatigue.   HENT: Negative.     Eyes: Negative.    Respiratory: Negative.     Cardiovascular: Negative.    Gastrointestinal:  Positive for abdominal pain.   Genitourinary: Negative.    Musculoskeletal: Negative.    Skin:  Negative.    Neurological: Negative.    All other systems reviewed and are negative.      Review of patient's allergies indicates:  No Known Allergies    Past Medical History:   Diagnosis Date    DM (diabetes mellitus)     GERD (gastroesophageal reflux disease)     HLD (hyperlipidemia)     HTN (hypertension)        Past Surgical History:   Procedure Laterality Date    APPENDECTOMY      CHOLECYSTECTOMY      ERCP N/A 12/17/2023    Procedure: ERCP (ENDOSCOPIC RETROGRADE CHOLANGIOPANCREATOGRAPHY);  Surgeon: Flako Kerns MD;  Location: [facility] OR;  Service: Gastroenterology;  Laterality: N/A;    ERCP W/ SPHICTEROTOMY  12/17/2023    Procedure: ERCP, WITH SPHINCTEROTOMY;  Surgeon: Flako Kerns MD;  Location: [facility] OR;  Service: Gastroenterology;;    ERCP, WITH CALCULUS REMOVAL  12/17/2023    Procedure: ERCP, WITH CALCULUS REMOVAL;  Surgeon: Flako Kerns MD;  Location: [facility];  Service: Gastroenterology;;       Social History     Socioeconomic History    Marital status:    Tobacco Use    Smoking status: Every Day     Current packs/day: 1.00     Types: Cigarettes    Smokeless tobacco: Never   Substance and Sexual Activity    Alcohol use: Never    Drug use: Never    Sexual activity: Yes     Social Determinants of Health     Financial Resource Strain: Low Risk  (8/8/2023)    Overall Financial Resource Strain (CARDIA)     Difficulty of Paying Living Expenses: Not hard at all   Food Insecurity: No Food Insecurity (8/8/2023)    Hunger Vital Sign     Worried About Running Out of Food in the Last Year: Never true     Ran Out of Food in the Last Year: Never true   Transportation Needs: No Transportation Needs (12/19/2023)    PRAPARE - Transportation     Lack of Transportation (Medical): No     Lack of Transportation (Non-Medical): No   Stress: No Stress Concern Present (8/8/2023)    [employer] [place] [employer] - Occupational Stress Questionnaire     Feeling of Stress : Only a little  "  Housing Stability: Low Risk  (12/19/2023)    Housing Stability Vital Sign     Unable to Pay for Housing in the Last Year: No     Number of Places Lived in the Last Year: 1     Unstable Housing in the Last Year: No         Scheduled Meds:   ampicillin IVPB  2 g Intravenous Q6H    enoxparin  30 mg Subcutaneous Q24H (prophylaxis, 1700)    furosemide (LASIX) injection  20 mg Intravenous Daily    meropenem (MERREM) IVPB  500 mg Intravenous Q8H    metoprolol succinate  25 mg Oral Daily    pantoprazole  40 mg Intravenous BID AC    sodium chloride 0.9%  10 mL Intravenous Q6H    traZODone  25 mg Oral QHS     Continuous Infusions:  PRN Meds:sodium chloride 0.9%, acetaminophen, acetaminophen, aluminum-magnesium hydroxide-simethicone, dextrose 10%, dextrose 10%, dicyclomine, glucagon (human recombinant), hydrALAZINE, HYDROmorphone, insulin aspart U-100, labetalol, melatonin, ondansetron, oxyCODONE, polyethylene glycol, prochlorperazine, senna-docusate 8.6-50 mg, sodium chloride 0.9%, Flushing PICC/Midline Protocol **AND** sodium chloride 0.9% **AND** sodium chloride 0.9%    Objective:  /74   Pulse 91   Temp 98.3 °F (36.8 °C) (Oral)   Resp 17   Ht 5' 6" (1.676 m)   Wt 72.1 kg (159 lb)   SpO2 96%   BMI 25.66 kg/m²     Physical Exam:   Physical Exam  Vitals reviewed.   HENT:      Head: Normocephalic.   Cardiovascular:      Rate and Rhythm: Normal rate and regular rhythm.   Pulmonary:      Effort: Pulmonary effort is normal. No respiratory distress.      Breath sounds: Normal breath sounds. No wheezing.   Abdominal:      General: Bowel sounds are normal. There is no distension.      Palpations: Abdomen is soft.      Tenderness: There is no abdominal tenderness.   Musculoskeletal:         General: Normal range of motion.      Cervical back: Normal range of motion.   Skin:     Findings: No rash.   Neurological:      Mental Status: He is alert and oriented to person, place, and time.   Psychiatric:         Mood and " Affect: Mood normal.         Behavior: Behavior normal.       Imaging      Lab Review   Recent Results (from the past 24 hour(s))   Comprehensive Metabolic Panel    Collection Time: 12/26/23  4:34 AM   Result Value Ref Range    Sodium Level 133 (L) 136 - 145 mmol/L    Potassium Level 4.3 3.5 - 5.1 mmol/L    Chloride 97 (L) 98 - 107 mmol/L    Carbon Dioxide 26 23 - 31 mmol/L    Glucose Level 122 (H) 82 - 115 mg/dL    Blood Urea Nitrogen 17.6 8.4 - 25.7 mg/dL    Creatinine 0.97 0.73 - 1.18 mg/dL    Calcium Level Total 7.4 (L) 8.8 - 10.0 mg/dL    Protein Total 4.6 (L) 5.8 - 7.6 gm/dL    Albumin Level 1.7 (L) 3.4 - 4.8 g/dL    Globulin 2.9 2.4 - 3.5 gm/dL    Albumin/Globulin Ratio 0.6 (L) 1.1 - 2.0 ratio    Bilirubin Total 1.3 <=1.5 mg/dL    Alkaline Phosphatase 97 40 - 150 unit/L    Alanine Aminotransferase 23 0 - 55 unit/L    Aspartate Aminotransferase 36 (H) 5 - 34 unit/L    eGFR >60 mls/min/1.73/m2   Magnesium    Collection Time: 12/26/23  4:34 AM   Result Value Ref Range    Magnesium Level 2.00 1.60 - 2.60 mg/dL   Phosphorus    Collection Time: 12/26/23  4:34 AM   Result Value Ref Range    Phosphorus Level 2.8 2.3 - 4.7 mg/dL   CBC with Differential    Collection Time: 12/26/23  4:35 AM   Result Value Ref Range    WBC 21.44 (H) 4.50 - 11.50 x10(3)/mcL    RBC 3.91 (L) 4.70 - 6.10 x10(6)/mcL    Hgb 12.0 (L) 14.0 - 18.0 g/dL    Hct 37.2 (L) 42.0 - 52.0 %    MCV 95.1 (H) 80.0 - 94.0 fL    MCH 30.7 27.0 - 31.0 pg    MCHC 32.3 (L) 33.0 - 36.0 g/dL    RDW 14.6 11.5 - 17.0 %    Platelet 340 130 - 400 x10(3)/mcL    MPV 9.8 7.4 - 10.4 fL    Neut % 83.8 %    Lymph % 4.6 %    Mono % 7.1 %    Eos % 1.2 %    Basophil % 0.5 %    Lymph # 0.99 0.6 - 4.6 x10(3)/mcL    Neut # 17.95 (H) 2.1 - 9.2 x10(3)/mcL    Mono # 1.52 (H) 0.1 - 1.3 x10(3)/mcL    Eos # 0.26 0 - 0.9 x10(3)/mcL    Baso # 0.11 <=0.2 x10(3)/mcL    IG# 0.61 (H) 0 - 0.04 x10(3)/mcL    IG% 2.8 %    NRBC% 0.0 %   POCT glucose    Collection Time: 12/26/23  5:06 AM   Result  Value Ref Range    POCT Glucose 129 (H) 70 - 110 mg/dL   POCT glucose    Collection Time: 12/26/23 11:26 AM   Result Value Ref Range    POCT Glucose 159 (H) 70 - 110 mg/dL   POCT glucose    Collection Time: 12/26/23  4:51 PM   Result Value Ref Range    POCT Glucose 156 (H) 70 - 110 mg/dL   POCT glucose    Collection Time: 12/26/23  8:23 PM   Result Value Ref Range    POCT Glucose 132 (H) 70 - 110 mg/dL       Assessment/Plan:  Enterococcus sepsis  Severe acute pancreatitis  Cholelithiasis / Cholangitis  Obstructive jaundice  SHA / CKD  Severe leukocytosis  Ascites / Pleural effusions    -Continue Ampicillin #9 and Merrem #4. D/C Flagyl #10  -Will need a 14 day course from the negative blood cultures  -Afebrile with leukocytosis trending down, follow  -Blood cultures from admit revealed Enterococcus 1/2 sets. Repeat blood cultures on 12/20 negative  -2D Echo with no mention of vegetations  -Renal dysfunction improved with Crea normalized  -Transaminases trending down  -Stool for c-diff PCR and toxin negative  -Discussed with patient, wife and nursing

## 2023-12-28 LAB
ANION GAP SERPL CALC-SCNC: 6 MEQ/L
BASOPHILS # BLD AUTO: 0.07 X10(3)/MCL
BASOPHILS NFR BLD AUTO: 0.5 %
BUN SERPL-MCNC: 13.4 MG/DL (ref 8.4–25.7)
CALCIUM SERPL-MCNC: 8 MG/DL (ref 8.8–10)
CHLORIDE SERPL-SCNC: 100 MMOL/L (ref 98–107)
CO2 SERPL-SCNC: 25 MMOL/L (ref 23–31)
CREAT SERPL-MCNC: 0.95 MG/DL (ref 0.73–1.18)
CREAT/UREA NIT SERPL: 14
EOSINOPHIL # BLD AUTO: 0.17 X10(3)/MCL (ref 0–0.9)
EOSINOPHIL NFR BLD AUTO: 1.2 %
ERYTHROCYTE [DISTWIDTH] IN BLOOD BY AUTOMATED COUNT: 14.2 % (ref 11.5–17)
GFR SERPLBLD CREATININE-BSD FMLA CKD-EPI: >60 MLS/MIN/1.73/M2
GLUCOSE SERPL-MCNC: 129 MG/DL (ref 82–115)
HCT VFR BLD AUTO: 37.1 % (ref 42–52)
HGB BLD-MCNC: 12.1 G/DL (ref 14–18)
IMM GRANULOCYTES # BLD AUTO: 0.19 X10(3)/MCL (ref 0–0.04)
IMM GRANULOCYTES NFR BLD AUTO: 1.4 %
LYMPHOCYTES # BLD AUTO: 1.06 X10(3)/MCL (ref 0.6–4.6)
LYMPHOCYTES NFR BLD AUTO: 7.5 %
MAGNESIUM SERPL-MCNC: 1.7 MG/DL (ref 1.6–2.6)
MCH RBC QN AUTO: 30.6 PG (ref 27–31)
MCHC RBC AUTO-ENTMCNC: 32.6 G/DL (ref 33–36)
MCV RBC AUTO: 93.9 FL (ref 80–94)
MONOCYTES # BLD AUTO: 1.27 X10(3)/MCL (ref 0.1–1.3)
MONOCYTES NFR BLD AUTO: 9 %
NEUTROPHILS # BLD AUTO: 11.28 X10(3)/MCL (ref 2.1–9.2)
NEUTROPHILS NFR BLD AUTO: 80.4 %
NRBC BLD AUTO-RTO: 0 %
PHOSPHATE SERPL-MCNC: 2.5 MG/DL (ref 2.3–4.7)
PLATELET # BLD AUTO: 443 X10(3)/MCL (ref 130–400)
PMV BLD AUTO: 9.6 FL (ref 7.4–10.4)
POCT GLUCOSE: 122 MG/DL (ref 70–110)
POCT GLUCOSE: 131 MG/DL (ref 70–110)
POCT GLUCOSE: 134 MG/DL (ref 70–110)
POTASSIUM SERPL-SCNC: 4.4 MMOL/L (ref 3.5–5.1)
RBC # BLD AUTO: 3.95 X10(6)/MCL (ref 4.7–6.1)
SODIUM SERPL-SCNC: 131 MMOL/L (ref 136–145)
WBC # SPEC AUTO: 14.04 X10(3)/MCL (ref 4.5–11.5)

## 2023-12-28 PROCEDURE — 63600175 PHARM REV CODE 636 W HCPCS: Performed by: INTERNAL MEDICINE

## 2023-12-28 PROCEDURE — 21400001 HC TELEMETRY ROOM

## 2023-12-28 PROCEDURE — 63600175 PHARM REV CODE 636 W HCPCS: Performed by: NURSE PRACTITIONER

## 2023-12-28 PROCEDURE — 25000003 PHARM REV CODE 250: Performed by: NURSE PRACTITIONER

## 2023-12-28 PROCEDURE — 63600175 PHARM REV CODE 636 W HCPCS

## 2023-12-28 PROCEDURE — C9113 INJ PANTOPRAZOLE SODIUM, VIA: HCPCS

## 2023-12-28 PROCEDURE — 25000003 PHARM REV CODE 250: Performed by: INTERNAL MEDICINE

## 2023-12-28 PROCEDURE — 11000001 HC ACUTE MED/SURG PRIVATE ROOM

## 2023-12-28 PROCEDURE — 83735 ASSAY OF MAGNESIUM: CPT | Performed by: INTERNAL MEDICINE

## 2023-12-28 PROCEDURE — 84100 ASSAY OF PHOSPHORUS: CPT | Performed by: INTERNAL MEDICINE

## 2023-12-28 PROCEDURE — A4216 STERILE WATER/SALINE, 10 ML: HCPCS | Performed by: INTERNAL MEDICINE

## 2023-12-28 PROCEDURE — 85025 COMPLETE CBC W/AUTO DIFF WBC: CPT | Performed by: INTERNAL MEDICINE

## 2023-12-28 PROCEDURE — 80048 BASIC METABOLIC PNL TOTAL CA: CPT | Performed by: INTERNAL MEDICINE

## 2023-12-28 PROCEDURE — 63600175 PHARM REV CODE 636 W HCPCS: Performed by: HOSPITALIST

## 2023-12-28 RX ORDER — FUROSEMIDE 10 MG/ML
10 INJECTION INTRAMUSCULAR; INTRAVENOUS DAILY
Status: DISCONTINUED | OUTPATIENT
Start: 2023-12-30 | End: 2023-12-29

## 2023-12-28 RX ORDER — MAGNESIUM SULFATE HEPTAHYDRATE 40 MG/ML
4 INJECTION, SOLUTION INTRAVENOUS ONCE
Status: COMPLETED | OUTPATIENT
Start: 2023-12-28 | End: 2023-12-28

## 2023-12-28 RX ADMIN — METOPROLOL SUCCINATE 25 MG: 25 TABLET, EXTENDED RELEASE ORAL at 09:12

## 2023-12-28 RX ADMIN — SODIUM CHLORIDE, PRESERVATIVE FREE 10 ML: 5 INJECTION INTRAVENOUS at 05:12

## 2023-12-28 RX ADMIN — MUPIROCIN: 20 OINTMENT TOPICAL at 09:12

## 2023-12-28 RX ADMIN — OXYCODONE HYDROCHLORIDE 5 MG: 5 TABLET ORAL at 01:12

## 2023-12-28 RX ADMIN — AMPICILLIN 2 G: 2 INJECTION, POWDER, FOR SOLUTION INTRAVENOUS at 04:12

## 2023-12-28 RX ADMIN — MEROPENEM 500 MG: 500 INJECTION, POWDER, FOR SOLUTION INTRAVENOUS at 05:12

## 2023-12-28 RX ADMIN — AMPICILLIN 2 G: 2 INJECTION, POWDER, FOR SOLUTION INTRAVENOUS at 05:12

## 2023-12-28 RX ADMIN — PANTOPRAZOLE SODIUM 40 MG: 40 INJECTION, POWDER, FOR SOLUTION INTRAVENOUS at 06:12

## 2023-12-28 RX ADMIN — TRAZODONE HYDROCHLORIDE 25 MG: 50 TABLET ORAL at 09:12

## 2023-12-28 RX ADMIN — AMPICILLIN 2 G: 2 INJECTION, POWDER, FOR SOLUTION INTRAVENOUS at 09:12

## 2023-12-28 RX ADMIN — OXYCODONE HYDROCHLORIDE 5 MG: 5 TABLET ORAL at 09:12

## 2023-12-28 RX ADMIN — ENOXAPARIN SODIUM 30 MG: 30 INJECTION SUBCUTANEOUS at 05:12

## 2023-12-28 RX ADMIN — MEROPENEM 500 MG: 500 INJECTION, POWDER, FOR SOLUTION INTRAVENOUS at 09:12

## 2023-12-28 RX ADMIN — SODIUM CHLORIDE, PRESERVATIVE FREE 10 ML: 5 INJECTION INTRAVENOUS at 12:12

## 2023-12-28 RX ADMIN — MEROPENEM 500 MG: 500 INJECTION, POWDER, FOR SOLUTION INTRAVENOUS at 12:12

## 2023-12-28 RX ADMIN — PANTOPRAZOLE SODIUM 40 MG: 40 INJECTION, POWDER, FOR SOLUTION INTRAVENOUS at 05:12

## 2023-12-28 RX ADMIN — MAGNESIUM SULFATE HEPTAHYDRATE 4 G: 40 INJECTION, SOLUTION INTRAVENOUS at 01:12

## 2023-12-28 NOTE — PROGRESS NOTES
Inpatient Nutrition Evaluation    Admit Date: 12/16/2023   Total duration of encounter: 12 days   Patient Age: 68 y.o.    Nutrition Recommendation/Prescription     -Advance diet as tolerated per MD. Goal Diet: Diabetic Diet.   -Boost Glucose Control TID for additional nourishment; provides 360 kcal and 14 gm protein per container.   -Monitor wt, labs, and intake.     Nutrition Assessment     Chart Review    Reason Seen: continuous nutrition monitoring and follow-up    Malnutrition Screening Tool Results   Have you recently lost weight without trying?: No  Have you been eating poorly because of a decreased appetite?: No   MST Score: 0   Diagnosis:  Acute severe pancreatitis- suspect biliary  Suspect choledocholithiasis  SIRS/sepsis secondary to pancreatitis and possible cholangitis  SHA superimposed on CKD 3A    Relevant Medical History:   T2DM, HTN, HLD, GERD    Scheduled Medications:  ampicillin IVPB, 2 g, Q6H  enoxparin, 30 mg, Q24H (prophylaxis, 1700)  [START ON 12/29/2023] furosemide (LASIX) injection, 10 mg, Daily  meropenem (MERREM) IVPB, 500 mg, Q8H  metoprolol succinate, 25 mg, Daily  mupirocin, , BID  pantoprazole, 40 mg, BID AC  sodium chloride 0.9%, 10 mL, Q6H  traZODone, 25 mg, QHS    Continuous Infusions:   PRN Medications: sodium chloride 0.9%, acetaminophen, acetaminophen, aluminum-magnesium hydroxide-simethicone, dextrose 10%, dextrose 10%, dicyclomine, glucagon (human recombinant), hydrALAZINE, HYDROmorphone, insulin aspart U-100, labetalol, melatonin, ondansetron, oxyCODONE, polyethylene glycol, prochlorperazine, senna-docusate 8.6-50 mg, sodium chloride 0.9%, Flushing PICC/Midline Protocol **AND** sodium chloride 0.9% **AND** sodium chloride 0.9%    Recent Labs   Lab 12/22/23  0417 12/23/23  0521 12/24/23  0512 12/24/23  0856 12/25/23  0728 12/26/23  0434 12/26/23  0435 12/27/23  0432 12/28/23  0621   * 135*  --  133* 133* 133*  --  131* 131*   K 4.5 4.2  --  4.7 4.0 4.3  --  3.9 4.4  "  CALCIUM 7.3* 7.4*  --  7.3* 7.9* 7.4*  --  7.8* 8.0*   PHOS 2.0* 2.2*  --  2.9 2.7 2.8  --  2.5 2.5   MG 2.10 1.80  --  1.60 1.70 2.00  --  1.80 1.70   CHLORIDE 99 98  --  99 101 97*  --  99 100   CO2 26 26  --  22* 25 26  --  23 25   BUN 33.3* 26.2*  --  20.4 18.7 17.6  --  14.9 13.4   CREATININE 1.05 1.03  --  0.91 0.90 0.97  --  0.84 0.95   EGFRNORACEVR >60 >60  --  >60 >60 >60  --  >60 >60   GLUCOSE 110 109  --  125* 129* 122*  --  126* 129*   BILITOT  --   --   --   --  1.3 1.3  --  1.2  --    ALKPHOS  --   --   --   --  103 97  --  104  --    ALT  --   --   --   --  23 23  --  18  --    AST  --   --   --   --  37* 36*  --  34  --    ALBUMIN 2.0*  --   --   --  1.7* 1.7*  --  1.7*  --    CRP  --   --   --  176.00*  --   --   --   --   --    LIPASE  --   --   --  22  --   --   --   --   --    WBC 19.53  19.53* 25.22  25.22* 26.22*  --  23.04*  --  21.44* 17.67* 14.04*   HGB 13.8* 12.5* 13.2*  --  12.5*  --  12.0* 11.9* 12.1*   HCT 41.0* 37.1* 39.4*  --  37.4*  --  37.2* 35.9* 37.1*       Nutrition Orders:  Diet full liquid      Appetite/Oral Intake: good/% of meals  Factors Affecting Nutritional Intake: none identified  Food/Temple/Cultural Preferences: none reported  Food Allergies: none reported  Last Bowel Movement: 12/27/23  Wound(s):  Skin intact per EMR    Comments    12/21/23: Pt reports very poor appetite for the past week but states he had no issues prior. Currently he is only drinking water d/t ongoing diarrhea. He states that N/V subsided on 12/16. He denies any unintended wt loss. Appears well nourished per NFPE.     12/28/23: Pt tolerating full liquids well, hungry, and ready for solids; receptive to an oral supplement for now.     Anthropometrics    Height: 5' 6" (167.6 cm), Height Method: Stated  Last Weight: 77.4 kg (170 lb 10.2 oz) (12/27/23 2300), Weight Method: Standard Scale  BMI (Calculated): 27.6  BMI Classification: overweight (BMI 25-29.9)        Ideal Body Weight (IBW), " Male: 142 lb     % Ideal Body Weight, Male (lb): 105.63 %                 Usual Body Weight (UBW), k.7 kg  % Usual Body Weight: 100.9     Usual Weight Provided By: patient    Wt Readings from Last 5 Encounters:   23 77.4 kg (170 lb 10.2 oz)   23 68.1 kg (150 lb 3.2 oz)   23 72.6 kg (160 lb)   23 70.6 kg (155 lb 11.2 oz)   22 71.2 kg (157 lb)     Weight Change(s) Since Admission: 11 lb wt gain noted  Wt Readings from Last 1 Encounters:   23 2300 77.4 kg (170 lb 10.2 oz)   23 0639 72.5 kg (159 lb 13.3 oz)   23 0500 72.1 kg (159 lb)   23 0542 71.8 kg (158 lb 3 oz)   23 0500 73.2 kg (161 lb 6.4 oz)   23 0559 73.2 kg (161 lb 6 oz)   23 0441 74 kg (163 lb 2.3 oz)   23 0600 73.2 kg (161 lb 6 oz)   23 1747 68 kg (150 lb)   23 1651 68 kg (150 lb)   Admit Weight: 68 kg (150 lb) (23 1651), Weight Method: Stated  23: 77.4 kg    Patient Education     Not applicable.    Nutrition Goals & Monitoring     Dietitian will monitor: energy intake and weight change    Nutrition Risk/Follow-Up: low (follow-up in 5-7 days)  Patients assigned 'low nutrition risk' status do not qualify for a full nutritional assessment but will be monitored and re-evaluated in a 5-7 day time period. Please consult if re-evaluation needed sooner.

## 2023-12-28 NOTE — PROGRESS NOTES
[facility]  Hospital Medicine Progress Note        Chief Complaint: Inpatient Follow-up abdominal pain    HPI:   68-year-old male with medical history of T2DM, hypertension, hyperlipidemia and prior cholecystectomy present to the ED with complaint of severe epigastric abdominal pain that started today around 9:00 a.m. after breakfast, the pain is severe 10/10 and radiate to his back associated with nausea and vomiting.  Report last bowel movement was this morning.  Reports chills but denies fever.  He has chronic scrotal hydrocele that has not changed or painful.     On arrival to ED he was afebrile, tachycardic, normotensive and saturating 96% on room air.  Labs notable for WBC 24.97, hemoglobin 18.2, creatinine 2.0, BUN 16.5, total bilirubin 2.7, direct 1.9, , , lipase more than 3000, triglyceride 183.  CT abdomen and pelvis show finding of severe acute pancreatitis without evidence of necrosis or abscess or fluid collection.  Liver remarkable for steatosis, gallbladder surgically absent, no comment on biliary tree.     He was given 2 L of IV fluids, IV Zosyn, IV analgesics and referred to hospital medicine service for further evaluation and management.  Patient was taken for ERCP on 12/17.  Choledocholithiasis was resolved.  Also had sphincterotomy performed.  Returned to the floor in stable condition.  GI recommending advancement of diet to clear liquids in 4-6 hours     Patient  continues to be requiring oxygen.  Echo with grade 1 diastolic dysfunction, normal systolic function.  Follow up chest x-ray obtained  with small right pleural effusion slightly increased. Nephrology okay to transition to oral Lasix.  GI signed off.    Infectious diseases recommending 14 day course from negative blood cultures of ampicillin and Flagyl.    As leukocytosis not improving, ordered a CT scan  which commenting on worsened appearance pancreatitis with possible collection developing  and small volume ascites, small bilateral pleural effusions.  Meropenem was added to his antibiotics.  GI was notified for further recommendations, recommending surgery consult.  Surgery on board.  Plan to continue to monitor and continue the antibiotics and repeat imaging in few days.  Diet changed to clears.    Changed lasix to IV    Interval Hx:  GI  continues follow up,recommending repeat imaging in future . On antibiotics. Continues to be requiring oxygen.  On clears    Patient was  seen and examined.GI symptoms improving  today.  Denies any  shortness for breath, chest pain or cough      Chart was reviewed, T-max of 98°.3, .  Most recent labs were reviewed.  Leukocytosis improving , down to 17.6.  Creatinine normalized. transaminitis improving.     Objective/physical exam:  General: In no acute distress, afebrile  Chest:  Crackles, on nasal cannula  Heart: RRR, +S1, S2, no appreciable murmur  Abdomen: Soft, nontender, BS +  MSK: Warm, no lower extremity edema, no clubbing or cyanosis  Neurologic: Alert and oriented x4, Cranial nerve II-XII intact, Strength 5/5 in all 4 extremities       VITAL SIGNS: 24 HRS MIN & MAX LAST   Temp  Min: 97.8 °F (36.6 °C)  Max: 98.3 °F (36.8 °C) 97.8 °F (36.6 °C)   BP  Min: 121/73  Max: 156/72 (!) 146/83   Pulse  Min: 83  Max: 97  83   Resp  Min: 14  Max: 18 18   SpO2  Min: 95 %  Max: 96 % 95 %       Recent Labs   Lab 12/25/23  0728 12/26/23  0435 12/27/23  0432   WBC 23.04* 21.44* 17.67*   RBC 4.03* 3.91* 3.90*   HGB 12.5* 12.0* 11.9*   HCT 37.4* 37.2* 35.9*   MCV 92.8 95.1* 92.1   MCH 31.0 30.7 30.5   MCHC 33.4 32.3* 33.1   RDW 14.7 14.6 14.5    340 389   MPV 10.1 9.8 9.8       Recent Labs   Lab 12/25/23  0728 12/26/23  0434 12/27/23  0432   * 133* 131*   K 4.0 4.3 3.9   CO2 25 26 23   BUN 18.7 17.6 14.9   CREATININE 0.90 0.97 0.84   CALCIUM 7.9* 7.4* 7.8*   MG 1.70 2.00 1.80   ALBUMIN 1.7* 1.7* 1.7*   ALKPHOS 103 97 104   ALT 23 23 18   AST 37* 36* 34   BILITOT 1.3  1.3 1.2          Microbiology Results (last 7 days)       Procedure Component Value Units Date/Time    Blood Culture [[id number]]  (Normal) Collected: 12/20/23 1046    Order Status: Completed Specimen: Blood from Arm, Right Updated: 12/25/23 1202     CULTURE, BLOOD ([facility]) No Growth at 5 days    Blood Culture [[id number]]  (Normal) Collected: 12/20/23 1045    Order Status: Completed Specimen: Blood from Antecubital, Left Updated: 12/25/23 1202     CULTURE, BLOOD ([facility]) No Growth at 5 days    Stool Culture [[id number]]  (Normal) Collected: 12/23/23 0554    Order Status: Completed Specimen: Stool Updated: 12/25/23 0920     Stool Culture Negative for Salmonella, Shigella, Campylobacter, Vibrio, Aeromonas, Pleisiomonas,Yersinia, or Shiga Toxin 1 and 2.    Respiratory Culture [[id number]]     Order Status: Canceled Specimen: Sputum, Expectorated     Blood culture #1 **CANNOT BE ORDERED STAT** [[id number]]  (Normal) Collected: 12/16/23 2017    Order Status: Completed Specimen: Blood from Antecubital, Right Updated: 12/21/23 2100     CULTURE, BLOOD ([facility]) No Growth at 5 days    Clostridium Diff Toxin, A & B, EIA [[id number]]  (Normal) Collected: 12/21/23 0902    Order Status: Completed Specimen: Stool Updated: 12/21/23 1101     Clostridium Difficile GDH Antigen Negative     Clostridium Difficile Toxin A/B Negative             Radiology:  X-Ray Chest 1 View for Line/Tube Placement  Narrative: EXAMINATION:  XR CHEST 1 VIEW FOR LINE/TUBE PLACEMENT    CLINICAL HISTORY:  PICC;    TECHNIQUE:  One view    COMPARISON:  December 20, 2023.    FINDINGS:  Right upper extremity approach PICC line which terminates about the cavoatrial junction and is optimal.  Cardiopericardial silhouette is within normal limits.  Improved right pleural effusion.  There is new thickened opacity right lower lung zone which may represent atelectasis.  Attention to follow-up exams.  There also new left basilar hypoventilatory changes no pulmonary edema.  No  pneumothorax.  Impression: Optimal placement of the PICC line.    Electronically signed by: Denver Wagner  Date:    12/23/2023  Time:    19:56  CT Chest Abdomen Pelvis Without Contrast (XPD)  Narrative: EXAMINATION:  CT CHEST ABDOMEN PELVIS WITHOUT CONTRAST(XPD)    CLINICAL HISTORY:  follow up;    TECHNIQUE:  Helical acquisition from the thoracic inlet through the ischia without IV contrast.  Lack of contrast limits evaluation of solid organs and vascular structures.  Three plane reconstructions made available for review. DLP 1042 mGycm. Automatic exposure control, adjustment of mA/kV or iterative reconstruction technique was used to reduce radiation.    COMPARISON:  18 December 2023, 15 August 2023    FINDINGS:  Chest.    Heart is not significantly enlarged.  There are coronary artery calcifications.  No pericardial effusion.    No mediastinal, hilar or axillary adenopathy by CT size criteria.    There are small bilateral pleural effusions slightly improved compared to prior.  Mild bibasilar atelectasis.  Moderate to severe emphysema.    Abdomen and pelvis.    No acute findings noncontrast liver, spleen, adrenals or kidneys.  Gallbladder surgically absent.    There is increased peripancreatic inflammation compared to prior.  Question a developing collection anterior and superior to the portion of the pancreas which had suggestion of necrosis on prior imaging.  This collection is seen on images 104-110 series 2.  There is small volume ascites.    There is no bowel obstruction or free air.  There is colonic diverticulosis.    Urinary bladder unremarkable.  Prostate mildly enlarged.  Abdominal aorta mildly ectatic with moderate atherosclerotic disease.    There are no acute osseous findings.  Impression: 1. Worsened appearance of pancreatitis with possible collection developing anterior and superior to the pancreas.  2. Small volume ascites.  3. Small bilateral pleural effusions.    Electronically signed by: Osiel  Ventura  Date:    12/23/2023  Time:    10:59      Scheduled Med:   ampicillin IVPB  2 g Intravenous Q6H    enoxparin  30 mg Subcutaneous Q24H (prophylaxis, 1700)    furosemide (LASIX) injection  20 mg Intravenous Daily    meropenem (MERREM) IVPB  500 mg Intravenous Q8H    metoprolol succinate  25 mg Oral Daily    pantoprazole  40 mg Intravenous BID AC    sodium chloride 0.9%  10 mL Intravenous Q6H    traZODone  25 mg Oral QHS        Continuous Infusions:       PRN Meds:  sodium chloride 0.9%, acetaminophen, acetaminophen, aluminum-magnesium hydroxide-simethicone, dextrose 10%, dextrose 10%, dicyclomine, glucagon (human recombinant), hydrALAZINE, HYDROmorphone, insulin aspart U-100, labetalol, melatonin, ondansetron, oxyCODONE, polyethylene glycol, prochlorperazine, senna-docusate 8.6-50 mg, sodium chloride 0.9%, Flushing PICC/Midline Protocol **AND** sodium chloride 0.9% **AND** sodium chloride 0.9%       Assessment/Plan:   Acute severe pancreatitis- suspect biliary  Cholangitis, acute  Choledocholithiasis status post ERCP  Severe sepsis  Leukocytosis  Enterococcus faecalis bacteremia  SHA superimposed on CKD 3A  Acute hypoxic respiratory failure requiring supplemental oxygen secondary to pulmonary edema/pleural effusion, for fluid resuscitation/concern for ARDS , from underlying pancreatitis  , history of smoking, questionable COPD?       History of T2DM, hypertension, hyperlipidemia, GERD    Plan  GI following.  Underwent ERCP with sludge and stone removal.  Lipase improved.Transaminitis improving.Ordered antibiotics-Flagyl, Ampicillin.  , Blood cultures noted to be positive, ordered repeat cultures, so far negative.Infectious Disease was consulted, on further recommendations/investigations if any-recommending 14 days after negative culture(last date likely around January 6th per our calculations, no other investigations recommended).Given worsening leukocytosis, ordered a scan 12/23 (without contrast due to  sha)showing possible fluid collection around the pancreas and small volume ascites , added meropenem.  GI was consulted.  Recommended surgery consult.  Plan to continue to monitor and consider repeat imaging in 7-10 days.  (Repeat scan ordered for 12/29).  We will continue to follow up with surgery and GI if patient if will need IR drainage if worsening/pending new CT finding results from 12/29. Diet as tolerated.  Monitor fever curve and WBC  Nephrology followed for SHA, creatinine improved on diuretics.  On 3 L at present.  Wean oxygen as tolerated.  Consider bronchodilators p.r.n..  Encourage incentive spirometry. Followed up on echocardiogram-EF 50-55.Grade I diastolic dysfunction.  Consider IV Lasix in-house while monitoring renal function, we will keep a close eye as there is a concern for ARDS in such patients.  Most recent scan commenting on small bilateral pleural effusions.  Strict Is&Os  Continue supportive care appropriate home medications.  On Sliding scale with fingersticks a.c. HS for management of diabetes in-house.  Therapy services    DVT prophylaxis-Lovenox      Anticipated discharge-to be decided,  once off oxygen and pending clinical improvement.  GI to clear the patient      Critical care diagnosis: sepsis, iv antibiotics, acute hypoxic respiratory failure requiring oxygen  Critical care interventions: hands on evaluation, review of labs/radiographs/records and discussions with family  Critical care time spent: >32 minutes        Teresa Anthony MD   12/27/2023     All diagnosis and differential diagnosis have been reviewed; assessment and plan has been documented; I have personally reviewed the labs and test results that are presently available; I have reviewed the patients medication list; I have reviewed the consulting providers response and recommendations. I have reviewed or attempted to review medical records based upon their availability    All of the patient's questions have been   addressed and answered. Patient's is agreeable to the above stated plan. I will continue to monitor closely and make adjustments to medical management as needed.  _____________________________________________________________________

## 2023-12-28 NOTE — PROGRESS NOTES
Infectious Diseases Progress Note  69 y/o male with Hx of GERD, HTN, HLD and DM Type II who presented to ER on 12/16 with abdominal pain. On admit he was afebrile with leukocytosis, WBC 24.97. Lipase >3,000,  and . U/A not impressive. Blood cultures revealed Enterococcus 1/2 sets. CT abdomen/pelvis with contrast revealed severe acute pancreatitis, gastric antrum and duodenal mural thickening likely represents reactive change for acute pancreatitis, right large hydrocele. Scrotal ultrasound with unremarkable right testicle, large right hydrocele which crosses the midline and causes compressive effect upon the left testicle which is inferiorly deviated. MRI Abdomen without contrast showed there appears to be numerous intraluminal filling defects in the mid to distal common bile duct series image 16 series 3, these are suggestive of impacted gallstones and sludge. On 12/17 he underwent ERCP with sludge and stone removal. Repeat CT abdomen/pelvis without contrast showed interval worsening of the ascites, persistent inflammatory changes around the pancreas consistent patient's history of pancreatitis, previous examination showed incomplete enhancement of the pancreas suggesting possible developing necrotizing pancreatitis, interval development of bibasilar atelectasis and moderate to large pleural effusions. Renal ultrasound unremarkable. 2D Echo with no mention of vegetations. 12/20 blood cultures negative thus far. During his stay he was noted to have diarrhea, stool for C-Diff PCR (-).   He is currently on Ampicillin and Merrem     Subjective:  Lying in bed in no acute distress. No new complaints voiced. Afebrile    Review of Systems   Constitutional:  Positive for malaise/fatigue.   HENT: Negative.     Eyes: Negative.    Respiratory: Negative.     Cardiovascular: Negative.    Gastrointestinal:  Positive for abdominal pain.   Genitourinary: Negative.    Musculoskeletal: Negative.    Skin: Negative.     Neurological: Negative.    All other systems reviewed and are negative.      Review of patient's allergies indicates:  No Known Allergies    Past Medical History:   Diagnosis Date    DM (diabetes mellitus)     GERD (gastroesophageal reflux disease)     HLD (hyperlipidemia)     HTN (hypertension)        Past Surgical History:   Procedure Laterality Date    APPENDECTOMY      CHOLECYSTECTOMY      ERCP N/A 12/17/2023    Procedure: ERCP (ENDOSCOPIC RETROGRADE CHOLANGIOPANCREATOGRAPHY);  Surgeon: Flako Kerns MD;  Location: [facility];  Service: Gastroenterology;  Laterality: N/A;    ERCP W/ SPHICTEROTOMY  12/17/2023    Procedure: ERCP, WITH SPHINCTEROTOMY;  Surgeon: Flako Kerns MD;  Location: [facility] OR;  Service: Gastroenterology;;    ERCP, WITH CALCULUS REMOVAL  12/17/2023    Procedure: ERCP, WITH CALCULUS REMOVAL;  Surgeon: Flako Kerns MD;  Location: [facility];  Service: Gastroenterology;;       Social History     Socioeconomic History    Marital status:    Tobacco Use    Smoking status: Every Day     Current packs/day: 1.00     Types: Cigarettes    Smokeless tobacco: Never   Substance and Sexual Activity    Alcohol use: Never    Drug use: Never    Sexual activity: Yes     Social Determinants of Health     Financial Resource Strain: Low Risk  (8/8/2023)    Overall Financial Resource Strain (CARDIA)     Difficulty of Paying Living Expenses: Not hard at all   Food Insecurity: No Food Insecurity (8/8/2023)    Hunger Vital Sign     Worried About Running Out of Food in the Last Year: Never true     Ran Out of Food in the Last Year: Never true   Transportation Needs: No Transportation Needs (12/19/2023)    PRAPARE - Transportation     Lack of Transportation (Medical): No     Lack of Transportation (Non-Medical): No   Stress: No Stress Concern Present (8/8/2023)    [employer] - Occupational Stress Questionnaire     Feeling of Stress : Only a little   Housing  "Stability: Low Risk  (12/19/2023)    Housing Stability Vital Sign     Unable to Pay for Housing in the Last Year: No     Number of Places Lived in the Last Year: 1     Unstable Housing in the Last Year: No         Scheduled Meds:   ampicillin IVPB  2 g Intravenous Q6H    enoxparin  30 mg Subcutaneous Q24H (prophylaxis, 1700)    [START ON 12/29/2023] furosemide (LASIX) injection  10 mg Intravenous Daily    meropenem (MERREM) IVPB  500 mg Intravenous Q8H    metoprolol succinate  25 mg Oral Daily    mupirocin   Nasal BID    pantoprazole  40 mg Intravenous BID AC    sodium chloride 0.9%  10 mL Intravenous Q6H    traZODone  25 mg Oral QHS     Continuous Infusions:  PRN Meds:sodium chloride 0.9%, acetaminophen, acetaminophen, aluminum-magnesium hydroxide-simethicone, dextrose 10%, dextrose 10%, dicyclomine, glucagon (human recombinant), hydrALAZINE, HYDROmorphone, insulin aspart U-100, labetalol, melatonin, ondansetron, oxyCODONE, polyethylene glycol, prochlorperazine, senna-docusate 8.6-50 mg, sodium chloride 0.9%, Flushing PICC/Midline Protocol **AND** sodium chloride 0.9% **AND** sodium chloride 0.9%    Objective:  BP (!) 146/83   Pulse 83   Temp 97.8 °F (36.6 °C) (Oral)   Resp 18   Ht 5' 6" (1.676 m)   Wt 72.5 kg (159 lb 13.3 oz)   SpO2 95%   BMI 25.80 kg/m²     Physical Exam:   Physical Exam  Vitals reviewed.   HENT:      Head: Normocephalic.   Cardiovascular:      Rate and Rhythm: Normal rate and regular rhythm.   Pulmonary:      Effort: Pulmonary effort is normal. No respiratory distress.      Breath sounds: Normal breath sounds. No wheezing.   Abdominal:      General: Bowel sounds are normal. There is no distension.      Palpations: Abdomen is soft.      Tenderness: There is no abdominal tenderness.   Musculoskeletal:         General: Normal range of motion.      Cervical back: Normal range of motion.   Skin:     Findings: No rash.   Neurological:      Mental Status: He is alert and oriented to person, " place, and time.   Psychiatric:         Mood and Affect: Mood normal.         Behavior: Behavior normal.       Imaging      Lab Review   Recent Results (from the past 24 hour(s))   Comprehensive Metabolic Panel    Collection Time: 12/27/23  4:32 AM   Result Value Ref Range    Sodium Level 131 (L) 136 - 145 mmol/L    Potassium Level 3.9 3.5 - 5.1 mmol/L    Chloride 99 98 - 107 mmol/L    Carbon Dioxide 23 23 - 31 mmol/L    Glucose Level 126 (H) 82 - 115 mg/dL    Blood Urea Nitrogen 14.9 8.4 - 25.7 mg/dL    Creatinine 0.84 0.73 - 1.18 mg/dL    Calcium Level Total 7.8 (L) 8.8 - 10.0 mg/dL    Protein Total 5.3 (L) 5.8 - 7.6 gm/dL    Albumin Level 1.7 (L) 3.4 - 4.8 g/dL    Globulin 3.6 (H) 2.4 - 3.5 gm/dL    Albumin/Globulin Ratio 0.5 (L) 1.1 - 2.0 ratio    Bilirubin Total 1.2 <=1.5 mg/dL    Alkaline Phosphatase 104 40 - 150 unit/L    Alanine Aminotransferase 18 0 - 55 unit/L    Aspartate Aminotransferase 34 5 - 34 unit/L    eGFR >60 mls/min/1.73/m2   Magnesium    Collection Time: 12/27/23  4:32 AM   Result Value Ref Range    Magnesium Level 1.80 1.60 - 2.60 mg/dL   Phosphorus    Collection Time: 12/27/23  4:32 AM   Result Value Ref Range    Phosphorus Level 2.5 2.3 - 4.7 mg/dL   CBC with Differential    Collection Time: 12/27/23  4:32 AM   Result Value Ref Range    WBC 17.67 (H) 4.50 - 11.50 x10(3)/mcL    RBC 3.90 (L) 4.70 - 6.10 x10(6)/mcL    Hgb 11.9 (L) 14.0 - 18.0 g/dL    Hct 35.9 (L) 42.0 - 52.0 %    MCV 92.1 80.0 - 94.0 fL    MCH 30.5 27.0 - 31.0 pg    MCHC 33.1 33.0 - 36.0 g/dL    RDW 14.5 11.5 - 17.0 %    Platelet 389 130 - 400 x10(3)/mcL    MPV 9.8 7.4 - 10.4 fL    Neut % 80.5 %    Lymph % 6.7 %    Mono % 8.9 %    Eos % 1.2 %    Basophil % 0.4 %    Lymph # 1.18 0.6 - 4.6 x10(3)/mcL    Neut # 14.23 (H) 2.1 - 9.2 x10(3)/mcL    Mono # 1.57 (H) 0.1 - 1.3 x10(3)/mcL    Eos # 0.22 0 - 0.9 x10(3)/mcL    Baso # 0.07 <=0.2 x10(3)/mcL    IG# 0.40 (H) 0 - 0.04 x10(3)/mcL    IG% 2.3 %    NRBC% 0.0 %   POCT glucose     Collection Time: 12/27/23 11:00 AM   Result Value Ref Range    POCT Glucose 188 (H) 70 - 110 mg/dL       Assessment/Plan:  Enterococcus sepsis  Severe acute pancreatitis  Cholelithiasis / Cholangitis  Obstructive jaundice  SHA / CKD  Severe leukocytosis  Ascites / Pleural effusions    -Continue Ampicillin #10 and Merrem #5  -Will need a 14 day course from the negative blood cultures  -Afebrile with leukocytosis trending down, follow  -Blood cultures from admit revealed Enterococcus 1/2 sets. Repeat blood cultures on 12/20 negative  -2D Echo with no mention of vegetations  -Renal dysfunction improved with Crea normalized  -Transaminases trending down  -Stool for c-diff PCR and toxin negative  -Discussed with patient and nursing

## 2023-12-28 NOTE — PROGRESS NOTES
"Gastroenterology Progress Note    Subjective/Interval History:    Ate cream of wheat and drank ensure today followed by generalized abdominal pain. Pt is feeling discouraged. Associated nausea but no vomiting.     Leukocytosis continues to improve. VSS. Down to 2L O2 NC.    Vital Signs:  /82 (BP Location: Right arm, Patient Position: Sitting)   Pulse 86   Temp 98.5 °F (36.9 °C) (Oral)   Resp 18   Ht 5' 6" (1.676 m)   Wt 77.4 kg (170 lb 10.2 oz)   SpO2 96%   BMI 27.54 kg/m²   Body mass index is 27.54 kg/m².    Physical Exam  Constitutional:       General: He is not in acute distress.     Appearance: He is not ill-appearing.   HENT:      Head: Normocephalic and atraumatic.   Eyes:      General: No scleral icterus.     Extraocular Movements: Extraocular movements intact.   Cardiovascular:      Rate and Rhythm: Normal rate and regular rhythm.   Pulmonary:      Effort: Pulmonary effort is normal. No respiratory distress.      Comments: On 2L O2 NC.  Abdominal:      General: Bowel sounds are normal. There is no distension.      Palpations: Abdomen is soft. There is no mass.      Tenderness: There is mild abdominal tenderness throughout. There is no guarding or rebound.   Musculoskeletal:      Right lower leg: Edema (1+) present.      Left lower leg: Edema (1+) present.   Skin:     General: Skin is warm and dry.      Coloration: Skin is not jaundiced.   Neurological:      Mental Status: He is alert and oriented to person, place, and time.   Psychiatric:         Mood and Affect: Mood normal.         Behavior: Behavior normal.       Labs:  Recent Results (from the past 48 hour(s))   POCT glucose    Collection Time: 12/26/23  4:51 PM   Result Value Ref Range    POCT Glucose 156 (H) 70 - 110 mg/dL   POCT glucose    Collection Time: 12/26/23  8:23 PM   Result Value Ref Range    POCT Glucose 132 (H) 70 - 110 mg/dL   Comprehensive Metabolic Panel    Collection Time: 12/27/23  4:32 AM   Result Value Ref Range    " Sodium Level 131 (L) 136 - 145 mmol/L    Potassium Level 3.9 3.5 - 5.1 mmol/L    Chloride 99 98 - 107 mmol/L    Carbon Dioxide 23 23 - 31 mmol/L    Glucose Level 126 (H) 82 - 115 mg/dL    Blood Urea Nitrogen 14.9 8.4 - 25.7 mg/dL    Creatinine 0.84 0.73 - 1.18 mg/dL    Calcium Level Total 7.8 (L) 8.8 - 10.0 mg/dL    Protein Total 5.3 (L) 5.8 - 7.6 gm/dL    Albumin Level 1.7 (L) 3.4 - 4.8 g/dL    Globulin 3.6 (H) 2.4 - 3.5 gm/dL    Albumin/Globulin Ratio 0.5 (L) 1.1 - 2.0 ratio    Bilirubin Total 1.2 <=1.5 mg/dL    Alkaline Phosphatase 104 40 - 150 unit/L    Alanine Aminotransferase 18 0 - 55 unit/L    Aspartate Aminotransferase 34 5 - 34 unit/L    eGFR >60 mls/min/1.73/m2   Magnesium    Collection Time: 12/27/23  4:32 AM   Result Value Ref Range    Magnesium Level 1.80 1.60 - 2.60 mg/dL   Phosphorus    Collection Time: 12/27/23  4:32 AM   Result Value Ref Range    Phosphorus Level 2.5 2.3 - 4.7 mg/dL   CBC with Differential    Collection Time: 12/27/23  4:32 AM   Result Value Ref Range    WBC 17.67 (H) 4.50 - 11.50 x10(3)/mcL    RBC 3.90 (L) 4.70 - 6.10 x10(6)/mcL    Hgb 11.9 (L) 14.0 - 18.0 g/dL    Hct 35.9 (L) 42.0 - 52.0 %    MCV 92.1 80.0 - 94.0 fL    MCH 30.5 27.0 - 31.0 pg    MCHC 33.1 33.0 - 36.0 g/dL    RDW 14.5 11.5 - 17.0 %    Platelet 389 130 - 400 x10(3)/mcL    MPV 9.8 7.4 - 10.4 fL    Neut % 80.5 %    Lymph % 6.7 %    Mono % 8.9 %    Eos % 1.2 %    Basophil % 0.4 %    Lymph # 1.18 0.6 - 4.6 x10(3)/mcL    Neut # 14.23 (H) 2.1 - 9.2 x10(3)/mcL    Mono # 1.57 (H) 0.1 - 1.3 x10(3)/mcL    Eos # 0.22 0 - 0.9 x10(3)/mcL    Baso # 0.07 <=0.2 x10(3)/mcL    IG# 0.40 (H) 0 - 0.04 x10(3)/mcL    IG% 2.3 %    NRBC% 0.0 %   POCT glucose    Collection Time: 12/27/23 11:00 AM   Result Value Ref Range    POCT Glucose 188 (H) 70 - 110 mg/dL   POCT glucose    Collection Time: 12/27/23  8:52 PM   Result Value Ref Range    POCT Glucose 131 (H) 70 - 110 mg/dL   Basic Metabolic Panel    Collection Time: 12/28/23  6:21 AM    Result Value Ref Range    Sodium Level 131 (L) 136 - 145 mmol/L    Potassium Level 4.4 3.5 - 5.1 mmol/L    Chloride 100 98 - 107 mmol/L    Carbon Dioxide 25 23 - 31 mmol/L    Glucose Level 129 (H) 82 - 115 mg/dL    Blood Urea Nitrogen 13.4 8.4 - 25.7 mg/dL    Creatinine 0.95 0.73 - 1.18 mg/dL    BUN/Creatinine Ratio 14     Calcium Level Total 8.0 (L) 8.8 - 10.0 mg/dL    Anion Gap 6.0 mEq/L    eGFR >60 mls/min/1.73/m2   Magnesium    Collection Time: 12/28/23  6:21 AM   Result Value Ref Range    Magnesium Level 1.70 1.60 - 2.60 mg/dL   Phosphorus    Collection Time: 12/28/23  6:21 AM   Result Value Ref Range    Phosphorus Level 2.5 2.3 - 4.7 mg/dL   CBC with Differential    Collection Time: 12/28/23  6:21 AM   Result Value Ref Range    WBC 14.04 (H) 4.50 - 11.50 x10(3)/mcL    RBC 3.95 (L) 4.70 - 6.10 x10(6)/mcL    Hgb 12.1 (L) 14.0 - 18.0 g/dL    Hct 37.1 (L) 42.0 - 52.0 %    MCV 93.9 80.0 - 94.0 fL    MCH 30.6 27.0 - 31.0 pg    MCHC 32.6 (L) 33.0 - 36.0 g/dL    RDW 14.2 11.5 - 17.0 %    Platelet 443 (H) 130 - 400 x10(3)/mcL    MPV 9.6 7.4 - 10.4 fL    Neut % 80.4 %    Lymph % 7.5 %    Mono % 9.0 %    Eos % 1.2 %    Basophil % 0.5 %    Lymph # 1.06 0.6 - 4.6 x10(3)/mcL    Neut # 11.28 (H) 2.1 - 9.2 x10(3)/mcL    Mono # 1.27 0.1 - 1.3 x10(3)/mcL    Eos # 0.17 0 - 0.9 x10(3)/mcL    Baso # 0.07 <=0.2 x10(3)/mcL    IG# 0.19 (H) 0 - 0.04 x10(3)/mcL    IG% 1.4 %    NRBC% 0.0 %   POCT glucose    Collection Time: 12/28/23  6:23 AM   Result Value Ref Range    POCT Glucose 122 (H) 70 - 110 mg/dL       Imaging:  X-Ray Chest 1 View for Line/Tube Placement    Result Date: 12/23/2023  EXAMINATION: XR CHEST 1 VIEW FOR LINE/TUBE PLACEMENT CLINICAL HISTORY: PICC; TECHNIQUE: One view COMPARISON: December 20, 2023. FINDINGS: Right upper extremity approach PICC line which terminates about the cavoatrial junction and is optimal.  Cardiopericardial silhouette is within normal limits.  Improved right pleural effusion.  There is new  thickened opacity right lower lung zone which may represent atelectasis.  Attention to follow-up exams.  There also new left basilar hypoventilatory changes no pulmonary edema.  No pneumothorax.     Optimal placement of the PICC line. Electronically signed by: Denver Wagner Date:    12/23/2023 Time:    19:56    CT Chest Abdomen Pelvis Without Contrast (XPD)    Result Date: 12/23/2023  EXAMINATION: CT CHEST ABDOMEN PELVIS WITHOUT CONTRAST(XPD) CLINICAL HISTORY: follow up; TECHNIQUE: Helical acquisition from the thoracic inlet through the ischia without IV contrast.  Lack of contrast limits evaluation of solid organs and vascular structures.  Three plane reconstructions made available for review. DLP 1042 mGycm. Automatic exposure control, adjustment of mA/kV or iterative reconstruction technique was used to reduce radiation. COMPARISON: 18 December 2023, 15 August 2023 FINDINGS: Chest. Heart is not significantly enlarged.  There are coronary artery calcifications.  No pericardial effusion. No mediastinal, hilar or axillary adenopathy by CT size criteria. There are small bilateral pleural effusions slightly improved compared to prior.  Mild bibasilar atelectasis.  Moderate to severe emphysema. Abdomen and pelvis. No acute findings noncontrast liver, spleen, adrenals or kidneys.  Gallbladder surgically absent. There is increased peripancreatic inflammation compared to prior.  Question a developing collection anterior and superior to the portion of the pancreas which had suggestion of necrosis on prior imaging.  This collection is seen on images 104-110 series 2.  There is small volume ascites. There is no bowel obstruction or free air.  There is colonic diverticulosis. Urinary bladder unremarkable.  Prostate mildly enlarged.  Abdominal aorta mildly ectatic with moderate atherosclerotic disease. There are no acute osseous findings.     1. Worsened appearance of pancreatitis with possible collection developing anterior  and superior to the pancreas. 2. Small volume ascites. 3. Small bilateral pleural effusions. Electronically signed by: Osiel Whitehead Date:    12/23/2023 Time:    10:59    Echo    Result Date: 12/20/2023    Left Ventricle: The left ventricle is normal in size. Normal wall thickness. Normal wall motion. There is normal systolic function with a visually estimated ejection fraction of 55 - 60%. Grade I diastolic dysfunction.   Right Ventricle: Normal right ventricular cavity size. Systolic function is normal.   Aortic Valve: The aortic valve is a trileaflet valve.   Pericardium: There is a trivial effusion. No indication of cardiac tamponade.   Technically difficult study due to lung interference.     X-Ray Chest 1 View    Result Date: 12/20/2023  EXAMINATION: XR CHEST 1 VIEW CLINICAL HISTORY: follow up; TECHNIQUE: Single view of the chest COMPARISON: 12/18/2023 FINDINGS: Cardiomegaly is unchanged.  Small right effusion is slightly increased in the interval.  No acute osseous abnormality.     As above. Electronically signed by: Cristhian Lara Date:    12/20/2023 Time:    09:53    CT Abdomen Pelvis  Without Contrast    Result Date: 12/18/2023  EXAMINATION: CT ABDOMEN PELVIS WITHOUT CONTRAST CLINICAL HISTORY: rise in tbili and pain s/p ERCP; TECHNIQUE: Low dose axial images, sagittal and coronal reformations were obtained from the lung bases to the pubic symphysis.  No contrast was administered.  Automatic exposure control is utilized to reduce patient radiation exposure. COMPARISON: 12/16/2023 FINDINGS: There are changes seen consistent with emphysema and COPD in the lungs.  There is  bibasilar atelectasis and bilateral pleural effusions.  This is a new finding.. The liver is hypoattenuated consistent with hepatic steatosis.  The patient is status post cholecystectomy.  There is evidence of ascites.  This is more prominent than the prior examination. There are inflammatory changes seen around the pancreas consistent with  pancreatitis.  This was seen on the prior examination as well.  Previous examination showed incomplete enhancement of the pancreas concerning for developing necrotizing pancreatitis.  No free intraperitoneal air is seen on this examination. Adrenal glands appear normal. Kidneys appear normal.  No hydronephrosis is seen.  No hydroureter seen. No colitis is seen.  No diverticulitis is seen.  No colonic mass is seen. Urinary bladder appears grossly unremarkable. Atherosclerotic changes are seen within the abdominal aorta and its branches. Bones appear grossly unremarkable.     Interval worsening of the ascites Persistent inflammatory changes around the pancreas consistent patient's history of pancreatitis.  Previous examination showed incomplete enhancement of the pancreas suggesting possible developing necrotizing pancreatitis. Interval development of bibasilar atelectasis and moderate to large pleural effusions Electronically signed by: Wayne Gee Date:    12/18/2023 Time:    12:12    X-Ray Chest 1 View    Result Date: 12/18/2023  EXAMINATION: XR CHEST 1 VIEW CPT 06026 CLINICAL HISTORY: Hypoxemia; FINDINGS: Examination reveals mediastinal silhouette to be within normal limits cardiac silhouette is not enlarged.  There is some increase interstitial and pulmonary vascular markings which may indicate a degree of pulmonary vascular congestion. There is blunting of both costophrenic angles indicating the presence of bilateral pleural effusions. Slightly more confluent opacities identified at the left base superimposed infiltrate cannot be completely excluded. Atelectatic changes identified at the right base     Changes of pulmonary vascular congestion. Bilateral pleural effusions Electronically signed by: Radu Edwards Date:    12/18/2023 Time:    09:43    US Retroperitoneal Complete    Result Date: 12/18/2023  EXAMINATION: US RETROPERITONEAL COMPLETE CLINICAL HISTORY: Acute on chronic kidney disease. COMPARISON:  CT 16 December 2023 FINDINGS: Grayscale and color Doppler sonographic evaluation of the kidneys and urinary bladder. The right kidney measures 11 cm. The left kidney measures 11 cm.   No hydronephrosis.  Grossly normal renal parenchymal echogenicity. No significant abnormality of the urinary bladder. There is small volume ascites.     No significant sonographic abnormality of the kidneys. Electronically signed by: Osiel Whitehead Date:    12/18/2023 Time:    09:09    FL ERCP Biliary And Pancreatic By Rad Tech    Result Date: 12/17/2023  EXAMINATION: FL ERCP BILIARY AND PANCREATIC CLINICAL HISTORY: biliary stone; TECHNIQUE: 52.8mGy of fluoroscopic support was utilized for procedural support during procedure.  Please refer to performing provider dictation. COMPARISON: None FINDINGS: Fluoroscopic support. Please refer to procedure of this dictation.     Fluoroscopic support.  Please refer to procedure of this dictation. Electronically signed by: Cristhian Lara Date:    12/17/2023 Time:    13:04    MRI MRCP Without Contrast    Result Date: 12/17/2023  EXAMINATION: MRI ABDOMEN WITHOUT CONTRAST MRCP CLINICAL HISTORY: Post cholecystectomy pancreatitis -- ?  Choledocholithiases; TECHNIQUE: Multiplanar, multisequence images are performed through the liver and upper abdomen.  Additionally 2D and 3D MRCP sequences are performed as well as MIP images. COMPARISON: None. FINDINGS: Lung bases: Moderate streaky is present in the visualized lung bases consistent with nonspecific dependent changes and scarring. Liver: Liver appears normal in size. Portal venous system: Normal. Gallbladder: Gallbladder is surgically absent. Biliary tree intrahepatic ducts: Unremarkable. Biliary tree extrahepatic ducts: There appear to be numerous intraluminal filling defects in the mid to distal common bile duct series image 16 series 3. Ampullary region: No obvious obstructive mass or stone. Spleen: Unremarkable. Pancreas: There is intermediate  intrasubstance T2 signal in the pancreatic body (image 19 series 4 and 6) with corresponding restricted diffusion signal on DWI (image 136 series 7), relating to edema changes. There is stable free fluid collection in the bilateral anterior pararenal space extending to the adjacent mesocolon, small bowel mesentery, perihepatic and perisplenic regions. T2 stranding intensities are seen along the lesser sac. These findings are reflective of acute interstitial pancreatitis. Pancreatic duct: Unremarkable. Adrenal glands: Unremarkable. Kidneys: There are probable cysts in both kidneys, with the larger seen in the right mid pole region measuring 1.0 cm (image 29 series 4).Ureters: Unremarkable. Stomach and distal esophagus: Normal. Small bowel: Normal. Colon: Normal. Lymph nodes: A 1cm lymph node is demonstrated in the mo hepatis region (image 18 series 4), likely reactive in nature. Abdominal wall: Normal. Systemic arteries and veins: Unremarkable. Osseous structures: There is mild spondylolytic changes in the imaged spine. Miscellaneous: There are motion artifacts in some of the sequences limiting its evaluation.     1. There appear to be numerous intraluminal filling defects in the mid to distal common bile duct series image 16 series 3. These are suggestive of impacted gallstones and sludge. Correlate clinically as regards additional evaluation and follow-up possibly with ERCP. 2. There is intermediate intrasubstance T2 signal in the pancreatic body (image 19 series 4 and 6) with corresponding restricted diffusion signal on DWI (image 136 series 7), relating to edema changes. There is stable free fluid collection in the bilateral anterior pararenal space extending to the adjacent mesocolon, small bowel mesentery, perihepatic and perisplenic regions. T2 stranding intensities are seen along the lesser sac. These findings are reflective of acute interstitial pancreatitis. Correlate with clinical and laboratory  findings as regards additional evaluation and follow-up to full resolution as indicated. 3. A 1cm lymph node is demonstrated in the mo hepatis region (image 18 series 4), likely reactive in nature. I concur with the preliminary report above. Electronically signed by: Wayne Gee Date:    12/17/2023 Time:    12:12    CT Abdomen Pelvis With IV Contrast NO Oral Contrast    Result Date: 12/16/2023  EXAMINATION: CT ABDOMEN PELVIS WITH IV CONTRAST CLINICAL HISTORY: Abdominal pain, acute, nonlocalized; TECHNIQUE: Multidetector axial images were obtained of the abdomen and pelvis following the administration of IV contrast. Oral contrast was not administered. Dose length product of 578 mGycm. Automated exposure control was utilized to minimize radiation dose. COMPARISON: August 4, 2023. FINDINGS: Included portion of the lungs show dependent hypoventilatory changes without acute air space infiltrates or fluid within the pleural spaces. Liver is remarkable for steatosis without focal space occupying lesion.  There is small amount of free fluid around the anterolateral surface of the liver.  Gallbladder is surgically absent.  Pancreas is surrounded by considerable phlegmons and fluid which extend into the anterior pararenal spaces and further into the lower abdomen consistent with acute pancreatitis.  There is no suggestion of pancreatic necrosis, pseudocyst or abscess formation.  Spleen is unremarkable. The adrenal glands appear within normal limits. The kidneys are unremarkable in size and contour. No solid or cystic renal lesion identified. There is no hydronephrosis. No perinephric fluid strandings or collections identified. Stomach is nondistended.  There is distal esophageal mural thickening which probably result of reflux disease with about similar appearance.  There is some mural thickening of the gastric antrum and the descending colon which may represent reactive change from acute pancreatitis.  Possible primary  gastritis and duodenitis is not excluded.  No abnormal dilatation of loops of small bowel.  Colon is nondistended.  No bowel obstruction.  Distal descending and sigmoid colon noninflamed diverticulosis coli. Urinary bladder appears within normal limits.  Prostate is enlarged in size and contains few calcifications.  There is large right hydrocele which extends into right inguinal canal.  Is no pelvic free fluid. No acute or otherwise osseous abnormality identified.     1. Severe acute pancreatitis. 2. Gastric antrum and duodenal mural thickening likely represents reactive change for acute pancreatitis. 3. Right large hydrocele. Electronically signed by: Denver Wagner Date:    12/16/2023 Time:    20:08    US Scrotum And Testicles    Result Date: 12/16/2023  EXAMINATION: US SCROTUM AND TESTICLES CLINICAL HISTORY: Other specified disorders of the male genital organs TECHNIQUE: Multiple real-time images were performed of the scrotum in various planes by the sonographer. COMPARISON: None available FINDINGS: Right testicle measures 3.3 x 2.4 x 2.4 cm.  There is unremarkable echotexture of the right testicle.  Unremarkable arteriovenous flow to the right testicle. There is large right scrotal hydrocele which measures 10.6 x 7.2 x 8.5 cm.  Right hydrocele causes mass effect upon left testicle which is deviated inferiorly.  This made it difficult to optimally assess vascularity to the left testicle due to pressure caused by the hydrocele.  Some vascularity along the peripheral aspect of less testicle was visualized.  Left testicle measures 3.4 x 2.0 x 2.0 cm and no abnormality of the parenchymal echotexture identified.     1. Unremarkable right testicle 2. Large right hydrocele which crosses the midline and causes compressive effect upon the left testicle which is inferiorly deviated.  Due to pressure caused by the hydrocele upon the left testicle optimal Doppler flow to the left testicle could not be obtained.  Only limited  arterial flow to the peripheral aspect left testicle could be visualized.  Please correlate clinically.  Oneoption is to perform a follow-up study post drainage of large right hydrocele. Electronically signed by: Denver Wagner Date:    12/16/2023 Time:    19:21         Assessment/Plan:  68 y.o. male unknown to our group with PMH of T2DM, HTN, HLD, chronic scrotal hydrocele, vitamin D deficiency, CKD 3a. Presented to the ED 12/16/23 with severe epigastric pain onset same day with radiation to back, associated n/v and chills also reported. GI consulted for choledocholithiasis. Underwent ERCP 12/17 with sludge and stone removal.      Gallstone pancreatitis  Transaminitis--resolved     - transaminitis resolved and respiratory status improving.   - noted scheduled CT with IV contrast for tomorrow per primary. F/u results.   - continue supportive care  - PPI BID  - ADAT     Olivia Franks PA-C

## 2023-12-28 NOTE — PROGRESS NOTES
[facility]  Hospital Medicine Progress Note        Chief Complaint: Inpatient Follow-up abdominal pain    HPI:   68-year-old male with medical history of T2DM, hypertension, hyperlipidemia and prior cholecystectomy present to the ED with complaint of severe epigastric abdominal pain that started today around 9:00 a.m. after breakfast, the pain is severe 10/10 and radiate to his back associated with nausea and vomiting.  Report last bowel movement was this morning.  Reports chills but denies fever.  He has chronic scrotal hydrocele that has not changed or painful.     On arrival to ED he was afebrile, tachycardic, normotensive and saturating 96% on room air.  Labs notable for WBC 24.97, hemoglobin 18.2, creatinine 2.0, BUN 16.5, total bilirubin 2.7, direct 1.9, , , lipase more than 3000, triglyceride 183.  CT abdomen and pelvis show finding of severe acute pancreatitis without evidence of necrosis or abscess or fluid collection.  Liver remarkable for steatosis, gallbladder surgically absent, no comment on biliary tree.     He was given 2 L of IV fluids, IV Zosyn, IV analgesics and referred to hospital medicine service for further evaluation and management.  Patient was taken for ERCP on 12/17.  Choledocholithiasis was resolved.  Also had sphincterotomy performed.  Returned to the floor in stable condition.  GI recommended advancement of diet to clear liquids in 4-6 hours     Patient  continues to be requiring oxygen.  Echo with grade 1 diastolic dysfunction, normal systolic function.  Follow up chest x-ray obtained  with small right pleural effusion slightly increased. Nephrology okay to transition to oral Lasix.  GI signed off.    Noted to have bacteremia, Infectious diseases recommending 14 day course from negative blood cultures of ampicillin and Flagyl.    As leukocytosis not improving, ordered a CT scan  which commenting on worsened appearance pancreatitis with  possible collection developing and small volume ascites, small bilateral pleural effusions.  Meropenem was added to his antibiotics.  GI was reconsulted, notified about the scan and requested for further recommendations, recommending surgery consult.  Surgery on board.  Plan to continue to monitor and continue the antibiotics and repeat imaging in few days.  Diet changed to clears.  Continues to require oxygen. Changed lasix to IV.      Interval Hx:  GI  continues follow up,recommending repeat imaging in future, odered tmrw . On antibiotics. Continues to be requiring oxygen.  Diet advanced to full liquids.      Patient was  seen and examined.GI symptoms improving  today.  Denies any  shortness for breath, chest pain or cough    Chart was reviewed, T-max of 98°.3, .  Most recent labs were reviewed.  Leukocytosis improving , down to 14.  Creatinine normalized. transaminitis improving.     Objective/physical exam:  General: In no acute distress, afebrile  Chest:  Crackles, on nasal cannula  Heart: RRR, +S1, S2, no appreciable murmur  Abdomen: Soft, nontender, BS +  MSK: Warm, no lower extremity edema, no clubbing or cyanosis  Neurologic: Alert and oriented x4, Cranial nerve II-XII intact, Strength 5/5 in all 4 extremities       VITAL SIGNS: 24 HRS MIN & MAX LAST   Temp  Min: 97.8 °F (36.6 °C)  Max: 98.3 °F (36.8 °C) 97.9 °F (36.6 °C)   BP  Min: 127/79  Max: 156/72 (!) 147/82   Pulse  Min: 78  Max: 93  93   Resp  Min: 18  Max: 18 18   SpO2  Min: 94 %  Max: 96 % (!) 94 %       Recent Labs   Lab 12/26/23  0435 12/27/23  0432 12/28/23  0621   WBC 21.44* 17.67* 14.04*   RBC 3.91* 3.90* 3.95*   HGB 12.0* 11.9* 12.1*   HCT 37.2* 35.9* 37.1*   MCV 95.1* 92.1 93.9   MCH 30.7 30.5 30.6   MCHC 32.3* 33.1 32.6*   RDW 14.6 14.5 14.2    389 443*   MPV 9.8 9.8 9.6       Recent Labs   Lab 12/25/23  0728 12/26/23  0434 12/27/23  0432 12/28/23  0621   * 133* 131* 131*   K 4.0 4.3 3.9 4.4   CO2 25 26 23 25   BUN 18.7 17.6  14.9 13.4   CREATININE 0.90 0.97 0.84 0.95   CALCIUM 7.9* 7.4* 7.8* 8.0*   MG 1.70 2.00 1.80 1.70   ALBUMIN 1.7* 1.7* 1.7*  --    ALKPHOS 103 97 104  --    ALT 23 23 18  --    AST 37* 36* 34  --    BILITOT 1.3 1.3 1.2  --           Microbiology Results (last 7 days)       Procedure Component Value Units Date/Time    Blood Culture [[id number]]  (Normal) Collected: 12/20/23 1046    Order Status: Completed Specimen: Blood from Arm, Right Updated: 12/25/23 1202     CULTURE, BLOOD ([facility]) No Growth at 5 days    Blood Culture [[id number]]  (Normal) Collected: 12/20/23 1045    Order Status: Completed Specimen: Blood from Antecubital, Left Updated: 12/25/23 1202     CULTURE, BLOOD ([facility]) No Growth at 5 days    Stool Culture [[id number]]  (Normal) Collected: 12/23/23 0554    Order Status: Completed Specimen: Stool Updated: 12/25/23 0920     Stool Culture Negative for Salmonella, Shigella, Campylobacter, Vibrio, Aeromonas, Pleisiomonas,Yersinia, or Shiga Toxin 1 and 2.    Respiratory Culture [[id number]]     Order Status: Canceled Specimen: Sputum, Expectorated     Blood culture #1 **CANNOT BE ORDERED STAT** [[id number]]  (Normal) Collected: 12/16/23 2017    Order Status: Completed Specimen: Blood from Antecubital, Right Updated: 12/21/23 2100     CULTURE, BLOOD ([facility]) No Growth at 5 days             Radiology:  X-Ray Chest 1 View for Line/Tube Placement  Narrative: EXAMINATION:  XR CHEST 1 VIEW FOR LINE/TUBE PLACEMENT    CLINICAL HISTORY:  PICC;    TECHNIQUE:  One view    COMPARISON:  December 20, 2023.    FINDINGS:  Right upper extremity approach PICC line which terminates about the cavoatrial junction and is optimal.  Cardiopericardial silhouette is within normal limits.  Improved right pleural effusion.  There is new thickened opacity right lower lung zone which may represent atelectasis.  Attention to follow-up exams.  There also new left basilar hypoventilatory changes no pulmonary edema.  No pneumothorax.  Impression:  Optimal placement of the PICC line.    Electronically signed by: Denver Wagner  Date:    12/23/2023  Time:    19:56  CT Chest Abdomen Pelvis Without Contrast (XPD)  Narrative: EXAMINATION:  CT CHEST ABDOMEN PELVIS WITHOUT CONTRAST(XPD)    CLINICAL HISTORY:  follow up;    TECHNIQUE:  Helical acquisition from the thoracic inlet through the ischia without IV contrast.  Lack of contrast limits evaluation of solid organs and vascular structures.  Three plane reconstructions made available for review. DLP 1042 mGycm. Automatic exposure control, adjustment of mA/kV or iterative reconstruction technique was used to reduce radiation.    COMPARISON:  18 December 2023, 15 August 2023    FINDINGS:  Chest.    Heart is not significantly enlarged.  There are coronary artery calcifications.  No pericardial effusion.    No mediastinal, hilar or axillary adenopathy by CT size criteria.    There are small bilateral pleural effusions slightly improved compared to prior.  Mild bibasilar atelectasis.  Moderate to severe emphysema.    Abdomen and pelvis.    No acute findings noncontrast liver, spleen, adrenals or kidneys.  Gallbladder surgically absent.    There is increased peripancreatic inflammation compared to prior.  Question a developing collection anterior and superior to the portion of the pancreas which had suggestion of necrosis on prior imaging.  This collection is seen on images 104-110 series 2.  There is small volume ascites.    There is no bowel obstruction or free air.  There is colonic diverticulosis.    Urinary bladder unremarkable.  Prostate mildly enlarged.  Abdominal aorta mildly ectatic with moderate atherosclerotic disease.    There are no acute osseous findings.  Impression: 1. Worsened appearance of pancreatitis with possible collection developing anterior and superior to the pancreas.  2. Small volume ascites.  3. Small bilateral pleural effusions.    Electronically signed by: Osiel  Ventura  Date:    12/23/2023  Time:    10:59      Scheduled Med:   ampicillin IVPB  2 g Intravenous Q6H    enoxparin  30 mg Subcutaneous Q24H (prophylaxis, 1700)    [START ON 12/29/2023] furosemide (LASIX) injection  10 mg Intravenous Daily    meropenem (MERREM) IVPB  500 mg Intravenous Q8H    metoprolol succinate  25 mg Oral Daily    mupirocin   Nasal BID    pantoprazole  40 mg Intravenous BID AC    sodium chloride 0.9%  10 mL Intravenous Q6H    traZODone  25 mg Oral QHS        Continuous Infusions:       PRN Meds:  sodium chloride 0.9%, acetaminophen, acetaminophen, aluminum-magnesium hydroxide-simethicone, dextrose 10%, dextrose 10%, dicyclomine, glucagon (human recombinant), hydrALAZINE, HYDROmorphone, insulin aspart U-100, labetalol, melatonin, ondansetron, oxyCODONE, polyethylene glycol, prochlorperazine, senna-docusate 8.6-50 mg, sodium chloride 0.9%, Flushing PICC/Midline Protocol **AND** sodium chloride 0.9% **AND** sodium chloride 0.9%       Assessment/Plan:   Acute severe pancreatitis- suspect biliary  Cholangitis, acute  Choledocholithiasis status post ERCP  Severe sepsis  Leukocytosis  Enterococcus faecalis bacteremia  SHA superimposed on CKD 3A  Acute hypoxic respiratory failure requiring supplemental oxygen secondary to pulmonary edema/pleural effusion, for fluid resuscitation/concern for ARDS , from underlying pancreatitis  , history of smoking, questionable COPD?       History of T2DM, hypertension, hyperlipidemia, GERD    Plan  GI following.  Underwent ERCP with sludge and stone removal.  Lipase improved.Transaminitis improving.Ordered antibiotics-Flagyl, Ampicillin.  , Blood cultures noted to be positive, ordered repeat cultures, so far negative.Infectious Disease was consulted, on further recommendations/investigations if any-recommending 14 days after negative culture(last date likely around January 6th per our calculations, no other investigations recommended).Given worsening leukocytosis,  ordered a scan 12/23 (without contrast due to sha)showing possible fluid collection around the pancreas and small volume ascites , added meropenem.  GI was consulted.  Recommended surgery consult.  Plan to continue to monitor and consider repeat imaging in 7-10 days.  (Repeat scan ordered for 12/29).  We will continue to follow up with surgery and GI if patient if will need IR drainage if worsening/pending new CT finding results from 12/29. Diet as tolerated, now advanced to full liquids.  Monitor fever curve and WBC  Nephrology followed for SHA, creatinine improved on diuretics.  On 3 L at present.  Wean oxygen as tolerated.  Consider bronchodilators p.r.n..  Encourage incentive spirometry. Followed up on echocardiogram-EF 50-55.Grade I diastolic dysfunction.  Consider IV Lasix in-house while monitoring renal function, we will keep a close eye as there is a concern for ARDS in such patients.  Most recent scan commenting on small bilateral pleural effusions.  Strict Is&Os  Continue supportive care appropriate home medications.  On Sliding scale with fingersticks a.c. HS for management of diabetes in-house.  Therapy services    DVT prophylaxis-Lovenox      Anticipated discharge-to be decided,  once off oxygen and pending clinical improvement.  GI to clear the patient.  Patient has repeat CT scan on 12/29      Critical care diagnosis: sepsis, iv antibiotics, acute hypoxic respiratory failure requiring oxygen  Critical care interventions: hands on evaluation, review of labs/radiographs/records and discussions with family  Critical care time spent: >32 minutes        Teresa Anthony MD   12/28/2023     All diagnosis and differential diagnosis have been reviewed; assessment and plan has been documented; I have personally reviewed the labs and test results that are presently available; I have reviewed the patients medication list; I have reviewed the consulting providers response and recommendations. I have reviewed or  attempted to review medical records based upon their availability    All of the patient's questions have been  addressed and answered. Patient's is agreeable to the above stated plan. I will continue to monitor closely and make adjustments to medical management as needed.  _____________________________________________________________________

## 2023-12-29 LAB
BASOPHILS # BLD AUTO: 0.07 X10(3)/MCL
BASOPHILS NFR BLD AUTO: 0.7 %
EOSINOPHIL # BLD AUTO: 0.1 X10(3)/MCL (ref 0–0.9)
EOSINOPHIL NFR BLD AUTO: 0.9 %
ERYTHROCYTE [DISTWIDTH] IN BLOOD BY AUTOMATED COUNT: 14 % (ref 11.5–17)
HCT VFR BLD AUTO: 35.3 % (ref 42–52)
HGB BLD-MCNC: 11.5 G/DL (ref 14–18)
IMM GRANULOCYTES # BLD AUTO: 0.11 X10(3)/MCL (ref 0–0.04)
IMM GRANULOCYTES NFR BLD AUTO: 1 %
LYMPHOCYTES # BLD AUTO: 0.89 X10(3)/MCL (ref 0.6–4.6)
LYMPHOCYTES NFR BLD AUTO: 8.4 %
MAGNESIUM SERPL-MCNC: 1.8 MG/DL (ref 1.6–2.6)
MCH RBC QN AUTO: 30.7 PG (ref 27–31)
MCHC RBC AUTO-ENTMCNC: 32.6 G/DL (ref 33–36)
MCV RBC AUTO: 94.4 FL (ref 80–94)
MONOCYTES # BLD AUTO: 1.11 X10(3)/MCL (ref 0.1–1.3)
MONOCYTES NFR BLD AUTO: 10.4 %
NEUTROPHILS # BLD AUTO: 8.35 X10(3)/MCL (ref 2.1–9.2)
NEUTROPHILS NFR BLD AUTO: 78.6 %
NRBC BLD AUTO-RTO: 0 %
PHOSPHATE SERPL-MCNC: 2.6 MG/DL (ref 2.3–4.7)
PLATELET # BLD AUTO: 424 X10(3)/MCL (ref 130–400)
PMV BLD AUTO: 9.2 FL (ref 7.4–10.4)
POCT GLUCOSE: 110 MG/DL (ref 70–110)
POCT GLUCOSE: 125 MG/DL (ref 70–110)
POCT GLUCOSE: 134 MG/DL (ref 70–110)
POCT GLUCOSE: 143 MG/DL (ref 70–110)
POCT GLUCOSE: 151 MG/DL (ref 70–110)
RBC # BLD AUTO: 3.74 X10(6)/MCL (ref 4.7–6.1)
WBC # SPEC AUTO: 10.63 X10(3)/MCL (ref 4.5–11.5)

## 2023-12-29 PROCEDURE — 25000003 PHARM REV CODE 250: Performed by: INTERNAL MEDICINE

## 2023-12-29 PROCEDURE — 63600175 PHARM REV CODE 636 W HCPCS: Performed by: NURSE PRACTITIONER

## 2023-12-29 PROCEDURE — 25000003 PHARM REV CODE 250: Performed by: NURSE PRACTITIONER

## 2023-12-29 PROCEDURE — 63600175 PHARM REV CODE 636 W HCPCS: Performed by: HOSPITALIST

## 2023-12-29 PROCEDURE — 25500020 PHARM REV CODE 255: Performed by: INTERNAL MEDICINE

## 2023-12-29 PROCEDURE — 21400001 HC TELEMETRY ROOM

## 2023-12-29 PROCEDURE — 11000001 HC ACUTE MED/SURG PRIVATE ROOM

## 2023-12-29 PROCEDURE — 63600175 PHARM REV CODE 636 W HCPCS

## 2023-12-29 PROCEDURE — 83735 ASSAY OF MAGNESIUM: CPT | Performed by: INTERNAL MEDICINE

## 2023-12-29 PROCEDURE — 85025 COMPLETE CBC W/AUTO DIFF WBC: CPT | Performed by: INTERNAL MEDICINE

## 2023-12-29 PROCEDURE — A4216 STERILE WATER/SALINE, 10 ML: HCPCS | Performed by: INTERNAL MEDICINE

## 2023-12-29 PROCEDURE — C9113 INJ PANTOPRAZOLE SODIUM, VIA: HCPCS

## 2023-12-29 PROCEDURE — 84100 ASSAY OF PHOSPHORUS: CPT | Performed by: INTERNAL MEDICINE

## 2023-12-29 RX ORDER — FUROSEMIDE 40 MG/1
40 TABLET ORAL DAILY
Status: DISCONTINUED | OUTPATIENT
Start: 2023-12-30 | End: 2024-01-04 | Stop reason: HOSPADM

## 2023-12-29 RX ADMIN — AMPICILLIN 2 G: 2 INJECTION, POWDER, FOR SOLUTION INTRAVENOUS at 08:12

## 2023-12-29 RX ADMIN — OXYCODONE HYDROCHLORIDE 5 MG: 5 TABLET ORAL at 08:12

## 2023-12-29 RX ADMIN — IOPAMIDOL 100 ML: 755 INJECTION, SOLUTION INTRAVENOUS at 10:12

## 2023-12-29 RX ADMIN — PANTOPRAZOLE SODIUM 40 MG: 40 INJECTION, POWDER, FOR SOLUTION INTRAVENOUS at 04:12

## 2023-12-29 RX ADMIN — ENOXAPARIN SODIUM 30 MG: 30 INJECTION SUBCUTANEOUS at 04:12

## 2023-12-29 RX ADMIN — MUPIROCIN: 20 OINTMENT TOPICAL at 08:12

## 2023-12-29 RX ADMIN — AMPICILLIN 2 G: 2 INJECTION, POWDER, FOR SOLUTION INTRAVENOUS at 04:12

## 2023-12-29 RX ADMIN — METOPROLOL SUCCINATE 25 MG: 25 TABLET, EXTENDED RELEASE ORAL at 08:12

## 2023-12-29 RX ADMIN — MEROPENEM 500 MG: 500 INJECTION, POWDER, FOR SOLUTION INTRAVENOUS at 01:12

## 2023-12-29 RX ADMIN — AMPICILLIN 2 G: 2 INJECTION, POWDER, FOR SOLUTION INTRAVENOUS at 02:12

## 2023-12-29 RX ADMIN — PANTOPRAZOLE SODIUM 40 MG: 40 INJECTION, POWDER, FOR SOLUTION INTRAVENOUS at 05:12

## 2023-12-29 RX ADMIN — OXYCODONE HYDROCHLORIDE 5 MG: 5 TABLET ORAL at 06:12

## 2023-12-29 RX ADMIN — SODIUM CHLORIDE, PRESERVATIVE FREE 10 ML: 5 INJECTION INTRAVENOUS at 05:12

## 2023-12-29 RX ADMIN — MEROPENEM 500 MG: 500 INJECTION, POWDER, FOR SOLUTION INTRAVENOUS at 08:12

## 2023-12-29 RX ADMIN — TRAZODONE HYDROCHLORIDE 25 MG: 50 TABLET ORAL at 08:12

## 2023-12-29 RX ADMIN — MEROPENEM 500 MG: 500 INJECTION, POWDER, FOR SOLUTION INTRAVENOUS at 04:12

## 2023-12-29 RX ADMIN — SODIUM CHLORIDE, PRESERVATIVE FREE 10 ML: 5 INJECTION INTRAVENOUS at 12:12

## 2023-12-29 NOTE — PROGRESS NOTES
Infectious Diseases Progress Note  69 y/o male with Hx of GERD, HTN, HLD and DM Type II who presented to ER on 12/16 with abdominal pain. On admit he was afebrile with leukocytosis, WBC 24.97. Lipase >3,000,  and . U/A not impressive. Blood cultures revealed Enterococcus 1/2 sets. CT abdomen/pelvis with contrast revealed severe acute pancreatitis, gastric antrum and duodenal mural thickening likely represents reactive change for acute pancreatitis, right large hydrocele. Scrotal ultrasound with unremarkable right testicle, large right hydrocele which crosses the midline and causes compressive effect upon the left testicle which is inferiorly deviated. MRI Abdomen without contrast showed there appears to be numerous intraluminal filling defects in the mid to distal common bile duct series image 16 series 3, these are suggestive of impacted gallstones and sludge. On 12/17 he underwent ERCP with sludge and stone removal. Repeat CT abdomen/pelvis without contrast showed interval worsening of the ascites, persistent inflammatory changes around the pancreas consistent patient's history of pancreatitis, previous examination showed incomplete enhancement of the pancreas suggesting possible developing necrotizing pancreatitis, interval development of bibasilar atelectasis and moderate to large pleural effusions. Renal ultrasound unremarkable. 2D Echo with no mention of vegetations. 12/20 blood cultures negative thus far. During his stay he was noted to have diarrhea, stool for C-Diff PCR (-).   He is currently on Ampicillin and Merrem     Subjective:  Lying in bed in no acute distress. No new complaints voiced. Afebrile.     Review of Systems   Constitutional:  Positive for malaise/fatigue.   HENT: Negative.     Eyes: Negative.    Respiratory: Negative.     Cardiovascular: Negative.    Gastrointestinal:  Positive for abdominal pain.   Genitourinary: Negative.    Musculoskeletal: Negative.    Skin: Negative.     Neurological: Negative.    All other systems reviewed and are negative.      Review of patient's allergies indicates:  No Known Allergies    Past Medical History:   Diagnosis Date    DM (diabetes mellitus)     GERD (gastroesophageal reflux disease)     HLD (hyperlipidemia)     HTN (hypertension)        Past Surgical History:   Procedure Laterality Date    APPENDECTOMY      CHOLECYSTECTOMY      ERCP N/A 12/17/2023    Procedure: ERCP (ENDOSCOPIC RETROGRADE CHOLANGIOPANCREATOGRAPHY);  Surgeon: Flako Kerns MD;  Location: [facility];  Service: Gastroenterology;  Laterality: N/A;    ERCP W/ SPHICTEROTOMY  12/17/2023    Procedure: ERCP, WITH SPHINCTEROTOMY;  Surgeon: Flako Kerns MD;  Location: [facility] OR;  Service: Gastroenterology;;    ERCP, WITH CALCULUS REMOVAL  12/17/2023    Procedure: ERCP, WITH CALCULUS REMOVAL;  Surgeon: Flako Kerns MD;  Location: [facility];  Service: Gastroenterology;;       Social History     Socioeconomic History    Marital status:    Tobacco Use    Smoking status: Every Day     Current packs/day: 1.00     Types: Cigarettes    Smokeless tobacco: Never   Substance and Sexual Activity    Alcohol use: Never    Drug use: Never    Sexual activity: Yes     Social Determinants of Health     Financial Resource Strain: Low Risk  (8/8/2023)    Overall Financial Resource Strain (CARDIA)     Difficulty of Paying Living Expenses: Not hard at all   Food Insecurity: No Food Insecurity (8/8/2023)    Hunger Vital Sign     Worried About Running Out of Food in the Last Year: Never true     Ran Out of Food in the Last Year: Never true   Transportation Needs: No Transportation Needs (12/19/2023)    PRAPARE - Transportation     Lack of Transportation (Medical): No     Lack of Transportation (Non-Medical): No   Stress: No Stress Concern Present (8/8/2023)    [employer] - Occupational Stress Questionnaire     Feeling of Stress : Only a little   Housing  "Stability: Low Risk  (12/19/2023)    Housing Stability Vital Sign     Unable to Pay for Housing in the Last Year: No     Number of Places Lived in the Last Year: 1     Unstable Housing in the Last Year: No         Scheduled Meds:   ampicillin IVPB  2 g Intravenous Q6H    enoxparin  30 mg Subcutaneous Q24H (prophylaxis, 1700)    [START ON 12/30/2023] furosemide (LASIX) injection  10 mg Intravenous Daily    meropenem (MERREM) IVPB  500 mg Intravenous Q8H    metoprolol succinate  25 mg Oral Daily    mupirocin   Nasal BID    pantoprazole  40 mg Intravenous BID AC    sodium chloride 0.9%  10 mL Intravenous Q6H    traZODone  25 mg Oral QHS     Continuous Infusions:  PRN Meds:sodium chloride 0.9%, acetaminophen, acetaminophen, aluminum-magnesium hydroxide-simethicone, dextrose 10%, dextrose 10%, dicyclomine, glucagon (human recombinant), hydrALAZINE, HYDROmorphone, insulin aspart U-100, labetalol, melatonin, ondansetron, oxyCODONE, polyethylene glycol, prochlorperazine, senna-docusate 8.6-50 mg, sodium chloride 0.9%, Flushing PICC/Midline Protocol **AND** sodium chloride 0.9% **AND** sodium chloride 0.9%    Objective:  BP (!) 155/77   Pulse 99   Temp 98.1 °F (36.7 °C) (Oral)   Resp 18   Ht 5' 6" (1.676 m)   Wt 72.5 kg (159 lb 13.3 oz)   SpO2 95%   BMI 25.80 kg/m²     Physical Exam:   Physical Exam  Vitals reviewed.   HENT:      Head: Normocephalic.   Cardiovascular:      Rate and Rhythm: Normal rate and regular rhythm.   Pulmonary:      Effort: Pulmonary effort is normal. No respiratory distress.      Breath sounds: Normal breath sounds. No wheezing.   Abdominal:      General: Bowel sounds are normal. There is no distension.      Palpations: Abdomen is soft.      Tenderness: There is no abdominal tenderness.   Musculoskeletal:         General: Normal range of motion.      Cervical back: Normal range of motion.   Skin:     Findings: No rash.   Neurological:      Mental Status: He is alert and oriented to person, " place, and time.   Psychiatric:         Mood and Affect: Mood normal.         Behavior: Behavior normal.       Imaging      Lab Review   Recent Results (from the past 24 hour(s))   POCT glucose    Collection Time: 12/28/23  7:59 PM   Result Value Ref Range    POCT Glucose 134 (H) 70 - 110 mg/dL   Magnesium    Collection Time: 12/29/23  4:45 AM   Result Value Ref Range    Magnesium Level 1.80 1.60 - 2.60 mg/dL   Phosphorus    Collection Time: 12/29/23  4:45 AM   Result Value Ref Range    Phosphorus Level 2.6 2.3 - 4.7 mg/dL   POCT glucose    Collection Time: 12/29/23  5:47 AM   Result Value Ref Range    POCT Glucose 134 (H) 70 - 110 mg/dL   CBC with Differential    Collection Time: 12/29/23  8:28 AM   Result Value Ref Range    WBC 10.63 4.50 - 11.50 x10(3)/mcL    RBC 3.74 (L) 4.70 - 6.10 x10(6)/mcL    Hgb 11.5 (L) 14.0 - 18.0 g/dL    Hct 35.3 (L) 42.0 - 52.0 %    MCV 94.4 (H) 80.0 - 94.0 fL    MCH 30.7 27.0 - 31.0 pg    MCHC 32.6 (L) 33.0 - 36.0 g/dL    RDW 14.0 11.5 - 17.0 %    Platelet 424 (H) 130 - 400 x10(3)/mcL    MPV 9.2 7.4 - 10.4 fL    Neut % 78.6 %    Lymph % 8.4 %    Mono % 10.4 %    Eos % 0.9 %    Basophil % 0.7 %    Lymph # 0.89 0.6 - 4.6 x10(3)/mcL    Neut # 8.35 2.1 - 9.2 x10(3)/mcL    Mono # 1.11 0.1 - 1.3 x10(3)/mcL    Eos # 0.10 0 - 0.9 x10(3)/mcL    Baso # 0.07 <=0.2 x10(3)/mcL    IG# 0.11 (H) 0 - 0.04 x10(3)/mcL    IG% 1.0 %    NRBC% 0.0 %       Assessment/Plan:  Enterococcus sepsis  Severe acute necrotic pancreatitis  Cholelithiasis / Cholangitis  Obstructive jaundice  SHA / CKD  Severe leukocytosis  Ascites / Pleural effusions    -Continue Ampicillin #12 and Merrem #7  -Will need a 14 day course of Ampicillin from the negative blood cultures with end date 1/3/24  -Afebrile with leukocytosis resolved, follow  -Repeat CT abdomen/pelvis with contrast today 12/29 with results revealing necrotic pancreatitis with possible organizing collection along the pancreatic neck and body. Will notify surgery of  findings  -Blood cultures from admit revealed Enterococcus 1/2 sets. Repeat blood cultures on 12/20 negative  -2D Echo with no mention of vegetations  -Renal dysfunction improved with Crea normalized  -Transaminases improved and normalized  -Stool for c-diff PCR and toxin negative  -Discussed with patient and nursing

## 2023-12-29 NOTE — PROGRESS NOTES
"Gastroenterology Progress Note    Subjective/Interval History:    Ate ice cream and yogurt today without abdominal pain or n/v. Feels better. Some gas.    Leukocytosis resolved. Hypertension up to 162/91 otherwise VSS. On 2L O2 NC.    CT abd/pel with IV contrast 12/29: necrotic pancreatitis with possible organizing collection anterior and superior to pancreatic neck and body measuring approximately 8 x 4 cm. Portal, splenic and superior mesenteric veins remain grossly patent.  Question some developing phlegmon particularly along the right pericolic gutter. Small volume ascites. No bowel obstruction. Inflammatory changes of stomach secondary to pancreatitis. No free air.    Vital Signs:  BP (!) 155/77   Pulse 99   Temp 98.1 °F (36.7 °C) (Oral)   Resp 18   Ht 5' 6" (1.676 m)   Wt 72.5 kg (159 lb 13.3 oz)   SpO2 95%   BMI 25.80 kg/m²   Body mass index is 25.8 kg/m².    Physical Exam  Constitutional:       General: He is not in acute distress.     Appearance: He is not ill-appearing.   HENT:      Head: Normocephalic and atraumatic.   Eyes:      General: No scleral icterus.     Extraocular Movements: Extraocular movements intact.   Cardiovascular:      Rate and Rhythm: Normal rate and regular rhythm.   Pulmonary:      Effort: Pulmonary effort is normal. No respiratory distress.      Comments: On 2L O2 NC.  Abdominal:      General: Bowel sounds are normal. There is no distension.      Palpations: Abdomen is soft. There is no mass.      Tenderness: There is mild abdominal tenderness throughout. There is no guarding or rebound.   Musculoskeletal:      Right lower leg: Edema (1+) present.      Left lower leg: Edema (1+) present.   Skin:     General: Skin is warm and dry.      Coloration: Skin is not jaundiced.   Neurological:      Mental Status: He is alert and oriented to person, place, and time.   Psychiatric:         Mood and Affect: Mood normal.         Behavior: Behavior normal.       Labs:  Recent Results (from " the past 48 hour(s))   POCT glucose    Collection Time: 12/27/23  8:52 PM   Result Value Ref Range    POCT Glucose 131 (H) 70 - 110 mg/dL   Basic Metabolic Panel    Collection Time: 12/28/23  6:21 AM   Result Value Ref Range    Sodium Level 131 (L) 136 - 145 mmol/L    Potassium Level 4.4 3.5 - 5.1 mmol/L    Chloride 100 98 - 107 mmol/L    Carbon Dioxide 25 23 - 31 mmol/L    Glucose Level 129 (H) 82 - 115 mg/dL    Blood Urea Nitrogen 13.4 8.4 - 25.7 mg/dL    Creatinine 0.95 0.73 - 1.18 mg/dL    BUN/Creatinine Ratio 14     Calcium Level Total 8.0 (L) 8.8 - 10.0 mg/dL    Anion Gap 6.0 mEq/L    eGFR >60 mls/min/1.73/m2   Magnesium    Collection Time: 12/28/23  6:21 AM   Result Value Ref Range    Magnesium Level 1.70 1.60 - 2.60 mg/dL   Phosphorus    Collection Time: 12/28/23  6:21 AM   Result Value Ref Range    Phosphorus Level 2.5 2.3 - 4.7 mg/dL   CBC with Differential    Collection Time: 12/28/23  6:21 AM   Result Value Ref Range    WBC 14.04 (H) 4.50 - 11.50 x10(3)/mcL    RBC 3.95 (L) 4.70 - 6.10 x10(6)/mcL    Hgb 12.1 (L) 14.0 - 18.0 g/dL    Hct 37.1 (L) 42.0 - 52.0 %    MCV 93.9 80.0 - 94.0 fL    MCH 30.6 27.0 - 31.0 pg    MCHC 32.6 (L) 33.0 - 36.0 g/dL    RDW 14.2 11.5 - 17.0 %    Platelet 443 (H) 130 - 400 x10(3)/mcL    MPV 9.6 7.4 - 10.4 fL    Neut % 80.4 %    Lymph % 7.5 %    Mono % 9.0 %    Eos % 1.2 %    Basophil % 0.5 %    Lymph # 1.06 0.6 - 4.6 x10(3)/mcL    Neut # 11.28 (H) 2.1 - 9.2 x10(3)/mcL    Mono # 1.27 0.1 - 1.3 x10(3)/mcL    Eos # 0.17 0 - 0.9 x10(3)/mcL    Baso # 0.07 <=0.2 x10(3)/mcL    IG# 0.19 (H) 0 - 0.04 x10(3)/mcL    IG% 1.4 %    NRBC% 0.0 %   POCT glucose    Collection Time: 12/28/23  6:23 AM   Result Value Ref Range    POCT Glucose 122 (H) 70 - 110 mg/dL   POCT glucose    Collection Time: 12/28/23 11:40 AM   Result Value Ref Range    POCT Glucose 151 (H) 70 - 110 mg/dL   POCT glucose    Collection Time: 12/28/23  7:59 PM   Result Value Ref Range    POCT Glucose 134 (H) 70 - 110 mg/dL    Magnesium    Collection Time: 12/29/23  4:45 AM   Result Value Ref Range    Magnesium Level 1.80 1.60 - 2.60 mg/dL   Phosphorus    Collection Time: 12/29/23  4:45 AM   Result Value Ref Range    Phosphorus Level 2.6 2.3 - 4.7 mg/dL   POCT glucose    Collection Time: 12/29/23  5:47 AM   Result Value Ref Range    POCT Glucose 134 (H) 70 - 110 mg/dL   CBC with Differential    Collection Time: 12/29/23  8:28 AM   Result Value Ref Range    WBC 10.63 4.50 - 11.50 x10(3)/mcL    RBC 3.74 (L) 4.70 - 6.10 x10(6)/mcL    Hgb 11.5 (L) 14.0 - 18.0 g/dL    Hct 35.3 (L) 42.0 - 52.0 %    MCV 94.4 (H) 80.0 - 94.0 fL    MCH 30.7 27.0 - 31.0 pg    MCHC 32.6 (L) 33.0 - 36.0 g/dL    RDW 14.0 11.5 - 17.0 %    Platelet 424 (H) 130 - 400 x10(3)/mcL    MPV 9.2 7.4 - 10.4 fL    Neut % 78.6 %    Lymph % 8.4 %    Mono % 10.4 %    Eos % 0.9 %    Basophil % 0.7 %    Lymph # 0.89 0.6 - 4.6 x10(3)/mcL    Neut # 8.35 2.1 - 9.2 x10(3)/mcL    Mono # 1.11 0.1 - 1.3 x10(3)/mcL    Eos # 0.10 0 - 0.9 x10(3)/mcL    Baso # 0.07 <=0.2 x10(3)/mcL    IG# 0.11 (H) 0 - 0.04 x10(3)/mcL    IG% 1.0 %    NRBC% 0.0 %       Imaging:  X-Ray Chest 1 View for Line/Tube Placement    Result Date: 12/23/2023  EXAMINATION: XR CHEST 1 VIEW FOR LINE/TUBE PLACEMENT CLINICAL HISTORY: PICC; TECHNIQUE: One view COMPARISON: December 20, 2023. FINDINGS: Right upper extremity approach PICC line which terminates about the cavoatrial junction and is optimal.  Cardiopericardial silhouette is within normal limits.  Improved right pleural effusion.  There is new thickened opacity right lower lung zone which may represent atelectasis.  Attention to follow-up exams.  There also new left basilar hypoventilatory changes no pulmonary edema.  No pneumothorax.     Optimal placement of the PICC line. Electronically signed by: Denver Wagner Date:    12/23/2023 Time:    19:56    CT Chest Abdomen Pelvis Without Contrast (XPD)    Result Date: 12/23/2023  EXAMINATION: CT CHEST ABDOMEN PELVIS WITHOUT  CONTRAST(XPD) CLINICAL HISTORY: follow up; TECHNIQUE: Helical acquisition from the thoracic inlet through the ischia without IV contrast.  Lack of contrast limits evaluation of solid organs and vascular structures.  Three plane reconstructions made available for review. DLP 1042 mGycm. Automatic exposure control, adjustment of mA/kV or iterative reconstruction technique was used to reduce radiation. COMPARISON: 18 December 2023, 15 August 2023 FINDINGS: Chest. Heart is not significantly enlarged.  There are coronary artery calcifications.  No pericardial effusion. No mediastinal, hilar or axillary adenopathy by CT size criteria. There are small bilateral pleural effusions slightly improved compared to prior.  Mild bibasilar atelectasis.  Moderate to severe emphysema. Abdomen and pelvis. No acute findings noncontrast liver, spleen, adrenals or kidneys.  Gallbladder surgically absent. There is increased peripancreatic inflammation compared to prior.  Question a developing collection anterior and superior to the portion of the pancreas which had suggestion of necrosis on prior imaging.  This collection is seen on images 104-110 series 2.  There is small volume ascites. There is no bowel obstruction or free air.  There is colonic diverticulosis. Urinary bladder unremarkable.  Prostate mildly enlarged.  Abdominal aorta mildly ectatic with moderate atherosclerotic disease. There are no acute osseous findings.     1. Worsened appearance of pancreatitis with possible collection developing anterior and superior to the pancreas. 2. Small volume ascites. 3. Small bilateral pleural effusions. Electronically signed by: Osiel Whitehead Date:    12/23/2023 Time:    10:59    Echo    Result Date: 12/20/2023    Left Ventricle: The left ventricle is normal in size. Normal wall thickness. Normal wall motion. There is normal systolic function with a visually estimated ejection fraction of 55 - 60%. Grade I diastolic dysfunction.   Right  Ventricle: Normal right ventricular cavity size. Systolic function is normal.   Aortic Valve: The aortic valve is a trileaflet valve.   Pericardium: There is a trivial effusion. No indication of cardiac tamponade.   Technically difficult study due to lung interference.     X-Ray Chest 1 View    Result Date: 12/20/2023  EXAMINATION: XR CHEST 1 VIEW CLINICAL HISTORY: follow up; TECHNIQUE: Single view of the chest COMPARISON: 12/18/2023 FINDINGS: Cardiomegaly is unchanged.  Small right effusion is slightly increased in the interval.  No acute osseous abnormality.     As above. Electronically signed by: Cristhian Lara Date:    12/20/2023 Time:    09:53    CT Abdomen Pelvis  Without Contrast    Result Date: 12/18/2023  EXAMINATION: CT ABDOMEN PELVIS WITHOUT CONTRAST CLINICAL HISTORY: rise in tbili and pain s/p ERCP; TECHNIQUE: Low dose axial images, sagittal and coronal reformations were obtained from the lung bases to the pubic symphysis.  No contrast was administered.  Automatic exposure control is utilized to reduce patient radiation exposure. COMPARISON: 12/16/2023 FINDINGS: There are changes seen consistent with emphysema and COPD in the lungs.  There is  bibasilar atelectasis and bilateral pleural effusions.  This is a new finding.. The liver is hypoattenuated consistent with hepatic steatosis.  The patient is status post cholecystectomy.  There is evidence of ascites.  This is more prominent than the prior examination. There are inflammatory changes seen around the pancreas consistent with pancreatitis.  This was seen on the prior examination as well.  Previous examination showed incomplete enhancement of the pancreas concerning for developing necrotizing pancreatitis.  No free intraperitoneal air is seen on this examination. Adrenal glands appear normal. Kidneys appear normal.  No hydronephrosis is seen.  No hydroureter seen. No colitis is seen.  No diverticulitis is seen.  No colonic mass is seen. Urinary  bladder appears grossly unremarkable. Atherosclerotic changes are seen within the abdominal aorta and its branches. Bones appear grossly unremarkable.     Interval worsening of the ascites Persistent inflammatory changes around the pancreas consistent patient's history of pancreatitis.  Previous examination showed incomplete enhancement of the pancreas suggesting possible developing necrotizing pancreatitis. Interval development of bibasilar atelectasis and moderate to large pleural effusions Electronically signed by: Wayne Gee Date:    12/18/2023 Time:    12:12    X-Ray Chest 1 View    Result Date: 12/18/2023  EXAMINATION: XR CHEST 1 VIEW CPT 89997 CLINICAL HISTORY: Hypoxemia; FINDINGS: Examination reveals mediastinal silhouette to be within normal limits cardiac silhouette is not enlarged.  There is some increase interstitial and pulmonary vascular markings which may indicate a degree of pulmonary vascular congestion. There is blunting of both costophrenic angles indicating the presence of bilateral pleural effusions. Slightly more confluent opacities identified at the left base superimposed infiltrate cannot be completely excluded. Atelectatic changes identified at the right base     Changes of pulmonary vascular congestion. Bilateral pleural effusions Electronically signed by: Radu Edwards Date:    12/18/2023 Time:    09:43    US Retroperitoneal Complete    Result Date: 12/18/2023  EXAMINATION: US RETROPERITONEAL COMPLETE CLINICAL HISTORY: Acute on chronic kidney disease. COMPARISON: CT 16 December 2023 FINDINGS: Grayscale and color Doppler sonographic evaluation of the kidneys and urinary bladder. The right kidney measures 11 cm. The left kidney measures 11 cm.   No hydronephrosis.  Grossly normal renal parenchymal echogenicity. No significant abnormality of the urinary bladder. There is small volume ascites.     No significant sonographic abnormality of the kidneys. Electronically signed by: Osiel  Ventura Date:    12/18/2023 Time:    09:09    FL ERCP Biliary And Pancreatic By Rad Tech    Result Date: 12/17/2023  EXAMINATION: FL ERCP BILIARY AND PANCREATIC CLINICAL HISTORY: biliary stone; TECHNIQUE: 52.8mGy of fluoroscopic support was utilized for procedural support during procedure.  Please refer to performing provider dictation. COMPARISON: None FINDINGS: Fluoroscopic support. Please refer to procedure of this dictation.     Fluoroscopic support.  Please refer to procedure of this dictation. Electronically signed by: Cristhian Lara Date:    12/17/2023 Time:    13:04    MRI MRCP Without Contrast    Result Date: 12/17/2023  EXAMINATION: MRI ABDOMEN WITHOUT CONTRAST MRCP CLINICAL HISTORY: Post cholecystectomy pancreatitis -- ?  Choledocholithiases; TECHNIQUE: Multiplanar, multisequence images are performed through the liver and upper abdomen.  Additionally 2D and 3D MRCP sequences are performed as well as MIP images. COMPARISON: None. FINDINGS: Lung bases: Moderate streaky is present in the visualized lung bases consistent with nonspecific dependent changes and scarring. Liver: Liver appears normal in size. Portal venous system: Normal. Gallbladder: Gallbladder is surgically absent. Biliary tree intrahepatic ducts: Unremarkable. Biliary tree extrahepatic ducts: There appear to be numerous intraluminal filling defects in the mid to distal common bile duct series image 16 series 3. Ampullary region: No obvious obstructive mass or stone. Spleen: Unremarkable. Pancreas: There is intermediate intrasubstance T2 signal in the pancreatic body (image 19 series 4 and 6) with corresponding restricted diffusion signal on DWI (image 136 series 7), relating to edema changes. There is stable free fluid collection in the bilateral anterior pararenal space extending to the adjacent mesocolon, small bowel mesentery, perihepatic and perisplenic regions. T2 stranding intensities are seen along the lesser sac. These findings are  reflective of acute interstitial pancreatitis. Pancreatic duct: Unremarkable. Adrenal glands: Unremarkable. Kidneys: There are probable cysts in both kidneys, with the larger seen in the right mid pole region measuring 1.0 cm (image 29 series 4).Ureters: Unremarkable. Stomach and distal esophagus: Normal. Small bowel: Normal. Colon: Normal. Lymph nodes: A 1cm lymph node is demonstrated in the mo hepatis region (image 18 series 4), likely reactive in nature. Abdominal wall: Normal. Systemic arteries and veins: Unremarkable. Osseous structures: There is mild spondylolytic changes in the imaged spine. Miscellaneous: There are motion artifacts in some of the sequences limiting its evaluation.     1. There appear to be numerous intraluminal filling defects in the mid to distal common bile duct series image 16 series 3. These are suggestive of impacted gallstones and sludge. Correlate clinically as regards additional evaluation and follow-up possibly with ERCP. 2. There is intermediate intrasubstance T2 signal in the pancreatic body (image 19 series 4 and 6) with corresponding restricted diffusion signal on DWI (image 136 series 7), relating to edema changes. There is stable free fluid collection in the bilateral anterior pararenal space extending to the adjacent mesocolon, small bowel mesentery, perihepatic and perisplenic regions. T2 stranding intensities are seen along the lesser sac. These findings are reflective of acute interstitial pancreatitis. Correlate with clinical and laboratory findings as regards additional evaluation and follow-up to full resolution as indicated. 3. A 1cm lymph node is demonstrated in the mo hepatis region (image 18 series 4), likely reactive in nature. I concur with the preliminary report above. Electronically signed by: Wayne Gee Date:    12/17/2023 Time:    12:12    CT Abdomen Pelvis With IV Contrast NO Oral Contrast    Result Date: 12/16/2023  EXAMINATION: CT ABDOMEN PELVIS  WITH IV CONTRAST CLINICAL HISTORY: Abdominal pain, acute, nonlocalized; TECHNIQUE: Multidetector axial images were obtained of the abdomen and pelvis following the administration of IV contrast. Oral contrast was not administered. Dose length product of 578 mGycm. Automated exposure control was utilized to minimize radiation dose. COMPARISON: August 4, 2023. FINDINGS: Included portion of the lungs show dependent hypoventilatory changes without acute air space infiltrates or fluid within the pleural spaces. Liver is remarkable for steatosis without focal space occupying lesion.  There is small amount of free fluid around the anterolateral surface of the liver.  Gallbladder is surgically absent.  Pancreas is surrounded by considerable phlegmons and fluid which extend into the anterior pararenal spaces and further into the lower abdomen consistent with acute pancreatitis.  There is no suggestion of pancreatic necrosis, pseudocyst or abscess formation.  Spleen is unremarkable. The adrenal glands appear within normal limits. The kidneys are unremarkable in size and contour. No solid or cystic renal lesion identified. There is no hydronephrosis. No perinephric fluid strandings or collections identified. Stomach is nondistended.  There is distal esophageal mural thickening which probably result of reflux disease with about similar appearance.  There is some mural thickening of the gastric antrum and the descending colon which may represent reactive change from acute pancreatitis.  Possible primary gastritis and duodenitis is not excluded.  No abnormal dilatation of loops of small bowel.  Colon is nondistended.  No bowel obstruction.  Distal descending and sigmoid colon noninflamed diverticulosis coli. Urinary bladder appears within normal limits.  Prostate is enlarged in size and contains few calcifications.  There is large right hydrocele which extends into right inguinal canal.  Is no pelvic free fluid. No acute or  otherwise osseous abnormality identified.     1. Severe acute pancreatitis. 2. Gastric antrum and duodenal mural thickening likely represents reactive change for acute pancreatitis. 3. Right large hydrocele. Electronically signed by: Denver Wagner Date:    12/16/2023 Time:    20:08    US Scrotum And Testicles    Result Date: 12/16/2023  EXAMINATION: US SCROTUM AND TESTICLES CLINICAL HISTORY: Other specified disorders of the male genital organs TECHNIQUE: Multiple real-time images were performed of the scrotum in various planes by the sonographer. COMPARISON: None available FINDINGS: Right testicle measures 3.3 x 2.4 x 2.4 cm.  There is unremarkable echotexture of the right testicle.  Unremarkable arteriovenous flow to the right testicle. There is large right scrotal hydrocele which measures 10.6 x 7.2 x 8.5 cm.  Right hydrocele causes mass effect upon left testicle which is deviated inferiorly.  This made it difficult to optimally assess vascularity to the left testicle due to pressure caused by the hydrocele.  Some vascularity along the peripheral aspect of less testicle was visualized.  Left testicle measures 3.4 x 2.0 x 2.0 cm and no abnormality of the parenchymal echotexture identified.     1. Unremarkable right testicle 2. Large right hydrocele which crosses the midline and causes compressive effect upon the left testicle which is inferiorly deviated.  Due to pressure caused by the hydrocele upon the left testicle optimal Doppler flow to the left testicle could not be obtained.  Only limited arterial flow to the peripheral aspect left testicle could be visualized.  Please correlate clinically.  Oneoption is to perform a follow-up study post drainage of large right hydrocele. Electronically signed by: Denver Wagner Date:    12/16/2023 Time:    19:21         Assessment/Plan:  68 y.o. male unknown to our group with PMH of T2DM, HTN, HLD, chronic scrotal hydrocele, vitamin D deficiency, CKD 3a. Presented to the ED  12/16/23 with severe epigastric pain onset same day with radiation to back, associated n/v and chills also reported. GI consulted for choledocholithiasis. Underwent ERCP 12/17 with sludge and stone removal.      Gallstone pancreatitis  Transaminitis--resolved     - respiratory status improving.   - continue supportive care  - PPI BID  - ADAT  - noted CT results. Recommend repeat CT in 2 weeks to reassess developing abscess and determine ability to drain from endoscopic perspective. Appreciate surgery recs. Will follow this weekend.      Olivia Franks PA-C

## 2023-12-29 NOTE — PLAN OF CARE
Problem: Adult Inpatient Plan of Care  Goal: Plan of Care Review  Outcome: Ongoing, Progressing  Goal: Patient-Specific Goal (Individualized)  Outcome: Ongoing, Progressing  Goal: Absence of Hospital-Acquired Illness or Injury  Outcome: Ongoing, Progressing  Goal: Optimal Comfort and Wellbeing  Outcome: Ongoing, Progressing  Goal: Readiness for Transition of Care  Outcome: Ongoing, Progressing     Problem: Diabetes Comorbidity  Goal: Blood Glucose Level Within Targeted Range  Outcome: Ongoing, Progressing     Problem: Pain Acute  Goal: Acceptable Pain Control and Functional Ability  Outcome: Ongoing, Progressing     Problem: Fluid Imbalance (Pancreatitis)  Goal: Fluid Balance  Outcome: Ongoing, Progressing     Problem: Infection (Pancreatitis)  Goal: Infection Symptom Resolution  Outcome: Ongoing, Progressing     Problem: Nutrition Impaired (Pancreatitis)  Goal: Optimal Nutrition Intake  Outcome: Ongoing, Progressing     Problem: Pain (Pancreatitis)  Goal: Acceptable Pain Control  Outcome: Ongoing, Progressing     Problem: Respiratory Compromise (Pancreatitis)  Goal: Effective Oxygenation and Ventilation  Outcome: Ongoing, Progressing     Problem: Infection  Goal: Absence of Infection Signs and Symptoms  Outcome: Ongoing, Progressing     Problem: Skin Injury Risk Increased  Goal: Skin Health and Integrity  Outcome: Ongoing, Progressing

## 2023-12-29 NOTE — PROGRESS NOTES
Infectious Diseases Progress Note  69 y/o male with Hx of GERD, HTN, HLD and DM Type II who presented to ER on 12/16 with abdominal pain. On admit he was afebrile with leukocytosis, WBC 24.97. Lipase >3,000,  and . U/A not impressive. Blood cultures revealed Enterococcus 1/2 sets. CT abdomen/pelvis with contrast revealed severe acute pancreatitis, gastric antrum and duodenal mural thickening likely represents reactive change for acute pancreatitis, right large hydrocele. Scrotal ultrasound with unremarkable right testicle, large right hydrocele which crosses the midline and causes compressive effect upon the left testicle which is inferiorly deviated. MRI Abdomen without contrast showed there appears to be numerous intraluminal filling defects in the mid to distal common bile duct series image 16 series 3, these are suggestive of impacted gallstones and sludge. On 12/17 he underwent ERCP with sludge and stone removal. Repeat CT abdomen/pelvis without contrast showed interval worsening of the ascites, persistent inflammatory changes around the pancreas consistent patient's history of pancreatitis, previous examination showed incomplete enhancement of the pancreas suggesting possible developing necrotizing pancreatitis, interval development of bibasilar atelectasis and moderate to large pleural effusions. Renal ultrasound unremarkable. 2D Echo with no mention of vegetations. 12/20 blood cultures negative thus far. During his stay he was noted to have diarrhea, stool for C-Diff PCR (-).   He is currently on Ampicillin and Merrem     Subjective:  Lying in bed in no acute distress. No new complaints voiced. Afebrile. Wife present    Review of Systems   Constitutional:  Positive for malaise/fatigue.   HENT: Negative.     Eyes: Negative.    Respiratory: Negative.     Cardiovascular: Negative.    Gastrointestinal:  Positive for abdominal pain.   Genitourinary: Negative.    Musculoskeletal: Negative.    Skin:  Negative.    Neurological: Negative.    All other systems reviewed and are negative.      Review of patient's allergies indicates:  No Known Allergies    Past Medical History:   Diagnosis Date    DM (diabetes mellitus)     GERD (gastroesophageal reflux disease)     HLD (hyperlipidemia)     HTN (hypertension)        Past Surgical History:   Procedure Laterality Date    APPENDECTOMY      CHOLECYSTECTOMY      ERCP N/A 12/17/2023    Procedure: ERCP (ENDOSCOPIC RETROGRADE CHOLANGIOPANCREATOGRAPHY);  Surgeon: Flako Kerns MD;  Location: [facility] OR;  Service: Gastroenterology;  Laterality: N/A;    ERCP W/ SPHICTEROTOMY  12/17/2023    Procedure: ERCP, WITH SPHINCTEROTOMY;  Surgeon: Flako Kerns MD;  Location: [facility] OR;  Service: Gastroenterology;;    ERCP, WITH CALCULUS REMOVAL  12/17/2023    Procedure: ERCP, WITH CALCULUS REMOVAL;  Surgeon: Flako Kerns MD;  Location: [facility];  Service: Gastroenterology;;       Social History     Socioeconomic History    Marital status:    Tobacco Use    Smoking status: Every Day     Current packs/day: 1.00     Types: Cigarettes    Smokeless tobacco: Never   Substance and Sexual Activity    Alcohol use: Never    Drug use: Never    Sexual activity: Yes     Social Determinants of Health     Financial Resource Strain: Low Risk  (8/8/2023)    Overall Financial Resource Strain (CARDIA)     Difficulty of Paying Living Expenses: Not hard at all   Food Insecurity: No Food Insecurity (8/8/2023)    Hunger Vital Sign     Worried About Running Out of Food in the Last Year: Never true     Ran Out of Food in the Last Year: Never true   Transportation Needs: No Transportation Needs (12/19/2023)    PRAPARE - Transportation     Lack of Transportation (Medical): No     Lack of Transportation (Non-Medical): No   Stress: No Stress Concern Present (8/8/2023)    [employer] [place] [employer] - Occupational Stress Questionnaire     Feeling of Stress : Only a little  "  Housing Stability: Low Risk  (12/19/2023)    Housing Stability Vital Sign     Unable to Pay for Housing in the Last Year: No     Number of Places Lived in the Last Year: 1     Unstable Housing in the Last Year: No         Scheduled Meds:   ampicillin IVPB  2 g Intravenous Q6H    enoxparin  30 mg Subcutaneous Q24H (prophylaxis, 1700)    [START ON 12/30/2023] furosemide (LASIX) injection  10 mg Intravenous Daily    meropenem (MERREM) IVPB  500 mg Intravenous Q8H    metoprolol succinate  25 mg Oral Daily    mupirocin   Nasal BID    pantoprazole  40 mg Intravenous BID AC    sodium chloride 0.9%  10 mL Intravenous Q6H    traZODone  25 mg Oral QHS     Continuous Infusions:  PRN Meds:sodium chloride 0.9%, acetaminophen, acetaminophen, aluminum-magnesium hydroxide-simethicone, dextrose 10%, dextrose 10%, dicyclomine, glucagon (human recombinant), hydrALAZINE, HYDROmorphone, insulin aspart U-100, labetalol, melatonin, ondansetron, oxyCODONE, polyethylene glycol, prochlorperazine, senna-docusate 8.6-50 mg, sodium chloride 0.9%, Flushing PICC/Midline Protocol **AND** sodium chloride 0.9% **AND** sodium chloride 0.9%    Objective:  BP (!) 155/81   Pulse 89   Temp 98.4 °F (36.9 °C) (Oral)   Resp 19   Ht 5' 6" (1.676 m)   Wt 77.4 kg (170 lb 10.2 oz)   SpO2 96%   BMI 27.54 kg/m²     Physical Exam:   Physical Exam  Vitals reviewed.   HENT:      Head: Normocephalic.   Cardiovascular:      Rate and Rhythm: Normal rate and regular rhythm.   Pulmonary:      Effort: Pulmonary effort is normal. No respiratory distress.      Breath sounds: Normal breath sounds. No wheezing.   Abdominal:      General: Bowel sounds are normal. There is no distension.      Palpations: Abdomen is soft.      Tenderness: There is no abdominal tenderness.   Musculoskeletal:         General: Normal range of motion.      Cervical back: Normal range of motion.   Skin:     Findings: No rash.   Neurological:      Mental Status: He is alert and oriented to " person, place, and time.   Psychiatric:         Mood and Affect: Mood normal.         Behavior: Behavior normal.       Imaging      Lab Review   Recent Results (from the past 24 hour(s))   POCT glucose    Collection Time: 12/27/23  8:52 PM   Result Value Ref Range    POCT Glucose 131 (H) 70 - 110 mg/dL   Basic Metabolic Panel    Collection Time: 12/28/23  6:21 AM   Result Value Ref Range    Sodium Level 131 (L) 136 - 145 mmol/L    Potassium Level 4.4 3.5 - 5.1 mmol/L    Chloride 100 98 - 107 mmol/L    Carbon Dioxide 25 23 - 31 mmol/L    Glucose Level 129 (H) 82 - 115 mg/dL    Blood Urea Nitrogen 13.4 8.4 - 25.7 mg/dL    Creatinine 0.95 0.73 - 1.18 mg/dL    BUN/Creatinine Ratio 14     Calcium Level Total 8.0 (L) 8.8 - 10.0 mg/dL    Anion Gap 6.0 mEq/L    eGFR >60 mls/min/1.73/m2   Magnesium    Collection Time: 12/28/23  6:21 AM   Result Value Ref Range    Magnesium Level 1.70 1.60 - 2.60 mg/dL   Phosphorus    Collection Time: 12/28/23  6:21 AM   Result Value Ref Range    Phosphorus Level 2.5 2.3 - 4.7 mg/dL   CBC with Differential    Collection Time: 12/28/23  6:21 AM   Result Value Ref Range    WBC 14.04 (H) 4.50 - 11.50 x10(3)/mcL    RBC 3.95 (L) 4.70 - 6.10 x10(6)/mcL    Hgb 12.1 (L) 14.0 - 18.0 g/dL    Hct 37.1 (L) 42.0 - 52.0 %    MCV 93.9 80.0 - 94.0 fL    MCH 30.6 27.0 - 31.0 pg    MCHC 32.6 (L) 33.0 - 36.0 g/dL    RDW 14.2 11.5 - 17.0 %    Platelet 443 (H) 130 - 400 x10(3)/mcL    MPV 9.6 7.4 - 10.4 fL    Neut % 80.4 %    Lymph % 7.5 %    Mono % 9.0 %    Eos % 1.2 %    Basophil % 0.5 %    Lymph # 1.06 0.6 - 4.6 x10(3)/mcL    Neut # 11.28 (H) 2.1 - 9.2 x10(3)/mcL    Mono # 1.27 0.1 - 1.3 x10(3)/mcL    Eos # 0.17 0 - 0.9 x10(3)/mcL    Baso # 0.07 <=0.2 x10(3)/mcL    IG# 0.19 (H) 0 - 0.04 x10(3)/mcL    IG% 1.4 %    NRBC% 0.0 %   POCT glucose    Collection Time: 12/28/23  6:23 AM   Result Value Ref Range    POCT Glucose 122 (H) 70 - 110 mg/dL   POCT glucose    Collection Time: 12/28/23  7:59 PM   Result Value Ref  Range    POCT Glucose 134 (H) 70 - 110 mg/dL       Assessment/Plan:  Enterococcus sepsis  Severe acute pancreatitis  Cholelithiasis / Cholangitis  Obstructive jaundice  SHA / CKD  Severe leukocytosis  Ascites / Pleural effusions    -Continue Ampicillin #11 and Merrem #6  -Will need a 14 day course from the negative blood cultures with end date 1/3/24  -Afebrile with leukocytosis trending down, follow  -Plan for repeat CT abdomen/pelvis with contrast in am, follow  -Blood cultures from admit revealed Enterococcus 1/2 sets. Repeat blood cultures on 12/20 negative  -2D Echo with no mention of vegetations  -Renal dysfunction improved with Crea normalized  -Transaminases trending down  -Stool for c-diff PCR and toxin negative  -Discussed with patient, wife and nursing

## 2023-12-29 NOTE — PROGRESS NOTES
[facility]  Hospital Medicine Progress Note        Chief Complaint: Inpatient Follow-up abdominal pain    HPI:   68-year-old male with medical history of T2DM, hypertension, hyperlipidemia and prior cholecystectomy present to the ED with complaint of severe epigastric abdominal pain that started today around 9:00 a.m. after breakfast, the pain is severe 10/10 and radiate to his back associated with nausea and vomiting.  Report last bowel movement was this morning.  Reports chills but denies fever.  He has chronic scrotal hydrocele that has not changed or painful.     On arrival to ED he was afebrile, tachycardic, normotensive and saturating 96% on room air.  Labs notable for WBC 24.97, hemoglobin 18.2, creatinine 2.0, BUN 16.5, total bilirubin 2.7, direct 1.9, , , lipase more than 3000, triglyceride 183.  CT abdomen and pelvis show finding of severe acute pancreatitis without evidence of necrosis or abscess or fluid collection.  Liver remarkable for steatosis, gallbladder surgically absent, no comment on biliary tree.     He was given 2 L of IV fluids, IV Zosyn, IV analgesics and referred to hospital medicine service for further evaluation and management.  Patient was taken for ERCP on 12/17.  Choledocholithiasis was resolved.  Also had sphincterotomy performed.  Returned to the floor in stable condition.  GI recommended advancement of diet to clear liquids in 4-6 hours     Patient  continues to be requiring oxygen.  Echo with grade 1 diastolic dysfunction, normal systolic function.  Follow up chest x-ray obtained  with small right pleural effusion slightly increased. Nephrology okay to transition to oral Lasix.  GI signed off.    Noted to have bacteremia, Infectious diseases recommending 14 day course from negative blood cultures of ampicillin and Flagyl.    As leukocytosis not improving, ordered a CT scan  which commenting on worsened appearance pancreatitis with  possible collection developing and small volume ascites, small bilateral pleural effusions.  Meropenem was added to his antibiotics.  GI was reconsulted, notified about the scan and requested for further recommendations, recommending surgery consult.  Surgery on board.  Plan to continue to monitor and continue the antibiotics and repeat imaging in few days.  Diet changed to clears.  Continues to require oxygen. Changed lasix to IV.      Interval Hx:   Diet advanced to full liquids.  GI  continues follow up,recommending repeat imaging in future, scan was ordered today.   ID following.On antibiotics.. Continues to be requiring oxygen.     Patient was  seen and examined.  Intermittently having abdominal discomfort on full liquids, but would like to stay with full liquids.  Denies any  shortness for breath, chest pain or cough    Chart was reviewed, T-max of 98°.7, .  Most recent labs were reviewed.  Leukocytosis improved , down to 10.  Creatinine normalized. transaminitis improving.     Objective/physical exam:  General: In no acute distress, afebrile  Chest:  Crackles, on nasal cannula  Heart: RRR, +S1, S2, no appreciable murmur  Abdomen: Soft, nontender, BS +  MSK: Warm, no lower extremity edema, no clubbing or cyanosis  Neurologic: Alert and oriented x4, Cranial nerve II-XII intact, Strength 5/5 in all 4 extremities       VITAL SIGNS: 24 HRS MIN & MAX LAST   Temp  Min: 97.9 °F (36.6 °C)  Max: 98.7 °F (37.1 °C) 98.7 °F (37.1 °C)   BP  Min: 129/64  Max: 162/91 (!) 149/92   Pulse  Min: 66  Max: 99  86   Resp  Min: 18  Max: 19 18   SpO2  Min: 94 %  Max: 99 % 95 %       Recent Labs   Lab 12/27/23  0432 12/28/23  0621 12/29/23  0828   WBC 17.67* 14.04* 10.63   RBC 3.90* 3.95* 3.74*   HGB 11.9* 12.1* 11.5*   HCT 35.9* 37.1* 35.3*   MCV 92.1 93.9 94.4*   MCH 30.5 30.6 30.7   MCHC 33.1 32.6* 32.6*   RDW 14.5 14.2 14.0    443* 424*   MPV 9.8 9.6 9.2       Recent Labs   Lab 12/25/23  0728 12/26/23  0434 12/27/23  0432  12/28/23  0621 12/29/23  0445   * 133* 131* 131*  --    K 4.0 4.3 3.9 4.4  --    CO2 25 26 23 25  --    BUN 18.7 17.6 14.9 13.4  --    CREATININE 0.90 0.97 0.84 0.95  --    CALCIUM 7.9* 7.4* 7.8* 8.0*  --    MG 1.70 2.00 1.80 1.70 1.80   ALBUMIN 1.7* 1.7* 1.7*  --   --    ALKPHOS 103 97 104  --   --    ALT 23 23 18  --   --    AST 37* 36* 34  --   --    BILITOT 1.3 1.3 1.2  --   --           Microbiology Results (last 7 days)       Procedure Component Value Units Date/Time    Blood Culture [[id number]]  (Normal) Collected: 12/20/23 1046    Order Status: Completed Specimen: Blood from Arm, Right Updated: 12/25/23 1202     CULTURE, BLOOD ([facility]) No Growth at 5 days    Blood Culture [[id number]]  (Normal) Collected: 12/20/23 1045    Order Status: Completed Specimen: Blood from Antecubital, Left Updated: 12/25/23 1202     CULTURE, BLOOD ([facility]) No Growth at 5 days    Stool Culture [[id number]]  (Normal) Collected: 12/23/23 0554    Order Status: Completed Specimen: Stool Updated: 12/25/23 0920     Stool Culture Negative for Salmonella, Shigella, Campylobacter, Vibrio, Aeromonas, Pleisiomonas,Yersinia, or Shiga Toxin 1 and 2.    Respiratory Culture [[id number]]     Order Status: Canceled Specimen: Sputum, Expectorated              Radiology:  CT Abdomen Pelvis With IV Contrast NO Oral Contrast  Narrative: EXAMINATION:  CT ABDOMEN PELVIS WITH IV CONTRAST    CLINICAL HISTORY:  Pancreatitis, acute, severe;    TECHNIQUE:  Helical acquisition through the abdomen and pelvis with IV contrast.  Three plane reconstructions were provided for review.  mGycm. Automatic exposure control, adjustment of mA/kV or iterative reconstruction technique was used to reduce radiation.    COMPARISON:  23 December 2023    FINDINGS:  There are small bilateral pleural effusions.  There is bibasilar atelectasis.    No acute findings liver, spleen, adrenals or kidneys.  The gallbladder is surgically absent.    Again there are findings of  necrotic pancreatitis with possible organizing collection anterior and superior to the pancreatic neck and body measuring approximately 8 x 4 cm on image 38 series 2.  Portal, splenic and superior mesenteric veins remain grossly patent.  Question some developing phlegmon particularly along the right pericolic gutter.  There is small volume ascites.    No bowel obstruction.  Inflammatory changes of the stomach secondary to pancreatitis.  No free air.    Urinary bladder unremarkable.  Moderate atherosclerotic disease.    No acute osseous findings.  Impression: Continued findings of necrotic pancreatitis with possible organizing collection along the pancreatic neck and body.    Electronically signed by: Osiel Whitehead  Date:    12/29/2023  Time:    11:57      Scheduled Med:   ampicillin IVPB  2 g Intravenous Q6H    enoxparin  30 mg Subcutaneous Q24H (prophylaxis, 1700)    [START ON 12/30/2023] furosemide (LASIX) injection  10 mg Intravenous Daily    meropenem (MERREM) IVPB  500 mg Intravenous Q8H    metoprolol succinate  25 mg Oral Daily    mupirocin   Nasal BID    pantoprazole  40 mg Intravenous BID AC    sodium chloride 0.9%  10 mL Intravenous Q6H    traZODone  25 mg Oral QHS        Continuous Infusions:       PRN Meds:  sodium chloride 0.9%, acetaminophen, acetaminophen, aluminum-magnesium hydroxide-simethicone, dextrose 10%, dextrose 10%, dicyclomine, glucagon (human recombinant), hydrALAZINE, HYDROmorphone, insulin aspart U-100, labetalol, melatonin, ondansetron, oxyCODONE, polyethylene glycol, prochlorperazine, senna-docusate 8.6-50 mg, sodium chloride 0.9%, Flushing PICC/Midline Protocol **AND** sodium chloride 0.9% **AND** sodium chloride 0.9%       Assessment/Plan:   Acute severe pancreatitis- suspect biliary  Cholangitis, acute  Choledocholithiasis status post ERCP  Severe sepsis  Leukocytosis  Enterococcus faecalis bacteremia  SHA superimposed on CKD 3A  Acute hypoxic respiratory failure requiring supplemental  oxygen secondary to pulmonary edema/pleural effusion, for fluid resuscitation/concern for ARDS , from underlying pancreatitis  , history of smoking, questionable COPD?       History of T2DM, hypertension, hyperlipidemia, GERD    Plan  GI following.  Underwent ERCP with sludge and stone removal.  Lipase improved.Transaminitis improving.Ordered antibiotics-Flagyl, Ampicillin.  , Blood cultures noted to be positive, ordered repeat cultures, so far negative.Infectious Disease was consulted, on further recommendations/investigations if any-recommending 14 days after negative culture(last date likely around January 6th per our calculations, no other investigations recommended).  Given worsening leukocytosis, ordered a scan 12/23 (without contrast due to sha)showing possible fluid collection around the pancreas and small volume ascites , added meropenem.  GI was consulted.  Recommended surgery consult.  Plan was to continue to monitor and consider repeat imaging.  (Repeat scan ordered for today). Continue to follow up with surgery and GI . Diet as tolerated, now advanced to full liquids.   Nephrology followed for SHA, creatinine improved on diuretics.   On 3 L at present.  Wean oxygen as tolerated.  Consider bronchodilators p.r.n..  Encourage incentive spirometry, we will keep a close eye as there is a concern for ARDS in such patients.  Most recent scan commenting on small bilateral pleural effusions.  Strict Is&Os. Continues to be on  lasix while trying to wean him off oxygen.  Held today's dose  Continue supportive care appropriate home medications.  On Sliding scale with fingersticks a.c. HS for management of diabetes in-house.  Therapy services    DVT prophylaxis-Lovenox      Anticipated discharge-to be decided,  once off oxygen and pending clinical improvement.  GI to clear the patient.  Patient has repeat CT today      Critical care diagnosis: sepsis, iv antibiotics, acute hypoxic respiratory failure requiring  oxygen  Critical care interventions: hands on evaluation, review of labs/radiographs/records and discussions with family  Critical care time spent: >32 minutes        Teresa Anthony MD   12/29/2023     All diagnosis and differential diagnosis have been reviewed; assessment and plan has been documented; I have personally reviewed the labs and test results that are presently available; I have reviewed the patients medication list; I have reviewed the consulting providers response and recommendations. I have reviewed or attempted to review medical records based upon their availability    All of the patient's questions have been  addressed and answered. Patient's is agreeable to the above stated plan. I will continue to monitor closely and make adjustments to medical management as needed.  _____________________________________________________________________

## 2023-12-30 LAB
ALBUMIN SERPL-MCNC: 1.8 G/DL (ref 3.4–4.8)
ALBUMIN/GLOB SERPL: 0.5 RATIO (ref 1.1–2)
ALP SERPL-CCNC: 92 UNIT/L (ref 40–150)
ALT SERPL-CCNC: 18 UNIT/L (ref 0–55)
AST SERPL-CCNC: 37 UNIT/L (ref 5–34)
BASOPHILS # BLD AUTO: 0.06 X10(3)/MCL
BASOPHILS NFR BLD AUTO: 0.6 %
BILIRUB SERPL-MCNC: 0.9 MG/DL
BUN SERPL-MCNC: 10.6 MG/DL (ref 8.4–25.7)
CALCIUM SERPL-MCNC: 7.8 MG/DL (ref 8.8–10)
CHLORIDE SERPL-SCNC: 100 MMOL/L (ref 98–107)
CO2 SERPL-SCNC: 25 MMOL/L (ref 23–31)
CREAT SERPL-MCNC: 0.77 MG/DL (ref 0.73–1.18)
EOSINOPHIL # BLD AUTO: 0.09 X10(3)/MCL (ref 0–0.9)
EOSINOPHIL NFR BLD AUTO: 0.9 %
ERYTHROCYTE [DISTWIDTH] IN BLOOD BY AUTOMATED COUNT: 13.8 % (ref 11.5–17)
GFR SERPLBLD CREATININE-BSD FMLA CKD-EPI: >60 MLS/MIN/1.73/M2
GLOBULIN SER-MCNC: 3.5 GM/DL (ref 2.4–3.5)
GLUCOSE SERPL-MCNC: 125 MG/DL (ref 82–115)
HCT VFR BLD AUTO: 37.1 % (ref 42–52)
HGB BLD-MCNC: 11.8 G/DL (ref 14–18)
IMM GRANULOCYTES # BLD AUTO: 0.11 X10(3)/MCL (ref 0–0.04)
IMM GRANULOCYTES NFR BLD AUTO: 1.1 %
LYMPHOCYTES # BLD AUTO: 0.88 X10(3)/MCL (ref 0.6–4.6)
LYMPHOCYTES NFR BLD AUTO: 8.9 %
MCH RBC QN AUTO: 30 PG (ref 27–31)
MCHC RBC AUTO-ENTMCNC: 31.8 G/DL (ref 33–36)
MCV RBC AUTO: 94.4 FL (ref 80–94)
MONOCYTES # BLD AUTO: 1.14 X10(3)/MCL (ref 0.1–1.3)
MONOCYTES NFR BLD AUTO: 11.5 %
NEUTROPHILS # BLD AUTO: 7.66 X10(3)/MCL (ref 2.1–9.2)
NEUTROPHILS NFR BLD AUTO: 77 %
NRBC BLD AUTO-RTO: 0 %
PLATELET # BLD AUTO: 444 X10(3)/MCL (ref 130–400)
PMV BLD AUTO: 9.4 FL (ref 7.4–10.4)
POCT GLUCOSE: 134 MG/DL (ref 70–110)
POCT GLUCOSE: 142 MG/DL (ref 70–110)
POCT GLUCOSE: 157 MG/DL (ref 70–110)
POTASSIUM SERPL-SCNC: 4.5 MMOL/L (ref 3.5–5.1)
PROT SERPL-MCNC: 5.3 GM/DL (ref 5.8–7.6)
RBC # BLD AUTO: 3.93 X10(6)/MCL (ref 4.7–6.1)
SODIUM SERPL-SCNC: 134 MMOL/L (ref 136–145)
WBC # SPEC AUTO: 9.94 X10(3)/MCL (ref 4.5–11.5)

## 2023-12-30 PROCEDURE — 27000221 HC OXYGEN, UP TO 24 HOURS

## 2023-12-30 PROCEDURE — C9113 INJ PANTOPRAZOLE SODIUM, VIA: HCPCS

## 2023-12-30 PROCEDURE — 25000003 PHARM REV CODE 250: Performed by: NURSE PRACTITIONER

## 2023-12-30 PROCEDURE — 85025 COMPLETE CBC W/AUTO DIFF WBC: CPT | Performed by: INTERNAL MEDICINE

## 2023-12-30 PROCEDURE — 63600175 PHARM REV CODE 636 W HCPCS

## 2023-12-30 PROCEDURE — 63600175 PHARM REV CODE 636 W HCPCS: Performed by: HOSPITALIST

## 2023-12-30 PROCEDURE — 80053 COMPREHEN METABOLIC PANEL: CPT | Performed by: INTERNAL MEDICINE

## 2023-12-30 PROCEDURE — 21400001 HC TELEMETRY ROOM

## 2023-12-30 PROCEDURE — 25000003 PHARM REV CODE 250: Performed by: INTERNAL MEDICINE

## 2023-12-30 PROCEDURE — 11000001 HC ACUTE MED/SURG PRIVATE ROOM

## 2023-12-30 PROCEDURE — A4216 STERILE WATER/SALINE, 10 ML: HCPCS | Performed by: INTERNAL MEDICINE

## 2023-12-30 PROCEDURE — 63600175 PHARM REV CODE 636 W HCPCS: Performed by: NURSE PRACTITIONER

## 2023-12-30 RX ADMIN — MEROPENEM 500 MG: 500 INJECTION, POWDER, FOR SOLUTION INTRAVENOUS at 04:12

## 2023-12-30 RX ADMIN — FUROSEMIDE 40 MG: 40 TABLET ORAL at 09:12

## 2023-12-30 RX ADMIN — OXYCODONE HYDROCHLORIDE 5 MG: 5 TABLET ORAL at 12:12

## 2023-12-30 RX ADMIN — OXYCODONE HYDROCHLORIDE 5 MG: 5 TABLET ORAL at 05:12

## 2023-12-30 RX ADMIN — MUPIROCIN: 20 OINTMENT TOPICAL at 09:12

## 2023-12-30 RX ADMIN — TRAZODONE HYDROCHLORIDE 25 MG: 50 TABLET ORAL at 09:12

## 2023-12-30 RX ADMIN — PANTOPRAZOLE SODIUM 40 MG: 40 INJECTION, POWDER, FOR SOLUTION INTRAVENOUS at 05:12

## 2023-12-30 RX ADMIN — AMPICILLIN 2 G: 2 INJECTION, POWDER, FOR SOLUTION INTRAVENOUS at 02:12

## 2023-12-30 RX ADMIN — SODIUM CHLORIDE, PRESERVATIVE FREE 10 ML: 5 INJECTION INTRAVENOUS at 01:12

## 2023-12-30 RX ADMIN — OXYCODONE HYDROCHLORIDE 5 MG: 5 TABLET ORAL at 09:12

## 2023-12-30 RX ADMIN — AMPICILLIN 2 G: 2 INJECTION, POWDER, FOR SOLUTION INTRAVENOUS at 09:12

## 2023-12-30 RX ADMIN — MELATONIN TAB 3 MG 6 MG: 3 TAB at 09:12

## 2023-12-30 RX ADMIN — METOPROLOL SUCCINATE 25 MG: 25 TABLET, EXTENDED RELEASE ORAL at 09:12

## 2023-12-30 RX ADMIN — AMPICILLIN 2 G: 2 INJECTION, POWDER, FOR SOLUTION INTRAVENOUS at 10:12

## 2023-12-30 RX ADMIN — MEROPENEM 500 MG: 500 INJECTION, POWDER, FOR SOLUTION INTRAVENOUS at 09:12

## 2023-12-30 RX ADMIN — ENOXAPARIN SODIUM 30 MG: 30 INJECTION SUBCUTANEOUS at 04:12

## 2023-12-30 RX ADMIN — MEROPENEM 500 MG: 500 INJECTION, POWDER, FOR SOLUTION INTRAVENOUS at 12:12

## 2023-12-30 RX ADMIN — SODIUM CHLORIDE, PRESERVATIVE FREE 10 ML: 5 INJECTION INTRAVENOUS at 05:12

## 2023-12-30 RX ADMIN — SODIUM CHLORIDE, PRESERVATIVE FREE 10 ML: 5 INJECTION INTRAVENOUS at 06:12

## 2023-12-30 RX ADMIN — AMPICILLIN 2 G: 2 INJECTION, POWDER, FOR SOLUTION INTRAVENOUS at 04:12

## 2023-12-30 RX ADMIN — PANTOPRAZOLE SODIUM 40 MG: 40 INJECTION, POWDER, FOR SOLUTION INTRAVENOUS at 04:12

## 2023-12-30 NOTE — PROGRESS NOTES
[facility]  Hospital Medicine Progress Note        Chief Complaint: Inpatient Follow-up abdominal pain    HPI: 68-year-old male with medical history of T2DM, hypertension, hyperlipidemia and prior cholecystectomy present to the ED with complaint of severe epigastric abdominal pain that started today around 9:00 a.m. after breakfast, the pain is severe 10/10 and radiate to his back associated with nausea and vomiting.  Report last bowel movement was this morning.  Reports chills but denies fever.  He has chronic scrotal hydrocele that has not changed or painful.  On arrival to ED he was afebrile, tachycardic, normotensive and saturating 96% on room air.  Labs notable for WBC 24.97, hemoglobin 18.2, creatinine 2.0, BUN 16.5, total bilirubin 2.7, direct 1.9, , , lipase more than 3000, triglyceride 183.  CT abdomen and pelvis show finding of severe acute pancreatitis without evidence of necrosis or abscess or fluid collection.  Liver remarkable for steatosis, gallbladder surgically absent, no comment on biliary tree.  He was given 2 L of IV fluids, IV Zosyn, IV analgesics and referred to hospital medicine service for further evaluation and management.  Patient was taken for ERCP on 12/17.  Choledocholithiasis was resolved.  Also had sphincterotomy performed.Returned to the floor in stable condition.  GI recommended advancement of diet to clear liquids in 4-6 hours  Patient  continues to be requiring oxygen.  Echo with grade 1 diastolic dysfunction, normal systolic function.  Follow up chest x-ray obtained  with small right pleural effusion slightly increased. Nephrology okay to transition to oral Lasix.  GI signed off.  Noted to have bacteremia, Infectious diseases recommending 14 day course from negative blood cultures of ampicillin and Flagyl.  As leukocytosis not improving, ordered a CT scan  which commenting on worsened appearance pancreatitis with possible collection  developing and small volume ascites, small bilateral pleural effusions.  Meropenem was added to his antibiotics.  GI was reconsulted, notified about the scan and requested for further recommendations, recommending surgery consult.  Surgery on board.  Plan to continue to monitor and continue the antibiotics and repeat imaging in few days.  Diet changed to clears.  Continues to require oxygen. Changed lasix to IV.    Interval Hx:   Patient is sitting in bed, reports he feels much better.  He still have pain but it is tolerable.  Inquired about going home.  Discuss he is continuing on antibiotics for now.  Awaiting final recommendations from GI.  No family is at bedside  Case was discussed with patient's nurse on the floor    Objective/physical exam:  General: In no acute distress, afebrile  Chest:  Good air entry, on supplemental oxygen via nasal cannula at 1 liter/minute  Heart: RRR, +S1, S2, no appreciable murmur  Abdomen: Soft, nontender, BS +  MSK: Warm, no lower extremity edema, no clubbing or cyanosis  Neurologic: Alert and oriented x4, Cranial nerve II-XII intact, Strength 5/5 in all 4 extremities       VITAL SIGNS: 24 HRS MIN & MAX LAST   Temp  Min: 97.9 °F (36.6 °C)  Max: 98.7 °F (37.1 °C) 97.9 °F (36.6 °C)   BP  Min: 136/82  Max: 155/83 (!) 148/83   Pulse  Min: 80  Max: 99  85   Resp  Min: 18  Max: 22 18   SpO2  Min: 93 %  Max: 96 % (!) 93 %       Recent Labs   Lab 12/28/23  0621 12/29/23  0828 12/30/23  0657   WBC 14.04* 10.63 9.94   RBC 3.95* 3.74* 3.93*   HGB 12.1* 11.5* 11.8*   HCT 37.1* 35.3* 37.1*   MCV 93.9 94.4* 94.4*   MCH 30.6 30.7 30.0   MCHC 32.6* 32.6* 31.8*   RDW 14.2 14.0 13.8   * 424* 444*   MPV 9.6 9.2 9.4         Recent Labs   Lab 12/26/23  0434 12/27/23  0432 12/28/23  0621 12/29/23  0445 12/30/23  0657   * 131* 131*  --  134*   K 4.3 3.9 4.4  --  4.5   CO2 26 23 25  --  25   BUN 17.6 14.9 13.4  --  10.6   CREATININE 0.97 0.84 0.95  --  0.77   CALCIUM 7.4* 7.8* 8.0*  --  7.8*    MG 2.00 1.80 1.70 1.80  --    ALBUMIN 1.7* 1.7*  --   --  1.8*   ALKPHOS 97 104  --   --  92   ALT 23 18  --   --  18   AST 36* 34  --   --  37*   BILITOT 1.3 1.2  --   --  0.9            Microbiology Results (last 7 days)       Procedure Component Value Units Date/Time    Blood Culture [[id number]]  (Normal) Collected: 12/20/23 1046    Order Status: Completed Specimen: Blood from Arm, Right Updated: 12/25/23 1202     CULTURE, BLOOD ([facility]) No Growth at 5 days    Blood Culture [[id number]]  (Normal) Collected: 12/20/23 1045    Order Status: Completed Specimen: Blood from Antecubital, Left Updated: 12/25/23 1202     CULTURE, BLOOD ([facility]) No Growth at 5 days    Stool Culture [[id number]]  (Normal) Collected: 12/23/23 0554    Order Status: Completed Specimen: Stool Updated: 12/25/23 0920     Stool Culture Negative for Salmonella, Shigella, Campylobacter, Vibrio, Aeromonas, Pleisiomonas,Yersinia, or Shiga Toxin 1 and 2.             Radiology:  CT Abdomen Pelvis With IV Contrast NO Oral Contrast  Narrative: EXAMINATION:  CT ABDOMEN PELVIS WITH IV CONTRAST    CLINICAL HISTORY:  Pancreatitis, acute, severe;    TECHNIQUE:  Helical acquisition through the abdomen and pelvis with IV contrast.  Three plane reconstructions were provided for review.  mGycm. Automatic exposure control, adjustment of mA/kV or iterative reconstruction technique was used to reduce radiation.    COMPARISON:  23 December 2023    FINDINGS:  There are small bilateral pleural effusions.  There is bibasilar atelectasis.    No acute findings liver, spleen, adrenals or kidneys.  The gallbladder is surgically absent.    Again there are findings of necrotic pancreatitis with possible organizing collection anterior and superior to the pancreatic neck and body measuring approximately 8 x 4 cm on image 38 series 2.  Portal, splenic and superior mesenteric veins remain grossly patent.  Question some developing phlegmon particularly along the right pericolic  gutter.  There is small volume ascites.    No bowel obstruction.  Inflammatory changes of the stomach secondary to pancreatitis.  No free air.    Urinary bladder unremarkable.  Moderate atherosclerotic disease.    No acute osseous findings.  Impression: Continued findings of necrotic pancreatitis with possible organizing collection along the pancreatic neck and body.    Electronically signed by: Osiel Whitehead  Date:    12/29/2023  Time:    11:57      Scheduled Med:   ampicillin IVPB  2 g Intravenous Q6H    enoxparin  30 mg Subcutaneous Q24H (prophylaxis, 1700)    furosemide  40 mg Oral Daily    meropenem (MERREM) IVPB  500 mg Intravenous Q8H    metoprolol succinate  25 mg Oral Daily    mupirocin   Nasal BID    pantoprazole  40 mg Intravenous BID AC    sodium chloride 0.9%  10 mL Intravenous Q6H    traZODone  25 mg Oral QHS        Continuous Infusions:       PRN Meds:  sodium chloride 0.9%, acetaminophen, acetaminophen, aluminum-magnesium hydroxide-simethicone, dextrose 10%, dextrose 10%, dicyclomine, glucagon (human recombinant), hydrALAZINE, HYDROmorphone, insulin aspart U-100, labetalol, melatonin, ondansetron, oxyCODONE, polyethylene glycol, prochlorperazine, senna-docusate 8.6-50 mg, sodium chloride 0.9%, Flushing PICC/Midline Protocol **AND** sodium chloride 0.9% **AND** sodium chloride 0.9%       Assessment/Plan:   Gallstone pancreatitis now with acute necrotizing pancreatitis  Cholangitis, acute  Choledocholithiasis status post ERCP  Severe sepsis- resolved  Leukocytosis- resolved  Enterococcus faecalis bacteremia  SHA superimposed on CKD 3A  Acute hypoxic respiratory failure requiring supplemental oxygen secondary to pulmonary edema/pleural effusion, for fluid resuscitation/concern for ARDS , from underlying pancreatitis  , history of smoking, questionable COPD?  History of T2DM, hypertension, hyperlipidemia, GERD      GI following.  Underwent ERCP with sludge and stone removal.  Lipase improved.  Transaminitis improving.  12/29- Repeat CT abdomen pelvis with IV contrast and no oral contrast shows continued finding of necrotic pancreatitis with possible organizing collection along the pancreatic neck and body.   GI recommended repeat CT abdomen in 2 weeks to reassess developing abscess and reminding the ability to drain from endoscopic perspective   General surgery also evaluated the patient, recommended supportive care and empiric antibiotics repeat imaging in 2 weeks  Infectious Disease Dr HERRERA on board- On Ampicillin and added Merrem given necrotizing pancreatitis- end date 1/03/24  Leukocytosis resolved  12/16- Blood cultures positive for Enterococcus faecalis  12/20- repeat blood cultures so far negative.  Diet as tolerated, now advanced to full liquids.   Nephrology followed for SHA, creatinine improved on diuretics.   On 1 L at present.  Wean oxygen as tolerated.    Consider bronchodilators p.r.n..  Encourage incentive spirometry, we will keep a close eye as there is a concern for ARDS in such patients.  Most recent scan commenting on small bilateral pleural effusions.   Strict Is&Os. Continues to be on lasix while trying to wean him off oxygen.   Continue supportive care appropriate home medications.    On Sliding scale with fingersticks a.c. HS for management of diabetes in-house.  Therapy evaluated the patient, no needs  Morning lab stable, repeat Monday    VTE prophylaxis:  Lovenox, renally dosed    Patient condition:  Fair    Anticipated discharge and Disposition:   TBD, home once IV antibiotic completes      All diagnosis and differential diagnosis have been reviewed; assessment and plan has been documented; I have personally reviewed the labs and test results that are presently available; I have reviewed the patients medication list; I have reviewed the consulting providers response and recommendations. I have reviewed or attempted to review medical records based upon their availability    All of  the patient's questions have been  addressed and answered. Patient's is agreeable to the above stated plan. I will continue to monitor closely and make adjustments to medical management as needed.    Felipe Casas MD  Department of Hospital Medicine   [facility]   12/30/2023 9:11 AM

## 2023-12-30 NOTE — PROGRESS NOTES
"Gastroenterology Progress Note    Subjective/Interval History:    CT abd/pel with IV contrast 12/29: necrotic pancreatitis with possible organizing collection anterior and superior to pancreatic neck and body measuring approximately 8 x 4 cm. Portal, splenic and superior mesenteric veins remain grossly patent.  Question some developing phlegmon particularly along the right pericolic gutter. Small volume ascites. No bowel obstruction. Inflammatory changes of stomach secondary to pancreatitis. No free air.    12-30-23  Pt with necrotizing pancreatitis on CT   Discussed good nutritional intake with pt and wife  Will attempt to eat and do 3 boosts a day  Encourage ambulation  Afebrile  Bowels moving    Vital Signs:  /76   Pulse 93   Temp 97.8 °F (36.6 °C) (Oral)   Resp 18   Ht 5' 6" (1.676 m)   Wt 72.5 kg (159 lb 13.3 oz)   SpO2 95%   BMI 25.80 kg/m²   Body mass index is 25.8 kg/m².    Physical Exam  Constitutional:       General: He is not in acute distress.     Appearance: He is not ill-appearing.   HENT:      Head: Normocephalic and atraumatic.   Eyes:      General: No scleral icterus.     Extraocular Movements: Extraocular movements intact.   Cardiovascular:      Rate and Rhythm: Normal rate and regular rhythm.   Pulmonary:      Effort: Pulmonary effort is normal. No respiratory distress.      Comments: On 2L O2 NC.  Abdominal:      General: Bowel sounds are normal. There is no distension.      Palpations: Abdomen is soft. There is no mass.      Tenderness: There is mild abdominal tenderness throughout. There is no guarding or rebound.   Musculoskeletal:      Right lower leg: Edema (1+) present.      Left lower leg: Edema (1+) present.   Skin:     General: Skin is warm and dry.      Coloration: Skin is not jaundiced.   Neurological:      Mental Status: He is alert and oriented to person, place, and time.   Psychiatric:         Mood and Affect: Mood normal.         Behavior: Behavior normal. "       Labs:  Recent Results (from the past 48 hour(s))   POCT glucose    Collection Time: 12/28/23  7:59 PM   Result Value Ref Range    POCT Glucose 134 (H) 70 - 110 mg/dL   Magnesium    Collection Time: 12/29/23  4:45 AM   Result Value Ref Range    Magnesium Level 1.80 1.60 - 2.60 mg/dL   Phosphorus    Collection Time: 12/29/23  4:45 AM   Result Value Ref Range    Phosphorus Level 2.6 2.3 - 4.7 mg/dL   POCT glucose    Collection Time: 12/29/23  5:47 AM   Result Value Ref Range    POCT Glucose 134 (H) 70 - 110 mg/dL   CBC with Differential    Collection Time: 12/29/23  8:28 AM   Result Value Ref Range    WBC 10.63 4.50 - 11.50 x10(3)/mcL    RBC 3.74 (L) 4.70 - 6.10 x10(6)/mcL    Hgb 11.5 (L) 14.0 - 18.0 g/dL    Hct 35.3 (L) 42.0 - 52.0 %    MCV 94.4 (H) 80.0 - 94.0 fL    MCH 30.7 27.0 - 31.0 pg    MCHC 32.6 (L) 33.0 - 36.0 g/dL    RDW 14.0 11.5 - 17.0 %    Platelet 424 (H) 130 - 400 x10(3)/mcL    MPV 9.2 7.4 - 10.4 fL    Neut % 78.6 %    Lymph % 8.4 %    Mono % 10.4 %    Eos % 0.9 %    Basophil % 0.7 %    Lymph # 0.89 0.6 - 4.6 x10(3)/mcL    Neut # 8.35 2.1 - 9.2 x10(3)/mcL    Mono # 1.11 0.1 - 1.3 x10(3)/mcL    Eos # 0.10 0 - 0.9 x10(3)/mcL    Baso # 0.07 <=0.2 x10(3)/mcL    IG# 0.11 (H) 0 - 0.04 x10(3)/mcL    IG% 1.0 %    NRBC% 0.0 %   POCT glucose    Collection Time: 12/29/23 11:01 AM   Result Value Ref Range    POCT Glucose 125 (H) 70 - 110 mg/dL   POCT glucose    Collection Time: 12/29/23  5:12 PM   Result Value Ref Range    POCT Glucose 143 (H) 70 - 110 mg/dL   POCT glucose    Collection Time: 12/29/23  7:25 PM   Result Value Ref Range    POCT Glucose 110 70 - 110 mg/dL   POCT glucose    Collection Time: 12/30/23  4:40 AM   Result Value Ref Range    POCT Glucose 134 (H) 70 - 110 mg/dL   Comprehensive Metabolic Panel    Collection Time: 12/30/23  6:57 AM   Result Value Ref Range    Sodium Level 134 (L) 136 - 145 mmol/L    Potassium Level 4.5 3.5 - 5.1 mmol/L    Chloride 100 98 - 107 mmol/L    Carbon Dioxide 25  23 - 31 mmol/L    Glucose Level 125 (H) 82 - 115 mg/dL    Blood Urea Nitrogen 10.6 8.4 - 25.7 mg/dL    Creatinine 0.77 0.73 - 1.18 mg/dL    Calcium Level Total 7.8 (L) 8.8 - 10.0 mg/dL    Protein Total 5.3 (L) 5.8 - 7.6 gm/dL    Albumin Level 1.8 (L) 3.4 - 4.8 g/dL    Globulin 3.5 2.4 - 3.5 gm/dL    Albumin/Globulin Ratio 0.5 (L) 1.1 - 2.0 ratio    Bilirubin Total 0.9 <=1.5 mg/dL    Alkaline Phosphatase 92 40 - 150 unit/L    Alanine Aminotransferase 18 0 - 55 unit/L    Aspartate Aminotransferase 37 (H) 5 - 34 unit/L    eGFR >60 mls/min/1.73/m2   CBC with Differential    Collection Time: 12/30/23  6:57 AM   Result Value Ref Range    WBC 9.94 4.50 - 11.50 x10(3)/mcL    RBC 3.93 (L) 4.70 - 6.10 x10(6)/mcL    Hgb 11.8 (L) 14.0 - 18.0 g/dL    Hct 37.1 (L) 42.0 - 52.0 %    MCV 94.4 (H) 80.0 - 94.0 fL    MCH 30.0 27.0 - 31.0 pg    MCHC 31.8 (L) 33.0 - 36.0 g/dL    RDW 13.8 11.5 - 17.0 %    Platelet 444 (H) 130 - 400 x10(3)/mcL    MPV 9.4 7.4 - 10.4 fL    Neut % 77.0 %    Lymph % 8.9 %    Mono % 11.5 %    Eos % 0.9 %    Basophil % 0.6 %    Lymph # 0.88 0.6 - 4.6 x10(3)/mcL    Neut # 7.66 2.1 - 9.2 x10(3)/mcL    Mono # 1.14 0.1 - 1.3 x10(3)/mcL    Eos # 0.09 0 - 0.9 x10(3)/mcL    Baso # 0.06 <=0.2 x10(3)/mcL    IG# 0.11 (H) 0 - 0.04 x10(3)/mcL    IG% 1.1 %    NRBC% 0.0 %   POCT glucose    Collection Time: 12/30/23 11:14 AM   Result Value Ref Range    POCT Glucose 142 (H) 70 - 110 mg/dL       Imaging:  X-Ray Chest 1 View for Line/Tube Placement    Result Date: 12/23/2023  EXAMINATION: XR CHEST 1 VIEW FOR LINE/TUBE PLACEMENT CLINICAL HISTORY: PICC; TECHNIQUE: One view COMPARISON: December 20, 2023. FINDINGS: Right upper extremity approach PICC line which terminates about the cavoatrial junction and is optimal.  Cardiopericardial silhouette is within normal limits.  Improved right pleural effusion.  There is new thickened opacity right lower lung zone which may represent atelectasis.  Attention to follow-up exams.  There also  new left basilar hypoventilatory changes no pulmonary edema.  No pneumothorax.     Optimal placement of the PICC line. Electronically signed by: Denver Wagner Date:    12/23/2023 Time:    19:56    CT Chest Abdomen Pelvis Without Contrast (XPD)    Result Date: 12/23/2023  EXAMINATION: CT CHEST ABDOMEN PELVIS WITHOUT CONTRAST(XPD) CLINICAL HISTORY: follow up; TECHNIQUE: Helical acquisition from the thoracic inlet through the ischia without IV contrast.  Lack of contrast limits evaluation of solid organs and vascular structures.  Three plane reconstructions made available for review. DLP 1042 mGycm. Automatic exposure control, adjustment of mA/kV or iterative reconstruction technique was used to reduce radiation. COMPARISON: 18 December 2023, 15 August 2023 FINDINGS: Chest. Heart is not significantly enlarged.  There are coronary artery calcifications.  No pericardial effusion. No mediastinal, hilar or axillary adenopathy by CT size criteria. There are small bilateral pleural effusions slightly improved compared to prior.  Mild bibasilar atelectasis.  Moderate to severe emphysema. Abdomen and pelvis. No acute findings noncontrast liver, spleen, adrenals or kidneys.  Gallbladder surgically absent. There is increased peripancreatic inflammation compared to prior.  Question a developing collection anterior and superior to the portion of the pancreas which had suggestion of necrosis on prior imaging.  This collection is seen on images 104-110 series 2.  There is small volume ascites. There is no bowel obstruction or free air.  There is colonic diverticulosis. Urinary bladder unremarkable.  Prostate mildly enlarged.  Abdominal aorta mildly ectatic with moderate atherosclerotic disease. There are no acute osseous findings.     1. Worsened appearance of pancreatitis with possible collection developing anterior and superior to the pancreas. 2. Small volume ascites. 3. Small bilateral pleural effusions. Electronically signed  by: Osiel Whitehead Date:    12/23/2023 Time:    10:59    Echo    Result Date: 12/20/2023    Left Ventricle: The left ventricle is normal in size. Normal wall thickness. Normal wall motion. There is normal systolic function with a visually estimated ejection fraction of 55 - 60%. Grade I diastolic dysfunction.   Right Ventricle: Normal right ventricular cavity size. Systolic function is normal.   Aortic Valve: The aortic valve is a trileaflet valve.   Pericardium: There is a trivial effusion. No indication of cardiac tamponade.   Technically difficult study due to lung interference.     X-Ray Chest 1 View    Result Date: 12/20/2023  EXAMINATION: XR CHEST 1 VIEW CLINICAL HISTORY: follow up; TECHNIQUE: Single view of the chest COMPARISON: 12/18/2023 FINDINGS: Cardiomegaly is unchanged.  Small right effusion is slightly increased in the interval.  No acute osseous abnormality.     As above. Electronically signed by: Cristhian Lara Date:    12/20/2023 Time:    09:53    CT Abdomen Pelvis  Without Contrast    Result Date: 12/18/2023  EXAMINATION: CT ABDOMEN PELVIS WITHOUT CONTRAST CLINICAL HISTORY: rise in tbili and pain s/p ERCP; TECHNIQUE: Low dose axial images, sagittal and coronal reformations were obtained from the lung bases to the pubic symphysis.  No contrast was administered.  Automatic exposure control is utilized to reduce patient radiation exposure. COMPARISON: 12/16/2023 FINDINGS: There are changes seen consistent with emphysema and COPD in the lungs.  There is  bibasilar atelectasis and bilateral pleural effusions.  This is a new finding.. The liver is hypoattenuated consistent with hepatic steatosis.  The patient is status post cholecystectomy.  There is evidence of ascites.  This is more prominent than the prior examination. There are inflammatory changes seen around the pancreas consistent with pancreatitis.  This was seen on the prior examination as well.  Previous examination showed incomplete enhancement  of the pancreas concerning for developing necrotizing pancreatitis.  No free intraperitoneal air is seen on this examination. Adrenal glands appear normal. Kidneys appear normal.  No hydronephrosis is seen.  No hydroureter seen. No colitis is seen.  No diverticulitis is seen.  No colonic mass is seen. Urinary bladder appears grossly unremarkable. Atherosclerotic changes are seen within the abdominal aorta and its branches. Bones appear grossly unremarkable.     Interval worsening of the ascites Persistent inflammatory changes around the pancreas consistent patient's history of pancreatitis.  Previous examination showed incomplete enhancement of the pancreas suggesting possible developing necrotizing pancreatitis. Interval development of bibasilar atelectasis and moderate to large pleural effusions Electronically signed by: Wayne Gee Date:    12/18/2023 Time:    12:12    X-Ray Chest 1 View    Result Date: 12/18/2023  EXAMINATION: XR CHEST 1 VIEW CPT 73613 CLINICAL HISTORY: Hypoxemia; FINDINGS: Examination reveals mediastinal silhouette to be within normal limits cardiac silhouette is not enlarged.  There is some increase interstitial and pulmonary vascular markings which may indicate a degree of pulmonary vascular congestion. There is blunting of both costophrenic angles indicating the presence of bilateral pleural effusions. Slightly more confluent opacities identified at the left base superimposed infiltrate cannot be completely excluded. Atelectatic changes identified at the right base     Changes of pulmonary vascular congestion. Bilateral pleural effusions Electronically signed by: Radu Edwards Date:    12/18/2023 Time:    09:43    US Retroperitoneal Complete    Result Date: 12/18/2023  EXAMINATION: US RETROPERITONEAL COMPLETE CLINICAL HISTORY: Acute on chronic kidney disease. COMPARISON: CT 16 December 2023 FINDINGS: Grayscale and color Doppler sonographic evaluation of the kidneys and urinary bladder.  The right kidney measures 11 cm. The left kidney measures 11 cm.   No hydronephrosis.  Grossly normal renal parenchymal echogenicity. No significant abnormality of the urinary bladder. There is small volume ascites.     No significant sonographic abnormality of the kidneys. Electronically signed by: Osiel Whitehead Date:    12/18/2023 Time:    09:09    FL ERCP Biliary And Pancreatic By Rad Tech    Result Date: 12/17/2023  EXAMINATION: FL ERCP BILIARY AND PANCREATIC CLINICAL HISTORY: biliary stone; TECHNIQUE: 52.8mGy of fluoroscopic support was utilized for procedural support during procedure.  Please refer to performing provider dictation. COMPARISON: None FINDINGS: Fluoroscopic support. Please refer to procedure of this dictation.     Fluoroscopic support.  Please refer to procedure of this dictation. Electronically signed by: Cristhian Lara Date:    12/17/2023 Time:    13:04    MRI MRCP Without Contrast    Result Date: 12/17/2023  EXAMINATION: MRI ABDOMEN WITHOUT CONTRAST MRCP CLINICAL HISTORY: Post cholecystectomy pancreatitis -- ?  Choledocholithiases; TECHNIQUE: Multiplanar, multisequence images are performed through the liver and upper abdomen.  Additionally 2D and 3D MRCP sequences are performed as well as MIP images. COMPARISON: None. FINDINGS: Lung bases: Moderate streaky is present in the visualized lung bases consistent with nonspecific dependent changes and scarring. Liver: Liver appears normal in size. Portal venous system: Normal. Gallbladder: Gallbladder is surgically absent. Biliary tree intrahepatic ducts: Unremarkable. Biliary tree extrahepatic ducts: There appear to be numerous intraluminal filling defects in the mid to distal common bile duct series image 16 series 3. Ampullary region: No obvious obstructive mass or stone. Spleen: Unremarkable. Pancreas: There is intermediate intrasubstance T2 signal in the pancreatic body (image 19 series 4 and 6) with corresponding restricted diffusion signal on  DWI (image 136 series 7), relating to edema changes. There is stable free fluid collection in the bilateral anterior pararenal space extending to the adjacent mesocolon, small bowel mesentery, perihepatic and perisplenic regions. T2 stranding intensities are seen along the lesser sac. These findings are reflective of acute interstitial pancreatitis. Pancreatic duct: Unremarkable. Adrenal glands: Unremarkable. Kidneys: There are probable cysts in both kidneys, with the larger seen in the right mid pole region measuring 1.0 cm (image 29 series 4).Ureters: Unremarkable. Stomach and distal esophagus: Normal. Small bowel: Normal. Colon: Normal. Lymph nodes: A 1cm lymph node is demonstrated in the mo hepatis region (image 18 series 4), likely reactive in nature. Abdominal wall: Normal. Systemic arteries and veins: Unremarkable. Osseous structures: There is mild spondylolytic changes in the imaged spine. Miscellaneous: There are motion artifacts in some of the sequences limiting its evaluation.     1. There appear to be numerous intraluminal filling defects in the mid to distal common bile duct series image 16 series 3. These are suggestive of impacted gallstones and sludge. Correlate clinically as regards additional evaluation and follow-up possibly with ERCP. 2. There is intermediate intrasubstance T2 signal in the pancreatic body (image 19 series 4 and 6) with corresponding restricted diffusion signal on DWI (image 136 series 7), relating to edema changes. There is stable free fluid collection in the bilateral anterior pararenal space extending to the adjacent mesocolon, small bowel mesentery, perihepatic and perisplenic regions. T2 stranding intensities are seen along the lesser sac. These findings are reflective of acute interstitial pancreatitis. Correlate with clinical and laboratory findings as regards additional evaluation and follow-up to full resolution as indicated. 3. A 1cm lymph node is demonstrated in the  mo hepatis region (image 18 series 4), likely reactive in nature. I concur with the preliminary report above. Electronically signed by: Wayne Gee Date:    12/17/2023 Time:    12:12    CT Abdomen Pelvis With IV Contrast NO Oral Contrast    Result Date: 12/16/2023  EXAMINATION: CT ABDOMEN PELVIS WITH IV CONTRAST CLINICAL HISTORY: Abdominal pain, acute, nonlocalized; TECHNIQUE: Multidetector axial images were obtained of the abdomen and pelvis following the administration of IV contrast. Oral contrast was not administered. Dose length product of 578 mGycm. Automated exposure control was utilized to minimize radiation dose. COMPARISON: August 4, 2023. FINDINGS: Included portion of the lungs show dependent hypoventilatory changes without acute air space infiltrates or fluid within the pleural spaces. Liver is remarkable for steatosis without focal space occupying lesion.  There is small amount of free fluid around the anterolateral surface of the liver.  Gallbladder is surgically absent.  Pancreas is surrounded by considerable phlegmons and fluid which extend into the anterior pararenal spaces and further into the lower abdomen consistent with acute pancreatitis.  There is no suggestion of pancreatic necrosis, pseudocyst or abscess formation.  Spleen is unremarkable. The adrenal glands appear within normal limits. The kidneys are unremarkable in size and contour. No solid or cystic renal lesion identified. There is no hydronephrosis. No perinephric fluid strandings or collections identified. Stomach is nondistended.  There is distal esophageal mural thickening which probably result of reflux disease with about similar appearance.  There is some mural thickening of the gastric antrum and the descending colon which may represent reactive change from acute pancreatitis.  Possible primary gastritis and duodenitis is not excluded.  No abnormal dilatation of loops of small bowel.  Colon is nondistended.  No bowel  obstruction.  Distal descending and sigmoid colon noninflamed diverticulosis coli. Urinary bladder appears within normal limits.  Prostate is enlarged in size and contains few calcifications.  There is large right hydrocele which extends into right inguinal canal.  Is no pelvic free fluid. No acute or otherwise osseous abnormality identified.     1. Severe acute pancreatitis. 2. Gastric antrum and duodenal mural thickening likely represents reactive change for acute pancreatitis. 3. Right large hydrocele. Electronically signed by: Denver Wagner Date:    12/16/2023 Time:    20:08    US Scrotum And Testicles    Result Date: 12/16/2023  EXAMINATION: US SCROTUM AND TESTICLES CLINICAL HISTORY: Other specified disorders of the male genital organs TECHNIQUE: Multiple real-time images were performed of the scrotum in various planes by the sonographer. COMPARISON: None available FINDINGS: Right testicle measures 3.3 x 2.4 x 2.4 cm.  There is unremarkable echotexture of the right testicle.  Unremarkable arteriovenous flow to the right testicle. There is large right scrotal hydrocele which measures 10.6 x 7.2 x 8.5 cm.  Right hydrocele causes mass effect upon left testicle which is deviated inferiorly.  This made it difficult to optimally assess vascularity to the left testicle due to pressure caused by the hydrocele.  Some vascularity along the peripheral aspect of less testicle was visualized.  Left testicle measures 3.4 x 2.0 x 2.0 cm and no abnormality of the parenchymal echotexture identified.     1. Unremarkable right testicle 2. Large right hydrocele which crosses the midline and causes compressive effect upon the left testicle which is inferiorly deviated.  Due to pressure caused by the hydrocele upon the left testicle optimal Doppler flow to the left testicle could not be obtained.  Only limited arterial flow to the peripheral aspect left testicle could be visualized.  Please correlate clinically.  Oneoption is to  perform a follow-up study post drainage of large right hydrocele. Electronically signed by: Denver Wagner Date:    12/16/2023 Time:    19:21         Assessment/Plan:  68 y.o. male unknown to our group with PMH of T2DM, HTN, HLD, chronic scrotal hydrocele, vitamin D deficiency, CKD 3a. Presented to the ED 12/16/23 with severe epigastric pain onset same day with radiation to back, associated n/v and chills also reported. GI consulted for choledocholithiasis. Underwent ERCP 12/17 with sludge and stone removal.      Gallstone pancreatitis  Transaminitis--resolved     - respiratory status improving.   - continue supportive care  - PPI BID  - ADAT  - noted CT results. Recommend repeat CT in 2 weeks to reassess developing abscess and determine ability to drain from endoscopic perspective. Appreciate surgery recs. Will follow this weekend.     Encourage PO intake  Encourage ambulation  Supportive care    Of can have adequate oral intake - should be able to go home soon while fluid collection matures     Starr Cole NP as scribe for Dr. Phil Oseguera

## 2023-12-31 LAB
POCT GLUCOSE: 119 MG/DL (ref 70–110)
POCT GLUCOSE: 121 MG/DL (ref 70–110)
POCT GLUCOSE: 131 MG/DL (ref 70–110)

## 2023-12-31 PROCEDURE — 25000003 PHARM REV CODE 250: Performed by: INTERNAL MEDICINE

## 2023-12-31 PROCEDURE — 63600175 PHARM REV CODE 636 W HCPCS: Performed by: NURSE PRACTITIONER

## 2023-12-31 PROCEDURE — 63600175 PHARM REV CODE 636 W HCPCS: Performed by: HOSPITALIST

## 2023-12-31 PROCEDURE — A4216 STERILE WATER/SALINE, 10 ML: HCPCS | Performed by: INTERNAL MEDICINE

## 2023-12-31 PROCEDURE — 27000221 HC OXYGEN, UP TO 24 HOURS

## 2023-12-31 PROCEDURE — 25000003 PHARM REV CODE 250: Performed by: NURSE PRACTITIONER

## 2023-12-31 PROCEDURE — 21400001 HC TELEMETRY ROOM

## 2023-12-31 PROCEDURE — C9113 INJ PANTOPRAZOLE SODIUM, VIA: HCPCS

## 2023-12-31 PROCEDURE — 11000001 HC ACUTE MED/SURG PRIVATE ROOM

## 2023-12-31 PROCEDURE — 63600175 PHARM REV CODE 636 W HCPCS

## 2023-12-31 RX ADMIN — PANTOPRAZOLE SODIUM 40 MG: 40 INJECTION, POWDER, FOR SOLUTION INTRAVENOUS at 05:12

## 2023-12-31 RX ADMIN — MELATONIN TAB 3 MG 6 MG: 3 TAB at 09:12

## 2023-12-31 RX ADMIN — OXYCODONE HYDROCHLORIDE 5 MG: 5 TABLET ORAL at 09:12

## 2023-12-31 RX ADMIN — MUPIROCIN: 20 OINTMENT TOPICAL at 10:12

## 2023-12-31 RX ADMIN — SODIUM CHLORIDE, PRESERVATIVE FREE 10 ML: 5 INJECTION INTRAVENOUS at 12:12

## 2023-12-31 RX ADMIN — AMPICILLIN 2 G: 2 INJECTION, POWDER, FOR SOLUTION INTRAVENOUS at 02:12

## 2023-12-31 RX ADMIN — SODIUM CHLORIDE, PRESERVATIVE FREE 10 ML: 5 INJECTION INTRAVENOUS at 06:12

## 2023-12-31 RX ADMIN — MEROPENEM 500 MG: 500 INJECTION, POWDER, FOR SOLUTION INTRAVENOUS at 12:12

## 2023-12-31 RX ADMIN — AMPICILLIN 2 G: 2 INJECTION, POWDER, FOR SOLUTION INTRAVENOUS at 04:12

## 2023-12-31 RX ADMIN — ENOXAPARIN SODIUM 30 MG: 30 INJECTION SUBCUTANEOUS at 04:12

## 2023-12-31 RX ADMIN — MEROPENEM 500 MG: 500 INJECTION, POWDER, FOR SOLUTION INTRAVENOUS at 04:12

## 2023-12-31 RX ADMIN — METOPROLOL SUCCINATE 25 MG: 25 TABLET, EXTENDED RELEASE ORAL at 10:12

## 2023-12-31 RX ADMIN — AMPICILLIN 2 G: 2 INJECTION, POWDER, FOR SOLUTION INTRAVENOUS at 09:12

## 2023-12-31 RX ADMIN — PANTOPRAZOLE SODIUM 40 MG: 40 INJECTION, POWDER, FOR SOLUTION INTRAVENOUS at 04:12

## 2023-12-31 RX ADMIN — AMPICILLIN 2 G: 2 INJECTION, POWDER, FOR SOLUTION INTRAVENOUS at 10:12

## 2023-12-31 RX ADMIN — MUPIROCIN: 20 OINTMENT TOPICAL at 09:12

## 2023-12-31 RX ADMIN — MEROPENEM 500 MG: 500 INJECTION, POWDER, FOR SOLUTION INTRAVENOUS at 10:12

## 2023-12-31 RX ADMIN — FUROSEMIDE 40 MG: 40 TABLET ORAL at 10:12

## 2023-12-31 RX ADMIN — OXYCODONE HYDROCHLORIDE 5 MG: 5 TABLET ORAL at 10:12

## 2023-12-31 RX ADMIN — TRAZODONE HYDROCHLORIDE 25 MG: 50 TABLET ORAL at 09:12

## 2023-12-31 NOTE — PROGRESS NOTES
[facility]  Hospital Medicine Progress Note        Chief Complaint: Inpatient Follow-up abdominal pain    HPI: 68-year-old male with medical history of T2DM, hypertension, hyperlipidemia and prior cholecystectomy present to the ED with complaint of severe epigastric abdominal pain that started today around 9:00 a.m. after breakfast, the pain is severe 10/10 and radiate to his back associated with nausea and vomiting.  Report last bowel movement was this morning.  Reports chills but denies fever.  He has chronic scrotal hydrocele that has not changed or painful.  On arrival to ED he was afebrile, tachycardic, normotensive and saturating 96% on room air.  Labs notable for WBC 24.97, hemoglobin 18.2, creatinine 2.0, BUN 16.5, total bilirubin 2.7, direct 1.9, , , lipase more than 3000, triglyceride 183.  CT abdomen and pelvis show finding of severe acute pancreatitis without evidence of necrosis or abscess or fluid collection.  Liver remarkable for steatosis, gallbladder surgically absent, no comment on biliary tree.  He was given 2 L of IV fluids, IV Zosyn, IV analgesics and referred to hospital medicine service for further evaluation and management.  Patient was taken for ERCP on 12/17.  Choledocholithiasis was resolved.  Also had sphincterotomy performed.Returned to the floor in stable condition.  GI recommended advancement of diet to clear liquids in 4-6 hours  Patient  continues to be requiring oxygen.  Echo with grade 1 diastolic dysfunction, normal systolic function.  Follow up chest x-ray obtained  with small right pleural effusion slightly increased. Nephrology okay to transition to oral Lasix.  GI signed off.  Noted to have bacteremia, Infectious diseases recommending 14 day course from negative blood cultures of ampicillin and Flagyl.  As leukocytosis not improving, ordered a CT scan  which commenting on worsened appearance pancreatitis with possible collection  developing and small volume ascites, small bilateral pleural effusions.  Meropenem was added to his antibiotics.  GI was reconsulted, notified about the scan and requested for further recommendations, recommending surgery consult.  Surgery on board.  Plan to continue to monitor and continue the antibiotics and repeat imaging in few days.  Diet changed to clears.  Continues to require oxygen. Changed lasix to IV.    Interval Hx:  Patient is sitting in bed, reports he feels much better.  Encourage patient to get up and walk in the hallways.    No family is at bedside  Case was discussed with patient's nurse on the floor    Objective/physical exam:  General: In no acute distress, afebrile  Chest:  Good air entry, on supplemental oxygen via nasal cannula at 1 liter/minute  Heart: RRR, +S1, S2, no appreciable murmur  Abdomen: Soft, nontender, BS +  MSK: Warm, no lower extremity edema, no clubbing or cyanosis  Neurologic: Alert and oriented x4, Cranial nerve II-XII intact, Strength 5/5 in all 4 extremities       VITAL SIGNS: 24 HRS MIN & MAX LAST   Temp  Min: 97.8 °F (36.6 °C)  Max: 98.4 °F (36.9 °C) 98.1 °F (36.7 °C)   BP  Min: 117/76  Max: 155/91 (!) 155/91   Pulse  Min: 80  Max: 93  81   Resp  Min: 16  Max: 18 18   SpO2  Min: 92 %  Max: 96 % 96 %       Recent Labs   Lab 12/28/23  0621 12/29/23  0828 12/30/23  0657   WBC 14.04* 10.63 9.94   RBC 3.95* 3.74* 3.93*   HGB 12.1* 11.5* 11.8*   HCT 37.1* 35.3* 37.1*   MCV 93.9 94.4* 94.4*   MCH 30.6 30.7 30.0   MCHC 32.6* 32.6* 31.8*   RDW 14.2 14.0 13.8   * 424* 444*   MPV 9.6 9.2 9.4         Recent Labs   Lab 12/26/23  0434 12/27/23  0432 12/28/23  0621 12/29/23  0445 12/30/23  0657   * 131* 131*  --  134*   K 4.3 3.9 4.4  --  4.5   CO2 26 23 25  --  25   BUN 17.6 14.9 13.4  --  10.6   CREATININE 0.97 0.84 0.95  --  0.77   CALCIUM 7.4* 7.8* 8.0*  --  7.8*   MG 2.00 1.80 1.70 1.80  --    ALBUMIN 1.7* 1.7*  --   --  1.8*   ALKPHOS 97 104  --   --  92   ALT 23 18   --   --  18   AST 36* 34  --   --  37*   BILITOT 1.3 1.2  --   --  0.9            Microbiology Results (last 7 days)       Procedure Component Value Units Date/Time    Blood Culture [[id number]]  (Normal) Collected: 12/20/23 1046    Order Status: Completed Specimen: Blood from Arm, Right Updated: 12/25/23 1202     CULTURE, BLOOD ([facility]) No Growth at 5 days    Blood Culture [[id number]]  (Normal) Collected: 12/20/23 1045    Order Status: Completed Specimen: Blood from Antecubital, Left Updated: 12/25/23 1202     CULTURE, BLOOD ([facility]) No Growth at 5 days    Stool Culture [[id number]]  (Normal) Collected: 12/23/23 0554    Order Status: Completed Specimen: Stool Updated: 12/25/23 0920     Stool Culture Negative for Salmonella, Shigella, Campylobacter, Vibrio, Aeromonas, Pleisiomonas,Yersinia, or Shiga Toxin 1 and 2.             Radiology:  CT Abdomen Pelvis With IV Contrast NO Oral Contrast  Narrative: EXAMINATION:  CT ABDOMEN PELVIS WITH IV CONTRAST    CLINICAL HISTORY:  Pancreatitis, acute, severe;    TECHNIQUE:  Helical acquisition through the abdomen and pelvis with IV contrast.  Three plane reconstructions were provided for review.  mGycm. Automatic exposure control, adjustment of mA/kV or iterative reconstruction technique was used to reduce radiation.    COMPARISON:  23 December 2023    FINDINGS:  There are small bilateral pleural effusions.  There is bibasilar atelectasis.    No acute findings liver, spleen, adrenals or kidneys.  The gallbladder is surgically absent.    Again there are findings of necrotic pancreatitis with possible organizing collection anterior and superior to the pancreatic neck and body measuring approximately 8 x 4 cm on image 38 series 2.  Portal, splenic and superior mesenteric veins remain grossly patent.  Question some developing phlegmon particularly along the right pericolic gutter.  There is small volume ascites.    No bowel obstruction.  Inflammatory changes of the stomach  secondary to pancreatitis.  No free air.    Urinary bladder unremarkable.  Moderate atherosclerotic disease.    No acute osseous findings.  Impression: Continued findings of necrotic pancreatitis with possible organizing collection along the pancreatic neck and body.    Electronically signed by: Osiel Whitehead  Date:    12/29/2023  Time:    11:57      Scheduled Med:   ampicillin IVPB  2 g Intravenous Q6H    enoxparin  30 mg Subcutaneous Q24H (prophylaxis, 1700)    furosemide  40 mg Oral Daily    meropenem (MERREM) IVPB  500 mg Intravenous Q8H    metoprolol succinate  25 mg Oral Daily    mupirocin   Nasal BID    pantoprazole  40 mg Intravenous BID AC    sodium chloride 0.9%  10 mL Intravenous Q6H    traZODone  25 mg Oral QHS        Continuous Infusions:       PRN Meds:  sodium chloride 0.9%, acetaminophen, acetaminophen, aluminum-magnesium hydroxide-simethicone, dextrose 10%, dextrose 10%, dicyclomine, glucagon (human recombinant), hydrALAZINE, HYDROmorphone, insulin aspart U-100, labetalol, melatonin, ondansetron, oxyCODONE, polyethylene glycol, prochlorperazine, senna-docusate 8.6-50 mg, sodium chloride 0.9%, Flushing PICC/Midline Protocol **AND** sodium chloride 0.9% **AND** sodium chloride 0.9%       Assessment/Plan:   Gallstone pancreatitis now with acute necrotizing pancreatitis  Cholangitis, acute  Enterococcus faecalis bacteremia  Choledocholithiasis status post ERCP with sludge and stone removal  Severe sepsis- resolved  Leukocytosis- resolved  SHA superimposed on CKD 3A  Acute hypoxic respiratory failure requiring supplemental oxygen secondary to pulmonary edema/pleural effusion, for fluid resuscitation/concern for ARDS , from underlying pancreatitis  , history of smoking, questionable COPD?  History of T2DM, hypertension, hyperlipidemia, GERD      GI following.  Underwent ERCP 12/17 with sludge and stone removal.  Lipase improved. Transaminitis improved  12/29- Repeat CT abdomen pelvis with IV contrast and  no oral contrast shows continued finding of necrotic pancreatitis with possible organizing collection along the pancreatic neck and body.   GI recommended repeat CT abdomen in 3-4 weeks to reassess developing abscess and reminding the ability to drain from endoscopic perspective, recommending 3-4 boost per day, improve nutrition, low-fat diet, follow up as outpatient with GI clinic.  Dr. Stanford also note no role of ongoing antibiotics for pancreatitis standpoint there is no evidence of infected necrosis on the CT scan that is no gas present and patient remains afebrile.  Recommended to continue antibiotic course for his presumed Enterococcus bacteremia as recommended per ID  General surgery also evaluated the patient, recommended supportive care and empiric antibiotics repeat imaging in few weeks  Infectious Disease Dr HERRERA on board- On Ampicillin and added Merrem given necrotizing pancreatitis- end date 1/03/24  Leukocytosis resolved  12/16- Blood cultures positive for Enterococcus faecalis  12/20- repeat blood cultures so far negative.  Diet as tolerated, now advanced to full liquids.   Nephrology followed for SHA, creatinine improved on diuretics.   On 1 L at present.  Wean oxygen as tolerated.    Consider bronchodilators p.r.n..  Encourage incentive spirometry, we will keep a close eye as there is a concern for ARDS in such patients.  Most recent scan commenting on small bilateral pleural effusions.   Strict Is&Os. Continues to be on lasix while trying to wean him off oxygen.   Continue supportive care appropriate home medications.    On Sliding scale with fingersticks a.c. HS for management of diabetes in-house.  Therapy evaluated the patient, no needs  Morning lab stable, repeat Tuesday    VTE prophylaxis:  Lovenox, renally dosed    Patient condition:  Fair    Anticipated discharge and Disposition:   TBD, home once IV antibiotic completes      All diagnosis and differential diagnosis have been reviewed;  assessment and plan has been documented; I have personally reviewed the labs and test results that are presently available; I have reviewed the patients medication list; I have reviewed the consulting providers response and recommendations. I have reviewed or attempted to review medical records based upon their availability    All of the patient's questions have been  addressed and answered. Patient's is agreeable to the above stated plan. I will continue to monitor closely and make adjustments to medical management as needed.    Felipe Casas MD  Department of Hospital Medicine   [facility]   12/31/2023 9:11 AM

## 2023-12-31 NOTE — PLAN OF CARE
Problem: Adult Inpatient Plan of Care  Goal: Plan of Care Review  Outcome: Ongoing, Progressing  Goal: Absence of Hospital-Acquired Illness or Injury  Outcome: Ongoing, Progressing  Goal: Optimal Comfort and Wellbeing  Outcome: Ongoing, Progressing     Problem: Pain Acute  Goal: Acceptable Pain Control and Functional Ability  Outcome: Ongoing, Progressing     Problem: Fall Injury Risk  Goal: Absence of Fall and Fall-Related Injury  Outcome: Ongoing, Progressing

## 2024-01-01 LAB
POCT GLUCOSE: 124 MG/DL (ref 70–110)
POCT GLUCOSE: 127 MG/DL (ref 70–110)
POCT GLUCOSE: 130 MG/DL (ref 70–110)
POCT GLUCOSE: 155 MG/DL (ref 70–110)

## 2024-01-01 PROCEDURE — 63600175 PHARM REV CODE 636 W HCPCS

## 2024-01-01 PROCEDURE — A4216 STERILE WATER/SALINE, 10 ML: HCPCS | Performed by: INTERNAL MEDICINE

## 2024-01-01 PROCEDURE — 25000003 PHARM REV CODE 250: Performed by: NURSE PRACTITIONER

## 2024-01-01 PROCEDURE — C9113 INJ PANTOPRAZOLE SODIUM, VIA: HCPCS

## 2024-01-01 PROCEDURE — 11000001 HC ACUTE MED/SURG PRIVATE ROOM

## 2024-01-01 PROCEDURE — 63600175 PHARM REV CODE 636 W HCPCS: Performed by: INTERNAL MEDICINE

## 2024-01-01 PROCEDURE — 25000003 PHARM REV CODE 250: Performed by: INTERNAL MEDICINE

## 2024-01-01 PROCEDURE — 63600175 PHARM REV CODE 636 W HCPCS: Performed by: NURSE PRACTITIONER

## 2024-01-01 PROCEDURE — 21400001 HC TELEMETRY ROOM

## 2024-01-01 PROCEDURE — 63600175 PHARM REV CODE 636 W HCPCS: Performed by: HOSPITALIST

## 2024-01-01 RX ADMIN — MEROPENEM 500 MG: 500 INJECTION, POWDER, FOR SOLUTION INTRAVENOUS at 01:01

## 2024-01-01 RX ADMIN — PANTOPRAZOLE SODIUM 40 MG: 40 INJECTION, POWDER, FOR SOLUTION INTRAVENOUS at 05:01

## 2024-01-01 RX ADMIN — MUPIROCIN: 20 OINTMENT TOPICAL at 01:01

## 2024-01-01 RX ADMIN — SODIUM CHLORIDE, PRESERVATIVE FREE 10 ML: 5 INJECTION INTRAVENOUS at 12:01

## 2024-01-01 RX ADMIN — FUROSEMIDE 40 MG: 40 TABLET ORAL at 09:01

## 2024-01-01 RX ADMIN — MELATONIN TAB 3 MG 6 MG: 3 TAB at 09:01

## 2024-01-01 RX ADMIN — SODIUM CHLORIDE, PRESERVATIVE FREE 10 ML: 5 INJECTION INTRAVENOUS at 06:01

## 2024-01-01 RX ADMIN — AMPICILLIN 2 G: 2 INJECTION, POWDER, FOR SOLUTION INTRAVENOUS at 09:01

## 2024-01-01 RX ADMIN — OXYCODONE HYDROCHLORIDE 5 MG: 5 TABLET ORAL at 09:01

## 2024-01-01 RX ADMIN — METOPROLOL SUCCINATE 25 MG: 25 TABLET, EXTENDED RELEASE ORAL at 09:01

## 2024-01-01 RX ADMIN — TRAZODONE HYDROCHLORIDE 25 MG: 50 TABLET ORAL at 09:01

## 2024-01-01 RX ADMIN — AMPICILLIN 2 G: 2 INJECTION, POWDER, FOR SOLUTION INTRAVENOUS at 03:01

## 2024-01-01 RX ADMIN — DICYCLOMINE HYDROCHLORIDE 20 MG: 20 INJECTION, SOLUTION INTRAMUSCULAR at 02:01

## 2024-01-01 RX ADMIN — MEROPENEM 500 MG: 500 INJECTION, POWDER, FOR SOLUTION INTRAVENOUS at 04:01

## 2024-01-01 RX ADMIN — AMPICILLIN 2 G: 2 INJECTION, POWDER, FOR SOLUTION INTRAVENOUS at 04:01

## 2024-01-01 RX ADMIN — SODIUM CHLORIDE, PRESERVATIVE FREE 10 ML: 5 INJECTION INTRAVENOUS at 01:01

## 2024-01-01 RX ADMIN — PANTOPRAZOLE SODIUM 40 MG: 40 INJECTION, POWDER, FOR SOLUTION INTRAVENOUS at 06:01

## 2024-01-01 RX ADMIN — ENOXAPARIN SODIUM 30 MG: 30 INJECTION SUBCUTANEOUS at 05:01

## 2024-01-01 RX ADMIN — SODIUM CHLORIDE, PRESERVATIVE FREE 10 ML: 5 INJECTION INTRAVENOUS at 05:01

## 2024-01-01 RX ADMIN — MEROPENEM 500 MG: 500 INJECTION, POWDER, FOR SOLUTION INTRAVENOUS at 09:01

## 2024-01-01 NOTE — PROGRESS NOTES
[facility]  Hospital Medicine Progress Note        Chief Complaint: Inpatient Follow-up abdominal pain    HPI: 68-year-old male with medical history of T2DM, hypertension, hyperlipidemia and prior cholecystectomy present to the ED with complaint of severe epigastric abdominal pain that started today around 9:00 a.m. after breakfast, the pain is severe 10/10 and radiate to his back associated with nausea and vomiting.  Report last bowel movement was this morning.  Reports chills but denies fever.  He has chronic scrotal hydrocele that has not changed or painful.  On arrival to ED he was afebrile, tachycardic, normotensive and saturating 96% on room air.  Labs notable for WBC 24.97, hemoglobin 18.2, creatinine 2.0, BUN 16.5, total bilirubin 2.7, direct 1.9, , , lipase more than 3000, triglyceride 183.  CT abdomen and pelvis show finding of severe acute pancreatitis without evidence of necrosis or abscess or fluid collection.  Liver remarkable for steatosis, gallbladder surgically absent, no comment on biliary tree.  He was given 2 L of IV fluids, IV Zosyn, IV analgesics and referred to hospital medicine service for further evaluation and management.  Patient was taken for ERCP on 12/17.  Choledocholithiasis was resolved.  Also had sphincterotomy performed.Returned to the floor in stable condition.  GI recommended advancement of diet to clear liquids in 4-6 hours  Patient  continues to be requiring oxygen.  Echo with grade 1 diastolic dysfunction, normal systolic function.  Follow up chest x-ray obtained  with small right pleural effusion slightly increased. Nephrology okay to transition to oral Lasix.  GI signed off.  Noted to have bacteremia, Infectious diseases recommending 14 day course from negative blood cultures of ampicillin and Flagyl.  As leukocytosis not improving, ordered a CT scan  which commenting on worsened appearance pancreatitis with possible collection  developing and small volume ascites, small bilateral pleural effusions.  Meropenem was added to his antibiotics.  GI was reconsulted, notified about the scan and requested for further recommendations, recommending surgery consult.  Surgery on board.  Plan to continue to monitor and continue the antibiotics and repeat imaging in few days.  Diet changed to clears.  Continues to require oxygen. Changed lasix to IV.    Interval Hx:  Patient is sitting in bed, reports he feels much better.  Reports he is hungry and ready for soft diet.  Discussed will try low fat/low residual diet today.  Verbalized understanding  Patient has been ambulating in the hallways as advised to 2-3 times a day  Family at bedside  Case was discussed with patient's nurse on the floor    Objective/physical exam:  General: In no acute distress, afebrile  Chest:  Good air entry, on supplemental oxygen via nasal cannula at 1 liter/minute  Heart: RRR, +S1, S2, no appreciable murmur  Abdomen: Soft, nontender, BS +  MSK: Warm, no lower extremity edema, no clubbing or cyanosis  Neurologic: Alert and oriented x4, Cranial nerve II-XII intact, Strength 5/5 in all 4 extremities       VITAL SIGNS: 24 HRS MIN & MAX LAST   Temp  Min: 97.6 °F (36.4 °C)  Max: 98.8 °F (37.1 °C) 97.9 °F (36.6 °C)   BP  Min: 134/83  Max: 163/91 (!) 146/81   Pulse  Min: 81  Max: 92  81   Resp  Min: 17  Max: 18 18   SpO2  Min: 90 %  Max: 97 % (!) 91 %       Recent Labs   Lab 12/28/23  0621 12/29/23  0828 12/30/23  0657   WBC 14.04* 10.63 9.94   RBC 3.95* 3.74* 3.93*   HGB 12.1* 11.5* 11.8*   HCT 37.1* 35.3* 37.1*   MCV 93.9 94.4* 94.4*   MCH 30.6 30.7 30.0   MCHC 32.6* 32.6* 31.8*   RDW 14.2 14.0 13.8   * 424* 444*   MPV 9.6 9.2 9.4         Recent Labs   Lab 12/26/23  0434 12/27/23  0432 12/28/23  0621 12/29/23  0445 12/30/23  0657   * 131* 131*  --  134*   K 4.3 3.9 4.4  --  4.5   CO2 26 23 25  --  25   BUN 17.6 14.9 13.4  --  10.6   CREATININE 0.97 0.84 0.95  --  0.77    CALCIUM 7.4* 7.8* 8.0*  --  7.8*   MG 2.00 1.80 1.70 1.80  --    ALBUMIN 1.7* 1.7*  --   --  1.8*   ALKPHOS 97 104  --   --  92   ALT 23 18  --   --  18   AST 36* 34  --   --  37*   BILITOT 1.3 1.2  --   --  0.9            Microbiology Results (last 7 days)       Procedure Component Value Units Date/Time    Blood Culture [[id number]]  (Normal) Collected: 12/20/23 1046    Order Status: Completed Specimen: Blood from Arm, Right Updated: 12/25/23 1202     CULTURE, BLOOD ([facility]) No Growth at 5 days    Blood Culture [[id number]]  (Normal) Collected: 12/20/23 1045    Order Status: Completed Specimen: Blood from Antecubital, Left Updated: 12/25/23 1202     CULTURE, BLOOD ([facility]) No Growth at 5 days    Stool Culture [[id number]]  (Normal) Collected: 12/23/23 0554    Order Status: Completed Specimen: Stool Updated: 12/25/23 0920     Stool Culture Negative for Salmonella, Shigella, Campylobacter, Vibrio, Aeromonas, Pleisiomonas,Yersinia, or Shiga Toxin 1 and 2.           Scheduled Med:   ampicillin IVPB  2 g Intravenous Q6H    enoxparin  30 mg Subcutaneous Q24H (prophylaxis, 1700)    furosemide  40 mg Oral Daily    meropenem (MERREM) IVPB  500 mg Intravenous Q8H    metoprolol succinate  25 mg Oral Daily    mupirocin   Nasal BID    pantoprazole  40 mg Intravenous BID AC    sodium chloride 0.9%  10 mL Intravenous Q6H    traZODone  25 mg Oral QHS      Continuous Infusions:     PRN Meds:  sodium chloride 0.9%, acetaminophen, acetaminophen, aluminum-magnesium hydroxide-simethicone, dextrose 10%, dextrose 10%, dicyclomine, glucagon (human recombinant), hydrALAZINE, HYDROmorphone, insulin aspart U-100, labetalol, melatonin, ondansetron, oxyCODONE, polyethylene glycol, prochlorperazine, senna-docusate 8.6-50 mg, sodium chloride 0.9%, Flushing PICC/Midline Protocol **AND** sodium chloride 0.9% **AND** sodium chloride 0.9%       Assessment/Plan:   Acute necrotizing pancreatitis due to Choledocholithiasis --> S/P ERCP with sludge and  stone removal  Cholangitis, acute  Enterococcus faecalis bacteremia  Severe sepsis- resolved  Leukocytosis- resolved  SHA superimposed on CKD 3A  Acute hypoxic respiratory failure requiring supplemental oxygen secondary to pulmonary edema/pleural effusion, for fluid resuscitation/concern for ARDS , from underlying pancreatitis  , history of smoking, questionable COPD?  History of T2DM, hypertension, hyperlipidemia, GERD      GI following.  Underwent ERCP 12/17 with sludge and stone removal.  Lipase improved. Transaminitis improved  12/29- Repeat CT abdomen pelvis with IV contrast and no oral contrast shows continued finding of necrotic pancreatitis with possible organizing collection along the pancreatic neck and body.   GI recommended repeat CT abdomen in 3 months as outpt to reassess developing abscess and reminding the ability to drain from endoscopic perspective, recommending 3-4 boost per day (ordered)  improve nutrition, low-fat diet, follow up as outpatient with GI clinic.    Dr. Oseguera wrote no role of ongoing antibiotics for pancreatitis standpoint there is no evidence of infected necrosis on the CT scan that is no gas present and patient remains afebrile.  Recommended to continue antibiotic course for his presumed Enterococcus bacteremia as recommended per ID, recommendations noted, awaiting ID recs   General surgery also evaluated the patient, recommended supportive care and empiric antibiotics repeat imaging in few weeks  Infectious Disease Dr HERRERA on board- On Ampicillin and added Merrem given necrotizing pancreatitis- end date 1/03/24  Leukocytosis resolved  12/16- Blood cultures positive for Enterococcus faecalis  12/20- repeat blood cultures so far negative.  Diet a now advanced to low fat/ low residual diet  Nephrology followed for SHA, creatinine improved on diuretics.   Now on RA   Strict Is&Os.   Ambulating in the bell with family  Continue supportive care appropriate home medications.    On  Sliding scale with fingersticks a.c. HS for management of diabetes in-house.  Therapy evaluated the patient, no needs  Morning cbc, bmp ordered     VTE prophylaxis:  Lovenox, renally dosed    Patient condition:  Fair    Anticipated discharge and Disposition:   TBD, home once IV antibiotic completes, awaiting ID recs       All diagnosis and differential diagnosis have been reviewed; assessment and plan has been documented; I have personally reviewed the labs and test results that are presently available; I have reviewed the patients medication list; I have reviewed the consulting providers response and recommendations. I have reviewed or attempted to review medical records based upon their availability    All of the patient's questions have been  addressed and answered. Patient's is agreeable to the above stated plan. I will continue to monitor closely and make adjustments to medical management as needed.    Felipe Casas MD  Department of Hospital Medicine   [facility]   01/01/2024 9:11 AM

## 2024-01-01 NOTE — PROGRESS NOTES
Patient awake, alert, sitting up in bed in no acute distress.  Reports continued improvement in symptoms over the last 48 hours. He is tolerating full liquids without difficulties and is ready to advance diet.  Patient has been up ambulating, walking the halls.    Vital signs stable  Alert and oriented, well-appearing, no acute distress  Benign abdomen with no tenderness to palpation or significant distention    A&P:  Admitted with necrotizing pancreatitis secondary to gallstones status post ERCP earlier admission with stone extraction.  See my progress note from Saturday 12/30/2023 for full recommendations.  Okay to advance to a low residue diet today and see how patient does.  Continue boost nutritional supplements.  Antibiotic course, needs to be finalized with hospitalist and/or ID. No indication for ongoing antibiotics from GI standpoint.  He can follow up with primary GI in 3 months with repeat CT scan of the abdomen with IV and oral contrast.  No further GI recs this time.

## 2024-01-02 LAB
ALBUMIN SERPL-MCNC: 2.1 G/DL (ref 3.4–4.8)
ALBUMIN/GLOB SERPL: 0.6 RATIO (ref 1.1–2)
ALP SERPL-CCNC: 95 UNIT/L (ref 40–150)
ALT SERPL-CCNC: 23 UNIT/L (ref 0–55)
AST SERPL-CCNC: 37 UNIT/L (ref 5–34)
BASOPHILS # BLD AUTO: 0.09 X10(3)/MCL
BASOPHILS NFR BLD AUTO: 1.6 %
BILIRUB SERPL-MCNC: 0.7 MG/DL
BUN SERPL-MCNC: 11.1 MG/DL (ref 8.4–25.7)
CALCIUM SERPL-MCNC: 8.2 MG/DL (ref 8.8–10)
CHLORIDE SERPL-SCNC: 100 MMOL/L (ref 98–107)
CO2 SERPL-SCNC: 24 MMOL/L (ref 23–31)
CREAT SERPL-MCNC: 0.86 MG/DL (ref 0.73–1.18)
EOSINOPHIL # BLD AUTO: 0.15 X10(3)/MCL (ref 0–0.9)
EOSINOPHIL NFR BLD AUTO: 2.6 %
ERYTHROCYTE [DISTWIDTH] IN BLOOD BY AUTOMATED COUNT: 13.2 % (ref 11.5–17)
GFR SERPLBLD CREATININE-BSD FMLA CKD-EPI: >60 MLS/MIN/1.73/M2
GLOBULIN SER-MCNC: 3.7 GM/DL (ref 2.4–3.5)
GLUCOSE SERPL-MCNC: 135 MG/DL (ref 82–115)
HCT VFR BLD AUTO: 34.6 % (ref 42–52)
HGB BLD-MCNC: 11.3 G/DL (ref 14–18)
IMM GRANULOCYTES # BLD AUTO: 0.03 X10(3)/MCL (ref 0–0.04)
IMM GRANULOCYTES NFR BLD AUTO: 0.5 %
LYMPHOCYTES # BLD AUTO: 1.23 X10(3)/MCL (ref 0.6–4.6)
LYMPHOCYTES NFR BLD AUTO: 21.7 %
MCH RBC QN AUTO: 30.5 PG (ref 27–31)
MCHC RBC AUTO-ENTMCNC: 32.7 G/DL (ref 33–36)
MCV RBC AUTO: 93.5 FL (ref 80–94)
MONOCYTES # BLD AUTO: 0.92 X10(3)/MCL (ref 0.1–1.3)
MONOCYTES NFR BLD AUTO: 16.2 %
NEUTROPHILS # BLD AUTO: 3.25 X10(3)/MCL (ref 2.1–9.2)
NEUTROPHILS NFR BLD AUTO: 57.4 %
NRBC BLD AUTO-RTO: 0 %
PLATELET # BLD AUTO: 383 X10(3)/MCL (ref 130–400)
PMV BLD AUTO: 9.1 FL (ref 7.4–10.4)
POCT GLUCOSE: 114 MG/DL (ref 70–110)
POCT GLUCOSE: 131 MG/DL (ref 70–110)
POCT GLUCOSE: 132 MG/DL (ref 70–110)
POCT GLUCOSE: 135 MG/DL (ref 70–110)
POCT GLUCOSE: 193 MG/DL (ref 70–110)
POTASSIUM SERPL-SCNC: 3.8 MMOL/L (ref 3.5–5.1)
PROT SERPL-MCNC: 5.8 GM/DL (ref 5.8–7.6)
RBC # BLD AUTO: 3.7 X10(6)/MCL (ref 4.7–6.1)
SODIUM SERPL-SCNC: 134 MMOL/L (ref 136–145)
WBC # SPEC AUTO: 5.67 X10(3)/MCL (ref 4.5–11.5)

## 2024-01-02 PROCEDURE — C9113 INJ PANTOPRAZOLE SODIUM, VIA: HCPCS

## 2024-01-02 PROCEDURE — 63600175 PHARM REV CODE 636 W HCPCS

## 2024-01-02 PROCEDURE — 85025 COMPLETE CBC W/AUTO DIFF WBC: CPT | Performed by: NURSE PRACTITIONER

## 2024-01-02 PROCEDURE — 63600175 PHARM REV CODE 636 W HCPCS: Performed by: INTERNAL MEDICINE

## 2024-01-02 PROCEDURE — 25000003 PHARM REV CODE 250: Performed by: INTERNAL MEDICINE

## 2024-01-02 PROCEDURE — 21400001 HC TELEMETRY ROOM

## 2024-01-02 PROCEDURE — 63600175 PHARM REV CODE 636 W HCPCS: Performed by: NURSE PRACTITIONER

## 2024-01-02 PROCEDURE — 94761 N-INVAS EAR/PLS OXIMETRY MLT: CPT

## 2024-01-02 PROCEDURE — 80053 COMPREHEN METABOLIC PANEL: CPT | Performed by: NURSE PRACTITIONER

## 2024-01-02 PROCEDURE — A4216 STERILE WATER/SALINE, 10 ML: HCPCS | Performed by: INTERNAL MEDICINE

## 2024-01-02 PROCEDURE — 11000001 HC ACUTE MED/SURG PRIVATE ROOM

## 2024-01-02 PROCEDURE — 25000003 PHARM REV CODE 250: Performed by: NURSE PRACTITIONER

## 2024-01-02 RX ORDER — GUAIFENESIN 100 MG/5ML
81 LIQUID (ML) ORAL DAILY
COMMUNITY

## 2024-01-02 RX ORDER — ENOXAPARIN SODIUM 100 MG/ML
40 INJECTION SUBCUTANEOUS EVERY 24 HOURS
Status: DISCONTINUED | OUTPATIENT
Start: 2024-01-02 | End: 2024-01-04 | Stop reason: HOSPADM

## 2024-01-02 RX ADMIN — TRAZODONE HYDROCHLORIDE 25 MG: 50 TABLET ORAL at 08:01

## 2024-01-02 RX ADMIN — OXYCODONE HYDROCHLORIDE 5 MG: 5 TABLET ORAL at 04:01

## 2024-01-02 RX ADMIN — SODIUM CHLORIDE, PRESERVATIVE FREE 10 ML: 5 INJECTION INTRAVENOUS at 06:01

## 2024-01-02 RX ADMIN — MEROPENEM 500 MG: 500 INJECTION, POWDER, FOR SOLUTION INTRAVENOUS at 04:01

## 2024-01-02 RX ADMIN — AMPICILLIN 2 G: 2 INJECTION, POWDER, FOR SOLUTION INTRAVENOUS at 03:01

## 2024-01-02 RX ADMIN — METOPROLOL SUCCINATE 25 MG: 25 TABLET, EXTENDED RELEASE ORAL at 09:01

## 2024-01-02 RX ADMIN — ENOXAPARIN SODIUM 40 MG: 40 INJECTION SUBCUTANEOUS at 05:01

## 2024-01-02 RX ADMIN — OXYCODONE HYDROCHLORIDE 5 MG: 5 TABLET ORAL at 03:01

## 2024-01-02 RX ADMIN — FUROSEMIDE 40 MG: 40 TABLET ORAL at 09:01

## 2024-01-02 RX ADMIN — PANTOPRAZOLE SODIUM 40 MG: 40 INJECTION, POWDER, FOR SOLUTION INTRAVENOUS at 04:01

## 2024-01-02 RX ADMIN — OXYCODONE HYDROCHLORIDE 5 MG: 5 TABLET ORAL at 08:01

## 2024-01-02 RX ADMIN — MEROPENEM 500 MG: 500 INJECTION, POWDER, FOR SOLUTION INTRAVENOUS at 01:01

## 2024-01-02 RX ADMIN — AMPICILLIN 2 G: 2 INJECTION, POWDER, FOR SOLUTION INTRAVENOUS at 04:01

## 2024-01-02 RX ADMIN — AMPICILLIN 2 G: 2 INJECTION, POWDER, FOR SOLUTION INTRAVENOUS at 08:01

## 2024-01-02 RX ADMIN — SODIUM CHLORIDE, PRESERVATIVE FREE 10 ML: 5 INJECTION INTRAVENOUS at 05:01

## 2024-01-02 RX ADMIN — MEROPENEM 500 MG: 500 INJECTION, POWDER, FOR SOLUTION INTRAVENOUS at 08:01

## 2024-01-02 RX ADMIN — INSULIN ASPART 2 UNITS: 100 INJECTION, SOLUTION INTRAVENOUS; SUBCUTANEOUS at 09:01

## 2024-01-02 RX ADMIN — AMPICILLIN 2 G: 2 INJECTION, POWDER, FOR SOLUTION INTRAVENOUS at 09:01

## 2024-01-02 RX ADMIN — SODIUM CHLORIDE, PRESERVATIVE FREE 10 ML: 5 INJECTION INTRAVENOUS at 01:01

## 2024-01-02 RX ADMIN — PANTOPRAZOLE SODIUM 40 MG: 40 INJECTION, POWDER, FOR SOLUTION INTRAVENOUS at 05:01

## 2024-01-02 RX ADMIN — SODIUM CHLORIDE, PRESERVATIVE FREE 10 ML: 5 INJECTION INTRAVENOUS at 12:01

## 2024-01-02 NOTE — PROGRESS NOTES
[facility]  Hospital Medicine Progress Note        Chief Complaint: Inpatient Follow-up abdominal pain    HPI: 68-year-old male with medical history of T2DM, hypertension, hyperlipidemia and prior cholecystectomy present to the ED with complaint of severe epigastric abdominal pain that started today around 9:00 a.m. after breakfast, the pain is severe 10/10 and radiate to his back associated with nausea and vomiting.  Report last bowel movement was this morning.  Reports chills but denies fever.  He has chronic scrotal hydrocele that has not changed or painful.  On arrival to ED he was afebrile, tachycardic, normotensive and saturating 96% on room air.  Labs notable for WBC 24.97, hemoglobin 18.2, creatinine 2.0, BUN 16.5, total bilirubin 2.7, direct 1.9, , , lipase more than 3000, triglyceride 183.  CT abdomen and pelvis show finding of severe acute pancreatitis without evidence of necrosis or abscess or fluid collection.  Liver remarkable for steatosis, gallbladder surgically absent, no comment on biliary tree.  He was given 2 L of IV fluids, IV Zosyn, IV analgesics and referred to hospital medicine service for further evaluation and management.  Patient was taken for ERCP on 12/17.  Choledocholithiasis was resolved.  Also had sphincterotomy performed.Returned to the floor in stable condition.  GI recommended advancement of diet to clear liquids in 4-6 hours  Patient  continues to be requiring oxygen.  Echo with grade 1 diastolic dysfunction, normal systolic function.  Follow up chest x-ray obtained  with small right pleural effusion slightly increased. Nephrology okay to transition to oral Lasix.  GI signed off.  Noted to have bacteremia, Infectious diseases recommending 14 day course from negative blood cultures of ampicillin and Flagyl.  As leukocytosis not improving, ordered a CT scan  which commenting on worsened appearance pancreatitis with possible collection  developing and small volume ascites, small bilateral pleural effusions.  Flagyl discontinued and started on Meropenem per ID.  GI was reconsulted, notified about the scan and requested for further recommendations, recommending surgery consult.  Surgery on board.  Plan to continue to monitor and continue the antibiotics and repeat imaging in few days.     Interval Hx:  Patient is sitting in bed, reports he feels much better.  Reports ate potatoes yesterday morning and that gave him cramps otherwise he ate and tolerated the food rest of the day  Encourage patient to continue walking in the halls 3 to 4 times a day  Discussed last day of antibiotic is 1/4  Patient mentioned he feels he takes a lot of medication at home which he wants me to look over and continue only that is needed.  Advised wife to bring the bottles and so we can update his med rec and I can adjust his medication on discharge, requested nurse to do the med rec if not correct once wife brings the meds   Wife at bedside  Case was discussed with patient's nurse and  on the floor    Objective/physical exam:  General: In no acute distress, afebrile  Chest:  Good air entry, on RA  Heart: RRR, +S1, S2, no appreciable murmur  Abdomen: Soft, nontender, BS +  MSK: Warm, no lower extremity edema, no clubbing or cyanosis  Neurologic: Alert and oriented x4, Cranial nerve II-XII intact, Strength 5/5 in all 4 extremities       VITAL SIGNS: 24 HRS MIN & MAX LAST   Temp  Min: 97.6 °F (36.4 °C)  Max: 98.1 °F (36.7 °C) 97.7 °F (36.5 °C)   BP  Min: 134/86  Max: 164/90 134/86   Pulse  Min: 78  Max: 87  82   Resp  Min: 16  Max: 20 16   SpO2  Min: 90 %  Max: 93 % (!) 91 %       Recent Labs   Lab 12/29/23  0828 12/30/23  0657 01/02/24  0404   WBC 10.63 9.94 5.67   RBC 3.74* 3.93* 3.70*   HGB 11.5* 11.8* 11.3*   HCT 35.3* 37.1* 34.6*   MCV 94.4* 94.4* 93.5   MCH 30.7 30.0 30.5   MCHC 32.6* 31.8* 32.7*   RDW 14.0 13.8 13.2   * 444* 383   MPV 9.2 9.4 9.1          Recent Labs   Lab 12/27/23  0432 12/28/23  0621 12/29/23  0445 12/30/23  0657 01/02/24  0404   * 131*  --  134* 134*   K 3.9 4.4  --  4.5 3.8   CO2 23 25  --  25 24   BUN 14.9 13.4  --  10.6 11.1   CREATININE 0.84 0.95  --  0.77 0.86   CALCIUM 7.8* 8.0*  --  7.8* 8.2*   MG 1.80 1.70 1.80  --   --    ALBUMIN 1.7*  --   --  1.8* 2.1*   ALKPHOS 104  --   --  92 95   ALT 18  --   --  18 23   AST 34  --   --  37* 37*   BILITOT 1.2  --   --  0.9 0.7            Microbiology Results (last 7 days)       ** No results found for the last 168 hours. **           Scheduled Med:   ampicillin IVPB  2 g Intravenous Q6H    enoxparin  30 mg Subcutaneous Q24H (prophylaxis, 1700)    furosemide  40 mg Oral Daily    meropenem (MERREM) IVPB  500 mg Intravenous Q8H    metoprolol succinate  25 mg Oral Daily    pantoprazole  40 mg Intravenous BID AC    sodium chloride 0.9%  10 mL Intravenous Q6H    traZODone  25 mg Oral QHS      Continuous Infusions:     PRN Meds:  sodium chloride 0.9%, acetaminophen, acetaminophen, aluminum-magnesium hydroxide-simethicone, dextrose 10%, dextrose 10%, dicyclomine, glucagon (human recombinant), hydrALAZINE, HYDROmorphone, insulin aspart U-100, labetalol, melatonin, ondansetron, oxyCODONE, polyethylene glycol, prochlorperazine, senna-docusate 8.6-50 mg, sodium chloride 0.9%, Flushing PICC/Midline Protocol **AND** sodium chloride 0.9% **AND** sodium chloride 0.9%       Assessment/Plan:   Acute necrotizing pancreatitis due to Choledocholithiasis --> S/P ERCP with sludge and stone removal  Cholangitis, acute  Enterococcus faecalis bacteremia  Severe sepsis- resolved  Leukocytosis- resolved  SHA superimposed on CKD 3A  Acute hypoxic respiratory failure requiring supplemental oxygen secondary to pulmonary edema/pleural effusion, for fluid resuscitation/concern for ARDS , from underlying pancreatitis  , history of smoking, questionable COPD?  History of T2DM, hypertension, hyperlipidemia, GERD      GI  following.  Underwent ERCP 12/17 with sludge and stone removal.  Lipase improved. Transaminitis improved  12/29- Repeat CT abdomen pelvis with IV contrast and no oral contrast shows continued finding of necrotic pancreatitis with possible organizing collection along the pancreatic neck and body.   GI recommended repeat CT abdomen in 3 months as outpt to reassess developing abscess and reminding the ability to drain from endoscopic perspective, recommending 3-4 boost per day (ordered)  improve nutrition, low-fat diet, follow up as outpatient with GI clinic.    Dr. Oseguera wrote no role of ongoing antibiotics for pancreatitis standpoint there is no evidence of infected necrosis on the CT scan that is no gas present and patient remains afebrile.  Recommended to continue antibiotic course for his presumed Enterococcus bacteremia as recommended per ID, recommendations noted, awaiting Dr JAVIER cuevas   General surgery also evaluated the patient, recommended supportive care and empiric antibiotics repeat imaging in few weeks  Infectious Disease Dr HERRERA on board- On Ampicillin and added Merrem given necrotizing pancreatitis- end date 1/03/24  Leukocytosis resolved  12/16- Blood cultures positive for Enterococcus faecalis  12/20- repeat blood cultures so far negative.  12/20- Echo did not show vegetations   Diet a now advanced to low fat/ low residual diet and tolerating, did not do well with potatoes per pt  Encouraged ambulation   Nephrology followed for SHA, creatinine improved on diuretics.   Now on RA   Strict Is&Os.   Ambulating in the bell with family  Continue supportive care appropriate home medications.    On Sliding scale with fingersticks a.c. HS for management of diabetes in-house.  Therapy evaluated the patient, no needs  Morning labs stable     VTE prophylaxis:  Lovenox    Patient condition:  Fair    Anticipated discharge and Disposition:   TBD, home once IV antibiotic completes      All diagnosis and differential  diagnosis have been reviewed; assessment and plan has been documented; I have personally reviewed the labs and test results that are presently available; I have reviewed the patients medication list; I have reviewed the consulting providers response and recommendations. I have reviewed or attempted to review medical records based upon their availability    All of the patient's questions have been  addressed and answered. Patient's is agreeable to the above stated plan. I will continue to monitor closely and make adjustments to medical management as needed.    Felipe Casas MD  Department of Hospital Medicine   [facility]   01/02/2024 9:11 AM

## 2024-01-03 PROBLEM — M54.9 CHRONIC NECK AND BACK PAIN: Status: ACTIVE | Noted: 2024-01-03

## 2024-01-03 PROBLEM — G89.29 CHRONIC NECK AND BACK PAIN: Status: ACTIVE | Noted: 2024-01-03

## 2024-01-03 PROBLEM — M54.2 CHRONIC NECK AND BACK PAIN: Status: ACTIVE | Noted: 2024-01-03

## 2024-01-03 PROBLEM — K85.90 ACUTE PANCREATITIS: Status: RESOLVED | Noted: 2023-12-16 | Resolved: 2024-01-03

## 2024-01-03 LAB
POCT GLUCOSE: 115 MG/DL (ref 70–110)
POCT GLUCOSE: 119 MG/DL (ref 70–110)
POCT GLUCOSE: 132 MG/DL (ref 70–110)

## 2024-01-03 PROCEDURE — 63600175 PHARM REV CODE 636 W HCPCS: Performed by: NURSE PRACTITIONER

## 2024-01-03 PROCEDURE — 25000003 PHARM REV CODE 250: Performed by: NURSE PRACTITIONER

## 2024-01-03 PROCEDURE — C9113 INJ PANTOPRAZOLE SODIUM, VIA: HCPCS

## 2024-01-03 PROCEDURE — 21400001 HC TELEMETRY ROOM

## 2024-01-03 PROCEDURE — 25000003 PHARM REV CODE 250: Performed by: INTERNAL MEDICINE

## 2024-01-03 PROCEDURE — 63600175 PHARM REV CODE 636 W HCPCS: Performed by: INTERNAL MEDICINE

## 2024-01-03 PROCEDURE — 11000001 HC ACUTE MED/SURG PRIVATE ROOM

## 2024-01-03 PROCEDURE — 63600175 PHARM REV CODE 636 W HCPCS

## 2024-01-03 PROCEDURE — A4216 STERILE WATER/SALINE, 10 ML: HCPCS | Performed by: INTERNAL MEDICINE

## 2024-01-03 RX ORDER — SODIUM CHLORIDE 9 MG/ML
INJECTION, SOLUTION INTRAVENOUS
Status: DISCONTINUED | OUTPATIENT
Start: 2024-01-03 | End: 2024-01-04 | Stop reason: HOSPADM

## 2024-01-03 RX ADMIN — AMPICILLIN 2 G: 2 INJECTION, POWDER, FOR SOLUTION INTRAVENOUS at 03:01

## 2024-01-03 RX ADMIN — SODIUM CHLORIDE, PRESERVATIVE FREE 10 ML: 5 INJECTION INTRAVENOUS at 06:01

## 2024-01-03 RX ADMIN — OXYCODONE HYDROCHLORIDE 5 MG: 5 TABLET ORAL at 04:01

## 2024-01-03 RX ADMIN — MELATONIN TAB 3 MG 6 MG: 3 TAB at 09:01

## 2024-01-03 RX ADMIN — SODIUM CHLORIDE: 9 INJECTION, SOLUTION INTRAVENOUS at 03:01

## 2024-01-03 RX ADMIN — SODIUM CHLORIDE, PRESERVATIVE FREE 10 ML: 5 INJECTION INTRAVENOUS at 01:01

## 2024-01-03 RX ADMIN — MEROPENEM 500 MG: 500 INJECTION, POWDER, FOR SOLUTION INTRAVENOUS at 05:01

## 2024-01-03 RX ADMIN — FUROSEMIDE 40 MG: 40 TABLET ORAL at 08:01

## 2024-01-03 RX ADMIN — MEROPENEM 500 MG: 500 INJECTION, POWDER, FOR SOLUTION INTRAVENOUS at 10:01

## 2024-01-03 RX ADMIN — PANTOPRAZOLE SODIUM 40 MG: 40 INJECTION, POWDER, FOR SOLUTION INTRAVENOUS at 05:01

## 2024-01-03 RX ADMIN — AMPICILLIN 2 G: 2 INJECTION, POWDER, FOR SOLUTION INTRAVENOUS at 08:01

## 2024-01-03 RX ADMIN — ENOXAPARIN SODIUM 40 MG: 40 INJECTION SUBCUTANEOUS at 04:01

## 2024-01-03 RX ADMIN — AMPICILLIN 2 G: 2 INJECTION, POWDER, FOR SOLUTION INTRAVENOUS at 10:01

## 2024-01-03 RX ADMIN — SODIUM CHLORIDE: 9 INJECTION, SOLUTION INTRAVENOUS at 05:01

## 2024-01-03 RX ADMIN — TRAZODONE HYDROCHLORIDE 25 MG: 50 TABLET ORAL at 09:01

## 2024-01-03 RX ADMIN — MEROPENEM 500 MG: 500 INJECTION, POWDER, FOR SOLUTION INTRAVENOUS at 01:01

## 2024-01-03 RX ADMIN — AMPICILLIN 2 G: 2 INJECTION, POWDER, FOR SOLUTION INTRAVENOUS at 04:01

## 2024-01-03 RX ADMIN — OXYCODONE HYDROCHLORIDE 5 MG: 5 TABLET ORAL at 09:01

## 2024-01-03 RX ADMIN — PANTOPRAZOLE SODIUM 40 MG: 40 INJECTION, POWDER, FOR SOLUTION INTRAVENOUS at 04:01

## 2024-01-03 RX ADMIN — METOPROLOL SUCCINATE 25 MG: 25 TABLET, EXTENDED RELEASE ORAL at 08:01

## 2024-01-03 RX ADMIN — OXYCODONE HYDROCHLORIDE 5 MG: 5 TABLET ORAL at 08:01

## 2024-01-03 RX ADMIN — SODIUM CHLORIDE, PRESERVATIVE FREE 10 ML: 5 INJECTION INTRAVENOUS at 12:01

## 2024-01-03 NOTE — PROGRESS NOTES
[facility]  Hospital Medicine Progress Note        Chief Complaint: Inpatient Follow-up abdominal pain    HPI: 68-year-old male with medical history of T2DM, hypertension, hyperlipidemia and prior cholecystectomy present to the ED with complaint of severe epigastric abdominal pain that started today around 9:00 a.m. after breakfast, the pain is severe 10/10 and radiate to his back associated with nausea and vomiting.  Report last bowel movement was this morning.  Reports chills but denies fever.  He has chronic scrotal hydrocele that has not changed or painful.  On arrival to ED he was afebrile, tachycardic, normotensive and saturating 96% on room air.  Labs notable for WBC 24.97, hemoglobin 18.2, creatinine 2.0, BUN 16.5, total bilirubin 2.7, direct 1.9, , , lipase more than 3000, triglyceride 183.  CT abdomen and pelvis show finding of severe acute pancreatitis without evidence of necrosis or abscess or fluid collection.  Liver remarkable for steatosis, gallbladder surgically absent, no comment on biliary tree.  He was given 2 L of IV fluids, IV Zosyn, IV analgesics and referred to hospital medicine service for further evaluation and management.  Patient was taken for ERCP on 12/17.  Choledocholithiasis was resolved.  Also had sphincterotomy performed.Returned to the floor in stable condition.  GI recommended advancement of diet to clear liquids in 4-6 hours  Patient  continues to be requiring oxygen.  Echo with grade 1 diastolic dysfunction, normal systolic function.  Follow up chest x-ray obtained  with small right pleural effusion slightly increased. Nephrology okay to transition to oral Lasix.  GI signed off.  Noted to have bacteremia, Infectious diseases recommending 14 day course from negative blood cultures of ampicillin and Flagyl.  As leukocytosis not improving, ordered a CT scan  which commenting on worsened appearance pancreatitis with possible collection  developing and small volume ascites, small bilateral pleural effusions.  Flagyl discontinued and started on Meropenem per ID.  GI was reconsulted, notified about the scan and requested for further recommendations, recommending surgery consult.  Surgery on board.  Plan to continue to monitor and continue the antibiotics and repeat imaging in few days.     Interval Hx:  Patient is sitting in bed, informed that I will do the adjust most of his medication on discharge.  Discuss if his last dose of antibiotic is after 4:00 p.m., will be discharged tomorrow.  Verbalized understanding   Wife at bedside  Case was discussed with patient's nurse and  on the floor    Objective/physical exam:  General: In no acute distress, afebrile  Chest:  Good air entry, on RA  Heart: RRR, +S1, S2, no appreciable murmur  Abdomen: Soft, nontender, BS +  MSK: Warm, no lower extremity edema, no clubbing or cyanosis  Neurologic: Alert and oriented x4, Cranial nerve II-XII intact, Strength 5/5 in all 4 extremities       VITAL SIGNS: 24 HRS MIN & MAX LAST   Temp  Min: 97.6 °F (36.4 °C)  Max: 98.9 °F (37.2 °C) 97.6 °F (36.4 °C)   BP  Min: 131/81  Max: 161/89 (!) 154/90   Pulse  Min: 70  Max: 88  78   Resp  Min: 16  Max: 18 18   SpO2  Min: 90 %  Max: 95 % (!) 94 %       Recent Labs   Lab 12/29/23  0828 12/30/23  0657 01/02/24  0404   WBC 10.63 9.94 5.67   RBC 3.74* 3.93* 3.70*   HGB 11.5* 11.8* 11.3*   HCT 35.3* 37.1* 34.6*   MCV 94.4* 94.4* 93.5   MCH 30.7 30.0 30.5   MCHC 32.6* 31.8* 32.7*   RDW 14.0 13.8 13.2   * 444* 383   MPV 9.2 9.4 9.1         Recent Labs   Lab 12/28/23  0621 12/29/23  0445 12/30/23  0657 01/02/24  0404   *  --  134* 134*   K 4.4  --  4.5 3.8   CO2 25  --  25 24   BUN 13.4  --  10.6 11.1   CREATININE 0.95  --  0.77 0.86   CALCIUM 8.0*  --  7.8* 8.2*   MG 1.70 1.80  --   --    ALBUMIN  --   --  1.8* 2.1*   ALKPHOS  --   --  92 95   ALT  --   --  18 23   AST  --   --  37* 37*   BILITOT  --   --  0.9 0.7        Microbiology Results (last 7 days)       ** No results found for the last 168 hours. **           Scheduled Med:   ampicillin IVPB  2 g Intravenous Q6H    enoxparin  40 mg Subcutaneous Q24H (prophylaxis, 1700)    furosemide  40 mg Oral Daily    meropenem (MERREM) IVPB  500 mg Intravenous Q8H    metoprolol succinate  25 mg Oral Daily    pantoprazole  40 mg Intravenous BID AC    sodium chloride 0.9%  10 mL Intravenous Q6H    traZODone  25 mg Oral QHS      Continuous Infusions:     PRN Meds:  sodium chloride 0.9%, sodium chloride 0.9%, sodium chloride 0.9%, acetaminophen, acetaminophen, aluminum-magnesium hydroxide-simethicone, dextrose 10%, dextrose 10%, dicyclomine, glucagon (human recombinant), hydrALAZINE, HYDROmorphone, insulin aspart U-100, labetalol, melatonin, ondansetron, oxyCODONE, polyethylene glycol, prochlorperazine, senna-docusate 8.6-50 mg, sodium chloride 0.9%, Flushing PICC/Midline Protocol **AND** sodium chloride 0.9% **AND** sodium chloride 0.9%       Assessment/Plan:   Acute necrotizing pancreatitis due to Choledocholithiasis --> S/P ERCP with sludge and stone removal  Cholangitis, acute- treated  Enterococcus faecalis bacteremia  Severe sepsis- resolved  Leukocytosis- resolved  SHA superimposed on CKD 3A  Acute hypoxic respiratory failure requiring supplemental oxygen secondary to pulmonary edema/pleural effusion, for fluid resuscitation/concern for ARDS , from underlying pancreatitis  , history of smoking, questionable COPD?  History of T2DM, hypertension, hyperlipidemia, GERD      GI signed off  Underwent ERCP 12/17 with sludge and stone removal.  Lipase improved. Transaminitis improved  12/29- Repeat CT abdomen pelvis with IV contrast and no oral contrast shows continued finding of necrotic pancreatitis with possible organizing collection along the pancreatic neck and body.   GI recommended repeat CT abdomen in 3 months as outpt to reassess developing abscess and reminding the ability to  drain from endoscopic perspective, recommending 3-4 boost per day (ordered)  improve nutrition, low-fat diet, follow up as outpatient with GI clinic.    Dr. Oseguera wrote no role of ongoing antibiotics for pancreatitis standpoint there is no evidence of infected necrosis on the CT scan that is no gas present and patient remains afebrile.  Recommended to continue antibiotic course for his presumed Enterococcus bacteremia as recommended per ID, recommendations noted  General surgery also evaluated the patient, recommended supportive care and empiric antibiotics repeat imaging in few weeks  Infectious Disease Dr HERRERA on board- On Ampicillin and added Merrem given necrotizing pancreatitis- end date 1/03/24 at 9pm  Leukocytosis resolved  12/16- Blood cultures positive for Enterococcus faecalis  12/20- repeat blood cultures so far negative.  12/20- Echo did not show vegetations   Diet a now advanced to low fat/ low residual diet and tolerating, patient wanted to eat biscuit, informed it is very high fat content and to avoid it for now.  Verbalized understanding  Encouraged ambulation, wife reported he has been walking in the hallways with her 3 to 4 times a day  Nephrology followed for SHA, creatinine improved on diuretics.   Now on RA   Strict Is&Os.   Continue supportive care appropriate home medications.    On Sliding scale with fingersticks a.c. HS for management of diabetes in-house.  Therapy evaluated the patient, no needs  Morning labs stable     VTE prophylaxis:  Lovenox    Patient condition:  Fair    Anticipated discharge and Disposition:   DC home tomorrow      All diagnosis and differential diagnosis have been reviewed; assessment and plan has been documented; I have personally reviewed the labs and test results that are presently available; I have reviewed the patients medication list; I have reviewed the consulting providers response and recommendations. I have reviewed or attempted to review medical records  based upon their availability    All of the patient's questions have been  addressed and answered. Patient's is agreeable to the above stated plan. I will continue to monitor closely and make adjustments to medical management as needed.    Felipe Casas MD  Department of Hospital Medicine   [facility]   01/03/2024 9:11 AM

## 2024-01-03 NOTE — PLAN OF CARE
[facility] will not pay for for any equip in bathroom.  Informed pt and wife. Plans are to discharge pt home tomorrow

## 2024-01-03 NOTE — PROGRESS NOTES
Infectious Diseases Progress Note  69 y/o male with Hx of GERD, HTN, HLD and DM Type II who presented to ER on 12/16 with abdominal pain. On admit he was afebrile with leukocytosis, WBC 24.97. Lipase >3,000,  and . U/A not impressive. Blood cultures revealed Enterococcus 1/2 sets. CT abdomen/pelvis with contrast revealed severe acute pancreatitis, gastric antrum and duodenal mural thickening likely represents reactive change for acute pancreatitis, right large hydrocele. Scrotal ultrasound with unremarkable right testicle, large right hydrocele which crosses the midline and causes compressive effect upon the left testicle which is inferiorly deviated. MRI Abdomen without contrast showed there appears to be numerous intraluminal filling defects in the mid to distal common bile duct series image 16 series 3, these are suggestive of impacted gallstones and sludge. On 12/17 he underwent ERCP with sludge and stone removal. Repeat CT abdomen/pelvis without contrast showed interval worsening of the ascites, persistent inflammatory changes around the pancreas consistent patient's history of pancreatitis, previous examination showed incomplete enhancement of the pancreas suggesting possible developing necrotizing pancreatitis, interval development of bibasilar atelectasis and moderate to large pleural effusions. Renal ultrasound unremarkable. 2D Echo with no mention of vegetations. 12/20 blood cultures negative thus far. During his stay he was noted to have diarrhea, stool for C-Diff PCR (-).   He is currently on Ampicillin and Merrem     Subjective:  Lying in bed in no acute distress. No new complaints voiced. Afebrile. Wife present    Review of Systems   Constitutional:  Positive for malaise/fatigue.   HENT: Negative.     Eyes: Negative.    Respiratory: Negative.     Cardiovascular: Negative.    Gastrointestinal:  Positive for abdominal pain.   Genitourinary: Negative.    Musculoskeletal: Negative.    Skin:  Negative.    Neurological: Negative.    All other systems reviewed and are negative.      Review of patient's allergies indicates:  No Known Allergies    Past Medical History:   Diagnosis Date    DM (diabetes mellitus)     GERD (gastroesophageal reflux disease)     HLD (hyperlipidemia)     HTN (hypertension)        Past Surgical History:   Procedure Laterality Date    APPENDECTOMY      CHOLECYSTECTOMY      ERCP N/A 12/17/2023    Procedure: ERCP (ENDOSCOPIC RETROGRADE CHOLANGIOPANCREATOGRAPHY);  Surgeon: Flako Kerns MD;  Location: [facility] OR;  Service: Gastroenterology;  Laterality: N/A;    ERCP W/ SPHICTEROTOMY  12/17/2023    Procedure: ERCP, WITH SPHINCTEROTOMY;  Surgeon: Flako Kerns MD;  Location: [facility] OR;  Service: Gastroenterology;;    ERCP, WITH CALCULUS REMOVAL  12/17/2023    Procedure: ERCP, WITH CALCULUS REMOVAL;  Surgeon: Flako Kerns MD;  Location: [facility];  Service: Gastroenterology;;       Social History     Socioeconomic History    Marital status:    Tobacco Use    Smoking status: Every Day     Current packs/day: 1.00     Types: Cigarettes    Smokeless tobacco: Never   Substance and Sexual Activity    Alcohol use: Never    Drug use: Never    Sexual activity: Yes     Social Determinants of Health     Financial Resource Strain: Low Risk  (8/8/2023)    Overall Financial Resource Strain (CARDIA)     Difficulty of Paying Living Expenses: Not hard at all   Food Insecurity: No Food Insecurity (8/8/2023)    Hunger Vital Sign     Worried About Running Out of Food in the Last Year: Never true     Ran Out of Food in the Last Year: Never true   Transportation Needs: No Transportation Needs (12/19/2023)    PRAPARE - Transportation     Lack of Transportation (Medical): No     Lack of Transportation (Non-Medical): No   Stress: No Stress Concern Present (8/8/2023)    [employer] [place] [employer] - Occupational Stress Questionnaire     Feeling of Stress : Only a little  "  Housing Stability: Low Risk  (12/19/2023)    Housing Stability Vital Sign     Unable to Pay for Housing in the Last Year: No     Number of Places Lived in the Last Year: 1     Unstable Housing in the Last Year: No         Scheduled Meds:   ampicillin IVPB  2 g Intravenous Q6H    enoxparin  40 mg Subcutaneous Q24H (prophylaxis, 1700)    furosemide  40 mg Oral Daily    meropenem (MERREM) IVPB  500 mg Intravenous Q8H    metoprolol succinate  25 mg Oral Daily    pantoprazole  40 mg Intravenous BID AC    sodium chloride 0.9%  10 mL Intravenous Q6H    traZODone  25 mg Oral QHS     Continuous Infusions:  PRN Meds:sodium chloride 0.9%, acetaminophen, acetaminophen, aluminum-magnesium hydroxide-simethicone, dextrose 10%, dextrose 10%, dicyclomine, glucagon (human recombinant), hydrALAZINE, HYDROmorphone, insulin aspart U-100, labetalol, melatonin, ondansetron, oxyCODONE, polyethylene glycol, prochlorperazine, senna-docusate 8.6-50 mg, sodium chloride 0.9%, Flushing PICC/Midline Protocol **AND** sodium chloride 0.9% **AND** sodium chloride 0.9%    Objective:  BP (!) 161/89   Pulse 77   Temp 98.2 °F (36.8 °C) (Oral)   Resp 16   Ht 5' 6" (1.676 m)   Wt 64.1 kg (141 lb 4.8 oz)   SpO2 (!) 91%   BMI 22.81 kg/m²     Physical Exam:   Physical Exam  Vitals reviewed.   HENT:      Head: Normocephalic.   Cardiovascular:      Rate and Rhythm: Normal rate and regular rhythm.   Pulmonary:      Effort: Pulmonary effort is normal. No respiratory distress.      Breath sounds: Normal breath sounds. No wheezing.   Abdominal:      General: Bowel sounds are normal. There is no distension.      Palpations: Abdomen is soft.      Tenderness: There is no abdominal tenderness.   Musculoskeletal:         General: Normal range of motion.      Cervical back: Normal range of motion.   Skin:     Findings: No rash.   Neurological:      Mental Status: He is alert and oriented to person, place, and time.   Psychiatric:         Mood and Affect: Mood " normal.         Behavior: Behavior normal.       Imaging      Lab Review   Recent Results (from the past 24 hour(s))   POCT glucose    Collection Time: 01/02/24 12:49 AM   Result Value Ref Range    POCT Glucose 131 (H) 70 - 110 mg/dL   Comprehensive Metabolic Panel    Collection Time: 01/02/24  4:04 AM   Result Value Ref Range    Sodium Level 134 (L) 136 - 145 mmol/L    Potassium Level 3.8 3.5 - 5.1 mmol/L    Chloride 100 98 - 107 mmol/L    Carbon Dioxide 24 23 - 31 mmol/L    Glucose Level 135 (H) 82 - 115 mg/dL    Blood Urea Nitrogen 11.1 8.4 - 25.7 mg/dL    Creatinine 0.86 0.73 - 1.18 mg/dL    Calcium Level Total 8.2 (L) 8.8 - 10.0 mg/dL    Protein Total 5.8 5.8 - 7.6 gm/dL    Albumin Level 2.1 (L) 3.4 - 4.8 g/dL    Globulin 3.7 (H) 2.4 - 3.5 gm/dL    Albumin/Globulin Ratio 0.6 (L) 1.1 - 2.0 ratio    Bilirubin Total 0.7 <=1.5 mg/dL    Alkaline Phosphatase 95 40 - 150 unit/L    Alanine Aminotransferase 23 0 - 55 unit/L    Aspartate Aminotransferase 37 (H) 5 - 34 unit/L    eGFR >60 mls/min/1.73/m2   CBC with Differential    Collection Time: 01/02/24  4:04 AM   Result Value Ref Range    WBC 5.67 4.50 - 11.50 x10(3)/mcL    RBC 3.70 (L) 4.70 - 6.10 x10(6)/mcL    Hgb 11.3 (L) 14.0 - 18.0 g/dL    Hct 34.6 (L) 42.0 - 52.0 %    MCV 93.5 80.0 - 94.0 fL    MCH 30.5 27.0 - 31.0 pg    MCHC 32.7 (L) 33.0 - 36.0 g/dL    RDW 13.2 11.5 - 17.0 %    Platelet 383 130 - 400 x10(3)/mcL    MPV 9.1 7.4 - 10.4 fL    Neut % 57.4 %    Lymph % 21.7 %    Mono % 16.2 %    Eos % 2.6 %    Basophil % 1.6 %    Lymph # 1.23 0.6 - 4.6 x10(3)/mcL    Neut # 3.25 2.1 - 9.2 x10(3)/mcL    Mono # 0.92 0.1 - 1.3 x10(3)/mcL    Eos # 0.15 0 - 0.9 x10(3)/mcL    Baso # 0.09 <=0.2 x10(3)/mcL    IG# 0.03 0 - 0.04 x10(3)/mcL    IG% 0.5 %    NRBC% 0.0 %   POCT glucose    Collection Time: 01/02/24  4:51 AM   Result Value Ref Range    POCT Glucose 132 (H) 70 - 110 mg/dL   POCT glucose    Collection Time: 01/02/24 11:34 AM   Result Value Ref Range    POCT Glucose 135  (H) 70 - 110 mg/dL   POCT glucose    Collection Time: 01/02/24  4:41 PM   Result Value Ref Range    POCT Glucose 114 (H) 70 - 110 mg/dL       Assessment/Plan:  Enterococcus sepsis  Severe acute necrotic pancreatitis  Cholelithiasis / Cholangitis  Obstructive jaundice  SHA / CKD  Severe leukocytosis  Ascites / Pleural effusions    -Completing course of Ampicillin #16 and Merrem #11  -Will need a 14 day course of Ampicillin from the negative blood cultures with end date 1/3/24  -Afebrile without leukocytosis, follow  -Repeat CT abdomen/pelvis with contrast on 12/29 with results revealing necrotic pancreatitis with possible organizing collection along the pancreatic neck and body. GI following with plans to repeat CT abdomen in 3 mths to reassess  -Blood cultures from admit revealed Enterococcus 1/2 sets. Repeat blood cultures on 12/20 negative  -2D Echo with no mention of vegetations  -Renal dysfunction improved with Crea normalized  -Stool for c-diff PCR and toxin negative  -Discussed with patient, wife and nursing

## 2024-01-03 NOTE — PROGRESS NOTES
Infectious Diseases Progress Note  69 y/o male with Hx of GERD, HTN, HLD and DM Type II who presented to ER on 12/16 with abdominal pain. On admit he was afebrile with leukocytosis, WBC 24.97. Lipase >3,000,  and . U/A not impressive. Blood cultures revealed Enterococcus 1/2 sets. CT abdomen/pelvis with contrast revealed severe acute pancreatitis, gastric antrum and duodenal mural thickening likely represents reactive change for acute pancreatitis, right large hydrocele. Scrotal ultrasound with unremarkable right testicle, large right hydrocele which crosses the midline and causes compressive effect upon the left testicle which is inferiorly deviated. MRI Abdomen without contrast showed there appears to be numerous intraluminal filling defects in the mid to distal common bile duct series image 16 series 3, these are suggestive of impacted gallstones and sludge. On 12/17 he underwent ERCP with sludge and stone removal. Repeat CT abdomen/pelvis without contrast showed interval worsening of the ascites, persistent inflammatory changes around the pancreas consistent patient's history of pancreatitis, previous examination showed incomplete enhancement of the pancreas suggesting possible developing necrotizing pancreatitis, interval development of bibasilar atelectasis and moderate to large pleural effusions. Renal ultrasound unremarkable. 2D Echo with no mention of vegetations. 12/20 blood cultures negative thus far. During his stay he was noted to have diarrhea, stool for C-Diff PCR (-).   He is currently on Ampicillin and Merrem     Subjective:  Lying in bed in no acute distress. No new complaints voiced. Afebrile.     Review of Systems   Constitutional:  Positive for malaise/fatigue.   HENT: Negative.     Eyes: Negative.    Respiratory: Negative.     Cardiovascular: Negative.    Gastrointestinal:  Positive for abdominal pain.   Genitourinary: Negative.    Musculoskeletal: Negative.    Skin: Negative.     Neurological: Negative.    All other systems reviewed and are negative.      Review of patient's allergies indicates:  No Known Allergies    Past Medical History:   Diagnosis Date    DM (diabetes mellitus)     GERD (gastroesophageal reflux disease)     HLD (hyperlipidemia)     HTN (hypertension)        Past Surgical History:   Procedure Laterality Date    APPENDECTOMY      CHOLECYSTECTOMY      ERCP N/A 12/17/2023    Procedure: ERCP (ENDOSCOPIC RETROGRADE CHOLANGIOPANCREATOGRAPHY);  Surgeon: Flako Kerns MD;  Location: [facility];  Service: Gastroenterology;  Laterality: N/A;    ERCP W/ SPHICTEROTOMY  12/17/2023    Procedure: ERCP, WITH SPHINCTEROTOMY;  Surgeon: Flako Kerns MD;  Location: [facility] OR;  Service: Gastroenterology;;    ERCP, WITH CALCULUS REMOVAL  12/17/2023    Procedure: ERCP, WITH CALCULUS REMOVAL;  Surgeon: Flako Kerns MD;  Location: [facility];  Service: Gastroenterology;;       Social History     Socioeconomic History    Marital status:    Tobacco Use    Smoking status: Every Day     Current packs/day: 1.00     Types: Cigarettes    Smokeless tobacco: Never   Substance and Sexual Activity    Alcohol use: Never    Drug use: Never    Sexual activity: Yes     Social Determinants of Health     Financial Resource Strain: Low Risk  (8/8/2023)    Overall Financial Resource Strain (CARDIA)     Difficulty of Paying Living Expenses: Not hard at all   Food Insecurity: No Food Insecurity (8/8/2023)    Hunger Vital Sign     Worried About Running Out of Food in the Last Year: Never true     Ran Out of Food in the Last Year: Never true   Transportation Needs: No Transportation Needs (12/19/2023)    PRAPARE - Transportation     Lack of Transportation (Medical): No     Lack of Transportation (Non-Medical): No   Stress: No Stress Concern Present (8/8/2023)    [employer] [employer] - Occupational Stress Questionnaire     Feeling of Stress : Only a little   Housing  "Stability: Low Risk  (12/19/2023)    Housing Stability Vital Sign     Unable to Pay for Housing in the Last Year: No     Number of Places Lived in the Last Year: 1     Unstable Housing in the Last Year: No         Scheduled Meds:   ampicillin IVPB  2 g Intravenous Q6H    enoxparin  40 mg Subcutaneous Q24H (prophylaxis, 1700)    furosemide  40 mg Oral Daily    meropenem (MERREM) IVPB  500 mg Intravenous Q8H    metoprolol succinate  25 mg Oral Daily    pantoprazole  40 mg Intravenous BID AC    sodium chloride 0.9%  10 mL Intravenous Q6H    traZODone  25 mg Oral QHS     Continuous Infusions:  PRN Meds:sodium chloride 0.9%, sodium chloride 0.9%, sodium chloride 0.9%, acetaminophen, acetaminophen, aluminum-magnesium hydroxide-simethicone, dextrose 10%, dextrose 10%, dicyclomine, glucagon (human recombinant), hydrALAZINE, HYDROmorphone, insulin aspart U-100, labetalol, melatonin, ondansetron, oxyCODONE, polyethylene glycol, prochlorperazine, senna-docusate 8.6-50 mg, sodium chloride 0.9%, Flushing PICC/Midline Protocol **AND** sodium chloride 0.9% **AND** sodium chloride 0.9%    Objective:  /82   Pulse 87   Temp 97.9 °F (36.6 °C) (Oral)   Resp 17   Ht 5' 6" (1.676 m)   Wt 62.6 kg (138 lb 0.1 oz)   SpO2 (!) 93%   BMI 22.28 kg/m²     Physical Exam:   Physical Exam  Vitals reviewed.   HENT:      Head: Normocephalic.   Cardiovascular:      Rate and Rhythm: Normal rate and regular rhythm.   Pulmonary:      Effort: Pulmonary effort is normal. No respiratory distress.      Breath sounds: Normal breath sounds. No wheezing.   Abdominal:      General: Bowel sounds are normal. There is no distension.      Palpations: Abdomen is soft.      Tenderness: There is no abdominal tenderness.   Musculoskeletal:         General: Normal range of motion.      Cervical back: Normal range of motion.   Skin:     Findings: No rash.   Neurological:      Mental Status: He is alert and oriented to person, place, and time.   Psychiatric:   "       Mood and Affect: Mood normal.         Behavior: Behavior normal.       Imaging      Lab Review   Recent Results (from the past 24 hour(s))   POCT glucose    Collection Time: 01/02/24  8:07 PM   Result Value Ref Range    POCT Glucose 193 (H) 70 - 110 mg/dL   POCT glucose    Collection Time: 01/03/24  4:36 AM   Result Value Ref Range    POCT Glucose 115 (H) 70 - 110 mg/dL   POCT glucose    Collection Time: 01/03/24 11:58 AM   Result Value Ref Range    POCT Glucose 119 (H) 70 - 110 mg/dL   POCT glucose    Collection Time: 01/03/24  4:45 PM   Result Value Ref Range    POCT Glucose 132 (H) 70 - 110 mg/dL       Assessment/Plan:  Enterococcus sepsis  Severe acute necrotic pancreatitis  Cholelithiasis / Cholangitis  Obstructive jaundice  SHA / CKD  Severe leukocytosis  Ascites / Pleural effusions    -Completed course of Ampicillin #17 and Merrem #12  -Will need a 14 day course of Ampicillin from the negative blood cultures with end date 1/3/24  -Afebrile without leukocytosis, follow  -Repeat CT abdomen/pelvis with contrast on 12/29 with results revealing necrotic pancreatitis with possible organizing collection along the pancreatic neck and body. GI following with inputs noted including no indication for ongoing antibiotics and plan to repeat CT abdomen in 3 mths to reassess  -Blood cultures from admit revealed Enterococcus 1/2 sets. Repeat blood cultures on 12/20 negative  -2D Echo with no mention of vegetations  -Renal dysfunction improved with Crea normalized  -Stool for c-diff PCR and toxin negative  -Discussed with patient and nursing. Disposition per primary. Plan for discharge home tomorrow noted

## 2024-01-04 VITALS
HEIGHT: 66 IN | OXYGEN SATURATION: 93 % | HEART RATE: 84 BPM | SYSTOLIC BLOOD PRESSURE: 154 MMHG | RESPIRATION RATE: 18 BRPM | DIASTOLIC BLOOD PRESSURE: 97 MMHG | WEIGHT: 135.56 LBS | TEMPERATURE: 98 F | BODY MASS INDEX: 21.79 KG/M2

## 2024-01-04 LAB
POCT GLUCOSE: 118 MG/DL (ref 70–110)
POCT GLUCOSE: 132 MG/DL (ref 70–110)

## 2024-01-04 PROCEDURE — 63600175 PHARM REV CODE 636 W HCPCS

## 2024-01-04 PROCEDURE — 25000003 PHARM REV CODE 250: Performed by: INTERNAL MEDICINE

## 2024-01-04 PROCEDURE — C9113 INJ PANTOPRAZOLE SODIUM, VIA: HCPCS

## 2024-01-04 PROCEDURE — A4216 STERILE WATER/SALINE, 10 ML: HCPCS | Performed by: INTERNAL MEDICINE

## 2024-01-04 RX ORDER — FUROSEMIDE 40 MG/1
40 TABLET ORAL DAILY
Qty: 30 TABLET | Refills: 1 | Status: SHIPPED | OUTPATIENT
Start: 2024-01-04

## 2024-01-04 RX ORDER — POLYETHYLENE GLYCOL 3350 17 G/17G
17 POWDER, FOR SOLUTION ORAL 2 TIMES DAILY PRN
Refills: 0 | COMMUNITY
Start: 2024-01-04

## 2024-01-04 RX ORDER — ONDANSETRON 4 MG/1
4 TABLET, FILM COATED ORAL EVERY 8 HOURS PRN
Qty: 12 TABLET | Refills: 0 | Status: SHIPPED | OUTPATIENT
Start: 2024-01-04

## 2024-01-04 RX ORDER — PANTOPRAZOLE SODIUM 40 MG/1
40 TABLET, DELAYED RELEASE ORAL 2 TIMES DAILY
Qty: 60 TABLET | Refills: 3 | Status: SHIPPED | OUTPATIENT
Start: 2024-01-04 | End: 2024-02-23 | Stop reason: SDUPTHER

## 2024-01-04 RX ADMIN — FUROSEMIDE 40 MG: 40 TABLET ORAL at 09:01

## 2024-01-04 RX ADMIN — METOPROLOL SUCCINATE 25 MG: 25 TABLET, EXTENDED RELEASE ORAL at 09:01

## 2024-01-04 RX ADMIN — SODIUM CHLORIDE, PRESERVATIVE FREE 10 ML: 5 INJECTION INTRAVENOUS at 06:01

## 2024-01-04 RX ADMIN — SODIUM CHLORIDE, PRESERVATIVE FREE 10 ML: 5 INJECTION INTRAVENOUS at 12:01

## 2024-01-04 RX ADMIN — PANTOPRAZOLE SODIUM 40 MG: 40 INJECTION, POWDER, FOR SOLUTION INTRAVENOUS at 06:01

## 2024-01-04 RX ADMIN — OXYCODONE HYDROCHLORIDE 5 MG: 5 TABLET ORAL at 09:01

## 2024-01-04 NOTE — PLAN OF CARE
Problem: Adult Inpatient Plan of Care  Goal: Plan of Care Review  Outcome: Ongoing, Not Progressing  Goal: Patient-Specific Goal (Individualized)  Outcome: Ongoing, Not Progressing  Goal: Absence of Hospital-Acquired Illness or Injury  Outcome: Ongoing, Not Progressing  Goal: Optimal Comfort and Wellbeing  Outcome: Ongoing, Not Progressing  Goal: Readiness for Transition of Care  Outcome: Ongoing, Not Progressing     Problem: Diabetes Comorbidity  Goal: Blood Glucose Level Within Targeted Range  Outcome: Ongoing, Not Progressing     Problem: Pain Acute  Goal: Acceptable Pain Control and Functional Ability  Outcome: Ongoing, Not Progressing     Problem: Fluid Imbalance (Pancreatitis)  Goal: Fluid Balance  Outcome: Ongoing, Not Progressing     Problem: Infection (Pancreatitis)  Goal: Infection Symptom Resolution  Outcome: Ongoing, Not Progressing     Problem: Nutrition Impaired (Pancreatitis)  Goal: Optimal Nutrition Intake  Outcome: Ongoing, Not Progressing     Problem: Pain (Pancreatitis)  Goal: Acceptable Pain Control  Outcome: Ongoing, Not Progressing     Problem: Respiratory Compromise (Pancreatitis)  Goal: Effective Oxygenation and Ventilation  Outcome: Ongoing, Not Progressing     Problem: Infection  Goal: Absence of Infection Signs and Symptoms  Outcome: Ongoing, Not Progressing     Problem: Skin Injury Risk Increased  Goal: Skin Health and Integrity  Outcome: Ongoing, Not Progressing     Problem: Fall Injury Risk  Goal: Absence of Fall and Fall-Related Injury  Outcome: Ongoing, Not Progressing

## 2024-01-04 NOTE — PLAN OF CARE
Problem: Adult Inpatient Plan of Care  Goal: Plan of Care Review  Outcome: Ongoing, Progressing  Goal: Absence of Hospital-Acquired Illness or Injury  Outcome: Ongoing, Progressing  Goal: Optimal Comfort and Wellbeing  Outcome: Ongoing, Progressing  Goal: Readiness for Transition of Care  Outcome: Ongoing, Progressing     Problem: Pain Acute  Goal: Acceptable Pain Control and Functional Ability  Outcome: Ongoing, Progressing     Problem: Fall Injury Risk  Goal: Absence of Fall and Fall-Related Injury  Outcome: Ongoing, Progressing

## 2024-01-04 NOTE — DISCHARGE SUMMARY
[facility] Hospital Medicine Discharge Summary    Admit Date: 12/16/2023  Discharge Date and Time: 1/4/20247:55 AM  Admitting Physician:  Team  Discharging Physician: Felipe Casas MD.  Primary Care Physician: Snehal Arroyo MD  Consults: Gastroenterology, General Surgery, and Nephrology    Discharge Diagnoses:  Acute necrotizing pancreatitis due to Choledocholithiasis --> S/P ERCP with sludge and stone removal  Cholangitis, acute- treated  Enterococcus faecalis bacteremia  Severe sepsis- resolved  Leukocytosis- resolved  SHA superimposed on CKD 3A  Acute hypoxic respiratory failure requiring supplemental oxygen secondary to pulmonary edema/pleural effusion, for fluid resuscitation/concern for ARDS , from underlying pancreatitis  , history of smoking, questionable COPD?  History of T2DM, hypertension, hyperlipidemia, GERD    Hospital Course:   68-year-old male with medical history of T2DM, hypertension, hyperlipidemia and prior cholecystectomy present to the ED with complaint of severe epigastric abdominal pain that started today around 9:00 a.m. after breakfast, the pain is severe 10/10 and radiate to his back associated with nausea and vomiting.  Report last bowel movement was this morning.  Reports chills but denies fever.  He has chronic scrotal hydrocele that has not changed or painful.  On arrival to ED he was afebrile, tachycardic, normotensive and saturating 96% on room air.  Labs notable for WBC 24.97, hemoglobin 18.2, creatinine 2.0, BUN 16.5, total bilirubin 2.7, direct 1.9, , , lipase more than 3000, triglyceride 183.  CT abdomen and pelvis show finding of severe acute pancreatitis without evidence of necrosis or abscess or fluid collection.  Liver remarkable for steatosis, gallbladder surgically absent, no comment on biliary tree.  He was given 2 L of IV fluids, IV Zosyn, IV analgesics and referred to hospital medicine service for further evaluation and  management.  Patient was taken for ERCP on 12/17.  Choledocholithiasis was resolved.  Also had sphincterotomy performed.Returned to the floor in stable condition.  GI recommended advancement of diet to clear liquids in 4-6 hours  Patient  continues to be requiring oxygen.  Echo with grade 1 diastolic dysfunction, normal systolic function.  Follow up chest x-ray obtained  with small right pleural effusion slightly increased. Nephrology okay to transition to oral Lasix.  GI signed off.  Noted to have bacteremia, Infectious diseases recommending 14 day course from negative blood cultures of ampicillin and Flagyl.  As leukocytosis not improving, ordered a CT scan  which commenting on worsened appearance pancreatitis with possible collection developing and small volume ascites, small bilateral pleural effusions.  Flagyl discontinued and started on Meropenem per ID.  GI was reconsulted, notified about the scan and requested for further recommendations, recommending surgery consult.  Surgery on board.  Plan to continue to monitor and continue the antibiotics and repeat imaging in few days  GI signed off  Underwent ERCP 12/17 with sludge and stone removal.  Lipase improved. Transaminitis improved  12/29- Repeat CT abdomen pelvis with IV contrast and no oral contrast shows continued finding of necrotic pancreatitis with possible organizing collection along the pancreatic neck and body.   GI recommended repeat CT abdomen in 3 months as outpt to reassess developing abscess and reminding the ability to drain from endoscopic perspective, recommending 3-4 boost per day (ordered)  improve nutrition, low-fat diet, follow up as outpatient with GI clinic.    Dr. Oseguera wrote no role of ongoing antibiotics for pancreatitis standpoint there is no evidence of infected necrosis on the CT scan that is no gas present and patient remains afebrile.  Recommended to continue antibiotic course for his presumed Enterococcus bacteremia as  recommended per ID, recommendations noted  General surgery also evaluated the patient, recommended supportive care and empiric antibiotics repeat imaging in few weeks  Infectious Disease Dr HERRERA on board- Completed Ampicillin and Merrem on 1/03/24 at 9pm  Leukocytosis resolved  12/16- Blood cultures positive for Enterococcus faecalis  12/20- repeat blood cultures so far negative.  12/20- Echo did not show vegetations   Diet a now advanced to low fat/ low residual diet and tolerating, patient wanted to eat biscuit, informed it is very high fat content and to avoid it for now.  Verbalized understanding  Encouraged ambulation, wife reported he has been walking in the hallways with her 3 to 4 times a day  Nephrology followed for SHA, creatinine improved on diuretics.   Now on RA   Therapy evaluated the patient, no needs    Pt was seen and examined on the day of discharge  Vitals:  VITAL SIGNS: 24 HRS MIN & MAX LAST   Temp  Min: 97.7 °F (36.5 °C)  Max: 98.4 °F (36.9 °C) 97.9 °F (36.6 °C)   BP  Min: 120/82  Max: 157/90 (!) 154/97   Pulse  Min: 79  Max: 98  84   Resp  Min: 17  Max: 18 18   SpO2  Min: 93 %  Max: 96 % (!) 93 %       Physical Exam:  General: In no acute distress, afebrile  Chest:  Good air entry, on RA  Heart: RRR, +S1, S2, no appreciable murmur  Abdomen: Soft, nontender, BS +  MSK: Warm, no lower extremity edema, no clubbing or cyanosis  Neurologic: Alert and oriented x4, Cranial nerve II-XII intact, Strength 5/5 in all 4 extremities    Recent Labs   Lab 12/29/23  0828 12/30/23  0657 01/02/24  0404   WBC 10.63 9.94 5.67   RBC 3.74* 3.93* 3.70*   HGB 11.5* 11.8* 11.3*   HCT 35.3* 37.1* 34.6*   MCV 94.4* 94.4* 93.5   MCH 30.7 30.0 30.5   MCHC 32.6* 31.8* 32.7*   RDW 14.0 13.8 13.2   * 444* 383   MPV 9.2 9.4 9.1       Recent Labs   Lab 12/29/23  0445 12/30/23  0657 01/02/24  0404   NA  --  134* 134*   K  --  4.5 3.8   CO2  --  25 24   BUN  --  10.6 11.1   CREATININE  --  0.77 0.86   CALCIUM  --  7.8* 8.2*    MG 1.80  --   --    ALBUMIN  --  1.8* 2.1*   ALKPHOS  --  92 95   ALT  --  18 23   AST  --  37* 37*   BILITOT  --  0.9 0.7        Microbiology Results (last 7 days)       ** No results found for the last 168 hours. **             CT Abdomen Pelvis With IV Contrast NO Oral Contrast  Narrative: EXAMINATION:  CT ABDOMEN PELVIS WITH IV CONTRAST    CLINICAL HISTORY:  Pancreatitis, acute, severe;    TECHNIQUE:  Helical acquisition through the abdomen and pelvis with IV contrast.  Three plane reconstructions were provided for review.  mGycm. Automatic exposure control, adjustment of mA/kV or iterative reconstruction technique was used to reduce radiation.    COMPARISON:  23 December 2023    FINDINGS:  There are small bilateral pleural effusions.  There is bibasilar atelectasis.    No acute findings liver, spleen, adrenals or kidneys.  The gallbladder is surgically absent.    Again there are findings of necrotic pancreatitis with possible organizing collection anterior and superior to the pancreatic neck and body measuring approximately 8 x 4 cm on image 38 series 2.  Portal, splenic and superior mesenteric veins remain grossly patent.  Question some developing phlegmon particularly along the right pericolic gutter.  There is small volume ascites.    No bowel obstruction.  Inflammatory changes of the stomach secondary to pancreatitis.  No free air.    Urinary bladder unremarkable.  Moderate atherosclerotic disease.    No acute osseous findings.  Impression: Continued findings of necrotic pancreatitis with possible organizing collection along the pancreatic neck and body.    Electronically signed by: Osiel Whitehead  Date:    12/29/2023  Time:    11:57         Medication List        START taking these medications      furosemide 40 MG tablet  Commonly known as: LASIX  Take 1 tablet (40 mg total) by mouth once daily.     ondansetron 4 MG tablet  Commonly known as: ZOFRAN  Take 1 tablet (4 mg total) by mouth every 8  (eight) hours as needed for Nausea.     pantoprazole 40 MG tablet  Commonly known as: PROTONIX  Take 1 tablet (40 mg total) by mouth 2 (two) times daily.     polyethylene glycol 17 gram Pwpk  Commonly known as: GLYCOLAX  Take 17 g by mouth 2 (two) times daily as needed for Constipation ((Second Choice)).            CHANGE how you take these medications      metoprolol succinate 25 MG 24 hr tablet  Commonly known as: TOPROL-XL  Take 1 tablet (25 mg total) by mouth once daily.  What changed: Another medication with the same name was removed. Continue taking this medication, and follow the directions you see here.            CONTINUE taking these medications      amlodipine-benazepril 10-20mg 10-20 mg per capsule  Commonly known as: LOTREL  Take 1 capsule by mouth once daily.     aspirin 81 MG Chew     atorvastatin 20 MG tablet  Commonly known as: LIPITOR  Take 1 tablet (20 mg total) by mouth once daily.     ergocalciferol 50,000 unit Cap  Commonly known as: ERGOCALCIFEROL  Take 1 capsule (50,000 Units total) by mouth every 7 days.     fenofibrate 145 MG tablet  Commonly known as: TRICOR  See Instructions, TAKE 1 TABLET EVERY DAY, # 90 tab(s), 3 Refill(s), Pharmacy: [facility], 168, cm, Height/Length Dosing, 11/11/21 9:32:00 CST, 73.75, kg, Weight Dosing, 11/11/21 9:32:00 CST Strength: 145 mg     fluticasone propionate 50 mcg/actuation nasal spray  Commonly known as: FLONASE  2 sprays (100 mcg total) by Each Nostril route once daily.     glimepiride 2 MG tablet  Commonly known as: AMARYL  Take 1 tablet (2 mg total) by mouth once daily.            STOP taking these medications      omeprazole 20 MG capsule  Commonly known as: PRILOSEC     ondansetron 4 MG Tbdl  Commonly known as: ZOFRAN-ODT               Where to Get Your Medications        These medications were sent to [facility] - [place], [place] - [address]  [address], [place]  [place] [address]      Phone: [phone]   furosemide 40 MG tablet  ondansetron 4 MG tablet  pantoprazole 40 MG tablet       You can get these medications from any pharmacy    You don't need a prescription for these medications  polyethylene glycol 17 gram Pwpk          Explained in detail to the patient about the discharge plan, medications, and follow-up visits. Pt understands and agrees with the treatment plan  Discharge Disposition: Home or Self Care   Discharged Condition: stable  Diet-   Dietary Orders (From admission, onward)       Start     Ordered    01/01/24 0923  Diet low fiber/residue Low Fat  Diet effective now        Question:  Diet Modifier:  Answer:  Low Fat    01/01/24 0923    12/28/23 1030  Dietary nutrition supplements Boost Glucose Control Chocolate; TID  Continuous        Question Answer Comment   Select PO Supplement: Boost Glucose Control Chocolate    Frequency: TID        12/28/23 1029                   Medications Per DC med rec  Activities as tolerated   Follow-up Information       Snehal Arroyo MD Follow up on 1/11/2024.    Specialty: Family Medicine  Why: Follow up appt with Dr Arroyo on Jan. 11th @ 9:20. Office Location [facility] [address]  Contact information:  [address]  [facility] [id number] [phone]               Erik Hummel, DO Follow up in 1 month(s).    Specialty: Nephrology  Contact information:  [address] [facility] [id number] [phone]               Phil Oseguera MD. Schedule an appointment as soon as possible for a visit in 3 month(s).    Specialty: Gastroenterology  Why: for repeat CT abdomen and f/u to monitor necrotizing pancreatitis  Contact information:  [address]  [address]  [facility] [id number] [phone]                           For further questions contact hospitalist office    Discharge time 34 minutes    For worsening symptoms, chest pain, shortness of breath, increased abdominal pain, high grade fever,  stroke or stroke like symptoms, immediately go to the nearest Emergency Room or call 911 as soon as possible.      Felipe Casas MD  Department of Hospital Medicine   [facility]   01/04/2024 7:55 AM

## 2024-01-05 ENCOUNTER — PATIENT OUTREACH (OUTPATIENT)
Dept: ADMINISTRATIVE | Facility: CLINIC | Age: 69
End: 2024-01-05
Payer: MEDICARE

## 2024-01-05 NOTE — PROGRESS NOTES
C3 nurse spoke with Franklin Macias (and wife) for a TCC post hospital discharge follow up call. The patient has a scheduled [facility] appointment with Snehal Arroyo MD (Family Medicine) on 1/11/2024 @ 10:45 AM.

## 2024-01-06 ENCOUNTER — NURSE TRIAGE (OUTPATIENT)
Dept: ADMINISTRATIVE | Facility: CLINIC | Age: 69
End: 2024-01-06
Payer: MEDICARE

## 2024-01-06 NOTE — TELEPHONE ENCOUNTER
Pt recently hospitalized for pancreatitis. Was instructed to take melatonin post discharge to help with sleep. Pts wife asking what dose he should be taking. No other issues at this time requiring triage. After review of chart, unable to find any info to accurately answer patient's question.    Dispo- call PCP when office is open. Advise pts wife accordingly and routed message to office reuqesting they call pt to answer question.  Reason for Disposition   Caller wants to use a complementary or alternative medicine    Protocols used: Medication Question Call-A-AH

## 2024-01-08 ENCOUNTER — NURSE TRIAGE (OUTPATIENT)
Dept: ADMINISTRATIVE | Facility: CLINIC | Age: 69
End: 2024-01-08
Payer: MEDICARE

## 2024-01-08 NOTE — TELEPHONE ENCOUNTER
Wife calls, reports generalized weakness and lethargy. Denies dizziness. Reports temp is 100.2 currently. Denies N/V, SOB, or CP. Reports 1 episode of diarrhea this morning.Current BP is 126/83 and HR is 107. Dry mouth but reports drinking a lot of water. Denies abd pain at this moment. Pt reports weight loss of approx 30 lbs over 20 days.     Care advice is for patient to go to office now. No in-person appts available. Virtual visit and OD VV offered and declined. Recommended UC/ED as a good source of care. Pt declines dispo and says he will wait on PCP appt on Thursday. Reiterated dispo and provided care advice. Pt verbalized understanding.      Reason for Disposition   MODERATE weakness (i.e., interferes with work, school, normal activities) and cause unknown (Exceptions: Weakness from acute minor illness or from poor fluid intake; weakness is chronic and not worse.)    Additional Information   Negative: SEVERE difficulty breathing (e.g., struggling for each breath, speaks in single words)   Negative: Shock suspected (e.g., cold/pale/clammy skin, too weak to stand, low BP, rapid pulse)   Negative: Difficult to awaken or acting confused (e.g., disoriented, slurred speech)   Negative: Fainted > 15 minutes ago and still feels too weak or dizzy to stand   Negative: SEVERE weakness (i.e., unable to walk or barely able to walk, requires support) and new-onset or worsening   Negative: Sounds like a life-threatening emergency to the triager   Negative: Difficulty breathing   Negative: Heart beating < 50 beats per minute OR > 140 beats per minute   Negative: Extra heartbeats, irregular heart beating, or heart is beating very fast (i.e., 'palpitations')   Negative: Follows large amount of bleeding (e.g., from vomiting, rectum, vagina) (Exception: Small transient weakness from sight of a small amount blood.)   Negative: Black or tarry bowel movements   Negative: MODERATE weakness or fatigue from poor fluid intake with no  improvement after 2 hours of rest and fluids   Negative: Drinking very little and dehydration suspected (e.g., no urine > 12 hours, very dry mouth, very lightheaded)   Negative: Patient sounds very sick or weak to the triager    Protocols used: Weakness (Generalized) and Fatigue-A-OH

## 2024-01-09 ENCOUNTER — HOSPITAL ENCOUNTER (INPATIENT)
Facility: HOSPITAL | Age: 69
LOS: 11 days | Discharge: HOME | DRG: 871 | End: 2024-01-20
Attending: STUDENT IN AN ORGANIZED HEALTH CARE EDUCATION/TRAINING PROGRAM | Admitting: INTERNAL MEDICINE
Payer: MEDICARE

## 2024-01-09 DIAGNOSIS — K85.90 ACUTE PANCREATITIS, UNSPECIFIED COMPLICATION STATUS, UNSPECIFIED PANCREATITIS TYPE: Primary | ICD-10-CM

## 2024-01-09 DIAGNOSIS — J91.8 PLEURAL EFFUSION IN OTHER CONDITIONS CLASSIFIED ELSEWHERE: ICD-10-CM

## 2024-01-09 DIAGNOSIS — N17.9 ACUTE RENAL FAILURE: ICD-10-CM

## 2024-01-09 DIAGNOSIS — R11.2 NAUSEA VOMITING AND DIARRHEA: ICD-10-CM

## 2024-01-09 DIAGNOSIS — R19.7 NAUSEA VOMITING AND DIARRHEA: ICD-10-CM

## 2024-01-09 DIAGNOSIS — R05.9 COUGH: ICD-10-CM

## 2024-01-09 LAB
ALBUMIN SERPL-MCNC: 2.7 G/DL (ref 3.4–4.8)
ALBUMIN/GLOB SERPL: 0.6 RATIO (ref 1.1–2)
ALP SERPL-CCNC: 112 UNIT/L (ref 40–150)
ALT SERPL-CCNC: 20 UNIT/L (ref 0–55)
AMORPH URATE CRY URNS QL MICRO: ABNORMAL /UL
APPEARANCE UR: ABNORMAL
AST SERPL-CCNC: 26 UNIT/L (ref 5–34)
BACTERIA #/AREA URNS AUTO: ABNORMAL /HPF
BASOPHILS # BLD AUTO: 0.06 X10(3)/MCL
BASOPHILS NFR BLD AUTO: 0.4 %
BILIRUB SERPL-MCNC: 1.6 MG/DL
BILIRUB UR QL STRIP.AUTO: NEGATIVE
BUN SERPL-MCNC: 27.9 MG/DL (ref 8.4–25.7)
CALCIUM SERPL-MCNC: 9 MG/DL (ref 8.8–10)
CHLORIDE SERPL-SCNC: 92 MMOL/L (ref 98–107)
CO2 SERPL-SCNC: 26 MMOL/L (ref 23–31)
COLOR UR AUTO: ABNORMAL
CREAT SERPL-MCNC: 1.73 MG/DL (ref 0.73–1.18)
EOSINOPHIL # BLD AUTO: 0.04 X10(3)/MCL (ref 0–0.9)
EOSINOPHIL NFR BLD AUTO: 0.2 %
ERYTHROCYTE [DISTWIDTH] IN BLOOD BY AUTOMATED COUNT: 13.7 % (ref 11.5–17)
GFR SERPLBLD CREATININE-BSD FMLA CKD-EPI: 43 MLS/MIN/1.73/M2
GLOBULIN SER-MCNC: 4.4 GM/DL (ref 2.4–3.5)
GLUCOSE SERPL-MCNC: 123 MG/DL (ref 82–115)
GLUCOSE SERPL-MCNC: 126 MG/DL (ref 70–110)
GLUCOSE UR QL STRIP.AUTO: NORMAL
HCT VFR BLD AUTO: 41 % (ref 42–52)
HGB BLD-MCNC: 13.6 G/DL (ref 14–18)
HYALINE CASTS #/AREA URNS LPF: ABNORMAL /LPF
IMM GRANULOCYTES # BLD AUTO: 0.12 X10(3)/MCL (ref 0–0.04)
IMM GRANULOCYTES NFR BLD AUTO: 0.7 %
KETONES UR QL STRIP.AUTO: NEGATIVE
LACTATE SERPL-SCNC: 1.8 MMOL/L (ref 0.5–2.2)
LEUKOCYTE ESTERASE UR QL STRIP.AUTO: NEGATIVE
LIPASE SERPL-CCNC: 113 U/L
LYMPHOCYTES # BLD AUTO: 1.63 X10(3)/MCL (ref 0.6–4.6)
LYMPHOCYTES NFR BLD AUTO: 9.7 %
MCH RBC QN AUTO: 30.7 PG (ref 27–31)
MCHC RBC AUTO-ENTMCNC: 33.2 G/DL (ref 33–36)
MCV RBC AUTO: 92.6 FL (ref 80–94)
MONOCYTES # BLD AUTO: 1.7 X10(3)/MCL (ref 0.1–1.3)
MONOCYTES NFR BLD AUTO: 10.1 %
MUCOUS THREADS URNS QL MICRO: ABNORMAL /LPF
NEUTROPHILS # BLD AUTO: 13.31 X10(3)/MCL (ref 2.1–9.2)
NEUTROPHILS NFR BLD AUTO: 78.9 %
NITRITE UR QL STRIP.AUTO: NEGATIVE
NRBC BLD AUTO-RTO: 0 %
PH UR STRIP.AUTO: 5 [PH]
PLATELET # BLD AUTO: 356 X10(3)/MCL (ref 130–400)
PMV BLD AUTO: 10 FL (ref 7.4–10.4)
POCT GLUCOSE: 126 MG/DL (ref 70–110)
POTASSIUM SERPL-SCNC: 3.7 MMOL/L (ref 3.5–5.1)
PROT SERPL-MCNC: 7.1 GM/DL (ref 5.8–7.6)
PROT UR QL STRIP.AUTO: ABNORMAL
RBC # BLD AUTO: 4.43 X10(6)/MCL (ref 4.7–6.1)
RBC #/AREA URNS AUTO: ABNORMAL /HPF
RBC UR QL AUTO: ABNORMAL
SODIUM SERPL-SCNC: 133 MMOL/L (ref 136–145)
SP GR UR STRIP.AUTO: 1.02 (ref 1–1.03)
SQUAMOUS #/AREA URNS LPF: ABNORMAL /HPF
UROBILINOGEN UR STRIP-ACNC: NORMAL
WBC # SPEC AUTO: 16.86 X10(3)/MCL (ref 4.5–11.5)
WBC #/AREA URNS AUTO: ABNORMAL /HPF

## 2024-01-09 PROCEDURE — 11000001 HC ACUTE MED/SURG PRIVATE ROOM

## 2024-01-09 PROCEDURE — 25000003 PHARM REV CODE 250: Performed by: INTERNAL MEDICINE

## 2024-01-09 PROCEDURE — 96374 THER/PROPH/DIAG INJ IV PUSH: CPT

## 2024-01-09 PROCEDURE — 87040 BLOOD CULTURE FOR BACTERIA: CPT | Performed by: NURSE PRACTITIONER

## 2024-01-09 PROCEDURE — 82962 GLUCOSE BLOOD TEST: CPT

## 2024-01-09 PROCEDURE — 80053 COMPREHEN METABOLIC PANEL: CPT | Performed by: NURSE PRACTITIONER

## 2024-01-09 PROCEDURE — 63600175 PHARM REV CODE 636 W HCPCS: Mod: JZ,JG | Performed by: STUDENT IN AN ORGANIZED HEALTH CARE EDUCATION/TRAINING PROGRAM

## 2024-01-09 PROCEDURE — 83605 ASSAY OF LACTIC ACID: CPT | Performed by: NURSE PRACTITIONER

## 2024-01-09 PROCEDURE — 85025 COMPLETE CBC W/AUTO DIFF WBC: CPT | Performed by: NURSE PRACTITIONER

## 2024-01-09 PROCEDURE — 21400001 HC TELEMETRY ROOM

## 2024-01-09 PROCEDURE — 81001 URINALYSIS AUTO W/SCOPE: CPT | Performed by: NURSE PRACTITIONER

## 2024-01-09 PROCEDURE — 25500020 PHARM REV CODE 255: Performed by: STUDENT IN AN ORGANIZED HEALTH CARE EDUCATION/TRAINING PROGRAM

## 2024-01-09 PROCEDURE — 83690 ASSAY OF LIPASE: CPT | Performed by: NURSE PRACTITIONER

## 2024-01-09 PROCEDURE — 25000003 PHARM REV CODE 250: Performed by: STUDENT IN AN ORGANIZED HEALTH CARE EDUCATION/TRAINING PROGRAM

## 2024-01-09 PROCEDURE — 27000207 HC ISOLATION

## 2024-01-09 PROCEDURE — 96376 TX/PRO/DX INJ SAME DRUG ADON: CPT

## 2024-01-09 PROCEDURE — 99285 EMERGENCY DEPT VISIT HI MDM: CPT | Mod: 25

## 2024-01-09 PROCEDURE — 96375 TX/PRO/DX INJ NEW DRUG ADDON: CPT

## 2024-01-09 PROCEDURE — 63600175 PHARM REV CODE 636 W HCPCS: Performed by: STUDENT IN AN ORGANIZED HEALTH CARE EDUCATION/TRAINING PROGRAM

## 2024-01-09 PROCEDURE — 96361 HYDRATE IV INFUSION ADD-ON: CPT

## 2024-01-09 PROCEDURE — 84478 ASSAY OF TRIGLYCERIDES: CPT | Performed by: NURSE PRACTITIONER

## 2024-01-09 PROCEDURE — 63600175 PHARM REV CODE 636 W HCPCS: Performed by: NURSE PRACTITIONER

## 2024-01-09 RX ORDER — MORPHINE SULFATE 4 MG/ML
4 INJECTION, SOLUTION INTRAMUSCULAR; INTRAVENOUS
Status: COMPLETED | OUTPATIENT
Start: 2024-01-09 | End: 2024-01-09

## 2024-01-09 RX ORDER — MORPHINE SULFATE 4 MG/ML
2 INJECTION, SOLUTION INTRAMUSCULAR; INTRAVENOUS EVERY 4 HOURS PRN
Status: DISCONTINUED | OUTPATIENT
Start: 2024-01-09 | End: 2024-01-15

## 2024-01-09 RX ORDER — ONDANSETRON HYDROCHLORIDE 2 MG/ML
4 INJECTION, SOLUTION INTRAVENOUS EVERY 6 HOURS PRN
Status: DISCONTINUED | OUTPATIENT
Start: 2024-01-09 | End: 2024-01-09

## 2024-01-09 RX ORDER — GLUCAGON 1 MG
1 KIT INJECTION
Status: DISCONTINUED | OUTPATIENT
Start: 2024-01-09 | End: 2024-01-20 | Stop reason: HOSPADM

## 2024-01-09 RX ORDER — AMLODIPINE BESYLATE 5 MG/1
10 TABLET ORAL DAILY
Status: DISCONTINUED | OUTPATIENT
Start: 2024-01-10 | End: 2024-01-10

## 2024-01-09 RX ORDER — OMEPRAZOLE 20 MG/1
20 TABLET, DELAYED RELEASE ORAL DAILY
Status: ON HOLD | COMMUNITY
End: 2024-01-20 | Stop reason: HOSPADM

## 2024-01-09 RX ORDER — ACETAMINOPHEN 325 MG/1
650 TABLET ORAL EVERY 6 HOURS PRN
Status: DISCONTINUED | OUTPATIENT
Start: 2024-01-09 | End: 2024-01-20 | Stop reason: HOSPADM

## 2024-01-09 RX ORDER — HYDRALAZINE HYDROCHLORIDE 20 MG/ML
10 INJECTION INTRAMUSCULAR; INTRAVENOUS EVERY 4 HOURS PRN
Status: DISCONTINUED | OUTPATIENT
Start: 2024-01-09 | End: 2024-01-20 | Stop reason: HOSPADM

## 2024-01-09 RX ORDER — ALBUTEROL SULFATE 90 UG/1
2 AEROSOL, METERED RESPIRATORY (INHALATION) EVERY 4 HOURS PRN
COMMUNITY

## 2024-01-09 RX ORDER — ONDANSETRON HYDROCHLORIDE 2 MG/ML
4 INJECTION, SOLUTION INTRAVENOUS
Status: COMPLETED | OUTPATIENT
Start: 2024-01-09 | End: 2024-01-09

## 2024-01-09 RX ORDER — INSULIN ASPART 100 [IU]/ML
0-10 INJECTION, SOLUTION INTRAVENOUS; SUBCUTANEOUS EVERY 6 HOURS PRN
Status: DISCONTINUED | OUTPATIENT
Start: 2024-01-09 | End: 2024-01-20 | Stop reason: HOSPADM

## 2024-01-09 RX ORDER — METOPROLOL SUCCINATE 25 MG/1
25 TABLET, EXTENDED RELEASE ORAL DAILY
Status: DISCONTINUED | OUTPATIENT
Start: 2024-01-10 | End: 2024-01-20 | Stop reason: HOSPADM

## 2024-01-09 RX ORDER — SODIUM CHLORIDE, SODIUM LACTATE, POTASSIUM CHLORIDE, CALCIUM CHLORIDE 600; 310; 30; 20 MG/100ML; MG/100ML; MG/100ML; MG/100ML
INJECTION, SOLUTION INTRAVENOUS CONTINUOUS
Status: DISCONTINUED | OUTPATIENT
Start: 2024-01-09 | End: 2024-01-13

## 2024-01-09 RX ORDER — ONDANSETRON HYDROCHLORIDE 2 MG/ML
4 INJECTION, SOLUTION INTRAVENOUS EVERY 4 HOURS PRN
Status: DISCONTINUED | OUTPATIENT
Start: 2024-01-10 | End: 2024-01-20 | Stop reason: HOSPADM

## 2024-01-09 RX ADMIN — SODIUM CHLORIDE, POTASSIUM CHLORIDE, SODIUM LACTATE AND CALCIUM CHLORIDE 1000 ML: 600; 310; 30; 20 INJECTION, SOLUTION INTRAVENOUS at 10:01

## 2024-01-09 RX ADMIN — SODIUM CHLORIDE, POTASSIUM CHLORIDE, SODIUM LACTATE AND CALCIUM CHLORIDE: 600; 310; 30; 20 INJECTION, SOLUTION INTRAVENOUS at 09:01

## 2024-01-09 RX ADMIN — IOPAMIDOL 100 ML: 755 INJECTION, SOLUTION INTRAVENOUS at 06:01

## 2024-01-09 RX ADMIN — MORPHINE SULFATE 4 MG: 4 INJECTION, SOLUTION INTRAMUSCULAR; INTRAVENOUS at 05:01

## 2024-01-09 RX ADMIN — PIPERACILLIN AND TAZOBACTAM 4.5 G: 4; .5 INJECTION, POWDER, LYOPHILIZED, FOR SOLUTION INTRAVENOUS; PARENTERAL at 08:01

## 2024-01-09 RX ADMIN — SODIUM CHLORIDE, POTASSIUM CHLORIDE, SODIUM LACTATE AND CALCIUM CHLORIDE 1000 ML: 600; 310; 30; 20 INJECTION, SOLUTION INTRAVENOUS at 01:01

## 2024-01-09 RX ADMIN — SODIUM CHLORIDE, POTASSIUM CHLORIDE, SODIUM LACTATE AND CALCIUM CHLORIDE 1000 ML: 600; 310; 30; 20 INJECTION, SOLUTION INTRAVENOUS at 05:01

## 2024-01-09 RX ADMIN — ONDANSETRON 4 MG: 2 INJECTION INTRAMUSCULAR; INTRAVENOUS at 10:01

## 2024-01-09 RX ADMIN — ONDANSETRON 4 MG: 2 INJECTION INTRAMUSCULAR; INTRAVENOUS at 05:01

## 2024-01-09 RX ADMIN — ACETAMINOPHEN 650 MG: 325 TABLET, FILM COATED ORAL at 10:01

## 2024-01-09 NOTE — Clinical Note
Diagnosis: Acute renal failure [291927]   Future Attending Provider: ISAAC CORTÉS [[id number]]   Admitting Provider:: ISAAC CORTÉS [[id number]]   Admit to which facility:: [facility] [[id number]]   Reason for IP Medical Treatment  (Clinical interventions that can only be accomplished in the IP setting? ) :: Abdominal pain, N/V/D, pancreatitis, leukocytosis   I certify that Inpatient services for greater than or equal to 2 midnights are medically necessary:: Yes   Plans for Post-Acute care--if anticipated (pick the single best option):: A. No post acute care anticipated at this time

## 2024-01-09 NOTE — ED PROVIDER NOTES
Encounter Date: 1/9/2024    SCRIBE #1 NOTE: I, Mika Amaro, am scribing for, and in the presence of,  Yoseph Pathak MD. I have scribed the following portions of the note - Other sections scribed: HPI, ROS, PE.       History     Chief Complaint   Patient presents with    Diarrhea     Abdominal pain left lower with diarrhea x 2 days, temperature of 100.6 this morning, discharged from hospital last Thursday for pancreatitis     Patient is a 67 y/o male with a history of DM, HTN, HLD, GERD presenting to the ED for evaluation of abdominal pain and diarrhea onset 2-3 days ago. Pt states he is unable to sleep at night and decided to come in because the pain was not going away. Was recently discharged from the hospital 1/4/23 after being admitted 12/16/23 for treatment of choledocholithiasis, pancreatitis. Per RN at bedside, pt is a 40 year smoker and he has been coughing up black contents. He does not drink. Takes aspirin daily.    The history is provided by the patient (and nursing staff). No  was used.     Review of patient's allergies indicates:  No Known Allergies  Past Medical History:   Diagnosis Date    DM (diabetes mellitus)     GERD (gastroesophageal reflux disease)     HLD (hyperlipidemia)     HTN (hypertension)      Past Surgical History:   Procedure Laterality Date    APPENDECTOMY      CHOLECYSTECTOMY      ERCP N/A 12/17/2023    Procedure: ERCP (ENDOSCOPIC RETROGRADE CHOLANGIOPANCREATOGRAPHY);  Surgeon: Flako Kerns MD;  Location: [facility];  Service: Gastroenterology;  Laterality: N/A;    ERCP W/ SPHICTEROTOMY  12/17/2023    Procedure: ERCP, WITH SPHINCTEROTOMY;  Surgeon: Flako Kerns MD;  Location: [facility] OR;  Service: Gastroenterology;;    ERCP, WITH CALCULUS REMOVAL  12/17/2023    Procedure: ERCP, WITH CALCULUS REMOVAL;  Surgeon: Flako Kerns MD;  Location: [facility] OR;  Service: Gastroenterology;;     No family history on file.  Social History     Tobacco Use     Smoking status: Every Day     Current packs/day: 1.00     Types: Cigarettes    Smokeless tobacco: Never   Substance Use Topics    Alcohol use: Never    Drug use: Never     Review of Systems   Constitutional:  Negative for fever.   HENT:  Negative for sore throat.    Eyes:  Negative for visual disturbance.   Respiratory:  Positive for cough. Negative for shortness of breath.    Cardiovascular:  Negative for chest pain.   Gastrointestinal:  Positive for abdominal pain and diarrhea.   Genitourinary:  Negative for dysuria.   Musculoskeletal:  Negative for joint swelling.   Skin:  Negative for rash.   Neurological:  Negative for weakness.   Psychiatric/Behavioral:  Negative for confusion.    All other systems reviewed and are negative.      Physical Exam     Initial Vitals   BP Pulse Resp Temp SpO2   01/09/24 0944 01/09/24 0944 01/09/24 0944 01/09/24 0945 01/09/24 0944   (!) 103/56 (!) 115 20 98.3 °F (36.8 °C) 96 %      MAP       --                Physical Exam    Nursing note and vitals reviewed.  Constitutional: He appears well-developed and well-nourished. He is not diaphoretic. No distress.   HENT:   Head: Normocephalic and atraumatic.   Eyes: Conjunctivae and EOM are normal. Pupils are equal, round, and reactive to light.   Neck:   Normal range of motion.  Cardiovascular:  Normal rate, regular rhythm, normal heart sounds and intact distal pulses.           No murmur heard.  Pulmonary/Chest: Breath sounds normal. No respiratory distress. He has no wheezes. He has no rales.   Abdominal: Abdomen is soft. He exhibits no distension. There is abdominal tenderness (diffuse).   Musculoskeletal:         General: No tenderness or edema. Normal range of motion.      Cervical back: Normal range of motion.     Neurological: He is alert and oriented to person, place, and time. No cranial nerve deficit.   Skin: Skin is warm and dry. Capillary refill takes less than 2 seconds. No rash noted. No erythema.   Psychiatric: He has a  normal mood and affect.         ED Course   Procedures  Labs Reviewed   COMPREHENSIVE METABOLIC PANEL - Abnormal; Notable for the following components:       Result Value    Sodium Level 133 (*)     Chloride 92 (*)     Glucose Level 123 (*)     Blood Urea Nitrogen 27.9 (*)     Creatinine 1.73 (*)     Albumin Level 2.7 (*)     Globulin 4.4 (*)     Albumin/Globulin Ratio 0.6 (*)     Bilirubin Total 1.6 (*)     All other components within normal limits   URINALYSIS, REFLEX TO URINE CULTURE - Abnormal; Notable for the following components:    Color, UA Light-Orange (*)     Appearance, UA Turbid (*)     Protein, UA 1+ (*)     Blood, UA 3+ (*)     Mucous, UA Trace (*)     Hyaline Casts, UA 6-10 (*)     Amorphous Crystal, UA Moderate (*)     RBC, UA 50-99 (*)     All other components within normal limits   LIPASE - Abnormal; Notable for the following components:    Lipase Level 113 (*)     All other components within normal limits   CBC WITH DIFFERENTIAL - Abnormal; Notable for the following components:    WBC 16.86 (*)     RBC 4.43 (*)     Hgb 13.6 (*)     Hct 41.0 (*)     Neut # 13.31 (*)     Mono # 1.70 (*)     IG# 0.12 (*)     All other components within normal limits   POCT GLUCOSE - Abnormal; Notable for the following components:    POCT Glucose 126 (*)     All other components within normal limits   LACTIC ACID, PLASMA - Normal   CBC W/ AUTO DIFFERENTIAL    Narrative:     The following orders were created for panel order CBC Auto Differential.  Procedure                               Abnormality         Status                     ---------                               -----------         ------                     CBC with Differential[[id number]]       Abnormal            Final result                 Please view results for these tests on the individual orders.          Imaging Results              CT Abdomen Pelvis With IV Contrast NO Oral Contrast (Final result)  Result time 01/09/24 19:11:40      Final result  by Kisha Finnegan MD (01/09/24 19:11:40)                   Impression:      1. Continued organization and partial encapsulation of the acute necrotic collection at the body of the pancreas and peripancreatic soft tissues.  No internal foci of gas or hortencia changes of acute super infection by imaging.  Overall not significantly changed in size from prior.  2. Mild improvement of free fluid and resolved pleural effusions.      Electronically signed by: Kisha Finnegan  Date:    01/09/2024  Time:    19:11               Narrative:    EXAMINATION:  CT ABDOMEN PELVIS WITH IV CONTRAST    CLINICAL HISTORY:  LLQ abdominal pain;    TECHNIQUE:  Helically acquired images with axial, sagittal and coronal reformations were obtained from the lung bases to the pubic symphysis after the IV administration of contrast.    Automated tube current modulation, weight-based exposure dosing, and/or iterative reconstruction technique utilized to reach lowest reasonably achievable exposure rate.    DLP: 368 mGy*cm    COMPARISON:  CT abdomen pelvis 12/29/2023, CT abdomen pelvis 12/16/2023    FINDINGS:  Mild motion degraded exam.    HEART: There are coronary artery calcifications.    LUNG BASES: Improved pleural effusions.    LIVER: Normal attenuation. No appreciable focal hepatic lesion.    BILIARY: Gallbladder is surgically absent.    PANCREAS: Similar changes of necrotizing pancreatitis with continued organization of complex fluid collection at the pancreatic neck and body extending toward the uncinate process, duodenum, root of the mesentery and gastro hepatic ligament.    SPLEEN: Normal in size    ADRENALS: No mass.    KIDNEYS/URETERS: The kidneys enhance symmetrically.  No hydronephrosis.    GI TRACT/MESENTERY: Edema at the 2nd and 3rd portion of the duodenum.  Bowel is normal in caliber without evidence of obstruction.  Colonic diverticulosis without acute inflammatory change.    PERITONEUM: There is free intraperitoneal fluid  tracking along the pericolic gutters and layering within the pelvis.  Probable changes of fat necrosis along the pericolic gutters and root of the mesentery.  No free air.    LYMPH NODES: No enlarged lymph nodes by size criteria.    VASCULATURE: Aortoiliac atherosclerosis.  Pancreatic collection encases the proximal portal vein which appears attenuated.  Motion artifact and timing of the contrast bolus mildly limits evaluation.  The portal vein does appear to remain patent.  The splenic vein is patent.    BLADDER: Normal appearance given degree of distention.    REPRODUCTIVE ORGANS: Scrotal hydrocele.    SOFT TISSUES: Unremarkable.    BONES: Grade 1 retrolisthesis of L3 on L4.                                       X-Ray Chest AP Portable (Final result)  Result time 01/09/24 17:05:09      Final result by Kisha Finnegan MD (01/09/24 17:05:09)                   Impression:      No acute cardiopulmonary abnormality.      Electronically signed by: Kisha Finnegan  Date:    01/09/2024  Time:    17:05               Narrative:    EXAMINATION:  XR CHEST AP PORTABLE    CLINICAL HISTORY:  Cough, unspecified    TECHNIQUE:  Single frontal view of the chest was performed.    COMPARISON:  12/23/2023    FINDINGS:  LINES AND TUBES: None    MEDIASTINUM AND WALLY: The cardiac silhouette is normal.    LUNGS: No lobar consolidation. No edema.    PLEURA:No pleural effusion. No pneumothorax.    BONES: No acute osseous abnormality.                                       Medications   Amino acid 4.25% - dextrose 5% (CLINIMIX-E) solution (1L provides 42.5 gm AA, 50 gm CHO (170 kcal/L dextrose), Na 35, K 30, Mg 5, Ca 4.5, Acetate 70, Cl 39, Phos 15) ( Intravenous Stopped 1/17/24 0303)   Amino acid 4.25% - dextrose 5% (CLINIMIX-E) solution (1L provides 42.5 gm AA, 50 gm CHO (170 kcal/L dextrose), Na 35, K 30, Mg 5, Ca 4.5, Acetate 70, Cl 39, Phos 15) ( Intravenous Stopped 1/17/24 1523)   Amino acid 4.25% - dextrose 5% (CLINIMIX-E)  solution (1L provides 42.5 gm AA, 50 gm CHO (170 kcal/L dextrose), Na 35, K 30, Mg 5, Ca 4.5, Acetate 70, Cl 39, Phos 15) ( Intravenous Stopped 1/19/24 1241)   Amino acid 4.25% - dextrose 5% (CLINIMIX-E) solution (1L provides 42.5 gm AA, 50 gm CHO (170 kcal/L dextrose), Na 35, K 30, Mg 5, Ca 4.5, Acetate 70, Cl 39, Phos 15) ( Intravenous Stopped 1/20/24 0726)   ondansetron injection 4 mg (4 mg Intravenous Given 1/9/24 1001)   lactated ringers bolus 1,000 mL (0 mLs Intravenous Stopped 1/9/24 1103)   lactated ringers bolus 1,000 mL (0 mLs Intravenous Stopped 1/9/24 1411)   lactated ringers bolus 1,000 mL (0 mLs Intravenous Stopped 1/9/24 1833)   morphine injection 4 mg (4 mg Intravenous Given 1/9/24 1733)   ondansetron injection 4 mg (4 mg Intravenous Given 1/9/24 1732)   iopamidoL (ISOVUE-370) injection 100 mL (100 mLs Intravenous Given 1/9/24 1856)   piperacillin-tazobactam (ZOSYN) 4.5 g in dextrose 5 % in water (D5W) 100 mL IVPB (MB+) (0 g Intravenous Stopped 1/9/24 2040)   iohexoL (OMNIPAQUE 350) injection 94 mL (94 mLs Intravenous Given 1/14/24 1020)     Medical Decision Making  Problems Addressed:  Acute pancreatitis, unspecified complication status, unspecified pancreatitis type: acute illness or injury that poses a threat to life or bodily functions  Acute renal failure: acute illness or injury that poses a threat to life or bodily functions  Cough: acute illness or injury that poses a threat to life or bodily functions    Amount and/or Complexity of Data Reviewed  External Data Reviewed: notes.     Details: Reviewed recent records including discharge summary  Labs: ordered.  Radiology: ordered.    Risk  Prescription drug management.  Parenteral controlled substances.  Decision regarding hospitalization.            Scribe Attestation:   Scribe #1: I performed the above scribed service and the documentation accurately describes the services I performed. I attest to the accuracy of the note.    Attending  Attestation:           Physician Attestation for Scribe:  Physician Attestation Statement for Scribe #1: I, Yoseph Pathak MD, reviewed documentation, as scribed by Mika Amaro in my presence, and it is both accurate and complete.                        Medical Decision Making:   History:   Old Medical Records: I decided to obtain old medical records.  Old Records Summarized: records from clinic visits, records from previous admission(s) and records from another hospital.       <> Summary of Records: Reviewed recent records, patient discharge summary  Initial Assessment:   Abdominal pain  Differential Diagnosis:   Judging by the patient's chief complaint and pertinent history, the patient has the following possible differential diagnoses, including but not limited to the following.  Some of these are deemed to be lower likelihood and some more likely based on my physical exam and history combined with possible lab work and/or imaging studies.   Please see the pertinent studies, and refer to the HPI.  Some of these diagnoses will take further evaluation to fully rule out, perhaps as an outpatient and the patient was encouraged to follow up when discharged for more comprehensive evaluation.    Pancreatitis, appendicitis, biliary disease, diverticulitis,   mesenteric ischemia, intraabdominal abscess, retroperitoneal abscess, gastritis, gastroenteritis, hepatitis, hernia, pancreatitis, inflammatory bowel disease, PUD,, nephrolithiasis, constipation, GERD, IBS    Clinical Tests:   Lab Tests: Ordered and Reviewed  Radiological Study: Ordered and Reviewed  ED Management:  Patient is a 68-year-old male who presents to the emergency department for abdominal pain, nausea, vomiting, diarrhea.  Reports did have fever this morning.  Labs and imaging obtained.  Pancreatitis noted.  SHA noted.  Given IV fluids, given pain and nausea control.  Suspect likely acute renal injury due to decreased p.o. intake from exacerbation of  pancreatitis.  Discussed case with hospital medicine who will admit the patient.  All results discussed with the patient.  Answered all questions at this time.  Verbalized understanding and agreed to plan.             Clinical Impression:  Final diagnoses:  [R05.9] Cough  [N17.9] Acute renal failure  [K85.90] Acute pancreatitis, unspecified complication status, unspecified pancreatitis type (Primary)  [R11.2, R19.7] Nausea vomiting and diarrhea          ED Disposition Condition    Admit Stable                Yoseph Pathak MD  01/22/24 1331

## 2024-01-09 NOTE — FIRST PROVIDER EVALUATION
Medical screening examination initiated.  I have conducted a focused provider triage encounter, findings are as follows:    Brief history of present illness:  Patient states left lower abdominal pain with nausea and diarrhea. States fever. Patient was discharged from the hospital last week after being admitted for pancreatitis.     There were no vitals filed for this visit.    Pertinent physical exam:  Awake, alert, ambulatory      Brief workup plan:  Labs    Preliminary workup initiated; this workup will be continued and followed by the physician or advanced practice provider that is assigned to the patient when roomed.

## 2024-01-10 LAB
ADV 40+41 DNA STL QL NAA+NON-PROBE: NOT DETECTED
ANION GAP SERPL CALC-SCNC: 8 MEQ/L
ASTRO TYP 1-8 RNA STL QL NAA+NON-PROBE: NOT DETECTED
BASOPHILS # BLD AUTO: 0.03 X10(3)/MCL
BASOPHILS NFR BLD AUTO: 0.3 %
BUN SERPL-MCNC: 18.9 MG/DL (ref 8.4–25.7)
C CAYETANENSIS DNA STL QL NAA+NON-PROBE: NOT DETECTED
C COLI+JEJ+UPSA DNA STL QL NAA+NON-PROBE: NOT DETECTED
C DIFF TOX A+B STL QL IA: NEGATIVE
CALCIUM SERPL-MCNC: 8.5 MG/DL (ref 8.8–10)
CHLORIDE SERPL-SCNC: 98 MMOL/L (ref 98–107)
CLOSTRIDIUM DIFFICILE GDH ANTIGEN (OHS): NEGATIVE
CO2 SERPL-SCNC: 28 MMOL/L (ref 23–31)
CREAT SERPL-MCNC: 1.17 MG/DL (ref 0.73–1.18)
CREAT/UREA NIT SERPL: 16
CRYPTOSP DNA STL QL NAA+NON-PROBE: NOT DETECTED
E HISTOLYT DNA STL QL NAA+NON-PROBE: NOT DETECTED
EAEC PAA PLAS AGGR+AATA ST NAA+NON-PRB: NOT DETECTED
EC STX1+STX2 GENES STL QL NAA+NON-PROBE: NOT DETECTED
EOSINOPHIL # BLD AUTO: 0 X10(3)/MCL (ref 0–0.9)
EOSINOPHIL NFR BLD AUTO: 0 %
EPEC EAE GENE STL QL NAA+NON-PROBE: NOT DETECTED
ERYTHROCYTE [DISTWIDTH] IN BLOOD BY AUTOMATED COUNT: 13.6 % (ref 11.5–17)
ETEC LTA+ST1A+ST1B TOX ST NAA+NON-PROBE: NOT DETECTED
G LAMBLIA DNA STL QL NAA+NON-PROBE: NOT DETECTED
GFR SERPLBLD CREATININE-BSD FMLA CKD-EPI: >60 MLS/MIN/1.73/M2
GLUCOSE SERPL-MCNC: 115 MG/DL (ref 82–115)
HCT VFR BLD AUTO: 34.4 % (ref 42–52)
HGB BLD-MCNC: 11.4 G/DL (ref 14–18)
IMM GRANULOCYTES # BLD AUTO: 0.04 X10(3)/MCL (ref 0–0.04)
IMM GRANULOCYTES NFR BLD AUTO: 0.4 %
LYMPHOCYTES # BLD AUTO: 1 X10(3)/MCL (ref 0.6–4.6)
LYMPHOCYTES NFR BLD AUTO: 10.6 %
MCH RBC QN AUTO: 30.6 PG (ref 27–31)
MCHC RBC AUTO-ENTMCNC: 33.1 G/DL (ref 33–36)
MCV RBC AUTO: 92.2 FL (ref 80–94)
MONOCYTES # BLD AUTO: 1.1 X10(3)/MCL (ref 0.1–1.3)
MONOCYTES NFR BLD AUTO: 11.7 %
NEUTROPHILS # BLD AUTO: 7.25 X10(3)/MCL (ref 2.1–9.2)
NEUTROPHILS NFR BLD AUTO: 77 %
NOROVIRUS GI+II RNA STL QL NAA+NON-PROBE: NOT DETECTED
NRBC BLD AUTO-RTO: 0 %
P SHIGELLOIDES DNA STL QL NAA+NON-PROBE: NOT DETECTED
PLATELET # BLD AUTO: 245 X10(3)/MCL (ref 130–400)
PMV BLD AUTO: 10.1 FL (ref 7.4–10.4)
POCT GLUCOSE: 108 MG/DL (ref 70–110)
POCT GLUCOSE: 117 MG/DL (ref 70–110)
POCT GLUCOSE: 132 MG/DL (ref 70–110)
POCT GLUCOSE: 98 MG/DL (ref 70–110)
POTASSIUM SERPL-SCNC: 3.8 MMOL/L (ref 3.5–5.1)
RBC # BLD AUTO: 3.73 X10(6)/MCL (ref 4.7–6.1)
RVA RNA STL QL NAA+NON-PROBE: NOT DETECTED
S ENT+BONG DNA STL QL NAA+NON-PROBE: NOT DETECTED
SAPO I+II+IV+V RNA STL QL NAA+NON-PROBE: NOT DETECTED
SHIGELLA SP+EIEC IPAH ST NAA+NON-PROBE: NOT DETECTED
SODIUM SERPL-SCNC: 134 MMOL/L (ref 136–145)
TRIGL SERPL-MCNC: 121 MG/DL (ref 34–140)
V CHOL+PARA+VUL DNA STL QL NAA+NON-PROBE: NOT DETECTED
V CHOLERAE DNA STL QL NAA+NON-PROBE: NOT DETECTED
WBC # SPEC AUTO: 9.42 X10(3)/MCL (ref 4.5–11.5)
Y ENTEROCOL DNA STL QL NAA+NON-PROBE: NOT DETECTED

## 2024-01-10 PROCEDURE — 87040 BLOOD CULTURE FOR BACTERIA: CPT | Performed by: INTERNAL MEDICINE

## 2024-01-10 PROCEDURE — 21400001 HC TELEMETRY ROOM

## 2024-01-10 PROCEDURE — 25000003 PHARM REV CODE 250: Performed by: NURSE PRACTITIONER

## 2024-01-10 PROCEDURE — 86318 IA INFECTIOUS AGENT ANTIBODY: CPT | Performed by: STUDENT IN AN ORGANIZED HEALTH CARE EDUCATION/TRAINING PROGRAM

## 2024-01-10 PROCEDURE — 27000207 HC ISOLATION

## 2024-01-10 PROCEDURE — 25000003 PHARM REV CODE 250: Performed by: INTERNAL MEDICINE

## 2024-01-10 PROCEDURE — 63600175 PHARM REV CODE 636 W HCPCS: Performed by: INTERNAL MEDICINE

## 2024-01-10 PROCEDURE — 87507 IADNA-DNA/RNA PROBE TQ 12-25: CPT | Performed by: NURSE PRACTITIONER

## 2024-01-10 PROCEDURE — 85025 COMPLETE CBC W/AUTO DIFF WBC: CPT | Performed by: INTERNAL MEDICINE

## 2024-01-10 PROCEDURE — 82653 EL-1 FECAL QUANTITATIVE: CPT

## 2024-01-10 PROCEDURE — 87045 FECES CULTURE AEROBIC BACT: CPT | Performed by: STUDENT IN AN ORGANIZED HEALTH CARE EDUCATION/TRAINING PROGRAM

## 2024-01-10 PROCEDURE — 63600175 PHARM REV CODE 636 W HCPCS: Mod: JZ,JG | Performed by: NURSE PRACTITIONER

## 2024-01-10 PROCEDURE — 87209 SMEAR COMPLEX STAIN: CPT | Performed by: STUDENT IN AN ORGANIZED HEALTH CARE EDUCATION/TRAINING PROGRAM

## 2024-01-10 PROCEDURE — 80048 BASIC METABOLIC PNL TOTAL CA: CPT | Performed by: INTERNAL MEDICINE

## 2024-01-10 RX ORDER — BACLOFEN 5 MG/1
5 TABLET ORAL 3 TIMES DAILY PRN
Status: DISCONTINUED | OUTPATIENT
Start: 2024-01-10 | End: 2024-01-11

## 2024-01-10 RX ORDER — NAPROXEN SODIUM 220 MG/1
81 TABLET, FILM COATED ORAL DAILY
Status: DISCONTINUED | OUTPATIENT
Start: 2024-01-10 | End: 2024-01-20 | Stop reason: HOSPADM

## 2024-01-10 RX ORDER — ATORVASTATIN CALCIUM 10 MG/1
20 TABLET, FILM COATED ORAL DAILY
Status: DISCONTINUED | OUTPATIENT
Start: 2024-01-10 | End: 2024-01-20 | Stop reason: HOSPADM

## 2024-01-10 RX ORDER — ENOXAPARIN SODIUM 100 MG/ML
40 INJECTION SUBCUTANEOUS EVERY 24 HOURS
Status: DISCONTINUED | OUTPATIENT
Start: 2024-01-10 | End: 2024-01-20 | Stop reason: HOSPADM

## 2024-01-10 RX ORDER — BACLOFEN 5 MG/1
5 TABLET ORAL 3 TIMES DAILY
Status: DISCONTINUED | OUTPATIENT
Start: 2024-01-10 | End: 2024-01-10

## 2024-01-10 RX ORDER — FLUTICASONE PROPIONATE 50 MCG
2 SPRAY, SUSPENSION (ML) NASAL DAILY
Status: DISCONTINUED | OUTPATIENT
Start: 2024-01-10 | End: 2024-01-20 | Stop reason: HOSPADM

## 2024-01-10 RX ORDER — FENOFIBRATE 145 MG/1
145 TABLET, FILM COATED ORAL DAILY
Status: DISCONTINUED | OUTPATIENT
Start: 2024-01-10 | End: 2024-01-20 | Stop reason: HOSPADM

## 2024-01-10 RX ADMIN — METOPROLOL SUCCINATE 25 MG: 25 TABLET, EXTENDED RELEASE ORAL at 08:01

## 2024-01-10 RX ADMIN — MORPHINE SULFATE 2 MG: 4 INJECTION, SOLUTION INTRAMUSCULAR; INTRAVENOUS at 08:01

## 2024-01-10 RX ADMIN — PIPERACILLIN AND TAZOBACTAM 4.5 G: 4; .5 INJECTION, POWDER, LYOPHILIZED, FOR SOLUTION INTRAVENOUS; PARENTERAL at 08:01

## 2024-01-10 RX ADMIN — FENOFIBRATE 145 MG: 145 TABLET, FILM COATED ORAL at 01:01

## 2024-01-10 RX ADMIN — SODIUM CHLORIDE, POTASSIUM CHLORIDE, SODIUM LACTATE AND CALCIUM CHLORIDE: 600; 310; 30; 20 INJECTION, SOLUTION INTRAVENOUS at 08:01

## 2024-01-10 RX ADMIN — ASPIRIN 81 MG CHEWABLE TABLET 81 MG: 81 TABLET CHEWABLE at 01:01

## 2024-01-10 RX ADMIN — MORPHINE SULFATE 2 MG: 4 INJECTION, SOLUTION INTRAMUSCULAR; INTRAVENOUS at 04:01

## 2024-01-10 RX ADMIN — MORPHINE SULFATE 2 MG: 4 INJECTION, SOLUTION INTRAMUSCULAR; INTRAVENOUS at 03:01

## 2024-01-10 RX ADMIN — ACETAMINOPHEN 650 MG: 325 TABLET, FILM COATED ORAL at 03:01

## 2024-01-10 RX ADMIN — MEROPENEM 1 G: 1 INJECTION, POWDER, FOR SOLUTION INTRAVENOUS at 01:01

## 2024-01-10 RX ADMIN — ATORVASTATIN CALCIUM 20 MG: 10 TABLET, FILM COATED ORAL at 01:01

## 2024-01-10 RX ADMIN — ENOXAPARIN SODIUM 40 MG: 100 INJECTION SUBCUTANEOUS at 04:01

## 2024-01-10 RX ADMIN — AMLODIPINE BESYLATE 10 MG: 5 TABLET ORAL at 08:01

## 2024-01-10 RX ADMIN — MEROPENEM 1 G: 1 INJECTION, POWDER, FOR SOLUTION INTRAVENOUS at 09:01

## 2024-01-10 RX ADMIN — SODIUM CHLORIDE, POTASSIUM CHLORIDE, SODIUM LACTATE AND CALCIUM CHLORIDE: 600; 310; 30; 20 INJECTION, SOLUTION INTRAVENOUS at 03:01

## 2024-01-10 RX ADMIN — MORPHINE SULFATE 2 MG: 4 INJECTION, SOLUTION INTRAMUSCULAR; INTRAVENOUS at 09:01

## 2024-01-10 RX ADMIN — ACETAMINOPHEN 650 MG: 325 TABLET, FILM COATED ORAL at 08:01

## 2024-01-10 NOTE — H&P
[facility] Hospital Medicine - H&P Note    Patient Name: Franklin Macias  : 1955  MRN: [id number]  PCP: Snehal Arroyo MD  Admitting Physician: JEFF Cole MD  Admission Class: IP- Inpatient   Code status: Full    Allergies   Patient has no known allergies.    Chief Complaint   Abdominal pain, vomiting, diarrhea    History of Present Illness   68 yr old male whose history includes HTN, DM and pancreatitis. Presented to ED with c/o 3 day history of epigastric abdominal pain, vomiting, and diarrhea. Temp 100.6 this morning.     Discharged from here on 23 following a 19 day admission during which time he was treated for acute necrotizing pancreatitis secondary to choledocholithiasis, severe sepsis (only one of two blood cultures grew E. Faecalis and repeat blood cultures negative), SHA on CKD, and acute hypoxemic respiratory failure. Gallbladder removed approximately 1.5 years ago. Underwent ERCP  with stone and sludge removal and sphincterotomy. Choledocholithiasis was resolved.  CT abdomen repeated  which showed  worsened appearance pancreatitis with possible collection developing and small volume ascites, small bilateral pleural effusions. Responded favorably to diuresis. Prior to discharge he was weaned off oxygen and tolerating a diet. Labs at discharge: WBC 5.67, BUN 11, creatinine 0.86 and lipase 23.      VS on arrival: T 98.3, P 115, R 20, B/P 103/56, sats 96% on room air. Initial labs: wBC 16.8, Na 133, BUN 27.9, creatinine 1.73, glucose 123, lipase 113 and otherwise unremarkable.  Chest x-ray negative for acute findings.  CT abdomen and pelvis with contrast shows continued organization and partial encapsulation of the acute necrotic collection at the body of the pancreas and peripancreatic soft tissues.  No internal foci of gas or hortencia changes of acute superinfection.  Overall not significantly changed in size from prior.  Mild improvement of free fluid  and resolved pleural effusions.    ROS   Except as documented, all other systems reviewed and negative     Past Medical History   Diabetes mellitus, type 2  Hypertension  HFpEF, diastolic dysfunction grade I, LVEF 55-60% - ECHO 12/20/23  Dyslipidemia  GERD  Pancreatitis    Past Surgical History   Appendectomy  Cholecystectomy  ERCP with CBD stone extraction and sphincterotomy - 12/17/23      Social History   Smokes a PPD for last 40 years. Denies alcohol or illicit drug use.       Family History   Reviewed and negative    Home Medications     Prior to Admission medications    Medication Sig Start Date End Date Taking? Authorizing Provider   amlodipine-benazepril 10-20mg (LOTREL) 10-20 mg per capsule Take 1 capsule by mouth once daily. 10/19/23   Snehal Arroyo MD   aspirin 81 MG Chew Take 81 mg by mouth once daily.    Provider, Historical   atorvastatin (LIPITOR) 20 MG tablet Take 1 tablet (20 mg total) by mouth once daily. 10/19/23   Snehal Arroyo MD   ergocalciferol (ERGOCALCIFEROL) 50,000 unit Cap Take 1 capsule (50,000 Units total) by mouth every 7 days. 9/25/23   Snehal Arroyo MD   fenofibrate (TRICOR) 145 MG tablet See Instructions, TAKE 1 TABLET EVERY DAY 10/10/23   Snehal Arroyo MD   fluticasone propionate (FLONASE) 50 mcg/actuation nasal spray 2 sprays (100 mcg total) by Each Nostril route once daily. 8/8/23   Snehal Arroyo MD   furosemide (LASIX) 40 MG tablet Take 1 tablet (40 mg total) by mouth once daily. 1/4/24   Felipe Casas MD   glimepiride (AMARYL) 2 MG tablet Take 1 tablet (2 mg total) by mouth once daily. 10/19/23   Snehal Arroyo MD   metoprolol succinate (TOPROL-XL) 25 MG 24 hr tablet Take 1 tablet (25 mg total) by mouth once daily. 10/19/23   Snehal Aroryo MD   ondansetron (ZOFRAN) 4 MG tablet Take 1 tablet (4 mg total) by mouth every 8 (eight) hours as needed for Nausea. 1/4/24   Felipe Casas MD   pantoprazole (PROTONIX) 40 MG tablet Take 1 tablet (40 mg total) by  "mouth 2 (two) times daily. 1/4/24   Felipe Casas MD   polyethylene glycol (GLYCOLAX) 17 gram PwPk Take 17 g by mouth 2 (two) times daily as needed for Constipation ((Second Choice)). 1/4/24   Felipe Casas MD    Albuterol sulfate HFA 90 mcg 2 puffs q 4 hr PRN  Omeprazole 20 mg daily  Physical Exam   Vital Signs  Temp:  [98.3 °F (36.8 °C)]   Pulse:  [103-122]   Resp:  [18-20]   BP: (103-126)/(56-77)   SpO2:  [95 %-97 %]    General: Appears comfortable  HEENT: NC/AT  Neck:  No JVD  Chest: CTABL  CVS: Regular rhythm. Normal S1/S2.  Abdomen: nondistended, normoactive BS, soft and non-tender.  MSK: No obvious deformity or joint swelling  Skin: Warm and dry  Neuro: AAOx3, no focal neurological deficit  Psych: Cooperative    Labs     Recent Labs     01/09/24  1008   WBC 16.86*   RBC 4.43*   HGB 13.6*   HCT 41.0*   MCV 92.6   MCH 30.7   MCHC 33.2   RDW 13.7        No results for input(s): "PROTIME", "INR", "PTT", "D-DIMER", "FERRITIN", "IRON", "TRANS", "TIBC", "LABIRON", "LBWRZIJU12", "FOLATE", "LDH", "HAPTOGLOBIN", "RETICCNTAUTO", "RETABS", "PERIPSMEAREV" in the last 72 hours.   Recent Labs     01/09/24  1008   *   K 3.7   CHLORIDE 92*   CO2 26   BUN 27.9*   CREATININE 1.73*   EGFRNORACEVR 43   GLUCOSE 123*   CALCIUM 9.0   ALBUMIN 2.7*   GLOBULIN 4.4*   ALKPHOS 112   ALT 20   AST 26   BILITOT 1.6*   LIPASE 113*     Recent Labs     01/09/24  1008   LACTIC 1.8     No results for input(s): "CPK", "TROPONINI" in the last 72 hours.     Microbiology Results (last 7 days)       Procedure Component Value Units Date/Time    Stool Culture **CANNOT BE ORDERED STAT** [[id number]]     Order Status: Sent Specimen: Stool     Clostridium Diff Toxin, A & B, EIA [[id number]]     Order Status: Sent Specimen: Stool     Blood Culture [[id number]] Collected: 01/09/24 1008    Order Status: Resulted Specimen: Blood Updated: 01/09/24 1016    Blood Culture [[id number]] Collected: 01/09/24 1008    Order Status: Resulted Specimen: Blood " Updated: 01/09/24 1016           Imaging   CT Abdomen Pelvis With IV Contrast NO Oral Contrast  Narrative: EXAMINATION:  CT ABDOMEN PELVIS WITH IV CONTRAST    CLINICAL HISTORY:  LLQ abdominal pain;    TECHNIQUE:  Helically acquired images with axial, sagittal and coronal reformations were obtained from the lung bases to the pubic symphysis after the IV administration of contrast.    Automated tube current modulation, weight-based exposure dosing, and/or iterative reconstruction technique utilized to reach lowest reasonably achievable exposure rate.    DLP: 368 mGy*cm    COMPARISON:  CT abdomen pelvis 12/29/2023, CT abdomen pelvis 12/16/2023    FINDINGS:  Mild motion degraded exam.    HEART: There are coronary artery calcifications.    LUNG BASES: Improved pleural effusions.    LIVER: Normal attenuation. No appreciable focal hepatic lesion.    BILIARY: Gallbladder is surgically absent.    PANCREAS: Similar changes of necrotizing pancreatitis with continued organization of complex fluid collection at the pancreatic neck and body extending toward the uncinate process, duodenum, root of the mesentery and gastro hepatic ligament.    SPLEEN: Normal in size    ADRENALS: No mass.    KIDNEYS/URETERS: The kidneys enhance symmetrically.  No hydronephrosis.    GI TRACT/MESENTERY: Edema at the 2nd and 3rd portion of the duodenum.  Bowel is normal in caliber without evidence of obstruction.  Colonic diverticulosis without acute inflammatory change.    PERITONEUM: There is free intraperitoneal fluid tracking along the pericolic gutters and layering within the pelvis.  Probable changes of fat necrosis along the pericolic gutters and root of the mesentery.  No free air.    LYMPH NODES: No enlarged lymph nodes by size criteria.    VASCULATURE: Aortoiliac atherosclerosis.  Pancreatic collection encases the proximal portal vein which appears attenuated.  Motion artifact and timing of the contrast bolus mildly limits evaluation.  The  portal vein does appear to remain patent.  The splenic vein is patent.    BLADDER: Normal appearance given degree of distention.    REPRODUCTIVE ORGANS: Scrotal hydrocele.    SOFT TISSUES: Unremarkable.    BONES: Grade 1 retrolisthesis of L3 on L4.  Impression: 1. Continued organization and partial encapsulation of the acute necrotic collection at the body of the pancreas and peripancreatic soft tissues.  No internal foci of gas or hortencia changes of acute super infection by imaging.  Overall not significantly changed in size from prior.  2. Mild improvement of free fluid and resolved pleural effusions.    Electronically signed by: Kisha Finnegan  Date:    01/09/2024  Time:    19:11  X-Ray Chest AP Portable  Narrative: EXAMINATION:  XR CHEST AP PORTABLE    CLINICAL HISTORY:  Cough, unspecified    TECHNIQUE:  Single frontal view of the chest was performed.    COMPARISON:  12/23/2023    FINDINGS:  LINES AND TUBES: None    MEDIASTINUM AND WALLY: The cardiac silhouette is normal.    LUNGS: No lobar consolidation. No edema.    PLEURA:No pleural effusion. No pneumothorax.    BONES: No acute osseous abnormality.  Impression: No acute cardiopulmonary abnormality.    Electronically signed by: Kisha Finnegan  Date:    01/09/2024  Time:    17:05      ECHO 12/20/23      Left Ventricle: The left ventricle is normal in size. Normal wall thickness. Normal wall motion. There is normal systolic function with a visually estimated ejection fraction of 55 - 60%. Grade I diastolic dysfunction.    Right Ventricle: Normal right ventricular cavity size. Systolic function is normal.    Aortic Valve: The aortic valve is a trileaflet valve.    Pericardium: There is a trivial effusion. No indication of cardiac tamponade.    Technically difficult study due to lung interference.    Assessment & Plan     Abdominal pain secondary to mild acute on chronic pancreatitis  - Keep NPO  - LR at 125ml/hr  - F/U on blood cultures  - continue zosyn  -  Morphine PRN   - GI consult  - Triglycerides pending    Acute gastroenteritis  - Stool for GI panel, C. Diff and culture pending    SHA   - continue IVF and avoid nephrotoxic meds  - labs in AM    Diabetes mellitus, type 2  - Hba1c = 6.2 on 12/17/23  - CBGs q 6 hr with SS    Hypertension   - home toprol and norvasc resumed  - will hold ACE for now    VTE Prophylaxis: Kenn TOLLIVER, Karla Romano, FNP-BC have discussed this patients case with Dr. Cole who agrees with the diagnosis and treatment plan.        ________________________________________________________________________  I, Dr. Mukund Cole assumed care of this patient.  For the patient encounter, I performed the substantive portion of the visit, I reviewed the NPPA documentation, treatment plan, and medical decision making.  I had face to face time with this patient.  I have personally reviewed the labs and test results that are presently available. I have reviewed or attempted to review medical records based upon their availability. If patient was admitted under observational status it is with my approval.      68-year-old male with a recent admission last month with necrotizing pancreatitis in the setting of choledocholithiasis.  He was septic during that admission and ultimately underwent ERCP with stone and sludge removal with sphincterotomy.  He developed an organized area of necrosis along pancreatic neck/body.  Plan at discharge was for a repeat CT abdomen at 3 months to make sure there is no development of abscess and to determine if he would benefit from endoscopic draining of his organize necrotizing pancreatic fluid collection.      Tonight he presents mostly with complaints of severe diarrhea but also with some nausea and vomiting and epigastric abdominal pain.  He has mild tenderness to deep palpation in the epigastric region, otherwise abdomen is benign.  Repeat imaging shows stable continued organization/partially encapsulation of  necrotic pancreas without internal foci/gas to suggest superimposed infection and unchanged from prior imaging.    Given elevated lipase, will place NPO and administer IV fluids and analgesics.  Obtain stool studies, supportive care overnight.  If no nausea and vomiting tonight can attempt to advance diet in the morning.      Time seen:  11:00 p.m. 01/09/2024  Mukund Cole MD

## 2024-01-10 NOTE — CONSULTS
"Gastroenterology Consultation Note    Reason for Consult:  pancreatitis    PCP:   Snehal Arroyo MD    Referring MD:  Mukund Cole MD    Hospital Day: 1     Initial History of Present Illness (HPI):  This is a 68 y.o. male known to Dr. Flako Kerns from recent admission with PMH of T2DM, HTN, HLD, chronic scrotal hydrocele, vitamin D deficiency, CKD 3a, necrotizing pancreatitis 2/2 gallstones s/p ERCP 12/17/23, cholecystectomy 2022.     Patient admitted to [facility] 12/16/23 for choledocholithiasis. Initial blood cultures on 12/16/2023 had 1 of 2 anaerobic bottles positive for enterococcus facialis - treated with broad spectrum antibiotics. Our team was consulted and ERCP was performed:  ERCP 12/17/23 Dr. Kerns: lower third of main bile duct mildly dilated with a stone causing an obstruction; complete removal of choledocholithaisis accomplished with biliary sphincterotomy and balloon extraction  Post procedure patient had abd pain 2/2 pancreatitis. He slowly improved with supportive care. GI signed off 12/21/23.    GI reconsulted 12/23/23 for worsening pancreatitis and concern for necrosis around the neck of the pancreas as seen on imaging. We recommended surgery consult - they recommended repeat imaging in a week for reassessment.    CT abd/pel with IV contrast 12/29: necrotic pancreatitis with possible organizing collection anterior and superior to pancreatic neck and body measuring approximately 8 x 4 cm.   We recommended repeat CT in 2 weeks to reassess developing abscess and determine ability to drain from endoscopic perspective.     Dr. Oseguera evaluated patient 12/30/23:   "I personally reviewed the CT scan report and images from 12/27/2023 that showed no necrotizing pancreatitis involving the pancreatic neck and proximal/mid body.  There is no organized collection that requires drainage nor is there significant extrinsic compression of the stomach or small bowel."  He recommended maximizing " patient's nutrition with high protein/low fat food with 3-4 Boosts daily, ambulation, completion of antibiotics with no role for ongoing antibiotics for a pancreatitis standpoint, repeat CT abd in 4-6 weeks with outpatient follow up in GI clinic.  Patient was discharged home 01/04/24.    He presented to the ED 01/09/24 with epigastric pain, n/v/d x3 days. He reported a fever onset day of presentation. Upon arrival he was slightly hypotensive with /56. He was febrile with Tmax 100.5F. Labs revealed WBC 16.86, Tbili 1.6, lipase 113, triglycerides 121, glucose 126, GI panel negative, stool for C diff pending.   CT abd/pel with IV: Continued organization and partial encapsulation of the acute necrotic collection at the body of the pancreas and peripancreatic soft tissues.  No internal foci of gas or hortencia changes of acute super infection by imaging.  Overall not significantly changed in size from prior.   IV zosyn and LR initiated. GI consulted for pancreatitis.    Patient resting in bed, wife at bedside. He states that he did well for a few days after discharge; however he began having abd pain and diarrhea with weakness a few days prior to current admission. He denies overt GI bleeding - stool has been yellow and brown. Denies vomiting but admits to some gagging which he attributes to long term tobacco use. He began having hiccups around the time of symptom onset - he had previously had hiccups during last hospitalization. He is very thirsty and asks for ice water multiple times. He states at home he drinks lots of water and some coca cola, denies ETOH use recently but admits to a remote history of such.    ROS:  Review of Systems   Constitutional:  Positive for malaise/fatigue and weight loss.   Respiratory:  Negative for cough and shortness of breath.    Cardiovascular:  Negative for chest pain.   Gastrointestinal:  Positive for abdominal pain and diarrhea. Negative for blood in stool, constipation, melena,  nausea and vomiting.   Neurological:  Negative for weakness.       Medical History:   Past Medical History:   Diagnosis Date    DM (diabetes mellitus)     GERD (gastroesophageal reflux disease)     HLD (hyperlipidemia)     HTN (hypertension)        Surgical History:   Past Surgical History:   Procedure Laterality Date    APPENDECTOMY      CHOLECYSTECTOMY      ERCP N/A 12/17/2023    Procedure: ERCP (ENDOSCOPIC RETROGRADE CHOLANGIOPANCREATOGRAPHY);  Surgeon: Flako Kerns MD;  Location: [facility];  Service: Gastroenterology;  Laterality: N/A;    ERCP W/ SPHICTEROTOMY  12/17/2023    Procedure: ERCP, WITH SPHINCTEROTOMY;  Surgeon: Flako Kerns MD;  Location: [facility] OR;  Service: Gastroenterology;;    ERCP, WITH CALCULUS REMOVAL  12/17/2023    Procedure: ERCP, WITH CALCULUS REMOVAL;  Surgeon: Flako Kerns MD;  Location: [facility];  Service: Gastroenterology;;       Family History:   No family history on file..     Social History:   Social History     Tobacco Use    Smoking status: Every Day     Current packs/day: 1.00     Types: Cigarettes    Smokeless tobacco: Never   Substance Use Topics    Alcohol use: Never       Allergies:  Review of patient's allergies indicates:  No Known Allergies    Medications Prior to Admission   Medication Sig Dispense Refill Last Dose    albuterol (PROVENTIL/VENTOLIN HFA) 90 mcg/actuation inhaler Inhale 2 puffs into the lungs every 4 (four) hours as needed for Wheezing. Rescue       amlodipine-benazepril 10-20mg (LOTREL) 10-20 mg per capsule Take 1 capsule by mouth once daily. 90 capsule 3     aspirin 81 MG Chew Take 81 mg by mouth once daily.       atorvastatin (LIPITOR) 20 MG tablet Take 1 tablet (20 mg total) by mouth once daily. 90 tablet 3     ergocalciferol (ERGOCALCIFEROL) 50,000 unit Cap Take 1 capsule (50,000 Units total) by mouth every 7 days. 30 capsule 0     fenofibrate (TRICOR) 145 MG tablet See Instructions, TAKE 1 TABLET EVERY DAY, # 90 tab(s), 3  "Refill(s), Pharmacy: [facility], 168, cm, Height/Length Dosing, 11/11/21 9:32:00 CST, 73.75, kg, Weight Dosing, 11/11/21 9:32:00 CST Strength: 145 mg 90 tablet 3     fluticasone propionate (FLONASE) 50 mcg/actuation nasal spray 2 sprays (100 mcg total) by Each Nostril route once daily. 16 g 11     furosemide (LASIX) 40 MG tablet Take 1 tablet (40 mg total) by mouth once daily. 30 tablet 1     glimepiride (AMARYL) 2 MG tablet Take 1 tablet (2 mg total) by mouth once daily. 90 tablet 3     metoprolol succinate (TOPROL-XL) 25 MG 24 hr tablet Take 1 tablet (25 mg total) by mouth once daily. 90 tablet 3     omeprazole (PRILOSEC OTC) 20 MG tablet Take 20 mg by mouth once daily.       ondansetron (ZOFRAN) 4 MG tablet Take 1 tablet (4 mg total) by mouth every 8 (eight) hours as needed for Nausea. 12 tablet 0     pantoprazole (PROTONIX) 40 MG tablet Take 1 tablet (40 mg total) by mouth 2 (two) times daily. 60 tablet 3     polyethylene glycol (GLYCOLAX) 17 gram PwPk Take 17 g by mouth 2 (two) times daily as needed for Constipation ((Second Choice)).  0          Objective Findings:    Vital Signs:  /76   Pulse (!) 114   Temp 99.8 °F (37.7 °C)   Resp 18   Ht 5' 6" (1.676 m)   Wt 59 kg (130 lb)   SpO2 95%   BMI 20.98 kg/m²   Body mass index is 20.98 kg/m².    Physical Exam:  Physical Exam  Constitutional:       General: He is not in acute distress.     Appearance: He is normal weight. He is not ill-appearing.   HENT:      Head: Normocephalic and atraumatic.      Right Ear: External ear normal.      Left Ear: External ear normal.      Nose: Nose normal.      Mouth/Throat:      Pharynx: Oropharynx is clear.   Eyes:      General: No scleral icterus.     Conjunctiva/sclera: Conjunctivae normal.   Pulmonary:      Effort: Pulmonary effort is normal. No respiratory distress.   Abdominal:      General: There is no distension.      Palpations: Abdomen is soft.      Tenderness: There is no abdominal " tenderness. There is no guarding.   Musculoskeletal:         General: Normal range of motion.      Cervical back: Normal range of motion.   Skin:     General: Skin is warm and dry.      Coloration: Skin is not jaundiced or pale.   Neurological:      Mental Status: He is alert and oriented to person, place, and time. Mental status is at baseline.   Psychiatric:         Mood and Affect: Mood normal.         Behavior: Behavior normal.         Thought Content: Thought content normal.         Labs:  Recent Results (from the past 24 hour(s))   Comprehensive Metabolic Panel    Collection Time: 01/09/24 10:08 AM   Result Value Ref Range    Sodium Level 133 (L) 136 - 145 mmol/L    Potassium Level 3.7 3.5 - 5.1 mmol/L    Chloride 92 (L) 98 - 107 mmol/L    Carbon Dioxide 26 23 - 31 mmol/L    Glucose Level 123 (H) 82 - 115 mg/dL    Blood Urea Nitrogen 27.9 (H) 8.4 - 25.7 mg/dL    Creatinine 1.73 (H) 0.73 - 1.18 mg/dL    Calcium Level Total 9.0 8.8 - 10.0 mg/dL    Protein Total 7.1 5.8 - 7.6 gm/dL    Albumin Level 2.7 (L) 3.4 - 4.8 g/dL    Globulin 4.4 (H) 2.4 - 3.5 gm/dL    Albumin/Globulin Ratio 0.6 (L) 1.1 - 2.0 ratio    Bilirubin Total 1.6 (H) <=1.5 mg/dL    Alkaline Phosphatase 112 40 - 150 unit/L    Alanine Aminotransferase 20 0 - 55 unit/L    Aspartate Aminotransferase 26 5 - 34 unit/L    eGFR 43 mls/min/1.73/m2   Lipase    Collection Time: 01/09/24 10:08 AM   Result Value Ref Range    Lipase Level 113 (H) <=60 U/L   CBC with Differential    Collection Time: 01/09/24 10:08 AM   Result Value Ref Range    WBC 16.86 (H) 4.50 - 11.50 x10(3)/mcL    RBC 4.43 (L) 4.70 - 6.10 x10(6)/mcL    Hgb 13.6 (L) 14.0 - 18.0 g/dL    Hct 41.0 (L) 42.0 - 52.0 %    MCV 92.6 80.0 - 94.0 fL    MCH 30.7 27.0 - 31.0 pg    MCHC 33.2 33.0 - 36.0 g/dL    RDW 13.7 11.5 - 17.0 %    Platelet 356 130 - 400 x10(3)/mcL    MPV 10.0 7.4 - 10.4 fL    Neut % 78.9 %    Lymph % 9.7 %    Mono % 10.1 %    Eos % 0.2 %    Basophil % 0.4 %    Lymph # 1.63 0.6 - 4.6  x10(3)/mcL    Neut # 13.31 (H) 2.1 - 9.2 x10(3)/mcL    Mono # 1.70 (H) 0.1 - 1.3 x10(3)/mcL    Eos # 0.04 0 - 0.9 x10(3)/mcL    Baso # 0.06 <=0.2 x10(3)/mcL    IG# 0.12 (H) 0 - 0.04 x10(3)/mcL    IG% 0.7 %    NRBC% 0.0 %   Lactic Acid, Plasma    Collection Time: 01/09/24 10:08 AM   Result Value Ref Range    Lactic Acid Level 1.8 0.5 - 2.2 mmol/L   Triglycerides    Collection Time: 01/09/24 10:08 AM   Result Value Ref Range    Triglyceride 121 34 - 140 mg/dL   POCT glucose    Collection Time: 01/09/24 12:49 PM   Result Value Ref Range    POCT Glucose 126 (H) 70 - 110 mg/dL   Urinalysis, Reflex to Urine Culture    Collection Time: 01/09/24 12:55 PM    Specimen: Urine   Result Value Ref Range    Color, UA Light-Orange (A) Yellow, Light-Yellow, Colorless, Straw, Dark-Yellow    Appearance, UA Turbid (A) Clear    Specific Gravity, UA 1.019 1.005 - 1.030    pH, UA 5.0 5.0 - 8.5    Protein, UA 1+ (A) Negative    Glucose, UA Normal Negative, Normal    Ketones, UA Negative Negative    Blood, UA 3+ (A) Negative    Bilirubin, UA Negative Negative    Urobilinogen, UA Normal 0.2, 1.0, Normal    Nitrites, UA Negative Negative    Leukocyte Esterase, UA Negative Negative    WBC, UA 0-5 None Seen, 0-2, 3-5, 0-5 /HPF    Bacteria, UA None Seen None Seen, Trace /HPF    Squamous Epithelial Cells, UA None Seen None Seen /HPF    Mucous, UA Trace (A) None Seen /LPF    Hyaline Casts, UA 6-10 (A) None Seen /lpf    Amorphous Crystal, UA Moderate (A) None Seen /uL    RBC, UA 50-99 (A) None Seen, 0-2, 3-5, 0-5 /HPF   POCT Glucose, Hand-Held Device    Collection Time: 01/09/24  1:40 PM   Result Value Ref Range    POC Glucose 126 (A) 70 - 110 MG/DL   GI Panel    Collection Time: 01/10/24  3:40 AM   Result Value Ref Range    CAMPYLOBACTER Not Detected Not Detected    PLESIOMONAS SHIGELLOIDES Not Detected Not Detected    SALMONELLA Not Detected Not Detected    Vibrio Not Detected Not Detected    YERSINIA ENTEROCOLITICA Not Detected Not Detected     ENTEROAGGREGATIVE E. COLI (EAEC) Not Detected Not Detected    ENTEROPATHOGENIC E. COLI (EPEC) Not Detected Not Detected    Enterotoxigenic E. coli (ETEC) Not Detected Not Detected    SHIGA-LIKE TOXIN-PRODUCING E. COLI (STEC) Not Detected Not Detected    Shigella/Enteroinvasive E. coli (EIEC) Not Detected Not Detected    CRYPTOSPORIDIUM Not Detected Not Detected    Cycolospora cayetanensis Not Detected Not Detected    Entamoeba histolytica Not Detected Not Detected    Giardia lamblia Not Detected Not Detected    Adenovirus F 40/41 Not Detected Not Detected    Astrovirus Not Detected Not Detected    Norovirus GI/GII Not Detected Not Detected    Rotavirus A Not Detected Not Detected    Sapovirus Not Detected Not Detected    VIBRIO CHOLERAE Not Detected Not Detected   POCT glucose    Collection Time: 01/10/24  5:09 AM   Result Value Ref Range    POCT Glucose 132 (H) 70 - 110 mg/dL   CBC with Differential    Collection Time: 01/10/24  7:15 AM   Result Value Ref Range    WBC 9.42 4.50 - 11.50 x10(3)/mcL    RBC 3.73 (L) 4.70 - 6.10 x10(6)/mcL    Hgb 11.4 (L) 14.0 - 18.0 g/dL    Hct 34.4 (L) 42.0 - 52.0 %    MCV 92.2 80.0 - 94.0 fL    MCH 30.6 27.0 - 31.0 pg    MCHC 33.1 33.0 - 36.0 g/dL    RDW 13.6 11.5 - 17.0 %    Platelet 245 130 - 400 x10(3)/mcL    MPV 10.1 7.4 - 10.4 fL    Neut % 77.0 %    Lymph % 10.6 %    Mono % 11.7 %    Eos % 0.0 %    Basophil % 0.3 %    Lymph # 1.00 0.6 - 4.6 x10(3)/mcL    Neut # 7.25 2.1 - 9.2 x10(3)/mcL    Mono # 1.10 0.1 - 1.3 x10(3)/mcL    Eos # 0.00 0 - 0.9 x10(3)/mcL    Baso # 0.03 <=0.2 x10(3)/mcL    IG# 0.04 0 - 0.04 x10(3)/mcL    IG% 0.4 %    NRBC% 0.0 %       CT Abdomen Pelvis With IV Contrast NO Oral Contrast   Final Result      1. Continued organization and partial encapsulation of the acute necrotic collection at the body of the pancreas and peripancreatic soft tissues.  No internal foci of gas or hortencia changes of acute super infection by imaging.  Overall not significantly changed in  size from prior.   2. Mild improvement of free fluid and resolved pleural effusions.         Electronically signed by: Kisha Finnegan   Date:    01/09/2024   Time:    19:11      X-Ray Chest AP Portable   Final Result      No acute cardiopulmonary abnormality.         Electronically signed by: Kisha Finnegan   Date:    01/09/2024   Time:    17:05            Assessment/Plan:  This is a 68 y.o. male known to Dr. Flako Kerns from recent admission with PMH of T2DM, HTN, HLD, chronic scrotal hydrocele, vitamin D deficiency, CKD 3a, necrotizing pancreatitis 2/2 gallstones s/p ERCP 12/17/23, cholecystectomy 2022.     Patient admitted to [facility] 12/16/23 for choledocholithiasis requiring ERCP for stone extraction. Post procedure his hospitalization was complicated by necrotizing pancreatitis with possible collection near pancreas. No endoscopic intervention indicated at that time. Patient discharged home 01/04/24.    He presented to the ED 01/09/24 with epigastric pain, n/v/d x3 days. He reported a fever onset day of presentation. GI consulted for pancreatitis.    Pancreatitis, acute on chronic  - supportive care  - ok for clear liquid trial today    Necrotic collection at body of pancreas  - CT abd/pel with IV contrast 12/29: necrotic pancreatitis with possible organizing collection anterior and superior to pancreatic neck and body measuring approximately 8 x 4 cm  - Dr. Oseguera evaluated patient at that time - no organized collection that required drainage; he recommended maximizing nutrition with increased protein intake and repeat CT abd in 4-6 weeks with follow up in GI clinic  - CT abd/pel with IV 01/09/24: Continued organization and partial encapsulation of the acute necrotic collection at the body of the pancreas and peripancreatic soft tissues.  No internal foci of gas or hortencia changes of acute super infection by imaging.  Overall not significantly changed in size from prior.     Diarrhea  - GI panel  negative  - stool for C diff pending  - will order pancreatic elastase    4. Baclofen  - renal indices wnl. D/w nephrology NP - ok to give baclofen as long as renal function is normal    - will discuss further with Dr. Turner    Thank you for allowing us to participate in the care of Franklin Macias.    Oneida Ellison PAAfshanC  Gastroenterology  [facility]

## 2024-01-10 NOTE — NURSING
Nurses Note -- 4 Eyes      1/9/2024   10:30 PM      Skin assessed during: Admit      [x] No Altered Skin Integrity Present    []Prevention Measures Documented      [] Yes- Altered Skin Integrity Present or Discovered   [] LDA Added if Not in Epic (Describe Wound)   [] New Altered Skin Integrity was Present on Admit and Documented in LDA   [] Wound Image Taken    Wound Care Consulted? No    Attending Nurse:  Antionette Harrell RN/Staff Member:   Karen Chou

## 2024-01-10 NOTE — PLAN OF CARE
Discharge planning with patient and his wife at baseline is independent of ADLS and is able to drive to his own appointments. Patient is medication compliant usually but states that he has lost a lot of weight recently and had a change in his blood pressure medicine and it is running low. He does not quite trust the blood pressure medicine skipped a few days but did resume taking it.

## 2024-01-10 NOTE — PLAN OF CARE
01/10/24 1130   Discharge Assessment   Assessment Type Discharge Planning Assessment   Confirmed/corrected address, phone number and insurance Yes   Confirmed Demographics Correct on Facesheet   Source of Information patient   When was your last doctors appointment? 01/04/24   Communicated CARMITA with patient/caregiver Date not available/Unable to determine   Reason For Admission cough, ARF   People in Home spouse   Do you expect to return to your current living situation? Yes   Do you have help at home or someone to help you manage your care at home? Yes   Who are your caregiver(s) and their phone number(s)? spouse Melvina       [phone]   Prior to hospitilization cognitive status: Alert/Oriented   Current cognitive status: Alert/Oriented   Walking or Climbing Stairs Difficulty no   Dressing/Bathing Difficulty no   Home Accessibility stairs to enter home   Number of Stairs, Main Entrance four   Stair Railings, Main Entrance railings safe and in good condition   Equipment Currently Used at Home none   Readmission within 30 days? Yes   Patient currently being followed by outpatient case management? No   Do you currently have service(s) that help you manage your care at home? No   Do you take prescription medications? Yes   Do you have prescription coverage? Yes   Coverage medicare   Do you have any problems affording any of your prescribed medications? No   Is the patient taking medications as prescribed? yes   Who is going to help you get home at discharge? Spouse   How do you get to doctors appointments? car, drives self   Are you on dialysis? No   Discharge Plan A Home   Discharge Plan B Home Health   DME Needed Upon Discharge  none   Discharge Plan discussed with: Patient;Spouse/sig other   Name(s) and Number(s) Melvinairis Macias   [phone]   Transition of Care Barriers None   OTHER   Name(s) of People in Home Melvina Macias   [phone]

## 2024-01-10 NOTE — PROGRESS NOTES
[facility]  Hospital Medicine Progress Note        Chief Complaint: Inpatient Follow-up    HPI:   68-year-old male with significant history of type 2 diabetes mellitus, HTN, chronic diastolic heart failure, HLD, GERD and  hydrocele.  Patient had a recent 19 day hospitalization for acute necrotizing pancreatitis secondary to gallstones/choledocholithiasis, severe sepsis with Enterococcus bacteremia, acute kidney injury, acute hypoxemic respiratory failure. patient had cholecystectomy in 2022, underwent ERCP this previous hospitalization with stone and sludge removal and sphincterotomy.  Repeat CT abdomen pelvis showed continued findings of necrotizing pancreatitis with possible organizing fluid collection around pancreatic neck and body.  GI recommended repeat CT in 3 months.  Patient was treated with meropenem, ampicillin while in-house which was discontinued upon DC.  He completed 14 day course while in-house.  Patient remained symptomatic even after DC.  Patient continued with abdominal discomfort, nausea, unable to keep anything down mouth paid also reports diarrhea, unquantified unintentional weight loss.  Patient was febrile and tachycardic.  Lab significant for leukocytosis, acute kidney injury/dehydration.  Lipase was elevated.  Patient was initiated on conservative management with IV fluids, NPO and GI services consulted, admitted to hospitalist medicine service.  CT abdomen pelvis repeated-overall similar findings compared to before with persistent fluid collection/necrotizing pancreatitis.  Stool studies ordered given diarrhea.        Interval Hx:     Patient seen at bedside . still having abdominal discomfort and nausea, but no vomiting .  reports he is thirsty and is wanting to drink at least some water .  no high-grade temperature spikes today morning . BP is low normal .  wife is at bedside    Objective/physical exam:  General: In no acute distress, afebrile  Chest: Clear  to auscultation bilaterally  Heart: S1, S2, no appreciable murmur  Abdomen: Soft, mild diffuse tenderness, BS +  MSK: Warm, no lower extremity edema, no clubbing or cyanosis  Neurologic: Alert and oriented x4, moving all extremities with good strength     VITAL SIGNS: 24 HRS MIN & MAX LAST   Temp  Min: 98 °F (36.7 °C)  Max: 100.5 °F (38.1 °C) 99.9 °F (37.7 °C)   BP  Min: 103/56  Max: 150/85 126/71   Pulse  Min: 103  Max: 128  (!) 116   Resp  Min: 18  Max: 20 18   SpO2  Min: 95 %  Max: 97 % 95 %       Recent Labs   Lab 01/09/24  1008   WBC 16.86*   RBC 4.43*   HGB 13.6*   HCT 41.0*   MCV 92.6   MCH 30.7   MCHC 33.2   RDW 13.7      MPV 10.0         Recent Labs   Lab 01/09/24  1008   *   K 3.7   CO2 26   BUN 27.9*   CREATININE 1.73*   CALCIUM 9.0   ALBUMIN 2.7*   ALKPHOS 112   ALT 20   AST 26   BILITOT 1.6*          Microbiology Results (last 7 days)       Procedure Component Value Units Date/Time    Blood Culture [[id number]]     Order Status: Sent Specimen: Blood     Blood Culture [[id number]]     Order Status: Sent Specimen: Blood     Clostridium Diff Toxin, A & B, EIA [[id number]] Collected: 01/10/24 0341    Order Status: Sent Specimen: Stool Updated: 01/10/24 0341    Stool Culture **CANNOT BE ORDERED STAT** [[id number]] Collected: 01/10/24 0341    Order Status: Sent Specimen: Stool Updated: 01/10/24 0341    Blood Culture [[id number]] Collected: 01/09/24 1008    Order Status: Resulted Specimen: Blood Updated: 01/09/24 1016    Blood Culture [[id number]] Collected: 01/09/24 1008    Order Status: Resulted Specimen: Blood Updated: 01/09/24 1016             Scheduled Med:   amLODIPine  10 mg Oral Daily    metoprolol succinate  25 mg Oral Daily          Assessment/Plan:    Acute on chronic necrotizing pancreatitis with peripancreatic fluid collection  Sirs with fever spike, leukocytosis and tachycardia secondary to above  Acute kidney injury secondary to dehydration-improved  Recent Enterococcus bacteremia  secondary to necrotizing pancreatitis-completed treatment  Recent choledocholithiasis status post ERCP, sphincterotomy in December, 2023  History of essential HTN-now borderline low BP   Type 2 diabetes mellitus-stable   Chronic diastolic heart failure-appears compensated  HLD  GERD  Scrotal hydrocele   Prophylaxis    Patient was febrile, tachycardic with leukocytosis on admit   Better today   Evaluated by GI, recommends conservative management   I have initiated him on antibiotics-Merrem for now given septic picture on admit, he was treated with Merrem/ampicillin during previous hospitalization for necrotizing pancreatitis with Enterococcus bacteremia   Renal function improved   Keep Ringer lactate infusion since oral intake is compromised  Advanced to clear liquids today,  I have decreased Ringer lactate infusion to 100 cc/hour   Hold home amlodipine since blood pressure is low normal   Cautiously continue Toprol-XL   Continue other home meds-aspirin, statin and fenofibrate  DVT prophylaxis-initiate subQ Lovenox      Lisa Willoughby MD   01/10/2024

## 2024-01-11 LAB
ABS NEUT (OLG): 7.44 X10(3)/MCL (ref 2.1–9.2)
ALBUMIN SERPL-MCNC: 1.8 G/DL (ref 3.4–4.8)
ALBUMIN/GLOB SERPL: 0.5 RATIO (ref 1.1–2)
ALP SERPL-CCNC: 75 UNIT/L (ref 40–150)
ALT SERPL-CCNC: 19 UNIT/L (ref 0–55)
AST SERPL-CCNC: 29 UNIT/L (ref 5–34)
BILIRUB SERPL-MCNC: 0.9 MG/DL
BUN SERPL-MCNC: 13.9 MG/DL (ref 8.4–25.7)
CALCIUM SERPL-MCNC: 8.1 MG/DL (ref 8.8–10)
CEA SERPL-MCNC: 5.51 NG/ML (ref 0–3)
CHLORIDE SERPL-SCNC: 100 MMOL/L (ref 98–107)
CO2 SERPL-SCNC: 28 MMOL/L (ref 23–31)
CREAT SERPL-MCNC: 0.87 MG/DL (ref 0.73–1.18)
ERYTHROCYTE [DISTWIDTH] IN BLOOD BY AUTOMATED COUNT: 13.7 % (ref 11.5–17)
GFR SERPLBLD CREATININE-BSD FMLA CKD-EPI: >60 MLS/MIN/1.73/M2
GLOBULIN SER-MCNC: 3.3 GM/DL (ref 2.4–3.5)
GLUCOSE SERPL-MCNC: 91 MG/DL (ref 82–115)
HCT VFR BLD AUTO: 32.2 % (ref 42–52)
HGB BLD-MCNC: 10 G/DL (ref 14–18)
INSTRUMENT WBC (OLG): 8.46 X10(3)/MCL
LYMPHOCYTES NFR BLD MANUAL: 0.68 X10(3)/MCL
LYMPHOCYTES NFR BLD MANUAL: 8 %
MCH RBC QN AUTO: 29.8 PG (ref 27–31)
MCHC RBC AUTO-ENTMCNC: 31.1 G/DL (ref 33–36)
MCV RBC AUTO: 95.8 FL (ref 80–94)
MONOCYTES NFR BLD MANUAL: 0.42 X10(3)/MCL (ref 0.1–1.3)
MONOCYTES NFR BLD MANUAL: 5 %
NEUTROPHILS NFR BLD MANUAL: 88 %
NRBC BLD AUTO-RTO: 0 %
O+P STL MICRO: NORMAL
PLATELET # BLD AUTO: 224 X10(3)/MCL (ref 130–400)
PLATELET # BLD EST: NORMAL 10*3/UL
PMV BLD AUTO: 9.8 FL (ref 7.4–10.4)
POCT GLUCOSE: 109 MG/DL (ref 70–110)
POCT GLUCOSE: 125 MG/DL (ref 70–110)
POCT GLUCOSE: 92 MG/DL (ref 70–110)
POTASSIUM SERPL-SCNC: 4 MMOL/L (ref 3.5–5.1)
PROT SERPL-MCNC: 5.1 GM/DL (ref 5.8–7.6)
RBC # BLD AUTO: 3.36 X10(6)/MCL (ref 4.7–6.1)
RBC MORPH BLD: NORMAL
SODIUM SERPL-SCNC: 135 MMOL/L (ref 136–145)
TOXIC GRANULES BLD QL SMEAR: ABNORMAL
WBC # SPEC AUTO: 8.46 X10(3)/MCL (ref 4.5–11.5)

## 2024-01-11 PROCEDURE — 82378 CARCINOEMBRYONIC ANTIGEN: CPT | Performed by: INTERNAL MEDICINE

## 2024-01-11 PROCEDURE — 80053 COMPREHEN METABOLIC PANEL: CPT | Performed by: INTERNAL MEDICINE

## 2024-01-11 PROCEDURE — 63600175 PHARM REV CODE 636 W HCPCS: Performed by: INTERNAL MEDICINE

## 2024-01-11 PROCEDURE — 25000003 PHARM REV CODE 250: Performed by: INTERNAL MEDICINE

## 2024-01-11 PROCEDURE — 25000003 PHARM REV CODE 250

## 2024-01-11 PROCEDURE — 21400001 HC TELEMETRY ROOM

## 2024-01-11 PROCEDURE — 25000242 PHARM REV CODE 250 ALT 637 W/ HCPCS: Performed by: INTERNAL MEDICINE

## 2024-01-11 PROCEDURE — 25000003 PHARM REV CODE 250: Performed by: NURSE PRACTITIONER

## 2024-01-11 PROCEDURE — 85027 COMPLETE CBC AUTOMATED: CPT | Performed by: INTERNAL MEDICINE

## 2024-01-11 PROCEDURE — 99223 1ST HOSP IP/OBS HIGH 75: CPT | Mod: FS,,, | Performed by: GENERAL PRACTICE

## 2024-01-11 PROCEDURE — 63600175 PHARM REV CODE 636 W HCPCS: Mod: JZ,JG | Performed by: NURSE PRACTITIONER

## 2024-01-11 PROCEDURE — 27000207 HC ISOLATION

## 2024-01-11 RX ORDER — CHOLESTYRAMINE 4 G/4.8G
1 POWDER, FOR SUSPENSION ORAL 2 TIMES DAILY
Status: DISCONTINUED | OUTPATIENT
Start: 2024-01-11 | End: 2024-01-13

## 2024-01-11 RX ORDER — SIMETHICONE 80 MG
1 TABLET,CHEWABLE ORAL 4 TIMES DAILY PRN
Status: DISCONTINUED | OUTPATIENT
Start: 2024-01-11 | End: 2024-01-20 | Stop reason: HOSPADM

## 2024-01-11 RX ORDER — DICYCLOMINE HYDROCHLORIDE 10 MG/1
10 CAPSULE ORAL EVERY 6 HOURS PRN
Status: DISCONTINUED | OUTPATIENT
Start: 2024-01-11 | End: 2024-01-20 | Stop reason: HOSPADM

## 2024-01-11 RX ORDER — BACLOFEN 5 MG/1
5 TABLET ORAL 3 TIMES DAILY PRN
Status: DISCONTINUED | OUTPATIENT
Start: 2024-01-11 | End: 2024-01-12

## 2024-01-11 RX ADMIN — SIMETHICONE 80 MG: 80 TABLET, CHEWABLE ORAL at 06:01

## 2024-01-11 RX ADMIN — ACETAMINOPHEN 650 MG: 325 TABLET, FILM COATED ORAL at 06:01

## 2024-01-11 RX ADMIN — BACLOFEN 5 MG: 5 TABLET ORAL at 11:01

## 2024-01-11 RX ADMIN — METOPROLOL SUCCINATE 25 MG: 25 TABLET, EXTENDED RELEASE ORAL at 09:01

## 2024-01-11 RX ADMIN — CHOLESTYRAMINE 4 G: 4 POWDER, FOR SUSPENSION ORAL at 11:01

## 2024-01-11 RX ADMIN — MORPHINE SULFATE 2 MG: 4 INJECTION, SOLUTION INTRAMUSCULAR; INTRAVENOUS at 09:01

## 2024-01-11 RX ADMIN — MEROPENEM 1 G: 1 INJECTION, POWDER, FOR SOLUTION INTRAVENOUS at 09:01

## 2024-01-11 RX ADMIN — MEROPENEM 1 G: 1 INJECTION, POWDER, FOR SOLUTION INTRAVENOUS at 02:01

## 2024-01-11 RX ADMIN — DICYCLOMINE HYDROCHLORIDE 10 MG: 10 CAPSULE ORAL at 06:01

## 2024-01-11 RX ADMIN — FENOFIBRATE 145 MG: 145 TABLET, FILM COATED ORAL at 09:01

## 2024-01-11 RX ADMIN — DICYCLOMINE HYDROCHLORIDE 10 MG: 10 CAPSULE ORAL at 11:01

## 2024-01-11 RX ADMIN — ENOXAPARIN SODIUM 40 MG: 100 INJECTION SUBCUTANEOUS at 06:01

## 2024-01-11 RX ADMIN — FLUTICASONE PROPIONATE 100 MCG: 50 SPRAY, METERED NASAL at 09:01

## 2024-01-11 RX ADMIN — CHOLESTYRAMINE 4 G: 4 POWDER, FOR SUSPENSION ORAL at 09:01

## 2024-01-11 RX ADMIN — SIMETHICONE 80 MG: 80 TABLET, CHEWABLE ORAL at 11:01

## 2024-01-11 RX ADMIN — ONDANSETRON 4 MG: 2 INJECTION INTRAMUSCULAR; INTRAVENOUS at 09:01

## 2024-01-11 RX ADMIN — MORPHINE SULFATE 2 MG: 4 INJECTION, SOLUTION INTRAMUSCULAR; INTRAVENOUS at 05:01

## 2024-01-11 RX ADMIN — SODIUM CHLORIDE, POTASSIUM CHLORIDE, SODIUM LACTATE AND CALCIUM CHLORIDE: 600; 310; 30; 20 INJECTION, SOLUTION INTRAVENOUS at 06:01

## 2024-01-11 RX ADMIN — ASPIRIN 81 MG CHEWABLE TABLET 81 MG: 81 TABLET CHEWABLE at 09:01

## 2024-01-11 RX ADMIN — MEROPENEM 1 G: 1 INJECTION, POWDER, FOR SOLUTION INTRAVENOUS at 04:01

## 2024-01-11 RX ADMIN — ATORVASTATIN CALCIUM 20 MG: 10 TABLET, FILM COATED ORAL at 09:01

## 2024-01-11 NOTE — CONSULTS
[facility]  Infectious Diseases Consult        ASSESSMENT & PLAN:   He is a 68-year-old male with a past medical history of diabetes mellitus, hypertension, CHF, and GERD who was recently hospitalized for necrotizing pancreatitis secondary to gallstone/choledocholithiasis.  He was also found to be bacteremic with Enterococcus and he was evaluated by Dr. Pedersen at that time,  he underwent an ERCP and sphincterotomy.  He completed a 14 course of meropenem and ampicillin during that hospitalization and was subsequently discharged.  He had ongoing abdominal discomfort, nausea, vomiting, and loose BMs.  Who presented here on 01/09 for further evaluation.  He was noted to have a leukocytosis and SHA possibly s/t IVVD as well as elevated lipase.  CT of the abdomen and pelvis showed similar findings with persistent fluid collection and necrotizing pancreatitis.  He was initiated on meropenem empirically.  Blood cultures and stool studies have remained negative.  Clinically improved, fevers resolved.  He was evaluated by GI-no indication for intervention at present, plan on monitoring progression with serial imaging.   We have been consulted per [employer] policy for the use of meropenem.     Necrotizing pancreatitis, fluid collection  Recent Enterococcus bacteremia s/t above, s/p treatment / resolution  Recent gallstone pancreatitis, s/p ERCP and sphincterotomy  SHA on admit, suspect s/t IVVD, resolved  H/o CHF / HTN / DM2    PLAN:  F/u progression and GI plans.   OK to continue empiric abx for now w/meropenem.  May consider deescalation vs DC pending the above.   Discussed with patient and GI PA.      HISTORY OF PRESENT ILLNESS:     He is a 68-year-old male with a past medical history of diabetes mellitus, hypertension, CHF, and GERD who was recently hospitalized for necrotizing pancreatitis secondary to gallstone/choledocholithiasis.  He was also found to be bacteremic with Enterococcus and he was evaluated by Dr. Pedersen at  that time,  he underwent an ERCP and sphincterotomy.  He completed a 14 course of meropenem and ampicillin during that hospitalization and was subsequently discharged.  He had ongoing abdominal discomfort, nausea, vomiting, and loose BMs.  Who presented here on 01/09 for further evaluation.  He was noted to have a leukocytosis and SHA possibly s/t IVVD as well as elevated lipase.  CT of the abdomen and pelvis showed similar findings with persistent fluid collection and necrotizing pancreatitis.  He was initiated on meropenem empirically.  Blood cultures and stool studies have remained negative.  Clinically improved, fevers resolved.  He was evaluated by GI-no indication for intervention at present, plan on monitoring progression with serial imaging.   We have been consulted per [employer] policy for the use of meropenem.   PAST MEDICAL HISTORY:     Past Medical History:   Diagnosis Date    DM (diabetes mellitus)     GERD (gastroesophageal reflux disease)     HLD (hyperlipidemia)     HTN (hypertension)        PAST SURGICAL HISTORY:     Past Surgical History:   Procedure Laterality Date    APPENDECTOMY      CHOLECYSTECTOMY      ERCP N/A 12/17/2023    Procedure: ERCP (ENDOSCOPIC RETROGRADE CHOLANGIOPANCREATOGRAPHY);  Surgeon: Flako Kerns MD;  Location: [facility] OR;  Service: Gastroenterology;  Laterality: N/A;    ERCP W/ SPHICTEROTOMY  12/17/2023    Procedure: ERCP, WITH SPHINCTEROTOMY;  Surgeon: Flako Kerns MD;  Location: [facility] OR;  Service: Gastroenterology;;    ERCP, WITH CALCULUS REMOVAL  12/17/2023    Procedure: ERCP, WITH CALCULUS REMOVAL;  Surgeon: Flako Kerns MD;  Location: [facility] OR;  Service: Gastroenterology;;       ALLERGIES:   Patient has no known allergies.    FAMILY HISTORY:   Reviewed and non-contributory     SOCIAL HISTORY:     Social History     Tobacco Use    Smoking status: Every Day     Current packs/day: 1.00     Types: Cigarettes    Smokeless tobacco: Never   Substance Use  "Topics    Alcohol use: Never        MEDICATIONS:   Reviewed in EMR    REVIEW OF SYSTEMS:   Except as documented, all other systems reviewed and negative     PHYSICAL EXAM:   T 98.4 °F (36.9 °C)   /63   P 104   RR 14   O2 (!) 93 %  GENERAL: Chronically ill appearing; NAD; does not appear toxic  SKIN: no rash  HEENT: sclera non-icteric; PERRL   NECK: supple; no LAD  CHEST: CTA; nonlabored, equal expansion; no adventitious BS  CARDIOVASCULAR: RRR, S1S2; no murmur   ABDOMEN:  active bowel sounds; abdomen soft, rounded, + mostly epigastric TTP  EXTREMITIES: no cyanosis or clubbing  NEURO: AAO x4; CN II-XII grossly intact  PSYCH: Mentation and affect appropriate     LABS AND IMAGING:     Recent Labs     01/10/24  0715 01/11/24  0537   WBC 9.42 8.46  8.46   RBC 3.73* 3.36*   HGB 11.4* 10.0*   HCT 34.4* 32.2*   MCV 92.2 95.8*   MCH 30.6 29.8   MCHC 33.1 31.1*   RDW 13.6 13.7    224     Recent Labs     01/09/24  1008   LACTIC 1.8     No results for input(s): "INR", "APTT", "D-DIMER" in the last 72 hours.  Recent Labs     01/09/24  1008   TRIG 121      Recent Labs     01/09/24  1008 01/10/24  0715 01/11/24  0537   * 134* 135*   K 3.7 3.8 4.0   CHLORIDE 92* 98 100   CO2 26 28 28   BUN 27.9* 18.9 13.9   CREATININE 1.73* 1.17 0.87   GLUCOSE 123* 115 91   CALCIUM 9.0 8.5* 8.1*   ALBUMIN 2.7*  --  1.8*   GLOBULIN 4.4*  --  3.3   ALKPHOS 112  --  75   ALT 20  --  19   AST 26  --  29   BILITOT 1.6*  --  0.9   LIPASE 113*  --   --      No results for input(s): "BNP", "CPK", "TROPONINI" in the last 72 hours.       CT Abdomen Pelvis With IV Contrast NO Oral Contrast  Narrative: EXAMINATION:  CT ABDOMEN PELVIS WITH IV CONTRAST    CLINICAL HISTORY:  LLQ abdominal pain;    TECHNIQUE:  Helically acquired images with axial, sagittal and coronal reformations were obtained from the lung bases to the pubic symphysis after the IV administration of contrast.    Automated tube current modulation, weight-based exposure dosing, " and/or iterative reconstruction technique utilized to reach lowest reasonably achievable exposure rate.    DLP: 368 mGy*cm    COMPARISON:  CT abdomen pelvis 12/29/2023, CT abdomen pelvis 12/16/2023    FINDINGS:  Mild motion degraded exam.    HEART: There are coronary artery calcifications.    LUNG BASES: Improved pleural effusions.    LIVER: Normal attenuation. No appreciable focal hepatic lesion.    BILIARY: Gallbladder is surgically absent.    PANCREAS: Similar changes of necrotizing pancreatitis with continued organization of complex fluid collection at the pancreatic neck and body extending toward the uncinate process, duodenum, root of the mesentery and gastro hepatic ligament.    SPLEEN: Normal in size    ADRENALS: No mass.    KIDNEYS/URETERS: The kidneys enhance symmetrically.  No hydronephrosis.    GI TRACT/MESENTERY: Edema at the 2nd and 3rd portion of the duodenum.  Bowel is normal in caliber without evidence of obstruction.  Colonic diverticulosis without acute inflammatory change.    PERITONEUM: There is free intraperitoneal fluid tracking along the pericolic gutters and layering within the pelvis.  Probable changes of fat necrosis along the pericolic gutters and root of the mesentery.  No free air.    LYMPH NODES: No enlarged lymph nodes by size criteria.    VASCULATURE: Aortoiliac atherosclerosis.  Pancreatic collection encases the proximal portal vein which appears attenuated.  Motion artifact and timing of the contrast bolus mildly limits evaluation.  The portal vein does appear to remain patent.  The splenic vein is patent.    BLADDER: Normal appearance given degree of distention.    REPRODUCTIVE ORGANS: Scrotal hydrocele.    SOFT TISSUES: Unremarkable.    BONES: Grade 1 retrolisthesis of L3 on L4.  Impression: 1. Continued organization and partial encapsulation of the acute necrotic collection at the body of the pancreas and peripancreatic soft tissues.  No internal foci of gas or hortencia changes of  acute super infection by imaging.  Overall not significantly changed in size from prior.  2. Mild improvement of free fluid and resolved pleural effusions.    Electronically signed by: Kisha Finnegan  Date:    01/09/2024  Time:    19:11  X-Ray Chest AP Portable  Narrative: EXAMINATION:  XR CHEST AP PORTABLE    CLINICAL HISTORY:  Cough, unspecified    TECHNIQUE:  Single frontal view of the chest was performed.    COMPARISON:  12/23/2023    FINDINGS:  LINES AND TUBES: None    MEDIASTINUM AND WALLY: The cardiac silhouette is normal.    LUNGS: No lobar consolidation. No edema.    PLEURA:No pleural effusion. No pneumothorax.    BONES: No acute osseous abnormality.  Impression: No acute cardiopulmonary abnormality.    Electronically signed by: Kisha Finnegan  Date:    01/09/2024  Time:    17:05         MALI Kelly  Infectious Diseases

## 2024-01-11 NOTE — PROGRESS NOTES
"Gastroenterology Progress Note    Subjective/Interval History:  Stool for C diff negative  Pancreatic elastase pending    Spoke with hospitalist and nurse regarding patient. He continues to have diarrhea post prandially. He continues to have hiccups - no baclofen has been administered yet. Patient c/o abd cramping and gassiness.    Patient resting in bed. He has not been eating much because of the hiccups, also he has diarrhea with each meal. He has left sided abd pain described as crampy. He has not been ambulating much due to this pain. Denies n/v. No abd tenderness. He states he is not sleeping because of his hiccups waking him up. He states he has occasional dyspnea.    ROS:  Review of Systems   Constitutional:  Positive for malaise/fatigue.   Respiratory:  Positive for shortness of breath. Negative for cough.    Cardiovascular:  Negative for chest pain.   Gastrointestinal:  Positive for abdominal pain and diarrhea. Negative for blood in stool, constipation, melena, nausea and vomiting.   Neurological:  Negative for weakness.       Vital Signs:  /76   Pulse (!) 111   Temp 98.4 °F (36.9 °C) (Oral)   Resp 18   Ht 5' 6" (1.676 m)   Wt 59 kg (130 lb)   SpO2 (!) 93%   BMI 20.98 kg/m²   Body mass index is 20.98 kg/m².    Physical Exam:  Physical Exam  Constitutional:       General: He is not in acute distress.     Appearance: He is normal weight. He is not ill-appearing.   HENT:      Head: Normocephalic and atraumatic.      Right Ear: External ear normal.      Left Ear: External ear normal.      Nose: Nose normal.      Mouth/Throat:      Pharynx: Oropharynx is clear.   Eyes:      Conjunctiva/sclera: Conjunctivae normal.   Pulmonary:      Effort: Pulmonary effort is normal. No respiratory distress.   Abdominal:      General: Abdomen is flat. There is no distension.      Palpations: Abdomen is soft.      Tenderness: There is no abdominal tenderness. There is no guarding.   Musculoskeletal:         General: " Normal range of motion.      Cervical back: Normal range of motion.   Skin:     General: Skin is warm and dry.      Coloration: Skin is not pale.   Neurological:      Mental Status: He is alert. Mental status is at baseline.   Psychiatric:         Mood and Affect: Mood normal.         Behavior: Behavior normal.         Thought Content: Thought content normal.         Labs:  Recent Results (from the past 24 hour(s))   POCT glucose    Collection Time: 01/10/24 10:51 AM   Result Value Ref Range    POCT Glucose 117 (H) 70 - 110 mg/dL   POCT glucose    Collection Time: 01/10/24  3:54 PM   Result Value Ref Range    POCT Glucose 98 70 - 110 mg/dL   POCT glucose    Collection Time: 01/10/24  9:03 PM   Result Value Ref Range    POCT Glucose 108 70 - 110 mg/dL   POCT glucose    Collection Time: 01/11/24  4:54 AM   Result Value Ref Range    POCT Glucose 92 70 - 110 mg/dL   Comprehensive Metabolic Panel    Collection Time: 01/11/24  5:37 AM   Result Value Ref Range    Sodium Level 135 (L) 136 - 145 mmol/L    Potassium Level 4.0 3.5 - 5.1 mmol/L    Chloride 100 98 - 107 mmol/L    Carbon Dioxide 28 23 - 31 mmol/L    Glucose Level 91 82 - 115 mg/dL    Blood Urea Nitrogen 13.9 8.4 - 25.7 mg/dL    Creatinine 0.87 0.73 - 1.18 mg/dL    Calcium Level Total 8.1 (L) 8.8 - 10.0 mg/dL    Protein Total 5.1 (L) 5.8 - 7.6 gm/dL    Albumin Level 1.8 (L) 3.4 - 4.8 g/dL    Globulin 3.3 2.4 - 3.5 gm/dL    Albumin/Globulin Ratio 0.5 (L) 1.1 - 2.0 ratio    Bilirubin Total 0.9 <=1.5 mg/dL    Alkaline Phosphatase 75 40 - 150 unit/L    Alanine Aminotransferase 19 0 - 55 unit/L    Aspartate Aminotransferase 29 5 - 34 unit/L    eGFR >60 mls/min/1.73/m2   CBC with Differential    Collection Time: 01/11/24  5:37 AM   Result Value Ref Range    WBC 8.46 4.50 - 11.50 x10(3)/mcL    RBC 3.36 (L) 4.70 - 6.10 x10(6)/mcL    Hgb 10.0 (L) 14.0 - 18.0 g/dL    Hct 32.2 (L) 42.0 - 52.0 %    MCV 95.8 (H) 80.0 - 94.0 fL    MCH 29.8 27.0 - 31.0 pg    MCHC 31.1 (L) 33.0 -  36.0 g/dL    RDW 13.7 11.5 - 17.0 %    Platelet 224 130 - 400 x10(3)/mcL    MPV 9.8 7.4 - 10.4 fL    NRBC% 0.0 %   Manual Differential    Collection Time: 01/11/24  5:37 AM   Result Value Ref Range    WBC 8.46 x10(3)/mcL    Neutrophils % 88 %    Lymphs % 8 %    Monocytes % 5 %    Neutrophils Abs 7.4448 2.1 - 9.2 x10(3)/mcL    Lymphs Abs 0.6768 0.6 - 4.6 x10(3)/mcL    Monocytes Abs 0.423 0.1 - 1.3 x10(3)/mcL    Platelets Normal Normal, Adequate    RBC Morph Normal Normal    Toxic Granulation 1+ (A) (none)         Assessment/Plan:  This is a 68 y.o. male known to Dr. Flako Kerns from recent admission with PMH of T2DM, HTN, HLD, chronic scrotal hydrocele, vitamin D deficiency, CKD 3a, necrotizing pancreatitis 2/2 gallstones s/p ERCP 12/17/23, cholecystectomy 2022.      Patient admitted to [facility] 12/16/23 for choledocholithiasis requiring ERCP for stone extraction. Post procedure his hospitalization was complicated by necrotizing pancreatitis with possible collection near pancreas. No endoscopic intervention indicated at that time. Patient discharged home 01/04/24.     He presented to the ED 01/09/24 with epigastric pain, n/v/d x3 days. He reported a fever onset day of presentation. GI consulted for pancreatitis.     Pancreatitis, acute on chronic  - supportive care: IVF, pain control  - ok for clear liquids. ADAT  - encourage safe ambulation     Necrotic collection at body of pancreas  - CT abd/pel with IV contrast 12/29: necrotic pancreatitis with possible organizing collection anterior and superior to pancreatic neck and body measuring approximately 8 x 4 cm  - Dr. Oseguera evaluated patient at that time - no organized collection that required drainage; he recommended maximizing nutrition with increased protein intake and repeat CT abd in 4-6 weeks with follow up in GI clinic  - CT abd/pel with IV 01/09/24: Continued organization and partial encapsulation of the acute necrotic collection at the body of the  pancreas and peripancreatic soft tissues.  No internal foci of gas or hortencia changes of acute super infection by imaging.  Overall not significantly changed in size from prior.   - no indication for urgent intervention at this time - we will watch over time to see how this collection progresses and if any intervention is warranted     Diarrhea  - GI panel, stool for C diff negative  - pancreatic elastase pending  - questran trial  - ordered simethicone PRN and dicyclomine PRN     4. Hiccups  - renal indices wnl. D/w nephrology NP - ok to give baclofen as long as renal function is normal     Oneida Ellison, PA-C  Gastroenterology  [facility]

## 2024-01-12 LAB
ALBUMIN SERPL-MCNC: 1.7 G/DL (ref 3.4–4.8)
ALBUMIN/GLOB SERPL: 0.5 RATIO (ref 1.1–2)
ALP SERPL-CCNC: 75 UNIT/L (ref 40–150)
ALT SERPL-CCNC: 18 UNIT/L (ref 0–55)
AST SERPL-CCNC: 26 UNIT/L (ref 5–34)
BACTERIA SPEC CULT: NORMAL
BASOPHILS # BLD AUTO: 0.06 X10(3)/MCL
BASOPHILS NFR BLD AUTO: 0.9 %
BILIRUB SERPL-MCNC: 0.7 MG/DL
BUN SERPL-MCNC: 13.4 MG/DL (ref 8.4–25.7)
CALCIUM SERPL-MCNC: 8.5 MG/DL (ref 8.8–10)
CHLORIDE SERPL-SCNC: 101 MMOL/L (ref 98–107)
CO2 SERPL-SCNC: 29 MMOL/L (ref 23–31)
CREAT SERPL-MCNC: 0.78 MG/DL (ref 0.73–1.18)
ELASTASE PANC STL-MCNT: 312 MCG/G
EOSINOPHIL # BLD AUTO: 0.07 X10(3)/MCL (ref 0–0.9)
EOSINOPHIL NFR BLD AUTO: 1.1 %
ERYTHROCYTE [DISTWIDTH] IN BLOOD BY AUTOMATED COUNT: 13.5 % (ref 11.5–17)
GFR SERPLBLD CREATININE-BSD FMLA CKD-EPI: >60 MLS/MIN/1.73/M2
GLOBULIN SER-MCNC: 3.4 GM/DL (ref 2.4–3.5)
GLUCOSE SERPL-MCNC: 97 MG/DL (ref 82–115)
HCT VFR BLD AUTO: 34 % (ref 42–52)
HGB BLD-MCNC: 10.7 G/DL (ref 14–18)
IMM GRANULOCYTES # BLD AUTO: 0.03 X10(3)/MCL (ref 0–0.04)
IMM GRANULOCYTES NFR BLD AUTO: 0.5 %
LYMPHOCYTES # BLD AUTO: 1.03 X10(3)/MCL (ref 0.6–4.6)
LYMPHOCYTES NFR BLD AUTO: 16.1 %
MCH RBC QN AUTO: 30.1 PG (ref 27–31)
MCHC RBC AUTO-ENTMCNC: 31.5 G/DL (ref 33–36)
MCV RBC AUTO: 95.5 FL (ref 80–94)
MONOCYTES # BLD AUTO: 0.98 X10(3)/MCL (ref 0.1–1.3)
MONOCYTES NFR BLD AUTO: 15.4 %
NEUTROPHILS # BLD AUTO: 4.21 X10(3)/MCL (ref 2.1–9.2)
NEUTROPHILS NFR BLD AUTO: 66 %
NRBC BLD AUTO-RTO: 0 %
PLATELET # BLD AUTO: 234 X10(3)/MCL (ref 130–400)
PMV BLD AUTO: 10.2 FL (ref 7.4–10.4)
POCT GLUCOSE: 103 MG/DL (ref 70–110)
POCT GLUCOSE: 90 MG/DL (ref 70–110)
POCT GLUCOSE: 94 MG/DL (ref 70–110)
POCT GLUCOSE: 94 MG/DL (ref 70–110)
POTASSIUM SERPL-SCNC: 3.9 MMOL/L (ref 3.5–5.1)
PROT SERPL-MCNC: 5.1 GM/DL (ref 5.8–7.6)
RBC # BLD AUTO: 3.56 X10(6)/MCL (ref 4.7–6.1)
SODIUM SERPL-SCNC: 137 MMOL/L (ref 136–145)
WBC # SPEC AUTO: 6.38 X10(3)/MCL (ref 4.5–11.5)

## 2024-01-12 PROCEDURE — 25000003 PHARM REV CODE 250

## 2024-01-12 PROCEDURE — 25000003 PHARM REV CODE 250: Performed by: NURSE PRACTITIONER

## 2024-01-12 PROCEDURE — 21400001 HC TELEMETRY ROOM

## 2024-01-12 PROCEDURE — 63600175 PHARM REV CODE 636 W HCPCS: Mod: JZ,JG | Performed by: NURSE PRACTITIONER

## 2024-01-12 PROCEDURE — 63600175 PHARM REV CODE 636 W HCPCS: Performed by: INTERNAL MEDICINE

## 2024-01-12 PROCEDURE — 27000207 HC ISOLATION

## 2024-01-12 PROCEDURE — 85025 COMPLETE CBC W/AUTO DIFF WBC: CPT | Performed by: INTERNAL MEDICINE

## 2024-01-12 PROCEDURE — 80053 COMPREHEN METABOLIC PANEL: CPT | Performed by: INTERNAL MEDICINE

## 2024-01-12 PROCEDURE — 99223 1ST HOSP IP/OBS HIGH 75: CPT | Mod: FS,,, | Performed by: GENERAL PRACTICE

## 2024-01-12 PROCEDURE — 25000003 PHARM REV CODE 250: Performed by: INTERNAL MEDICINE

## 2024-01-12 RX ORDER — PANTOPRAZOLE SODIUM 40 MG/1
40 TABLET, DELAYED RELEASE ORAL DAILY
Status: DISCONTINUED | OUTPATIENT
Start: 2024-01-12 | End: 2024-01-20 | Stop reason: HOSPADM

## 2024-01-12 RX ORDER — TALC
9 POWDER (GRAM) TOPICAL NIGHTLY PRN
Status: DISCONTINUED | OUTPATIENT
Start: 2024-01-12 | End: 2024-01-20 | Stop reason: HOSPADM

## 2024-01-12 RX ORDER — BACLOFEN 5 MG/1
5 TABLET ORAL 3 TIMES DAILY
Status: DISCONTINUED | OUTPATIENT
Start: 2024-01-12 | End: 2024-01-14

## 2024-01-12 RX ADMIN — MEROPENEM 1 G: 1 INJECTION, POWDER, FOR SOLUTION INTRAVENOUS at 04:01

## 2024-01-12 RX ADMIN — CHOLESTYRAMINE 4 G: 4 POWDER, FOR SUSPENSION ORAL at 09:01

## 2024-01-12 RX ADMIN — CHOLESTYRAMINE 4 G: 4 POWDER, FOR SUSPENSION ORAL at 10:01

## 2024-01-12 RX ADMIN — MEROPENEM 1 G: 1 INJECTION, POWDER, FOR SOLUTION INTRAVENOUS at 09:01

## 2024-01-12 RX ADMIN — MEROPENEM 1 G: 1 INJECTION, POWDER, FOR SOLUTION INTRAVENOUS at 01:01

## 2024-01-12 RX ADMIN — PANCRELIPASE 1 CAPSULE: 60000; 12000; 38000 CAPSULE, DELAYED RELEASE PELLETS ORAL at 04:01

## 2024-01-12 RX ADMIN — PANCRELIPASE 1 CAPSULE: 60000; 12000; 38000 CAPSULE, DELAYED RELEASE PELLETS ORAL at 11:01

## 2024-01-12 RX ADMIN — BACLOFEN 5 MG: 5 TABLET ORAL at 03:01

## 2024-01-12 RX ADMIN — MORPHINE SULFATE 2 MG: 4 INJECTION, SOLUTION INTRAMUSCULAR; INTRAVENOUS at 01:01

## 2024-01-12 RX ADMIN — FENOFIBRATE 145 MG: 145 TABLET, FILM COATED ORAL at 09:01

## 2024-01-12 RX ADMIN — MORPHINE SULFATE 2 MG: 4 INJECTION, SOLUTION INTRAMUSCULAR; INTRAVENOUS at 12:01

## 2024-01-12 RX ADMIN — MORPHINE SULFATE 2 MG: 4 INJECTION, SOLUTION INTRAMUSCULAR; INTRAVENOUS at 07:01

## 2024-01-12 RX ADMIN — ENOXAPARIN SODIUM 40 MG: 100 INJECTION SUBCUTANEOUS at 04:01

## 2024-01-12 RX ADMIN — MELATONIN TAB 3 MG 9 MG: 3 TAB at 09:01

## 2024-01-12 RX ADMIN — ASPIRIN 81 MG CHEWABLE TABLET 81 MG: 81 TABLET CHEWABLE at 09:01

## 2024-01-12 RX ADMIN — ATORVASTATIN CALCIUM 20 MG: 10 TABLET, FILM COATED ORAL at 09:01

## 2024-01-12 RX ADMIN — METOPROLOL SUCCINATE 25 MG: 25 TABLET, EXTENDED RELEASE ORAL at 09:01

## 2024-01-12 RX ADMIN — BACLOFEN 5 MG: 5 TABLET ORAL at 09:01

## 2024-01-12 RX ADMIN — FLUTICASONE PROPIONATE 100 MCG: 50 SPRAY, METERED NASAL at 09:01

## 2024-01-12 RX ADMIN — PANTOPRAZOLE SODIUM 40 MG: 40 TABLET, DELAYED RELEASE ORAL at 10:01

## 2024-01-12 NOTE — PROGRESS NOTES
[facility]  Infectious Disease Progress Note            ASSESSMENT & PLAN:     He is a 68-year-old male with a past medical history of diabetes mellitus, hypertension, CHF, and GERD who was recently hospitalized for necrotizing pancreatitis secondary to gallstone/choledocholithiasis.  He was also found to be bacteremic with Enterococcus and he was evaluated by Dr. Pedersen at that time,  he underwent an ERCP and sphincterotomy.  He completed a 14 course of meropenem and ampicillin during that hospitalization and was subsequently discharged.  He had ongoing abdominal discomfort, nausea, vomiting, and loose BMs.  Who presented here on 01/09 for further evaluation.  He was noted to have a leukocytosis and SHA possibly s/t IVVD as well as elevated lipase.  CT of the abdomen and pelvis showed similar findings with persistent fluid collection and necrotizing pancreatitis.  He was initiated on meropenem empirically.  Blood cultures and stool studies have remained negative.  Clinically improved, fevers resolved.  He was evaluated by GI-no indication for intervention at present, plan on monitoring progression with serial imaging.   We have been consulted per [employer] policy for the use of meropenem.      Necrotizing pancreatitis, fluid collection  Recent Enterococcus bacteremia s/t above, s/p treatment / resolution  Recent gallstone pancreatitis, s/p ERCP and sphincterotomy  SHA on admit, suspect s/t IVVD, resolved  H/o CHF / HTN / DM2     PLAN:  GI monitoring for now.   Continue meropenem.   Discussed with patient.     SUBJECTIVE:   AF, VSS.  No new issues or complaints.     MEDICATIONS:   Reviewed in EMR    REVIEW OF SYSTEMS:   Except as documented, all other systems reviewed and negative     PHYSICAL EXAM:   T 99.4 °F (37.4 °C)   /75   P 96   RR 18   O2 (!) 93 %  GENERAL: Chronically ill appearing; NAD; does not appear toxic  SKIN: no rash  HEENT: sclera non-icteric; PERRL   NECK: supple; no LAD  CHEST: CTA; nonlabored,  "equal expansion; no adventitious BS  CARDIOVASCULAR: RRR, S1S2; no murmur   ABDOMEN:  active bowel sounds; abdomen soft, rounded, + mostly epigastric TTP  EXTREMITIES: no cyanosis or clubbing  NEURO: AAO x4; CN II-XII grossly intact  PSYCH: Mentation and affect appropriate    LABS AND IMAGING:     Recent Labs     01/11/24  0537 01/12/24  0545   WBC 8.46  8.46 6.38   RBC 3.36* 3.56*   HGB 10.0* 10.7*   HCT 32.2* 34.0*   MCV 95.8* 95.5*   MCH 29.8 30.1   MCHC 31.1* 31.5*   RDW 13.7 13.5    234     No results for input(s): "LACTIC" in the last 72 hours.  No results for input(s): "INR", "APTT", "D-DIMER" in the last 72 hours.  No results for input(s): "HGBA1C", "CHOL", "TRIG", "LDL", "VLDL", "HDL" in the last 72 hours.   Recent Labs     01/11/24  0537 01/12/24  0545   * 137   K 4.0 3.9   CHLORIDE 100 101   CO2 28 29   BUN 13.9 13.4   CREATININE 0.87 0.78   GLUCOSE 91 97   CALCIUM 8.1* 8.5*   ALBUMIN 1.8* 1.7*   GLOBULIN 3.3 3.4   ALKPHOS 75 75   ALT 19 18   AST 29 26   BILITOT 0.9 0.7     No results for input(s): "BNP", "CPK", "TROPONINI" in the last 72 hours.       CT Abdomen Pelvis With IV Contrast NO Oral Contrast  Narrative: EXAMINATION:  CT ABDOMEN PELVIS WITH IV CONTRAST    CLINICAL HISTORY:  LLQ abdominal pain;    TECHNIQUE:  Helically acquired images with axial, sagittal and coronal reformations were obtained from the lung bases to the pubic symphysis after the IV administration of contrast.    Automated tube current modulation, weight-based exposure dosing, and/or iterative reconstruction technique utilized to reach lowest reasonably achievable exposure rate.    DLP: 368 mGy*cm    COMPARISON:  CT abdomen pelvis 12/29/2023, CT abdomen pelvis 12/16/2023    FINDINGS:  Mild motion degraded exam.    HEART: There are coronary artery calcifications.    LUNG BASES: Improved pleural effusions.    LIVER: Normal attenuation. No appreciable focal hepatic lesion.    BILIARY: Gallbladder is surgically " absent.    PANCREAS: Similar changes of necrotizing pancreatitis with continued organization of complex fluid collection at the pancreatic neck and body extending toward the uncinate process, duodenum, root of the mesentery and gastro hepatic ligament.    SPLEEN: Normal in size    ADRENALS: No mass.    KIDNEYS/URETERS: The kidneys enhance symmetrically.  No hydronephrosis.    GI TRACT/MESENTERY: Edema at the 2nd and 3rd portion of the duodenum.  Bowel is normal in caliber without evidence of obstruction.  Colonic diverticulosis without acute inflammatory change.    PERITONEUM: There is free intraperitoneal fluid tracking along the pericolic gutters and layering within the pelvis.  Probable changes of fat necrosis along the pericolic gutters and root of the mesentery.  No free air.    LYMPH NODES: No enlarged lymph nodes by size criteria.    VASCULATURE: Aortoiliac atherosclerosis.  Pancreatic collection encases the proximal portal vein which appears attenuated.  Motion artifact and timing of the contrast bolus mildly limits evaluation.  The portal vein does appear to remain patent.  The splenic vein is patent.    BLADDER: Normal appearance given degree of distention.    REPRODUCTIVE ORGANS: Scrotal hydrocele.    SOFT TISSUES: Unremarkable.    BONES: Grade 1 retrolisthesis of L3 on L4.  Impression: 1. Continued organization and partial encapsulation of the acute necrotic collection at the body of the pancreas and peripancreatic soft tissues.  No internal foci of gas or hortencia changes of acute super infection by imaging.  Overall not significantly changed in size from prior.  2. Mild improvement of free fluid and resolved pleural effusions.    Electronically signed by: Kisah Finnegan  Date:    01/09/2024  Time:    19:11  X-Ray Chest AP Portable  Narrative: EXAMINATION:  XR CHEST AP PORTABLE    CLINICAL HISTORY:  Cough, unspecified    TECHNIQUE:  Single frontal view of the chest was  performed.    COMPARISON:  12/23/2023    FINDINGS:  LINES AND TUBES: None    MEDIASTINUM AND WALLY: The cardiac silhouette is normal.    LUNGS: No lobar consolidation. No edema.    PLEURA:No pleural effusion. No pneumothorax.    BONES: No acute osseous abnormality.  Impression: No acute cardiopulmonary abnormality.    Electronically signed by: Kisha Finnegan  Date:    01/09/2024  Time:    17:05          MALI Kelly  Infectious Disease

## 2024-01-12 NOTE — PROGRESS NOTES
"Gastroenterology Progress Note    Subjective/Interval History:  Pancreatic elastase pending    Spoke with nurse regarding patient. He continues to c/o hiccups. Baclofen administered    Patient resting in bed. He states the hiccups persist and continue to wake him up while he is asleep. He states he still has watery stools even with the questran, but then he states that it is starting to become more like a paste. He has no appetite. C/o LUQ pain and tenderness.    ROS:  Review of Systems   Constitutional:  Positive for malaise/fatigue.   Respiratory:  Positive for shortness of breath. Negative for cough.    Cardiovascular:  Negative for chest pain.   Gastrointestinal:  Positive for abdominal pain and diarrhea. Negative for blood in stool, constipation, melena, nausea and vomiting.   Neurological:  Negative for weakness.       Vital Signs:  /75   Pulse 96   Temp 99.4 °F (37.4 °C) (Oral)   Resp 18   Ht 5' 6" (1.676 m)   Wt 59 kg (130 lb)   SpO2 (!) 93%   BMI 20.98 kg/m²   Body mass index is 20.98 kg/m².    Physical Exam:  Physical Exam  Constitutional:       General: He is not in acute distress.     Appearance: He is normal weight. He is not ill-appearing.   HENT:      Head: Normocephalic and atraumatic.      Right Ear: External ear normal.      Left Ear: External ear normal.      Nose: Nose normal.      Mouth/Throat:      Pharynx: Oropharynx is clear.   Eyes:      Conjunctiva/sclera: Conjunctivae normal.   Pulmonary:      Effort: Pulmonary effort is normal. No respiratory distress.   Abdominal:      General: Abdomen is flat. There is no distension.      Palpations: Abdomen is soft.      Tenderness: There is no abdominal tenderness. There is no guarding.   Musculoskeletal:         General: Normal range of motion.      Cervical back: Normal range of motion.   Skin:     General: Skin is warm and dry.      Coloration: Skin is not pale.   Neurological:      Mental Status: He is alert. Mental status is at " baseline.   Psychiatric:         Mood and Affect: Mood normal.         Behavior: Behavior normal.         Thought Content: Thought content normal.         Labs:  Recent Results (from the past 24 hour(s))   POCT glucose    Collection Time: 01/11/24 11:12 AM   Result Value Ref Range    POCT Glucose 125 (H) 70 - 110 mg/dL   POCT glucose    Collection Time: 01/11/24  4:57 PM   Result Value Ref Range    POCT Glucose 109 70 - 110 mg/dL   POCT glucose    Collection Time: 01/12/24  4:40 AM   Result Value Ref Range    POCT Glucose 94 70 - 110 mg/dL   Comprehensive Metabolic Panel    Collection Time: 01/12/24  5:45 AM   Result Value Ref Range    Sodium Level 137 136 - 145 mmol/L    Potassium Level 3.9 3.5 - 5.1 mmol/L    Chloride 101 98 - 107 mmol/L    Carbon Dioxide 29 23 - 31 mmol/L    Glucose Level 97 82 - 115 mg/dL    Blood Urea Nitrogen 13.4 8.4 - 25.7 mg/dL    Creatinine 0.78 0.73 - 1.18 mg/dL    Calcium Level Total 8.5 (L) 8.8 - 10.0 mg/dL    Protein Total 5.1 (L) 5.8 - 7.6 gm/dL    Albumin Level 1.7 (L) 3.4 - 4.8 g/dL    Globulin 3.4 2.4 - 3.5 gm/dL    Albumin/Globulin Ratio 0.5 (L) 1.1 - 2.0 ratio    Bilirubin Total 0.7 <=1.5 mg/dL    Alkaline Phosphatase 75 40 - 150 unit/L    Alanine Aminotransferase 18 0 - 55 unit/L    Aspartate Aminotransferase 26 5 - 34 unit/L    eGFR >60 mls/min/1.73/m2   CBC with Differential    Collection Time: 01/12/24  5:45 AM   Result Value Ref Range    WBC 6.38 4.50 - 11.50 x10(3)/mcL    RBC 3.56 (L) 4.70 - 6.10 x10(6)/mcL    Hgb 10.7 (L) 14.0 - 18.0 g/dL    Hct 34.0 (L) 42.0 - 52.0 %    MCV 95.5 (H) 80.0 - 94.0 fL    MCH 30.1 27.0 - 31.0 pg    MCHC 31.5 (L) 33.0 - 36.0 g/dL    RDW 13.5 11.5 - 17.0 %    Platelet 234 130 - 400 x10(3)/mcL    MPV 10.2 7.4 - 10.4 fL    Neut % 66.0 %    Lymph % 16.1 %    Mono % 15.4 %    Eos % 1.1 %    Basophil % 0.9 %    Lymph # 1.03 0.6 - 4.6 x10(3)/mcL    Neut # 4.21 2.1 - 9.2 x10(3)/mcL    Mono # 0.98 0.1 - 1.3 x10(3)/mcL    Eos # 0.07 0 - 0.9 x10(3)/mcL     Baso # 0.06 <=0.2 x10(3)/mcL    IG# 0.03 0 - 0.04 x10(3)/mcL    IG% 0.5 %    NRBC% 0.0 %         Assessment/Plan:  This is a 68 y.o. male known to Dr. Flako Kerns from recent admission with PMH of T2DM, HTN, HLD, chronic scrotal hydrocele, vitamin D deficiency, CKD 3a, necrotizing pancreatitis 2/2 gallstones s/p ERCP 12/17/23, cholecystectomy 2022.      Patient admitted to [facility] 12/16/23 for choledocholithiasis requiring ERCP for stone extraction. Post procedure his hospitalization was complicated by necrotizing pancreatitis with possible collection near pancreas. No endoscopic intervention indicated at that time. Patient discharged home 01/04/24.     He presented to the ED 01/09/24 with epigastric pain, n/v/d x3 days. He reported a fever onset day of presentation. GI consulted for pancreatitis.     Pancreatitis, acute on chronic  - supportive care: IVF, pain control  - ADAT  - encourage safe ambulation     Necrotic collection at body of pancreas  - CT abd/pel with IV contrast 12/29: necrotic pancreatitis with possible organizing collection anterior and superior to pancreatic neck and body measuring approximately 8 x 4 cm  - Dr. Oseguera evaluated patient at that time - no organized collection that required drainage; he recommended maximizing nutrition with increased protein intake and repeat CT abd in 4-6 weeks with follow up in GI clinic  - CT abd/pel with IV 01/09/24: Continued organization and partial encapsulation of the acute necrotic collection at the body of the pancreas and peripancreatic soft tissues.  No internal foci of gas or hortencia changes of acute super infection by imaging.  Overall not significantly changed in size from prior.   - no indication for urgent intervention at this time - we will watch over time to see how this collection progresses and if any intervention is warranted  - on merrem per ID for pancreatic tissue penetration     Diarrhea  - GI panel, stool for C diff negative  -  pancreatic elastase pending  - questran trial  - simethicone and dicyclomine PRN  - will add creon 1 capsule with meals     4. Hiccups  - renal indices wnl. D/w nephrology NP - ok to give baclofen as long as renal function is normal. Will change this to scheduled instead of PRN.   - will add pantoprazole 40mg qd as patient states he is on acid suppression medication at home     JOSEFINA JaffeC  Gastroenterology  [facility]

## 2024-01-12 NOTE — PROGRESS NOTES
[facility]  Hospital Medicine Progress Note        Chief Complaint: Inpatient Follow-up    HPI:   68-year-old male with significant history of type 2 diabetes mellitus, HTN, chronic diastolic heart failure, HLD, GERD and  hydrocele.  Patient had a recent 19 day hospitalization for acute necrotizing pancreatitis secondary to gallstones/choledocholithiasis, severe sepsis with Enterococcus bacteremia, acute kidney injury, acute hypoxemic respiratory failure. patient had cholecystectomy in 2022, underwent ERCP this previous hospitalization with stone and sludge removal and sphincterotomy.  Repeat CT abdomen pelvis showed continued findings of necrotizing pancreatitis with possible organizing fluid collection around pancreatic neck and body.  GI recommended repeat CT in 3 months.  Patient was treated with meropenem, ampicillin while in-house which was discontinued upon DC.  He completed 14 day course while in-house.  Patient remained symptomatic even after DC.  Patient continued with abdominal discomfort, nausea, unable to keep anything down mouth .  also reports diarrhea, unquantified unintentional weight loss.  Patient was febrile and tachycardic.  Lab significant for leukocytosis, acute kidney injury/dehydration.  Lipase was elevated.  Patient was initiated on conservative management with IV fluids, NPO and GI services consulted, admitted to hospitalist medicine service.  CT abdomen pelvis repeated-overall similar findings compared to before with persistent fluid collection/necrotizing pancreatitis.  Stool studies ordered given diarrhea.  Given concern for necrotizing pancreatitis decided to initiate meropenem.  GI evaluated, CT findings similar to prior and therefore no interventions planned, recommended conservative management.  Diet advanced to clear liquids on 01/10.  Persistent diarrhea as of 1/11, stool studies negative, added Questran, Merrem continued for necrotizing pancreatitis,  GI following     Interval Hx:     Patient seen at bedside .  Comfortably sitting up in bed, abdominal discomfort, nausea and vomiting better, but still having loose bowels after each liquid meal, also complaining of hiccups, no other new complaints, afebrile and hemodynamics are stable    Objective/physical exam:  General: In no acute distress, afebrile  Chest: Clear to auscultation bilaterally  Heart: S1, S2, no appreciable murmur  Abdomen: Soft, nontender today, BS +  MSK: Warm, no lower extremity edema, no clubbing or cyanosis  Neurologic: Alert and oriented x4, moving all extremities with good strength     VITAL SIGNS: 24 HRS MIN & MAX LAST   Temp  Min: 97.8 °F (36.6 °C)  Max: 100.8 °F (38.2 °C) 99.4 °F (37.4 °C)   BP  Min: 116/70  Max: 148/75 137/75   Pulse  Min: 84  Max: 108  96   Resp  Min: 14  Max: 18 18   SpO2  Min: 90 %  Max: 95 % (!) 90 %       Recent Labs   Lab 01/12/24  0545   WBC 6.38   RBC 3.56*   HGB 10.7*   HCT 34.0*   MCV 95.5*   MCH 30.1   MCHC 31.5*   RDW 13.5      MPV 10.2         Recent Labs   Lab 01/12/24  0545      K 3.9   CO2 29   BUN 13.4   CREATININE 0.78   CALCIUM 8.5*   ALBUMIN 1.7*   ALKPHOS 75   ALT 18   AST 26   BILITOT 0.7          Microbiology Results (last 7 days)       Procedure Component Value Units Date/Time    Blood Culture [[id number]]  (Normal) Collected: 01/09/24 1008    Order Status: Completed Specimen: Blood Updated: 01/11/24 1101     CULTURE, BLOOD ([facility]) No Growth At 48 Hours    Blood Culture [[id number]]  (Normal) Collected: 01/09/24 1008    Order Status: Completed Specimen: Blood Updated: 01/11/24 1101     CULTURE, BLOOD ([facility]) No Growth At 48 Hours    Stool Culture **CANNOT BE ORDERED STAT** [[id number]]  (Normal) Collected: 01/10/24 0341    Order Status: Completed Specimen: Stool Updated: 01/11/24 0922     Stool Culture Negative for Salmonella, Shigella, Campylobacter, Vibrio, Aeromonas, Pleisiomonas,Yersinia, or Shiga Toxin 1 and 2.    Blood Culture  [[id number]]  (Normal) Collected: 01/10/24 0715    Order Status: Completed Specimen: Blood Updated: 01/11/24 0900     CULTURE, BLOOD ([facility]) No Growth At 24 Hours    Blood Culture [[id number]]  (Normal) Collected: 01/10/24 0715    Order Status: Completed Specimen: Blood Updated: 01/11/24 0900     CULTURE, BLOOD ([facility]) No Growth At 24 Hours    Clostridium Diff Toxin, A & B, EIA [[id number]]  (Normal) Collected: 01/10/24 0341    Order Status: Completed Specimen: Stool Updated: 01/10/24 1124     Clostridium Difficile GDH Antigen Negative     Clostridium Difficile Toxin A/B Negative             Scheduled Med:   aspirin  81 mg Oral Daily    atorvastatin  20 mg Oral Daily    cholestyramine-aspartame  1 packet Oral BID    enoxparin  40 mg Subcutaneous Q24H (prophylaxis, 1700)    fenofibrate  145 mg Oral Daily    fluticasone propionate  2 spray Each Nostril Daily    meropenem (MERREM) IVPB  1 g Intravenous Q8H    metoprolol succinate  25 mg Oral Daily          Assessment/Plan:    Acute onset diarrhea-likely noninfectious  Acute on chronic necrotizing pancreatitis with peripancreatic fluid collection  Sirs with fever spike, leukocytosis and tachycardia secondary to above-improved  Acute kidney injury secondary to dehydration-improved  Recent Enterococcus bacteremia secondary to necrotizing pancreatitis-completed treatment  Recent choledocholithiasis status post ERCP, sphincterotomy in December, 2023  History of essential HTN-now borderline low BP   Type 2 diabetes mellitus-stable   Chronic diastolic heart failure-appears compensated  HLD  GERD  Scrotal hydrocele   Prophylaxis      Symptomatically better except for persistent diarrhea   Infectious etiology ruled out   Initiated Dianna on 01/11  Continue full liquids for today, plan to advance to low residue tomorrow  Keep IV fluid resuscitation with Ringer lactate  GI and Infectious Disease following   Both agree with meropenem  Per ID he  might benefit from necrosectomy once  inflammation resolves  GI has added pancreatic enzymes psoriasis each meal  No indication for fluid drainage per GI since CT findings are stable compared to prior  he was treated with Merrem/ampicillin during previous hospitalization for necrotizing pancreatitis with Enterococcus bacteremia   Hold home amlodipine since blood pressure is normal   Cautiously continue Toprol-XL   Continue other home meds-aspirin, statin and fenofibrate  P.r.n. Bentyl for abdominal cramps  Baclofen scheduled for hiccups  DVT prophylaxis- subQ Lovenox      Lisa Willoughby MD   01/12/2024

## 2024-01-13 LAB
ALBUMIN SERPL-MCNC: 1.6 G/DL (ref 3.4–4.8)
ALBUMIN/GLOB SERPL: 0.5 RATIO (ref 1.1–2)
ALP SERPL-CCNC: 67 UNIT/L (ref 40–150)
ALT SERPL-CCNC: 16 UNIT/L (ref 0–55)
AST SERPL-CCNC: 26 UNIT/L (ref 5–34)
BASOPHILS # BLD AUTO: 0.07 X10(3)/MCL
BASOPHILS NFR BLD AUTO: 0.9 %
BILIRUB SERPL-MCNC: 0.7 MG/DL
BUN SERPL-MCNC: 12.7 MG/DL (ref 8.4–25.7)
CALCIUM SERPL-MCNC: 8.1 MG/DL (ref 8.8–10)
CHLORIDE SERPL-SCNC: 100 MMOL/L (ref 98–107)
CO2 SERPL-SCNC: 29 MMOL/L (ref 23–31)
CREAT SERPL-MCNC: 0.79 MG/DL (ref 0.73–1.18)
EOSINOPHIL # BLD AUTO: 0.08 X10(3)/MCL (ref 0–0.9)
EOSINOPHIL NFR BLD AUTO: 1 %
ERYTHROCYTE [DISTWIDTH] IN BLOOD BY AUTOMATED COUNT: 13.9 % (ref 11.5–17)
GFR SERPLBLD CREATININE-BSD FMLA CKD-EPI: >60 MLS/MIN/1.73/M2
GLOBULIN SER-MCNC: 3.4 GM/DL (ref 2.4–3.5)
GLUCOSE SERPL-MCNC: 103 MG/DL (ref 82–115)
HCT VFR BLD AUTO: 32 % (ref 42–52)
HGB BLD-MCNC: 10.3 G/DL (ref 14–18)
IMM GRANULOCYTES # BLD AUTO: 0.03 X10(3)/MCL (ref 0–0.04)
IMM GRANULOCYTES NFR BLD AUTO: 0.4 %
LYMPHOCYTES # BLD AUTO: 1.39 X10(3)/MCL (ref 0.6–4.6)
LYMPHOCYTES NFR BLD AUTO: 17.1 %
MCH RBC QN AUTO: 30 PG (ref 27–31)
MCHC RBC AUTO-ENTMCNC: 32.2 G/DL (ref 33–36)
MCV RBC AUTO: 93.3 FL (ref 80–94)
MONOCYTES # BLD AUTO: 1.5 X10(3)/MCL (ref 0.1–1.3)
MONOCYTES NFR BLD AUTO: 18.5 %
NEUTROPHILS # BLD AUTO: 5.04 X10(3)/MCL (ref 2.1–9.2)
NEUTROPHILS NFR BLD AUTO: 62.1 %
NRBC BLD AUTO-RTO: 0 %
PLATELET # BLD AUTO: 213 X10(3)/MCL (ref 130–400)
PMV BLD AUTO: 10.1 FL (ref 7.4–10.4)
POCT GLUCOSE: 102 MG/DL (ref 70–110)
POCT GLUCOSE: 149 MG/DL (ref 70–110)
POCT GLUCOSE: 153 MG/DL (ref 70–110)
POCT GLUCOSE: 157 MG/DL (ref 70–110)
POTASSIUM SERPL-SCNC: 4.1 MMOL/L (ref 3.5–5.1)
PROT SERPL-MCNC: 5 GM/DL (ref 5.8–7.6)
RBC # BLD AUTO: 3.43 X10(6)/MCL (ref 4.7–6.1)
SODIUM SERPL-SCNC: 134 MMOL/L (ref 136–145)
WBC # SPEC AUTO: 8.11 X10(3)/MCL (ref 4.5–11.5)

## 2024-01-13 PROCEDURE — 85025 COMPLETE CBC W/AUTO DIFF WBC: CPT | Performed by: INTERNAL MEDICINE

## 2024-01-13 PROCEDURE — 25000003 PHARM REV CODE 250: Performed by: INTERNAL MEDICINE

## 2024-01-13 PROCEDURE — 25000003 PHARM REV CODE 250: Performed by: NURSE PRACTITIONER

## 2024-01-13 PROCEDURE — 21400001 HC TELEMETRY ROOM

## 2024-01-13 PROCEDURE — 80053 COMPREHEN METABOLIC PANEL: CPT | Performed by: INTERNAL MEDICINE

## 2024-01-13 PROCEDURE — 63600175 PHARM REV CODE 636 W HCPCS: Mod: JZ,JG | Performed by: NURSE PRACTITIONER

## 2024-01-13 PROCEDURE — 27000207 HC ISOLATION

## 2024-01-13 PROCEDURE — 63600175 PHARM REV CODE 636 W HCPCS: Performed by: INTERNAL MEDICINE

## 2024-01-13 PROCEDURE — 25000003 PHARM REV CODE 250

## 2024-01-13 RX ORDER — CHOLESTYRAMINE 4 G/4.8G
1 POWDER, FOR SUSPENSION ORAL 3 TIMES DAILY
Status: DISCONTINUED | OUTPATIENT
Start: 2024-01-13 | End: 2024-01-14

## 2024-01-13 RX ADMIN — ONDANSETRON 4 MG: 2 INJECTION INTRAMUSCULAR; INTRAVENOUS at 09:01

## 2024-01-13 RX ADMIN — CHOLESTYRAMINE 4 G: 4 POWDER, FOR SUSPENSION ORAL at 08:01

## 2024-01-13 RX ADMIN — METOPROLOL SUCCINATE 25 MG: 25 TABLET, EXTENDED RELEASE ORAL at 08:01

## 2024-01-13 RX ADMIN — MORPHINE SULFATE 2 MG: 4 INJECTION, SOLUTION INTRAMUSCULAR; INTRAVENOUS at 09:01

## 2024-01-13 RX ADMIN — BACLOFEN 5 MG: 5 TABLET ORAL at 08:01

## 2024-01-13 RX ADMIN — ONDANSETRON 4 MG: 2 INJECTION INTRAMUSCULAR; INTRAVENOUS at 03:01

## 2024-01-13 RX ADMIN — MORPHINE SULFATE 2 MG: 4 INJECTION, SOLUTION INTRAMUSCULAR; INTRAVENOUS at 08:01

## 2024-01-13 RX ADMIN — MORPHINE SULFATE 2 MG: 4 INJECTION, SOLUTION INTRAMUSCULAR; INTRAVENOUS at 03:01

## 2024-01-13 RX ADMIN — BACLOFEN 5 MG: 5 TABLET ORAL at 03:01

## 2024-01-13 RX ADMIN — MELATONIN TAB 3 MG 9 MG: 3 TAB at 08:01

## 2024-01-13 RX ADMIN — ENOXAPARIN SODIUM 40 MG: 100 INJECTION SUBCUTANEOUS at 04:01

## 2024-01-13 RX ADMIN — ATORVASTATIN CALCIUM 20 MG: 10 TABLET, FILM COATED ORAL at 08:01

## 2024-01-13 RX ADMIN — FLUTICASONE PROPIONATE 100 MCG: 50 SPRAY, METERED NASAL at 08:01

## 2024-01-13 RX ADMIN — FENOFIBRATE 145 MG: 145 TABLET, FILM COATED ORAL at 08:01

## 2024-01-13 RX ADMIN — PANCRELIPASE 3 CAPSULE: 60000; 12000; 38000 CAPSULE, DELAYED RELEASE PELLETS ORAL at 12:01

## 2024-01-13 RX ADMIN — PANCRELIPASE 3 CAPSULE: 60000; 12000; 38000 CAPSULE, DELAYED RELEASE PELLETS ORAL at 04:01

## 2024-01-13 RX ADMIN — ASPIRIN 81 MG CHEWABLE TABLET 81 MG: 81 TABLET CHEWABLE at 08:01

## 2024-01-13 RX ADMIN — LEUCINE, PHENYLALANINE, LYSINE, METHIONINE, ISOLEUCINE, VALINE, HISTIDINE, THREONINE, TRYPTOPHAN, ALANINE, GLYCINE, ARGININE, PROLINE, SERINE, TYROSINE, SODIUM ACETATE, DIBASIC POTASSIUM PHOSPHATE, MAGNESIUM CHLORIDE, SODIUM CHLORIDE, CALCIUM CHLORIDE, DEXTROSE
311; 238; 247; 170; 255; 247; 204; 179; 77; 880; 438; 489; 289; 213; 17; 297; 261; 51; 77; 33; 5 INJECTION INTRAVENOUS at 10:01

## 2024-01-13 RX ADMIN — PANTOPRAZOLE SODIUM 40 MG: 40 TABLET, DELAYED RELEASE ORAL at 08:01

## 2024-01-13 RX ADMIN — MEROPENEM 1 G: 1 INJECTION, POWDER, FOR SOLUTION INTRAVENOUS at 09:01

## 2024-01-13 RX ADMIN — MEROPENEM 1 G: 1 INJECTION, POWDER, FOR SOLUTION INTRAVENOUS at 01:01

## 2024-01-13 RX ADMIN — MEROPENEM 1 G: 1 INJECTION, POWDER, FOR SOLUTION INTRAVENOUS at 05:01

## 2024-01-13 RX ADMIN — CHOLESTYRAMINE 4 G: 4 POWDER, FOR SUSPENSION ORAL at 02:01

## 2024-01-13 RX ADMIN — PANCRELIPASE 1 CAPSULE: 60000; 12000; 38000 CAPSULE, DELAYED RELEASE PELLETS ORAL at 06:01

## 2024-01-13 NOTE — PROGRESS NOTES
[facility]  Hospital Medicine Progress Note        Chief Complaint: Inpatient Follow-up    HPI:   68-year-old male with significant history of type 2 diabetes mellitus, HTN, chronic diastolic heart failure, HLD, GERD and  hydrocele.  Patient had a recent 19 day hospitalization for acute necrotizing pancreatitis secondary to gallstones/choledocholithiasis, severe sepsis with Enterococcus bacteremia, acute kidney injury, acute hypoxemic respiratory failure. patient had cholecystectomy in 2022, underwent ERCP this previous hospitalization with stone and sludge removal and sphincterotomy.  Repeat CT abdomen pelvis showed continued findings of necrotizing pancreatitis with possible organizing fluid collection around pancreatic neck and body.  GI recommended repeat CT in 3 months.  Patient was treated with meropenem, ampicillin while in-house which was discontinued upon DC.  He completed 14 day course while in-house.  Patient remained symptomatic even after DC.  Patient continued with abdominal discomfort, nausea, unable to keep anything down mouth .  also reports diarrhea, unquantified unintentional weight loss.  Patient was febrile and tachycardic.  Lab significant for leukocytosis, acute kidney injury/dehydration.  Lipase was elevated.  Patient was initiated on conservative management with IV fluids, NPO and GI services consulted, admitted to hospitalist medicine service.  CT abdomen pelvis repeated-overall similar findings compared to before with persistent fluid collection/necrotizing pancreatitis.  Stool studies ordered given diarrhea.  Given concern for necrotizing pancreatitis decided to initiate meropenem.  GI evaluated, CT findings similar to prior and therefore no interventions planned, recommended conservative management.  Diet advanced to clear liquids on 01/10.  Persistent diarrhea as of 1/11, stool studies negative, added Questran, Merrem continued for necrotizing pancreatitis,  GI following.  Infectious Disease evaluated, recommended possible necrosectomy once inflammation resolves     Interval Hx:     Patient seen at bedside .  Still having loose stools, but frequency decreasing, still with extremely poor appetite, intermittent abdominal discomfort, no nausea or vomiting.  Patient reports he does not feel like eating      Objective/physical exam:  General: In no acute distress, afebrile  Chest: Clear to auscultation bilaterally  Heart: S1, S2, no appreciable murmur  Abdomen: Soft, nontender today, BS +  MSK: Warm, no lower extremity edema, no clubbing or cyanosis  Neurologic: Alert and oriented x4, moving all extremities with good strength     VITAL SIGNS: 24 HRS MIN & MAX LAST   Temp  Min: 98.4 °F (36.9 °C)  Max: 99.3 °F (37.4 °C) 98.4 °F (36.9 °C)   BP  Min: 136/69  Max: 149/68 (!) 149/68   Pulse  Min: 83  Max: 93  83   Resp  Min: 17  Max: 18 17   SpO2  Min: 93 %  Max: 95 % (!) 94 %       Recent Labs   Lab 01/13/24  0601   WBC 8.11   RBC 3.43*   HGB 10.3*   HCT 32.0*   MCV 93.3   MCH 30.0   MCHC 32.2*   RDW 13.9      MPV 10.1         Recent Labs   Lab 01/13/24  0601   *   K 4.1   CO2 29   BUN 12.7   CREATININE 0.79   CALCIUM 8.1*   ALBUMIN 1.6*   ALKPHOS 67   ALT 16   AST 26   BILITOT 0.7          Microbiology Results (last 7 days)       Procedure Component Value Units Date/Time    Blood Culture [[id number]]  (Normal) Collected: 01/09/24 1008    Order Status: Completed Specimen: Blood Updated: 01/12/24 1101     CULTURE, BLOOD ([facility]) No Growth At 72 Hours    Blood Culture [[id number]]  (Normal) Collected: 01/09/24 1008    Order Status: Completed Specimen: Blood Updated: 01/12/24 1101     CULTURE, BLOOD ([facility]) No Growth At 72 Hours    Stool Culture **CANNOT BE ORDERED STAT** [[id number]]  (Normal) Collected: 01/10/24 0341    Order Status: Completed Specimen: Stool Updated: 01/12/24 0912     Stool Culture Negative for Salmonella, Shigella, Campylobacter, Vibrio, Aeromonas,  Pleisiomonas,Yersinia, or Shiga Toxin 1 and 2.    Blood Culture [[id number]]  (Normal) Collected: 01/10/24 0715    Order Status: Completed Specimen: Blood Updated: 01/12/24 0901     CULTURE, BLOOD ([facility]) No Growth At 48 Hours    Blood Culture [[id number]]  (Normal) Collected: 01/10/24 0715    Order Status: Completed Specimen: Blood Updated: 01/12/24 0901     CULTURE, BLOOD ([facility]) No Growth At 48 Hours    Clostridium Diff Toxin, A & B, EIA [[id number]]  (Normal) Collected: 01/10/24 0341    Order Status: Completed Specimen: Stool Updated: 01/10/24 1124     Clostridium Difficile GDH Antigen Negative     Clostridium Difficile Toxin A/B Negative             Scheduled Med:   aspirin  81 mg Oral Daily    atorvastatin  20 mg Oral Daily    baclofen  5 mg Oral TID    cholestyramine-aspartame  1 packet Oral BID    enoxparin  40 mg Subcutaneous Q24H (prophylaxis, 1700)    fenofibrate  145 mg Oral Daily    fluticasone propionate  2 spray Each Nostril Daily    lipase-protease-amylase 12,000-38,000-60,000 units  1 capsule Oral TID WM    meropenem (MERREM) IVPB  1 g Intravenous Q8H    metoprolol succinate  25 mg Oral Daily    pantoprazole  40 mg Oral Daily          Assessment/Plan:    Acute onset diarrhea-likely noninfectious  Acute on chronic necrotizing pancreatitis with peripancreatic fluid collection  Sirs with fever spike, leukocytosis and tachycardia secondary to above-improved  Acute kidney injury secondary to dehydration-improved  Recent Enterococcus bacteremia secondary to necrotizing pancreatitis-completed treatment  Recent choledocholithiasis status post ERCP, sphincterotomy in December, 2023  History of essential HTN-now borderline low BP   Type 2 diabetes mellitus-stable   Chronic diastolic heart failure-appears compensated  HLD  GERD  Scrotal hydrocele   Prophylaxis    Oral intake extremely poor   Diarrhea persist, but frequency is better   Infectious causes ruled out   I will increase Questran to 3 times a  day  Continue p.r.n. Bentyl, baclofen for hiccups  Patient reports he has not ready to be advanced to solid food  Keep full liquid and I have added Clinimix for nutrition  On pancreatic enzymes  GI and Infectious Disease following   Both agree with meropenem  Per ID he might benefit from necrosectomy once inflammation resolves  No indication for fluid drainage per GI since CT findings are stable compared to prior  he was treated with Merrem/ampicillin during previous hospitalization for necrotizing pancreatitis with Enterococcus bacteremia   Hold home amlodipine since blood pressure is normal   Cautiously continue Toprol-XL   Continue other home meds-aspirin, statin and fenofibrate  P.r.n. Bentyl for abdominal cramps  Baclofen scheduled for hiccups  DVT prophylaxis- subQ Lovenox      Lisa Willoughby MD   01/13/2024

## 2024-01-13 NOTE — PLAN OF CARE
Problem: Adult Inpatient Plan of Care  Goal: Plan of Care Review  1/13/2024 1423 by Pam Euceda LPN  Outcome: Ongoing, Progressing  1/13/2024 1422 by Pam Euceda LPN  Outcome: Ongoing, Progressing  Goal: Patient-Specific Goal (Individualized)  1/13/2024 1423 by Pam Euceda LPN  Outcome: Ongoing, Progressing  1/13/2024 1422 by Pam Euceda LPN  Outcome: Ongoing, Progressing  Goal: Absence of Hospital-Acquired Illness or Injury  1/13/2024 1423 by Pam Euceda LPN  Outcome: Ongoing, Progressing  1/13/2024 1422 by Pam Euceda LPN  Outcome: Ongoing, Progressing  Goal: Optimal Comfort and Wellbeing  1/13/2024 1423 by Pam Euceda LPN  Outcome: Ongoing, Progressing  1/13/2024 1422 by Pam Euceda LPN  Outcome: Ongoing, Progressing  Goal: Readiness for Transition of Care  1/13/2024 1423 by Pam Euceda LPN  Outcome: Ongoing, Progressing  1/13/2024 1422 by Pam Euceda LPN  Outcome: Ongoing, Progressing     Problem: Diabetes Comorbidity  Goal: Blood Glucose Level Within Targeted Range  1/13/2024 1423 by Pam Euceda LPN  Outcome: Ongoing, Progressing  1/13/2024 1422 by Pam Euceda LPN  Outcome: Ongoing, Progressing     Problem: Infection  Goal: Absence of Infection Signs and Symptoms  1/13/2024 1423 by Pam Euceda LPN  Outcome: Ongoing, Progressing  1/13/2024 1422 by Pam Euceda LPN  Outcome: Ongoing, Progressing

## 2024-01-13 NOTE — PROGRESS NOTES
"[facility]    CC: pancreatitis    HPI 68 y.o. male with necrotizing, acute on chronic, improving pancreatitis with associated fluid collection. No fevers overnight.  Overall, he seems to be feeling better.     Past Medical History  Past Medical History:   Diagnosis Date    DM (diabetes mellitus)     GERD (gastroesophageal reflux disease)     HLD (hyperlipidemia)     HTN (hypertension)          Review of Systems  General ROS: negative for chills, fever or weight loss  Cardiovascular ROS: no chest pain or dyspnea on exertion  Gastrointestinal ROS: mild intermittent abdominal pain, no overt bleeding, no distension    Physical Examination  /71   Pulse 81   Temp 98.6 °F (37 °C) (Oral)   Resp 18   Ht 5' 6" (1.676 m)   Wt 59 kg (130 lb)   SpO2 96%   BMI 20.98 kg/m²   General appearance: alert, cooperative, no distress  HENT: Normocephalic, atraumatic, neck symmetrical, no nasal discharge   Lungs: clear to auscultation bilaterally, no dullness to percussion bilaterally  Heart: regular rate and rhythm without rub; no displacement of the PMI   Abdomen: soft, non-tender; bowel sounds normoactive; no organomegaly  Extremities: extremities symmetric; no clubbing, cyanosis, or edema  Neurologic: Alert and oriented X 3, normal strength, normal coordination and gait    Labs:  Lab Results   Component Value Date    WBC 8.11 01/13/2024    HGB 10.3 (L) 01/13/2024    HCT 32.0 (L) 01/13/2024    MCV 93.3 01/13/2024     01/13/2024           Imaging:    I have personally reviewed and interpreted these images.    Assessment:   68 year old male with interstitial pancreatitis, with fluid collection.  On meropenem.  At this point, no role for drainage.  He seems to be improving.  Advance diet as tolerated and plan for reimaging in 2-3 months to assess fluid collection.      Plan:  Advance diet as tolerated  Continue merrem while inpatient  Discharge when more tolerant of PO and pain improved    Will " see Monday.  Call if needed tomorrow.       SHASHI Turner Jr., M.D.  [facility]

## 2024-01-14 LAB
ABS NEUT (OLG): 7.69 X10(3)/MCL (ref 2.1–9.2)
ALBUMIN SERPL-MCNC: 1.9 G/DL (ref 3.4–4.8)
ALBUMIN/GLOB SERPL: 0.5 RATIO (ref 1.1–2)
ALP SERPL-CCNC: 94 UNIT/L (ref 40–150)
ALT SERPL-CCNC: 18 UNIT/L (ref 0–55)
AST SERPL-CCNC: 33 UNIT/L (ref 5–34)
BACTERIA BLD CULT: NORMAL
BACTERIA BLD CULT: NORMAL
BILIRUB SERPL-MCNC: 0.9 MG/DL
BUN SERPL-MCNC: 13.6 MG/DL (ref 8.4–25.7)
CALCIUM SERPL-MCNC: 8.6 MG/DL (ref 8.8–10)
CHLORIDE SERPL-SCNC: 96 MMOL/L (ref 98–107)
CO2 SERPL-SCNC: 31 MMOL/L (ref 23–31)
CREAT SERPL-MCNC: 0.8 MG/DL (ref 0.73–1.18)
ERYTHROCYTE [DISTWIDTH] IN BLOOD BY AUTOMATED COUNT: 13.7 % (ref 11.5–17)
GFR SERPLBLD CREATININE-BSD FMLA CKD-EPI: >60 MLS/MIN/1.73/M2
GLOBULIN SER-MCNC: 4.1 GM/DL (ref 2.4–3.5)
GLUCOSE SERPL-MCNC: 131 MG/DL (ref 82–115)
HCT VFR BLD AUTO: 38.3 % (ref 42–52)
HGB BLD-MCNC: 12.4 G/DL (ref 14–18)
INSTRUMENT WBC (OLG): 8.54 X10(3)/MCL
LYMPHOCYTES NFR BLD MANUAL: 0.34 X10(3)/MCL
LYMPHOCYTES NFR BLD MANUAL: 4 %
MCH RBC QN AUTO: 29.7 PG (ref 27–31)
MCHC RBC AUTO-ENTMCNC: 32.4 G/DL (ref 33–36)
MCV RBC AUTO: 91.8 FL (ref 80–94)
MONOCYTES NFR BLD MANUAL: 0.51 X10(3)/MCL (ref 0.1–1.3)
MONOCYTES NFR BLD MANUAL: 6 %
NEUTROPHILS NFR BLD MANUAL: 90 %
NRBC BLD AUTO-RTO: 0 %
PLATELET # BLD AUTO: 285 X10(3)/MCL (ref 130–400)
PLATELET # BLD EST: NORMAL 10*3/UL
PMV BLD AUTO: 10.6 FL (ref 7.4–10.4)
POCT GLUCOSE: 118 MG/DL (ref 70–110)
POCT GLUCOSE: 133 MG/DL (ref 70–110)
POCT GLUCOSE: 161 MG/DL (ref 70–110)
POTASSIUM SERPL-SCNC: 4.4 MMOL/L (ref 3.5–5.1)
PROT SERPL-MCNC: 6 GM/DL (ref 5.8–7.6)
RBC # BLD AUTO: 4.17 X10(6)/MCL (ref 4.7–6.1)
RBC MORPH BLD: NORMAL
SODIUM SERPL-SCNC: 134 MMOL/L (ref 136–145)
WBC # SPEC AUTO: 8.54 X10(3)/MCL (ref 4.5–11.5)

## 2024-01-14 PROCEDURE — 63600175 PHARM REV CODE 636 W HCPCS: Performed by: INTERNAL MEDICINE

## 2024-01-14 PROCEDURE — 25000003 PHARM REV CODE 250: Performed by: INTERNAL MEDICINE

## 2024-01-14 PROCEDURE — 63600175 PHARM REV CODE 636 W HCPCS: Mod: JZ,JG | Performed by: NURSE PRACTITIONER

## 2024-01-14 PROCEDURE — 80053 COMPREHEN METABOLIC PANEL: CPT | Performed by: INTERNAL MEDICINE

## 2024-01-14 PROCEDURE — 25000003 PHARM REV CODE 250

## 2024-01-14 PROCEDURE — 21400001 HC TELEMETRY ROOM

## 2024-01-14 PROCEDURE — 27000207 HC ISOLATION

## 2024-01-14 PROCEDURE — 85027 COMPLETE CBC AUTOMATED: CPT | Performed by: INTERNAL MEDICINE

## 2024-01-14 PROCEDURE — 25500020 PHARM REV CODE 255: Performed by: INTERNAL MEDICINE

## 2024-01-14 PROCEDURE — 25000003 PHARM REV CODE 250: Performed by: NURSE PRACTITIONER

## 2024-01-14 RX ORDER — CHLORPROMAZINE HYDROCHLORIDE 25 MG/1
25 TABLET, FILM COATED ORAL 3 TIMES DAILY PRN
Status: DISCONTINUED | OUTPATIENT
Start: 2024-01-14 | End: 2024-01-20 | Stop reason: HOSPADM

## 2024-01-14 RX ORDER — CHOLESTYRAMINE 4 G/4.8G
1 POWDER, FOR SUSPENSION ORAL DAILY
Status: DISCONTINUED | OUTPATIENT
Start: 2024-01-15 | End: 2024-01-14

## 2024-01-14 RX ORDER — HYDROCODONE BITARTRATE AND ACETAMINOPHEN 10; 325 MG/1; MG/1
1 TABLET ORAL EVERY 4 HOURS PRN
Status: DISCONTINUED | OUTPATIENT
Start: 2024-01-14 | End: 2024-01-20 | Stop reason: HOSPADM

## 2024-01-14 RX ORDER — GABAPENTIN 100 MG/1
100 CAPSULE ORAL 2 TIMES DAILY
Status: DISCONTINUED | OUTPATIENT
Start: 2024-01-14 | End: 2024-01-17

## 2024-01-14 RX ORDER — FUROSEMIDE 10 MG/ML
20 INJECTION INTRAMUSCULAR; INTRAVENOUS DAILY
Status: DISCONTINUED | OUTPATIENT
Start: 2024-01-14 | End: 2024-01-15

## 2024-01-14 RX ADMIN — METOPROLOL SUCCINATE 25 MG: 25 TABLET, EXTENDED RELEASE ORAL at 09:01

## 2024-01-14 RX ADMIN — PANTOPRAZOLE SODIUM 40 MG: 40 TABLET, DELAYED RELEASE ORAL at 09:01

## 2024-01-14 RX ADMIN — MORPHINE SULFATE 2 MG: 4 INJECTION, SOLUTION INTRAMUSCULAR; INTRAVENOUS at 09:01

## 2024-01-14 RX ADMIN — MEROPENEM 1 G: 1 INJECTION, POWDER, FOR SOLUTION INTRAVENOUS at 06:01

## 2024-01-14 RX ADMIN — MORPHINE SULFATE 2 MG: 4 INJECTION, SOLUTION INTRAMUSCULAR; INTRAVENOUS at 03:01

## 2024-01-14 RX ADMIN — IOHEXOL 94 ML: 350 INJECTION, SOLUTION INTRAVENOUS at 10:01

## 2024-01-14 RX ADMIN — ENOXAPARIN SODIUM 40 MG: 100 INJECTION SUBCUTANEOUS at 05:01

## 2024-01-14 RX ADMIN — HYDROCODONE BITARTRATE AND ACETAMINOPHEN 1 TABLET: 10; 325 TABLET ORAL at 11:01

## 2024-01-14 RX ADMIN — LEUCINE, PHENYLALANINE, LYSINE, METHIONINE, ISOLEUCINE, VALINE, HISTIDINE, THREONINE, TRYPTOPHAN, ALANINE, GLYCINE, ARGININE, PROLINE, SERINE, TYROSINE, SODIUM ACETATE, DIBASIC POTASSIUM PHOSPHATE, MAGNESIUM CHLORIDE, SODIUM CHLORIDE, CALCIUM CHLORIDE, DEXTROSE
311; 238; 247; 170; 255; 247; 204; 179; 77; 880; 438; 489; 289; 213; 17; 297; 261; 51; 77; 33; 5 INJECTION INTRAVENOUS at 04:01

## 2024-01-14 RX ADMIN — HYDROCODONE BITARTRATE AND ACETAMINOPHEN 1 TABLET: 10; 325 TABLET ORAL at 04:01

## 2024-01-14 RX ADMIN — PANCRELIPASE 3 CAPSULE: 60000; 12000; 38000 CAPSULE, DELAYED RELEASE PELLETS ORAL at 06:01

## 2024-01-14 RX ADMIN — ONDANSETRON 4 MG: 2 INJECTION INTRAMUSCULAR; INTRAVENOUS at 09:01

## 2024-01-14 RX ADMIN — MELATONIN TAB 3 MG 9 MG: 3 TAB at 09:01

## 2024-01-14 RX ADMIN — CHOLESTYRAMINE 4 G: 4 POWDER, FOR SUSPENSION ORAL at 09:01

## 2024-01-14 RX ADMIN — ATORVASTATIN CALCIUM 20 MG: 10 TABLET, FILM COATED ORAL at 09:01

## 2024-01-14 RX ADMIN — GABAPENTIN 100 MG: 100 CAPSULE ORAL at 09:01

## 2024-01-14 RX ADMIN — PANCRELIPASE 3 CAPSULE: 60000; 12000; 38000 CAPSULE, DELAYED RELEASE PELLETS ORAL at 11:01

## 2024-01-14 RX ADMIN — FENOFIBRATE 145 MG: 145 TABLET, FILM COATED ORAL at 09:01

## 2024-01-14 RX ADMIN — LEUCINE, PHENYLALANINE, LYSINE, METHIONINE, ISOLEUCINE, VALINE, HISTIDINE, THREONINE, TRYPTOPHAN, ALANINE, GLYCINE, ARGININE, PROLINE, SERINE, TYROSINE, SODIUM ACETATE, DIBASIC POTASSIUM PHOSPHATE, MAGNESIUM CHLORIDE, SODIUM CHLORIDE, CALCIUM CHLORIDE, DEXTROSE
311; 238; 247; 170; 255; 247; 204; 179; 77; 880; 438; 489; 289; 213; 17; 297; 261; 51; 77; 33; 5 INJECTION INTRAVENOUS at 11:01

## 2024-01-14 RX ADMIN — ASPIRIN 81 MG CHEWABLE TABLET 81 MG: 81 TABLET CHEWABLE at 09:01

## 2024-01-14 RX ADMIN — MEROPENEM 1 G: 1 INJECTION, POWDER, FOR SOLUTION INTRAVENOUS at 09:01

## 2024-01-14 RX ADMIN — FUROSEMIDE 20 MG: 10 INJECTION, SOLUTION INTRAMUSCULAR; INTRAVENOUS at 04:01

## 2024-01-14 RX ADMIN — FLUTICASONE PROPIONATE 100 MCG: 50 SPRAY, METERED NASAL at 09:01

## 2024-01-14 RX ADMIN — HYDROCODONE BITARTRATE AND ACETAMINOPHEN 1 TABLET: 10; 325 TABLET ORAL at 09:01

## 2024-01-14 RX ADMIN — MEROPENEM 1 G: 1 INJECTION, POWDER, FOR SOLUTION INTRAVENOUS at 02:01

## 2024-01-14 NOTE — PLAN OF CARE
Reviewed. Still with intermittent low grade fevers.  Agree with repeat CT will follow up imaging to see if any changes that might suggest need for drainage.

## 2024-01-14 NOTE — PROGRESS NOTES
Inpatient Nutrition Assessment    Admit Date: 1/9/2024   Total duration of encounter: 5 days   Patient Age: 68 y.o.    Nutrition Recommendation/Prescription     - resume oral diet as medically appropriate; goal diet low residue  - may benefit from oral nutritional supplement once diet advanced and diarrhea resolved  - continue PPN if unable to tolerate oral diet: Clinimix E 4.25/5% @ 80ml/hr (653kcal, 82gm protein)    Communication of Recommendations: reviewed with nurse and reviewed with family    Nutrition Assessment     Malnutrition Assessment/Nutrition-Focused Physical Exam    Malnutrition Context: acute illness or injury (01/14/24 1411)  Malnutrition Level: moderate (01/14/24 1411)  Energy Intake (Malnutrition): less than 75% for greater than or equal to 1 month (01/14/24 1411)  Weight Loss (Malnutrition): greater than 7.5% in 3 months (01/14/24 1411)  Subcutaneous Fat (Malnutrition):  (unable to evaluate) (01/14/24 1411)           Muscle Mass (Malnutrition):  (unable to evaluate) (01/14/24 1411)                                   A minimum of two characteristics is recommended for diagnosis of either severe or non-severe malnutrition.    Chart Review    Reason Seen: continuous nutrition monitoring    Malnutrition Screening Tool Results   Have you recently lost weight without trying?: No  Have you been eating poorly because of a decreased appetite?: Yes   MST Score: 1   Diagnosis:  Acute onset diarrhea-likely noninfectious  Acute on chronic necrotizing pancreatitis with peripancreatic fluid collection  Sirs with fever spike, leukocytosis and tachycardia secondary to above-improved  Acute kidney injury secondary to dehydration-improved  Recent Enterococcus bacteremia secondary to necrotizing pancreatitis-completed treatment  Recent choledocholithiasis status post ERCP, sphincterotomy in December, 2023    Relevant Medical History:  type 2 diabetes mellitus, HTN, chronic diastolic heart failure, HLD, GERD and   hydrocele     Scheduled Medications:  aspirin, 81 mg, Daily  atorvastatin, 20 mg, Daily  [START ON 1/15/2024] cholestyramine-aspartame, 1 packet, Daily  enoxparin, 40 mg, Q24H (prophylaxis, 1700)  fenofibrate, 145 mg, Daily  fluticasone propionate, 2 spray, Daily  gabapentin, 100 mg, BID  lipase-protease-amylase 12,000-38,000-60,000 units, 3 capsule, TID WM  meropenem (MERREM) IVPB, 1 g, Q8H  metoprolol succinate, 25 mg, Daily  pantoprazole, 40 mg, Daily    Continuous Infusions:  Amino acid 4.25% - dextrose 5% (CLINIMIX-E) solution (1L provides 42.5 gm AA, 50 gm CHO (170 kcal/L dextrose), Na 35, K 30, Mg 5, Ca 4.5, Acetate 70, Cl 39, Phos 15), Last Rate: 75 mL/hr at 01/14/24 1150    PRN Medications: acetaminophen, dextrose 10%, dextrose 10%, dicyclomine, glucagon (human recombinant), hydrALAZINE, HYDROcodone-acetaminophen, insulin aspart U-100, melatonin, morphine, ondansetron, simethicone    Calorie Containing IV Medications: Clinimix    Recent Labs   Lab 01/09/24  1008 01/10/24  0715 01/11/24  0537 01/12/24  0545 01/13/24  0601 01/14/24  0618 01/14/24  0907   * 134* 135* 137 134* 134*  --    K 3.7 3.8 4.0 3.9 4.1 4.4  --    CALCIUM 9.0 8.5* 8.1* 8.5* 8.1* 8.6*  --    CHLORIDE 92* 98 100 101 100 96*  --    CO2 26 28 28 29 29 31  --    BUN 27.9* 18.9 13.9 13.4 12.7 13.6  --    CREATININE 1.73* 1.17 0.87 0.78 0.79 0.80  --    EGFRNORACEVR 43 >60 >60 >60 >60 >60  --    GLUCOSE 123* 115 91 97 103 131*  --    BILITOT 1.6*  --  0.9 0.7 0.7 0.9  --    ALKPHOS 112  --  75 75 67 94  --    ALT 20  --  19 18 16 18  --    AST 26  --  29 26 26 33  --    ALBUMIN 2.7*  --  1.8* 1.7* 1.6* 1.9*  --    TRIG 121  --   --   --   --   --   --    LIPASE 113*  --   --   --   --   --   --    WBC 16.86* 9.42 8.46  8.46 6.38 8.11  --  8.54  8.54   HGB 13.6* 11.4* 10.0* 10.7* 10.3*  --  12.4*   HCT 41.0* 34.4* 32.2* 34.0* 32.0*  --  38.3*     Nutrition Orders:  Diet NPO Except for: Medication  Amino acid 4.25% - dextrose 5%  "(CLINIMIX-E) solution (1L provides 42.5 gm AA, 50 gm CHO (170 kcal/L dextrose), Na 35, K 30, Mg 5, Ca 4.5, Acetate 70, Cl 39, Phos 15)      Appetite/Oral Intake: NPO/not applicable  Factors Affecting Nutritional Intake: abdominal pain, diarrhea, and vomiting  Food/Scientology/Cultural Preferences: none reported  Food Allergies: no known food allergies  Last Bowel Movement: 24  Wound(s):      Comments    24 pt using restroom, wife reports loose stools improving, still with increased abdominal pain. Currently NPO due to procedure this morning; says pt ate a little of supper last night. Weight loss since August noted in EMR, wife reports unintentional weight loss also. Recently admitted for about 2.5 weeks due to pancreatitis just discharged at the beginning of the month. 3 days prior to this admit started having abdominal pain, vomiting, diarrhea. PPN running.    Anthropometrics    Height: 5' 5.98" (167.6 cm),    Last Weight: 59 kg (130 lb) (24 1410), Weight Method: Bed Scale  BMI (Calculated): 21  BMI Classification: underweight (BMI less than 22 if >65 years of age)        Ideal Body Weight (IBW), Male: 141.88 lb     % Ideal Body Weight, Male (lb): 91.63 %                 Usual Body Weight (UBW), k.1 kg  % Usual Body Weight: 86.77  % Weight Change From Usual Weight: -13.41 %  Usual Weight Provided By: EMR weight history    Wt Readings from Last 5 Encounters:   24 59 kg (130 lb)   24 61.5 kg (135 lb 9.3 oz)   23 68.1 kg (150 lb 3.2 oz)   23 72.6 kg (160 lb)   23 70.6 kg (155 lb 11.2 oz)     Weight Change(s) Since Admission:   Wt Readings from Last 1 Encounters:   24 1410 59 kg (130 lb)   24 2200 59 kg (130 lb)   24 0944 59 kg (130 lb)   Admit Weight: 59 kg (130 lb) (24 0944), Weight Method: Stated    Estimated Needs    Weight Used For Calorie Calculations: 59 kg (130 lb 1.1 oz)  Energy Calorie Requirements (kcal): 5028-7599 kcal (1.5-1.7 stress " factor)  Energy Need Method: [facility]  Weight Used For Protein Calculations: 59 kg (130 lb 1.1 oz)  Protein Requirements: 88-100gm (1.5-1.7 gm/kg)  Fluid Requirements (mL): 1960ml (1ml/kcal)    Enteral Nutrition     Patient not receiving enteral nutrition at this time.    Parenteral Nutrition     Standard Formula: Clinimix E 4.25/5  Custom Formula: not applicable  Additives: none  Rate/Volume: 75ml/hr  Lipids: none  Total Nutrition Provided by Parenteral Nutrition:  Calories Provided  612 kcal/d, 31% needs   Protein Provided  77 g/d, 88% needs   Dextrose Provided  90 g/d, GIR 1.06 mg CHO/kg/min   Fluid Provided  1800 ml/d, 92% needs       Evaluation of Received Nutrient Intake    Calories: not meeting estimated needs  Protein: meeting estimated needs    Patient Education     Not applicable.    Nutrition Diagnosis     PES: Unintended weight loss related to suboptimal protein/energy intake as evidenced by >7.5% weight loss in 3 months. (new)     PES: Moderate chronic disease or condition related malnutrition related to acute illness as evidenced by less than 75% needs met for greater than or equal to 1 month and greater than 7.5% weight loss in 3 months. (new)    Nutrition Interventions     Intervention(s): collaboration with other providers    Goal: Meet greater than 80% of nutritional needs by follow-up. (new)  Goal: Maintain weight throughout hospitalization. (new)    Nutrition Goals & Monitoring     Dietitian will monitor: food and beverage intake, parenteral nutrition intake, weight change, and electrolyte/renal panel    Nutrition Risk/Follow-Up: high (follow-up in 1-4 days)   Please consult if re-assessment needed sooner.

## 2024-01-14 NOTE — CONSULTS
General Surgery  Consult Note    SUBJECTIVE:     Chief Complaint:   Per triage note--Diarrhea (Abdominal pain left lower with diarrhea x 2 days, temperature of 100.6 this morning, discharged from hospital last Thursday for pancreatitis)      History of Present Illness:  Mr. Macias is a 68 y.o. male with history of gallstone pancreatitis s/p cholecystectomy 1.5 years ago, HTN, DM and GERD. Pancreatitis has since progressed to acute necrotizing pancreatitis, and he was recently hospitalized from 12/12-1/4 for ongoing management. During that admission, he underwent ERCP with stone and sludge removal and sphincterotomy on 12/17. CT abdomen/pelvis from 12/23 showed worsening of pancreatitis with possible development of fluid collection. On discharge, patient was tolerating a diet.     He returned to the ED on 1/9 with persistent epigastric pain and diarrhea. He was started on IV merrem and admitted to Medicine. He continues to experience epigastric pain and has spiked intermittent fevers - last one was yesterday at 101.3. Repeat CT abdomen/pelvis demonstrates worsening of pancreatitis with partial encapsulation of fluid collection measuring 10 x 7 cm, consistent with pseudocyst.     Past Medical History:  Past Medical History:   Diagnosis Date    DM (diabetes mellitus)     GERD (gastroesophageal reflux disease)     HLD (hyperlipidemia)     HTN (hypertension)        Home Medications:  No current facility-administered medications on file prior to encounter.     Current Outpatient Medications on File Prior to Encounter   Medication Sig    albuterol (PROVENTIL/VENTOLIN HFA) 90 mcg/actuation inhaler Inhale 2 puffs into the lungs every 4 (four) hours as needed for Wheezing. Rescue    amlodipine-benazepril 10-20mg (LOTREL) 10-20 mg per capsule Take 1 capsule by mouth once daily.    aspirin 81 MG Chew Take 81 mg by mouth once daily.    atorvastatin (LIPITOR) 20 MG tablet Take 1 tablet (20 mg total) by mouth once daily.     ergocalciferol (ERGOCALCIFEROL) 50,000 unit Cap Take 1 capsule (50,000 Units total) by mouth every 7 days.    fenofibrate (TRICOR) 145 MG tablet See Instructions, TAKE 1 TABLET EVERY DAY, # 90 tab(s), 3 Refill(s), Pharmacy: [facility], 168, cm, Height/Length Dosing, 11/11/21 9:32:00 CST, 73.75, kg, Weight Dosing, 11/11/21 9:32:00 CST Strength: 145 mg    fluticasone propionate (FLONASE) 50 mcg/actuation nasal spray 2 sprays (100 mcg total) by Each Nostril route once daily.    furosemide (LASIX) 40 MG tablet Take 1 tablet (40 mg total) by mouth once daily.    glimepiride (AMARYL) 2 MG tablet Take 1 tablet (2 mg total) by mouth once daily.    metoprolol succinate (TOPROL-XL) 25 MG 24 hr tablet Take 1 tablet (25 mg total) by mouth once daily.    omeprazole (PRILOSEC OTC) 20 MG tablet Take 20 mg by mouth once daily.    ondansetron (ZOFRAN) 4 MG tablet Take 1 tablet (4 mg total) by mouth every 8 (eight) hours as needed for Nausea.    pantoprazole (PROTONIX) 40 MG tablet Take 1 tablet (40 mg total) by mouth 2 (two) times daily.    polyethylene glycol (GLYCOLAX) 17 gram PwPk Take 17 g by mouth 2 (two) times daily as needed for Constipation ((Second Choice)).       Allergies:  Review of patient's allergies indicates:  No Known Allergies    Surgical History:  Past Surgical History:   Procedure Laterality Date    APPENDECTOMY      CHOLECYSTECTOMY      ERCP N/A 12/17/2023    Procedure: ERCP (ENDOSCOPIC RETROGRADE CHOLANGIOPANCREATOGRAPHY);  Surgeon: Flako Kerns MD;  Location: [facility] OR;  Service: Gastroenterology;  Laterality: N/A;    ERCP W/ SPHICTEROTOMY  12/17/2023    Procedure: ERCP, WITH SPHINCTEROTOMY;  Surgeon: Flako Kerns MD;  Location: [facility] OR;  Service: Gastroenterology;;    ERCP, WITH CALCULUS REMOVAL  12/17/2023    Procedure: ERCP, WITH CALCULUS REMOVAL;  Surgeon: Flako Kerns MD;  Location: [facility] OR;  Service: Gastroenterology;;       Family History:  No family  history on file.    Social History:  Social History     Tobacco Use    Smoking status: Every Day     Current packs/day: 1.00     Types: Cigarettes    Smokeless tobacco: Never   Substance Use Topics    Alcohol use: Never    Drug use: Never        Review of Systems:  Stated above in HPI; all systems otherwise negative       OBJECTIVE:     Vital Signs:  Temp: 97.9 °F (36.6 °C) (01/14/24 1900)  Pulse: 90 (01/14/24 1900)  Resp: 18 (01/14/24 1900)  BP: 93/66 (01/14/24 1900)  SpO2: 95 % (01/14/24 1900)    Physical Exam:  General: well developed, well nourished, no distress  HEENT: NCAT, EOMI  Neck: supple, symmetrical  Lungs: Normal WOB, No SOB  Cardiovascular: regular rate  Abdomen: soft, nondistended with TTP to mid-epigastrium.   Skin: Skin color, texture, turgor normal. No rashes or lesions  Musculoskeletal:no clubbing, cyanosis, no deformities  Neurologic: No focal numbness or weakness  Psych/Behavioral:  Alert and oriented, appropriate affect.    Laboratory:  Labs Reviewed   COMPREHENSIVE METABOLIC PANEL - Abnormal; Notable for the following components:       Result Value    Sodium Level 133 (*)     Chloride 92 (*)     Glucose Level 123 (*)     Blood Urea Nitrogen 27.9 (*)     Creatinine 1.73 (*)     Albumin Level 2.7 (*)     Globulin 4.4 (*)     Albumin/Globulin Ratio 0.6 (*)     Bilirubin Total 1.6 (*)     All other components within normal limits   URINALYSIS, REFLEX TO URINE CULTURE - Abnormal; Notable for the following components:    Color, UA Light-Orange (*)     Appearance, UA Turbid (*)     Protein, UA 1+ (*)     Blood, UA 3+ (*)     Mucous, UA Trace (*)     Hyaline Casts, UA 6-10 (*)     Amorphous Crystal, UA Moderate (*)     RBC, UA 50-99 (*)     All other components within normal limits   LIPASE - Abnormal; Notable for the following components:    Lipase Level 113 (*)     All other components within normal limits   CBC WITH DIFFERENTIAL - Abnormal; Notable for the following components:    WBC 16.86 (*)      RBC 4.43 (*)     Hgb 13.6 (*)     Hct 41.0 (*)     Neut # 13.31 (*)     Mono # 1.70 (*)     IG# 0.12 (*)     All other components within normal limits   POCT GLUCOSE - Abnormal; Notable for the following components:    POCT Glucose 126 (*)     All other components within normal limits   LACTIC ACID, PLASMA - Normal   CBC W/ AUTO DIFFERENTIAL    Narrative:     The following orders were created for panel order CBC Auto Differential.                  Procedure                               Abnormality         Status                                     ---------                               -----------         ------                                     CBC with Differential[[id number]]       Abnormal            Final result                                                 Please view results for these tests on the individual orders.   POCT GLUCOSE MONITORING CONTINUOUS         Diagnostic Results:  Imaging Results              CT Abdomen Pelvis With IV Contrast NO Oral Contrast (Final result)  Result time 01/09/24 19:11:40      Final result by Kisha Finnegan MD (01/09/24 19:11:40)                   Impression:      1. Continued organization and partial encapsulation of the acute necrotic collection at the body of the pancreas and peripancreatic soft tissues.  No internal foci of gas or hortencia changes of acute super infection by imaging.  Overall not significantly changed in size from prior.  2. Mild improvement of free fluid and resolved pleural effusions.      Electronically signed by: Kisha Finnegan  Date:    01/09/2024  Time:    19:11               Narrative:    EXAMINATION:  CT ABDOMEN PELVIS WITH IV CONTRAST    CLINICAL HISTORY:  LLQ abdominal pain;    TECHNIQUE:  Helically acquired images with axial, sagittal and coronal reformations were obtained from the lung bases to the pubic symphysis after the IV administration of contrast.    Automated tube current modulation, weight-based exposure dosing, and/or  iterative reconstruction technique utilized to reach lowest reasonably achievable exposure rate.    DLP: 368 mGy*cm    COMPARISON:  CT abdomen pelvis 12/29/2023, CT abdomen pelvis 12/16/2023    FINDINGS:  Mild motion degraded exam.    HEART: There are coronary artery calcifications.    LUNG BASES: Improved pleural effusions.    LIVER: Normal attenuation. No appreciable focal hepatic lesion.    BILIARY: Gallbladder is surgically absent.    PANCREAS: Similar changes of necrotizing pancreatitis with continued organization of complex fluid collection at the pancreatic neck and body extending toward the uncinate process, duodenum, root of the mesentery and gastro hepatic ligament.    SPLEEN: Normal in size    ADRENALS: No mass.    KIDNEYS/URETERS: The kidneys enhance symmetrically.  No hydronephrosis.    GI TRACT/MESENTERY: Edema at the 2nd and 3rd portion of the duodenum.  Bowel is normal in caliber without evidence of obstruction.  Colonic diverticulosis without acute inflammatory change.    PERITONEUM: There is free intraperitoneal fluid tracking along the pericolic gutters and layering within the pelvis.  Probable changes of fat necrosis along the pericolic gutters and root of the mesentery.  No free air.    LYMPH NODES: No enlarged lymph nodes by size criteria.    VASCULATURE: Aortoiliac atherosclerosis.  Pancreatic collection encases the proximal portal vein which appears attenuated.  Motion artifact and timing of the contrast bolus mildly limits evaluation.  The portal vein does appear to remain patent.  The splenic vein is patent.    BLADDER: Normal appearance given degree of distention.    REPRODUCTIVE ORGANS: Scrotal hydrocele.    SOFT TISSUES: Unremarkable.    BONES: Grade 1 retrolisthesis of L3 on L4.                                       X-Ray Chest AP Portable (Final result)  Result time 01/09/24 17:05:09      Final result by Kisha Finnegan MD (01/09/24 17:05:09)                   Impression:      No  acute cardiopulmonary abnormality.      Electronically signed by: Kisha Finnegan  Date:    01/09/2024  Time:    17:05               Narrative:    EXAMINATION:  XR CHEST AP PORTABLE    CLINICAL HISTORY:  Cough, unspecified    TECHNIQUE:  Single frontal view of the chest was performed.    COMPARISON:  12/23/2023    FINDINGS:  LINES AND TUBES: None    MEDIASTINUM AND WALLY: The cardiac silhouette is normal.    LUNGS: No lobar consolidation. No edema.    PLEURA:No pleural effusion. No pneumothorax.    BONES: No acute osseous abnormality.                                       ASSESSMENT:     68 M with necrotizing pancreatitis now c/b development of pseudocyst measuring 10 x 7 cm.     PLAN:     - all imaging, vitals and labs reviewed  - given size of pseudocyst and symptoms, patient would benefit from drainage procedure. This would yield the best results once pseudocyst has developed a mature encapsulated wall. This typically takes 6-8 weeks from onset, which likely was at/around 12/23, 3 weeks ago.   - would recommend maximal multimodal pain regimen and defer drainage procedure for another 3 weeks if possible  - okay to resume prior diet as tolerated; continue TPN  - IV merrem per primary   - surgery will continue to follow       Vita Evans MD  [employer] General Surgery, PGY-2

## 2024-01-14 NOTE — PROGRESS NOTES
[facility]  Hospital Medicine Progress Note        Chief Complaint: Inpatient Follow-up    HPI:   68-year-old male with significant history of type 2 diabetes mellitus, HTN, chronic diastolic heart failure, HLD, GERD and  hydrocele.  Patient had a recent 19 day hospitalization for acute necrotizing pancreatitis secondary to gallstones/choledocholithiasis, severe sepsis with Enterococcus bacteremia, acute kidney injury, acute hypoxemic respiratory failure. patient had cholecystectomy in 2022, underwent ERCP this previous hospitalization with stone and sludge removal and sphincterotomy.  Repeat CT abdomen pelvis showed continued findings of necrotizing pancreatitis with possible organizing fluid collection around pancreatic neck and body.  GI recommended repeat CT in 3 months.  Patient was treated with meropenem, ampicillin while in-house which was discontinued upon DC.  He completed 14 day course while in-house.  Patient remained symptomatic even after DC.  Patient continued with abdominal discomfort, nausea, unable to keep anything down mouth .  also reports diarrhea, unquantified unintentional weight loss.  Patient was febrile and tachycardic.  Lab significant for leukocytosis, acute kidney injury/dehydration.  Lipase was elevated.  Patient was initiated on conservative management with IV fluids, NPO and GI services consulted, admitted to hospitalist medicine service.  CT abdomen pelvis repeated-overall similar findings compared to before with persistent fluid collection/necrotizing pancreatitis.  Stool studies ordered given diarrhea.  Given concern for necrotizing pancreatitis decided to initiate meropenem.  GI evaluated, CT findings similar to prior and therefore no interventions planned, recommended conservative management.  Diet advanced to clear liquids on 01/10.  Persistent diarrhea as of 1/11, stool studies negative, added Questran, Merrem continued for necrotizing pancreatitis,  GI following.  Infectious Disease evaluated, recommended possible necrosectomy once inflammation resolves.  Still symptomatic with diarrhea and abdominal discomfort, Questran increased on 1/13.  Full liquid diet continued.      Interval Hx:     Patient seen at bedside .  He is now having high-grade temperature spikes, is tachycardic . abdominal pain is worse today .  no bowel movements since yesterday.  nauseous, no vomiting     Objective/physical exam:  General: In no acute distress, afebrile  Chest: Clear to auscultation bilaterally  Heart: S1, S2, no appreciable murmur  Abdomen: Soft, nontender today, BS +  MSK: Warm, no lower extremity edema, no clubbing or cyanosis  Neurologic: Alert and oriented x4, moving all extremities with good strength     VITAL SIGNS: 24 HRS MIN & MAX LAST   Temp  Min: 98.6 °F (37 °C)  Max: 101.3 °F (38.5 °C) (!) 100.8 °F (38.2 °C)   BP  Min: 123/71  Max: 145/83 136/82   Pulse  Min: 81  Max: 118  (!) 118   Resp  Min: 17  Max: 19 19   SpO2  Min: 91 %  Max: 96 % (!) 92 %       Recent Labs   Lab 01/13/24  0601   WBC 8.11   RBC 3.43*   HGB 10.3*   HCT 32.0*   MCV 93.3   MCH 30.0   MCHC 32.2*   RDW 13.9      MPV 10.1         Recent Labs   Lab 01/14/24  0618   *   K 4.4   CO2 31   BUN 13.6   CREATININE 0.80   CALCIUM 8.6*   ALBUMIN 1.9*   ALKPHOS 94   ALT 18   AST 33   BILITOT 0.9          Microbiology Results (last 7 days)       Procedure Component Value Units Date/Time    Blood Culture [[id number]]  (Normal) Collected: 01/09/24 1008    Order Status: Completed Specimen: Blood Updated: 01/13/24 1100     CULTURE, BLOOD ([facility]) No Growth At 96 Hours    Blood Culture [[id number]]  (Normal) Collected: 01/09/24 1008    Order Status: Completed Specimen: Blood Updated: 01/13/24 1100     CULTURE, BLOOD ([facility]) No Growth At 96 Hours    Blood Culture [[id number]]  (Normal) Collected: 01/10/24 0715    Order Status: Completed Specimen: Blood Updated: 01/13/24 0901     CULTURE, BLOOD ([facility]) No  Growth At 72 Hours    Blood Culture [[id number]]  (Normal) Collected: 01/10/24 0715    Order Status: Completed Specimen: Blood Updated: 01/13/24 0901     CULTURE, BLOOD ([facility]) No Growth At 72 Hours    Stool Culture **CANNOT BE ORDERED STAT** [[id number]]  (Normal) Collected: 01/10/24 0341    Order Status: Completed Specimen: Stool Updated: 01/12/24 0912     Stool Culture Negative for Salmonella, Shigella, Campylobacter, Vibrio, Aeromonas, Pleisiomonas,Yersinia, or Shiga Toxin 1 and 2.    Clostridium Diff Toxin, A & B, EIA [[id number]]  (Normal) Collected: 01/10/24 0341    Order Status: Completed Specimen: Stool Updated: 01/10/24 1124     Clostridium Difficile GDH Antigen Negative     Clostridium Difficile Toxin A/B Negative             Scheduled Med:   aspirin  81 mg Oral Daily    atorvastatin  20 mg Oral Daily    baclofen  5 mg Oral TID    cholestyramine-aspartame  1 packet Oral TID    enoxparin  40 mg Subcutaneous Q24H (prophylaxis, 1700)    fenofibrate  145 mg Oral Daily    fluticasone propionate  2 spray Each Nostril Daily    lipase-protease-amylase 12,000-38,000-60,000 units  3 capsule Oral TID WM    meropenem (MERREM) IVPB  1 g Intravenous Q8H    metoprolol succinate  25 mg Oral Daily    pantoprazole  40 mg Oral Daily          Assessment/Plan:    Sirs with high-grade temperature spike, tachycardia  Acute on chronic necrotizing pancreatitis with peripancreatic fluid collection  Large pseudocyst  Bilateral pleural effusion  Acute kidney injury secondary to dehydration-improved  Recent Enterococcus bacteremia secondary to necrotizing pancreatitis-completed treatment  Recent choledocholithiasis status post ERCP, sphincterotomy in December, 2023  History of essential HTN-now borderline low BP   Type 2 diabetes mellitus-stable   Chronic diastolic heart failure-appears compensated  HLD  GERD  Scrotal hydrocele   Prophylaxis      Symptomatically worse today   High-grade temperature spikes, tachycardic   Worsening  abdominal pain  No bowel movements since yesterday  Decided to obtain CT abdomen/pelvis with IV contrast which shows worsening pancreatitis with large pseudocyst  GI and Infectious Disease on board   Will consult General surgery   Continue IV meropenem, symptomatic management   NPO, on Clinimix  Okay for p.o. meds   DC baclofen as this is not helping with hiccups   Initiate gabapentin  No bowel movements since yesterday and therefore DC Questran  On pancreatic enzymes  Noted bilateral pleural effusion   Dedicated chest x-ray will be obtained   Patient had recent echocardiogram in December which showed normal ejection fraction, has diastolic dysfunction  Lasix 20 mg daily in order to maintain negative fluid balance since he is on Clinimix  he was treated with Merrem/ampicillin during previous hospitalization for necrotizing pancreatitis with Enterococcus bacteremia   Hold home amlodipine since blood pressure is normal   Cautiously continue Toprol-XL   Continue other home meds-aspirin, statin and fenofibrate  P.r.n. Bentyl for abdominal cramps  DVT prophylaxis- subQ Lovenox      Lisa Willoughby MD   01/14/2024

## 2024-01-15 LAB
ANION GAP SERPL CALC-SCNC: 9 MEQ/L
BACTERIA BLD CULT: NORMAL
BACTERIA BLD CULT: NORMAL
BASOPHILS # BLD AUTO: 0.05 X10(3)/MCL
BASOPHILS NFR BLD AUTO: 0.5 %
BUN SERPL-MCNC: 16 MG/DL (ref 8.4–25.7)
CALCIUM SERPL-MCNC: 8.8 MG/DL (ref 8.8–10)
CHLORIDE SERPL-SCNC: 97 MMOL/L (ref 98–107)
CO2 SERPL-SCNC: 30 MMOL/L (ref 23–31)
CREAT SERPL-MCNC: 0.75 MG/DL (ref 0.73–1.18)
CREAT/UREA NIT SERPL: 21
EOSINOPHIL # BLD AUTO: 0.06 X10(3)/MCL (ref 0–0.9)
EOSINOPHIL NFR BLD AUTO: 0.7 %
ERYTHROCYTE [DISTWIDTH] IN BLOOD BY AUTOMATED COUNT: 14 % (ref 11.5–17)
GFR SERPLBLD CREATININE-BSD FMLA CKD-EPI: >60 MLS/MIN/1.73/M2
GLUCOSE SERPL-MCNC: 117 MG/DL (ref 82–115)
HCT VFR BLD AUTO: 39.4 % (ref 42–52)
HGB BLD-MCNC: 12.6 G/DL (ref 14–18)
IMM GRANULOCYTES # BLD AUTO: 0.04 X10(3)/MCL (ref 0–0.04)
IMM GRANULOCYTES NFR BLD AUTO: 0.4 %
LYMPHOCYTES # BLD AUTO: 1.18 X10(3)/MCL (ref 0.6–4.6)
LYMPHOCYTES NFR BLD AUTO: 12.9 %
MCH RBC QN AUTO: 29.7 PG (ref 27–31)
MCHC RBC AUTO-ENTMCNC: 32 G/DL (ref 33–36)
MCV RBC AUTO: 92.9 FL (ref 80–94)
MONOCYTES # BLD AUTO: 0.72 X10(3)/MCL (ref 0.1–1.3)
MONOCYTES NFR BLD AUTO: 7.8 %
NEUTROPHILS # BLD AUTO: 7.13 X10(3)/MCL (ref 2.1–9.2)
NEUTROPHILS NFR BLD AUTO: 77.7 %
NRBC BLD AUTO-RTO: 0 %
PLATELET # BLD AUTO: 272 X10(3)/MCL (ref 130–400)
PMV BLD AUTO: 10.9 FL (ref 7.4–10.4)
POCT GLUCOSE: 139 MG/DL (ref 70–110)
POCT GLUCOSE: 147 MG/DL (ref 70–110)
POCT GLUCOSE: 148 MG/DL (ref 70–110)
POCT GLUCOSE: 151 MG/DL (ref 70–110)
POTASSIUM SERPL-SCNC: 5 MMOL/L (ref 3.5–5.1)
RBC # BLD AUTO: 4.24 X10(6)/MCL (ref 4.7–6.1)
SODIUM SERPL-SCNC: 136 MMOL/L (ref 136–145)
WBC # SPEC AUTO: 9.18 X10(3)/MCL (ref 4.5–11.5)

## 2024-01-15 PROCEDURE — 21400001 HC TELEMETRY ROOM

## 2024-01-15 PROCEDURE — 25000003 PHARM REV CODE 250: Performed by: NURSE PRACTITIONER

## 2024-01-15 PROCEDURE — 85025 COMPLETE CBC W/AUTO DIFF WBC: CPT | Performed by: INTERNAL MEDICINE

## 2024-01-15 PROCEDURE — 27000207 HC ISOLATION

## 2024-01-15 PROCEDURE — 99232 SBSQ HOSP IP/OBS MODERATE 35: CPT | Mod: GC,,, | Performed by: SURGERY

## 2024-01-15 PROCEDURE — 25000003 PHARM REV CODE 250: Performed by: INTERNAL MEDICINE

## 2024-01-15 PROCEDURE — 63600175 PHARM REV CODE 636 W HCPCS: Performed by: INTERNAL MEDICINE

## 2024-01-15 PROCEDURE — 25000003 PHARM REV CODE 250

## 2024-01-15 PROCEDURE — 80048 BASIC METABOLIC PNL TOTAL CA: CPT | Performed by: INTERNAL MEDICINE

## 2024-01-15 RX ORDER — HYDROMORPHONE HYDROCHLORIDE 2 MG/ML
1 INJECTION, SOLUTION INTRAMUSCULAR; INTRAVENOUS; SUBCUTANEOUS EVERY 4 HOURS PRN
Status: DISCONTINUED | OUTPATIENT
Start: 2024-01-15 | End: 2024-01-20 | Stop reason: HOSPADM

## 2024-01-15 RX ADMIN — LEUCINE, PHENYLALANINE, LYSINE, METHIONINE, ISOLEUCINE, VALINE, HISTIDINE, THREONINE, TRYPTOPHAN, ALANINE, GLYCINE, ARGININE, PROLINE, SERINE, TYROSINE, SODIUM ACETATE, DIBASIC POTASSIUM PHOSPHATE, MAGNESIUM CHLORIDE, SODIUM CHLORIDE, CALCIUM CHLORIDE, DEXTROSE
311; 238; 247; 170; 255; 247; 204; 179; 77; 880; 438; 489; 289; 213; 17; 297; 261; 51; 77; 33; 5 INJECTION INTRAVENOUS at 09:01

## 2024-01-15 RX ADMIN — METOPROLOL SUCCINATE 25 MG: 25 TABLET, EXTENDED RELEASE ORAL at 09:01

## 2024-01-15 RX ADMIN — PANCRELIPASE 3 CAPSULE: 60000; 12000; 38000 CAPSULE, DELAYED RELEASE PELLETS ORAL at 01:01

## 2024-01-15 RX ADMIN — HYDROMORPHONE HYDROCHLORIDE 1 MG: 2 INJECTION INTRAMUSCULAR; INTRAVENOUS; SUBCUTANEOUS at 10:01

## 2024-01-15 RX ADMIN — MEROPENEM 1 G: 1 INJECTION, POWDER, FOR SOLUTION INTRAVENOUS at 06:01

## 2024-01-15 RX ADMIN — MELATONIN TAB 3 MG 9 MG: 3 TAB at 09:01

## 2024-01-15 RX ADMIN — ENOXAPARIN SODIUM 40 MG: 100 INJECTION SUBCUTANEOUS at 05:01

## 2024-01-15 RX ADMIN — PANCRELIPASE 3 CAPSULE: 60000; 12000; 38000 CAPSULE, DELAYED RELEASE PELLETS ORAL at 06:01

## 2024-01-15 RX ADMIN — GABAPENTIN 100 MG: 100 CAPSULE ORAL at 09:01

## 2024-01-15 RX ADMIN — PANTOPRAZOLE SODIUM 40 MG: 40 TABLET, DELAYED RELEASE ORAL at 09:01

## 2024-01-15 RX ADMIN — HYDROCODONE BITARTRATE AND ACETAMINOPHEN 1 TABLET: 10; 325 TABLET ORAL at 09:01

## 2024-01-15 RX ADMIN — ASPIRIN 81 MG CHEWABLE TABLET 81 MG: 81 TABLET CHEWABLE at 09:01

## 2024-01-15 RX ADMIN — FUROSEMIDE 20 MG: 10 INJECTION, SOLUTION INTRAMUSCULAR; INTRAVENOUS at 09:01

## 2024-01-15 RX ADMIN — MEROPENEM 1 G: 1 INJECTION, POWDER, FOR SOLUTION INTRAVENOUS at 01:01

## 2024-01-15 RX ADMIN — FLUTICASONE PROPIONATE 100 MCG: 50 SPRAY, METERED NASAL at 09:01

## 2024-01-15 RX ADMIN — ATORVASTATIN CALCIUM 20 MG: 10 TABLET, FILM COATED ORAL at 09:01

## 2024-01-15 RX ADMIN — MEROPENEM 1 G: 1 INJECTION, POWDER, FOR SOLUTION INTRAVENOUS at 09:01

## 2024-01-15 RX ADMIN — FENOFIBRATE 145 MG: 145 TABLET, FILM COATED ORAL at 09:01

## 2024-01-15 NOTE — PROGRESS NOTES
"Gastroenterology Progress Note  History reviewed, patient independently examined by me.  I agree with the history, physical exam, assessment and plan as noted by the PA below.  The patient had severe pancreatitis which is in the resolution phase.  He is tolerating p.o. intake.  He has some chills and night sweats with low-grade fever.  He has intermittent abdominal pain which is controlled.  On physical exam the patient is in no acute distress, the heart reveals a regular rate and rhythm lungs are clear the abdomen is mildly tender , the bowel sounds are active.  impression is resolving pancreatitis with phlegmonous fluid collection.  Plan is to continue antibiotics for now  Subjective/Interval History:  Patient resting in bed, wife at bedside. He is tolerating PO intake without n/v. He is not ambulating much per wife due to intermittent abd pain. Admits to constipation. He reports some fevers, chills, and night sweats.    ROS:  Review of Systems   Constitutional:  Positive for chills, diaphoresis (night sweats), fever and malaise/fatigue.   Respiratory:  Negative for cough and shortness of breath.    Cardiovascular:  Negative for chest pain.   Gastrointestinal:  Positive for abdominal pain and constipation. Negative for diarrhea, nausea and vomiting.   Neurological:  Negative for weakness.       Vital Signs:  /62 (BP Location: Right arm, Patient Position: Lying)   Pulse 109   Temp 100.1 °F (37.8 °C) (Oral)   Resp 18   Ht 5' 5.98" (1.676 m)   Wt 59 kg (130 lb)   SpO2 (!) 90%   BMI 20.99 kg/m²   Body mass index is 20.99 kg/m².    Physical Exam:  Physical Exam  Constitutional:       General: He is not in acute distress.     Appearance: He is normal weight. He is not ill-appearing.   HENT:      Head: Normocephalic and atraumatic.      Right Ear: External ear normal.      Left Ear: External ear normal.      Nose: Nose normal.      Mouth/Throat:      Pharynx: Oropharynx is clear.   Eyes:      General: No " scleral icterus.     Conjunctiva/sclera: Conjunctivae normal.   Pulmonary:      Effort: Pulmonary effort is normal. No respiratory distress.   Abdominal:      General: There is no distension.      Tenderness: There is no abdominal tenderness. There is no guarding.      Comments: Abd firm   Musculoskeletal:         General: Normal range of motion.      Cervical back: Normal range of motion.   Skin:     General: Skin is warm and dry.      Coloration: Skin is not jaundiced.   Neurological:      Mental Status: He is alert and oriented to person, place, and time. Mental status is at baseline.   Psychiatric:         Mood and Affect: Mood normal.         Behavior: Behavior normal.         Thought Content: Thought content normal.         Labs:  Recent Results (from the past 24 hour(s))   POCT glucose    Collection Time: 01/14/24  4:45 PM   Result Value Ref Range    POCT Glucose 133 (H) 70 - 110 mg/dL   POCT glucose    Collection Time: 01/14/24 10:12 PM   Result Value Ref Range    POCT Glucose 118 (H) 70 - 110 mg/dL   POCT glucose    Collection Time: 01/15/24  6:23 AM   Result Value Ref Range    POCT Glucose 139 (H) 70 - 110 mg/dL   Basic Metabolic Panel    Collection Time: 01/15/24  8:40 AM   Result Value Ref Range    Sodium Level 136 136 - 145 mmol/L    Potassium Level 5.0 3.5 - 5.1 mmol/L    Chloride 97 (L) 98 - 107 mmol/L    Carbon Dioxide 30 23 - 31 mmol/L    Glucose Level 117 (H) 82 - 115 mg/dL    Blood Urea Nitrogen 16.0 8.4 - 25.7 mg/dL    Creatinine 0.75 0.73 - 1.18 mg/dL    BUN/Creatinine Ratio 21     Calcium Level Total 8.8 8.8 - 10.0 mg/dL    Anion Gap 9.0 mEq/L    eGFR >60 mls/min/1.73/m2   CBC with Differential    Collection Time: 01/15/24  8:40 AM   Result Value Ref Range    WBC 9.18 4.50 - 11.50 x10(3)/mcL    RBC 4.24 (L) 4.70 - 6.10 x10(6)/mcL    Hgb 12.6 (L) 14.0 - 18.0 g/dL    Hct 39.4 (L) 42.0 - 52.0 %    MCV 92.9 80.0 - 94.0 fL    MCH 29.7 27.0 - 31.0 pg    MCHC 32.0 (L) 33.0 - 36.0 g/dL    RDW 14.0 11.5  - 17.0 %    Platelet 272 130 - 400 x10(3)/mcL    MPV 10.9 (H) 7.4 - 10.4 fL    Neut % 77.7 %    Lymph % 12.9 %    Mono % 7.8 %    Eos % 0.7 %    Basophil % 0.5 %    Lymph # 1.18 0.6 - 4.6 x10(3)/mcL    Neut # 7.13 2.1 - 9.2 x10(3)/mcL    Mono # 0.72 0.1 - 1.3 x10(3)/mcL    Eos # 0.06 0 - 0.9 x10(3)/mcL    Baso # 0.05 <=0.2 x10(3)/mcL    IG# 0.04 0 - 0.04 x10(3)/mcL    IG% 0.4 %    NRBC% 0.0 %   POCT glucose    Collection Time: 01/15/24 10:39 AM   Result Value Ref Range    POCT Glucose 148 (H) 70 - 110 mg/dL         Assessment/Plan:  This is a 68 y.o. male known to Dr. Flako Kerns from recent admission with PMH of T2DM, HTN, HLD, chronic scrotal hydrocele, vitamin D deficiency, CKD 3a, necrotizing pancreatitis 2/2 gallstones s/p ERCP 12/17/23, cholecystectomy 2022.      Patient admitted to [facility] 12/16/23 for choledocholithiasis requiring ERCP for stone extraction. Post procedure his hospitalization was complicated by necrotizing pancreatitis with possible collection near pancreas. No endoscopic intervention indicated at that time. Patient discharged home 01/04/24.     He presented to the ED 01/09/24 with epigastric pain, n/v/d x3 days. He reported a fever onset day of presentation. GI consulted for pancreatitis.     Pancreatitis, acute on chronic  - supportive care: IVF, pain control  - ADAT  - encourage safe ambulation     Necrotic collection at body of pancreas  - CT abd/pel with IV contrast 12/29: necrotic pancreatitis with possible organizing collection anterior and superior to pancreatic neck and body measuring approximately 8 x 4 cm  - Dr. Oseguera evaluated patient at that time - no organized collection that required drainage; he recommended maximizing nutrition with increased protein intake and repeat CT abd in 4-6 weeks with follow up in GI clinic  - CT abd/pel with IV 01/09/24: Continued organization and partial encapsulation of the acute necrotic collection at the body of the pancreas and  peripancreatic soft tissues.  No internal foci of gas or hortencia changes of acute super infection by imaging.  Overall not significantly changed in size from prior.   - CT abd/pel 01/14/24: worsening pancreatitis, large pseudocyst in body and head of pancreas as well as some pancreatic necrosis; fluid collections and pseudocysts are also seen just inferior to pancreas which are stable since prior exam; worsening ascites  - on merrem per ID for pancreatic tissue penetration  - prefer to allow maturation of pseudocyst prior to drainage - this typically takes 6-8 weeks from onset  - recommend high protein intake. Will order dietary nutritional supplementation  - encourage safe ambulation     Diarrhea - resolved  - GI panel, stool for C diff negative  - pancreatic elastase wnl  - questran d/c due to constipation  - simethicone and dicyclomine PRN  - ok to d/c creon given absence of pancreatic insufficiency     - will discuss further with Dr. Silverio Ellison, PA-C  Gastroenterology  [facility]

## 2024-01-15 NOTE — PROGRESS NOTES
[facility]  Hospital Medicine Progress Note        Chief Complaint: Inpatient Follow-up    HPI:   68-year-old male with significant history of type 2 diabetes mellitus, HTN, chronic diastolic heart failure, HLD, GERD and  hydrocele.  Patient had a recent 19 day hospitalization for acute necrotizing pancreatitis secondary to gallstones/choledocholithiasis, severe sepsis with Enterococcus bacteremia, acute kidney injury, acute hypoxemic respiratory failure. patient had cholecystectomy in 2022, underwent ERCP this previous hospitalization with stone and sludge removal and sphincterotomy.  Repeat CT abdomen pelvis showed continued findings of necrotizing pancreatitis with possible organizing fluid collection around pancreatic neck and body.  GI recommended repeat CT in 3 months.  Patient was treated with meropenem, ampicillin while in-house which was discontinued upon DC.  He completed 14 day course while in-house.  Patient remained symptomatic even after DC.  Patient continued with abdominal discomfort, nausea, unable to keep anything down mouth .  also reports diarrhea, unquantified unintentional weight loss.  Patient was febrile and tachycardic.  Lab significant for leukocytosis, acute kidney injury/dehydration.  Lipase was elevated.  Patient was initiated on conservative management with IV fluids, NPO and GI services consulted, admitted to hospitalist medicine service.  CT abdomen pelvis repeated-overall similar findings compared to before with persistent fluid collection/necrotizing pancreatitis.  Stool studies ordered given diarrhea.  Given concern for necrotizing pancreatitis decided to initiate meropenem.  GI evaluated, CT findings similar to prior and therefore no interventions planned, recommended conservative management.  Diet advanced to clear liquids on 01/10.  Persistent diarrhea as of 1/11, stool studies negative, added Questran, Merrem continued for necrotizing pancreatitis,  GI following.  Infectious Disease evaluated, recommended possible necrosectomy once inflammation resolves.  Still symptomatic with diarrhea and abdominal discomfort, Questran increased on 1/13.  Full liquid diet continued.  Worsening abdominal symptoms with associated high-grade temperature spike on 1/14, no more diarrhea, in fact no bowel movements .  CT abdomen was repeated with contrast which showed large pseudocyst and worsening inflammation . kept NPO, General surgery consulted . Merrem continued . Clinimix continued for nutrition.     Interval Hx:     Patient seen at bedside .  Still with persistent abdominal pain, morphine not helping, no bowel movements since day before yesterday, still having low-grade temperature spikes.     Objective/physical exam:  General: In no acute distress, afebrile  Chest: Clear to auscultation bilaterally  Heart: S1, S2, no appreciable murmur  Abdomen: Soft, nontender today, BS +  MSK: Warm, no lower extremity edema, no clubbing or cyanosis  Neurologic: Alert and oriented x4, moving all extremities with good strength     VITAL SIGNS: 24 HRS MIN & MAX LAST   Temp  Min: 97.9 °F (36.6 °C)  Max: 98.8 °F (37.1 °C) 98.7 °F (37.1 °C)   BP  Min: 93/66  Max: 136/82 122/74   Pulse  Min: 84  Max: 111  106   Resp  Min: 18  Max: 19 18   SpO2  Min: 93 %  Max: 97 % (!) 93 %       Recent Labs   Lab 01/14/24  0907   WBC 8.54  8.54   RBC 4.17*   HGB 12.4*   HCT 38.3*   MCV 91.8   MCH 29.7   MCHC 32.4*   RDW 13.7      MPV 10.6*         Recent Labs   Lab 01/14/24  0618   *   K 4.4   CO2 31   BUN 13.6   CREATININE 0.80   CALCIUM 8.6*   ALBUMIN 1.9*   ALKPHOS 94   ALT 18   AST 33   BILITOT 0.9          Microbiology Results (last 7 days)       Procedure Component Value Units Date/Time    Blood Culture [[id number]]  (Normal) Collected: 01/09/24 1008    Order Status: Completed Specimen: Blood Updated: 01/14/24 1101     CULTURE, BLOOD ([facility]) No Growth at 5 days    Blood Culture [[id number]]   (Normal) Collected: 01/09/24 1008    Order Status: Completed Specimen: Blood Updated: 01/14/24 1101     CULTURE, BLOOD ([facility]) No Growth at 5 days    Blood Culture [[id number]]  (Normal) Collected: 01/10/24 0715    Order Status: Completed Specimen: Blood Updated: 01/14/24 0901     CULTURE, BLOOD ([facility]) No Growth At 96 Hours    Blood Culture [[id number]]  (Normal) Collected: 01/10/24 0715    Order Status: Completed Specimen: Blood Updated: 01/14/24 0901     CULTURE, BLOOD ([facility]) No Growth At 96 Hours    Stool Culture **CANNOT BE ORDERED STAT** [[id number]]  (Normal) Collected: 01/10/24 0341    Order Status: Completed Specimen: Stool Updated: 01/12/24 0912     Stool Culture Negative for Salmonella, Shigella, Campylobacter, Vibrio, Aeromonas, Pleisiomonas,Yersinia, or Shiga Toxin 1 and 2.    Clostridium Diff Toxin, A & B, EIA [[id number]]  (Normal) Collected: 01/10/24 0341    Order Status: Completed Specimen: Stool Updated: 01/10/24 1124     Clostridium Difficile GDH Antigen Negative     Clostridium Difficile Toxin A/B Negative             Scheduled Med:   aspirin  81 mg Oral Daily    atorvastatin  20 mg Oral Daily    enoxparin  40 mg Subcutaneous Q24H (prophylaxis, 1700)    fenofibrate  145 mg Oral Daily    fluticasone propionate  2 spray Each Nostril Daily    furosemide (LASIX) injection  20 mg Intravenous Daily    gabapentin  100 mg Oral BID    lipase-protease-amylase 12,000-38,000-60,000 units  3 capsule Oral TID WM    meropenem (MERREM) IVPB  1 g Intravenous Q8H    metoprolol succinate  25 mg Oral Daily    pantoprazole  40 mg Oral Daily          Assessment/Plan:    Sirs with fever spike/tachycardia secondary to below  Acute on chronic necrotizing pancreatitis with peripancreatic fluid collection  Large pseudocyst  Acute kidney injury secondary to dehydration-improved  Recent Enterococcus bacteremia secondary to necrotizing pancreatitis-completed treatment  Recent choledocholithiasis status post ERCP, sphincterotomy in  December, 2023  History of essential HTN-now borderline low BP   Type 2 diabetes mellitus-stable   Chronic diastolic heart failure-appears compensated  HLD  GERD  Scrotal hydrocele   Prophylaxis      Still having low-grade temperature spikes   Persistent abdominal symptoms-abdominal pain, no appetite  CT from 01/14 with worsening inflammation, large pseudocyst   Infectious Disease, GI and general surgery on board   Continue IV Merrem   Patient will benefit from drainage, but would have to wait until inflammation resolves   Surgery would like to wait at least 3 weeks  Continue symptomatic management   Switch morphine to Dilaudid  Diet advanced to full liquid   Continue Clinimix, consult nutritionist  Gabapentin for hiccups  Questran discontinued 1/14 since the patient was not having any bowel movements   CT abdomen was concerning for bilateral effusion, two-view chest x-ray with no effusion, DC Lasix   Patient had recent echocardiogram in December which showed normal ejection fraction, has diastolic dysfunction  he was treated with Merrem/ampicillin during previous hospitalization for necrotizing pancreatitis with Enterococcus bacteremia   Hold home amlodipine since blood pressure is normal   Cautiously continue Toprol-XL   Continue other home meds-aspirin, statin and fenofibrate  P.r.n. Bentyl for abdominal cramps  DVT prophylaxis- subQ Lovenox      Lisa Willouhgby MD   01/15/2024

## 2024-01-15 NOTE — PROGRESS NOTES
Acute Care Surgery   Progress Note  Admit Date: 1/9/2024  HD#6  POD#* No surgery found *    Subjective:   Interval history:  Febrile to 100.8. Patient tolerating clear liquid diet. Pain well controlled.   Home Meds:  Current Outpatient Medications   Medication Instructions    albuterol (PROVENTIL/VENTOLIN HFA) 90 mcg/actuation inhaler 2 puffs, Inhalation, Every 4 hours PRN, Rescue    amlodipine-benazepril 10-20mg (LOTREL) 10-20 mg per capsule 1 capsule, Oral, Daily    aspirin 81 mg, Oral, Daily    atorvastatin (LIPITOR) 20 mg, Oral, Daily    ergocalciferol (ERGOCALCIFEROL) 50,000 Units, Oral, Every 7 days    fenofibrate (TRICOR) 145 MG tablet See Instructions, TAKE 1 TABLET EVERY DAY, # 90 tab(s), 3 Refill(s), Pharmacy: [facility], 168, cm, Height/Length Dosing, 11/11/21 9:32:00 CST, 73.75, kg, Weight Dosing, 11/11/21 9:32:00 CST Strength: 145 mg    fluticasone propionate (FLONASE) 100 mcg, Each Nostril, Daily    furosemide (LASIX) 40 mg, Oral, Daily    glimepiride (AMARYL) 2 mg, Oral, Daily    metoprolol succinate (TOPROL-XL) 25 mg, Oral, Daily    omeprazole (PRILOSEC OTC) 20 mg, Oral, Daily    ondansetron (ZOFRAN) 4 mg, Oral, Every 8 hours PRN    pantoprazole (PROTONIX) 40 mg, Oral, 2 times daily    polyethylene glycol (GLYCOLAX) 17 g, Oral, 2 times daily PRN      Scheduled Meds:   aspirin  81 mg Oral Daily    atorvastatin  20 mg Oral Daily    enoxparin  40 mg Subcutaneous Q24H (prophylaxis, 1700)    fenofibrate  145 mg Oral Daily    fluticasone propionate  2 spray Each Nostril Daily    gabapentin  100 mg Oral BID    lipase-protease-amylase 12,000-38,000-60,000 units  3 capsule Oral TID WM    meropenem (MERREM) IVPB  1 g Intravenous Q8H    metoprolol succinate  25 mg Oral Daily    pantoprazole  40 mg Oral Daily     Continuous Infusions:   Amino acid 4.25% - dextrose 5% (CLINIMIX-E) solution (1L provides 42.5 gm AA, 50 gm CHO (170 kcal/L dextrose), Na 35, K 30, Mg 5, Ca 4.5, Acetate 70, Cl 39,  "Phos 15) 75 mL/hr at 01/15/24 0954     PRN Meds:acetaminophen, chlorproMAZINE, dextrose 10%, dextrose 10%, dicyclomine, glucagon (human recombinant), hydrALAZINE, HYDROcodone-acetaminophen, HYDROmorphone, insulin aspart U-100, melatonin, ondansetron, simethicone     Objective:     VITAL SIGNS: 24 HR MIN & MAX LAST   Temp  Min: 97.9 °F (36.6 °C)  Max: 100.1 °F (37.8 °C)  100.1 °F (37.8 °C)   BP  Min: 93/66  Max: 122/74  107/62    Pulse  Min: 84  Max: 109  109    Resp  Min: 18  Max: 19  18    SpO2  Min: 90 %  Max: 97 %  (!) 90 %      HT: 5' 5.98" (167.6 cm)  WT: 59 kg (130 lb)  BMI: 21     Intake/output:  Intake/Output - Last 3 Shifts         01/13 0700 01/14 0659 01/14 0700  01/15 0659 01/15 0700 01/16 0659    P.O. 960 480 600    Total Intake(mL/kg) 960 (16.3) 480 (8.1) 600 (10.2)    Urine (mL/kg/hr)  1 (0)     Emesis/NG output  0     Total Output  1     Net +960 +479 +600           Urine Occurrence 5 x 4 x 3 x    Stool Occurrence 2 x 1 x 0 x            Intake/Output Summary (Last 24 hours) at 1/15/2024 1606  Last data filed at 1/15/2024 1300  Gross per 24 hour   Intake 720 ml   Output 1 ml   Net 719 ml           Lines/drains/airway:       Peripheral IV - Single Lumen 01/12/24 1300 20 G Anterior;Distal;Right Forearm (Active)   Site Assessment Clean;Dry;Intact 01/15/24 1600   Line Status Infusing 01/15/24 1600   Dressing Status Clean;Dry;Intact 01/15/24 1600   Dressing Intervention Integrity maintained 01/15/24 1600   Reason Not Rotated Not due 01/14/24 0830   Number of days: 3       Physical examination:  Gen: NAD, AAOx3, answering questions appropriately  HEENT: NC/AT  CV: RR  Resp: NWOB  Abd: S/ND. Tenderness of midepigastrium to palpation.   Ext: moving all extremities spontaneously and purposefully  Neuro: CN II-XII grossly intact      Labs:  Renal:  Recent Labs     01/13/24  0601 01/14/24  0618 01/15/24  0840   BUN 12.7 13.6 16.0   CREATININE 0.79 0.80 0.75     No results for input(s): "LACTIC" in the last 72 " "hours.  FENGI:  Recent Labs     01/13/24  0601 01/14/24  0618 01/15/24  0840   * 134* 136   K 4.1 4.4 5.0   CO2 29 31 30   CALCIUM 8.1* 8.6* 8.8   ALBUMIN 1.6* 1.9*  --    BILITOT 0.7 0.9  --    AST 26 33  --    ALKPHOS 67 94  --    ALT 16 18  --      Heme:  Recent Labs     01/13/24  0601 01/14/24  0907 01/15/24  0840   HGB 10.3* 12.4* 12.6*   HCT 32.0* 38.3* 39.4*    285 272     ID:  Recent Labs     01/13/24  0601 01/14/24  0907 01/15/24  0840   WBC 8.11 8.54  8.54 9.18     CBG:  Recent Labs     01/13/24  0601 01/14/24  0618 01/15/24  0840   GLUCOSE 103 131* 117*      Cardiovascular:  No results for input(s): "TROPONINI", "CKTOTAL", "CKMB", "BNP" in the last 168 hours.  ABG:  No results for input(s): "PH", "PO2", "PCO2", "HCO3", "BE" in the last 168 hours.   I have reviewed all pertinent lab results within the past 24 hours.    Imaging:  X-Ray Chest PA And Lateral   Final Result      No acute cardiopulmonary process identified.         Electronically signed by: Denver Wagner   Date:    01/14/2024   Time:    17:31      CT Abdomen Pelvis With IV Contrast NO Oral Contrast   Final Result      Worsening inflammatory changes seen around the pancreas consistent with worsening pancreatitis.  There is evidence of a large pseudocyst in the body and head of the pancreas as well as some pancreatic necrosis.  Findings are consistent with patient's history of necrotizing pancreatitis.  Fluid collections and pseudocysts are also seen just inferior to the pancreas which are stable since prior examination.      Worsening ascites      Interval development of bibasilar atelectasis and bilateral moderate-sized pleural effusions         Electronically signed by: Wayne Gee   Date:    01/14/2024   Time:    10:27      CT Abdomen Pelvis With IV Contrast NO Oral Contrast   Final Result      1. Continued organization and partial encapsulation of the acute necrotic collection at the body of the pancreas and peripancreatic " soft tissues.  No internal foci of gas or hortencia changes of acute super infection by imaging.  Overall not significantly changed in size from prior.   2. Mild improvement of free fluid and resolved pleural effusions.         Electronically signed by: Kisha Finnegan   Date:    01/09/2024   Time:    19:11      X-Ray Chest AP Portable   Final Result      No acute cardiopulmonary abnormality.         Electronically signed by: Kisha Finnegan   Date:    01/09/2024   Time:    17:05         I have reviewed all pertinent imaging results/findings within the past 24 hours.    Micro/Path/Other:  Microbiology Results (last 7 days)       Procedure Component Value Units Date/Time    Blood Culture [[id number]]  (Normal) Collected: 01/10/24 0715    Order Status: Completed Specimen: Blood Updated: 01/15/24 0900     CULTURE, BLOOD ([facility]) No Growth at 5 days    Blood Culture [[id number]]  (Normal) Collected: 01/10/24 0715    Order Status: Completed Specimen: Blood Updated: 01/15/24 0900     CULTURE, BLOOD ([facility]) No Growth at 5 days    Blood Culture [[id number]]  (Normal) Collected: 01/09/24 1008    Order Status: Completed Specimen: Blood Updated: 01/14/24 1101     CULTURE, BLOOD ([facility]) No Growth at 5 days    Blood Culture [[id number]]  (Normal) Collected: 01/09/24 1008    Order Status: Completed Specimen: Blood Updated: 01/14/24 1101     CULTURE, BLOOD ([facility]) No Growth at 5 days    Stool Culture **CANNOT BE ORDERED STAT** [[id number]]  (Normal) Collected: 01/10/24 0341    Order Status: Completed Specimen: Stool Updated: 01/12/24 0912     Stool Culture Negative for Salmonella, Shigella, Campylobacter, Vibrio, Aeromonas, Pleisiomonas,Yersinia, or Shiga Toxin 1 and 2.    Clostridium Diff Toxin, A & B, EIA [[id number]]  (Normal) Collected: 01/10/24 0341    Order Status: Completed Specimen: Stool Updated: 01/10/24 1124     Clostridium Difficile GDH Antigen Negative     Clostridium Difficile Toxin A/B Negative           Specimen (168h ago,  onward)      None             Assessment & Plan:   69 yo w/ necrotizing pancreatitis now with pseudocyst. Tolerating clear liquid diet.    Plan  - No acute surgical intervention at this time  - Patient will need eventual endoscopic drainage with GI of pseudocyst. '  - Recommend optimization of nutrition and pain control  - Surgery will sign off.

## 2024-01-16 LAB
ALBUMIN SERPL-MCNC: 1.6 G/DL (ref 3.4–4.8)
ALBUMIN/GLOB SERPL: 0.5 RATIO (ref 1.1–2)
ALP SERPL-CCNC: 229 UNIT/L (ref 40–150)
ALT SERPL-CCNC: 32 UNIT/L (ref 0–55)
AST SERPL-CCNC: 75 UNIT/L (ref 5–34)
BASOPHILS # BLD AUTO: 0.03 X10(3)/MCL
BASOPHILS NFR BLD AUTO: 0.3 %
BILIRUB SERPL-MCNC: 2 MG/DL
BUN SERPL-MCNC: 15.3 MG/DL (ref 8.4–25.7)
CALCIUM SERPL-MCNC: 8.1 MG/DL (ref 8.8–10)
CHLORIDE SERPL-SCNC: 96 MMOL/L (ref 98–107)
CO2 SERPL-SCNC: 31 MMOL/L (ref 23–31)
CREAT SERPL-MCNC: 0.75 MG/DL (ref 0.73–1.18)
EOSINOPHIL # BLD AUTO: 0.11 X10(3)/MCL (ref 0–0.9)
EOSINOPHIL NFR BLD AUTO: 1.3 %
ERYTHROCYTE [DISTWIDTH] IN BLOOD BY AUTOMATED COUNT: 14.2 % (ref 11.5–17)
GFR SERPLBLD CREATININE-BSD FMLA CKD-EPI: >60 MLS/MIN/1.73/M2
GLOBULIN SER-MCNC: 3.3 GM/DL (ref 2.4–3.5)
GLUCOSE SERPL-MCNC: 140 MG/DL (ref 82–115)
HCT VFR BLD AUTO: 31.9 % (ref 42–52)
HGB BLD-MCNC: 10.6 G/DL (ref 14–18)
IMM GRANULOCYTES # BLD AUTO: 0.05 X10(3)/MCL (ref 0–0.04)
IMM GRANULOCYTES NFR BLD AUTO: 0.6 %
LYMPHOCYTES # BLD AUTO: 1.32 X10(3)/MCL (ref 0.6–4.6)
LYMPHOCYTES NFR BLD AUTO: 15.1 %
MCH RBC QN AUTO: 30.6 PG (ref 27–31)
MCHC RBC AUTO-ENTMCNC: 33.2 G/DL (ref 33–36)
MCV RBC AUTO: 92.2 FL (ref 80–94)
MONOCYTES # BLD AUTO: 0.87 X10(3)/MCL (ref 0.1–1.3)
MONOCYTES NFR BLD AUTO: 10 %
NEUTROPHILS # BLD AUTO: 6.35 X10(3)/MCL (ref 2.1–9.2)
NEUTROPHILS NFR BLD AUTO: 72.7 %
NRBC BLD AUTO-RTO: 0 %
PLATELET # BLD AUTO: 250 X10(3)/MCL (ref 130–400)
PMV BLD AUTO: 10.1 FL (ref 7.4–10.4)
POCT GLUCOSE: 134 MG/DL (ref 70–110)
POCT GLUCOSE: 137 MG/DL (ref 70–110)
POCT GLUCOSE: 138 MG/DL (ref 70–110)
POTASSIUM SERPL-SCNC: 4.4 MMOL/L (ref 3.5–5.1)
PROT SERPL-MCNC: 4.9 GM/DL (ref 5.8–7.6)
RBC # BLD AUTO: 3.46 X10(6)/MCL (ref 4.7–6.1)
SODIUM SERPL-SCNC: 131 MMOL/L (ref 136–145)
WBC # SPEC AUTO: 8.73 X10(3)/MCL (ref 4.5–11.5)

## 2024-01-16 PROCEDURE — 25000003 PHARM REV CODE 250: Performed by: NURSE PRACTITIONER

## 2024-01-16 PROCEDURE — 63600175 PHARM REV CODE 636 W HCPCS: Performed by: INTERNAL MEDICINE

## 2024-01-16 PROCEDURE — 80053 COMPREHEN METABOLIC PANEL: CPT | Performed by: INTERNAL MEDICINE

## 2024-01-16 PROCEDURE — 21400001 HC TELEMETRY ROOM

## 2024-01-16 PROCEDURE — 25000003 PHARM REV CODE 250: Performed by: INTERNAL MEDICINE

## 2024-01-16 PROCEDURE — 85025 COMPLETE CBC W/AUTO DIFF WBC: CPT | Performed by: INTERNAL MEDICINE

## 2024-01-16 PROCEDURE — 99233 SBSQ HOSP IP/OBS HIGH 50: CPT | Mod: FS,,, | Performed by: GENERAL PRACTICE

## 2024-01-16 PROCEDURE — 25000003 PHARM REV CODE 250

## 2024-01-16 RX ADMIN — GABAPENTIN 100 MG: 100 CAPSULE ORAL at 08:01

## 2024-01-16 RX ADMIN — HYDROCODONE BITARTRATE AND ACETAMINOPHEN 1 TABLET: 10; 325 TABLET ORAL at 08:01

## 2024-01-16 RX ADMIN — PANTOPRAZOLE SODIUM 40 MG: 40 TABLET, DELAYED RELEASE ORAL at 08:01

## 2024-01-16 RX ADMIN — SIMETHICONE 80 MG: 80 TABLET, CHEWABLE ORAL at 08:01

## 2024-01-16 RX ADMIN — GABAPENTIN 100 MG: 100 CAPSULE ORAL at 10:01

## 2024-01-16 RX ADMIN — FENOFIBRATE 145 MG: 145 TABLET, FILM COATED ORAL at 08:01

## 2024-01-16 RX ADMIN — MEROPENEM 1 G: 1 INJECTION, POWDER, FOR SOLUTION INTRAVENOUS at 04:01

## 2024-01-16 RX ADMIN — DICYCLOMINE HYDROCHLORIDE 10 MG: 10 CAPSULE ORAL at 08:01

## 2024-01-16 RX ADMIN — LEUCINE, PHENYLALANINE, LYSINE, METHIONINE, ISOLEUCINE, VALINE, HISTIDINE, THREONINE, TRYPTOPHAN, ALANINE, GLYCINE, ARGININE, PROLINE, SERINE, TYROSINE, SODIUM ACETATE, DIBASIC POTASSIUM PHOSPHATE, MAGNESIUM CHLORIDE, SODIUM CHLORIDE, CALCIUM CHLORIDE, DEXTROSE
311; 238; 247; 170; 255; 247; 204; 179; 77; 880; 438; 489; 289; 213; 17; 297; 261; 51; 77; 33; 5 INJECTION INTRAVENOUS at 03:01

## 2024-01-16 RX ADMIN — MEROPENEM 1 G: 1 INJECTION, POWDER, FOR SOLUTION INTRAVENOUS at 10:01

## 2024-01-16 RX ADMIN — METOPROLOL SUCCINATE 25 MG: 25 TABLET, EXTENDED RELEASE ORAL at 08:01

## 2024-01-16 RX ADMIN — ENOXAPARIN SODIUM 40 MG: 100 INJECTION SUBCUTANEOUS at 04:01

## 2024-01-16 RX ADMIN — MELATONIN TAB 3 MG 9 MG: 3 TAB at 10:01

## 2024-01-16 RX ADMIN — MEROPENEM 1 G: 1 INJECTION, POWDER, FOR SOLUTION INTRAVENOUS at 01:01

## 2024-01-16 RX ADMIN — ASPIRIN 81 MG CHEWABLE TABLET 81 MG: 81 TABLET CHEWABLE at 08:01

## 2024-01-16 RX ADMIN — ATORVASTATIN CALCIUM 20 MG: 10 TABLET, FILM COATED ORAL at 08:01

## 2024-01-16 NOTE — NURSING
Nurses Note -- 4 Eyes      1/16/2024   8:51 AM      Skin assessed during: Transfer      [x] No Altered Skin Integrity Present    []Prevention Measures Documented      [] Yes- Altered Skin Integrity Present or Discovered   [] LDA Added if Not in Epic (Describe Wound)   [] New Altered Skin Integrity was Present on Admit and Documented in LDA   [] Wound Image Taken    Wound Care Consulted? No    Attending Nurse: Ladan Harrell RN/Staff Member:  Luther Erwin

## 2024-01-16 NOTE — PROGRESS NOTES
[facility]  Hospital Medicine Progress Note        Chief Complaint: Inpatient Follow-up    HPI:   68-year-old male with significant history of type 2 diabetes mellitus, HTN, chronic diastolic heart failure, HLD, GERD and  hydrocele.  Patient had a recent 19 day hospitalization for acute necrotizing pancreatitis secondary to gallstones/choledocholithiasis, severe sepsis with Enterococcus bacteremia, acute kidney injury, acute hypoxemic respiratory failure. patient had cholecystectomy in 2022, underwent ERCP this previous hospitalization with stone and sludge removal and sphincterotomy.  Repeat CT abdomen pelvis showed continued findings of necrotizing pancreatitis with possible organizing fluid collection around pancreatic neck and body.  GI recommended repeat CT in 3 months.  Patient was treated with meropenem, ampicillin while in-house which was discontinued upon DC.  He completed 14 day course while in-house.  Patient remained symptomatic even after DC.  Patient continued with abdominal discomfort, nausea, unable to keep anything down mouth .  also reports diarrhea, unquantified unintentional weight loss.  Patient was febrile and tachycardic.  Lab significant for leukocytosis, acute kidney injury/dehydration.  Lipase was elevated.  Patient was initiated on conservative management with IV fluids, NPO and GI services consulted, admitted to hospitalist medicine service.  CT abdomen pelvis repeated-overall similar findings compared to before with persistent fluid collection/necrotizing pancreatitis.  Stool studies ordered given diarrhea.  Given concern for necrotizing pancreatitis decided to initiate meropenem.  GI evaluated, CT findings similar to prior and therefore no interventions planned, recommended conservative management.  Diet advanced to clear liquids on 01/10.  Persistent diarrhea as of 1/11, stool studies negative, added Questran, Merrem continued for necrotizing pancreatitis,  GI following.  Infectious Disease evaluated, recommended possible necrosectomy once inflammation resolves.  Still symptomatic with diarrhea and abdominal discomfort, Questran increased on 1/13.  Full liquid diet continued.  Worsening abdominal symptoms with associated high-grade temperature spike on 1/14, no more diarrhea, in fact no bowel movements .  CT abdomen was repeated with contrast which showed large pseudocyst and worsening inflammation . kept NPO, General surgery consulted . Merrem continued . Clinimix continued for nutrition.  Patient will benefit from drainage given large pseudocyst, however will have to wait at least 3 weeks for it to mature prior to drainage. symptomatic/conservative management continued. nutritionist consulted . kept Clinimix . oral intake still will very inadequate.      Interval Hx:     Patient seen at bedside .  Patient in better spirits today, afebrile, abdominal symptoms much better, wanting to try real solid food.  Afebrile and hemodynamics are stable.  Wife is at bedside    Objective/physical exam:  General: In no acute distress, afebrile  Chest: Clear to auscultation bilaterally  Heart: S1, S2, no appreciable murmur  Abdomen: Soft, nontender today, BS +  MSK: Warm, no lower extremity edema, no clubbing or cyanosis  Neurologic: Alert and oriented x4, moving all extremities with good strength     VITAL SIGNS: 24 HRS MIN & MAX LAST   Temp  Min: 98.4 °F (36.9 °C)  Max: 100.1 °F (37.8 °C) 98.7 °F (37.1 °C)   BP  Min: 107/62  Max: 148/80 (!) 148/80   Pulse  Min: 93  Max: 110  98   Resp  Min: 18  Max: 18 18   SpO2  Min: 90 %  Max: 94 % (!) 94 %       Recent Labs   Lab 01/16/24  0616   WBC 8.73   RBC 3.46*   HGB 10.6*   HCT 31.9*   MCV 92.2   MCH 30.6   MCHC 33.2   RDW 14.2      MPV 10.1         Recent Labs   Lab 01/16/24  0616   *   K 4.4   CO2 31   BUN 15.3   CREATININE 0.75   CALCIUM 8.1*   ALBUMIN 1.6*   ALKPHOS 229*   ALT 32   AST 75*   BILITOT 2.0*           Microbiology Results (last 7 days)       Procedure Component Value Units Date/Time    Blood Culture [[id number]]  (Normal) Collected: 01/10/24 0715    Order Status: Completed Specimen: Blood Updated: 01/15/24 0900     CULTURE, BLOOD ([facility]) No Growth at 5 days    Blood Culture [[id number]]  (Normal) Collected: 01/10/24 0715    Order Status: Completed Specimen: Blood Updated: 01/15/24 0900     CULTURE, BLOOD ([facility]) No Growth at 5 days    Blood Culture [[id number]]  (Normal) Collected: 01/09/24 1008    Order Status: Completed Specimen: Blood Updated: 01/14/24 1101     CULTURE, BLOOD ([facility]) No Growth at 5 days    Blood Culture [[id number]]  (Normal) Collected: 01/09/24 1008    Order Status: Completed Specimen: Blood Updated: 01/14/24 1101     CULTURE, BLOOD ([facility]) No Growth at 5 days    Stool Culture **CANNOT BE ORDERED STAT** [[id number]]  (Normal) Collected: 01/10/24 0341    Order Status: Completed Specimen: Stool Updated: 01/12/24 0912     Stool Culture Negative for Salmonella, Shigella, Campylobacter, Vibrio, Aeromonas, Pleisiomonas,Yersinia, or Shiga Toxin 1 and 2.    Clostridium Diff Toxin, A & B, EIA [[id number]]  (Normal) Collected: 01/10/24 0341    Order Status: Completed Specimen: Stool Updated: 01/10/24 1124     Clostridium Difficile GDH Antigen Negative     Clostridium Difficile Toxin A/B Negative             Scheduled Med:   aspirin  81 mg Oral Daily    atorvastatin  20 mg Oral Daily    enoxparin  40 mg Subcutaneous Q24H (prophylaxis, 1700)    fenofibrate  145 mg Oral Daily    fluticasone propionate  2 spray Each Nostril Daily    gabapentin  100 mg Oral BID    lipase-protease-amylase 12,000-38,000-60,000 units  3 capsule Oral TID WM    meropenem (MERREM) IVPB  1 g Intravenous Q8H    metoprolol succinate  25 mg Oral Daily    pantoprazole  40 mg Oral Daily          Assessment/Plan:    Sirs with fever spike/tachycardia secondary to below-improved/resolved  Acute on chronic necrotizing pancreatitis with  peripancreatic fluid collection  Large pseudocyst  Acute kidney injury secondary to dehydration-improved  Recent Enterococcus bacteremia secondary to necrotizing pancreatitis-completed treatment  Recent choledocholithiasis status post ERCP, sphincterotomy in December, 2023  History of essential HTN-now borderline low BP   Type 2 diabetes mellitus-stable   Chronic diastolic heart failure-appears compensated  HLD  GERD  Scrotal hydrocele   Prophylaxis      Afebrile and hemodynamically stable   Abdominal symptoms much better   Advanced to low residue diet today  CT from 01/14 with worsening inflammation, large pseudocyst   Infectious Disease, GI and general surgery on board   Continue IV Merrem   Patient will benefit from drainage, but would have to wait until inflammation resolves   Surgery would like to wait at least 3 weeks  Continue symptomatic management -multimodal pain control  Patient better on dilaudid  On Clinimix since oral intake is not adequate, will have to be cautious given hyponatremia, had consulted nutritionist on 01/15, await recs   Gabapentin for hiccups  No more diarrhea, off Questran for the past 3 days, in fact no bowel movements  If continues with no bowel movement by tomorrow I plan to add bowel regimen  CT abdomen 1/14 was concerning for bilateral effusion, two-view chest x-ray with no effusion, DC Lasix   Patient had recent echocardiogram in December which showed normal ejection fraction, has diastolic dysfunction  he was treated with Merrem/ampicillin during previous hospitalization for necrotizing pancreatitis with Enterococcus bacteremia   Hold home amlodipine since blood pressure is normal   Cautiously continue Toprol-XL   Continue other home meds-aspirin, statin and fenofibrate  P.r.n. Bentyl for abdominal cramps  DVT prophylaxis- subQ Lovenox      Lisa Willoughby MD   01/16/2024

## 2024-01-16 NOTE — PROGRESS NOTES
"Gastroenterology Progress Note  Subjective/Interval History:    History reviewed.  Patient independently examined by me.  He feels much better today.  He has no abdominal pain, nausea or vomiting.  His bowels are not moving much but he has not eaten much.  He has no fever or chills.  On physical exam the abdomen is flat soft and nontender.  Clinically he is pancreatitis is improving although the CT scan is still evolving.  Impression is severe necrotizing pancreatitis, clinically improving plan is to continue supportive care  Patient resting in bed, wife at bedside. He is advanced to full diet. He had some vomiting after eating rice and gravy, but he tolerated chicken well. Still having hiccups despite gabapentin. Abd pain improved. No BM.    ROS:  Review of Systems   Constitutional:  Negative for fever and malaise/fatigue.   Respiratory:  Negative for cough and shortness of breath.    Cardiovascular:  Negative for chest pain.   Gastrointestinal:  Positive for constipation. Negative for abdominal pain, diarrhea, nausea and vomiting.   Neurological:  Negative for weakness.       Vital Signs:  /76   Pulse 93   Temp 97.9 °F (36.6 °C) (Oral)   Resp 17   Ht 5' 5.98" (1.676 m)   Wt 59 kg (130 lb)   SpO2 96%   BMI 20.99 kg/m²   Body mass index is 20.99 kg/m².    Physical Exam:  Physical Exam  Constitutional:       General: He is not in acute distress.     Appearance: He is normal weight. He is not ill-appearing.   HENT:      Head: Normocephalic and atraumatic.      Right Ear: External ear normal.      Left Ear: External ear normal.      Nose: Nose normal.      Mouth/Throat:      Pharynx: Oropharynx is clear.   Eyes:      General: No scleral icterus.     Conjunctiva/sclera: Conjunctivae normal.   Pulmonary:      Effort: Pulmonary effort is normal. No respiratory distress.   Abdominal:      General: There is no distension.      Tenderness: There is no abdominal tenderness. There is no guarding.      Comments: " Abd firm   Musculoskeletal:         General: Normal range of motion.      Cervical back: Normal range of motion.   Skin:     General: Skin is warm and dry.      Coloration: Skin is not jaundiced.   Neurological:      Mental Status: He is alert and oriented to person, place, and time. Mental status is at baseline.   Psychiatric:         Mood and Affect: Mood normal.         Behavior: Behavior normal.         Thought Content: Thought content normal.         Labs:  Recent Results (from the past 24 hour(s))   POCT glucose    Collection Time: 01/15/24  5:03 PM   Result Value Ref Range    POCT Glucose 147 (H) 70 - 110 mg/dL   POCT glucose    Collection Time: 01/15/24  9:08 PM   Result Value Ref Range    POCT Glucose 151 (H) 70 - 110 mg/dL   POCT glucose    Collection Time: 01/16/24  4:58 AM   Result Value Ref Range    POCT Glucose 138 (H) 70 - 110 mg/dL   Comprehensive Metabolic Panel    Collection Time: 01/16/24  6:16 AM   Result Value Ref Range    Sodium Level 131 (L) 136 - 145 mmol/L    Potassium Level 4.4 3.5 - 5.1 mmol/L    Chloride 96 (L) 98 - 107 mmol/L    Carbon Dioxide 31 23 - 31 mmol/L    Glucose Level 140 (H) 82 - 115 mg/dL    Blood Urea Nitrogen 15.3 8.4 - 25.7 mg/dL    Creatinine 0.75 0.73 - 1.18 mg/dL    Calcium Level Total 8.1 (L) 8.8 - 10.0 mg/dL    Protein Total 4.9 (L) 5.8 - 7.6 gm/dL    Albumin Level 1.6 (L) 3.4 - 4.8 g/dL    Globulin 3.3 2.4 - 3.5 gm/dL    Albumin/Globulin Ratio 0.5 (L) 1.1 - 2.0 ratio    Bilirubin Total 2.0 (H) <=1.5 mg/dL    Alkaline Phosphatase 229 (H) 40 - 150 unit/L    Alanine Aminotransferase 32 0 - 55 unit/L    Aspartate Aminotransferase 75 (H) 5 - 34 unit/L    eGFR >60 mls/min/1.73/m2   CBC with Differential    Collection Time: 01/16/24  6:16 AM   Result Value Ref Range    WBC 8.73 4.50 - 11.50 x10(3)/mcL    RBC 3.46 (L) 4.70 - 6.10 x10(6)/mcL    Hgb 10.6 (L) 14.0 - 18.0 g/dL    Hct 31.9 (L) 42.0 - 52.0 %    MCV 92.2 80.0 - 94.0 fL    MCH 30.6 27.0 - 31.0 pg    MCHC 33.2 33.0 -  36.0 g/dL    RDW 14.2 11.5 - 17.0 %    Platelet 250 130 - 400 x10(3)/mcL    MPV 10.1 7.4 - 10.4 fL    Neut % 72.7 %    Lymph % 15.1 %    Mono % 10.0 %    Eos % 1.3 %    Basophil % 0.3 %    Lymph # 1.32 0.6 - 4.6 x10(3)/mcL    Neut # 6.35 2.1 - 9.2 x10(3)/mcL    Mono # 0.87 0.1 - 1.3 x10(3)/mcL    Eos # 0.11 0 - 0.9 x10(3)/mcL    Baso # 0.03 <=0.2 x10(3)/mcL    IG# 0.05 (H) 0 - 0.04 x10(3)/mcL    IG% 0.6 %    NRBC% 0.0 %         Assessment/Plan:  This is a 68 y.o. male known to Dr. Flako Kerns from recent admission with PMH of T2DM, HTN, HLD, chronic scrotal hydrocele, vitamin D deficiency, CKD 3a, necrotizing pancreatitis 2/2 gallstones s/p ERCP 12/17/23, cholecystectomy 2022.      Patient admitted to [facility] 12/16/23 for choledocholithiasis requiring ERCP for stone extraction. Post procedure his hospitalization was complicated by necrotizing pancreatitis with possible collection near pancreas. No endoscopic intervention indicated at that time. Patient discharged home 01/04/24.     He presented to the ED 01/09/24 with epigastric pain, n/v/d x3 days. He reported a fever onset day of presentation. GI consulted for pancreatitis.     Pancreatitis, acute on chronic  - supportive care: IVF, pain control  - ADAT     Necrotic collection at body of pancreas  - CT abd/pel with IV contrast 12/29: necrotic pancreatitis with possible organizing collection anterior and superior to pancreatic neck and body measuring approximately 8 x 4 cm  - Dr. Oseguera evaluated patient at that time - no organized collection that required drainage; he recommended maximizing nutrition with increased protein intake and repeat CT abd in 4-6 weeks with follow up in GI clinic  - CT abd/pel with IV 01/09/24: Continued organization and partial encapsulation of the acute necrotic collection at the body of the pancreas and peripancreatic soft tissues.  No internal foci of gas or hortencia changes of acute super infection by imaging.  Overall not  significantly changed in size from prior.   - CT abd/pel 01/14/24: worsening pancreatitis, large pseudocyst in body and head of pancreas as well as some pancreatic necrosis; fluid collections and pseudocysts are also seen just inferior to pancreas which are stable since prior exam; worsening ascites  - on merrem per ID for pancreatic tissue penetration  - prefer to allow maturation of pseudocyst prior to consideration for endoscopic drainage - this typically takes 6-8 weeks from onset  - recommend high protein intake. Will order dietary nutritional supplementation  - encourage safe ambulation     Diarrhea - resolved  - GI panel, stool for C diff negative  - pancreatic elastase wnl  - questran d/c due to constipation  - simethicone and dicyclomine PRN  - ok to d/c creon given absence of pancreatic insufficiency    Oneida Ellison, PA-C  Gastroenterology  [facility]

## 2024-01-16 NOTE — PROGRESS NOTES
[facility]  Infectious Disease Progress Note            ASSESSMENT & PLAN:     He is a 68-year-old male with a past medical history of diabetes mellitus, hypertension, CHF, and GERD who was recently hospitalized for necrotizing pancreatitis secondary to gallstone/choledocholithiasis.  He was also found to be bacteremic with Enterococcus and he was evaluated by Dr. Pedersen at that time,  he underwent an ERCP and sphincterotomy.  He completed a 14 course of meropenem and ampicillin during that hospitalization and was subsequently discharged.  He had ongoing abdominal discomfort, nausea, vomiting, and loose BMs.  Who presented here on 01/09 for further evaluation.  He was noted to have a leukocytosis and SHA possibly s/t IVVD as well as elevated lipase.  CT of the abdomen and pelvis showed similar findings with persistent fluid collection and necrotizing pancreatitis.  He was initiated on meropenem empirically.  Blood cultures and stool studies have remained negative.  Clinically improved, fevers resolved.  He was evaluated by GI-no indication for intervention at present, plan on monitoring progression with serial imaging.   We have been consulted per [employer] policy for the use of meropenem.      Necrotizing pancreatitis, fluid collection  Recent Enterococcus bacteremia s/t above, s/p treatment / resolution  Recent gallstone pancreatitis, s/p ERCP and sphincterotomy  SHA on admit, suspect s/t IVVD, resolved  H/o CHF / HTN / DM2     PLAN:  GI monitoring for now.   Continue meropenem.   Discussed with patient.     SUBJECTIVE:   Some low-grade temps, no associated symptoms.  Otherwise stable. No events overnight.      MEDICATIONS:   Reviewed in EMR    REVIEW OF SYSTEMS:   Except as documented, all other systems reviewed and negative     PHYSICAL EXAM:   T 98.2 °F (36.8 °C)   /78   P 85   RR 18   O2 96 %  GENERAL: Chronically ill appearing; NAD; does not appear toxic  SKIN: no rash  HEENT: sclera non-icteric; PERRL   NECK:  "supple; no LAD  CHEST: CTA; nonlabored, equal expansion; no adventitious BS  CARDIOVASCULAR: RRR, S1S2; no murmur   ABDOMEN:  active bowel sounds; abdomen soft, rounded, + mostly epigastric TTP  EXTREMITIES: no cyanosis or clubbing  NEURO: AAO x4; CN II-XII grossly intact  PSYCH: Mentation and affect appropriate    LABS AND IMAGING:     Recent Labs     01/15/24  0840 01/16/24  0616   WBC 9.18 8.73   RBC 4.24* 3.46*   HGB 12.6* 10.6*   HCT 39.4* 31.9*   MCV 92.9 92.2   MCH 29.7 30.6   MCHC 32.0* 33.2   RDW 14.0 14.2    250       No results for input(s): "LACTIC" in the last 72 hours.  No results for input(s): "INR", "APTT", "D-DIMER" in the last 72 hours.  No results for input(s): "HGBA1C", "CHOL", "TRIG", "LDL", "VLDL", "HDL" in the last 72 hours.   Recent Labs     01/14/24  0618 01/15/24  0840 01/16/24  0616   * 136 131*   K 4.4 5.0 4.4   CHLORIDE 96* 97* 96*   CO2 31 30 31   BUN 13.6 16.0 15.3   CREATININE 0.80 0.75 0.75   GLUCOSE 131* 117* 140*   CALCIUM 8.6* 8.8 8.1*   ALBUMIN 1.9*  --  1.6*   GLOBULIN 4.1*  --  3.3   ALKPHOS 94  --  229*   ALT 18  --  32   AST 33  --  75*   BILITOT 0.9  --  2.0*       No results for input(s): "BNP", "CPK", "TROPONINI" in the last 72 hours.       X-Ray Chest PA And Lateral  Narrative: EXAMINATION:  XR CHEST PA AND LATERAL    CLINICAL HISTORY:  Bilateral pleural effusion;    TECHNIQUE:  Two-view    COMPARISON:  .    FINDINGS:  Cardiopericardial silhouette is within normal limits. Lungs are without dense focal or segmental consolidation, congestion, pleural effusion or pneumothorax.  Impression: No acute cardiopulmonary process identified.    Electronically signed by: Denver Wagner  Date:    01/14/2024  Time:    17:31  CT Abdomen Pelvis With IV Contrast NO Oral Contrast  Narrative: EXAMINATION:  CT ABDOMEN PELVIS WITH IV CONTRAST    CLINICAL HISTORY:  Acute on chronic necrotizing pancreatitis with peripancreatic fluid collection, worsening fever spikes and worsening " pain;    TECHNIQUE:  Low dose axial images, sagittal and coronal reformations were obtained from the lung bases to the pubic symphysis following the IV administration of contrast. Automatic exposure control (AEC) is utilized to reduce patient radiation exposure.    COMPARISON:  01/09/2024    FINDINGS:  There are changes consistent with COPD in the lung bases bilaterally.  Some blebs hemorrhages bilaterally.  There is bibasilar atelectasis.  There are bilateral moderate-sized pleural effusions.  These are new since the prior examination.    There is evidence of ascites.  This is more prominent than the prior examination.    There is a small hiatal hernia.    The liver appears normal.  No liver mass or lesion is seen.  Portal and hepatic veins appear normal.    The patient is status post cholecystectomy.    There is evidence of inflammatory change seen throughout the pancreas involving the head and body of the pancreas.  There is relative sparing of the tail.  There is a large pseudocyst seen involving the body and head of the pancreas.  Pseudocyst measures roughly 10 cm x 7 cm.  It is similar to the prior examination.  There is some pancreatic necrosis seen consistent with patient's history of  necrotizing pancreatitis.    Diffuse inflammatory changes seen along the peripancreatic region.  Inflammatory changes appear slightly more prominent than the prior examination.  Is some fluid collections also seen just inferior to the pancreas consistent with additional pseudocyst.  These were seen on the prior examination as well.    .    The spleen shows no acute abnormality.    The adrenal glands appear normal.  No adrenal nodule is seen.    The kidneys appear normal.  No hydronephrosis is seen.  No hydroureter is seen.  No nephrolithiasis is seen.  No obvious ureteral stones are seen.    Urinary bladder appears grossly unremarkable.    No colitis is seen.  No diverticulitis is seen.  No obvious colonic mass or lesion is  seen.    No free air is seen.  Impression: Worsening inflammatory changes seen around the pancreas consistent with worsening pancreatitis.  There is evidence of a large pseudocyst in the body and head of the pancreas as well as some pancreatic necrosis.  Findings are consistent with patient's history of necrotizing pancreatitis.  Fluid collections and pseudocysts are also seen just inferior to the pancreas which are stable since prior examination.    Worsening ascites    Interval development of bibasilar atelectasis and bilateral moderate-sized pleural effusions    Electronically signed by: Wayne Gee  Date:    01/14/2024  Time:    10:27          MALI Kelly  Infectious Disease

## 2024-01-17 LAB
ALBUMIN SERPL-MCNC: 1.8 G/DL (ref 3.4–4.8)
ALBUMIN/GLOB SERPL: 0.5 RATIO (ref 1.1–2)
ALP SERPL-CCNC: 205 UNIT/L (ref 40–150)
ALT SERPL-CCNC: 29 UNIT/L (ref 0–55)
AST SERPL-CCNC: 54 UNIT/L (ref 5–34)
BASOPHILS # BLD AUTO: 0.05 X10(3)/MCL
BASOPHILS NFR BLD AUTO: 0.6 %
BILIRUB SERPL-MCNC: 0.9 MG/DL
BUN SERPL-MCNC: 16.2 MG/DL (ref 8.4–25.7)
CALCIUM SERPL-MCNC: 8.6 MG/DL (ref 8.8–10)
CHLORIDE SERPL-SCNC: 96 MMOL/L (ref 98–107)
CO2 SERPL-SCNC: 26 MMOL/L (ref 23–31)
CREAT SERPL-MCNC: 0.75 MG/DL (ref 0.73–1.18)
EOSINOPHIL # BLD AUTO: 0.11 X10(3)/MCL (ref 0–0.9)
EOSINOPHIL NFR BLD AUTO: 1.3 %
ERYTHROCYTE [DISTWIDTH] IN BLOOD BY AUTOMATED COUNT: 14.3 % (ref 11.5–17)
GFR SERPLBLD CREATININE-BSD FMLA CKD-EPI: >60 MLS/MIN/1.73/M2
GLOBULIN SER-MCNC: 3.8 GM/DL (ref 2.4–3.5)
GLUCOSE SERPL-MCNC: 124 MG/DL (ref 82–115)
HCT VFR BLD AUTO: 33.9 % (ref 42–52)
HGB BLD-MCNC: 11.3 G/DL (ref 14–18)
IMM GRANULOCYTES # BLD AUTO: 0.07 X10(3)/MCL (ref 0–0.04)
IMM GRANULOCYTES NFR BLD AUTO: 0.8 %
LYMPHOCYTES # BLD AUTO: 2.2 X10(3)/MCL (ref 0.6–4.6)
LYMPHOCYTES NFR BLD AUTO: 25.5 %
MCH RBC QN AUTO: 30.6 PG (ref 27–31)
MCHC RBC AUTO-ENTMCNC: 33.3 G/DL (ref 33–36)
MCV RBC AUTO: 91.9 FL (ref 80–94)
MONOCYTES # BLD AUTO: 0.9 X10(3)/MCL (ref 0.1–1.3)
MONOCYTES NFR BLD AUTO: 10.4 %
NEUTROPHILS # BLD AUTO: 5.3 X10(3)/MCL (ref 2.1–9.2)
NEUTROPHILS NFR BLD AUTO: 61.4 %
NRBC BLD AUTO-RTO: 0 %
PLATELET # BLD AUTO: 315 X10(3)/MCL (ref 130–400)
PMV BLD AUTO: 9.7 FL (ref 7.4–10.4)
POCT GLUCOSE: 117 MG/DL (ref 70–110)
POCT GLUCOSE: 138 MG/DL (ref 70–110)
POTASSIUM SERPL-SCNC: 4.3 MMOL/L (ref 3.5–5.1)
PROT SERPL-MCNC: 5.6 GM/DL (ref 5.8–7.6)
RBC # BLD AUTO: 3.69 X10(6)/MCL (ref 4.7–6.1)
SODIUM SERPL-SCNC: 130 MMOL/L (ref 136–145)
WBC # SPEC AUTO: 8.63 X10(3)/MCL (ref 4.5–11.5)

## 2024-01-17 PROCEDURE — 80053 COMPREHEN METABOLIC PANEL: CPT | Performed by: INTERNAL MEDICINE

## 2024-01-17 PROCEDURE — 85025 COMPLETE CBC W/AUTO DIFF WBC: CPT | Performed by: INTERNAL MEDICINE

## 2024-01-17 PROCEDURE — 25000003 PHARM REV CODE 250: Performed by: NURSE PRACTITIONER

## 2024-01-17 PROCEDURE — C1751 CATH, INF, PER/CENT/MIDLINE: HCPCS

## 2024-01-17 PROCEDURE — 25000003 PHARM REV CODE 250

## 2024-01-17 PROCEDURE — 76937 US GUIDE VASCULAR ACCESS: CPT

## 2024-01-17 PROCEDURE — 36410 VNPNXR 3YR/> PHY/QHP DX/THER: CPT

## 2024-01-17 PROCEDURE — 99233 SBSQ HOSP IP/OBS HIGH 50: CPT | Mod: FS,,, | Performed by: GENERAL PRACTICE

## 2024-01-17 PROCEDURE — 25000003 PHARM REV CODE 250: Performed by: INTERNAL MEDICINE

## 2024-01-17 PROCEDURE — A4216 STERILE WATER/SALINE, 10 ML: HCPCS | Performed by: INTERNAL MEDICINE

## 2024-01-17 PROCEDURE — 63600175 PHARM REV CODE 636 W HCPCS: Performed by: INTERNAL MEDICINE

## 2024-01-17 PROCEDURE — 21400001 HC TELEMETRY ROOM

## 2024-01-17 RX ORDER — SODIUM CHLORIDE 0.9 % (FLUSH) 0.9 %
10 SYRINGE (ML) INJECTION
Status: DISCONTINUED | OUTPATIENT
Start: 2024-01-17 | End: 2024-01-20 | Stop reason: HOSPADM

## 2024-01-17 RX ORDER — SODIUM CHLORIDE 0.9 % (FLUSH) 0.9 %
10 SYRINGE (ML) INJECTION EVERY 6 HOURS
Status: DISCONTINUED | OUTPATIENT
Start: 2024-01-17 | End: 2024-01-20 | Stop reason: HOSPADM

## 2024-01-17 RX ORDER — GABAPENTIN 300 MG/1
300 CAPSULE ORAL 3 TIMES DAILY
Status: DISCONTINUED | OUTPATIENT
Start: 2024-01-17 | End: 2024-01-18

## 2024-01-17 RX ADMIN — ASPIRIN 81 MG CHEWABLE TABLET 81 MG: 81 TABLET CHEWABLE at 09:01

## 2024-01-17 RX ADMIN — ENOXAPARIN SODIUM 40 MG: 100 INJECTION SUBCUTANEOUS at 05:01

## 2024-01-17 RX ADMIN — GABAPENTIN 100 MG: 100 CAPSULE ORAL at 09:01

## 2024-01-17 RX ADMIN — PANTOPRAZOLE SODIUM 40 MG: 40 TABLET, DELAYED RELEASE ORAL at 09:01

## 2024-01-17 RX ADMIN — HYDROCODONE BITARTRATE AND ACETAMINOPHEN 1 TABLET: 10; 325 TABLET ORAL at 03:01

## 2024-01-17 RX ADMIN — GABAPENTIN 300 MG: 300 CAPSULE ORAL at 11:01

## 2024-01-17 RX ADMIN — GABAPENTIN 300 MG: 300 CAPSULE ORAL at 02:01

## 2024-01-17 RX ADMIN — Medication 10 ML: at 05:01

## 2024-01-17 RX ADMIN — MEROPENEM 1 G: 1 INJECTION, POWDER, FOR SOLUTION INTRAVENOUS at 05:01

## 2024-01-17 RX ADMIN — Medication 10 ML: at 11:01

## 2024-01-17 RX ADMIN — MELATONIN TAB 3 MG 9 MG: 3 TAB at 11:01

## 2024-01-17 RX ADMIN — HYDROCODONE BITARTRATE AND ACETAMINOPHEN 1 TABLET: 10; 325 TABLET ORAL at 09:01

## 2024-01-17 RX ADMIN — FENOFIBRATE 145 MG: 145 TABLET, FILM COATED ORAL at 09:01

## 2024-01-17 RX ADMIN — MEROPENEM 1 G: 1 INJECTION, POWDER, FOR SOLUTION INTRAVENOUS at 06:01

## 2024-01-17 RX ADMIN — ATORVASTATIN CALCIUM 20 MG: 10 TABLET, FILM COATED ORAL at 09:01

## 2024-01-17 RX ADMIN — METOPROLOL SUCCINATE 25 MG: 25 TABLET, EXTENDED RELEASE ORAL at 09:01

## 2024-01-17 NOTE — PROGRESS NOTES
"Gastroenterology Progress Note  Subjective/Interval History:  History reviewed. Pt independently examined by me. Agree with A&P noted by PA below. Pt feels good, tolerating diet. OK for DC and FU with Dr. Oseguera in 2 mos w repeat abdominal CT  Patient resting in bed. Tolerating PO intake well without n/v. Denies abd pain/tenderness. No BM. He states that his back is likely what is causing his leg pain. Also c/o redness at IV site. Nurse aware.    ROS:  Review of Systems   Constitutional:  Negative for fever and malaise/fatigue.   Respiratory:  Negative for cough and shortness of breath.    Cardiovascular:  Negative for chest pain.   Gastrointestinal:  Positive for constipation. Negative for abdominal pain, diarrhea, nausea and vomiting.   Neurological:  Negative for weakness.       Vital Signs:  /72   Pulse 86   Temp 98.1 °F (36.7 °C) (Oral)   Resp 19   Ht 5' 5.98" (1.676 m)   Wt 59 kg (130 lb)   SpO2 97%   BMI 20.99 kg/m²   Body mass index is 20.99 kg/m².    Physical Exam:  Physical Exam  Constitutional:       General: He is not in acute distress.     Appearance: He is normal weight. He is not ill-appearing.   HENT:      Head: Normocephalic and atraumatic.      Right Ear: External ear normal.      Left Ear: External ear normal.      Nose: Nose normal.      Mouth/Throat:      Pharynx: Oropharynx is clear.   Eyes:      General: No scleral icterus.     Conjunctiva/sclera: Conjunctivae normal.   Pulmonary:      Effort: Pulmonary effort is normal. No respiratory distress.   Abdominal:      General: There is no distension.      Tenderness: There is no abdominal tenderness. There is no guarding.      Comments: Abd firm   Musculoskeletal:         General: Normal range of motion.      Cervical back: Normal range of motion.   Skin:     General: Skin is warm and dry.      Coloration: Skin is not jaundiced.   Neurological:      Mental Status: He is alert and oriented to person, place, and time. Mental status is " at baseline.   Psychiatric:         Mood and Affect: Mood normal.         Behavior: Behavior normal.         Thought Content: Thought content normal.         Labs:  Recent Results (from the past 24 hour(s))   POCT glucose    Collection Time: 01/16/24  4:19 PM   Result Value Ref Range    POCT Glucose 134 (H) 70 - 110 mg/dL   Comprehensive Metabolic Panel    Collection Time: 01/17/24  3:08 AM   Result Value Ref Range    Sodium Level 130 (L) 136 - 145 mmol/L    Potassium Level 4.3 3.5 - 5.1 mmol/L    Chloride 96 (L) 98 - 107 mmol/L    Carbon Dioxide 26 23 - 31 mmol/L    Glucose Level 124 (H) 82 - 115 mg/dL    Blood Urea Nitrogen 16.2 8.4 - 25.7 mg/dL    Creatinine 0.75 0.73 - 1.18 mg/dL    Calcium Level Total 8.6 (L) 8.8 - 10.0 mg/dL    Protein Total 5.6 (L) 5.8 - 7.6 gm/dL    Albumin Level 1.8 (L) 3.4 - 4.8 g/dL    Globulin 3.8 (H) 2.4 - 3.5 gm/dL    Albumin/Globulin Ratio 0.5 (L) 1.1 - 2.0 ratio    Bilirubin Total 0.9 <=1.5 mg/dL    Alkaline Phosphatase 205 (H) 40 - 150 unit/L    Alanine Aminotransferase 29 0 - 55 unit/L    Aspartate Aminotransferase 54 (H) 5 - 34 unit/L    eGFR >60 mls/min/1.73/m2   CBC with Differential    Collection Time: 01/17/24  3:08 AM   Result Value Ref Range    WBC 8.63 4.50 - 11.50 x10(3)/mcL    RBC 3.69 (L) 4.70 - 6.10 x10(6)/mcL    Hgb 11.3 (L) 14.0 - 18.0 g/dL    Hct 33.9 (L) 42.0 - 52.0 %    MCV 91.9 80.0 - 94.0 fL    MCH 30.6 27.0 - 31.0 pg    MCHC 33.3 33.0 - 36.0 g/dL    RDW 14.3 11.5 - 17.0 %    Platelet 315 130 - 400 x10(3)/mcL    MPV 9.7 7.4 - 10.4 fL    Neut % 61.4 %    Lymph % 25.5 %    Mono % 10.4 %    Eos % 1.3 %    Basophil % 0.6 %    Lymph # 2.20 0.6 - 4.6 x10(3)/mcL    Neut # 5.30 2.1 - 9.2 x10(3)/mcL    Mono # 0.90 0.1 - 1.3 x10(3)/mcL    Eos # 0.11 0 - 0.9 x10(3)/mcL    Baso # 0.05 <=0.2 x10(3)/mcL    IG# 0.07 (H) 0 - 0.04 x10(3)/mcL    IG% 0.8 %    NRBC% 0.0 %   POCT glucose    Collection Time: 01/17/24 10:49 AM   Result Value Ref Range    POCT Glucose 138 (H) 70 - 110  mg/dL         Assessment/Plan:  This is a 68 y.o. male known to Dr. Flako Kerns from recent admission with PMH of T2DM, HTN, HLD, chronic scrotal hydrocele, vitamin D deficiency, CKD 3a, necrotizing pancreatitis 2/2 gallstones s/p ERCP 12/17/23, cholecystectomy 2022.      Patient admitted to [facility] 12/16/23 for choledocholithiasis requiring ERCP for stone extraction. Post procedure his hospitalization was complicated by necrotizing pancreatitis with possible collection near pancreas. No endoscopic intervention indicated at that time. Patient discharged home 01/04/24.     He presented to the ED 01/09/24 with epigastric pain, n/v/d x3 days. He reported a fever onset day of presentation. GI consulted for pancreatitis.     Pancreatitis, acute on chronic  - supportive care: IVF, pain control  - ADAT     Necrotic collection at body of pancreas  - CT abd/pel with IV contrast 12/29: necrotic pancreatitis with possible organizing collection anterior and superior to pancreatic neck and body measuring approximately 8 x 4 cm  - Dr. Oseguera evaluated patient at that time - no organized collection that required drainage; he recommended maximizing nutrition with increased protein intake and repeat CT abd in 4-6 weeks with follow up in GI clinic  - CT abd/pel with IV 01/09/24: Continued organization and partial encapsulation of the acute necrotic collection at the body of the pancreas and peripancreatic soft tissues.  No internal foci of gas or hortencia changes of acute super infection by imaging.  Overall not significantly changed in size from prior.   - CT abd/pel 01/14/24: worsening pancreatitis, large pseudocyst in body and head of pancreas as well as some pancreatic necrosis; fluid collections and pseudocysts are also seen just inferior to pancreas which are stable since prior exam; worsening ascites  - on merrem per ID for pancreatic tissue penetration  - prefer to allow maturation of pseudocyst prior to consideration for  endoscopic drainage - this typically takes 6-8 weeks from onset  - recommend high protein intake. Will order dietary nutritional supplementation  - encourage safe ambulation     Diarrhea - resolved  - GI panel, stool for C diff negative  - pancreatic elastase wnl  - questran d/c due to constipation  - simethicone and dicyclomine PRN  - ok to d/c creon given absence of pancreatic insufficiency    - will discuss need for future imaging and follow up to assess if endoscopic drainage appropriate with Dr. Sawyer.    Oneida Ellison, PA-C  Gastroenterology  [facility]

## 2024-01-17 NOTE — PROGRESS NOTES
[facility] Hospital Medicine Progress Note        Chief Complaint: Inpatient Follow-up for necrotizing pancreatitis     HPI:   68-year-old male with significant history of type 2 diabetes mellitus, HTN, chronic diastolic heart failure, HLD, GERD and  hydrocele.  Patient had a recent 19 day hospitalization for acute necrotizing pancreatitis secondary to gallstones/choledocholithiasis, severe sepsis with Enterococcus bacteremia, acute kidney injury, acute hypoxemic respiratory failure. patient had cholecystectomy in 2022, underwent ERCP this previous hospitalization with stone and sludge removal and sphincterotomy.  Repeat CT abdomen pelvis showed continued findings of necrotizing pancreatitis with possible organizing fluid collection around pancreatic neck and body.  GI recommended repeat CT in 3 months.  Patient was treated with meropenem, ampicillin for Enterococcus bacteremia while in-house which was discontinued upon DC.  He completed 14 day course while in-house.  Patient remained symptomatic even after DC.  Patient continued with abdominal discomfort, nausea, unable to keep anything down by mouth. Also reports diarrhea, unquantified unintentional weight loss. Patient was febrile and tachycardic.  Lab significant for leukocytosis, acute kidney injury/dehydration.  Lipase was elevated.  Patient was initiated on conservative management with IV fluids, NPO and GI services consulted, admitted to hospitalist medicine service.  CT abdomen pelvis repeated-overall similar findings compared to before with persistent fluid collection/necrotizing pancreatitis.  Stool studies ordered given diarrhea.  Given concern for necrotizing pancreatitis decided to initiate meropenem.  GI evaluated, CT findings similar to prior and therefore no interventions planned, recommended conservative management.  Diet initiated  to clear liquids on 01/10.  Persistent diarrhea as of 1/11, stool studies negative,  added Questran, Merrem continued for necrotizing pancreatitis, GI following.  Infectious Disease evaluated, recommended possible necrosectomy once inflammation resolves.  Still symptomatic with diarrhea and abdominal discomfort, Questran increased on 1/13.  diet advance to full liquid. Worsening abdominal symptoms with associated high-grade temperature spike on 1/14, however no more diarrhea, in fact no bowel movements .  CT abdomen was repeated with contrast which showed large pseudocyst and worsening inflammation . kept NPO, General surgery consulted . Merrem continued . Clinimix initiated and continued for nutrition.  Patient will benefit from drainage given large pseudocyst, however will have to wait at least 3 weeks for it to mature prior to drainage. symptomatic/conservative management continued. nutritionist consulted . kept Clinimix . diet re initiated. oral intake still  very inadequate.      Interval Hx:   Afebrile . On Ra. Other vitals are stable   On low residue diet. Pt reports eating very little but keeping it down so far.   No further diarrhea reported   Last BM 1/15/24.   Questran discontinued.  Pt currently denies abd pain   C/O numbness to his left lat thigh with h/o back problem.      Case was discussed with patient's nurse and  on the floor.    Objective/physical exam:  General: In no acute distress, afebrile  Chest: Clear to auscultation bilaterally  Heart: RRR, +S1, S2, no appreciable murmur  Abdomen: Slightly distended but  nontender, BS presents.   MSK: Warm, no lower extremity edema, no clubbing or cyanosis  Neurologic: Alert and oriented x4, Cranial nerve II-XII intact, Moves all ext spontaneously.     VITAL SIGNS: 24 HRS MIN & MAX LAST   Temp  Min: 97.9 °F (36.6 °C)  Max: 98.9 °F (37.2 °C) 98.1 °F (36.7 °C)   BP  Min: 125/68  Max: 145/78 128/76   Pulse  Min: 86  Max: 97  89   Resp  Min: 17  Max: 19 19   SpO2  Min: 95 %  Max: 97 % 96 %     I have reviewed the following  labs:  Recent Labs   Lab 01/15/24  0840 01/16/24  0616 01/17/24  0308   WBC 9.18 8.73 8.63   RBC 4.24* 3.46* 3.69*   HGB 12.6* 10.6* 11.3*   HCT 39.4* 31.9* 33.9*   MCV 92.9 92.2 91.9   MCH 29.7 30.6 30.6   MCHC 32.0* 33.2 33.3   RDW 14.0 14.2 14.3    250 315   MPV 10.9* 10.1 9.7     Recent Labs   Lab 01/14/24  0618 01/15/24  0840 01/16/24  0616 01/17/24  0308   * 136 131* 130*   K 4.4 5.0 4.4 4.3   CO2 31 30 31 26   BUN 13.6 16.0 15.3 16.2   CREATININE 0.80 0.75 0.75 0.75   CALCIUM 8.6* 8.8 8.1* 8.6*   ALBUMIN 1.9*  --  1.6* 1.8*   ALKPHOS 94  --  229* 205*   ALT 18  --  32 29   AST 33  --  75* 54*   BILITOT 0.9  --  2.0* 0.9     Microbiology Results (last 7 days)       Procedure Component Value Units Date/Time    Blood Culture [[id number]]  (Normal) Collected: 01/10/24 0715    Order Status: Completed Specimen: Blood Updated: 01/15/24 0900     CULTURE, BLOOD ([facility]) No Growth at 5 days    Blood Culture [[id number]]  (Normal) Collected: 01/10/24 0715    Order Status: Completed Specimen: Blood Updated: 01/15/24 0900     CULTURE, BLOOD ([facility]) No Growth at 5 days    Blood Culture [[id number]]  (Normal) Collected: 01/09/24 1008    Order Status: Completed Specimen: Blood Updated: 01/14/24 1101     CULTURE, BLOOD ([facility]) No Growth at 5 days    Blood Culture [[id number]]  (Normal) Collected: 01/09/24 1008    Order Status: Completed Specimen: Blood Updated: 01/14/24 1101     CULTURE, BLOOD ([facility]) No Growth at 5 days    Stool Culture **CANNOT BE ORDERED STAT** [[id number]]  (Normal) Collected: 01/10/24 0341    Order Status: Completed Specimen: Stool Updated: 01/12/24 0912     Stool Culture Negative for Salmonella, Shigella, Campylobacter, Vibrio, Aeromonas, Pleisiomonas,Yersinia, or Shiga Toxin 1 and 2.             See below for Radiology    Scheduled Med:   aspirin  81 mg Oral Daily    atorvastatin  20 mg Oral Daily    enoxparin  40 mg Subcutaneous Q24H (prophylaxis, 1700)    fenofibrate  145 mg Oral Daily     fluticasone propionate  2 spray Each Nostril Daily    gabapentin  300 mg Oral TID    meropenem (MERREM) IVPB  1 g Intravenous Q8H    metoprolol succinate  25 mg Oral Daily    pantoprazole  40 mg Oral Daily    sodium chloride 0.9%  10 mL Intravenous Q6H      Continuous Infusions:     PRN Meds:  acetaminophen, chlorproMAZINE, dextrose 10%, dextrose 10%, dicyclomine, glucagon (human recombinant), hydrALAZINE, HYDROcodone-acetaminophen, HYDROmorphone, insulin aspart U-100, melatonin, ondansetron, simethicone, Flushing PICC/Midline Protocol **AND** sodium chloride 0.9% **AND** sodium chloride 0.9%     Assessment/Plan:  Sepsis with fever /tachycardia/ leukocytosis  secondary to Necrotizing pancreatitis, POA -improved/resolved  Acute on chronic necrotizing pancreatitis with peripancreatic fluid collection and Large pseudocyst  Acute kidney injury, POA- resolved   Recent Enterococcus bacteremia secondary to necrotizing pancreatitis-completed treatment  Recent choledocholithiasis status post ERCP, sphincterotomy in December, 2023  History of essential HTN-now borderline low BP   Type 2 diabetes mellitus-stable   Chronic diastolic heart failure-currently appears compensated  LLE paresthesia     Hx- HLD, GERD, Scrotal hydrocele     Plan-  Currently Afebrile and hemodynamically stable   Abdominal symptoms much better   Diarrhea improved . Questran held  Diet advanced to low residue yesterday   Pt reports eating minimal but tolerating   GI suggested allow maturation of pseudocyst prior to consideration for endoscopic drainage which  typically takes 6-8 weeks from onset.   pancreatic elastase wnl , therefore creon discontinued  given absence of pancreatic insufficiency.   Need to improve nutritional status. High protein diet recommended.    Infectious disease is following . ID suggested to continue Meropenem until pt is relaibly tolerating oral intake without vomiting. They will possibly switch to oral Cipro and Flagyl when  appropriate.   GI cleared pt for discharge   Continue symptomatic management -multimodal pain control  Patient better on dilaudid  On Clinimix since oral intake still  not adequate,     Hold home amlodipine held due to  blood pressure being normal   Continue Toprol-XL   Continue other home meds-aspirin, statin and fenofibrate  P.r.n. Bentyl for abdominal cramps      VTE prophylaxis: Lovenox    Patient condition:  Fair    Anticipated discharge and Disposition:     Home with family     All diagnosis and differential diagnosis have been reviewed; assessment and plan has been documented; I have personally reviewed the labs and test results that are presently available; I have reviewed the patients medication list; I have reviewed the consulting providers response and recommendations. I have reviewed or attempted to review medical records based upon their availability    All of the patient's questions have been  addressed and answered. Patient's is agreeable to the above stated plan. I will continue to monitor closely and make adjustments to medical management as needed.  ________________________    Tea Contreras MD   01/17/2024

## 2024-01-17 NOTE — PROGRESS NOTES
[facility]  Infectious Disease Progress Note            ASSESSMENT & PLAN:     He is a 68-year-old male with a past medical history of diabetes mellitus, hypertension, CHF, and GERD who was recently hospitalized for necrotizing pancreatitis secondary to gallstone/choledocholithiasis.  He was also found to be bacteremic with Enterococcus and he was evaluated by Dr. Pedersen at that time,  he underwent an ERCP and sphincterotomy.  He completed a 14 course of meropenem and ampicillin during that hospitalization and was subsequently discharged.  He had ongoing abdominal discomfort, nausea, vomiting, and loose BMs.  Who presented here on 01/09 for further evaluation.  He was noted to have a leukocytosis and SHA possibly s/t IVVD as well as elevated lipase.  CT of the abdomen and pelvis showed similar findings with persistent fluid collection and necrotizing pancreatitis.  He was initiated on meropenem empirically.  Blood cultures and stool studies have remained negative.  Clinically improved, fevers resolved.  He was evaluated by GI-no indication for intervention at present, plan on monitoring progression with serial imaging.   We have been consulted per [employer] policy for the use of meropenem.      Necrotizing pancreatitis, fluid collection  Recent Enterococcus bacteremia s/t above, s/p treatment / resolution  Recent gallstone pancreatitis, s/p ERCP and sphincterotomy  SHA on admit, suspect s/t IVVD, resolved  H/o CHF / HTN / DM2     PLAN:  GI monitoring for now, planning possible endoscopy in a few weeks.   Continue meropenem for now, plan to change to PO abx once reliably and consistently taking PO.   Will need about a 4 week total abx course.   Discussed with patient.     SUBJECTIVE:     AF, VSS.  No new issues or events.  Doing OK with PO intake.     MEDICATIONS:   Reviewed in EMR    REVIEW OF SYSTEMS:   Except as documented, all other systems reviewed and negative     PHYSICAL EXAM:   T 97.9 °F (36.6 °C)   BP (!) 145/78    "P 90   RR 19   O2 95 %  GENERAL: Chronically ill appearing; NAD; does not appear toxic  SKIN: no rash  HEENT: sclera non-icteric; PERRL   NECK: supple; no LAD  CHEST: CTA; nonlabored, equal expansion; no adventitious BS  CARDIOVASCULAR: RRR, S1S2; no murmur   ABDOMEN:  active bowel sounds; abdomen soft, rounded, + mostly epigastric TTP  EXTREMITIES: no cyanosis or clubbing  NEURO: AAO x4; CN II-XII grossly intact  PSYCH: Mentation and affect appropriate    LABS AND IMAGING:     Recent Labs     01/16/24  0616 01/17/24  0308   WBC 8.73 8.63   RBC 3.46* 3.69*   HGB 10.6* 11.3*   HCT 31.9* 33.9*   MCV 92.2 91.9   MCH 30.6 30.6   MCHC 33.2 33.3   RDW 14.2 14.3    315       No results for input(s): "LACTIC" in the last 72 hours.  No results for input(s): "INR", "APTT", "D-DIMER" in the last 72 hours.  No results for input(s): "HGBA1C", "CHOL", "TRIG", "LDL", "VLDL", "HDL" in the last 72 hours.   Recent Labs     01/16/24  0616 01/17/24  0308   * 130*   K 4.4 4.3   CHLORIDE 96* 96*   CO2 31 26   BUN 15.3 16.2   CREATININE 0.75 0.75   GLUCOSE 140* 124*   CALCIUM 8.1* 8.6*   ALBUMIN 1.6* 1.8*   GLOBULIN 3.3 3.8*   ALKPHOS 229* 205*   ALT 32 29   AST 75* 54*   BILITOT 2.0* 0.9       No results for input(s): "BNP", "CPK", "TROPONINI" in the last 72 hours.       X-Ray Chest PA And Lateral  Narrative: EXAMINATION:  XR CHEST PA AND LATERAL    CLINICAL HISTORY:  Bilateral pleural effusion;    TECHNIQUE:  Two-view    COMPARISON:  .    FINDINGS:  Cardiopericardial silhouette is within normal limits. Lungs are without dense focal or segmental consolidation, congestion, pleural effusion or pneumothorax.  Impression: No acute cardiopulmonary process identified.    Electronically signed by: Denver Wagner  Date:    01/14/2024  Time:    17:31  CT Abdomen Pelvis With IV Contrast NO Oral Contrast  Narrative: EXAMINATION:  CT ABDOMEN PELVIS WITH IV CONTRAST    CLINICAL HISTORY:  Acute on chronic necrotizing pancreatitis with " peripancreatic fluid collection, worsening fever spikes and worsening pain;    TECHNIQUE:  Low dose axial images, sagittal and coronal reformations were obtained from the lung bases to the pubic symphysis following the IV administration of contrast. Automatic exposure control (AEC) is utilized to reduce patient radiation exposure.    COMPARISON:  01/09/2024    FINDINGS:  There are changes consistent with COPD in the lung bases bilaterally.  Some blebs hemorrhages bilaterally.  There is bibasilar atelectasis.  There are bilateral moderate-sized pleural effusions.  These are new since the prior examination.    There is evidence of ascites.  This is more prominent than the prior examination.    There is a small hiatal hernia.    The liver appears normal.  No liver mass or lesion is seen.  Portal and hepatic veins appear normal.    The patient is status post cholecystectomy.    There is evidence of inflammatory change seen throughout the pancreas involving the head and body of the pancreas.  There is relative sparing of the tail.  There is a large pseudocyst seen involving the body and head of the pancreas.  Pseudocyst measures roughly 10 cm x 7 cm.  It is similar to the prior examination.  There is some pancreatic necrosis seen consistent with patient's history of  necrotizing pancreatitis.    Diffuse inflammatory changes seen along the peripancreatic region.  Inflammatory changes appear slightly more prominent than the prior examination.  Is some fluid collections also seen just inferior to the pancreas consistent with additional pseudocyst.  These were seen on the prior examination as well.    .    The spleen shows no acute abnormality.    The adrenal glands appear normal.  No adrenal nodule is seen.    The kidneys appear normal.  No hydronephrosis is seen.  No hydroureter is seen.  No nephrolithiasis is seen.  No obvious ureteral stones are seen.    Urinary bladder appears grossly unremarkable.    No colitis is  seen.  No diverticulitis is seen.  No obvious colonic mass or lesion is seen.    No free air is seen.  Impression: Worsening inflammatory changes seen around the pancreas consistent with worsening pancreatitis.  There is evidence of a large pseudocyst in the body and head of the pancreas as well as some pancreatic necrosis.  Findings are consistent with patient's history of necrotizing pancreatitis.  Fluid collections and pseudocysts are also seen just inferior to the pancreas which are stable since prior examination.    Worsening ascites    Interval development of bibasilar atelectasis and bilateral moderate-sized pleural effusions    Electronically signed by: Wayne Gee  Date:    01/14/2024  Time:    10:27          MALI Kelly  Infectious Disease

## 2024-01-17 NOTE — PROCEDURES
"Franklin Macias is a 68 y.o. male patient.    Temp: 98.1 °F (36.7 °C) (01/17/24 1524)  Pulse: 89 (01/17/24 1524)  Resp: 19 (01/17/24 0918)  BP: 128/76 (01/17/24 1524)  SpO2: 96 % (01/17/24 1524)  Weight: 59 kg (130 lb) (01/14/24 1410)  Height: 5' 5.98" (167.6 cm) (01/14/24 1412)    PICC  Date/Time: 1/17/2024 5:08 PM  Performed by: Davion Solis, [place]  Consent Done: Yes  Time out: Immediately prior to procedure a time out was called to verify the correct patient, procedure, equipment, support staff and site/side marked as required  Indications: med administration and vascular access  Anesthesia: local infiltration  Local anesthetic: lidocaine 1% without epinephrine  Anesthetic Total (mL): 3  Preparation: skin prepped with ChloraPrep  Skin prep agent dried: skin prep agent completely dried prior to procedure  Sterile barriers: all five maximum sterile barriers used - cap, mask, sterile gown, sterile gloves, and large sterile sheet  Hand hygiene: hand hygiene performed prior to central venous catheter insertion  Location details: left basilic  Catheter type: single lumen  Catheter size: 4 Fr  Catheter Length: 15cm    Ultrasound guidance: yes  Vessel Caliber: medium and patent, compressibility normal  Needle advanced into vessel with real time Ultrasound guidance.  Guidewire confirmed in vessel.  Sterile sheath used.  Number of attempts: 1  Post-procedure: blood return through all ports, sterile dressing applied and chlorhexidine patch    Complications: none  Comments: Arm circ- 24cm          Davion Solis RN  1/17/2024    "

## 2024-01-18 LAB
ANION GAP SERPL CALC-SCNC: 4 MEQ/L
BUN SERPL-MCNC: 17.6 MG/DL (ref 8.4–25.7)
CALCIUM SERPL-MCNC: 8.5 MG/DL (ref 8.8–10)
CHLORIDE SERPL-SCNC: 98 MMOL/L (ref 98–107)
CO2 SERPL-SCNC: 31 MMOL/L (ref 23–31)
CREAT SERPL-MCNC: 0.81 MG/DL (ref 0.73–1.18)
CREAT/UREA NIT SERPL: 22
GFR SERPLBLD CREATININE-BSD FMLA CKD-EPI: >60 MLS/MIN/1.73/M2
GLUCOSE SERPL-MCNC: 106 MG/DL (ref 82–115)
MAGNESIUM SERPL-MCNC: 2 MG/DL (ref 1.6–2.6)
PHOSPHATE SERPL-MCNC: 2.7 MG/DL (ref 2.3–4.7)
POCT GLUCOSE: 133 MG/DL (ref 70–110)
POCT GLUCOSE: 152 MG/DL (ref 70–110)
POTASSIUM SERPL-SCNC: 4.4 MMOL/L (ref 3.5–5.1)
SODIUM SERPL-SCNC: 133 MMOL/L (ref 136–145)

## 2024-01-18 PROCEDURE — 83735 ASSAY OF MAGNESIUM: CPT | Performed by: INTERNAL MEDICINE

## 2024-01-18 PROCEDURE — 99232 SBSQ HOSP IP/OBS MODERATE 35: CPT | Mod: ,,, | Performed by: GENERAL PRACTICE

## 2024-01-18 PROCEDURE — 25000003 PHARM REV CODE 250: Performed by: INTERNAL MEDICINE

## 2024-01-18 PROCEDURE — 84100 ASSAY OF PHOSPHORUS: CPT | Performed by: INTERNAL MEDICINE

## 2024-01-18 PROCEDURE — 80048 BASIC METABOLIC PNL TOTAL CA: CPT | Performed by: INTERNAL MEDICINE

## 2024-01-18 PROCEDURE — 25000003 PHARM REV CODE 250

## 2024-01-18 PROCEDURE — 25000003 PHARM REV CODE 250: Performed by: NURSE PRACTITIONER

## 2024-01-18 PROCEDURE — 21400001 HC TELEMETRY ROOM

## 2024-01-18 PROCEDURE — A4216 STERILE WATER/SALINE, 10 ML: HCPCS | Performed by: INTERNAL MEDICINE

## 2024-01-18 PROCEDURE — 63600175 PHARM REV CODE 636 W HCPCS: Performed by: INTERNAL MEDICINE

## 2024-01-18 RX ORDER — GABAPENTIN 300 MG/1
300 CAPSULE ORAL EVERY 8 HOURS
Status: DISCONTINUED | OUTPATIENT
Start: 2024-01-18 | End: 2024-01-20 | Stop reason: HOSPADM

## 2024-01-18 RX ADMIN — DICYCLOMINE HYDROCHLORIDE 10 MG: 10 CAPSULE ORAL at 10:01

## 2024-01-18 RX ADMIN — ASPIRIN 81 MG CHEWABLE TABLET 81 MG: 81 TABLET CHEWABLE at 09:01

## 2024-01-18 RX ADMIN — HYDROCODONE BITARTRATE AND ACETAMINOPHEN 1 TABLET: 10; 325 TABLET ORAL at 03:01

## 2024-01-18 RX ADMIN — HYDROCODONE BITARTRATE AND ACETAMINOPHEN 1 TABLET: 10; 325 TABLET ORAL at 10:01

## 2024-01-18 RX ADMIN — MEROPENEM 1 G: 1 INJECTION, POWDER, FOR SOLUTION INTRAVENOUS at 06:01

## 2024-01-18 RX ADMIN — LEUCINE, PHENYLALANINE, LYSINE, METHIONINE, ISOLEUCINE, VALINE, HISTIDINE, THREONINE, TRYPTOPHAN, ALANINE, GLYCINE, ARGININE, PROLINE, SERINE, TYROSINE, SODIUM ACETATE, DIBASIC POTASSIUM PHOSPHATE, MAGNESIUM CHLORIDE, SODIUM CHLORIDE, CALCIUM CHLORIDE, DEXTROSE
311; 238; 247; 170; 255; 247; 204; 179; 77; 880; 438; 489; 289; 213; 17; 297; 261; 51; 77; 33; 5 INJECTION INTRAVENOUS at 05:01

## 2024-01-18 RX ADMIN — ATORVASTATIN CALCIUM 20 MG: 10 TABLET, FILM COATED ORAL at 09:01

## 2024-01-18 RX ADMIN — FENOFIBRATE 145 MG: 145 TABLET, FILM COATED ORAL at 09:01

## 2024-01-18 RX ADMIN — MEROPENEM 1 G: 1 INJECTION, POWDER, FOR SOLUTION INTRAVENOUS at 03:01

## 2024-01-18 RX ADMIN — LEUCINE, PHENYLALANINE, LYSINE, METHIONINE, ISOLEUCINE, VALINE, HISTIDINE, THREONINE, TRYPTOPHAN, ALANINE, GLYCINE, ARGININE, PROLINE, SERINE, TYROSINE, SODIUM ACETATE, DIBASIC POTASSIUM PHOSPHATE, MAGNESIUM CHLORIDE, SODIUM CHLORIDE, CALCIUM CHLORIDE, DEXTROSE
311; 238; 247; 170; 255; 247; 204; 179; 77; 880; 438; 489; 289; 213; 17; 297; 261; 51; 77; 33; 5 INJECTION INTRAVENOUS at 12:01

## 2024-01-18 RX ADMIN — Medication 10 ML: at 06:01

## 2024-01-18 RX ADMIN — MEROPENEM 1 G: 1 INJECTION, POWDER, FOR SOLUTION INTRAVENOUS at 11:01

## 2024-01-18 RX ADMIN — GABAPENTIN 300 MG: 300 CAPSULE ORAL at 02:01

## 2024-01-18 RX ADMIN — GABAPENTIN 300 MG: 300 CAPSULE ORAL at 05:01

## 2024-01-18 RX ADMIN — Medication 10 ML: at 12:01

## 2024-01-18 RX ADMIN — Medication 10 ML: at 05:01

## 2024-01-18 RX ADMIN — PANTOPRAZOLE SODIUM 40 MG: 40 TABLET, DELAYED RELEASE ORAL at 09:01

## 2024-01-18 RX ADMIN — METOPROLOL SUCCINATE 25 MG: 25 TABLET, EXTENDED RELEASE ORAL at 09:01

## 2024-01-18 RX ADMIN — SIMETHICONE 80 MG: 80 TABLET, CHEWABLE ORAL at 10:01

## 2024-01-18 RX ADMIN — HYDROCODONE BITARTRATE AND ACETAMINOPHEN 1 TABLET: 10; 325 TABLET ORAL at 06:01

## 2024-01-18 RX ADMIN — GABAPENTIN 300 MG: 300 CAPSULE ORAL at 10:01

## 2024-01-18 RX ADMIN — ENOXAPARIN SODIUM 40 MG: 100 INJECTION SUBCUTANEOUS at 06:01

## 2024-01-18 RX ADMIN — MELATONIN TAB 3 MG 9 MG: 3 TAB at 10:01

## 2024-01-18 NOTE — PROGRESS NOTES
Inpatient Nutrition Assessment    Admit Date: 1/9/2024   Total duration of encounter: 9 days   Patient Age: 68 y.o.    Nutrition Recommendation/Prescription     Diet as tolerated.  Boost Breeze (provides 250 kcal, 9 g protein per serving) TID.  PPN until greater than 60% needs met with oral intake.    Communication of Recommendations: reviewed with nurse    Nutrition Assessment     Malnutrition Assessment/Nutrition-Focused Physical Exam    Malnutrition Context: acute illness or injury (01/14/24 1411)  Malnutrition Level: moderate (01/14/24 1411)  Energy Intake (Malnutrition): less than 75% for greater than or equal to 1 month (01/14/24 1411)  Weight Loss (Malnutrition): greater than 7.5% in 3 months (01/14/24 1411)  Subcutaneous Fat (Malnutrition):  (unable to evaluate) (01/14/24 1411)           Muscle Mass (Malnutrition):  (unable to evaluate) (01/14/24 1411)                                   A minimum of two characteristics is recommended for diagnosis of either severe or non-severe malnutrition.    Chart Review    Reason Seen: follow-up    Malnutrition Screening Tool Results   Have you recently lost weight without trying?: No  Have you been eating poorly because of a decreased appetite?: Yes   MST Score: 1   Diagnosis:  Acute on chronic necrotizing pancreatitis   Pancreatic pseudocyst  Recent Enterococcus bacteremia secondary to necrotizing pancreatitis-completed treatment  Recent choledocholithiasis   History of essential HTN  Type 2 diabetes mellitus-stable   Chronic diastolic heart failure-currently appears compensated  LLE paresthesia     Relevant Medical History:  type 2 diabetes mellitus, HTN, chronic diastolic heart failure, HLD, GERD and  hydrocele     Scheduled Medications:  aspirin, 81 mg, Daily  atorvastatin, 20 mg, Daily  enoxparin, 40 mg, Q24H (prophylaxis, 1700)  fenofibrate, 145 mg, Daily  fluticasone propionate, 2 spray, Daily  gabapentin, 300 mg, Q8H  meropenem (MERREM) IVPB, 1 g, Q8H  metoprolol  succinate, 25 mg, Daily  pantoprazole, 40 mg, Daily  sodium chloride 0.9%, 10 mL, Q6H    Continuous Infusions:  Amino acid 4.25% - dextrose 5% (CLINIMIX-E) solution (1L provides 42.5 gm AA, 50 gm CHO (170 kcal/L dextrose), Na 35, K 30, Mg 5, Ca 4.5, Acetate 70, Cl 39, Phos 15), Last Rate: 75 mL/hr at 01/18/24 1241    PRN Medications: acetaminophen, chlorproMAZINE, dextrose 10%, dextrose 10%, dicyclomine, glucagon (human recombinant), hydrALAZINE, HYDROcodone-acetaminophen, HYDROmorphone, insulin aspart U-100, melatonin, ondansetron, simethicone, Flushing PICC/Midline Protocol **AND** sodium chloride 0.9% **AND** sodium chloride 0.9%    Calorie Containing IV Medications: Clinimix    Recent Labs   Lab 01/12/24  0545 01/13/24  0601 01/14/24  0618 01/14/24  0907 01/15/24  0840 01/16/24  0616 01/17/24  0308 01/18/24  0435    134* 134*  --  136 131* 130* 133*   K 3.9 4.1 4.4  --  5.0 4.4 4.3 4.4   CALCIUM 8.5* 8.1* 8.6*  --  8.8 8.1* 8.6* 8.5*   PHOS  --   --   --   --   --   --   --  2.7   MG  --   --   --   --   --   --   --  2.00   CHLORIDE 101 100 96*  --  97* 96* 96* 98   CO2 29 29 31  --  30 31 26 31   BUN 13.4 12.7 13.6  --  16.0 15.3 16.2 17.6   CREATININE 0.78 0.79 0.80  --  0.75 0.75 0.75 0.81   EGFRNORACEVR >60 >60 >60  --  >60 >60 >60 >60   GLUCOSE 97 103 131*  --  117* 140* 124* 106   BILITOT 0.7 0.7 0.9  --   --  2.0* 0.9  --    ALKPHOS 75 67 94  --   --  229* 205*  --    ALT 18 16 18  --   --  32 29  --    AST 26 26 33  --   --  75* 54*  --    ALBUMIN 1.7* 1.6* 1.9*  --   --  1.6* 1.8*  --    WBC 6.38 8.11  --  8.54  8.54 9.18 8.73 8.63  --    HGB 10.7* 10.3*  --  12.4* 12.6* 10.6* 11.3*  --    HCT 34.0* 32.0*  --  38.3* 39.4* 31.9* 33.9*  --        Nutrition Orders:  Diet low fiber/residue  Amino acid 4.25% - dextrose 5% (CLINIMIX-E) solution (1L provides 42.5 gm AA, 50 gm CHO (170 kcal/L dextrose), Na 35, K 30, Mg 5, Ca 4.5, Acetate 70, Cl 39, Phos 15)    Appetite/Oral Intake: NPO/not  "applicable  Factors Affecting Nutritional Intake: abdominal pain, diarrhea, and vomiting  Food/Congregational/Cultural Preferences: none reported  Food Allergies: no known food allergies  Last Bowel Movement: 24  Wound(s):      Comments    24 pt using restroom, wife reports loose stools improving, still with increased abdominal pain. Currently NPO due to procedure this morning; says pt ate a little of supper last night. Weight loss since August noted in EMR, wife reports unintentional weight loss also. Recently admitted for about 2.5 weeks due to pancreatitis just discharged at the beginning of the month. 3 days prior to this admit started having abdominal pain, vomiting, diarrhea. PPN running.    24 Nurse reports patient eating about 50% of meals, not drinking supplements, wants to trial Boost Breeze, Clinimix continues.    Anthropometrics    Height: 5' 5.98" (167.6 cm),    Last Weight: 59 kg (130 lb) (24 1410), Weight Method: Bed Scale  BMI (Calculated): 21  BMI Classification: underweight (BMI less than 22 if >65 years of age)        Ideal Body Weight (IBW), Male: 141.88 lb     % Ideal Body Weight, Male (lb): 91.63 %                 Usual Body Weight (UBW), k.1 kg  % Usual Body Weight: 86.77  % Weight Change From Usual Weight: -13.41 %  Usual Weight Provided By: EMR weight history    Wt Readings from Last 5 Encounters:   24 59 kg (130 lb)   24 61.5 kg (135 lb 9.3 oz)   23 68.1 kg (150 lb 3.2 oz)   23 72.6 kg (160 lb)   23 70.6 kg (155 lb 11.2 oz)     Weight Change(s) Since Admission:   Wt Readings from Last 1 Encounters:   24 1410 59 kg (130 lb)   24 2200 59 kg (130 lb)   24 0944 59 kg (130 lb)   Admit Weight: 59 kg (130 lb) (24 0944), Weight Method: Stated    Estimated Needs    Weight Used For Calorie Calculations: 59 kg (130 lb 1.1 oz)  Energy Calorie Requirements (kcal): 0758-0720 kcal (1.5-1.7 stress factor)  Energy Need Method: " [facility]  Weight Used For Protein Calculations: 59 kg (130 lb 1.1 oz)  Protein Requirements: 88-100gm (1.5-1.7 gm/kg)  Fluid Requirements (mL): 1960ml (1ml/kcal)    Enteral Nutrition Patient not receiving enteral nutrition at this time.    Parenteral Nutrition     Standard Formula: Clinimix E 4.25/5  Custom Formula: not applicable  Additives: none  Rate/Volume: 75 ml/hr  Lipids: none  Total Nutrition Provided by Parenteral Nutrition:  Calories Provided  612 kcal/d, 31% needs   Protein Provided  77 g/d, 88% needs   Dextrose Provided  90 g/d, GIR 1.06 mg CHO/kg/min   Fluid Provided  1800 ml/d, 92% needs     Evaluation of Received Nutrient Intake    Calories: not meeting estimated needs  Protein: meeting estimated needs    Patient Education Not applicable.    Nutrition Diagnosis     PES: Unintended weight loss related to suboptimal protein/energy intake as evidenced by >7.5% weight loss in 3 months. (active)     PES: Moderate chronic disease or condition related malnutrition related to acute illness as evidenced by less than 75% needs met for greater than or equal to 1 month and greater than 7.5% weight loss in 3 months. (active)    Nutrition Interventions     Intervention(s): general/healthful diet, modified composition of parenteral nutrition, commercial beverage, and collaboration with other providers    Goal: Meet greater than 80% of nutritional needs by follow-up. (goal progressing)  Goal: Maintain weight throughout hospitalization. (goal progressing)    Nutrition Goals & Monitoring     Dietitian will monitor: food and beverage intake, energy intake, parenteral nutrition intake, weight change, electrolyte/renal panel, glucose/endocrine profile, and gastrointestinal profile    Nutrition Risk/Follow-Up: high (follow-up in 1-4 days)   Please consult if re-assessment needed sooner.

## 2024-01-18 NOTE — PHYSICIAN QUERY
PT Name: Franklin Macias  MR #: [id number]    DOCUMENTATION CLARIFICATION     CDS/: Mireya Villarreal RN, CDI            Contact information:[email or web address]    This form is a permanent document in the medical record.     Query Date: 2024    By submitting this query, we are merely seeking further clarification of documentation.  Please utilize your independent clinical judgment when addressing the question(s) below.    The medical record contains the following:   Indicators  Supporting Clinical Findings Location in Medical Record   x Registered Dietician Diagnosis Malnutrition Context: acute illness or injury   Malnutrition Level: moderate    24 pt using restroom, wife reports loose stools improving, still with increased abdominal pain. Currently NPO due to procedure this morning; says pt ate a little of supper last night. Weight loss since August noted in EMR, wife reports unintentional weight loss also. Recently admitted for about 2.5 weeks due to pancreatitis just discharged at the beginning of the month. 3 days prior to this admit started having abdominal pain, vomiting, diarrhea. PPN running.    RD PN    x Energy Intake less than 75% for greater than or equal to 1 month  RD PN    x Weight Loss greater than 7.5% in 3 months     Wt Readings from Last 5 Encounters:  24 59 kg (130 lb)  24 61.5 kg (135 lb 9.3 oz)  23 68.1 kg (150 lb 3.2 oz)  23 72.6 kg (160 lb)  23 70.6 kg (155 lb 11.2 oz) RD PN     Fat Loss      Muscle Loss      Edema/Fluid Accumulation      Reduced  Strength (by dynamometer)     x Weight, BMI, Usual Body Weight 59 kg (130 lb)   BMI (Calculated): 21  BMI Classification: underweight (BMI less than 22 if >65 years of age)  Usual Body Weight (UBW), k.1 kg  RD PN     Delayed Wound Healing     x Acute or Chronic Illness Acute onset diarrhea-likely noninfectious  Acute on chronic necrotizing pancreatitis with peripancreatic fluid  collection  Sirs with fever spike, leukocytosis and tachycardia secondary to above-improved  Acute kidney injury secondary to dehydration-improved  Recent Enterococcus bacteremia secondary to necrotizing pancreatitis-completed treatment  Recent choledocholithiasis status post ERCP, sphincterotomy in December, 2023     Relevant Medical History:  type 2 diabetes mellitus, HTN, chronic diastolic heart failure, HLD, GERD and  hydrocele     RD PN 1/14    Social or Environmental Circumstances     x Treatment  resume oral diet as medically appropriate; goal diet low residue  - may benefit from oral nutritional supplement once diet advanced and diarrhea resolved  - continue PPN if unable to tolerate oral diet: Clinimix E 4.25/5% @ 80ml/hr (653kcal, 82gm protein)    RD PN 1/14    Other       Academy of Nutrition and Dietetics (Academy) and the American Society for Parenteral and Enteral Nutrition (A.S.P.E.N.) Clinical Characteristics to support Malnutrition      Criteria for mild malnutrition is defined as 1 characteristic outlined above within the established moderate or severe parameters.  A minimum of 2 out of the 6 characteristics noted above are recommended for a diagnosis of moderate or severe malnutrition.  Chronic illness/injury is a disease/condition lasting 3 months or longer.    The noted clinical guidelines are only system guidelines and do not replace the providers clinical judgment.    Provider, please specify the diagnosis or diagnoses associated with above clinical findings.  Check all that apply.    [  ] Moderate Malnutrition - a minimum of 2 of the 6 moderate malnutrition characteristics noted above    [  x Malnutrition, Unspecified degree   [  ] Other Nutritional Diagnosis (please specify): _______   [  ] Malnutrition ruled out     Please document in your progress notes daily for the duration of treatment until resolved and  include in your discharge summary.      Reference:    SHASHI Oconnell, PhD, RD,  Jazz ECHEVERRIA P., PhD, RN, TODD Chowdhury MD, PhD, Jeovanny HARRELL A., MS, RD, [facility], MADONNA Vila, MS, RD, The Academy Malnutrition Work Group, The A.S.P.E.N. Board of Directors. (2012). Consensus Statement: Academy of Nutrition and Dietetics and American Society for Parenteral and Enteral Nutrition: Characteristics Recommended for the Identification and Documentation of Adult Malnutrition (Undernutrition). Journal of Parenteral and Enteral Nutrition, 36(3), 275-283. doi:10.1177/3976687618206264     Form No. 15082

## 2024-01-18 NOTE — PROGRESS NOTES
[facility]  Hospital Medicine Progress Note      Chief Complaint: abdominal pain       HPI: (personally reviewed by me and is documented from initial H&P)   68-year-old male with significant history of type 2 diabetes mellitus, HTN, chronic diastolic heart failure, HLD, GERD and  hydrocele.  Patient had a recent 19 day hospitalization for acute necrotizing pancreatitis secondary to gallstones/choledocholithiasis, severe sepsis with Enterococcus bacteremia, acute kidney injury, acute hypoxemic respiratory failure. patient had cholecystectomy in 2022, underwent ERCP this previous hospitalization with stone and sludge removal and sphincterotomy.      Repeat CT abdomen pelvis showed continued findings of necrotizing pancreatitis with possible organizing fluid collection around pancreatic neck and body.  GI recommended repeat CT in 3 months.  Patient was treated with meropenem, ampicillin for Enterococcus bacteremia while in-house which was discontinued upon DC.  He completed 14 day course while in-house.  Patient remained symptomatic even after DC.  Patient continued with abdominal discomfort, nausea, unable to keep anything down by mouth. Also reports diarrhea, unquantified unintentional weight loss. Patient was febrile and tachycardic.  Lab significant for leukocytosis, acute kidney injury/dehydration.  Lipase was elevated.  Patient was initiated on conservative management with IV fluids, NPO and GI services consulted, admitted to hospitalist medicine service.  CT abdomen pelvis repeated-overall similar findings compared to before with persistent fluid collection/necrotizing pancreatitis.  Stool studies ordered given diarrhea.  Given concern for necrotizing pancreatitis decided to initiate meropenem.  GI evaluated, CT findings similar to prior and therefore no interventions planned, recommended conservative management.  Diet initiated  to clear liquids on 01/10.  Persistent diarrhea as of  1/11, stool studies negative, added Questran, Merrem continued for necrotizing pancreatitis, GI following.  Infectious Disease evaluated, recommended possible necrosectomy once inflammation resolves.  Still symptomatic with diarrhea and abdominal discomfort, Questran increased on 1/13.  diet advance to full liquid. Worsening abdominal symptoms with associated high-grade temperature spike on 1/14, however no more diarrhea, in fact no bowel movements .  CT abdomen was repeated with contrast which showed large pseudocyst and worsening inflammation . kept NPO, General surgery consulted . Merrem continued . Clinimix initiated and continued for nutrition.  Patient will benefit from drainage given large pseudocyst, however will have to wait at least 3 weeks for it to mature prior to drainage. symptomatic/conservative management continued. nutritionist consulted . kept Clinimix . diet re-initiated.    Interval Hx:     Daughter rating small bites of his diet.   He does have abdominal fullness but no pain reported.  __________________________________________________________________________________________________________________________________    Objective/physical exam:  Vital signs have been personally reviewed by me   Neuro: awake, alert, oriented.     General: Appears comfortable, no acute distress.    Integumentary: Warm, dry, intact.  Musculoskeletal: Purposeful movement noted.     Respiratory: No accessory muscle use. Breath sounds are equal.  Cardiovascular: Regular rate.       VITAL SIGNS: 24 HRS MIN & MAX LAST   Temp  Min: 97.8 °F (36.6 °C)  Max: 98.9 °F (37.2 °C) 97.8 °F (36.6 °C)   BP  Min: 127/69  Max: 148/64 130/60   Pulse  Min: 82  Max: 94  94   Resp  Min: 17  Max: 19 17   SpO2  Min: 94 %  Max: 97 % 95 %     X-Ray Chest PA And Lateral  Narrative: EXAMINATION:  XR CHEST PA AND LATERAL    CLINICAL HISTORY:  Bilateral pleural effusion;    TECHNIQUE:  Two-view    COMPARISON:  .    FINDINGS:  Cardiopericardial  silhouette is within normal limits. Lungs are without dense focal or segmental consolidation, congestion, pleural effusion or pneumothorax.  Impression: No acute cardiopulmonary process identified.    Electronically signed by: Denver Wagner  Date:    01/14/2024  Time:    17:31  CT Abdomen Pelvis With IV Contrast NO Oral Contrast  Narrative: EXAMINATION:  CT ABDOMEN PELVIS WITH IV CONTRAST    CLINICAL HISTORY:  Acute on chronic necrotizing pancreatitis with peripancreatic fluid collection, worsening fever spikes and worsening pain;    TECHNIQUE:  Low dose axial images, sagittal and coronal reformations were obtained from the lung bases to the pubic symphysis following the IV administration of contrast. Automatic exposure control (AEC) is utilized to reduce patient radiation exposure.    COMPARISON:  01/09/2024    FINDINGS:  There are changes consistent with COPD in the lung bases bilaterally.  Some blebs hemorrhages bilaterally.  There is bibasilar atelectasis.  There are bilateral moderate-sized pleural effusions.  These are new since the prior examination.    There is evidence of ascites.  This is more prominent than the prior examination.    There is a small hiatal hernia.    The liver appears normal.  No liver mass or lesion is seen.  Portal and hepatic veins appear normal.    The patient is status post cholecystectomy.    There is evidence of inflammatory change seen throughout the pancreas involving the head and body of the pancreas.  There is relative sparing of the tail.  There is a large pseudocyst seen involving the body and head of the pancreas.  Pseudocyst measures roughly 10 cm x 7 cm.  It is similar to the prior examination.  There is some pancreatic necrosis seen consistent with patient's history of  necrotizing pancreatitis.    Diffuse inflammatory changes seen along the peripancreatic region.  Inflammatory changes appear slightly more prominent than the prior examination.  Is some fluid collections  also seen just inferior to the pancreas consistent with additional pseudocyst.  These were seen on the prior examination as well.    .    The spleen shows no acute abnormality.    The adrenal glands appear normal.  No adrenal nodule is seen.    The kidneys appear normal.  No hydronephrosis is seen.  No hydroureter is seen.  No nephrolithiasis is seen.  No obvious ureteral stones are seen.    Urinary bladder appears grossly unremarkable.    No colitis is seen.  No diverticulitis is seen.  No obvious colonic mass or lesion is seen.    No free air is seen.  Impression: Worsening inflammatory changes seen around the pancreas consistent with worsening pancreatitis.  There is evidence of a large pseudocyst in the body and head of the pancreas as well as some pancreatic necrosis.  Findings are consistent with patient's history of necrotizing pancreatitis.  Fluid collections and pseudocysts are also seen just inferior to the pancreas which are stable since prior examination.    Worsening ascites    Interval development of bibasilar atelectasis and bilateral moderate-sized pleural effusions    Electronically signed by: Wayne Gee  Date:    01/14/2024  Time:    10:27    Recent Labs   Lab 01/15/24  0840 01/16/24  0616 01/17/24  0308   WBC 9.18 8.73 8.63   RBC 4.24* 3.46* 3.69*   HGB 12.6* 10.6* 11.3*   HCT 39.4* 31.9* 33.9*   MCV 92.9 92.2 91.9   MCH 29.7 30.6 30.6   MCHC 32.0* 33.2 33.3   RDW 14.0 14.2 14.3    250 315   MPV 10.9* 10.1 9.7       Recent Labs   Lab 01/14/24  0618 01/15/24  0840 01/16/24  0616 01/17/24  0308 01/18/24  0435   *   < > 131* 130* 133*   K 4.4   < > 4.4 4.3 4.4   CO2 31   < > 31 26 31   BUN 13.6   < > 15.3 16.2 17.6   CREATININE 0.80   < > 0.75 0.75 0.81   CALCIUM 8.6*   < > 8.1* 8.6* 8.5*   MG  --   --   --   --  2.00   ALBUMIN 1.9*  --  1.6* 1.8*  --    ALKPHOS 94  --  229* 205*  --    ALT 18  --  32 29  --    AST 33  --  75* 54*  --    BILITOT 0.9  --  2.0* 0.9  --     < > = values  in this interval not displayed.          Microbiology Results (last 7 days)       Procedure Component Value Units Date/Time    Blood Culture [[id number]]  (Normal) Collected: 01/10/24 0715    Order Status: Completed Specimen: Blood Updated: 01/15/24 0900     CULTURE, BLOOD ([facility]) No Growth at 5 days    Blood Culture [[id number]]  (Normal) Collected: 01/10/24 0715    Order Status: Completed Specimen: Blood Updated: 01/15/24 0900     CULTURE, BLOOD ([facility]) No Growth at 5 days    Blood Culture [[id number]]  (Normal) Collected: 01/09/24 1008    Order Status: Completed Specimen: Blood Updated: 01/14/24 1101     CULTURE, BLOOD ([facility]) No Growth at 5 days    Blood Culture [[id number]]  (Normal) Collected: 01/09/24 1008    Order Status: Completed Specimen: Blood Updated: 01/14/24 1101     CULTURE, BLOOD ([facility]) No Growth at 5 days    Stool Culture **CANNOT BE ORDERED STAT** [[id number]]  (Normal) Collected: 01/10/24 0341    Order Status: Completed Specimen: Stool Updated: 01/12/24 0912     Stool Culture Negative for Salmonella, Shigella, Campylobacter, Vibrio, Aeromonas, Pleisiomonas,Yersinia, or Shiga Toxin 1 and 2.             See below for Radiology    Scheduled Med:   aspirin  81 mg Oral Daily    atorvastatin  20 mg Oral Daily    enoxparin  40 mg Subcutaneous Q24H (prophylaxis, 1700)    fenofibrate  145 mg Oral Daily    fluticasone propionate  2 spray Each Nostril Daily    gabapentin  300 mg Oral Q8H    meropenem (MERREM) IVPB  1 g Intravenous Q8H    metoprolol succinate  25 mg Oral Daily    pantoprazole  40 mg Oral Daily    sodium chloride 0.9%  10 mL Intravenous Q6H        Continuous Infusions:   Amino acid 4.25% - dextrose 5% (CLINIMIX-E) solution (1L provides 42.5 gm AA, 50 gm CHO (170 kcal/L dextrose), Na 35, K 30, Mg 5, Ca 4.5, Acetate 70, Cl 39, Phos 15) 75 mL/hr at 01/18/24 0513        PRN Meds:  acetaminophen, chlorproMAZINE, dextrose 10%, dextrose 10%, dicyclomine, glucagon (human recombinant), hydrALAZINE,  HYDROcodone-acetaminophen, HYDROmorphone, insulin aspart U-100, melatonin, ondansetron, simethicone, Flushing PICC/Midline Protocol **AND** sodium chloride 0.9% **AND** sodium chloride 0.9%       __________________________________________________________________________________________________________________________________    Assessment/Plan:  Acute on chronic necrotizing pancreatitis   Pancreatic pseudocyst  Recent Enterococcus bacteremia secondary to necrotizing pancreatitis-completed treatment  Recent choledocholithiasis   History of essential HTN  Type 2 diabetes mellitus-stable   Chronic diastolic heart failure-currently appears compensated  LLE paresthesia     Above present on admission     _______________________________________________________________________________________________________________________________    Currently being treated for necrotizing pancreatitis---plan for endoscopic surgical drain placement when pseudocyst matures; when tolerating diet he can likely discharge home with oral antibiotic therapy and followup outpatient with GI and surgerical team.     Continue supportive care  Continue checking vital signs q4hrs.   Nurse notified to page me if any changes occur     DVT prophylaxis initiated   Nutrition Status:  Izard diet as tolerated    Consults:  Gastroenterologist, Infectious Disease physician    I have personally reviewed the specialist documentation and/or have spoken to the specialist with regard to the care of this patient; recommendations are noted above.     I have spent >30 minutes on the day of the visit; time spent includes face to face time and non-face to face time preparing to see the patient (eg, review of tests), independently reviewing and interpreting medical records, both past and current; documenting clinical information in the electronic or other health record, and communicating results to the patient/family/caregiver and care coordinator and nursing team.       Anticipated discharge and Disposition when medically stable:  Pending.     All diagnosis and differential diagnosis have been reviewed,  interpreted and communicated appropriately to care team. assessment and plan has been documented; I have personally reviewed the labs and test results that are presently available and pertinent to this hospital course; I have reviewed medical records based upon their availability.    All of the patient's questions have been  addressed and answered. Patient's is agreeable to the above stated plan.   I will continue to monitor closely and make adjustments to medical management as needed.    Yana Gomez,    01/18/2024      This note was created with the assistance of Dragon voice recognition software. There may be transcription errors as a result of using this technology however minimal. Effort has been made to assure accuracy of transcription but any obvious errors or omissions should be clarified with the author of the document.

## 2024-01-18 NOTE — PROGRESS NOTES
[facility]  Infectious Disease Progress Note            ASSESSMENT & PLAN:     He is a 68-year-old male with a past medical history of diabetes mellitus, hypertension, CHF, and GERD who was recently hospitalized for necrotizing pancreatitis secondary to gallstone/choledocholithiasis.  He was also found to be bacteremic with Enterococcus and he was evaluated by Dr. Pedersen at that time,  he underwent an ERCP and sphincterotomy.  He completed a 14 course of meropenem and ampicillin during that hospitalization and was subsequently discharged.  He had ongoing abdominal discomfort, nausea, vomiting, and loose BMs.  Who presented here on 01/09 for further evaluation.  He was noted to have a leukocytosis and SHA possibly s/t IVVD as well as elevated lipase.  CT of the abdomen and pelvis showed similar findings with persistent fluid collection and necrotizing pancreatitis.  He was initiated on meropenem empirically.  Blood cultures and stool studies have remained negative.  Clinically improved, fevers resolved.  He was evaluated by GI-no indication for intervention at present, plan on monitoring progression with serial imaging.   We have been consulted per [employer] policy for the use of meropenem.      Necrotizing pancreatitis, fluid collection  Recent Enterococcus bacteremia s/t above, s/p treatment / resolution  Recent gallstone pancreatitis, s/p ERCP and sphincterotomy  SHA on admit, suspect s/t IVVD, resolved  H/o CHF / HTN / DM2     PLAN:  GI monitoring for now, planning possible endoscopy in a few weeks.   Continue meropenem for now, plan to change to PO abx once reliably and consistently taking PO.  Improving daily.  Will need about a 4 week total abx course.   Discussed with patient.     SUBJECTIVE:     AF, VSS.  Better PO intake overall.  No nausea or vomiting.     MEDICATIONS:   Reviewed in EMR    REVIEW OF SYSTEMS:   Except as documented, all other systems reviewed and negative     PHYSICAL EXAM:   T 98.2 °F (36.8 °C)    "/81   P 98   RR 20   O2 97 %  GENERAL: Chronically ill appearing; NAD; does not appear toxic  SKIN: no rash  HEENT: sclera non-icteric; PERRL   NECK: supple; no LAD  CHEST: CTA; nonlabored, equal expansion; no adventitious BS  CARDIOVASCULAR: RRR, S1S2; no murmur   ABDOMEN:  active bowel sounds; abdomen soft, rounded, + mostly epigastric TTP  EXTREMITIES: no cyanosis or clubbing  NEURO: AAO x4; CN II-XII grossly intact  PSYCH: Mentation and affect appropriate    LABS AND IMAGING:     Recent Labs     01/16/24  0616 01/17/24  0308   WBC 8.73 8.63   RBC 3.46* 3.69*   HGB 10.6* 11.3*   HCT 31.9* 33.9*   MCV 92.2 91.9   MCH 30.6 30.6   MCHC 33.2 33.3   RDW 14.2 14.3    315       No results for input(s): "LACTIC" in the last 72 hours.  No results for input(s): "INR", "APTT", "D-DIMER" in the last 72 hours.  No results for input(s): "HGBA1C", "CHOL", "TRIG", "LDL", "VLDL", "HDL" in the last 72 hours.   Recent Labs     01/16/24  0616 01/17/24  0308 01/18/24  0435   * 130* 133*   K 4.4 4.3 4.4   CHLORIDE 96* 96* 98   CO2 31 26 31   BUN 15.3 16.2 17.6   CREATININE 0.75 0.75 0.81   GLUCOSE 140* 124* 106   CALCIUM 8.1* 8.6* 8.5*   MG  --   --  2.00   PHOS  --   --  2.7   ALBUMIN 1.6* 1.8*  --    GLOBULIN 3.3 3.8*  --    ALKPHOS 229* 205*  --    ALT 32 29  --    AST 75* 54*  --    BILITOT 2.0* 0.9  --        No results for input(s): "BNP", "CPK", "TROPONINI" in the last 72 hours.       X-Ray Chest PA And Lateral  Narrative: EXAMINATION:  XR CHEST PA AND LATERAL    CLINICAL HISTORY:  Bilateral pleural effusion;    TECHNIQUE:  Two-view    COMPARISON:  .    FINDINGS:  Cardiopericardial silhouette is within normal limits. Lungs are without dense focal or segmental consolidation, congestion, pleural effusion or pneumothorax.  Impression: No acute cardiopulmonary process identified.    Electronically signed by: Denver Wagner  Date:    01/14/2024  Time:    17:31  CT Abdomen Pelvis With IV Contrast NO Oral " Contrast  Narrative: EXAMINATION:  CT ABDOMEN PELVIS WITH IV CONTRAST    CLINICAL HISTORY:  Acute on chronic necrotizing pancreatitis with peripancreatic fluid collection, worsening fever spikes and worsening pain;    TECHNIQUE:  Low dose axial images, sagittal and coronal reformations were obtained from the lung bases to the pubic symphysis following the IV administration of contrast. Automatic exposure control (AEC) is utilized to reduce patient radiation exposure.    COMPARISON:  01/09/2024    FINDINGS:  There are changes consistent with COPD in the lung bases bilaterally.  Some blebs hemorrhages bilaterally.  There is bibasilar atelectasis.  There are bilateral moderate-sized pleural effusions.  These are new since the prior examination.    There is evidence of ascites.  This is more prominent than the prior examination.    There is a small hiatal hernia.    The liver appears normal.  No liver mass or lesion is seen.  Portal and hepatic veins appear normal.    The patient is status post cholecystectomy.    There is evidence of inflammatory change seen throughout the pancreas involving the head and body of the pancreas.  There is relative sparing of the tail.  There is a large pseudocyst seen involving the body and head of the pancreas.  Pseudocyst measures roughly 10 cm x 7 cm.  It is similar to the prior examination.  There is some pancreatic necrosis seen consistent with patient's history of  necrotizing pancreatitis.    Diffuse inflammatory changes seen along the peripancreatic region.  Inflammatory changes appear slightly more prominent than the prior examination.  Is some fluid collections also seen just inferior to the pancreas consistent with additional pseudocyst.  These were seen on the prior examination as well.    .    The spleen shows no acute abnormality.    The adrenal glands appear normal.  No adrenal nodule is seen.    The kidneys appear normal.  No hydronephrosis is seen.  No hydroureter is  seen.  No nephrolithiasis is seen.  No obvious ureteral stones are seen.    Urinary bladder appears grossly unremarkable.    No colitis is seen.  No diverticulitis is seen.  No obvious colonic mass or lesion is seen.    No free air is seen.  Impression: Worsening inflammatory changes seen around the pancreas consistent with worsening pancreatitis.  There is evidence of a large pseudocyst in the body and head of the pancreas as well as some pancreatic necrosis.  Findings are consistent with patient's history of necrotizing pancreatitis.  Fluid collections and pseudocysts are also seen just inferior to the pancreas which are stable since prior examination.    Worsening ascites    Interval development of bibasilar atelectasis and bilateral moderate-sized pleural effusions    Electronically signed by: Wayne Gee  Date:    01/14/2024  Time:    10:27          MALI Kelly  Infectious Disease

## 2024-01-19 LAB
POCT GLUCOSE: 124 MG/DL (ref 70–110)
POCT GLUCOSE: 156 MG/DL (ref 70–110)

## 2024-01-19 PROCEDURE — 25000003 PHARM REV CODE 250

## 2024-01-19 PROCEDURE — 63600175 PHARM REV CODE 636 W HCPCS: Performed by: INTERNAL MEDICINE

## 2024-01-19 PROCEDURE — 25000003 PHARM REV CODE 250: Performed by: NURSE PRACTITIONER

## 2024-01-19 PROCEDURE — 25000003 PHARM REV CODE 250: Performed by: INTERNAL MEDICINE

## 2024-01-19 PROCEDURE — 21400001 HC TELEMETRY ROOM

## 2024-01-19 PROCEDURE — 25000003 PHARM REV CODE 250: Performed by: STUDENT IN AN ORGANIZED HEALTH CARE EDUCATION/TRAINING PROGRAM

## 2024-01-19 PROCEDURE — A4216 STERILE WATER/SALINE, 10 ML: HCPCS | Performed by: INTERNAL MEDICINE

## 2024-01-19 PROCEDURE — 99233 SBSQ HOSP IP/OBS HIGH 50: CPT | Mod: ,,, | Performed by: GENERAL PRACTICE

## 2024-01-19 RX ORDER — CIPROFLOXACIN 500 MG/1
500 TABLET ORAL EVERY 12 HOURS
Status: DISCONTINUED | OUTPATIENT
Start: 2024-01-19 | End: 2024-01-20 | Stop reason: HOSPADM

## 2024-01-19 RX ORDER — METRONIDAZOLE 500 MG/1
500 TABLET ORAL EVERY 8 HOURS
Status: DISCONTINUED | OUTPATIENT
Start: 2024-01-19 | End: 2024-01-20 | Stop reason: HOSPADM

## 2024-01-19 RX ADMIN — METRONIDAZOLE 500 MG: 500 TABLET ORAL at 08:01

## 2024-01-19 RX ADMIN — FLUTICASONE PROPIONATE 100 MCG: 50 SPRAY, METERED NASAL at 09:01

## 2024-01-19 RX ADMIN — Medication 10 ML: at 01:01

## 2024-01-19 RX ADMIN — Medication 10 ML: at 11:01

## 2024-01-19 RX ADMIN — Medication 10 ML: at 12:01

## 2024-01-19 RX ADMIN — MEROPENEM 1 G: 1 INJECTION, POWDER, FOR SOLUTION INTRAVENOUS at 04:01

## 2024-01-19 RX ADMIN — PANTOPRAZOLE SODIUM 40 MG: 40 TABLET, DELAYED RELEASE ORAL at 09:01

## 2024-01-19 RX ADMIN — CIPROFLOXACIN HYDROCHLORIDE 500 MG: 500 TABLET, FILM COATED ORAL at 08:01

## 2024-01-19 RX ADMIN — Medication 10 ML: at 06:01

## 2024-01-19 RX ADMIN — FENOFIBRATE 145 MG: 145 TABLET, FILM COATED ORAL at 09:01

## 2024-01-19 RX ADMIN — GABAPENTIN 300 MG: 300 CAPSULE ORAL at 04:01

## 2024-01-19 RX ADMIN — ENOXAPARIN SODIUM 40 MG: 100 INJECTION SUBCUTANEOUS at 04:01

## 2024-01-19 RX ADMIN — CIPROFLOXACIN HYDROCHLORIDE 500 MG: 500 TABLET, FILM COATED ORAL at 01:01

## 2024-01-19 RX ADMIN — MELATONIN TAB 3 MG 9 MG: 3 TAB at 08:01

## 2024-01-19 RX ADMIN — HYDROCODONE BITARTRATE AND ACETAMINOPHEN 1 TABLET: 10; 325 TABLET ORAL at 07:01

## 2024-01-19 RX ADMIN — METOPROLOL SUCCINATE 25 MG: 25 TABLET, EXTENDED RELEASE ORAL at 09:01

## 2024-01-19 RX ADMIN — LEUCINE, PHENYLALANINE, LYSINE, METHIONINE, ISOLEUCINE, VALINE, HISTIDINE, THREONINE, TRYPTOPHAN, ALANINE, GLYCINE, ARGININE, PROLINE, SERINE, TYROSINE, SODIUM ACETATE, DIBASIC POTASSIUM PHOSPHATE, MAGNESIUM CHLORIDE, SODIUM CHLORIDE, CALCIUM CHLORIDE, DEXTROSE
311; 238; 247; 170; 255; 247; 204; 179; 77; 880; 438; 489; 289; 213; 17; 297; 261; 51; 77; 33; 5 INJECTION INTRAVENOUS at 07:01

## 2024-01-19 RX ADMIN — ASPIRIN 81 MG CHEWABLE TABLET 81 MG: 81 TABLET CHEWABLE at 09:01

## 2024-01-19 RX ADMIN — HYDROCODONE BITARTRATE AND ACETAMINOPHEN 1 TABLET: 10; 325 TABLET ORAL at 04:01

## 2024-01-19 RX ADMIN — GABAPENTIN 300 MG: 300 CAPSULE ORAL at 08:01

## 2024-01-19 RX ADMIN — GABAPENTIN 300 MG: 300 CAPSULE ORAL at 01:01

## 2024-01-19 RX ADMIN — ATORVASTATIN CALCIUM 20 MG: 10 TABLET, FILM COATED ORAL at 09:01

## 2024-01-19 RX ADMIN — METRONIDAZOLE 500 MG: 500 TABLET ORAL at 01:01

## 2024-01-19 NOTE — PROGRESS NOTES
[facility]  Hospital Medicine Progress Note      Chief Complaint: abdominal pain       HPI: (personally reviewed by me and is documented from initial H&P)   68-year-old male with significant history of type 2 diabetes mellitus, HTN, chronic diastolic heart failure, HLD, GERD and  hydrocele.  Patient had a recent 19 day hospitalization for acute necrotizing pancreatitis secondary to gallstones/choledocholithiasis, severe sepsis with Enterococcus bacteremia, acute kidney injury, acute hypoxemic respiratory failure. patient had cholecystectomy in 2022, underwent ERCP this previous hospitalization with stone and sludge removal and sphincterotomy.      Repeat CT abdomen pelvis showed continued findings of necrotizing pancreatitis with possible organizing fluid collection around pancreatic neck and body.  GI recommended repeat CT in 3 months.  Patient was treated with meropenem, ampicillin for Enterococcus bacteremia while in-house which was discontinued upon DC.  He completed 14 day course while in-house.  Patient remained symptomatic even after DC.  Patient continued with abdominal discomfort, nausea, unable to keep anything down by mouth. Also reports diarrhea, unquantified unintentional weight loss. Patient was febrile and tachycardic.  Lab significant for leukocytosis, acute kidney injury/dehydration.  Lipase was elevated.  Patient was initiated on conservative management with IV fluids, NPO and GI services consulted, admitted to hospitalist medicine service.  CT abdomen pelvis repeated-overall similar findings compared to before with persistent fluid collection/necrotizing pancreatitis.  Stool studies ordered given diarrhea.  Given concern for necrotizing pancreatitis decided to initiate meropenem.  GI evaluated, CT findings similar to prior and therefore no interventions planned, recommended conservative management.  Diet initiated  to clear liquids on 01/10.  Persistent diarrhea as of  1/11, stool studies negative, added Questran, Merrem continued for necrotizing pancreatitis, GI following.  Infectious Disease evaluated, recommended possible necrosectomy once inflammation resolves.  Still symptomatic with diarrhea and abdominal discomfort, Questran increased on 1/13.  diet advance to full liquid. Worsening abdominal symptoms with associated high-grade temperature spike on 1/14, however no more diarrhea, in fact no bowel movements .  CT abdomen was repeated with contrast which showed large pseudocyst and worsening inflammation . kept NPO, General surgery consulted . Merrem continued . Clinimix initiated and continued for nutrition.  Patient will benefit from drainage given large pseudocyst, however will have to wait at least 3 weeks for it to mature prior to drainage. symptomatic/conservative management continued. nutritionist consulted . kept Clinimix . diet re-initiated.    Interval Hx:   Tolerating meals, no complaints of abdominal pain or abdominal fullness at this time.      _______________________________________________________________________________________________________________________________    Objective/physical exam:  Vital signs have been personally reviewed by me   Neuro: awake, alert, oriented.     General: Appears comfortable, no acute distress.    Integumentary: Warm, dry, intact.  Musculoskeletal: Purposeful movement noted.     Respiratory: No accessory muscle use. Breath sounds are equal.  Cardiovascular: Regular rate.       VITAL SIGNS: 24 HRS MIN & MAX LAST   Temp  Min: 97.6 °F (36.4 °C)  Max: 98.2 °F (36.8 °C) 98 °F (36.7 °C)   BP  Min: 124/69  Max: 133/79 126/81   Pulse  Min: 82  Max: 98  98   Resp  Min: 18  Max: 18 18   SpO2  Min: 95 %  Max: 96 % 95 %     X-Ray Chest PA And Lateral  Narrative: EXAMINATION:  XR CHEST PA AND LATERAL    CLINICAL HISTORY:  Bilateral pleural effusion;    TECHNIQUE:  Two-view    COMPARISON:  .    FINDINGS:  Cardiopericardial silhouette is  within normal limits. Lungs are without dense focal or segmental consolidation, congestion, pleural effusion or pneumothorax.  Impression: No acute cardiopulmonary process identified.    Electronically signed by: Denver Wagner  Date:    01/14/2024  Time:    17:31  CT Abdomen Pelvis With IV Contrast NO Oral Contrast  Narrative: EXAMINATION:  CT ABDOMEN PELVIS WITH IV CONTRAST    CLINICAL HISTORY:  Acute on chronic necrotizing pancreatitis with peripancreatic fluid collection, worsening fever spikes and worsening pain;    TECHNIQUE:  Low dose axial images, sagittal and coronal reformations were obtained from the lung bases to the pubic symphysis following the IV administration of contrast. Automatic exposure control (AEC) is utilized to reduce patient radiation exposure.    COMPARISON:  01/09/2024    FINDINGS:  There are changes consistent with COPD in the lung bases bilaterally.  Some blebs hemorrhages bilaterally.  There is bibasilar atelectasis.  There are bilateral moderate-sized pleural effusions.  These are new since the prior examination.    There is evidence of ascites.  This is more prominent than the prior examination.    There is a small hiatal hernia.    The liver appears normal.  No liver mass or lesion is seen.  Portal and hepatic veins appear normal.    The patient is status post cholecystectomy.    There is evidence of inflammatory change seen throughout the pancreas involving the head and body of the pancreas.  There is relative sparing of the tail.  There is a large pseudocyst seen involving the body and head of the pancreas.  Pseudocyst measures roughly 10 cm x 7 cm.  It is similar to the prior examination.  There is some pancreatic necrosis seen consistent with patient's history of  necrotizing pancreatitis.    Diffuse inflammatory changes seen along the peripancreatic region.  Inflammatory changes appear slightly more prominent than the prior examination.  Is some fluid collections also seen just  inferior to the pancreas consistent with additional pseudocyst.  These were seen on the prior examination as well.    .    The spleen shows no acute abnormality.    The adrenal glands appear normal.  No adrenal nodule is seen.    The kidneys appear normal.  No hydronephrosis is seen.  No hydroureter is seen.  No nephrolithiasis is seen.  No obvious ureteral stones are seen.    Urinary bladder appears grossly unremarkable.    No colitis is seen.  No diverticulitis is seen.  No obvious colonic mass or lesion is seen.    No free air is seen.  Impression: Worsening inflammatory changes seen around the pancreas consistent with worsening pancreatitis.  There is evidence of a large pseudocyst in the body and head of the pancreas as well as some pancreatic necrosis.  Findings are consistent with patient's history of necrotizing pancreatitis.  Fluid collections and pseudocysts are also seen just inferior to the pancreas which are stable since prior examination.    Worsening ascites    Interval development of bibasilar atelectasis and bilateral moderate-sized pleural effusions    Electronically signed by: Wayne Gee  Date:    01/14/2024  Time:    10:27    Recent Labs   Lab 01/15/24  0840 01/16/24  0616 01/17/24  0308   WBC 9.18 8.73 8.63   RBC 4.24* 3.46* 3.69*   HGB 12.6* 10.6* 11.3*   HCT 39.4* 31.9* 33.9*   MCV 92.9 92.2 91.9   MCH 29.7 30.6 30.6   MCHC 32.0* 33.2 33.3   RDW 14.0 14.2 14.3    250 315   MPV 10.9* 10.1 9.7       Recent Labs   Lab 01/14/24  0618 01/15/24  0840 01/16/24  0616 01/17/24  0308 01/18/24  0435   *   < > 131* 130* 133*   K 4.4   < > 4.4 4.3 4.4   CO2 31   < > 31 26 31   BUN 13.6   < > 15.3 16.2 17.6   CREATININE 0.80   < > 0.75 0.75 0.81   CALCIUM 8.6*   < > 8.1* 8.6* 8.5*   MG  --   --   --   --  2.00   ALBUMIN 1.9*  --  1.6* 1.8*  --    ALKPHOS 94  --  229* 205*  --    ALT 18  --  32 29  --    AST 33  --  75* 54*  --    BILITOT 0.9  --  2.0* 0.9  --     < > = values in this  interval not displayed.          Microbiology Results (last 7 days)       Procedure Component Value Units Date/Time    Blood Culture [[id number]]  (Normal) Collected: 01/10/24 0715    Order Status: Completed Specimen: Blood Updated: 01/15/24 0900     CULTURE, BLOOD ([facility]) No Growth at 5 days    Blood Culture [[id number]]  (Normal) Collected: 01/10/24 0715    Order Status: Completed Specimen: Blood Updated: 01/15/24 0900     CULTURE, BLOOD ([facility]) No Growth at 5 days    Blood Culture [[id number]]  (Normal) Collected: 01/09/24 1008    Order Status: Completed Specimen: Blood Updated: 01/14/24 1101     CULTURE, BLOOD ([facility]) No Growth at 5 days    Blood Culture [[id number]]  (Normal) Collected: 01/09/24 1008    Order Status: Completed Specimen: Blood Updated: 01/14/24 1101     CULTURE, BLOOD ([facility]) No Growth at 5 days             See below for Radiology    Scheduled Med:   aspirin  81 mg Oral Daily    atorvastatin  20 mg Oral Daily    enoxparin  40 mg Subcutaneous Q24H (prophylaxis, 1700)    fenofibrate  145 mg Oral Daily    fluticasone propionate  2 spray Each Nostril Daily    gabapentin  300 mg Oral Q8H    meropenem (MERREM) IVPB  1 g Intravenous Q8H    metoprolol succinate  25 mg Oral Daily    pantoprazole  40 mg Oral Daily    sodium chloride 0.9%  10 mL Intravenous Q6H        Continuous Infusions:   Amino acid 4.25% - dextrose 5% (CLINIMIX-E) solution (1L provides 42.5 gm AA, 50 gm CHO (170 kcal/L dextrose), Na 35, K 30, Mg 5, Ca 4.5, Acetate 70, Cl 39, Phos 15) 75 mL/hr at 01/19/24 0756        PRN Meds:  acetaminophen, chlorproMAZINE, dextrose 10%, dextrose 10%, dicyclomine, glucagon (human recombinant), hydrALAZINE, HYDROcodone-acetaminophen, HYDROmorphone, insulin aspart U-100, melatonin, ondansetron, simethicone, Flushing PICC/Midline Protocol **AND** sodium chloride 0.9% **AND** sodium chloride 0.9%        __________________________________________________________________________________________________________________________________    Assessment/Plan:  Acute on chronic necrotizing pancreatitis   Pancreatic pseudocyst  Recent Enterococcus bacteremia secondary to necrotizing pancreatitis-completed treatment  Recent choledocholithiasis   History of essential HTN  Type 2 diabetes mellitus-stable   Chronic diastolic heart failure-currently appears compensated  LLE paresthesia     Above present on admission     _______________________________________________________________________________________________________________________________    Currently being treated for necrotizing pancreatitis---plan for endoscopic surgical drain placement when pseudocyst matures; when tolerating diet he can likely discharge home with oral antibiotic therapy and followup outpatient with GI and surgerical team.     Continue supportive care  Continue checking vital signs q4hrs.   Nurse notified to page me if any changes occur     DVT prophylaxis initiated   Nutrition Status:  [place] diet as tolerated    Consults:  Gastroenterologist, Infectious Disease physician    I have personally reviewed the specialist documentation and/or have spoken to the specialist with regard to the care of this patient; recommendations are noted above.     I have spent >30 minutes on the day of the visit; time spent includes face to face time and non-face to face time preparing to see the patient (eg, review of tests), independently reviewing and interpreting medical records, both past and current; documenting clinical information in the electronic or other health record, and communicating results to the patient/family/caregiver and care coordinator and nursing team.      Anticipated discharge and Disposition when medically stable:  Pending.     All diagnosis and differential diagnosis have been reviewed,  interpreted and communicated appropriately to care  team. assessment and plan has been documented; I have personally reviewed the labs and test results that are presently available and pertinent to this hospital course; I have reviewed medical records based upon their availability.    All of the patient's questions have been  addressed and answered. Patient's is agreeable to the above stated plan.   I will continue to monitor closely and make adjustments to medical management as needed.    Yana Gomez, DO   01/19/2024      This note was created with the assistance of Dragon voice recognition software. There may be transcription errors as a result of using this technology however minimal. Effort has been made to assure accuracy of transcription but any obvious errors or omissions should be clarified with the author of the document.

## 2024-01-19 NOTE — PLAN OF CARE
Problem: Adult Inpatient Plan of Care  Goal: Readiness for Transition of Care  1/18/2024 1932 by Eufemia Lozano, RN  Outcome: Met  1/18/2024 1932 by Eufemia Lozano, RN  Outcome: Ongoing, Progressing

## 2024-01-19 NOTE — PROGRESS NOTES
[facility]  Infectious Disease Progress Note            ASSESSMENT & PLAN:     He is a 68-year-old male with a past medical history of diabetes mellitus, hypertension, CHF, and GERD who was recently hospitalized for necrotizing pancreatitis secondary to gallstone/choledocholithiasis.  He was also found to be bacteremic with Enterococcus and he was evaluated by Dr. Pedersen at that time,  he underwent an ERCP and sphincterotomy.  He completed a 14 course of meropenem and ampicillin during that hospitalization and was subsequently discharged.  He had ongoing abdominal discomfort, nausea, vomiting, and loose BMs.  Who presented here on 01/09 for further evaluation.  He was noted to have a leukocytosis and SHA possibly s/t IVVD as well as elevated lipase.  CT of the abdomen and pelvis showed similar findings with persistent fluid collection and necrotizing pancreatitis.  He was initiated on meropenem empirically.  Blood cultures and stool studies have remained negative.  Clinically improved, fevers resolved.  He was evaluated by GI-no indication for intervention at present, plan on monitoring progression with serial imaging.   We have been consulted per [employer] policy for the use of meropenem.      Necrotizing pancreatitis, fluid collection  Recent Enterococcus bacteremia s/t above, s/p treatment / resolution  Recent gallstone pancreatitis, s/p ERCP and sphincterotomy  SHA on admit, suspect s/t IVVD, resolved  H/o CHF / HTN / DM2     PLAN:  GI planning possible endoscopy in a few weeks.   Change abx to ciprofloxacin and Flagyl PO to complete an additional 2 week course - end date: 2/2.  F/u with us in 2-3 weeks.   Will sign off.  Please call if need arises.   Discussed with patient.     SUBJECTIVE:      Doing well today, no issues or complaints.  Asking about discharge.     MEDICATIONS:   Reviewed in EMR    REVIEW OF SYSTEMS:   Except as documented, all other systems reviewed and negative     PHYSICAL EXAM:   T 98 °F (36.7 °C)   " /81   P 98   RR 18   O2 95 %  GENERAL: Chronically ill appearing; NAD; does not appear toxic  SKIN: no rash  HEENT: sclera non-icteric; PERRL   NECK: supple; no LAD  CHEST: CTA; nonlabored, equal expansion; no adventitious BS  CARDIOVASCULAR: RRR, S1S2; no murmur   ABDOMEN:  active bowel sounds; abdomen soft, rounded, + mostly epigastric TTP - improved  EXTREMITIES: no cyanosis or clubbing  NEURO: AAO x4; CN II-XII grossly intact  PSYCH: Mentation and affect appropriate    LABS AND IMAGING:     Recent Labs     01/17/24  0308   WBC 8.63   RBC 3.69*   HGB 11.3*   HCT 33.9*   MCV 91.9   MCH 30.6   MCHC 33.3   RDW 14.3          No results for input(s): "LACTIC" in the last 72 hours.  No results for input(s): "INR", "APTT", "D-DIMER" in the last 72 hours.  No results for input(s): "HGBA1C", "CHOL", "TRIG", "LDL", "VLDL", "HDL" in the last 72 hours.   Recent Labs     01/17/24  0308 01/18/24  0435   * 133*   K 4.3 4.4   CHLORIDE 96* 98   CO2 26 31   BUN 16.2 17.6   CREATININE 0.75 0.81   GLUCOSE 124* 106   CALCIUM 8.6* 8.5*   MG  --  2.00   PHOS  --  2.7   ALBUMIN 1.8*  --    GLOBULIN 3.8*  --    ALKPHOS 205*  --    ALT 29  --    AST 54*  --    BILITOT 0.9  --        No results for input(s): "BNP", "CPK", "TROPONINI" in the last 72 hours.       X-Ray Chest PA And Lateral  Narrative: EXAMINATION:  XR CHEST PA AND LATERAL    CLINICAL HISTORY:  Bilateral pleural effusion;    TECHNIQUE:  Two-view    COMPARISON:  .    FINDINGS:  Cardiopericardial silhouette is within normal limits. Lungs are without dense focal or segmental consolidation, congestion, pleural effusion or pneumothorax.  Impression: No acute cardiopulmonary process identified.    Electronically signed by: Denver Wagner  Date:    01/14/2024  Time:    17:31  CT Abdomen Pelvis With IV Contrast NO Oral Contrast  Narrative: EXAMINATION:  CT ABDOMEN PELVIS WITH IV CONTRAST    CLINICAL HISTORY:  Acute on chronic necrotizing pancreatitis with " peripancreatic fluid collection, worsening fever spikes and worsening pain;    TECHNIQUE:  Low dose axial images, sagittal and coronal reformations were obtained from the lung bases to the pubic symphysis following the IV administration of contrast. Automatic exposure control (AEC) is utilized to reduce patient radiation exposure.    COMPARISON:  01/09/2024    FINDINGS:  There are changes consistent with COPD in the lung bases bilaterally.  Some blebs hemorrhages bilaterally.  There is bibasilar atelectasis.  There are bilateral moderate-sized pleural effusions.  These are new since the prior examination.    There is evidence of ascites.  This is more prominent than the prior examination.    There is a small hiatal hernia.    The liver appears normal.  No liver mass or lesion is seen.  Portal and hepatic veins appear normal.    The patient is status post cholecystectomy.    There is evidence of inflammatory change seen throughout the pancreas involving the head and body of the pancreas.  There is relative sparing of the tail.  There is a large pseudocyst seen involving the body and head of the pancreas.  Pseudocyst measures roughly 10 cm x 7 cm.  It is similar to the prior examination.  There is some pancreatic necrosis seen consistent with patient's history of  necrotizing pancreatitis.    Diffuse inflammatory changes seen along the peripancreatic region.  Inflammatory changes appear slightly more prominent than the prior examination.  Is some fluid collections also seen just inferior to the pancreas consistent with additional pseudocyst.  These were seen on the prior examination as well.    .    The spleen shows no acute abnormality.    The adrenal glands appear normal.  No adrenal nodule is seen.    The kidneys appear normal.  No hydronephrosis is seen.  No hydroureter is seen.  No nephrolithiasis is seen.  No obvious ureteral stones are seen.    Urinary bladder appears grossly unremarkable.    No colitis is  seen.  No diverticulitis is seen.  No obvious colonic mass or lesion is seen.    No free air is seen.  Impression: Worsening inflammatory changes seen around the pancreas consistent with worsening pancreatitis.  There is evidence of a large pseudocyst in the body and head of the pancreas as well as some pancreatic necrosis.  Findings are consistent with patient's history of necrotizing pancreatitis.  Fluid collections and pseudocysts are also seen just inferior to the pancreas which are stable since prior examination.    Worsening ascites    Interval development of bibasilar atelectasis and bilateral moderate-sized pleural effusions    Electronically signed by: Wayne Gee  Date:    01/14/2024  Time:    10:27          MALI Kelly  Infectious Disease

## 2024-01-20 VITALS
DIASTOLIC BLOOD PRESSURE: 79 MMHG | SYSTOLIC BLOOD PRESSURE: 128 MMHG | OXYGEN SATURATION: 97 % | HEIGHT: 66 IN | HEART RATE: 100 BPM | TEMPERATURE: 98 F | WEIGHT: 130 LBS | RESPIRATION RATE: 18 BRPM | BODY MASS INDEX: 20.89 KG/M2

## 2024-01-20 PROBLEM — K85.91 NECROTIZING PANCREATITIS: Status: ACTIVE | Noted: 2023-12-16

## 2024-01-20 LAB
ANION GAP SERPL CALC-SCNC: 5 MEQ/L
BASOPHILS # BLD AUTO: 0.07 X10(3)/MCL
BASOPHILS NFR BLD AUTO: 1.3 %
BUN SERPL-MCNC: 19.6 MG/DL (ref 8.4–25.7)
CALCIUM SERPL-MCNC: 9.2 MG/DL (ref 8.8–10)
CHLORIDE SERPL-SCNC: 100 MMOL/L (ref 98–107)
CO2 SERPL-SCNC: 32 MMOL/L (ref 23–31)
CREAT SERPL-MCNC: 0.87 MG/DL (ref 0.73–1.18)
CREAT/UREA NIT SERPL: 23
EOSINOPHIL # BLD AUTO: 0.18 X10(3)/MCL (ref 0–0.9)
EOSINOPHIL NFR BLD AUTO: 3.4 %
ERYTHROCYTE [DISTWIDTH] IN BLOOD BY AUTOMATED COUNT: 14.6 % (ref 11.5–17)
GFR SERPLBLD CREATININE-BSD FMLA CKD-EPI: >60 MLS/MIN/1.73/M2
GLUCOSE SERPL-MCNC: 106 MG/DL (ref 82–115)
HCT VFR BLD AUTO: 35.2 % (ref 42–52)
HGB BLD-MCNC: 11.2 G/DL (ref 14–18)
IMM GRANULOCYTES # BLD AUTO: 0.03 X10(3)/MCL (ref 0–0.04)
IMM GRANULOCYTES NFR BLD AUTO: 0.6 %
LYMPHOCYTES # BLD AUTO: 2 X10(3)/MCL (ref 0.6–4.6)
LYMPHOCYTES NFR BLD AUTO: 38 %
MAGNESIUM SERPL-MCNC: 2 MG/DL (ref 1.6–2.6)
MCH RBC QN AUTO: 29.7 PG (ref 27–31)
MCHC RBC AUTO-ENTMCNC: 31.8 G/DL (ref 33–36)
MCV RBC AUTO: 93.4 FL (ref 80–94)
MONOCYTES # BLD AUTO: 0.78 X10(3)/MCL (ref 0.1–1.3)
MONOCYTES NFR BLD AUTO: 14.8 %
NEUTROPHILS # BLD AUTO: 2.2 X10(3)/MCL (ref 2.1–9.2)
NEUTROPHILS NFR BLD AUTO: 41.9 %
NRBC BLD AUTO-RTO: 0 %
PHOSPHATE SERPL-MCNC: 2.7 MG/DL (ref 2.3–4.7)
PLATELET # BLD AUTO: 290 X10(3)/MCL (ref 130–400)
PMV BLD AUTO: 9.5 FL (ref 7.4–10.4)
POCT GLUCOSE: 110 MG/DL (ref 70–110)
POCT GLUCOSE: 117 MG/DL (ref 70–110)
POTASSIUM SERPL-SCNC: 4.9 MMOL/L (ref 3.5–5.1)
RBC # BLD AUTO: 3.77 X10(6)/MCL (ref 4.7–6.1)
SODIUM SERPL-SCNC: 137 MMOL/L (ref 136–145)
WBC # SPEC AUTO: 5.26 X10(3)/MCL (ref 4.5–11.5)

## 2024-01-20 PROCEDURE — 25000003 PHARM REV CODE 250: Performed by: NURSE PRACTITIONER

## 2024-01-20 PROCEDURE — 25000003 PHARM REV CODE 250

## 2024-01-20 PROCEDURE — 83735 ASSAY OF MAGNESIUM: CPT | Performed by: STUDENT IN AN ORGANIZED HEALTH CARE EDUCATION/TRAINING PROGRAM

## 2024-01-20 PROCEDURE — 25000003 PHARM REV CODE 250: Performed by: INTERNAL MEDICINE

## 2024-01-20 PROCEDURE — 85025 COMPLETE CBC W/AUTO DIFF WBC: CPT | Performed by: STUDENT IN AN ORGANIZED HEALTH CARE EDUCATION/TRAINING PROGRAM

## 2024-01-20 PROCEDURE — 84100 ASSAY OF PHOSPHORUS: CPT | Performed by: STUDENT IN AN ORGANIZED HEALTH CARE EDUCATION/TRAINING PROGRAM

## 2024-01-20 PROCEDURE — 80048 BASIC METABOLIC PNL TOTAL CA: CPT | Performed by: STUDENT IN AN ORGANIZED HEALTH CARE EDUCATION/TRAINING PROGRAM

## 2024-01-20 RX ORDER — GABAPENTIN 300 MG/1
600 CAPSULE ORAL 3 TIMES DAILY
Qty: 182 CAPSULE | Refills: 0 | Status: SHIPPED | OUTPATIENT
Start: 2024-01-20 | End: 2024-03-06

## 2024-01-20 RX ORDER — METRONIDAZOLE 500 MG/1
500 TABLET ORAL EVERY 8 HOURS
Qty: 42 TABLET | Refills: 0 | Status: ON HOLD | OUTPATIENT
Start: 2024-01-20 | End: 2024-01-26 | Stop reason: HOSPADM

## 2024-01-20 RX ORDER — CIPROFLOXACIN 500 MG/1
500 TABLET ORAL EVERY 12 HOURS
Qty: 28 TABLET | Refills: 0 | Status: ON HOLD | OUTPATIENT
Start: 2024-01-20 | End: 2024-01-26 | Stop reason: HOSPADM

## 2024-01-20 RX ADMIN — FENOFIBRATE 145 MG: 145 TABLET, FILM COATED ORAL at 08:01

## 2024-01-20 RX ADMIN — FLUTICASONE PROPIONATE 100 MCG: 50 SPRAY, METERED NASAL at 09:01

## 2024-01-20 RX ADMIN — METRONIDAZOLE 500 MG: 500 TABLET ORAL at 07:01

## 2024-01-20 RX ADMIN — HYDROCODONE BITARTRATE AND ACETAMINOPHEN 1 TABLET: 10; 325 TABLET ORAL at 08:01

## 2024-01-20 RX ADMIN — PANTOPRAZOLE SODIUM 40 MG: 40 TABLET, DELAYED RELEASE ORAL at 08:01

## 2024-01-20 RX ADMIN — GABAPENTIN 300 MG: 300 CAPSULE ORAL at 07:01

## 2024-01-20 RX ADMIN — ATORVASTATIN CALCIUM 20 MG: 10 TABLET, FILM COATED ORAL at 08:01

## 2024-01-20 RX ADMIN — METOPROLOL SUCCINATE 25 MG: 25 TABLET, EXTENDED RELEASE ORAL at 08:01

## 2024-01-20 RX ADMIN — CIPROFLOXACIN HYDROCHLORIDE 500 MG: 500 TABLET, FILM COATED ORAL at 08:01

## 2024-01-20 RX ADMIN — ASPIRIN 81 MG CHEWABLE TABLET 81 MG: 81 TABLET CHEWABLE at 08:01

## 2024-01-21 ENCOUNTER — NURSE TRIAGE (OUTPATIENT)
Dept: ADMINISTRATIVE | Facility: CLINIC | Age: 69
End: 2024-01-21
Payer: MEDICARE

## 2024-01-21 NOTE — TELEPHONE ENCOUNTER
Patient's wife states patient was discharged from the hospital on yesterday, 01/20/24 and states he takes several prescription medications. Patient's wife states patient's current blood pressure reading is 80/60 mm Hg. Patient states c/o dizziness/lightheadedness.     Patient advised to Call EMS/911 Now for immediate medical attention. Patient also advised to contact the [facility] Triage Service for any worsening symptoms. Patient declined care advice at this time and states he will follow up with his PCP. Patient encouraged to adhere to care advice.     Reason for Disposition   [1] Systolic BP < 90 AND [2] dizzy, lightheaded, or weak    Additional Information   Negative: Started suddenly after an allergic medicine, an allergic food, or bee sting   Negative: Shock suspected (e.g., cold/pale/clammy skin, too weak to stand, low BP, rapid pulse)   Negative: Difficult to awaken or acting confused (e.g., disoriented, slurred speech)   Negative: Fainted    Protocols used: Blood Pressure - Low-A-

## 2024-01-22 ENCOUNTER — HOSPITAL ENCOUNTER (INPATIENT)
Facility: HOSPITAL | Age: 69
LOS: 4 days | Discharge: HOME | DRG: 312 | End: 2024-01-26
Attending: EMERGENCY MEDICINE | Admitting: EMERGENCY MEDICINE
Payer: MEDICARE

## 2024-01-22 ENCOUNTER — TELEPHONE (OUTPATIENT)
Dept: INFECTIOUS DISEASES | Facility: CLINIC | Age: 69
End: 2024-01-22
Payer: MEDICARE

## 2024-01-22 DIAGNOSIS — R42 DIZZINESS: ICD-10-CM

## 2024-01-22 DIAGNOSIS — E87.20 LACTIC ACIDOSIS: ICD-10-CM

## 2024-01-22 DIAGNOSIS — R94.31 PROLONGED QT INTERVAL: ICD-10-CM

## 2024-01-22 DIAGNOSIS — E83.42 HYPOMAGNESEMIA: ICD-10-CM

## 2024-01-22 DIAGNOSIS — K85.91 NECROTIZING PANCREATITIS: Primary | ICD-10-CM

## 2024-01-22 DIAGNOSIS — R94.31 QT PROLONGATION: ICD-10-CM

## 2024-01-22 LAB
ALBUMIN SERPL-MCNC: 2.1 G/DL (ref 3.4–4.8)
ALBUMIN/GLOB SERPL: 0.6 RATIO (ref 1.1–2)
ALP SERPL-CCNC: 121 UNIT/L (ref 40–150)
ALT SERPL-CCNC: 21 UNIT/L (ref 0–55)
AST SERPL-CCNC: 43 UNIT/L (ref 5–34)
BASOPHILS # BLD AUTO: 0.05 X10(3)/MCL
BASOPHILS NFR BLD AUTO: 0.6 %
BILIRUB SERPL-MCNC: 0.5 MG/DL
BUN SERPL-MCNC: 18.1 MG/DL (ref 8.4–25.7)
CALCIUM SERPL-MCNC: 8.8 MG/DL (ref 8.8–10)
CHLORIDE SERPL-SCNC: 103 MMOL/L (ref 98–107)
CO2 SERPL-SCNC: 28 MMOL/L (ref 23–31)
CREAT SERPL-MCNC: 1.19 MG/DL (ref 0.73–1.18)
EOSINOPHIL # BLD AUTO: 0.15 X10(3)/MCL (ref 0–0.9)
EOSINOPHIL NFR BLD AUTO: 1.7 %
ERYTHROCYTE [DISTWIDTH] IN BLOOD BY AUTOMATED COUNT: 14.7 % (ref 11.5–17)
GFR SERPLBLD CREATININE-BSD FMLA CKD-EPI: >60 MLS/MIN/1.73/M2
GLOBULIN SER-MCNC: 3.8 GM/DL (ref 2.4–3.5)
GLUCOSE SERPL-MCNC: 145 MG/DL (ref 82–115)
HCT VFR BLD AUTO: 34 % (ref 42–52)
HGB BLD-MCNC: 11 G/DL (ref 14–18)
IMM GRANULOCYTES # BLD AUTO: 0.04 X10(3)/MCL (ref 0–0.04)
IMM GRANULOCYTES NFR BLD AUTO: 0.5 %
LACTATE SERPL-SCNC: 2.7 MMOL/L (ref 0.5–2.2)
LACTATE SERPL-SCNC: 2.7 MMOL/L (ref 0.5–2.2)
LIPASE SERPL-CCNC: 57 U/L
LYMPHOCYTES # BLD AUTO: 2.32 X10(3)/MCL (ref 0.6–4.6)
LYMPHOCYTES NFR BLD AUTO: 26.8 %
MAGNESIUM SERPL-MCNC: 1.5 MG/DL (ref 1.6–2.6)
MCH RBC QN AUTO: 30.1 PG (ref 27–31)
MCHC RBC AUTO-ENTMCNC: 32.4 G/DL (ref 33–36)
MCV RBC AUTO: 93.2 FL (ref 80–94)
MONOCYTES # BLD AUTO: 1.01 X10(3)/MCL (ref 0.1–1.3)
MONOCYTES NFR BLD AUTO: 11.7 %
NEUTROPHILS # BLD AUTO: 5.08 X10(3)/MCL (ref 2.1–9.2)
NEUTROPHILS NFR BLD AUTO: 58.7 %
NRBC BLD AUTO-RTO: 0 %
PLATELET # BLD AUTO: 344 X10(3)/MCL (ref 130–400)
PMV BLD AUTO: 11.1 FL (ref 7.4–10.4)
POTASSIUM SERPL-SCNC: 3.9 MMOL/L (ref 3.5–5.1)
PROT SERPL-MCNC: 5.9 GM/DL (ref 5.8–7.6)
RBC # BLD AUTO: 3.65 X10(6)/MCL (ref 4.7–6.1)
SODIUM SERPL-SCNC: 137 MMOL/L (ref 136–145)
TROPONIN I SERPL-MCNC: <0.01 NG/ML (ref 0–0.04)
WBC # SPEC AUTO: 8.65 X10(3)/MCL (ref 4.5–11.5)

## 2024-01-22 PROCEDURE — 83605 ASSAY OF LACTIC ACID: CPT | Performed by: PHYSICIAN ASSISTANT

## 2024-01-22 PROCEDURE — 96361 HYDRATE IV INFUSION ADD-ON: CPT

## 2024-01-22 PROCEDURE — 63600175 PHARM REV CODE 636 W HCPCS: Performed by: PHYSICIAN ASSISTANT

## 2024-01-22 PROCEDURE — 83690 ASSAY OF LIPASE: CPT | Performed by: EMERGENCY MEDICINE

## 2024-01-22 PROCEDURE — 25000003 PHARM REV CODE 250: Performed by: NURSE PRACTITIONER

## 2024-01-22 PROCEDURE — 96367 TX/PROPH/DG ADDL SEQ IV INF: CPT

## 2024-01-22 PROCEDURE — 11000001 HC ACUTE MED/SURG PRIVATE ROOM

## 2024-01-22 PROCEDURE — 93010 ELECTROCARDIOGRAM REPORT: CPT | Mod: ,,, | Performed by: STUDENT IN AN ORGANIZED HEALTH CARE EDUCATION/TRAINING PROGRAM

## 2024-01-22 PROCEDURE — 25000003 PHARM REV CODE 250: Performed by: EMERGENCY MEDICINE

## 2024-01-22 PROCEDURE — 85025 COMPLETE CBC W/AUTO DIFF WBC: CPT | Performed by: PHYSICIAN ASSISTANT

## 2024-01-22 PROCEDURE — 93005 ELECTROCARDIOGRAM TRACING: CPT

## 2024-01-22 PROCEDURE — 96366 THER/PROPH/DIAG IV INF ADDON: CPT

## 2024-01-22 PROCEDURE — 25500020 PHARM REV CODE 255: Performed by: EMERGENCY MEDICINE

## 2024-01-22 PROCEDURE — 96375 TX/PRO/DX INJ NEW DRUG ADDON: CPT

## 2024-01-22 PROCEDURE — 87040 BLOOD CULTURE FOR BACTERIA: CPT | Performed by: PHYSICIAN ASSISTANT

## 2024-01-22 PROCEDURE — 63600175 PHARM REV CODE 636 W HCPCS: Performed by: NURSE PRACTITIONER

## 2024-01-22 PROCEDURE — 96365 THER/PROPH/DIAG IV INF INIT: CPT

## 2024-01-22 PROCEDURE — 21400001 HC TELEMETRY ROOM

## 2024-01-22 PROCEDURE — 99285 EMERGENCY DEPT VISIT HI MDM: CPT | Mod: 25

## 2024-01-22 PROCEDURE — 80053 COMPREHEN METABOLIC PANEL: CPT | Performed by: PHYSICIAN ASSISTANT

## 2024-01-22 PROCEDURE — 83735 ASSAY OF MAGNESIUM: CPT | Performed by: PHYSICIAN ASSISTANT

## 2024-01-22 PROCEDURE — 84484 ASSAY OF TROPONIN QUANT: CPT | Performed by: PHYSICIAN ASSISTANT

## 2024-01-22 PROCEDURE — 63600175 PHARM REV CODE 636 W HCPCS: Performed by: EMERGENCY MEDICINE

## 2024-01-22 RX ORDER — HYDROCODONE BITARTRATE AND ACETAMINOPHEN 5; 325 MG/1; MG/1
1 TABLET ORAL
Status: COMPLETED | OUTPATIENT
Start: 2024-01-22 | End: 2024-01-22

## 2024-01-22 RX ORDER — GLIMEPIRIDE 1 MG/1
2 TABLET ORAL DAILY
Status: DISCONTINUED | OUTPATIENT
Start: 2024-01-23 | End: 2024-01-23

## 2024-01-22 RX ORDER — CIPROFLOXACIN 2 MG/ML
400 INJECTION, SOLUTION INTRAVENOUS
Status: DISCONTINUED | OUTPATIENT
Start: 2024-01-22 | End: 2024-01-22

## 2024-01-22 RX ORDER — GLUCAGON 1 MG
1 KIT INJECTION
Status: DISCONTINUED | OUTPATIENT
Start: 2024-01-22 | End: 2024-01-26 | Stop reason: HOSPADM

## 2024-01-22 RX ORDER — IBUPROFEN 200 MG
16 TABLET ORAL
Status: DISCONTINUED | OUTPATIENT
Start: 2024-01-22 | End: 2024-01-26 | Stop reason: HOSPADM

## 2024-01-22 RX ORDER — CIPROFLOXACIN 2 MG/ML
400 INJECTION, SOLUTION INTRAVENOUS
Status: DISCONTINUED | OUTPATIENT
Start: 2024-01-23 | End: 2024-01-22

## 2024-01-22 RX ORDER — HYDROCODONE BITARTRATE AND ACETAMINOPHEN 7.5; 325 MG/1; MG/1
1 TABLET ORAL EVERY 6 HOURS PRN
Status: DISCONTINUED | OUTPATIENT
Start: 2024-01-22 | End: 2024-01-26 | Stop reason: HOSPADM

## 2024-01-22 RX ORDER — METRONIDAZOLE 500 MG/100ML
500 INJECTION, SOLUTION INTRAVENOUS
Status: DISCONTINUED | OUTPATIENT
Start: 2024-01-22 | End: 2024-01-22

## 2024-01-22 RX ORDER — IBUPROFEN 200 MG
24 TABLET ORAL
Status: DISCONTINUED | OUTPATIENT
Start: 2024-01-22 | End: 2024-01-26 | Stop reason: HOSPADM

## 2024-01-22 RX ORDER — CIPROFLOXACIN 500 MG/1
500 TABLET ORAL EVERY 12 HOURS
Status: DISCONTINUED | OUTPATIENT
Start: 2024-01-23 | End: 2024-01-23

## 2024-01-22 RX ORDER — CIPROFLOXACIN 2 MG/ML
400 INJECTION, SOLUTION INTRAVENOUS
Status: COMPLETED | OUTPATIENT
Start: 2024-01-22 | End: 2024-01-22

## 2024-01-22 RX ORDER — SODIUM CHLORIDE, SODIUM LACTATE, POTASSIUM CHLORIDE, CALCIUM CHLORIDE 600; 310; 30; 20 MG/100ML; MG/100ML; MG/100ML; MG/100ML
INJECTION, SOLUTION INTRAVENOUS CONTINUOUS
Status: DISCONTINUED | OUTPATIENT
Start: 2024-01-22 | End: 2024-01-26 | Stop reason: HOSPADM

## 2024-01-22 RX ORDER — METRONIDAZOLE 500 MG/1
500 TABLET ORAL EVERY 8 HOURS
Status: DISCONTINUED | OUTPATIENT
Start: 2024-01-23 | End: 2024-01-23

## 2024-01-22 RX ORDER — INSULIN ASPART 100 [IU]/ML
0-10 INJECTION, SOLUTION INTRAVENOUS; SUBCUTANEOUS
Status: DISCONTINUED | OUTPATIENT
Start: 2024-01-22 | End: 2024-01-26 | Stop reason: HOSPADM

## 2024-01-22 RX ORDER — MAGNESIUM SULFATE HEPTAHYDRATE 40 MG/ML
2 INJECTION, SOLUTION INTRAVENOUS
Status: COMPLETED | OUTPATIENT
Start: 2024-01-22 | End: 2024-01-22

## 2024-01-22 RX ADMIN — METRONIDAZOLE 500 MG: 5 INJECTION, SOLUTION INTRAVENOUS at 03:01

## 2024-01-22 RX ADMIN — CIPROFLOXACIN 400 MG: 2 INJECTION, SOLUTION INTRAVENOUS at 02:01

## 2024-01-22 RX ADMIN — CIPROFLOXACIN 400 MG: 2 INJECTION, SOLUTION INTRAVENOUS at 01:01

## 2024-01-22 RX ADMIN — HYDROCODONE BITARTRATE AND ACETAMINOPHEN 1 TABLET: 7.5; 325 TABLET ORAL at 06:01

## 2024-01-22 RX ADMIN — HYDROCODONE BITARTRATE AND ACETAMINOPHEN 1 TABLET: 5; 325 TABLET ORAL at 02:01

## 2024-01-22 RX ADMIN — HYDROCODONE BITARTRATE AND ACETAMINOPHEN 1 TABLET: 7.5; 325 TABLET ORAL at 11:01

## 2024-01-22 RX ADMIN — MAGNESIUM SULFATE HEPTAHYDRATE 2 G: 40 INJECTION, SOLUTION INTRAVENOUS at 01:01

## 2024-01-22 RX ADMIN — SODIUM CHLORIDE, POTASSIUM CHLORIDE, SODIUM LACTATE AND CALCIUM CHLORIDE 1000 ML: 600; 310; 30; 20 INJECTION, SOLUTION INTRAVENOUS at 01:01

## 2024-01-22 RX ADMIN — SODIUM CHLORIDE, POTASSIUM CHLORIDE, SODIUM LACTATE AND CALCIUM CHLORIDE: 600; 310; 30; 20 INJECTION, SOLUTION INTRAVENOUS at 06:01

## 2024-01-22 RX ADMIN — SODIUM CHLORIDE, POTASSIUM CHLORIDE, SODIUM LACTATE AND CALCIUM CHLORIDE 1000 ML: 600; 310; 30; 20 INJECTION, SOLUTION INTRAVENOUS at 10:01

## 2024-01-22 RX ADMIN — SODIUM CHLORIDE, POTASSIUM CHLORIDE, SODIUM LACTATE AND CALCIUM CHLORIDE 1000 ML: 600; 310; 30; 20 INJECTION, SOLUTION INTRAVENOUS at 03:01

## 2024-01-22 RX ADMIN — IOPAMIDOL 84 ML: 755 INJECTION, SOLUTION INTRAVENOUS at 02:01

## 2024-01-22 NOTE — TELEPHONE ENCOUNTER
Spoke with wife and she explained that morning meds are becoming too much for pt to handle.. BP was as low as 90/60.the patient become weak and disoriented.. I recommended that she presents to the ER so that pt could be evaluated and med list could be looked at for possible drug interactions.. pt wife verbalized understanding.

## 2024-01-22 NOTE — FIRST PROVIDER EVALUATION
"Medical screening examination initiated.  I have conducted a focused provider triage encounter, findings are as follows:    Brief history of present illness:  68-year-old male with history of necrotizing pancreatitis following with infectious disease presents to ED for evaluation of weakness and dizziness increasing over the past several days.  Family reports patient with blood pressure of 80/60.  Reports having a fainting episode yesterday with confusion. Currently on antibiotics.     Vitals:    01/22/24 0951   BP: 94/60   Pulse: 98   Resp: 16   Temp: 97.2 °F (36.2 °C)   TempSrc: Oral   SpO2: 96%   Weight: 60.3 kg (133 lb)   Height: 5' 6" (1.676 m)       Pertinent physical exam:  Patient awake and ambulatory into triage.    Brief workup plan:  Labs, EKG, CXR, Lactic, blood cultures, IVF    Preliminary workup initiated; this workup will be continued and followed by the physician or advanced practice provider that is assigned to the patient when roomed.  "

## 2024-01-22 NOTE — TELEPHONE ENCOUNTER
----- Message from Kaci Meeks sent at 1/22/2024  8:12 AM CST -----  Regarding: health concerns  Dr. Benítez seen pt in hospital and his f/u is 02/08/24, pt wife called asking if he can come in sooner because his blood pressure keeps dropping, current b/p 121/68 and pulse 90. Pt # [phone]

## 2024-01-22 NOTE — ED PROVIDER NOTES
"Encounter Date: 1/22/2024       History     Chief Complaint   Patient presents with    Weakness     Reports weakness, dizziness & hypotension at home yesterday. Pt's wife reports SBP was in the 80s. Currently seeing Dr. Benítez for necrotizing pancreatitis. Denies abdominal pain       68-year-old male with a history of hypertension, hyperlipidemia, diabetes mellitus, necrotizing pancreatitis, followed by Dr. Benítez, infectious Disease, outpatient impending further GI evaluation outpatient presents to the emergency department with generalized weakness, dizziness, hypotension.  Systolic blood pressures dropping into the 80s.  Reports syncopal episode and "bouncing off the walls" yesterday.  States taking all medications as prescribed; however, does split his morning medications into some meds at 7:00 a.m. and some around 820 as he feels like it is too many medications at 1 time.  He is on amlodipine/ benazepril and metoprolol for BP, both taken this morning.  Denies any fever or chills.  Reports tolerating a diet well.  Denies any abdominal pain.    The history is provided by the patient, medical records and the spouse.     Review of patient's allergies indicates:  No Known Allergies  Past Medical History:   Diagnosis Date    DM (diabetes mellitus)     GERD (gastroesophageal reflux disease)     HLD (hyperlipidemia)     HTN (hypertension)      Past Surgical History:   Procedure Laterality Date    APPENDECTOMY      CHOLECYSTECTOMY      ERCP N/A 12/17/2023    Procedure: ERCP (ENDOSCOPIC RETROGRADE CHOLANGIOPANCREATOGRAPHY);  Surgeon: Flako Kerns MD;  Location: [facility];  Service: Gastroenterology;  Laterality: N/A;    ERCP W/ SPHICTEROTOMY  12/17/2023    Procedure: ERCP, WITH SPHINCTEROTOMY;  Surgeon: Flako Kerns MD;  Location: [facility] OR;  Service: Gastroenterology;;    ERCP, WITH CALCULUS REMOVAL  12/17/2023    Procedure: ERCP, WITH CALCULUS REMOVAL;  Surgeon: Flako Kerns MD;  Location: [facility]" OR;  Service: Gastroenterology;;     No family history on file.  Social History     Tobacco Use    Smoking status: Every Day     Current packs/day: 1.00     Types: Cigarettes    Smokeless tobacco: Never   Substance Use Topics    Alcohol use: Never    Drug use: Never     Review of Systems   Constitutional:  Positive for fatigue.   Respiratory:  Negative for shortness of breath.    Cardiovascular:  Negative for chest pain.   Gastrointestinal:  Negative for abdominal pain, diarrhea, nausea and vomiting.   Neurological:  Positive for syncope, weakness and light-headedness.       Physical Exam     Initial Vitals [01/22/24 0951]   BP Pulse Resp Temp SpO2   94/60 98 16 97.2 °F (36.2 °C) 96 %      MAP       --         Physical Exam    Nursing note and vitals reviewed.  Constitutional: He appears well-developed and well-nourished. No distress.   HENT:   Head: Normocephalic.   Mouth/Throat: Oropharynx is clear and moist.   Eyes: Conjunctivae are normal. No scleral icterus.   Neck: Neck supple.   Normal range of motion.  Cardiovascular:  Normal rate.           Pulmonary/Chest: No respiratory distress.   Abdominal: Abdomen is soft. There is no abdominal tenderness.   Musculoskeletal:      Cervical back: Normal range of motion and neck supple.     Neurological: He is alert and oriented to person, place, and time. He has normal strength. No sensory deficit. GCS score is 15. GCS eye subscore is 4. GCS verbal subscore is 5. GCS motor subscore is 6.   Skin: Skin is warm and dry. No rash noted.         ED Course   Procedures  Labs Reviewed   COMPREHENSIVE METABOLIC PANEL - Abnormal; Notable for the following components:       Result Value    Glucose Level 145 (*)     Creatinine 1.19 (*)     Albumin Level 2.1 (*)     Globulin 3.8 (*)     Albumin/Globulin Ratio 0.6 (*)     Aspartate Aminotransferase 43 (*)     All other components within normal limits   MAGNESIUM - Abnormal; Notable for the following components:    Magnesium Level 1.50  (*)     All other components within normal limits   LACTIC ACID, PLASMA - Abnormal; Notable for the following components:    Lactic Acid Level 2.7 (*)     All other components within normal limits   CBC WITH DIFFERENTIAL - Abnormal; Notable for the following components:    RBC 3.65 (*)     Hgb 11.0 (*)     Hct 34.0 (*)     MCHC 32.4 (*)     MPV 11.1 (*)     All other components within normal limits   LACTIC ACID, PLASMA - Abnormal; Notable for the following components:    Lactic Acid Level 2.7 (*)     All other components within normal limits   TROPONIN I - Normal   LIPASE - Normal   BLOOD CULTURE OLG   BLOOD CULTURE OLG   CBC W/ AUTO DIFFERENTIAL    Narrative:     The following orders were created for panel order CBC auto differential.  Procedure                               Abnormality         Status                     ---------                               -----------         ------                     CBC with Differential[[id number]]       Abnormal            Final result                 Please view results for these tests on the individual orders.     EKG Readings: (Independently Interpreted)   Initial Reading: No STEMI. Rhythm: Normal Sinus Rhythm. Heart Rate: 96. ST Segments: Non-Specific ST Segment Depression. T Waves: Normal. Axis: Normal. Clinical Impression: Normal Sinus Rhythm   01/22/2024 @ 0947     ECG Results              EKG 12-lead (Final result)  Result time 01/22/24 11:44:31      Final result by Interface, Lab [facility] (01/22/24 11:44:31)                   Narrative:    Test Reason : R42,    Vent. Rate : 096 BPM     Atrial Rate : 096 BPM     P-R Int : 124 ms          QRS Dur : 080 ms      QT Int : 368 ms       P-R-T Axes : 062 033 008 degrees     QTc Int : 464 ms    Normal sinus rhythm  Nonspecific ST abnormality  Abnormal ECG  Confirmed by Bandar Guidry MD ([id number]) on 1/22/2024 11:44:21 AM    Referred By:             Confirmed By:Bandar Guidry MD                                  Imaging  Results              CT Abdomen Pelvis With IV Contrast NO Oral Contrast (Final result)  Result time 01/22/24 14:47:47      Final result by Gerber Silva MD (01/22/24 14:47:47)                   Impression:      Persistent peripancreatic inflammation, although peripancreatic fluid collections are slightly smaller since 01/14/2023 with decreased volume of ascites.  New nonspecific gas within the largest peripancreatic fluid collection.      Electronically signed by: Gerber Silva  Date:    01/22/2024  Time:    14:47               Narrative:    EXAMINATION:  CT ABDOMEN PELVIS WITH IV CONTRAST    CLINICAL HISTORY:  Sepsis;    TECHNIQUE:  CT imaging of the abdomen and pelvis after intravenous contrast. Dose length product 771 mGycm. Automatic exposure control, adjustment of mA/kV or iterative reconstruction technique used to limit radiation dose.    COMPARISON:  CT 01/14/2023    FINDINGS:  Continued peripancreatic inflammation.  The largest peripancreatic fluid collection now contains internal gas, but is slightly smaller during the interval.  Few other fluid collections have also decreased in size.    No biliary dilatation.  Stable liver and spleen.  Normal adrenal glands.  No hydronephrosis.  Some mass effect on the duodenum from the pancreatic changes, but no significant gastric dilatation.  Normal bladder.  Right-sided hydrocele.  No consolidation or significant pleural fluid in the lung bases.  No acute osseous findings.                                       X-Ray Chest 1 View (Final result)  Result time 01/22/24 10:10:44      Final result by Radu Edwards MD (01/22/24 10:10:44)                   Impression:      No acute chest disease is identified.      Electronically signed by: Radu Edwards  Date:    01/22/2024  Time:    10:10               Narrative:    EXAMINATION:  XR CHEST 1 VIEW    CLINICAL HISTORY:  , Dizziness and giddiness.    COMPARISON:  January 14, 2024    FINDINGS:  No alveolar  consolidation, effusion, or pneumothorax is seen.   The thoracic aorta is normal  cardiac silhouette, central pulmonary vessels and mediastinum are normal in size and are grossly unremarkable.   visualized osseous structures are grossly unremarkable.                                       Medications   metronidazole IVPB 500 mg (0 mg Intravenous Stopped 1/22/24 1618)   lactated ringers infusion (has no administration in time range)   lactated ringers bolus 1,000 mL (0 mLs Intravenous Stopped 1/22/24 1115)   lactated ringers bolus 1,000 mL (0 mLs Intravenous Stopped 1/22/24 1408)   magnesium sulfate 2g in water 50mL IVPB (premix) (0 g Intravenous Stopped 1/22/24 1552)   ciprofloxacin (CIPRO)400mg/200ml D5W IVPB 400 mg (0 mg Intravenous Stopped 1/22/24 1517)   iopamidoL (ISOVUE-370) injection 84 mL (84 mLs Intravenous Given 1/22/24 1414)   HYDROcodone-acetaminophen 5-325 mg per tablet 1 tablet (1 tablet Oral Given 1/22/24 1424)   lactated ringers bolus 1,000 mL (0 mLs Intravenous Stopped 1/22/24 1647)     Medical Decision Making  Problems Addressed:  Hypomagnesemia: acute illness or injury  Lactic acidosis: acute illness or injury  Necrotizing pancreatitis: chronic illness or injury with exacerbation, progression, or side effects of treatment    Amount and/or Complexity of Data Reviewed  Radiology: ordered.    Risk  Prescription drug management.  Decision regarding hospitalization.      ED assessment:    Mr. Macias presented to the emergency department for evaluation of generalized weakness, dizziness, hypotension at home in the setting of necrotizing pancreatitis diagnosis.  Currently on outpatient antibiotics.  Afebrile, nontoxic appearing, hypotensive on arrival.  Fluid resuscitation initiated.    Differential diagnosis (including but not limited to):   Necrotizing pancreatitis, SIRS, hypotension, sepsis, septic shock, acute kidney injury, electrolyte derangements, dehydration, pancreatic access    ED management:   See ED course below.  Blood pressure improving with volume resuscitation.  Labs with hypomagnesemia, lactic acidosis.  CT scan obtained demonstrates persistent pancreatic inflammatory changes though slight decrease in the fluid collections noted.  IV antibiotics initiated and will admit to hospital medicine with infectious disease consultation.      Amount and/or Complexity of Data Reviewed  Independent historian: spouse    Summary of history:  Provided collateral information as documented in HPI  External data reviewed: notes from previous admissions, prior labs, prior imaging, and prescription medications   Summary of data reviewed:  Previous admission 01/09/2024 with acute pancreatitis.  Admitted in December with similar presentation as well.  Followed outpatient by infectious disease, currently on ciprofloxacin and Flagyl.  Risk and benefits of testing: discussed   Labs: ordered and reviewed  Radiology: ordered and independent interpretation performed (see above or ED course)  ECG/medicine tests: ordered and independent interpretation performed (see above or ED course)  Discussion of management or test interpretation with external provider(s): discussed with hospitalist physician and discussed with infectious disease consultant   Summary of discussion:  As below    Risk  Prescription drug management   Decision regarding hospitalization  Shared decision making     Critical Care  none    I, Zenia Rodgers MD personally performed the history, PE, MDM, and procedures as documented above and agree with the scribe's documentation.              ED Course as of 01/22/24 1750   Mon Jan 22, 2024   1316  Patient with lactic acidosis, hypomagnesemia, hypoalbuminemia,  stable anemia,  mild acute kidney injury.  After 1st fluid bolus, became more hypotensive, 70s over 50s.  Laid down flat and blood pressure did slightly improve.  Additional fluid bolus ordered.  Will plan for admission. [KS]   6695   Discussed with Dr. Benítez,  recommends continue IV antibiotics, agrees with repeat CT.  He will follow in consultation [KS]   9171  Discussed with PETERSON Kurtz with Hospital Medicine who accepts for admission on behalf of Dr. Gomez [KS]      ED Course User Index  [KS] Zenia Rodgers MD                           Clinical Impression:  Final diagnoses:  [R42] Dizziness  [K85.91] Necrotizing pancreatitis (Primary)  [E83.42] Hypomagnesemia  [E87.20] Lactic acidosis          ED Disposition Condition    Admit                 Zenia Rodgers MD  02/25/24 5806

## 2024-01-22 NOTE — DISCHARGE SUMMARY
[facility] Hospital Medicine Discharge Summary    Admit Date: 1/9/2024  Discharge Date and Time: 1/22/202411:51 AM  Admitting Physician: JESUS Team  Discharging Physician: Yana Gomez DO.  Primary Care Physician: Snehal Arroyo MD    discharge Diagnoses:  Acute on chronic necrotizing pancreatitis   Pancreatic pseudocyst  Recent Enterococcus bacteremia secondary to necrotizing pancreatitis-completed treatment  Recent choledocholithiasis   History of essential HTN  Type 2 diabetes mellitus-stable   Chronic diastolic heart failure-currently appears compensated  LLE paresthesia      Above present on admission        Hospital Course:   Chief Complaint: abdominal pain         HPI: (personally reviewed by me and is documented from initial H&P)   68-year-old male with significant history of type 2 diabetes mellitus, HTN, chronic diastolic heart failure, HLD, GERD and  hydrocele.  Patient had a recent 19 day hospitalization for acute necrotizing pancreatitis secondary to gallstones/choledocholithiasis, severe sepsis with Enterococcus bacteremia, acute kidney injury, acute hypoxemic respiratory failure. patient had cholecystectomy in 2022, underwent ERCP this previous hospitalization with stone and sludge removal and sphincterotomy.       Repeat CT abdomen pelvis showed continued findings of necrotizing pancreatitis with possible organizing fluid collection around pancreatic neck and body.  GI recommended repeat CT in 3 months.  Patient was treated with meropenem, ampicillin for Enterococcus bacteremia while in-house which was discontinued upon DC.  He completed 14 day course while in-house.  Patient remained symptomatic even after DC.  Patient continued with abdominal discomfort, nausea, unable to keep anything down by mouth. Also reports diarrhea, unquantified unintentional weight loss. Patient was febrile and tachycardic.  Lab significant for leukocytosis, acute kidney  injury/dehydration.  Lipase was elevated.  Patient was initiated on conservative management with IV fluids, NPO and GI services consulted, admitted to hospitalist medicine service.  CT abdomen pelvis repeated-overall similar findings compared to before with persistent fluid collection/necrotizing pancreatitis.  Stool studies ordered given diarrhea.  Given concern for necrotizing pancreatitis decided to initiate meropenem.  GI evaluated, CT findings similar to prior and therefore no interventions planned, recommended conservative management.  Diet initiated  to clear liquids on 01/10.  Persistent diarrhea as of 1/11, stool studies negative, added Questran, Merrem continued for necrotizing pancreatitis, GI following.  Infectious Disease evaluated, recommended possible necrosectomy once inflammation resolves.  Still symptomatic with diarrhea and abdominal discomfort, Questran increased on 1/13.  diet advance to full liquid. Worsening abdominal symptoms with associated high-grade temperature spike on 1/14, however no more diarrhea, in fact no bowel movements .  CT abdomen was repeated with contrast which showed large pseudocyst and worsening inflammation . kept NPO, General surgery consulted . Merrem continued . Clinimix initiated and continued for nutrition.  Patient will benefit from drainage given large pseudocyst, however will have to wait at least 3 weeks for it to mature prior to drainage. symptomatic/conservative management continued. nutritionist consulted . kept Clinimix . diet re-initiated.     Interval Hx:   Tolerating meals, no complaints of abdominal pain or abdominal fullness at this time.      Hospital course and discharge care plan has been discussed with patient, patient voices understanding and agreement with plan. All questions have been answered to the best of my ability. Patient is advised to return to ED or call 911 in case of emergency and or if symptoms worsen.    Vitals:  VITAL SIGNS: 24 HRS MIN  & MAX LAST   No data recorded 97.7 °F (36.5 °C)   No data recorded 128/79   No data recorded  100   No data recorded 18   No data recorded 97 %       Physical Exam:  Vital signs have been personally reviewed by me   Neuro: awake, alert, oriented.      General: Appears comfortable, no acute distress.  Integumentary: Warm, dry, intact.  Musculoskeletal: Purposeful movement noted.     Respiratory: No accessory muscle use. Breath sounds are equal.  Cardiovascular: Regular rate.        Procedures Performed: No admission procedures for hospital encounter.     Significant Diagnostic Studies: See Full reports for all details    Recent Labs   Lab 01/17/24  0308 01/20/24  0343 01/22/24  1016   WBC 8.63 5.26 8.65   RBC 3.69* 3.77* 3.65*   HGB 11.3* 11.2* 11.0*   HCT 33.9* 35.2* 34.0*   MCV 91.9 93.4 93.2   MCH 30.6 29.7 30.1   MCHC 33.3 31.8* 32.4*   RDW 14.3 14.6 14.7    290 344   MPV 9.7 9.5 11.1*       Recent Labs   Lab 01/16/24  0616 01/17/24  0308 01/18/24  0435 01/20/24  0343   * 130* 133* 137   K 4.4 4.3 4.4 4.9   CO2 31 26 31 32*   BUN 15.3 16.2 17.6 19.6   CREATININE 0.75 0.75 0.81 0.87   CALCIUM 8.1* 8.6* 8.5* 9.2   MG  --   --  2.00 2.00   ALBUMIN 1.6* 1.8*  --   --    ALKPHOS 229* 205*  --   --    ALT 32 29  --   --    AST 75* 54*  --   --    BILITOT 2.0* 0.9  --   --         Microbiology Results (last 7 days)       Procedure Component Value Units Date/Time    Blood Culture [[id number]]  (Normal) Collected: 01/10/24 0715    Order Status: Completed Specimen: Blood Updated: 01/15/24 0900     CULTURE, BLOOD ([facility]) No Growth at 5 days    Blood Culture [[id number]]  (Normal) Collected: 01/10/24 0715    Order Status: Completed Specimen: Blood Updated: 01/15/24 0900     CULTURE, BLOOD ([facility]) No Growth at 5 days    Blood Culture [[id number]]  (Normal) Collected: 01/09/24 1008    Order Status: Completed Specimen: Blood Updated: 01/14/24 1101     CULTURE, BLOOD ([facility]) No Growth at 5 days    Blood Culture  [[id number]]  (Normal) Collected: 01/09/24 1008    Order Status: Completed Specimen: Blood Updated: 01/14/24 1101     CULTURE, BLOOD ([facility]) No Growth at 5 days             X-Ray Chest 1 View  Narrative: EXAMINATION:  XR CHEST 1 VIEW    CLINICAL HISTORY:  , Dizziness and giddiness.    COMPARISON:  January 14, 2024    FINDINGS:  No alveolar consolidation, effusion, or pneumothorax is seen.   The thoracic aorta is normal  cardiac silhouette, central pulmonary vessels and mediastinum are normal in size and are grossly unremarkable.   visualized osseous structures are grossly unremarkable.  Impression: No acute chest disease is identified.    Electronically signed by: Radu Edwards  Date:    01/22/2024  Time:    10:10         Medication List        START taking these medications      ciprofloxacin HCl 500 MG tablet  Commonly known as: CIPRO  Take 1 tablet (500 mg total) by mouth every 12 (twelve) hours. for 14 days     gabapentin 300 MG capsule  Commonly known as: NEURONTIN  Take 2 capsules (600 mg total) by mouth 3 (three) times daily.     metroNIDAZOLE 500 MG tablet  Commonly known as: FLAGYL  Take 1 tablet (500 mg total) by mouth every 8 (eight) hours. for 14 days            CONTINUE taking these medications      albuterol 90 mcg/actuation inhaler  Commonly known as: PROVENTIL/VENTOLIN HFA     amlodipine-benazepril 10-20mg 10-20 mg per capsule  Commonly known as: LOTREL  Take 1 capsule by mouth once daily.     aspirin 81 MG Chew     atorvastatin 20 MG tablet  Commonly known as: LIPITOR  Take 1 tablet (20 mg total) by mouth once daily.     ergocalciferol 50,000 unit Cap  Commonly known as: ERGOCALCIFEROL  Take 1 capsule (50,000 Units total) by mouth every 7 days.     fenofibrate 145 MG tablet  Commonly known as: TRICOR  See Instructions, TAKE 1 TABLET EVERY DAY, # 90 tab(s), 3 Refill(s), Pharmacy: [facility], 168, cm, Height/Length Dosing, 11/11/21 9:32:00 CST, 73.75, kg, Weight Dosing, 11/11/21  9:32:00 CST Strength: 145 mg     fluticasone propionate 50 mcg/actuation nasal spray  Commonly known as: FLONASE  2 sprays (100 mcg total) by Each Nostril route once daily.     furosemide 40 MG tablet  Commonly known as: LASIX  Take 1 tablet (40 mg total) by mouth once daily.     glimepiride 2 MG tablet  Commonly known as: AMARYL  Take 1 tablet (2 mg total) by mouth once daily.     metoprolol succinate 25 MG 24 hr tablet  Commonly known as: TOPROL-XL  Take 1 tablet (25 mg total) by mouth once daily.     ondansetron 4 MG tablet  Commonly known as: ZOFRAN  Take 1 tablet (4 mg total) by mouth every 8 (eight) hours as needed for Nausea.     pantoprazole 40 MG tablet  Commonly known as: PROTONIX  Take 1 tablet (40 mg total) by mouth 2 (two) times daily.     polyethylene glycol 17 gram Pwpk  Commonly known as: GLYCOLAX  Take 17 g by mouth 2 (two) times daily as needed for Constipation ((Second Choice)).            STOP taking these medications      omeprazole 20 MG tablet  Commonly known as: PRILOSEC OTC               Where to Get Your Medications        These medications were sent to [facility] [address]  [address], [place] [facility] [address]      Phone: [phone]   ciprofloxacin HCl 500 MG tablet  gabapentin 300 MG capsule  metroNIDAZOLE 500 MG tablet          Explained in detail to the patient about the discharge plan, medications, and follow-up visits. Pt understands and agrees with the treatment plan  Discharge Disposition: Home or Self Care   Discharged Condition: stable  Diet-    Medications Per [place] med rec  Activities as tolerated    For further questions contact hospitalist office    Discharge time 33 minutes    For worsening symptoms, chest pain, shortness of breath, increased abdominal pain, high grade fever, stroke or stroke like symptoms, immediately go to the nearest Emergency Room or call 911 as soon as possible.      Yana  JACE Moraes M.D,   Date of service 01/20/2024

## 2024-01-22 NOTE — Clinical Note
Diagnosis: Necrotizing pancreatitis [655654]   Future Attending Provider: JACKIE RICE [[id number]]   Admit to which facility:: [facility] [[id number]]   Reason for IP Medical Treatment  (Clinical interventions that can only be accomplished in the IP setting? ) :: IV fluids, hemodynamic monitoring   I certify that Inpatient services for greater than or equal to 2 midnights are medically necessary:: Yes   Plans for Post-Acute care--if anticipated (pick the single best option):: A. No post acute care anticipated at this time

## 2024-01-23 LAB
ALBUMIN SERPL-MCNC: 2 G/DL (ref 3.4–4.8)
ALBUMIN/GLOB SERPL: 0.6 RATIO (ref 1.1–2)
ALP SERPL-CCNC: 109 UNIT/L (ref 40–150)
ALT SERPL-CCNC: 17 UNIT/L (ref 0–55)
AST SERPL-CCNC: 33 UNIT/L (ref 5–34)
BASOPHILS # BLD AUTO: 0.06 X10(3)/MCL
BASOPHILS NFR BLD AUTO: 0.8 %
BILIRUB SERPL-MCNC: 0.7 MG/DL
BUN SERPL-MCNC: 10.8 MG/DL (ref 8.4–25.7)
CALCIUM SERPL-MCNC: 8.1 MG/DL (ref 8.8–10)
CHLORIDE SERPL-SCNC: 101 MMOL/L (ref 98–107)
CO2 SERPL-SCNC: 29 MMOL/L (ref 23–31)
CREAT SERPL-MCNC: 1.03 MG/DL (ref 0.73–1.18)
EOSINOPHIL # BLD AUTO: 0.1 X10(3)/MCL (ref 0–0.9)
EOSINOPHIL NFR BLD AUTO: 1.3 %
ERYTHROCYTE [DISTWIDTH] IN BLOOD BY AUTOMATED COUNT: 14.7 % (ref 11.5–17)
GFR SERPLBLD CREATININE-BSD FMLA CKD-EPI: >60 MLS/MIN/1.73/M2
GLOBULIN SER-MCNC: 3.4 GM/DL (ref 2.4–3.5)
GLUCOSE SERPL-MCNC: 113 MG/DL (ref 82–115)
HCT VFR BLD AUTO: 32.6 % (ref 42–52)
HGB BLD-MCNC: 10.3 G/DL (ref 14–18)
IMM GRANULOCYTES # BLD AUTO: 0.02 X10(3)/MCL (ref 0–0.04)
IMM GRANULOCYTES NFR BLD AUTO: 0.3 %
LACTATE SERPL-SCNC: 1.2 MMOL/L (ref 0.5–2.2)
LYMPHOCYTES # BLD AUTO: 2.12 X10(3)/MCL (ref 0.6–4.6)
LYMPHOCYTES NFR BLD AUTO: 28.4 %
MCH RBC QN AUTO: 29.7 PG (ref 27–31)
MCHC RBC AUTO-ENTMCNC: 31.6 G/DL (ref 33–36)
MCV RBC AUTO: 93.9 FL (ref 80–94)
MONOCYTES # BLD AUTO: 0.85 X10(3)/MCL (ref 0.1–1.3)
MONOCYTES NFR BLD AUTO: 11.4 %
NEUTROPHILS # BLD AUTO: 4.31 X10(3)/MCL (ref 2.1–9.2)
NEUTROPHILS NFR BLD AUTO: 57.8 %
NRBC BLD AUTO-RTO: 0 %
PLATELET # BLD AUTO: 272 X10(3)/MCL (ref 130–400)
PMV BLD AUTO: 9.3 FL (ref 7.4–10.4)
POCT GLUCOSE: 105 MG/DL (ref 70–110)
POCT GLUCOSE: 110 MG/DL (ref 70–110)
POCT GLUCOSE: 121 MG/DL (ref 70–110)
POCT GLUCOSE: 149 MG/DL (ref 70–110)
POCT GLUCOSE: 207 MG/DL (ref 70–110)
POTASSIUM SERPL-SCNC: 4.1 MMOL/L (ref 3.5–5.1)
PROT SERPL-MCNC: 5.4 GM/DL (ref 5.8–7.6)
RBC # BLD AUTO: 3.47 X10(6)/MCL (ref 4.7–6.1)
SODIUM SERPL-SCNC: 135 MMOL/L (ref 136–145)
WBC # SPEC AUTO: 7.46 X10(3)/MCL (ref 4.5–11.5)

## 2024-01-23 PROCEDURE — 63600175 PHARM REV CODE 636 W HCPCS: Performed by: NURSE PRACTITIONER

## 2024-01-23 PROCEDURE — 93005 ELECTROCARDIOGRAM TRACING: CPT

## 2024-01-23 PROCEDURE — 85025 COMPLETE CBC W/AUTO DIFF WBC: CPT | Performed by: NURSE PRACTITIONER

## 2024-01-23 PROCEDURE — 93010 ELECTROCARDIOGRAM REPORT: CPT | Mod: ,,, | Performed by: INTERNAL MEDICINE

## 2024-01-23 PROCEDURE — 25000003 PHARM REV CODE 250: Performed by: NURSE PRACTITIONER

## 2024-01-23 PROCEDURE — 83605 ASSAY OF LACTIC ACID: CPT | Performed by: INTERNAL MEDICINE

## 2024-01-23 PROCEDURE — 99223 1ST HOSP IP/OBS HIGH 75: CPT | Mod: FS,,, | Performed by: GENERAL PRACTICE

## 2024-01-23 PROCEDURE — 25000003 PHARM REV CODE 250: Performed by: INTERNAL MEDICINE

## 2024-01-23 PROCEDURE — 21400001 HC TELEMETRY ROOM

## 2024-01-23 PROCEDURE — 63600175 PHARM REV CODE 636 W HCPCS: Performed by: INTERNAL MEDICINE

## 2024-01-23 PROCEDURE — 80053 COMPREHEN METABOLIC PANEL: CPT | Performed by: NURSE PRACTITIONER

## 2024-01-23 RX ORDER — ENOXAPARIN SODIUM 100 MG/ML
40 INJECTION SUBCUTANEOUS EVERY 24 HOURS
Status: DISCONTINUED | OUTPATIENT
Start: 2024-01-23 | End: 2024-01-26 | Stop reason: HOSPADM

## 2024-01-23 RX ORDER — PANTOPRAZOLE SODIUM 40 MG/1
40 TABLET, DELAYED RELEASE ORAL DAILY
Status: DISCONTINUED | OUTPATIENT
Start: 2024-01-23 | End: 2024-01-26 | Stop reason: HOSPADM

## 2024-01-23 RX ORDER — GABAPENTIN 300 MG/1
300 CAPSULE ORAL EVERY 8 HOURS
Status: DISCONTINUED | OUTPATIENT
Start: 2024-01-23 | End: 2024-01-26 | Stop reason: HOSPADM

## 2024-01-23 RX ORDER — NAPROXEN SODIUM 220 MG/1
81 TABLET, FILM COATED ORAL DAILY
Status: DISCONTINUED | OUTPATIENT
Start: 2024-01-23 | End: 2024-01-26 | Stop reason: HOSPADM

## 2024-01-23 RX ORDER — FENOFIBRATE 145 MG/1
145 TABLET, FILM COATED ORAL DAILY
Status: DISCONTINUED | OUTPATIENT
Start: 2024-01-23 | End: 2024-01-26 | Stop reason: HOSPADM

## 2024-01-23 RX ADMIN — HYDROCODONE BITARTRATE AND ACETAMINOPHEN 1 TABLET: 7.5; 325 TABLET ORAL at 07:01

## 2024-01-23 RX ADMIN — CIPROFLOXACIN HYDROCHLORIDE 500 MG: 500 TABLET, FILM COATED ORAL at 09:01

## 2024-01-23 RX ADMIN — METRONIDAZOLE 500 MG: 500 TABLET ORAL at 09:01

## 2024-01-23 RX ADMIN — METRONIDAZOLE 500 MG: 500 TABLET ORAL at 01:01

## 2024-01-23 RX ADMIN — INSULIN ASPART 2 UNITS: 100 INJECTION, SOLUTION INTRAVENOUS; SUBCUTANEOUS at 09:01

## 2024-01-23 RX ADMIN — GABAPENTIN 300 MG: 300 CAPSULE ORAL at 09:01

## 2024-01-23 RX ADMIN — FENOFIBRATE 145 MG: 145 TABLET, FILM COATED ORAL at 09:01

## 2024-01-23 RX ADMIN — PANTOPRAZOLE SODIUM 40 MG: 40 TABLET, DELAYED RELEASE ORAL at 09:01

## 2024-01-23 RX ADMIN — SODIUM CHLORIDE, POTASSIUM CHLORIDE, SODIUM LACTATE AND CALCIUM CHLORIDE: 600; 310; 30; 20 INJECTION, SOLUTION INTRAVENOUS at 09:01

## 2024-01-23 RX ADMIN — GABAPENTIN 300 MG: 300 CAPSULE ORAL at 01:01

## 2024-01-23 RX ADMIN — ENOXAPARIN SODIUM 40 MG: 100 INJECTION SUBCUTANEOUS at 06:01

## 2024-01-23 RX ADMIN — ASPIRIN 81 MG CHEWABLE TABLET 81 MG: 81 TABLET CHEWABLE at 09:01

## 2024-01-23 RX ADMIN — GABAPENTIN 300 MG: 300 CAPSULE ORAL at 05:01

## 2024-01-23 NOTE — CONSULTS
"Gastroenterology Consultation Note    Reason for Consult:  Peripancreatic fluid collection     PCP:   Snehal Arroyo MD    History of Present Illness (HPI):  This is a 68 y.o. male known to Dr. Flako Kerns from recent admission with PMH of T2DM, HTN, HLD, chronic scrotal hydrocele, vitamin D deficiency, CKD 3a, necrotizing pancreatitis 2/2 gallstones s/p ERCP 12/17/23, cholecystectomy 2022.      Patient admitted to [facility] 12/16/23 for choledocholithiasis. Initial blood cultures on 12/16/2023 had 1 of 2 anaerobic bottles positive for enterococcus facialis - treated with broad spectrum antibiotics. Our team was consulted and ERCP was performed:  ERCP 12/17/23 Dr. Kerns: lower third of main bile duct mildly dilated with a stone causing an obstruction; complete removal of choledocholithaisis accomplished with biliary sphincterotomy and balloon extraction  Post procedure patient had abd pain 2/2 pancreatitis. He slowly improved with supportive care. GI signed off 12/21/23.     GI reconsulted 12/23/23 for worsening pancreatitis and concern for necrosis around the neck of the pancreas as seen on imaging. We recommended surgery consult - they recommended repeat imaging in a week for reassessment.     CT abd/pel with IV contrast 12/29: necrotic pancreatitis with possible organizing collection anterior and superior to pancreatic neck and body measuring approximately 8 x 4 cm.   We recommended repeat CT in 2 weeks to reassess developing abscess and determine ability to drain from endoscopic perspective.      Dr. Oseguera evaluated patient 12/30/23:   "I personally reviewed the CT scan report and images from 12/27/2023 that showed no necrotizing pancreatitis involving the pancreatic neck and proximal/mid body.  There is no organized collection that requires drainage nor is there significant extrinsic compression of the stomach or small bowel."  He recommended maximizing patient's nutrition with high protein/low fat " food with 3-4 Boosts daily, ambulation, completion of antibiotics with no role for ongoing antibiotics for a pancreatitis standpoint, repeat CT abd in 4-6 weeks with outpatient follow up in GI clinic.  Patient was discharged home 01/04/24.     Pt then presented tot he  ED 01/09/24 with epigastric pain, n/v/d x3 days. He reported a fever onset day of presentation. Upon arrival he was slightly hypotensive with /56. He was febrile with Tmax 100.5F. CT abd/pel with IV: Continued organization and partial encapsulation of the acute necrotic collection at the body of the pancreas and peripancreatic soft tissues.  No internal foci of gas or hortencia changes of acute super infection by imaging.  Overall not significantly changed in size from prior.   Gi was consulted and recommended follow up with CT in 2 months.     Patient then presented to the ED 1/22 with c/o dizziness, weakness, hypotension and a fainting episode.  On arrival, VSS with H&H 11.0/34.0 without leukocytosis, Tbili wnl, ASt  AST 43, ALT 21. Lipase wnl.   Ct abd/pelvis 01/22/2024 with persistent peripancreatic inflammation, although fluid collections slightly smaller 01/14/2024 with decrease volume of ascites. New nonspecific gas within largest peripancreatic fluid collection    Since recent discharge, he has been without issues from a GI standpoint. He has been tolerating diet without issues. Denies abd pain, N/V. Has been having regular Bms without issues.       ROS:  Review of Systems   Constitutional:  Negative for chills and fever.   Respiratory:  Negative for shortness of breath, wheezing and stridor.    Gastrointestinal:  Negative for abdominal pain, blood in stool, constipation, diarrhea, heartburn, melena, nausea and vomiting.   Neurological:  Positive for weakness. Negative for seizures and loss of consciousness.   Psychiatric/Behavioral:  The patient is not nervous/anxious.        Medical History:   Past Medical History:   Diagnosis Date    DM  (diabetes mellitus)     GERD (gastroesophageal reflux disease)     HLD (hyperlipidemia)     HTN (hypertension)        Surgical History:   Past Surgical History:   Procedure Laterality Date    APPENDECTOMY      CHOLECYSTECTOMY      ERCP N/A 12/17/2023    Procedure: ERCP (ENDOSCOPIC RETROGRADE CHOLANGIOPANCREATOGRAPHY);  Surgeon: Flako Kerns MD;  Location: [facility];  Service: Gastroenterology;  Laterality: N/A;    ERCP W/ SPHICTEROTOMY  12/17/2023    Procedure: ERCP, WITH SPHINCTEROTOMY;  Surgeon: Flako Kerns MD;  Location: [facility] OR;  Service: Gastroenterology;;    ERCP, WITH CALCULUS REMOVAL  12/17/2023    Procedure: ERCP, WITH CALCULUS REMOVAL;  Surgeon: Flako Kerns MD;  Location: [facility] OR;  Service: Gastroenterology;;       Family History:   No family history on file..     Social History:   Social History     Tobacco Use    Smoking status: Every Day     Current packs/day: 1.00     Types: Cigarettes    Smokeless tobacco: Never   Substance Use Topics    Alcohol use: Never       Allergies:  Review of patient's allergies indicates:  No Known Allergies    Medications Prior to Admission   Medication Sig Dispense Refill Last Dose    amlodipine-benazepril 10-20mg (LOTREL) 10-20 mg per capsule Take 1 capsule by mouth once daily. 90 capsule 3 1/22/2024    aspirin 81 MG Chew Take 81 mg by mouth once daily.   1/22/2024    atorvastatin (LIPITOR) 20 MG tablet Take 1 tablet (20 mg total) by mouth once daily. 90 tablet 3 1/22/2024    ciprofloxacin HCl (CIPRO) 500 MG tablet Take 1 tablet (500 mg total) by mouth every 12 (twelve) hours. for 14 days 28 tablet 0 1/22/2024    ergocalciferol (ERGOCALCIFEROL) 50,000 unit Cap Take 1 capsule (50,000 Units total) by mouth every 7 days. 30 capsule 0 1/19/2024    fenofibrate (TRICOR) 145 MG tablet See Instructions, TAKE 1 TABLET EVERY DAY, # 90 tab(s), 3 Refill(s), Pharmacy: [facility], 168, cm, Height/Length Dosing, 11/11/21 9:32:00 CST,  "73.75, kg, Weight Dosing, 11/11/21 9:32:00 CST Strength: 145 mg 90 tablet 3 1/22/2024    furosemide (LASIX) 40 MG tablet Take 1 tablet (40 mg total) by mouth once daily. 30 tablet 1 1/22/2024    gabapentin (NEURONTIN) 300 MG capsule Take 2 capsules (600 mg total) by mouth 3 (three) times daily. 182 capsule 0 1/22/2024    glimepiride (AMARYL) 2 MG tablet Take 1 tablet (2 mg total) by mouth once daily. 90 tablet 3 1/22/2024    metoprolol succinate (TOPROL-XL) 25 MG 24 hr tablet Take 1 tablet (25 mg total) by mouth once daily. 90 tablet 3 1/22/2024    metroNIDAZOLE (FLAGYL) 500 MG tablet Take 1 tablet (500 mg total) by mouth every 8 (eight) hours. for 14 days 42 tablet 0 1/22/2024    pantoprazole (PROTONIX) 40 MG tablet Take 1 tablet (40 mg total) by mouth 2 (two) times daily. 60 tablet 3 1/22/2024    albuterol (PROVENTIL/VENTOLIN HFA) 90 mcg/actuation inhaler Inhale 2 puffs into the lungs every 4 (four) hours as needed for Wheezing. Rescue       fluticasone propionate (FLONASE) 50 mcg/actuation nasal spray 2 sprays (100 mcg total) by Each Nostril route once daily. 16 g 11     ondansetron (ZOFRAN) 4 MG tablet Take 1 tablet (4 mg total) by mouth every 8 (eight) hours as needed for Nausea. 12 tablet 0 Unknown    polyethylene glycol (GLYCOLAX) 17 gram PwPk Take 17 g by mouth 2 (two) times daily as needed for Constipation ((Second Choice)).  0          Objective Findings:    Vital Signs:  /71   Pulse 100   Temp 98.2 °F (36.8 °C) (Oral)   Resp 20   Ht 5' 6" (1.676 m)   Wt 62.4 kg (137 lb 8 oz)   SpO2 96%   BMI 22.19 kg/m²   Body mass index is 22.19 kg/m².    Physical Exam:  Physical Exam  Constitutional:       General: He is not in acute distress.  HENT:      Head: Normocephalic and atraumatic.      Nose: Nose normal.   Eyes:      General: No scleral icterus.     Extraocular Movements: Extraocular movements intact.   Cardiovascular:      Pulses: Normal pulses.   Pulmonary:      Effort: No respiratory distress. "      Breath sounds: Normal breath sounds.   Abdominal:      General: There is no distension.      Palpations: Abdomen is soft.      Tenderness: There is no abdominal tenderness. There is no guarding.   Skin:     General: Skin is warm and dry.      Coloration: Skin is not jaundiced.   Neurological:      General: No focal deficit present.      Mental Status: He is alert and oriented to person, place, and time. Mental status is at baseline.   Psychiatric:         Mood and Affect: Mood normal.         Behavior: Behavior normal.         Thought Content: Thought content normal.         Judgment: Judgment normal.         Labs:  Recent Results (from the past 24 hour(s))   POCT glucose    Collection Time: 01/22/24 11:28 PM   Result Value Ref Range    POCT Glucose 149 (H) 70 - 110 mg/dL   Comprehensive Metabolic Panel    Collection Time: 01/23/24  4:22 AM   Result Value Ref Range    Sodium Level 135 (L) 136 - 145 mmol/L    Potassium Level 4.1 3.5 - 5.1 mmol/L    Chloride 101 98 - 107 mmol/L    Carbon Dioxide 29 23 - 31 mmol/L    Glucose Level 113 82 - 115 mg/dL    Blood Urea Nitrogen 10.8 8.4 - 25.7 mg/dL    Creatinine 1.03 0.73 - 1.18 mg/dL    Calcium Level Total 8.1 (L) 8.8 - 10.0 mg/dL    Protein Total 5.4 (L) 5.8 - 7.6 gm/dL    Albumin Level 2.0 (L) 3.4 - 4.8 g/dL    Globulin 3.4 2.4 - 3.5 gm/dL    Albumin/Globulin Ratio 0.6 (L) 1.1 - 2.0 ratio    Bilirubin Total 0.7 <=1.5 mg/dL    Alkaline Phosphatase 109 40 - 150 unit/L    Alanine Aminotransferase 17 0 - 55 unit/L    Aspartate Aminotransferase 33 5 - 34 unit/L    eGFR >60 mls/min/1.73/m2   CBC with Differential    Collection Time: 01/23/24  4:22 AM   Result Value Ref Range    WBC 7.46 4.50 - 11.50 x10(3)/mcL    RBC 3.47 (L) 4.70 - 6.10 x10(6)/mcL    Hgb 10.3 (L) 14.0 - 18.0 g/dL    Hct 32.6 (L) 42.0 - 52.0 %    MCV 93.9 80.0 - 94.0 fL    MCH 29.7 27.0 - 31.0 pg    MCHC 31.6 (L) 33.0 - 36.0 g/dL    RDW 14.7 11.5 - 17.0 %    Platelet 272 130 - 400 x10(3)/mcL    MPV 9.3  7.4 - 10.4 fL    Neut % 57.8 %    Lymph % 28.4 %    Mono % 11.4 %    Eos % 1.3 %    Basophil % 0.8 %    Lymph # 2.12 0.6 - 4.6 x10(3)/mcL    Neut # 4.31 2.1 - 9.2 x10(3)/mcL    Mono # 0.85 0.1 - 1.3 x10(3)/mcL    Eos # 0.10 0 - 0.9 x10(3)/mcL    Baso # 0.06 <=0.2 x10(3)/mcL    IG# 0.02 0 - 0.04 x10(3)/mcL    IG% 0.3 %    NRBC% 0.0 %   Lactic Acid, Plasma    Collection Time: 01/23/24  4:22 AM   Result Value Ref Range    Lactic Acid Level 1.2 0.5 - 2.2 mmol/L       CT Abdomen Pelvis With IV Contrast NO Oral Contrast   Final Result      Persistent peripancreatic inflammation, although peripancreatic fluid collections are slightly smaller since 01/14/2023 with decreased volume of ascites.  New nonspecific gas within the largest peripancreatic fluid collection.         Electronically signed by: Gerber Silva   Date:    01/22/2024   Time:    14:47      X-Ray Chest 1 View   Final Result      No acute chest disease is identified.         Electronically signed by: Radu Edwards   Date:    01/22/2024   Time:    10:10            Assessment/Plan:  this is a 68 y.o. male known to Dr. Flako Kerns from recent admission with PMH of T2DM, HTN, HLD, chronic scrotal hydrocele, vitamin D deficiency, CKD 3a, necrotizing pancreatitis 2/2 gallstones s/p ERCP 12/17/23, cholecystectomy 2022.      Patient admitted to [facility] 12/16/23 for choledocholithiasis requiring ERCP for stone extraction. Post procedure his hospitalization was complicated by necrotizing pancreatitis with possible collection near pancreas. No endoscopic intervention indicated at that time. Patient discharged home 01/04/24.     He presented to the ED 01/09/24 with epigastric pain, n/v/d x3 days. He reported a fever onset day of presentation. GI consulted for pancreatitis with pseudocyst- recommended outpatient follow up with repeat imaging in 4-6 weeks. Patient was discharged 1/20/24.    He presented to the ED 1/22 with c/o weakness and dizziness.   GI consulted  for      Pancreatitis, acute on chronic  - supportive care: IVF, pain control  - ADAT  - encourage safe ambulation     Necrotic collection at body of pancreas  - CT abd/pel with IV contrast 12/29: necrotic pancreatitis with possible organizing collection anterior and superior to pancreatic neck and body measuring approximately 8 x 4 cm  - Dr. Oseguera evaluated patient at that time - no organized collection that required drainage; he recommended maximizing nutrition with increased protein intake and repeat CT abd in 4-6 weeks with follow up in GI clinic. Task has been sent to our office with clinic appt 4/4/2024  - CT abd/pel with IV 01/09/24: Continued organization and partial encapsulation of the acute necrotic collection at the body of the pancreas and peripancreatic soft tissues.  No internal foci of gas or hortencia changes of acute super infection by imaging.  Overall not significantly changed in size from prior.   - CT abd/pel 01/14/24: worsening pancreatitis, large pseudocyst in body and head of pancreas as well as some pancreatic necrosis; fluid collections and pseudocysts are also seen just inferior to pancreas which are stable since prior exam; worsening ascites  - most recent Ct abd/pelvis 01/22/2024 with persistent peripancreatic inflammation, although fluid collections slightly smaller 01/14/2024 with decrease volume of ascites. New nonspecific gas within largest peripancreatic fluid collection  - prefer to allow maturation of pseudocyst prior to drainage - this typically takes 6-8 weeks from onset  - recommend high protein intake  - encourage safe ambulation    - Will further discuss with Dr. Oseguera with recs to follow  - supp care      Thank you for allowing us to participate in the care of Franklin Macias.    Kalyn Willard PA-C  Gastroenterology  [facility]

## 2024-01-23 NOTE — NURSING
Nurses Note -- 4 Eyes      1/23/2024   10:05 AM      Skin assessed during: Admit      [x] No Altered Skin Integrity Present    []Prevention Measures Documented      [] Yes- Altered Skin Integrity Present or Discovered   [] LDA Added if Not in Epic (Describe Wound)   [] New Altered Skin Integrity was Present on Admit and Documented in LDA   [] Wound Image Taken    Wound Care Consulted? No    Attending Nurse:  Rekha Harrell RN/Staff Member:  Anita Escudero LPN

## 2024-01-23 NOTE — H&P
[facility] Hospital Medicine - H&P Note    Patient Name: Franklin Macias  : 1955  MRN: [id number]  PCP: Snehal Arroyo MD  Admitting Physician: JEFF Cole MD  Admission Class: IP- Inpatient   Code status: Full    Allergies   Patient has no known allergies.    Chief Complaint   Feeling disoriented    History of Present Illness   68 yr old male whose history includes HTN, DM, CKD 3A and gallstone pancreatitis. Presented with c/o feeling disoriented which started yesterday. B/P at the time of symptoms was 90/50 according to patient. At the time of my exam he is AAO x 3. Denies any fever, syncope, abdominal pain, vomiting or diarrhea. Since his d/c home on  he has been feeling well overall. Appetite has been good.     Prior to today he was discharged from here on  following a 19 day admission during which time he was treated for acute necrotizing pancreatitis secondary to choledocholithiasis, severe sepsis (only one of two blood cultures grew E. Faecalis and repeat blood cultures negative), SHA on CKD, and acute hypoxemic respiratory failure. Gallbladder removed approximately 1.5 years ago. Underwent ERCP  with stone and sludge removal and sphincterotomy. Choledocholithiasis was resolved.  CT abdomen repeated  which showed  worsened appearance pancreatitis with possible collection developing and small volume ascites, small bilateral pleural effusions. Responded favorably to diuresis.   Again admitted  -  for persistent abdominal pain. CT of the abdomen and pelvis showed similar findings with persistent fluid collection and necrotizing pancreatitis.  He was initiated on meropenem empirically.  Blood cultures and stool studies have remained negative. Seen by ID and discharged home on PO Cipro and Flagyl with plans to continue for 2 weeks to complete 4 weeks of therapy.     VS on arrival: T 97.2, P 98, R 16, B/P 94/60, Sats 96% on room air. Initial labs: WBC  8.54, creatinine 1.19, glucose 145, Mg 1.5, AST 43, lipase 57, lactic acid 2.7. CT abdomen/pelvis with contrast shows persistent peripancreatic inflammation with improved peripancreatic fluid collections and decreased ascites compared to CT on 1/14/23. Chest x-ray negative for acute findings.     ROS   Except as documented, all other systems reviewed and negative     Past Medical History   Hypertension  Diabetes mellitus  Dyslipidemia  GERD  Gallstone pancreatitis  HFpEF, diastolic dysfunction grade I, LVEF 55-60% - ECHO 12/20/23   Chronic scrotal hydrocele  CKD 3A  Vitamin D deficiency    Past Surgical History   Appendectomy  Cholecystectomy - 2022  ERCP with CBD stone extraction and sphincterotomy - 12/17/23     Social History   Smokes a PPD for last 40 years. Denies alcohol or illicit drug use.     Family History   Reviewed and negative    Home Medications     Prior to Admission medications    Medication Sig Start Date End Date Taking? Authorizing Provider   amlodipine-benazepril 10-20mg (LOTREL) 10-20 mg per capsule Take 1 capsule by mouth once daily. 10/19/23  Yes Snehal Arroyo MD   aspirin 81 MG Chew Take 81 mg by mouth once daily.   Yes Provider, Historical   atorvastatin (LIPITOR) 20 MG tablet Take 1 tablet (20 mg total) by mouth once daily. 10/19/23  Yes Snehal Arroyo MD   ciprofloxacin HCl (CIPRO) 500 MG tablet Take 1 tablet (500 mg total) by mouth every 12 (twelve) hours. for 14 days 1/20/24 2/3/24 Yes Yana Gomez, DO   ergocalciferol (ERGOCALCIFEROL) 50,000 unit Cap Take 1 capsule (50,000 Units total) by mouth every 7 days. 9/25/23  Yes Snehal Arroyo MD   fenofibrate (TRICOR) 145 MG tablet See Instructions, TAKE 1 TABLET EVERY DAY, # 90 tab(s), 3 Refill(s), Pharmacy: [facility], 168, cm, Height/Length Dosing, 11/11/21 9:32:00 CST, 73.75, kg, Weight Dosing, 11/11/21 9:32:00 CST Strength: 145 mg 10/10/23  Yes Snehal Arroyo MD   furosemide (LASIX) 40 MG tablet Take 1  tablet (40 mg total) by mouth once daily. 1/4/24  Yes Felipe Casas MD   gabapentin (NEURONTIN) 300 MG capsule Take 2 capsules (600 mg total) by mouth 3 (three) times daily. 1/20/24  Yes Yana Gomez DO   glimepiride (AMARYL) 2 MG tablet Take 1 tablet (2 mg total) by mouth once daily. 10/19/23  Yes Snehal Arroyo MD   metoprolol succinate (TOPROL-XL) 25 MG 24 hr tablet Take 1 tablet (25 mg total) by mouth once daily. 10/19/23  Yes Snehal Arroyo MD   metroNIDAZOLE (FLAGYL) 500 MG tablet Take 1 tablet (500 mg total) by mouth every 8 (eight) hours. for 14 days 1/20/24 2/3/24 Yes Yana Gomez DO   pantoprazole (PROTONIX) 40 MG tablet Take 1 tablet (40 mg total) by mouth 2 (two) times daily. 1/4/24  Yes Felipe Casas MD   albuterol (PROVENTIL/VENTOLIN HFA) 90 mcg/actuation inhaler Inhale 2 puffs into the lungs every 4 (four) hours as needed for Wheezing. Rescue    Provider, Historical   fluticasone propionate (FLONASE) 50 mcg/actuation nasal spray 2 sprays (100 mcg total) by Each Nostril route once daily. 8/8/23   Snehal Arroyo MD   ondansetron (ZOFRAN) 4 MG tablet Take 1 tablet (4 mg total) by mouth every 8 (eight) hours as needed for Nausea. 1/4/24   Felipe Casas MD   polyethylene glycol (GLYCOLAX) 17 gram PwPk Take 17 g by mouth 2 (two) times daily as needed for Constipation ((Second Choice)). 1/4/24   Felipe Casas MD        Physical Exam   Vital Signs  Temp:  [97.2 °F (36.2 °C)]   Pulse:  []   Resp:  [13-22]   BP: ()/(53-76)   SpO2:  [95 %-100 %]    General: Appears comfortable  HEENT: NC/AT  Neck:  No JVD  Chest: CTABL  CVS: Regular rhythm. Normal S1/S2.  Abdomen: nondistended, normoactive BS, soft and non-tender.  MSK: No obvious deformity or joint swelling  Skin: Warm and dry  Neuro: AAOx3, no focal neurological deficit  Psych: Cooperative    Labs     Recent Labs     01/20/24  0343 01/22/24  1016   WBC 5.26 8.65   RBC 3.77* 3.65*   HGB 11.2* 11.0*   HCT 35.2* 34.0*   MCV 93.4 93.2  "  MCH 29.7 30.1   MCHC 31.8* 32.4*   RDW 14.6 14.7    344     No results for input(s): "PROTIME", "INR", "PTT", "D-DIMER", "FERRITIN", "IRON", "TRANS", "TIBC", "LABIRON", "[id number]", "FOLATE", "LDH", "HAPTOGLOBIN", "RETICCNTAUTO", "RETABS", "PERIPSMEAREV" in the last 72 hours.   Recent Labs     01/20/24  0343 01/22/24  1149    137   K 4.9 3.9   CHLORIDE 100 103   CO2 32* 28   BUN 19.6 18.1   CREATININE 0.87 1.19*   EGFRNORACEVR >60 >60   GLUCOSE 106 145*   CALCIUM 9.2 8.8   MG 2.00 1.50*   PHOS 2.7  --    ALBUMIN  --  2.1*   GLOBULIN  --  3.8*   ALKPHOS  --  121   ALT  --  21   AST  --  43*   BILITOT  --  0.5   LIPASE  --  57     Recent Labs     01/22/24  1016 01/22/24  1149   LACTIC 2.7* 2.7*     Recent Labs     01/22/24  1016   TROPONINI <0.010        Microbiology Results (last 7 days)       Procedure Component Value Units Date/Time    Blood culture #2 **CANNOT BE ORDERED STAT** [[id number]] Collected: 01/22/24 1030    Order Status: Resulted Specimen: Blood Updated: 01/22/24 1039    Blood culture #1 **CANNOT BE ORDERED STAT** [[id number]] Collected: 01/22/24 1016    Order Status: Resulted Specimen: Blood from Antecubital, Right Updated: 01/22/24 1039           Imaging   CT Abdomen Pelvis With IV Contrast NO Oral Contrast  Narrative: EXAMINATION:  CT ABDOMEN PELVIS WITH IV CONTRAST    CLINICAL HISTORY:  Sepsis;    TECHNIQUE:  CT imaging of the abdomen and pelvis after intravenous contrast. Dose length product 771 mGycm. Automatic exposure control, adjustment of mA/kV or iterative reconstruction technique used to limit radiation dose.    COMPARISON:  CT 01/14/2023    FINDINGS:  Continued peripancreatic inflammation.  The largest peripancreatic fluid collection now contains internal gas, but is slightly smaller during the interval.  Few other fluid collections have also decreased in size.    No biliary dilatation.  Stable liver and spleen.  Normal adrenal glands.  No hydronephrosis.  Some mass effect " on the duodenum from the pancreatic changes, but no significant gastric dilatation.  Normal bladder.  Right-sided hydrocele.  No consolidation or significant pleural fluid in the lung bases.  No acute osseous findings.  Impression: Persistent peripancreatic inflammation, although peripancreatic fluid collections are slightly smaller since 01/14/2023 with decreased volume of ascites.  New nonspecific gas within the largest peripancreatic fluid collection.    Electronically signed by: Gerber Silva  Date:    01/22/2024  Time:    14:47  X-Ray Chest 1 View  Narrative: EXAMINATION:  XR CHEST 1 VIEW    CLINICAL HISTORY:  , Dizziness and giddiness.    COMPARISON:  January 14, 2024    FINDINGS:  No alveolar consolidation, effusion, or pneumothorax is seen.   The thoracic aorta is normal  cardiac silhouette, central pulmonary vessels and mediastinum are normal in size and are grossly unremarkable.   visualized osseous structures are grossly unremarkable.  Impression: No acute chest disease is identified.    Electronically signed by: Radu Edwards  Date:    01/22/2024  Time:    10:10    ECHO 12/20/23      Left Ventricle: The left ventricle is normal in size. Normal wall thickness. Normal wall motion. There is normal systolic function with a visually estimated ejection fraction of 55 - 60%. Grade I diastolic dysfunction.    Right Ventricle: Normal right ventricular cavity size. Systolic function is normal.    Aortic Valve: The aortic valve is a trileaflet valve.    Pericardium: There is a trivial effusion. No indication of cardiac tamponade.    Technically difficult study due to lung interference.  Assessment & Plan     Orthostatic hypotension   - 125/76 98HR lying, 102/66 101HR sitting  - LR at 75 ml/hr  - hold B/P meds for now    Hypomagnesemia - replaced   - labs in AM    Chronic necrotizing pancreatitis - resolving   - CT abdomen/pelvis shows improvement  - continue PO Cipro and Flagyl    Diabetes mellitus, type 2 -  well controlled  - Hba1c 6.2 on 12/17/23  - CBGs AC and HS with SS  - home meds resumed    CKD 3A - stable  - avoid nephrotoxic meds    HFpEF, diastolic dysfunction grade I, LVEF 55-60% - ECHO 12/20/23 - stable    PMH: GERD, dyslipidemia    VTE Prophylaxis: SCDs     I, Karla Romano, OLGAP-BC have discussed this patients case with Dr. Cole who agrees with the diagnosis and treatment plan.      ________________________________________________________________________  I, Dr. Mukund Cole assumed care of this patient.  For the patient encounter, I performed the substantive portion of the visit, I reviewed the NPPA documentation, treatment plan, and medical decision making.  I had face to face time with this patient.  I have personally reviewed the labs and test results that are presently available. I have reviewed or attempted to review medical records based upon their availability. If patient was admitted under observational status it is with my approval.      Patient is a 68-year-old male with recent prolonged admission for gallstone pancreatitis (status post cholecystectomy 1.5 years ago but had a CBD stone requiring removal on ERCP 12/17/2023, complicated by worsening pancreatitis and developed of a fluid collection and evidence of necrotizing pancreatitis.  He was still on a course of ciprofloxacin/Flagyl per ID to complete a 4 week course end 02/03/2023, these antibiotics will be continued here.  GI was planning for endoscopic drainage a few weeks after discharge.    CT shows persistent peripancreatic inflammation, perihepatic fluid collection is now smaller though is nonspecific gas within the fluid collection.    He was demonstrated labile blood pressure here, also with orthostasis suggest he has volume depletion.  The concern would be that he has infectious process/bacteremia however systemically he was not showing such signs of an infectious process at this time.  ID has been reconsulted.    Time seen:  11PM 1/22/24  Mukund Cole MD

## 2024-01-23 NOTE — NURSING
Nurses Note -- 4 Eyes      1/23/2024   6:01 AM      Skin assessed during: Admit      [] No Altered Skin Integrity Present    []Prevention Measures Documented      [] Yes- Altered Skin Integrity Present or Discovered   [] LDA Added if Not in Epic (Describe Wound)   [] New Altered Skin Integrity was Present on Admit and Documented in LDA   [] Wound Image Taken    Wound Care Consulted? No    Attending Nurse:  Sandee Harrell RN/Staff Member:  vinh ARANA

## 2024-01-23 NOTE — CONSULTS
[facility]  Infectious Diseases Consult        ASSESSMENT & PLAN:     He is a 68-year-old male with a past medical history of diabetes mellitus, hypertension, CHF, and more recently necrotizing pancreatitis secondary to gallstone choledocholithiasis who we had evaluated during his last hospitalization at the beginning of this month.  Please see our last note from 01/19 for full history and course of prior hospitalizations, however patient was discharged on 1/20 on oral ciprofloxacin and Flagyl with plan to continue through 2/2.  GI was planning a possible endoscopy in a few weeks.  He presented back on 01/22 secondary to lightheadedness and weakness as well as patient's attention.  Noted to be hypotensive on admit and with an SHA.  Renal function and blood pressure improved with IV fluids.  Current CT with IV contrast showed persistent peripancreatic inflammation although fluid collections are slightly smaller, however new nonspecific gas within the largest peripancreatic fluid collection is noted.  He remains on ciprofloxacin and Flagyl.  Feeling much better overall, no significant complaints.  Wife is at bedside.  She does report she has recently spread out his daily medications throughout the day secondary to a large amount of oral medications in the morning, this includes antihypertensives.  Medications currently on hold, blood pressures now normalized.  We have been consulted for assistance with antimicrobials.    Hypotension, suspect noninfectious - med related vs SHA s/t IVVD vs combination  SHA, resolved  Necrotizing pancreatitis, fluid collections  Recent Enterococcus bacteremia s/t above, s/p treatment / resolution  Recent gallstone pancreatitis, s/p ERCP and sphincterotomy  H/o CHF / DM2 / HTN    PLAN:  Consult GI for input, plans.  CT w/improved size of fluid collections although new gas in largest.    Suspect hypotension unlikely s/t sepsis - suspect med related vs SHA from IVVD vs combination.  Change  ciprofloxacin and Flagyl to PO forms.   Discussed with patient, wife, and primary.     HISTORY OF PRESENT ILLNESS:     He is a 68-year-old male with a past medical history of diabetes mellitus, hypertension, CHF, and more recently necrotizing pancreatitis secondary to gallstone choledocholithiasis who we had evaluated during his last hospitalization at the beginning of this month.  Please see our last note from 01/19 for full history and course of prior hospitalizations, however patient was discharged on 1/20 on oral ciprofloxacin and Flagyl with plan to continue through 2/2.  GI was planning a possible endoscopy in a few weeks.  He presented back on 01/22 secondary to lightheadedness and weakness as well as patient's attention.  Noted to be hypotensive on admit and with an SHA.  Renal function and blood pressure improved with IV fluids.  Current CT with IV contrast showed persistent peripancreatic inflammation although fluid collections are slightly smaller, however new nonspecific gas within the largest peripancreatic fluid collection is noted.  He remains on ciprofloxacin and Flagyl.  Feeling much better overall, no significant complaints.  Wife is at bedside.  She does report she has recently spread out his daily medications throughout the day secondary to a large amount of oral medications in the morning, this includes antihypertensives.  Medications currently on hold, blood pressures now normalized.  We have been consulted for assistance with antimicrobials.    PAST MEDICAL HISTORY:     Past Medical History:   Diagnosis Date    DM (diabetes mellitus)     GERD (gastroesophageal reflux disease)     HLD (hyperlipidemia)     HTN (hypertension)        PAST SURGICAL HISTORY:     Past Surgical History:   Procedure Laterality Date    APPENDECTOMY      CHOLECYSTECTOMY      ERCP N/A 12/17/2023    Procedure: ERCP (ENDOSCOPIC RETROGRADE CHOLANGIOPANCREATOGRAPHY);  Surgeon: Flako Kerns MD;  Location: [facility];   "Service: Gastroenterology;  Laterality: N/A;    ERCP W/ SPHICTEROTOMY  12/17/2023    Procedure: ERCP, WITH SPHINCTEROTOMY;  Surgeon: Flako Kerns MD;  Location: [facility] OR;  Service: Gastroenterology;;    ERCP, WITH CALCULUS REMOVAL  12/17/2023    Procedure: ERCP, WITH CALCULUS REMOVAL;  Surgeon: Flako Kerns MD;  Location: [facility] OR;  Service: Gastroenterology;;       ALLERGIES:   Patient has no known allergies.    FAMILY HISTORY:   Reviewed and non-contributory     SOCIAL HISTORY:     Social History     Tobacco Use    Smoking status: Every Day     Current packs/day: 1.00     Types: Cigarettes    Smokeless tobacco: Never   Substance Use Topics    Alcohol use: Never        MEDICATIONS:   Reviewed in EMR    REVIEW OF SYSTEMS:   Except as documented, all other systems reviewed and negative     PHYSICAL EXAM:   T 98.3 °F (36.8 °C)   /73   P 93   RR 18   O2 96 %  GENERAL: Chronically ill appearing; NAD; does not appear toxic  SKIN: no rash  HEENT: sclera non-icteric; PERRL   NECK: supple; no LAD  CHEST: CTA; nonlabored, equal expansion; no adventitious BS  CARDIOVASCULAR: RRR, S1S2; no murmur   ABDOMEN:  active bowel sounds; abdomen soft, rounded, + mild mostly epigastric TTP   EXTREMITIES: no cyanosis or clubbing  NEURO: AAO x4; CN II-XII grossly intact  PSYCH: Mentation and affect appropriate    LABS AND IMAGING:     Recent Labs     01/22/24  1016 01/23/24  0422   WBC 8.65 7.46   RBC 3.65* 3.47*   HGB 11.0* 10.3*   HCT 34.0* 32.6*   MCV 93.2 93.9   MCH 30.1 29.7   MCHC 32.4* 31.6*   RDW 14.7 14.7    272     Recent Labs     01/22/24  1016 01/22/24  1149 01/23/24  0422   LACTIC 2.7* 2.7* 1.2     No results for input(s): "INR", "APTT", "D-DIMER" in the last 72 hours.  No results for input(s): "HGBA1C", "CHOL", "TRIG", "LDL", "VLDL", "HDL" in the last 72 hours.   Recent Labs     01/22/24  1149 01/23/24  0422    135*   K 3.9 4.1   CHLORIDE 103 101   CO2 28 29   BUN 18.1 10.8   CREATININE " 1.19* 1.03   GLUCOSE 145* 113   CALCIUM 8.8 8.1*   MG 1.50*  --    ALBUMIN 2.1* 2.0*   GLOBULIN 3.8* 3.4   ALKPHOS 121 109   ALT 21 17   AST 43* 33   BILITOT 0.5 0.7   LIPASE 57  --      Recent Labs     01/22/24  1016   TROPONINI <0.010          CT Abdomen Pelvis With IV Contrast NO Oral Contrast  Narrative: EXAMINATION:  CT ABDOMEN PELVIS WITH IV CONTRAST    CLINICAL HISTORY:  Sepsis;    TECHNIQUE:  CT imaging of the abdomen and pelvis after intravenous contrast. Dose length product 771 mGycm. Automatic exposure control, adjustment of mA/kV or iterative reconstruction technique used to limit radiation dose.    COMPARISON:  CT 01/14/2023    FINDINGS:  Continued peripancreatic inflammation.  The largest peripancreatic fluid collection now contains internal gas, but is slightly smaller during the interval.  Few other fluid collections have also decreased in size.    No biliary dilatation.  Stable liver and spleen.  Normal adrenal glands.  No hydronephrosis.  Some mass effect on the duodenum from the pancreatic changes, but no significant gastric dilatation.  Normal bladder.  Right-sided hydrocele.  No consolidation or significant pleural fluid in the lung bases.  No acute osseous findings.  Impression: Persistent peripancreatic inflammation, although peripancreatic fluid collections are slightly smaller since 01/14/2023 with decreased volume of ascites.  New nonspecific gas within the largest peripancreatic fluid collection.    Electronically signed by: Gerber Silva  Date:    01/22/2024  Time:    14:47  X-Ray Chest 1 View  Narrative: EXAMINATION:  XR CHEST 1 VIEW    CLINICAL HISTORY:  , Dizziness and giddiness.    COMPARISON:  January 14, 2024    FINDINGS:  No alveolar consolidation, effusion, or pneumothorax is seen.   The thoracic aorta is normal  cardiac silhouette, central pulmonary vessels and mediastinum are normal in size and are grossly unremarkable.   visualized osseous structures are grossly  unremarkable.  Impression: No acute chest disease is identified.    Electronically signed by: Radu Edwards  Date:    01/22/2024  Time:    10:10       MALI Kelly  Infectious Diseases

## 2024-01-23 NOTE — PROGRESS NOTES
Inpatient Nutrition Evaluation    Admit Date: 1/22/2024   Total duration of encounter: 1 day   Patient Age: 68 y.o.    Nutrition Recommendation/Prescription     -Continue Diabetic Diet as tolerated.   -Monitor wt, labs, and intake.     Nutrition Assessment     Chart Review    Reason Seen: continuous nutrition monitoring    Malnutrition Screening Tool Results   Have you recently lost weight without trying?: Unsure  Have you been eating poorly because of a decreased appetite?: Yes   MST Score: 3   Diagnosis:  Orthostatic hypotension   Hypomagnesemia   Lactic acidosis  Chronic necrotizing pancreatitis   Gallstone pancreatitis had a CBD stone requiring removal in December of 2023  Diabetes mellitus type 2  CKD 3A   Heart failure with preserved EF with diastolic dysfunction grade 1   GERD   Hyperlipidemia    Relevant Medical History: HTN, DM, CKD 3A and gallstone pancreatitis     Scheduled Medications:  aspirin, 81 mg, Daily  ciprofloxacin HCl, 500 mg, Q12H  enoxparin, 40 mg, Q24H (prophylaxis, 1700)  fenofibrate, 145 mg, Daily  gabapentin, 300 mg, Q8H  metroNIDAZOLE, 500 mg, Q8H  pantoprazole, 40 mg, Daily    Continuous Infusions:  lactated ringers, Last Rate: 75 mL/hr at 01/22/24 1832    PRN Medications: dextrose 10%, dextrose 10%, glucagon (human recombinant), glucose, glucose, HYDROcodone-acetaminophen, insulin aspart U-100    Recent Labs   Lab 01/17/24  0308 01/18/24  0435 01/20/24  0343 01/22/24  1016 01/22/24  1149 01/23/24  0422   * 133* 137  --  137 135*   K 4.3 4.4 4.9  --  3.9 4.1   CALCIUM 8.6* 8.5* 9.2  --  8.8 8.1*   PHOS  --  2.7 2.7  --   --   --    MG  --  2.00 2.00  --  1.50*  --    CHLORIDE 96* 98 100  --  103 101   CO2 26 31 32*  --  28 29   BUN 16.2 17.6 19.6  --  18.1 10.8   CREATININE 0.75 0.81 0.87  --  1.19* 1.03   EGFRNORACEVR >60 >60 >60  --  >60 >60   GLUCOSE 124* 106 106  --  145* 113   BILITOT 0.9  --   --   --  0.5 0.7   ALKPHOS 205*  --   --   --  121 109   ALT 29  --   --   --  21  "17   AST 54*  --   --   --  43* 33   ALBUMIN 1.8*  --   --   --  2.1* 2.0*   LIPASE  --   --   --   --  57  --    WBC 8.63  --  5.26 8.65  --  7.46   HGB 11.3*  --  11.2* 11.0*  --  10.3*   HCT 33.9*  --  35.2* 34.0*  --  32.6*     Nutrition Orders:  Diet diabetic      Appetite/Oral Intake: good/% of meals  Factors Affecting Nutritional Intake: none identified  Food/Mormonism/Cultural Preferences: none reported  Food Allergies: no known food allergies  Last Bowel Movement: 01/21/24  Wound(s):  intact     Comments    1/23/24: Pt reports good appetite/intake and usual good intake; pt denies n/v and reports a usual wt of ~133 lbs.     Anthropometrics    Height: 5' 6" (167.6 cm), Height Method: Stated  Last Weight: 62.4 kg (137 lb 8 oz) (01/22/24 2119), Weight Method: Bed Scale  BMI (Calculated): 22.2  BMI Classification: normal (BMI 18.5-24.9)        Ideal Body Weight (IBW), Male: 142 lb     % Ideal Body Weight, Male (lb): 96.83 %                          Usual Weight Provided By: patient    Wt Readings from Last 5 Encounters:   01/22/24 62.4 kg (137 lb 8 oz)   01/14/24 59 kg (130 lb)   01/04/24 61.5 kg (135 lb 9.3 oz)   08/08/23 68.1 kg (150 lb 3.2 oz)   08/04/23 72.6 kg (160 lb)     Weight Change(s) Since Admission:   Wt Readings from Last 1 Encounters:   01/22/24 2119 62.4 kg (137 lb 8 oz)   01/22/24 0951 60.3 kg (133 lb)   Admit Weight: 60.3 kg (133 lb) (01/22/24 0951), Weight Method: Stated    Patient Education     Not applicable.    Nutrition Goals & Monitoring     Dietitian will monitor: energy intake and weight    Nutrition Risk/Follow-Up: low (follow-up in 5-7 days)  Patients assigned 'low nutrition risk' status do not qualify for a full nutritional assessment but will be monitored and re-evaluated in a 5-7 day time period. Please consult if re-evaluation needed sooner.   "

## 2024-01-23 NOTE — PROGRESS NOTES
[facility] Hospital Medicine Progress Note        Chief Complaint: Inpatient Follow-up for     HPI: 68 yr old male whose history includes HTN, DM, CKD 3A and gallstone pancreatitis. Presented with c/o feeling disoriented which started yesterday. B/P at the time of symptoms was 90/50 according to patient. At the time of my exam he is AAO x 3. Denies any fever, syncope, abdominal pain, vomiting or diarrhea. Since his d/c home on 1/20 he has been feeling well overall. Appetite has been good.      Prior to today he was discharged from here on 1/4 following a 19 day admission during which time he was treated for acute necrotizing pancreatitis secondary to choledocholithiasis, severe sepsis (only one of two blood cultures grew E. Faecalis and repeat blood cultures negative), SHA on CKD, and acute hypoxemic respiratory failure. Gallbladder removed approximately 1.5 years ago. Underwent ERCP 12/17 with stone and sludge removal and sphincterotomy. Choledocholithiasis was resolved.  CT abdomen repeated 12/23 which showed  worsened appearance pancreatitis with possible collection developing and small volume ascites, small bilateral pleural effusions. Responded favorably to diuresis.   Again admitted 1/9 -1/20  for persistent abdominal pain. CT of the abdomen and pelvis showed similar findings with persistent fluid collection and necrotizing pancreatitis.  He was initiated on meropenem empirically.  Blood cultures and stool studies have remained negative. Seen by ID and discharged home on PO Cipro and Flagyl with plans to continue for 2 weeks to complete 4 weeks of therapy.      VS on arrival: T 97.2, P 98, R 16, B/P 94/60, Sats 96% on room air. Initial labs: WBC 8.54, creatinine 1.19, glucose 145, Mg 1.5, AST 43, lipase 57, lactic acid 2.7. CT abdomen/pelvis with contrast shows persistent peripancreatic inflammation with improved peripancreatic fluid collections and decreased ascites compared to  CT on 1/14/23. Chest x-ray negative for acute findings.     Interval Hx:   Patient seen and examined this morning still complains of some abdominal pain though on the lower side  Case was discussed with patient's nurse and  on the floor.    Objective/physical exam:  General: In no acute distress, afebrile  Chest: Clear to auscultation bilaterally  Heart: RRR, +S1, S2, no appreciable murmur  Abdomen: Soft, nontender, BS +  MSK: Warm, no lower extremity edema, no clubbing or cyanosis  Neurologic: Alert and oriented x4,   VITAL SIGNS: 24 HRS MIN & MAX LAST   Temp  Min: 97.2 °F (36.2 °C)  Max: 98.3 °F (36.8 °C) 98.2 °F (36.8 °C)   BP  Min: 77/53  Max: 153/92 123/75   Pulse  Min: 87  Max: 104  95   Resp  Min: 13  Max: 22 18   SpO2  Min: 95 %  Max: 100 % 96 %     I have reviewed the following labs:  Recent Labs   Lab 01/20/24  0343 01/22/24  1016 01/23/24  0422   WBC 5.26 8.65 7.46   RBC 3.77* 3.65* 3.47*   HGB 11.2* 11.0* 10.3*   HCT 35.2* 34.0* 32.6*   MCV 93.4 93.2 93.9   MCH 29.7 30.1 29.7   MCHC 31.8* 32.4* 31.6*   RDW 14.6 14.7 14.7    344 272   MPV 9.5 11.1* 9.3     Recent Labs   Lab 01/17/24  0308 01/18/24  0435 01/20/24  0343 01/22/24  1149 01/23/24  0422   * 133* 137 137 135*   K 4.3 4.4 4.9 3.9 4.1   CO2 26 31 32* 28 29   BUN 16.2 17.6 19.6 18.1 10.8   CREATININE 0.75 0.81 0.87 1.19* 1.03   CALCIUM 8.6* 8.5* 9.2 8.8 8.1*   MG  --  2.00 2.00 1.50*  --    ALBUMIN 1.8*  --   --  2.1* 2.0*   ALKPHOS 205*  --   --  121 109   ALT 29  --   --  21 17   AST 54*  --   --  43* 33   BILITOT 0.9  --   --  0.5 0.7     Microbiology Results (last 7 days)       Procedure Component Value Units Date/Time    Blood culture #2 **CANNOT BE ORDERED STAT** [[id number]] Collected: 01/22/24 1030    Order Status: Resulted Specimen: Blood Updated: 01/22/24 1039    Blood culture #1 **CANNOT BE ORDERED STAT** [[id number]] Collected: 01/22/24 1016    Order Status: Resulted Specimen: Blood from Antecubital, Right  Updated: 01/22/24 1039             See below for Radiology    Scheduled Med:   aspirin  81 mg Oral Daily    ciprofloxacin HCl  500 mg Oral Q12H    enoxparin  40 mg Subcutaneous Q24H (prophylaxis, 1700)    fenofibrate  145 mg Oral Daily    gabapentin  300 mg Oral Q8H    metroNIDAZOLE  500 mg Oral Q8H    pantoprazole  40 mg Oral Daily      Continuous Infusions:   lactated ringers 75 mL/hr at 01/22/24 1832      PRN Meds:  dextrose 10%, dextrose 10%, glucagon (human recombinant), glucose, glucose, HYDROcodone-acetaminophen, insulin aspart U-100     Assessment/Plan:  Orthostatic hypotension   Hypomagnesemia   Lactic acidosis  Chronic necrotizing pancreatitis   Gallstone pancreatitis had a CBD stone requiring removal in December of 2023  Diabetes mellitus type 2  CKD 3A   Heart failure with preserved EF with diastolic dysfunction grade 1   GERD   Hyperlipidemia    Continue with IV fluids for 1 more day we will repeat orthostatics again tomorrow  Infectious diseases has been consulted, blood cultures x2 have been ordered   For now will continue with p.o. antibiotics await further ID recommendations and continue with IV fluids   CT showed persistent peripancreatic inflammation perihepatic fluid collection is now smaller New nonspecific gas within the largest peripancreatic fluid collection.   Continue with LR at 75 cc an hour  VTE prophylaxis:  Lovenox    Patient condition:  Stable/Fair/Guarded/ Serious/ Critical    Anticipated discharge and Disposition:         All diagnosis and differential diagnosis have been reviewed; assessment and plan has been documented; I have personally reviewed the labs and test results that are presently available; I have reviewed the patients medication list; I have reviewed the consulting providers response and recommendations. I have reviewed or attempted to review medical records based upon their availability    All of the patient's questions have been  addressed and answered. Patient's is  agreeable to the above stated plan. I will continue to monitor closely and make adjustments to medical management as needed.  _____________________________________________________________________    Nutrition Status:    Radiology:  I have personally reviewed the following imaging and agree with the radiologist.     CT Abdomen Pelvis With IV Contrast NO Oral Contrast  Narrative: EXAMINATION:  CT ABDOMEN PELVIS WITH IV CONTRAST    CLINICAL HISTORY:  Sepsis;    TECHNIQUE:  CT imaging of the abdomen and pelvis after intravenous contrast. Dose length product 771 mGycm. Automatic exposure control, adjustment of mA/kV or iterative reconstruction technique used to limit radiation dose.    COMPARISON:  CT 01/14/2023    FINDINGS:  Continued peripancreatic inflammation.  The largest peripancreatic fluid collection now contains internal gas, but is slightly smaller during the interval.  Few other fluid collections have also decreased in size.    No biliary dilatation.  Stable liver and spleen.  Normal adrenal glands.  No hydronephrosis.  Some mass effect on the duodenum from the pancreatic changes, but no significant gastric dilatation.  Normal bladder.  Right-sided hydrocele.  No consolidation or significant pleural fluid in the lung bases.  No acute osseous findings.  Impression: Persistent peripancreatic inflammation, although peripancreatic fluid collections are slightly smaller since 01/14/2023 with decreased volume of ascites.  New nonspecific gas within the largest peripancreatic fluid collection.    Electronically signed by: Gerber Silva  Date:    01/22/2024  Time:    14:47  X-Ray Chest 1 View  Narrative: EXAMINATION:  XR CHEST 1 VIEW    CLINICAL HISTORY:  , Dizziness and giddiness.    COMPARISON:  January 14, 2024    FINDINGS:  No alveolar consolidation, effusion, or pneumothorax is seen.   The thoracic aorta is normal  cardiac silhouette, central pulmonary vessels and mediastinum are normal in size and are grossly  unremarkable.   visualized osseous structures are grossly unremarkable.  Impression: No acute chest disease is identified.    Electronically signed by: Radu Edwards  Date:    01/22/2024  Time:    10:10      Chela Sheldon MD   01/23/2024

## 2024-01-23 NOTE — PLAN OF CARE
Problem: Adult Inpatient Plan of Care  Goal: Plan of Care Review  Outcome: Ongoing, Progressing  Flowsheets (Taken 1/23/2024 7772)  Plan of Care Reviewed With: patient  Goal: Patient-Specific Goal (Individualized)  Outcome: Ongoing, Progressing  Goal: Absence of Hospital-Acquired Illness or Injury  Outcome: Ongoing, Progressing  Goal: Optimal Comfort and Wellbeing  Outcome: Ongoing, Progressing     Problem: Diabetes Comorbidity  Goal: Blood Glucose Level Within Targeted Range  Outcome: Ongoing, Progressing

## 2024-01-24 LAB
ANION GAP SERPL CALC-SCNC: 9 MEQ/L
BASOPHILS # BLD AUTO: 0.05 X10(3)/MCL
BASOPHILS NFR BLD AUTO: 0.8 %
BUN SERPL-MCNC: 7.1 MG/DL (ref 8.4–25.7)
CALCIUM SERPL-MCNC: 8.6 MG/DL (ref 8.8–10)
CHLORIDE SERPL-SCNC: 104 MMOL/L (ref 98–107)
CO2 SERPL-SCNC: 26 MMOL/L (ref 23–31)
CREAT SERPL-MCNC: 0.95 MG/DL (ref 0.73–1.18)
CREAT/UREA NIT SERPL: 7
EOSINOPHIL # BLD AUTO: 0.11 X10(3)/MCL (ref 0–0.9)
EOSINOPHIL NFR BLD AUTO: 1.8 %
ERYTHROCYTE [DISTWIDTH] IN BLOOD BY AUTOMATED COUNT: 14.6 % (ref 11.5–17)
GFR SERPLBLD CREATININE-BSD FMLA CKD-EPI: >60 MLS/MIN/1.73/M2
GLUCOSE SERPL-MCNC: 77 MG/DL (ref 82–115)
HCT VFR BLD AUTO: 33.2 % (ref 42–52)
HGB BLD-MCNC: 10.5 G/DL (ref 14–18)
IMM GRANULOCYTES # BLD AUTO: 0.02 X10(3)/MCL (ref 0–0.04)
IMM GRANULOCYTES NFR BLD AUTO: 0.3 %
LYMPHOCYTES # BLD AUTO: 2.82 X10(3)/MCL (ref 0.6–4.6)
LYMPHOCYTES NFR BLD AUTO: 45.1 %
MCH RBC QN AUTO: 29.4 PG (ref 27–31)
MCHC RBC AUTO-ENTMCNC: 31.6 G/DL (ref 33–36)
MCV RBC AUTO: 93 FL (ref 80–94)
MONOCYTES # BLD AUTO: 0.8 X10(3)/MCL (ref 0.1–1.3)
MONOCYTES NFR BLD AUTO: 12.8 %
NEUTROPHILS # BLD AUTO: 2.45 X10(3)/MCL (ref 2.1–9.2)
NEUTROPHILS NFR BLD AUTO: 39.2 %
NRBC BLD AUTO-RTO: 0 %
PLATELET # BLD AUTO: 268 X10(3)/MCL (ref 130–400)
PMV BLD AUTO: 9.6 FL (ref 7.4–10.4)
POCT GLUCOSE: 147 MG/DL (ref 70–110)
POCT GLUCOSE: 174 MG/DL (ref 70–110)
POCT GLUCOSE: 98 MG/DL (ref 70–110)
POCT GLUCOSE: 98 MG/DL (ref 70–110)
POTASSIUM SERPL-SCNC: 3.7 MMOL/L (ref 3.5–5.1)
RBC # BLD AUTO: 3.57 X10(6)/MCL (ref 4.7–6.1)
SODIUM SERPL-SCNC: 139 MMOL/L (ref 136–145)
WBC # SPEC AUTO: 6.25 X10(3)/MCL (ref 4.5–11.5)

## 2024-01-24 PROCEDURE — 63600175 PHARM REV CODE 636 W HCPCS: Performed by: INTERNAL MEDICINE

## 2024-01-24 PROCEDURE — 25000003 PHARM REV CODE 250: Performed by: INTERNAL MEDICINE

## 2024-01-24 PROCEDURE — 80048 BASIC METABOLIC PNL TOTAL CA: CPT | Performed by: INTERNAL MEDICINE

## 2024-01-24 PROCEDURE — 25000003 PHARM REV CODE 250: Performed by: NURSE PRACTITIONER

## 2024-01-24 PROCEDURE — 21400001 HC TELEMETRY ROOM

## 2024-01-24 PROCEDURE — 85025 COMPLETE CBC W/AUTO DIFF WBC: CPT | Performed by: INTERNAL MEDICINE

## 2024-01-24 RX ORDER — FLUTICASONE PROPIONATE 50 MCG
2 SPRAY, SUSPENSION (ML) NASAL DAILY
Status: DISCONTINUED | OUTPATIENT
Start: 2024-01-24 | End: 2024-01-26 | Stop reason: HOSPADM

## 2024-01-24 RX ORDER — METOPROLOL SUCCINATE 25 MG/1
25 TABLET, EXTENDED RELEASE ORAL DAILY
Status: DISCONTINUED | OUTPATIENT
Start: 2024-01-24 | End: 2024-01-26 | Stop reason: HOSPADM

## 2024-01-24 RX ADMIN — HYDROCODONE BITARTRATE AND ACETAMINOPHEN 1 TABLET: 7.5; 325 TABLET ORAL at 08:01

## 2024-01-24 RX ADMIN — HYDROCODONE BITARTRATE AND ACETAMINOPHEN 1 TABLET: 7.5; 325 TABLET ORAL at 01:01

## 2024-01-24 RX ADMIN — GABAPENTIN 300 MG: 300 CAPSULE ORAL at 06:01

## 2024-01-24 RX ADMIN — PANTOPRAZOLE SODIUM 40 MG: 40 TABLET, DELAYED RELEASE ORAL at 09:01

## 2024-01-24 RX ADMIN — ASPIRIN 81 MG CHEWABLE TABLET 81 MG: 81 TABLET CHEWABLE at 09:01

## 2024-01-24 RX ADMIN — GABAPENTIN 300 MG: 300 CAPSULE ORAL at 01:01

## 2024-01-24 RX ADMIN — HYDROCODONE BITARTRATE AND ACETAMINOPHEN 1 TABLET: 7.5; 325 TABLET ORAL at 06:01

## 2024-01-24 RX ADMIN — GABAPENTIN 300 MG: 300 CAPSULE ORAL at 09:01

## 2024-01-24 RX ADMIN — FENOFIBRATE 145 MG: 145 TABLET, FILM COATED ORAL at 09:01

## 2024-01-24 RX ADMIN — METOPROLOL SUCCINATE 25 MG: 25 TABLET, EXTENDED RELEASE ORAL at 11:01

## 2024-01-24 RX ADMIN — ENOXAPARIN SODIUM 40 MG: 100 INJECTION SUBCUTANEOUS at 05:01

## 2024-01-24 NOTE — PROGRESS NOTES
[facility] Hospital Medicine Progress Note        Chief Complaint: Inpatient Follow-up for     HPI: 68 yr old male whose history includes HTN, DM, CKD 3A and gallstone pancreatitis. Presented with c/o feeling disoriented which started yesterday. B/P at the time of symptoms was 90/50 according to patient. At the time of my exam he is AAO x 3. Denies any fever, syncope, abdominal pain, vomiting or diarrhea. Since his d/c home on 1/20 he has been feeling well overall. Appetite has been good.      Prior to today he was discharged from here on 1/4 following a 19 day admission during which time he was treated for acute necrotizing pancreatitis secondary to choledocholithiasis, severe sepsis (only one of two blood cultures grew E. Faecalis and repeat blood cultures negative), SHA on CKD, and acute hypoxemic respiratory failure. Gallbladder removed approximately 1.5 years ago. Underwent ERCP 12/17 with stone and sludge removal and sphincterotomy. Choledocholithiasis was resolved.  CT abdomen repeated 12/23 which showed  worsened appearance pancreatitis with possible collection developing and small volume ascites, small bilateral pleural effusions. Responded favorably to diuresis.   Again admitted 1/9 -1/20  for persistent abdominal pain. CT of the abdomen and pelvis showed similar findings with persistent fluid collection and necrotizing pancreatitis.  He was initiated on meropenem empirically.  Blood cultures and stool studies have remained negative. Seen by ID and discharged home on PO Cipro and Flagyl with plans to continue for 2 weeks to complete 4 weeks of therapy.      VS on arrival: T 97.2, P 98, R 16, B/P 94/60, Sats 96% on room air. Initial labs: WBC 8.54, creatinine 1.19, glucose 145, Mg 1.5, AST 43, lipase 57, lactic acid 2.7. CT abdomen/pelvis with contrast shows persistent peripancreatic inflammation with improved peripancreatic fluid collections and decreased ascites compared to  CT on 1/14/23. Chest x-ray negative for acute findings.     Interval Hx:   Patient seen and examined this morning with wife bedside blood pressure better no other issues reported  Objective/physical exam:  General: In no acute distress, afebrile  Chest: Clear to auscultation bilaterally  Heart: RRR, +S1, S2, no appreciable murmur  Abdomen: Soft, nontender, BS +  MSK: Warm, no lower extremity edema, no clubbing or cyanosis  Neurologic: Alert and oriented x4,   VITAL SIGNS: 24 HRS MIN & MAX LAST   Temp  Min: 97.8 °F (36.6 °C)  Max: 98.3 °F (36.8 °C) 98.3 °F (36.8 °C)   BP  Min: 107/73  Max: 125/73 115/76   Pulse  Min: 93  Max: 103  103   Resp  Min: 16  Max: 20 18   SpO2  Min: 94 %  Max: 96 % (!) 94 %     I have reviewed the following labs:  Recent Labs   Lab 01/22/24  1016 01/23/24  0422 01/24/24  0356   WBC 8.65 7.46 6.25   RBC 3.65* 3.47* 3.57*   HGB 11.0* 10.3* 10.5*   HCT 34.0* 32.6* 33.2*   MCV 93.2 93.9 93.0   MCH 30.1 29.7 29.4   MCHC 32.4* 31.6* 31.6*   RDW 14.7 14.7 14.6    272 268   MPV 11.1* 9.3 9.6       Recent Labs   Lab 01/18/24  0435 01/20/24  0343 01/22/24  1149 01/23/24  0422 01/24/24  0356   * 137 137 135* 139   K 4.4 4.9 3.9 4.1 3.7   CO2 31 32* 28 29 26   BUN 17.6 19.6 18.1 10.8 7.1*   CREATININE 0.81 0.87 1.19* 1.03 0.95   CALCIUM 8.5* 9.2 8.8 8.1* 8.6*   MG 2.00 2.00 1.50*  --   --    ALBUMIN  --   --  2.1* 2.0*  --    ALKPHOS  --   --  121 109  --    ALT  --   --  21 17  --    AST  --   --  43* 33  --    BILITOT  --   --  0.5 0.7  --        Microbiology Results (last 7 days)       Procedure Component Value Units Date/Time    Blood culture #1 **CANNOT BE ORDERED STAT** [[id number]]  (Normal) Collected: 01/22/24 1016    Order Status: Completed Specimen: Blood from Antecubital, Right Updated: 01/23/24 1101     CULTURE, BLOOD ([facility]) No Growth At 24 Hours    Blood culture #2 **CANNOT BE ORDERED STAT** [[id number]]  (Normal) Collected: 01/22/24 1030    Order Status: Completed Specimen:  Blood Updated: 01/23/24 1101     CULTURE, BLOOD ([facility]) No Growth At 24 Hours             See below for Radiology    Scheduled Med:   aspirin  81 mg Oral Daily    enoxparin  40 mg Subcutaneous Q24H (prophylaxis, 1700)    fenofibrate  145 mg Oral Daily    fluticasone propionate  2 spray Each Nostril Daily    gabapentin  300 mg Oral Q8H    metoprolol succinate  25 mg Oral Daily    pantoprazole  40 mg Oral Daily      Continuous Infusions:   lactated ringers 75 mL/hr at 01/23/24 2126      PRN Meds:  dextrose 10%, dextrose 10%, glucagon (human recombinant), glucose, glucose, HYDROcodone-acetaminophen, insulin aspart U-100     Assessment/Plan:  Orthostatic hypotension possibly secondary to medications versus volume depletion  Hypomagnesemia   Lactic acidosis  Chronic necrotizing pancreatitis   Gallstone pancreatitis had a CBD stone requiring removal in December of 2023  Diabetes mellitus type 2  CKD 3A   Heart failure with preserved EF with diastolic dysfunction grade 1   GERD   Hyperlipidemia  Prolonged QT      We have consulted GI and they will discuss with Dr. Oseguera and will make further plan  Repeat orthostatics today   All blood pressure medications have been on hold I will just resume metoprolol today and see how patient's blood pressure stays  I will reduce the rate of IV fluids to 50 cc an hour  Most likely patient will not need any blood pressure medications on discharge but will see how the blood pressure is   Infectious diseases has discontinued all the antibiotics blood cultures have remained negative patient has completed 2 weeks of p.o. antibiotics  Repeat EKG again today and will keep a close watch on QT interval  Repeat blood work in a.m.  VTE prophylaxis:  Lovenox    Patient condition:  Stable/Fair/Guarded/ Serious/ Critical    Anticipated discharge and Disposition:         All diagnosis and differential diagnosis have been reviewed; assessment and plan has been documented; I have personally reviewed the  labs and test results that are presently available; I have reviewed the patients medication list; I have reviewed the consulting providers response and recommendations. I have reviewed or attempted to review medical records based upon their availability    All of the patient's questions have been  addressed and answered. Patient's is agreeable to the above stated plan. I will continue to monitor closely and make adjustments to medical management as needed.  _____________________________________________________________________    Nutrition Status:    Radiology:  I have personally reviewed the following imaging and agree with the radiologist.     CT Abdomen Pelvis With IV Contrast NO Oral Contrast  Narrative: EXAMINATION:  CT ABDOMEN PELVIS WITH IV CONTRAST    CLINICAL HISTORY:  Sepsis;    TECHNIQUE:  CT imaging of the abdomen and pelvis after intravenous contrast. Dose length product 771 mGycm. Automatic exposure control, adjustment of mA/kV or iterative reconstruction technique used to limit radiation dose.    COMPARISON:  CT 01/14/2023    FINDINGS:  Continued peripancreatic inflammation.  The largest peripancreatic fluid collection now contains internal gas, but is slightly smaller during the interval.  Few other fluid collections have also decreased in size.    No biliary dilatation.  Stable liver and spleen.  Normal adrenal glands.  No hydronephrosis.  Some mass effect on the duodenum from the pancreatic changes, but no significant gastric dilatation.  Normal bladder.  Right-sided hydrocele.  No consolidation or significant pleural fluid in the lung bases.  No acute osseous findings.  Impression: Persistent peripancreatic inflammation, although peripancreatic fluid collections are slightly smaller since 01/14/2023 with decreased volume of ascites.  New nonspecific gas within the largest peripancreatic fluid collection.    Electronically signed by: Gerber Silva  Date:    01/22/2024  Time:    14:47  X-Ray Chest 1  View  Narrative: EXAMINATION:  XR CHEST 1 VIEW    CLINICAL HISTORY:  , Dizziness and giddiness.    COMPARISON:  January 14, 2024    FINDINGS:  No alveolar consolidation, effusion, or pneumothorax is seen.   The thoracic aorta is normal  cardiac silhouette, central pulmonary vessels and mediastinum are normal in size and are grossly unremarkable.   visualized osseous structures are grossly unremarkable.  Impression: No acute chest disease is identified.    Electronically signed by: Radu Ewdards  Date:    01/22/2024  Time:    10:10      Chela Sheldon MD   01/24/2024

## 2024-01-24 NOTE — PROGRESS NOTES
"Gastroenterology Progress Note    Subjective:  NAEON. Pt continues to do well from a GI standpoint. Denies abd pain, n/v and tolerating PO without issues.   Remains afebrile w/o leukocytosis.     Objective:    ROS:    Review of Systems   Constitutional:  Negative for chills and fever.   Respiratory:  Negative for shortness of breath, wheezing and stridor.    Gastrointestinal:  Negative for abdominal pain, heartburn, nausea and vomiting.   Musculoskeletal:  Negative for falls.   Neurological:  Negative for seizures and loss of consciousness.   Psychiatric/Behavioral:  The patient is not nervous/anxious.          Vital Signs:  /75 (BP Location: Left arm, Patient Position: Standing)   Pulse 102   Temp 98.1 °F (36.7 °C) (Oral)   Resp 17   Ht 5' 6" (1.676 m)   Wt 62.4 kg (137 lb 8 oz)   SpO2 96%   BMI 22.19 kg/m²   Body mass index is 22.19 kg/m².    Physical Exam:    Physical Exam  Constitutional:       General: He is not in acute distress.     Appearance: He is not toxic-appearing or diaphoretic.   HENT:      Head: Normocephalic and atraumatic.      Nose: Nose normal.   Eyes:      General: No scleral icterus.     Extraocular Movements: Extraocular movements intact.   Pulmonary:      Effort: Pulmonary effort is normal. No respiratory distress.      Breath sounds: Normal breath sounds.   Abdominal:      General: There is no distension.      Palpations: Abdomen is soft.      Tenderness: There is no abdominal tenderness. There is no guarding.   Skin:     General: Skin is warm and dry.      Coloration: Skin is not jaundiced.   Neurological:      Mental Status: He is alert. Mental status is at baseline.         Labs:  Recent Results (from the past 24 hour(s))   POCT glucose    Collection Time: 01/23/24  5:18 PM   Result Value Ref Range    POCT Glucose 121 (H) 70 - 110 mg/dL   POCT glucose    Collection Time: 01/23/24  8:14 PM   Result Value Ref Range    POCT Glucose 207 (H) 70 - 110 mg/dL   Basic Metabolic Panel "    Collection Time: 01/24/24  3:56 AM   Result Value Ref Range    Sodium Level 139 136 - 145 mmol/L    Potassium Level 3.7 3.5 - 5.1 mmol/L    Chloride 104 98 - 107 mmol/L    Carbon Dioxide 26 23 - 31 mmol/L    Glucose Level 77 (L) 82 - 115 mg/dL    Blood Urea Nitrogen 7.1 (L) 8.4 - 25.7 mg/dL    Creatinine 0.95 0.73 - 1.18 mg/dL    BUN/Creatinine Ratio 7     Calcium Level Total 8.6 (L) 8.8 - 10.0 mg/dL    Anion Gap 9.0 mEq/L    eGFR >60 mls/min/1.73/m2   CBC with Differential    Collection Time: 01/24/24  3:56 AM   Result Value Ref Range    WBC 6.25 4.50 - 11.50 x10(3)/mcL    RBC 3.57 (L) 4.70 - 6.10 x10(6)/mcL    Hgb 10.5 (L) 14.0 - 18.0 g/dL    Hct 33.2 (L) 42.0 - 52.0 %    MCV 93.0 80.0 - 94.0 fL    MCH 29.4 27.0 - 31.0 pg    MCHC 31.6 (L) 33.0 - 36.0 g/dL    RDW 14.6 11.5 - 17.0 %    Platelet 268 130 - 400 x10(3)/mcL    MPV 9.6 7.4 - 10.4 fL    Neut % 39.2 %    Lymph % 45.1 %    Mono % 12.8 %    Eos % 1.8 %    Basophil % 0.8 %    Lymph # 2.82 0.6 - 4.6 x10(3)/mcL    Neut # 2.45 2.1 - 9.2 x10(3)/mcL    Mono # 0.80 0.1 - 1.3 x10(3)/mcL    Eos # 0.11 0 - 0.9 x10(3)/mcL    Baso # 0.05 <=0.2 x10(3)/mcL    IG# 0.02 0 - 0.04 x10(3)/mcL    IG% 0.3 %    NRBC% 0.0 %   POCT glucose    Collection Time: 01/24/24  4:40 AM   Result Value Ref Range    POCT Glucose 98 70 - 110 mg/dL   POCT glucose    Collection Time: 01/24/24 11:21 AM   Result Value Ref Range    POCT Glucose 147 (H) 70 - 110 mg/dL       Assessment/Plan:  This is a 68 y.o. male known to Dr. Flako Kerns from recent admission with PMH of T2DM, HTN, HLD, chronic scrotal hydrocele, vitamin D deficiency, CKD 3a, necrotizing pancreatitis 2/2 gallstones s/p ERCP 12/17/23, cholecystectomy 2022.      Patient admitted to [facility] 12/16/23 for choledocholithiasis requiring ERCP for stone extraction. Post procedure his hospitalization was complicated by necrotizing pancreatitis with possible collection near pancreas. No endoscopic intervention indicated at that time.  Patient discharged home 01/04/24.     He presented to the ED 01/09/24 with epigastric pain, n/v/d x3 days. He reported a fever onset day of presentation. GI consulted for pancreatitis with pseudocyst- recommended outpatient follow up with repeat imaging in 4-6 weeks. Patient was discharged 1/20/24.     He presented to the ED 1/22 with c/o weakness and dizziness.   GI consulted for      Pancreatitis, acute on chronic  - supportive care: IVF, pain control  - ADAT  - encourage safe ambulation     Necrotic collection at body of pancreas  - CT abd/pel with IV contrast 12/29: necrotic pancreatitis with possible organizing collection anterior and superior to pancreatic neck and body measuring approximately 8 x 4 cm  - Dr. Oseguera evaluated patient at that time - no organized collection that required drainage; he recommended maximizing nutrition with increased protein intake and repeat CT abd in 4-6 weeks with follow up in GI clinic. Task has been sent to our office with clinic appt 4/4/2024  - CT abd/pel with IV 01/09/24: Continued organization and partial encapsulation of the acute necrotic collection at the body of the pancreas and peripancreatic soft tissues.  No internal foci of gas or hortencia changes of acute super infection by imaging.  Overall not significantly changed in size from prior.   - CT abd/pel 01/14/24: worsening pancreatitis, large pseudocyst in body and head of pancreas as well as some pancreatic necrosis; fluid collections and pseudocysts are also seen just inferior to pancreas which are stable since prior exam; worsening ascites  - most recent Ct abd/pelvis 01/22/2024 with persistent peripancreatic inflammation, although fluid collections slightly smaller 01/14/2024 with decrease volume of ascites. New nonspecific gas within largest peripancreatic fluid collection  - prefer to allow maturation of pseudocyst prior to drainage - this typically takes 6-8 weeks from onset  - recommend high protein intake  -  encourage safe ambulation     - discussed case with Dr. Oseguera and imaging reviewed-  as long as he is without signs of infection and tolerating PO without issues, no reason to drain at this time. We proceed as planned to follow up outpatient with repeat imaging    GI will sign off at this time without further recommendations. Please call or page with any questions.         Thank you for allowing us to participate in the care of Franklin Macias.       Kalyn Willard PA-C  Gastroenterology  [facility]

## 2024-01-25 LAB
ANION GAP SERPL CALC-SCNC: 5 MEQ/L
BASOPHILS # BLD AUTO: 0.07 X10(3)/MCL
BASOPHILS NFR BLD AUTO: 1.3 %
BUN SERPL-MCNC: 7 MG/DL (ref 8.4–25.7)
CALCIUM SERPL-MCNC: 8.5 MG/DL (ref 8.8–10)
CHLORIDE SERPL-SCNC: 106 MMOL/L (ref 98–107)
CO2 SERPL-SCNC: 29 MMOL/L (ref 23–31)
CREAT SERPL-MCNC: 0.89 MG/DL (ref 0.73–1.18)
CREAT/UREA NIT SERPL: 8
EOSINOPHIL # BLD AUTO: 0.14 X10(3)/MCL (ref 0–0.9)
EOSINOPHIL NFR BLD AUTO: 2.6 %
ERYTHROCYTE [DISTWIDTH] IN BLOOD BY AUTOMATED COUNT: 14.6 % (ref 11.5–17)
GFR SERPLBLD CREATININE-BSD FMLA CKD-EPI: >60 MLS/MIN/1.73/M2
GLUCOSE SERPL-MCNC: 79 MG/DL (ref 82–115)
HCT VFR BLD AUTO: 33.3 % (ref 42–52)
HGB BLD-MCNC: 10.6 G/DL (ref 14–18)
IMM GRANULOCYTES # BLD AUTO: 0.01 X10(3)/MCL (ref 0–0.04)
IMM GRANULOCYTES NFR BLD AUTO: 0.2 %
LYMPHOCYTES # BLD AUTO: 2.04 X10(3)/MCL (ref 0.6–4.6)
LYMPHOCYTES NFR BLD AUTO: 37.8 %
MCH RBC QN AUTO: 29.9 PG (ref 27–31)
MCHC RBC AUTO-ENTMCNC: 31.8 G/DL (ref 33–36)
MCV RBC AUTO: 94.1 FL (ref 80–94)
MONOCYTES # BLD AUTO: 0.74 X10(3)/MCL (ref 0.1–1.3)
MONOCYTES NFR BLD AUTO: 13.7 %
NEUTROPHILS # BLD AUTO: 2.4 X10(3)/MCL (ref 2.1–9.2)
NEUTROPHILS NFR BLD AUTO: 44.4 %
NRBC BLD AUTO-RTO: 0 %
PLATELET # BLD AUTO: 242 X10(3)/MCL (ref 130–400)
PMV BLD AUTO: 9.5 FL (ref 7.4–10.4)
POCT GLUCOSE: 181 MG/DL (ref 70–110)
POCT GLUCOSE: 192 MG/DL (ref 70–110)
POCT GLUCOSE: 84 MG/DL (ref 70–110)
POCT GLUCOSE: 96 MG/DL (ref 70–110)
POTASSIUM SERPL-SCNC: 4.3 MMOL/L (ref 3.5–5.1)
RBC # BLD AUTO: 3.54 X10(6)/MCL (ref 4.7–6.1)
SODIUM SERPL-SCNC: 140 MMOL/L (ref 136–145)
WBC # SPEC AUTO: 5.4 X10(3)/MCL (ref 4.5–11.5)

## 2024-01-25 PROCEDURE — 80048 BASIC METABOLIC PNL TOTAL CA: CPT | Performed by: INTERNAL MEDICINE

## 2024-01-25 PROCEDURE — 63600175 PHARM REV CODE 636 W HCPCS: Performed by: INTERNAL MEDICINE

## 2024-01-25 PROCEDURE — 21400001 HC TELEMETRY ROOM

## 2024-01-25 PROCEDURE — 25000003 PHARM REV CODE 250: Performed by: INTERNAL MEDICINE

## 2024-01-25 PROCEDURE — 99232 SBSQ HOSP IP/OBS MODERATE 35: CPT | Mod: ,,, | Performed by: GENERAL PRACTICE

## 2024-01-25 PROCEDURE — 85025 COMPLETE CBC W/AUTO DIFF WBC: CPT | Performed by: INTERNAL MEDICINE

## 2024-01-25 PROCEDURE — 25000003 PHARM REV CODE 250: Performed by: NURSE PRACTITIONER

## 2024-01-25 RX ORDER — LISINOPRIL 5 MG/1
5 TABLET ORAL DAILY
Status: DISCONTINUED | OUTPATIENT
Start: 2024-01-25 | End: 2024-01-26 | Stop reason: HOSPADM

## 2024-01-25 RX ADMIN — PANTOPRAZOLE SODIUM 40 MG: 40 TABLET, DELAYED RELEASE ORAL at 09:01

## 2024-01-25 RX ADMIN — HYDROCODONE BITARTRATE AND ACETAMINOPHEN 1 TABLET: 7.5; 325 TABLET ORAL at 06:01

## 2024-01-25 RX ADMIN — GABAPENTIN 300 MG: 300 CAPSULE ORAL at 01:01

## 2024-01-25 RX ADMIN — ASPIRIN 81 MG CHEWABLE TABLET 81 MG: 81 TABLET CHEWABLE at 09:01

## 2024-01-25 RX ADMIN — HYDROCODONE BITARTRATE AND ACETAMINOPHEN 1 TABLET: 7.5; 325 TABLET ORAL at 08:01

## 2024-01-25 RX ADMIN — GABAPENTIN 300 MG: 300 CAPSULE ORAL at 06:01

## 2024-01-25 RX ADMIN — METOPROLOL SUCCINATE 25 MG: 25 TABLET, EXTENDED RELEASE ORAL at 09:01

## 2024-01-25 RX ADMIN — ENOXAPARIN SODIUM 40 MG: 100 INJECTION SUBCUTANEOUS at 04:01

## 2024-01-25 RX ADMIN — GABAPENTIN 300 MG: 300 CAPSULE ORAL at 09:01

## 2024-01-25 RX ADMIN — FENOFIBRATE 145 MG: 145 TABLET, FILM COATED ORAL at 09:01

## 2024-01-25 RX ADMIN — LISINOPRIL 5 MG: 5 TABLET ORAL at 11:01

## 2024-01-25 NOTE — PLAN OF CARE
01/25/24 1625   Discharge Assessment   Assessment Type Discharge Planning Assessment   Confirmed/corrected address, phone number and insurance Yes   Does patient/caregiver understand observation status   (inpatient)   Communicated CARMITA with patient/caregiver Date not available/Unable to determine   Reason For Admission pancreatitis   People in Home spouse   Facility Arrived From: home   Do you expect to return to your current living situation? Yes   Do you have help at home or someone to help you manage your care at home? Yes   Who are your caregiver(s) and their phone number(s)? spouse   Prior to hospitilization cognitive status: Unable to Assess   Current cognitive status: Alert/Oriented   Walking or Climbing Stairs Difficulty yes   Equipment Currently Used at Home none   Patient currently being followed by outpatient case management? No   Do you currently have service(s) that help you manage your care at home? No   Do you take prescription medications? Yes   Do you have prescription coverage? Yes   Do you have any problems affording any of your prescribed medications? No   Is the patient taking medications as prescribed? yes   Who is going to help you get home at discharge? wife   How do you get to doctors appointments? car, drives self;family or friend will provide   Are you on dialysis? No   Discharge Plan A Home with family   Discharge Plan B Home with family   Discharge Plan discussed with: Spouse/sig other   Transition of Care Barriers None   Social Connections   Are you , , , , never , or living with a partner?    OTHER   Name(s) of People in Home wife and pt     Disch home when stable

## 2024-01-25 NOTE — PROGRESS NOTES
[facility]  Infectious Disease Progress Note            ASSESSMENT & PLAN:     He is a 68-year-old male with a past medical history of diabetes mellitus, hypertension, CHF, and more recently necrotizing pancreatitis secondary to gallstone choledocholithiasis who we had evaluated during his last hospitalization at the beginning of this month.  Please see our last note from 01/19 for full history and course of prior hospitalizations, however patient was discharged on 1/20 on oral ciprofloxacin and Flagyl with plan to continue through 2/2.  GI was planning a possible endoscopy in a few weeks.  He presented back on 01/22 secondary to lightheadedness and weakness as well as patient's attention.  Noted to be hypotensive on admit and with an SHA.  Renal function and blood pressure improved with IV fluids.  Current CT with IV contrast showed persistent peripancreatic inflammation although fluid collections are slightly smaller, however new nonspecific gas within the largest peripancreatic fluid collection is noted.  He remains on ciprofloxacin and Flagyl.  Feeling much better overall, no significant complaints.  Wife is at bedside.  She does report she has recently spread out his daily medications throughout the day secondary to a large amount of oral medications in the morning, this includes antihypertensives.  Medications currently on hold, blood pressures now normalized.  We have been consulted for assistance with antimicrobials.     Hypotension, suspect noninfectious - med related vs SHA s/t IVVD vs combination  SHA, resolved  Necrotizing pancreatitis, fluid collections  Recent Enterococcus bacteremia s/t above, s/p treatment / resolution  Recent gallstone pancreatitis, s/p ERCP and sphincterotomy  H/o CHF / DM2 / HTN     PLAN:  Stable off abx.   Will sign off.  Please call if need arises.   Keep f/u with us scheduled 2/8 and GI f/u as scheduled.   Discussed with patient and family.       SUBJECTIVE:   AF, VSS.  Doing well  "without complaints.     MEDICATIONS:   Reviewed in EMR    REVIEW OF SYSTEMS:   Except as documented, all other systems reviewed and negative     PHYSICAL EXAM:   T 98.2 °F (36.8 °C)   BP (!) 165/80   P 96   RR 18   O2 96 %  GENERAL: Chronically ill appearing; NAD; does not appear toxic  SKIN: no rash  HEENT: sclera non-icteric; PERRL   NECK: supple; no LAD  CHEST: CTA; nonlabored, equal expansion; no adventitious BS  CARDIOVASCULAR: RRR, S1S2; no murmur   ABDOMEN:  active bowel sounds; abdomen soft, rounded, + mild mostly epigastric TTP   EXTREMITIES: no cyanosis or clubbing  NEURO: AAO x4; CN II-XII grossly intact  PSYCH: Mentation and affect appropriate    LABS AND IMAGING:     Recent Labs     01/24/24  0356 01/25/24  0407   WBC 6.25 5.40   RBC 3.57* 3.54*   HGB 10.5* 10.6*   HCT 33.2* 33.3*   MCV 93.0 94.1*   MCH 29.4 29.9   MCHC 31.6* 31.8*   RDW 14.6 14.6    242     Recent Labs     01/22/24  1149 01/23/24  0422   LACTIC 2.7* 1.2     No results for input(s): "INR", "APTT", "D-DIMER" in the last 72 hours.  No results for input(s): "HGBA1C", "CHOL", "TRIG", "LDL", "VLDL", "HDL" in the last 72 hours.   Recent Labs     01/22/24  1149 01/23/24  0422 01/24/24  0356 01/25/24  0407    135* 139 140   K 3.9 4.1 3.7 4.3   CHLORIDE 103 101 104 106   CO2 28 29 26 29   BUN 18.1 10.8 7.1* 7.0*   CREATININE 1.19* 1.03 0.95 0.89   GLUCOSE 145* 113 77* 79*   CALCIUM 8.8 8.1* 8.6* 8.5*   MG 1.50*  --   --   --    ALBUMIN 2.1* 2.0*  --   --    GLOBULIN 3.8* 3.4  --   --    ALKPHOS 121 109  --   --    ALT 21 17  --   --    AST 43* 33  --   --    BILITOT 0.5 0.7  --   --    LIPASE 57  --   --   --      No results for input(s): "BNP", "CPK", "TROPONINI" in the last 72 hours.       CT Abdomen Pelvis With IV Contrast NO Oral Contrast  Narrative: EXAMINATION:  CT ABDOMEN PELVIS WITH IV CONTRAST    CLINICAL HISTORY:  Sepsis;    TECHNIQUE:  CT imaging of the abdomen and pelvis after intravenous contrast. Dose length product " 771 mGycm. Automatic exposure control, adjustment of mA/kV or iterative reconstruction technique used to limit radiation dose.    COMPARISON:  CT 01/14/2023    FINDINGS:  Continued peripancreatic inflammation.  The largest peripancreatic fluid collection now contains internal gas, but is slightly smaller during the interval.  Few other fluid collections have also decreased in size.    No biliary dilatation.  Stable liver and spleen.  Normal adrenal glands.  No hydronephrosis.  Some mass effect on the duodenum from the pancreatic changes, but no significant gastric dilatation.  Normal bladder.  Right-sided hydrocele.  No consolidation or significant pleural fluid in the lung bases.  No acute osseous findings.  Impression: Persistent peripancreatic inflammation, although peripancreatic fluid collections are slightly smaller since 01/14/2023 with decreased volume of ascites.  New nonspecific gas within the largest peripancreatic fluid collection.    Electronically signed by: Gerber Silva  Date:    01/22/2024  Time:    14:47  X-Ray Chest 1 View  Narrative: EXAMINATION:  XR CHEST 1 VIEW    CLINICAL HISTORY:  , Dizziness and giddiness.    COMPARISON:  January 14, 2024    FINDINGS:  No alveolar consolidation, effusion, or pneumothorax is seen.   The thoracic aorta is normal  cardiac silhouette, central pulmonary vessels and mediastinum are normal in size and are grossly unremarkable.   visualized osseous structures are grossly unremarkable.  Impression: No acute chest disease is identified.    Electronically signed by: Radu Edwards  Date:    01/22/2024  Time:    10:10          MALI Kelly  Infectious Disease

## 2024-01-25 NOTE — PROGRESS NOTES
[facility] Hospital Medicine Progress Note        Chief Complaint: Inpatient Follow-up for     HPI: 68 yr old male whose history includes HTN, DM, CKD 3A and gallstone pancreatitis. Presented with c/o feeling disoriented which started yesterday. B/P at the time of symptoms was 90/50 according to patient. At the time of my exam he is AAO x 3. Denies any fever, syncope, abdominal pain, vomiting or diarrhea. Since his d/c home on 1/20 he has been feeling well overall. Appetite has been good.      Prior to today he was discharged from here on 1/4 following a 19 day admission during which time he was treated for acute necrotizing pancreatitis secondary to choledocholithiasis, severe sepsis (only one of two blood cultures grew E. Faecalis and repeat blood cultures negative), SHA on CKD, and acute hypoxemic respiratory failure. Gallbladder removed approximately 1.5 years ago. Underwent ERCP 12/17 with stone and sludge removal and sphincterotomy. Choledocholithiasis was resolved.  CT abdomen repeated 12/23 which showed  worsened appearance pancreatitis with possible collection developing and small volume ascites, small bilateral pleural effusions. Responded favorably to diuresis.   Again admitted 1/9 -1/20  for persistent abdominal pain. CT of the abdomen and pelvis showed similar findings with persistent fluid collection and necrotizing pancreatitis.  He was initiated on meropenem empirically.  Blood cultures and stool studies have remained negative. Seen by ID and discharged home on PO Cipro and Flagyl with plans to continue for 2 weeks to complete 4 weeks of therapy.      VS on arrival: T 97.2, P 98, R 16, B/P 94/60, Sats 96% on room air. Initial labs: WBC 8.54, creatinine 1.19, glucose 145, Mg 1.5, AST 43, lipase 57, lactic acid 2.7. CT abdomen/pelvis with contrast shows persistent peripancreatic inflammation with improved peripancreatic fluid collections and decreased ascites compared to  CT on 1/14/23. Chest x-ray negative for acute findings.     Interval Hx:   1/24/24Patient seen and examined this morning with wife bedside blood pressure better no other issues reported    1/25/24 dr shepard - chart reviewed and pt examined. Voices no complains. Remains hypertensive. Will dc iv fluids and start lisinopril 5 mgs po daily in addition  to metoprolol succinate 25 mgs po daily.   Most likely discharge in am with follow up w luis alberto haro as well as GI.   Objective/physical exam:  General: In no acute distress, afebrile  Chest: Clear to auscultation bilaterally  Heart: RRR, +S1, S2, no appreciable murmur  Abdomen: Soft, nontender, BS +  MSK: Warm, no lower extremity edema, no clubbing or cyanosis  Neurologic: Alert and oriented x4,   VITAL SIGNS: 24 HRS MIN & MAX LAST   Temp  Min: 98.1 °F (36.7 °C)  Max: 98.3 °F (36.8 °C) 98.2 °F (36.8 °C)   BP  Min: 105/66  Max: 165/80 (!) 165/80   Pulse  Min: 88  Max: 102  96   Resp  Min: 16  Max: 19 18   SpO2  Min: 95 %  Max: 97 % 96 %     I have reviewed the following labs:  Recent Labs   Lab 01/23/24  0422 01/24/24  0356 01/25/24  0407   WBC 7.46 6.25 5.40   RBC 3.47* 3.57* 3.54*   HGB 10.3* 10.5* 10.6*   HCT 32.6* 33.2* 33.3*   MCV 93.9 93.0 94.1*   MCH 29.7 29.4 29.9   MCHC 31.6* 31.6* 31.8*   RDW 14.7 14.6 14.6    268 242   MPV 9.3 9.6 9.5       Recent Labs   Lab 01/20/24  0343 01/22/24  1149 01/23/24  0422 01/24/24  0356 01/25/24  0407    137 135* 139 140   K 4.9 3.9 4.1 3.7 4.3   CO2 32* 28 29 26 29   BUN 19.6 18.1 10.8 7.1* 7.0*   CREATININE 0.87 1.19* 1.03 0.95 0.89   CALCIUM 9.2 8.8 8.1* 8.6* 8.5*   MG 2.00 1.50*  --   --   --    ALBUMIN  --  2.1* 2.0*  --   --    ALKPHOS  --  121 109  --   --    ALT  --  21 17  --   --    AST  --  43* 33  --   --    BILITOT  --  0.5 0.7  --   --        Microbiology Results (last 7 days)       Procedure Component Value Units Date/Time    Blood culture #1 **CANNOT BE ORDERED STAT** [[id number]]  (Normal) Collected:  01/22/24 1016    Order Status: Completed Specimen: Blood from Antecubital, Right Updated: 01/24/24 1101     CULTURE, BLOOD ([facility]) No Growth At 48 Hours    Blood culture #2 **CANNOT BE ORDERED STAT** [[id number]]  (Normal) Collected: 01/22/24 1030    Order Status: Completed Specimen: Blood Updated: 01/24/24 1101     CULTURE, BLOOD ([facility]) No Growth At 48 Hours             See below for Radiology    Scheduled Med:   aspirin  81 mg Oral Daily    enoxparin  40 mg Subcutaneous Q24H (prophylaxis, 1700)    fenofibrate  145 mg Oral Daily    fluticasone propionate  2 spray Each Nostril Daily    gabapentin  300 mg Oral Q8H    metoprolol succinate  25 mg Oral Daily    pantoprazole  40 mg Oral Daily      Continuous Infusions:   lactated ringers 50 mL/hr at 01/24/24 1324      PRN Meds:  dextrose 10%, dextrose 10%, glucagon (human recombinant), glucose, glucose, HYDROcodone-acetaminophen, insulin aspart U-100     Assessment/Plan:  Orthostatic hypotension possibly secondary to medications versus volume depletion  Hypomagnesemia- corrected    Lactic acidosis  Chronic necrotizing pancreatitis w peripancreatic fluid collections   Gallstone pancreatitis had a CBD stone requiring removal in December of 2023  Diabetes mellitus type 2  CKD 3A   Heart failure with preserved EF with diastolic dysfunction grade 1   GERD   Hyperlipidemia  Prolonged QT      Dc iv fluids  Lisinopril 5 mgs po daily   Monitor bp today  Will discharge in am w follow up with luis alberto haro as well as with GI.   VTE prophylaxis:  Lovenox    Patient condition:  Stable    Anticipated discharge and Disposition:   home       All diagnosis and differential diagnosis have been reviewed; assessment and plan has been documented; I have personally reviewed the labs and test results that are presently available; I have reviewed the patients medication list; I have reviewed the consulting providers response and recommendations. I have reviewed or attempted to review medical records  based upon their availability    All of the patient's questions have been  addressed and answered. Patient's is agreeable to the above stated plan. I will continue to monitor closely and make adjustments to medical management as needed.  _____________________________________________________________________    Nutrition Status:    Radiology:  I have personally reviewed the following imaging and agree with the radiologist.     CT Abdomen Pelvis With IV Contrast NO Oral Contrast  Narrative: EXAMINATION:  CT ABDOMEN PELVIS WITH IV CONTRAST    CLINICAL HISTORY:  Sepsis;    TECHNIQUE:  CT imaging of the abdomen and pelvis after intravenous contrast. Dose length product 771 mGycm. Automatic exposure control, adjustment of mA/kV or iterative reconstruction technique used to limit radiation dose.    COMPARISON:  CT 01/14/2023    FINDINGS:  Continued peripancreatic inflammation.  The largest peripancreatic fluid collection now contains internal gas, but is slightly smaller during the interval.  Few other fluid collections have also decreased in size.    No biliary dilatation.  Stable liver and spleen.  Normal adrenal glands.  No hydronephrosis.  Some mass effect on the duodenum from the pancreatic changes, but no significant gastric dilatation.  Normal bladder.  Right-sided hydrocele.  No consolidation or significant pleural fluid in the lung bases.  No acute osseous findings.  Impression: Persistent peripancreatic inflammation, although peripancreatic fluid collections are slightly smaller since 01/14/2023 with decreased volume of ascites.  New nonspecific gas within the largest peripancreatic fluid collection.    Electronically signed by: Gerber Silva  Date:    01/22/2024  Time:    14:47  X-Ray Chest 1 View  Narrative: EXAMINATION:  XR CHEST 1 VIEW    CLINICAL HISTORY:  , Dizziness and giddiness.    COMPARISON:  January 14, 2024    FINDINGS:  No alveolar consolidation, effusion, or pneumothorax is seen.   The thoracic  aorta is normal  cardiac silhouette, central pulmonary vessels and mediastinum are normal in size and are grossly unremarkable.   visualized osseous structures are grossly unremarkable.  Impression: No acute chest disease is identified.    Electronically signed by: Radu Edwards  Date:    01/22/2024  Time:    10:10      Alban Obando MD   01/25/2024

## 2024-01-26 VITALS
RESPIRATION RATE: 21 BRPM | DIASTOLIC BLOOD PRESSURE: 83 MMHG | WEIGHT: 137.5 LBS | TEMPERATURE: 98 F | OXYGEN SATURATION: 96 % | HEART RATE: 90 BPM | SYSTOLIC BLOOD PRESSURE: 135 MMHG | HEIGHT: 66 IN | BODY MASS INDEX: 22.1 KG/M2

## 2024-01-26 LAB — POCT GLUCOSE: 92 MG/DL (ref 70–110)

## 2024-01-26 PROCEDURE — 25000003 PHARM REV CODE 250: Performed by: INTERNAL MEDICINE

## 2024-01-26 RX ORDER — HYDROCODONE BITARTRATE AND ACETAMINOPHEN 7.5; 325 MG/1; MG/1
1 TABLET ORAL EVERY 6 HOURS PRN
Qty: 28 TABLET | Refills: 0 | Status: SHIPPED | OUTPATIENT
Start: 2024-01-26 | End: 2024-02-02

## 2024-01-26 RX ADMIN — PANTOPRAZOLE SODIUM 40 MG: 40 TABLET, DELAYED RELEASE ORAL at 09:01

## 2024-01-26 RX ADMIN — LISINOPRIL 5 MG: 5 TABLET ORAL at 09:01

## 2024-01-26 RX ADMIN — FENOFIBRATE 145 MG: 145 TABLET, FILM COATED ORAL at 09:01

## 2024-01-26 RX ADMIN — METOPROLOL SUCCINATE 25 MG: 25 TABLET, EXTENDED RELEASE ORAL at 09:01

## 2024-01-26 RX ADMIN — ASPIRIN 81 MG CHEWABLE TABLET 81 MG: 81 TABLET CHEWABLE at 09:01

## 2024-01-26 RX ADMIN — GABAPENTIN 300 MG: 300 CAPSULE ORAL at 06:01

## 2024-01-26 NOTE — NURSING
Pt DC per MD order. PIV removed and covered with gauze and tape. Prescriptions sent to preferred pharmacy. Pt ambulated downstairs to private vehicle with spouse at side. Pt in stable condition.

## 2024-01-26 NOTE — DISCHARGE INSTRUCTIONS
1-please follow up with gastroenterology as scheduled   2-please follow up with your primary care MD

## 2024-01-27 LAB
BACTERIA BLD CULT: NORMAL
BACTERIA BLD CULT: NORMAL

## 2024-01-31 ENCOUNTER — TELEPHONE (OUTPATIENT)
Dept: PRIMARY CARE CLINIC | Facility: CLINIC | Age: 69
End: 2024-01-31
Payer: MEDICARE

## 2024-01-31 NOTE — TELEPHONE ENCOUNTER
----- Message from Tiff May sent at 1/31/2024  9:00 AM CST -----  Regarding: advice  Type:  Needs Medical Advice    Who Called: pt's wife Susan  Symptoms (please be specific): when pt prone b/p ok but when he sits up it drops   How long has patient had these symptoms:  2 days  [facility] and [email or web address]   Would the patient rather a call back or a response via [facility]? C/b  Best Call Back Number: [phone]    Additional Information: concerned about b/p fluctuation and would like to discuss pt taking gabapentin  Please call wife back

## 2024-02-01 ENCOUNTER — OFFICE VISIT (OUTPATIENT)
Dept: PRIMARY CARE CLINIC | Facility: CLINIC | Age: 69
End: 2024-02-01
Payer: MEDICARE

## 2024-02-01 VITALS
OXYGEN SATURATION: 96 % | BODY MASS INDEX: 19.74 KG/M2 | TEMPERATURE: 99 F | HEIGHT: 66 IN | WEIGHT: 122.81 LBS | DIASTOLIC BLOOD PRESSURE: 51 MMHG | HEART RATE: 94 BPM | SYSTOLIC BLOOD PRESSURE: 81 MMHG

## 2024-02-01 DIAGNOSIS — I95.9 HYPOTENSION, UNSPECIFIED HYPOTENSION TYPE: ICD-10-CM

## 2024-02-01 DIAGNOSIS — I50.30 DIASTOLIC CONGESTIVE HEART FAILURE, UNSPECIFIED HF CHRONICITY: ICD-10-CM

## 2024-02-01 DIAGNOSIS — F32.A DEPRESSION, UNSPECIFIED DEPRESSION TYPE: ICD-10-CM

## 2024-02-01 DIAGNOSIS — E11.22 TYPE 2 DIABETES MELLITUS WITH STAGE 3A CHRONIC KIDNEY DISEASE, WITHOUT LONG-TERM CURRENT USE OF INSULIN: ICD-10-CM

## 2024-02-01 DIAGNOSIS — N18.31 TYPE 2 DIABETES MELLITUS WITH STAGE 3A CHRONIC KIDNEY DISEASE, WITHOUT LONG-TERM CURRENT USE OF INSULIN: ICD-10-CM

## 2024-02-01 DIAGNOSIS — Z09 HOSPITAL DISCHARGE FOLLOW-UP: Primary | ICD-10-CM

## 2024-02-01 PROBLEM — K85.91 NECROTIZING PANCREATITIS: Status: RESOLVED | Noted: 2023-12-16 | Resolved: 2024-02-01

## 2024-02-01 PROBLEM — N43.3 HYDROCELE OF TESTIS: Status: RESOLVED | Noted: 2023-12-16 | Resolved: 2024-02-01

## 2024-02-01 PROBLEM — G89.29 CHRONIC RIGHT SHOULDER PAIN: Status: RESOLVED | Noted: 2022-08-01 | Resolved: 2024-02-01

## 2024-02-01 PROBLEM — M25.511 CHRONIC RIGHT SHOULDER PAIN: Status: RESOLVED | Noted: 2022-08-01 | Resolved: 2024-02-01

## 2024-02-01 PROCEDURE — 99214 OFFICE O/P EST MOD 30 MIN: CPT | Mod: ,,, | Performed by: STUDENT IN AN ORGANIZED HEALTH CARE EDUCATION/TRAINING PROGRAM

## 2024-02-01 PROCEDURE — 3074F SYST BP LT 130 MM HG: CPT | Mod: CPTII,,, | Performed by: STUDENT IN AN ORGANIZED HEALTH CARE EDUCATION/TRAINING PROGRAM

## 2024-02-01 PROCEDURE — 1160F RVW MEDS BY RX/DR IN RCRD: CPT | Mod: CPTII,,, | Performed by: STUDENT IN AN ORGANIZED HEALTH CARE EDUCATION/TRAINING PROGRAM

## 2024-02-01 PROCEDURE — 3078F DIAST BP <80 MM HG: CPT | Mod: CPTII,,, | Performed by: STUDENT IN AN ORGANIZED HEALTH CARE EDUCATION/TRAINING PROGRAM

## 2024-02-01 PROCEDURE — 1111F DSCHRG MED/CURRENT MED MERGE: CPT | Mod: CPTII,,, | Performed by: STUDENT IN AN ORGANIZED HEALTH CARE EDUCATION/TRAINING PROGRAM

## 2024-02-01 PROCEDURE — 1126F AMNT PAIN NOTED NONE PRSNT: CPT | Mod: CPTII,,, | Performed by: STUDENT IN AN ORGANIZED HEALTH CARE EDUCATION/TRAINING PROGRAM

## 2024-02-01 PROCEDURE — 1159F MED LIST DOCD IN RCRD: CPT | Mod: CPTII,,, | Performed by: STUDENT IN AN ORGANIZED HEALTH CARE EDUCATION/TRAINING PROGRAM

## 2024-02-01 PROCEDURE — 3288F FALL RISK ASSESSMENT DOCD: CPT | Mod: CPTII,,, | Performed by: STUDENT IN AN ORGANIZED HEALTH CARE EDUCATION/TRAINING PROGRAM

## 2024-02-01 PROCEDURE — 1101F PT FALLS ASSESS-DOCD LE1/YR: CPT | Mod: CPTII,,, | Performed by: STUDENT IN AN ORGANIZED HEALTH CARE EDUCATION/TRAINING PROGRAM

## 2024-02-01 NOTE — ASSESSMENT & PLAN NOTE
Since pt not eating much and feeling weak, stop Glimepiride for now  Continue monitoring CBGs at home

## 2024-02-01 NOTE — PROGRESS NOTES
Date: 02/01/2024  Patient ID: [id number]   Chief Complaint: Follow-up (Weight loss, pancreatitis, fatigue,hypotension)    HPI:   Franklin Macias is a 69 y.o. male here today for Follow-up (Weight loss, pancreatitis, fatigue,hypotension)  Patient was hospitalized 12/16-01/04/2024 for acute necrotizing pancreatitis 2/2 choledocholithiasis.  ERCP was performed with sludge and stone removal and sphincterotomy.  Cholangitis was resolved.  Patient had severe sepsis from Enterococcus faecalis that was resolved with antibiotics.  SHA stabilized.  Patient also was acute hypoxic respiratory failure requiring supplemental oxygen that improved.  Echo showed grade 1 diastolic dysfunction, normal systolic function your GI recommended repeat CT abdomen in 3 months to reassess abscess with no role of ongoing antibiotics at this time.  General surgery recommended supportive care.  ID signed off.  Patient was discharged with Lasix, Zofran, pantoprazole, MiraLax the patient is to follow up with Nephrology, GI next week.     BP in office low and at home low. Has been taking BP meds. Patient feels depressed with passive SI but no plan. Also does not sleep well and has poor appetite. Denies abdominal pain. Sometimes take Norco. Gabapentin makes him feel unwell. Also has lost weight. Patient feels tired and weak with de santiago. Eats some foods but mostly soft for now. Pt also quit smoking  Patient Active Problem List   Diagnosis    Type 2 diabetes mellitus with stage 3a chronic kidney disease, without long-term current use of insulin    Chronic kidney disease, stage 3a    Hyperlipidemia    Chronic neck and back pain    Diastolic congestive heart failure    Hospital discharge follow-up    Hypotension    Depression     Outpatient Medications Marked as Taking for the 2/1/24 encounter (Office Visit) with Snehal Arroyo MD   Medication Sig Dispense Refill    albuterol (PROVENTIL/VENTOLIN HFA) 90 mcg/actuation inhaler Inhale 2 puffs into the lungs  every 4 (four) hours as needed for Wheezing. Rescue      amlodipine-benazepril 10-20mg (LOTREL) 10-20 mg per capsule Take 1 capsule by mouth once daily. 90 capsule 3    aspirin 81 MG Chew Take 81 mg by mouth once daily.      ergocalciferol (ERGOCALCIFEROL) 50,000 unit Cap Take 1 capsule (50,000 Units total) by mouth every 7 days. 30 capsule 0    fenofibrate (TRICOR) 145 MG tablet See Instructions, TAKE 1 TABLET EVERY DAY, # 90 tab(s), 3 Refill(s), Pharmacy: [facility], 168, cm, Height/Length Dosing, 11/11/21 9:32:00 CST, 73.75, kg, Weight Dosing, 11/11/21 9:32:00 CST Strength: 145 mg 90 tablet 3    fluticasone propionate (FLONASE) 50 mcg/actuation nasal spray 2 sprays (100 mcg total) by Each Nostril route once daily. 16 g 11    furosemide (LASIX) 40 MG tablet Take 1 tablet (40 mg total) by mouth once daily. 30 tablet 1    gabapentin (NEURONTIN) 300 MG capsule Take 2 capsules (600 mg total) by mouth 3 (three) times daily. 182 capsule 0    glimepiride (AMARYL) 2 MG tablet Take 1 tablet (2 mg total) by mouth once daily. 90 tablet 3    HYDROcodone-acetaminophen (NORCO) 7.5-325 mg per tablet Take 1 tablet by mouth every 6 (six) hours as needed for Pain. 28 tablet 0    metoprolol succinate (TOPROL-XL) 25 MG 24 hr tablet Take 1 tablet (25 mg total) by mouth once daily. 90 tablet 3    ondansetron (ZOFRAN) 4 MG tablet Take 1 tablet (4 mg total) by mouth every 8 (eight) hours as needed for Nausea. 12 tablet 0    pantoprazole (PROTONIX) 40 MG tablet Take 1 tablet (40 mg total) by mouth 2 (two) times daily. 60 tablet 3     Past Medical History:   Diagnosis Date    SHA (acute kidney injury) 12/2023    Choledocholithiasis 12/2023    Chronic right shoulder pain 08/01/2022    Continue otc tylenol for pain management with short course hydrocodone only PRN for BT pain for short course    DM (diabetes mellitus)     GERD (gastroesophageal reflux disease)     HLD (hyperlipidemia)     HTN (hypertension)     Hydrocele  of testis 12/16/2023    Hypoxic respiratory failure 12/2023    Necrotizing pancreatitis 12/16/2023 12/2023    Sepsis due to Enterococcus faecalis 12/2023     Past Surgical History:   Procedure Laterality Date    APPENDECTOMY      CHOLECYSTECTOMY      ERCP N/A 12/17/2023    Procedure: ERCP (ENDOSCOPIC RETROGRADE CHOLANGIOPANCREATOGRAPHY);  Surgeon: Flako Kerns MD;  Location: [facility];  Service: Gastroenterology;  Laterality: N/A;    ERCP W/ SPHICTEROTOMY  12/17/2023    Procedure: ERCP, WITH SPHINCTEROTOMY;  Surgeon: Flako Kerns MD;  Location: [facility] OR;  Service: Gastroenterology;;    ERCP, WITH CALCULUS REMOVAL  12/17/2023    Procedure: ERCP, WITH CALCULUS REMOVAL;  Surgeon: lFako Kerns MD;  Location: [facility] OR;  Service: Gastroenterology;;     Review of patient's allergies indicates:  No Known Allergies  History reviewed. No pertinent family history.   Social History     Socioeconomic History    Marital status:    Tobacco Use    Smoking status: Every Day     Current packs/day: 1.00     Types: Cigarettes    Smokeless tobacco: Never   Substance and Sexual Activity    Alcohol use: Never    Drug use: Never    Sexual activity: Yes     Social Determinants of Health     Financial Resource Strain: Low Risk  (8/8/2023)    Overall Financial Resource Strain (CARDIA)     Difficulty of Paying Living Expenses: Not hard at all   Food Insecurity: No Food Insecurity (8/8/2023)    Hunger Vital Sign     Worried About Running Out of Food in the Last Year: Never true     Ran Out of Food in the Last Year: Never true   Transportation Needs: No Transportation Needs (12/19/2023)    PRAPARE - Transportation     Lack of Transportation (Medical): No     Lack of Transportation (Non-Medical): No   Stress: No Stress Concern Present (8/8/2023)    [employer] - Occupational Stress Questionnaire     Feeling of Stress : Only a little   Social Connections: Unknown (1/25/2024)     "Social Connection and Isolation Panel [NHANES]     Marital Status:    Housing Stability: Low Risk  (12/19/2023)    Housing Stability Vital Sign     Unable to Pay for Housing in the Last Year: No     Number of Places Lived in the Last Year: 1     Unstable Housing in the Last Year: No     Patient Care Team:  Snehal Arroyo MD as PCP - General (Family Medicine)   Subjective:   ROS  See HPI for details  All Other ROS: Negative except as stated in HPI.   Objective:   BP (!) 81/51   Pulse 94   Temp 98.5 °F (36.9 °C)   Ht 5' 6" (1.676 m)   Wt 55.7 kg (122 lb 12.8 oz)   SpO2 96%   BMI 19.82 kg/m²   Physical Exam  Constitutional:       Comments: Thin appearing   Cardiovascular:      Rate and Rhythm: Normal rate and regular rhythm.      Heart sounds: Normal heart sounds.      Comments: No BLE edema  Pulmonary:      Effort: Pulmonary effort is normal.      Breath sounds: Normal breath sounds. No wheezing or rhonchi.   Abdominal:      General: Abdomen is flat. Bowel sounds are normal.      Palpations: Abdomen is soft.      Tenderness: There is no abdominal tenderness. There is no guarding.   Neurological:      Mental Status: He is alert and oriented to person, place, and time.       Assessment:       ICD-10-CM ICD-9-CM   1. Hospital discharge follow-up  Z09 V67.59   2. Hypotension, unspecified hypotension type  I95.9 458.9   3. Diastolic congestive heart failure, unspecified HF chronicity  I50.30 428.30     428.0   4. Depression, unspecified depression type  F32.A 311   5. Type 2 diabetes mellitus with stage 3a chronic kidney disease, without long-term current use of insulin  E11.22 250.40    N18.31 585.3      Plan:   1. Hospital discharge follow-up  Assessment & Plan:  Continue f/u with specialist  Advance diet as tolerated  ED precautions      2. Hypotension, unspecified hypotension type  Assessment & Plan:  Stop Lotrel for now 2/2 hypotension. Lasix prn for edema      3. Diastolic congestive heart failure, " unspecified HF chronicity  Assessment & Plan:  Stable  Stop Lotrel for now 2/2 hypotension. Lasix prn for edema          4. Depression, unspecified depression type  Assessment & Plan:  Monitor for improvement as vitals improve  If still depressed, consider starting Remeron  ED precautions      5. Type 2 diabetes mellitus with stage 3a chronic kidney disease, without long-term current use of insulin  Assessment & Plan:  Since pt not eating much and feeling weak, stop Glimepiride for now  Continue monitoring CBGs at home           Medication List with Changes/Refills   Current Medications    ALBUTEROL (PROVENTIL/VENTOLIN HFA) 90 MCG/ACTUATION INHALER    Inhale 2 puffs into the lungs every 4 (four) hours as needed for Wheezing. Rescue       Start Date: --        End Date: --    AMLODIPINE-BENAZEPRIL 10-20MG (LOTREL) 10-20 MG PER CAPSULE    Take 1 capsule by mouth once daily.       Start Date: 10/19/2023End Date: --    ASPIRIN 81 MG CHEW    Take 81 mg by mouth once daily.       Start Date: --        End Date: --    ERGOCALCIFEROL (ERGOCALCIFEROL) 50,000 UNIT CAP    Take 1 capsule (50,000 Units total) by mouth every 7 days.       Start Date: 9/25/2023 End Date: --    FENOFIBRATE (TRICOR) 145 MG TABLET    See Instructions, TAKE 1 TABLET EVERY DAY, # 90 tab(s), 3 Refill(s), Pharmacy: [facility], 168, cm, Height/Length Dosing, 11/11/21 9:32:00 CST, 73.75, kg, Weight Dosing, 11/11/21 9:32:00 CST Strength: 145 mg       Start Date: 10/10/2023End Date: --    FLUTICASONE PROPIONATE (FLONASE) 50 MCG/ACTUATION NASAL SPRAY    2 sprays (100 mcg total) by Each Nostril route once daily.       Start Date: 8/8/2023  End Date: --    FUROSEMIDE (LASIX) 40 MG TABLET    Take 1 tablet (40 mg total) by mouth once daily.       Start Date: 1/4/2024  End Date: --    GABAPENTIN (NEURONTIN) 300 MG CAPSULE    Take 2 capsules (600 mg total) by mouth 3 (three) times daily.       Start Date: 1/20/2024 End Date: --    GLIMEPIRIDE  (AMARYL) 2 MG TABLET    Take 1 tablet (2 mg total) by mouth once daily.       Start Date: 10/19/2023End Date: --    HYDROCODONE-ACETAMINOPHEN (NORCO) 7.5-325 MG PER TABLET    Take 1 tablet by mouth every 6 (six) hours as needed for Pain.       Start Date: 1/26/2024 End Date: 2/2/2024    METOPROLOL SUCCINATE (TOPROL-XL) 25 MG 24 HR TABLET    Take 1 tablet (25 mg total) by mouth once daily.       Start Date: 10/19/2023End Date: --    ONDANSETRON (ZOFRAN) 4 MG TABLET    Take 1 tablet (4 mg total) by mouth every 8 (eight) hours as needed for Nausea.       Start Date: 1/4/2024  End Date: --    PANTOPRAZOLE (PROTONIX) 40 MG TABLET    Take 1 tablet (40 mg total) by mouth 2 (two) times daily.       Start Date: 1/4/2024  End Date: --    POLYETHYLENE GLYCOL (GLYCOLAX) 17 GRAM PWPK    Take 17 g by mouth 2 (two) times daily as needed for Constipation ((Second Choice)).       Start Date: 1/4/2024  End Date: --        Follow up in about 2 weeks (around 2/15/2024) for hospital f/u, hypotension, Depression, Weight loss. In addition to their scheduled follow up, the patient has also been instructed to follow up on as needed basis.

## 2024-02-01 NOTE — ASSESSMENT & PLAN NOTE
Monitor for improvement as vitals improve  If still depressed, consider starting Remeron  ED precautions

## 2024-02-02 ENCOUNTER — TELEPHONE (OUTPATIENT)
Dept: PRIMARY CARE CLINIC | Facility: CLINIC | Age: 69
End: 2024-02-02
Payer: MEDICARE

## 2024-02-02 DIAGNOSIS — F32.A DEPRESSION, UNSPECIFIED DEPRESSION TYPE: Primary | ICD-10-CM

## 2024-02-02 NOTE — TELEPHONE ENCOUNTER
----- Message from Jason Reid sent at 2/2/2024  8:19 AM CST -----  Regarding: call back  .Type:  Needs Medical Advice    Who Called: gale - wife  Symptoms (please be specific): not eating ., restless , night sweats   How long has patient had these symptoms:    Pharmacy name and phone #:  [email or web address]  Would the patient rather a call back or a response via [facility]?   Best Call Back Number: [phone]  Additional Information: pt wife states she came in yesterday and pt was offered a medication for his symptoms she is requesting a call back from the nurse to get it prescribed to him

## 2024-02-05 ENCOUNTER — TELEPHONE (OUTPATIENT)
Dept: PRIMARY CARE CLINIC | Facility: CLINIC | Age: 69
End: 2024-02-05
Payer: MEDICARE

## 2024-02-05 DIAGNOSIS — F32.A DEPRESSION, UNSPECIFIED DEPRESSION TYPE: ICD-10-CM

## 2024-02-05 RX ORDER — MIRTAZAPINE 7.5 MG/1
7.5 TABLET, FILM COATED ORAL NIGHTLY
Qty: 30 TABLET | Refills: 11 | Status: SHIPPED | OUTPATIENT
Start: 2024-02-05 | End: 2024-03-06

## 2024-02-05 RX ORDER — MIRTAZAPINE 7.5 MG/1
7.5 TABLET, FILM COATED ORAL NIGHTLY
Qty: 30 TABLET | Refills: 11 | Status: SHIPPED | OUTPATIENT
Start: 2024-02-05 | End: 2024-02-05 | Stop reason: SDUPTHER

## 2024-02-05 NOTE — TELEPHONE ENCOUNTER
----- Message from Jackie Urias sent at 2/5/2024  9:54 AM CST -----  .Type:  Patient Returning Call    Who Called:Melvina  Who Left Message for Patient:Wife  Does the patient know what this is regarding?:mirtazapine (REMERON) 7.5 MG Tab  Would the patient rather a call back or a response via [facility]?   Best Call Back Number:[phone]  Additional Information: Please resend to [facility] patient is home mirtazapine (REMERON) 7.5 MG Tab

## 2024-02-08 ENCOUNTER — OFFICE VISIT (OUTPATIENT)
Dept: INFECTIOUS DISEASES | Facility: CLINIC | Age: 69
End: 2024-02-08
Payer: MEDICARE

## 2024-02-08 ENCOUNTER — LAB VISIT (OUTPATIENT)
Dept: LAB | Facility: HOSPITAL | Age: 69
End: 2024-02-08
Payer: MEDICARE

## 2024-02-08 VITALS
BODY MASS INDEX: 18.48 KG/M2 | HEART RATE: 92 BPM | OXYGEN SATURATION: 98 % | WEIGHT: 115 LBS | RESPIRATION RATE: 18 BRPM | DIASTOLIC BLOOD PRESSURE: 72 MMHG | SYSTOLIC BLOOD PRESSURE: 107 MMHG | HEIGHT: 66 IN

## 2024-02-08 DIAGNOSIS — K85.92 ACUTE PANCREATITIS WITH INFECTED NECROSIS, UNSPECIFIED PANCREATITIS TYPE: ICD-10-CM

## 2024-02-08 DIAGNOSIS — R78.81 BACTEREMIA: ICD-10-CM

## 2024-02-08 DIAGNOSIS — N18.31 CHRONIC KIDNEY DISEASE, STAGE 3A: Primary | ICD-10-CM

## 2024-02-08 DIAGNOSIS — K85.91 NECROTIZING PANCREATITIS: ICD-10-CM

## 2024-02-08 LAB
ALBUMIN SERPL-MCNC: 3 G/DL (ref 3.4–4.8)
ALBUMIN/GLOB SERPL: 0.7 RATIO (ref 1.1–2)
ALP SERPL-CCNC: 60 UNIT/L (ref 40–150)
ALT SERPL-CCNC: 16 UNIT/L (ref 0–55)
AST SERPL-CCNC: 32 UNIT/L (ref 5–34)
BASOPHILS # BLD AUTO: 0.06 X10(3)/MCL
BASOPHILS NFR BLD AUTO: 0.6 %
BILIRUB SERPL-MCNC: 0.6 MG/DL
BUN SERPL-MCNC: 13.5 MG/DL (ref 8.4–25.7)
CALCIUM SERPL-MCNC: 9.4 MG/DL (ref 8.8–10)
CHLORIDE SERPL-SCNC: 105 MMOL/L (ref 98–107)
CO2 SERPL-SCNC: 27 MMOL/L (ref 23–31)
CREAT SERPL-MCNC: 1.25 MG/DL (ref 0.73–1.18)
EOSINOPHIL # BLD AUTO: 0.11 X10(3)/MCL (ref 0–0.9)
EOSINOPHIL NFR BLD AUTO: 1.2 %
ERYTHROCYTE [DISTWIDTH] IN BLOOD BY AUTOMATED COUNT: 14.5 % (ref 11.5–17)
GFR SERPLBLD CREATININE-BSD FMLA CKD-EPI: >60 MLS/MIN/1.73/M2
GLOBULIN SER-MCNC: 4.1 GM/DL (ref 2.4–3.5)
GLUCOSE SERPL-MCNC: 108 MG/DL (ref 82–115)
HCT VFR BLD AUTO: 41.9 % (ref 42–52)
HGB BLD-MCNC: 12.8 G/DL (ref 14–18)
IMM GRANULOCYTES # BLD AUTO: 0.02 X10(3)/MCL (ref 0–0.04)
IMM GRANULOCYTES NFR BLD AUTO: 0.2 %
LYMPHOCYTES # BLD AUTO: 3.24 X10(3)/MCL (ref 0.6–4.6)
LYMPHOCYTES NFR BLD AUTO: 35.1 %
MCH RBC QN AUTO: 29 PG (ref 27–31)
MCHC RBC AUTO-ENTMCNC: 30.5 G/DL (ref 33–36)
MCV RBC AUTO: 94.8 FL (ref 80–94)
MONOCYTES # BLD AUTO: 1.05 X10(3)/MCL (ref 0.1–1.3)
MONOCYTES NFR BLD AUTO: 11.4 %
NEUTROPHILS # BLD AUTO: 4.76 X10(3)/MCL (ref 2.1–9.2)
NEUTROPHILS NFR BLD AUTO: 51.5 %
NRBC BLD AUTO-RTO: 0 %
PLATELET # BLD AUTO: 376 X10(3)/MCL (ref 130–400)
PMV BLD AUTO: 9.4 FL (ref 7.4–10.4)
POTASSIUM SERPL-SCNC: 4.1 MMOL/L (ref 3.5–5.1)
PROT SERPL-MCNC: 7.1 GM/DL (ref 5.8–7.6)
RBC # BLD AUTO: 4.42 X10(6)/MCL (ref 4.7–6.1)
SODIUM SERPL-SCNC: 139 MMOL/L (ref 136–145)
WBC # SPEC AUTO: 9.24 X10(3)/MCL (ref 4.5–11.5)

## 2024-02-08 PROCEDURE — 3288F FALL RISK ASSESSMENT DOCD: CPT | Mod: CPTII,S$GLB,,

## 2024-02-08 PROCEDURE — 1101F PT FALLS ASSESS-DOCD LE1/YR: CPT | Mod: CPTII,S$GLB,,

## 2024-02-08 PROCEDURE — 85025 COMPLETE CBC W/AUTO DIFF WBC: CPT

## 2024-02-08 PROCEDURE — 3078F DIAST BP <80 MM HG: CPT | Mod: CPTII,S$GLB,,

## 2024-02-08 PROCEDURE — 3008F BODY MASS INDEX DOCD: CPT | Mod: CPTII,S$GLB,,

## 2024-02-08 PROCEDURE — 80053 COMPREHEN METABOLIC PANEL: CPT

## 2024-02-08 PROCEDURE — 1159F MED LIST DOCD IN RCRD: CPT | Mod: CPTII,S$GLB,,

## 2024-02-08 PROCEDURE — 1111F DSCHRG MED/CURRENT MED MERGE: CPT | Mod: CPTII,S$GLB,,

## 2024-02-08 PROCEDURE — 36415 COLL VENOUS BLD VENIPUNCTURE: CPT

## 2024-02-08 PROCEDURE — 3074F SYST BP LT 130 MM HG: CPT | Mod: CPTII,S$GLB,,

## 2024-02-08 PROCEDURE — 99215 OFFICE O/P EST HI 40 MIN: CPT | Mod: S$GLB,,,

## 2024-02-08 PROCEDURE — 99999 PR PBB SHADOW E&M-EST. PATIENT-LVL IV: CPT | Mod: PBBFAC,,,

## 2024-02-08 RX ORDER — HYDROCODONE BITARTRATE AND ACETAMINOPHEN 7.5; 325 MG/1; MG/1
1 TABLET ORAL EVERY 6 HOURS PRN
COMMUNITY
Start: 2024-01-26

## 2024-02-08 NOTE — PROGRESS NOTES
Subjective:       Patient ID: Franklin Macias 69 y.o.     Chief Complaint:   Chief Complaint   Patient presents with    hosp f/u...nec pancreatitis    PT C/O NIGHT SWEATS/ CHILLS/ SLEEPLESSNESS        HPI:  01/25/2024 hospital evaluation:   He is a 68-year-old male with a past medical history of diabetes mellitus, hypertension, CHF, and more recently necrotizing pancreatitis secondary to gallstone choledocholithiasis who we had evaluated during his last hospitalization at the beginning of this month.  Please see our last note from 01/19 for full history and course of prior hospitalizations, however patient was discharged on 1/20 on oral ciprofloxacin and Flagyl with plan to continue through 2/2.  GI was planning a possible endoscopy in a few weeks.  He presented back on 01/22 secondary to lightheadedness and weakness as well as patient's attention.  Noted to be hypotensive on admit and with an SHA.  Renal function and blood pressure improved with IV fluids.  Current CT with IV contrast showed persistent peripancreatic inflammation although fluid collections are slightly smaller, however new nonspecific gas within the largest peripancreatic fluid collection is noted.  He remains on ciprofloxacin and Flagyl.  Feeling much better overall, no significant complaints.  Wife is at bedside.  She does report she has recently spread out his daily medications throughout the day secondary to a large amount of oral medications in the morning, this includes antihypertensives.  Medications currently on hold, blood pressures now normalized.  We have been consulted for assistance with antimicrobials.     Hypotension, suspect noninfectious - med related vs SHA s/t IVVD vs combination  SHA, resolved  Necrotizing pancreatitis, fluid collections  Recent Enterococcus bacteremia s/t above, s/p treatment / resolution  Recent gallstone pancreatitis, s/p ERCP and sphincterotomy  H/o CHF / DM2 / HTN     PLAN:  Stable off abx. Antibitoics  discontinued on 1/23/24  Has done well off antibiotics, no evidence of fevers or hypotension. Continue holding off and follow up with us as scheduled outpatient.   Will sign off.  Please call if need arises.     02/08/2024 office visit:   Mr. Macias presents for follow-up today.  He has been off of antibiotics for almost 2 weeks now.  He has experienced night sweats up to 3-4 times nightly in which he had to change his clothes.  They are still occurring nightly however down to once a night.  He did have 1 episode of nausea however denies any vomiting or diarrhea.  He denies any fevers, but he has not checked his temperature.  He denies any chills.  He does have some left upper quadrant abdominal pain at times. Has quit smoking since December 16th!!!      Past Medical History:   Diagnosis Date    SHA (acute kidney injury) 12/2023    Choledocholithiasis 12/2023    Chronic right shoulder pain 08/01/2022    Continue otc tylenol for pain management with short course hydrocodone only PRN for BT pain for short course    DM (diabetes mellitus)     GERD (gastroesophageal reflux disease)     HLD (hyperlipidemia)     HTN (hypertension)     Hydrocele of testis 12/16/2023    Hypoxic respiratory failure 12/2023    Necrotizing pancreatitis 12/16/2023 12/2023    Sepsis due to Enterococcus faecalis 12/2023        Past Surgical History:   Procedure Laterality Date    APPENDECTOMY      CHOLECYSTECTOMY      ERCP N/A 12/17/2023    Procedure: ERCP (ENDOSCOPIC RETROGRADE CHOLANGIOPANCREATOGRAPHY);  Surgeon: Flako Kerns MD;  Location: [facility] OR;  Service: Gastroenterology;  Laterality: N/A;    ERCP W/ SPHICTEROTOMY  12/17/2023    Procedure: ERCP, WITH SPHINCTEROTOMY;  Surgeon: Flako Kerns MD;  Location: [facility] OR;  Service: Gastroenterology;;    ERCP, WITH CALCULUS REMOVAL  12/17/2023    Procedure: ERCP, WITH CALCULUS REMOVAL;  Surgeon: Flako Kerns MD;  Location: [facility] OR;  Service: Gastroenterology;;         Social History     Socioeconomic History    Marital status:    Tobacco Use    Smoking status: Every Day     Current packs/day: 1.00     Types: Cigarettes    Smokeless tobacco: Never   Substance and Sexual Activity    Alcohol use: Never    Drug use: Never    Sexual activity: Yes     Social Determinants of Health     Financial Resource Strain: Low Risk  (8/8/2023)    Overall Financial Resource Strain (CARDIA)     Difficulty of Paying Living Expenses: Not hard at all   Food Insecurity: No Food Insecurity (8/8/2023)    Hunger Vital Sign     Worried About Running Out of Food in the Last Year: Never true     Ran Out of Food in the Last Year: Never true   Transportation Needs: No Transportation Needs (12/19/2023)    PRAPARE - Transportation     Lack of Transportation (Medical): No     Lack of Transportation (Non-Medical): No   Stress: No Stress Concern Present (8/8/2023)    [employer] - Occupational Stress Questionnaire     Feeling of Stress : Only a little   Social Connections: Unknown (1/25/2024)    Social Connection and Isolation Panel [NHANES]     Marital Status:    Housing Stability: Low Risk  (12/19/2023)    Housing Stability Vital Sign     Unable to Pay for Housing in the Last Year: No     Number of Places Lived in the Last Year: 1     Unstable Housing in the Last Year: No        History reviewed. No pertinent family history.     Review of patient's allergies indicates:  No Known Allergies     Immunization History   Administered Date(s) Administered    COVID-19, MRNA, LN-S, PF (Pfizer) (Purple Cap) 03/05/2021, 03/26/2021    Influenza (FLUAD) - Quadrivalent - Adjuvanted - PF *Preferred* (65+) 10/13/2022, 10/04/2023    Influenza - Quadrivalent - MDCK - PF 10/03/2017    Influenza - Quadrivalent - PF *Preferred* (6 months and older) 10/21/2019, 09/18/2020, 09/21/2021    Influenza - Trivalent - PF (ADULT) 09/29/2014, 10/01/2015, 10/15/2018, 10/15/2018, 10/21/2019     "Pneumococcal Conjugate - 13 Valent 08/15/2017    Pneumococcal Polysaccharide - 23 Valent 11/11/2021        Review of Systems   All other systems reviewed and are negative.         Objective:      /72 (BP Location: Right arm)   Pulse 92   Resp 18   Ht 5' 6" (1.676 m)   Wt 52.2 kg (115 lb)   SpO2 98%   BMI 18.56 kg/m²      Physical Exam  Vitals reviewed.   Constitutional:       General: He is not in acute distress.     Appearance: Normal appearance. He is ill-appearing. He is not toxic-appearing.   HENT:      Mouth/Throat:      Mouth: Mucous membranes are moist.      Pharynx: No oropharyngeal exudate or posterior oropharyngeal erythema.   Eyes:      General: No scleral icterus.     Conjunctiva/sclera: Conjunctivae normal.      Pupils: Pupils are equal, round, and reactive to light.   Cardiovascular:      Rate and Rhythm: Normal rate and regular rhythm.      Pulses: Normal pulses.      Heart sounds: No murmur heard.  Pulmonary:      Effort: Pulmonary effort is normal. No respiratory distress.      Breath sounds: Normal breath sounds.   Abdominal:      General: Abdomen is flat. Bowel sounds are normal.      Palpations: Abdomen is soft.      Tenderness: There is no abdominal tenderness.      Comments: No tenderness to palpation on my exam   Musculoskeletal:         General: No swelling.      Cervical back: Normal range of motion and neck supple.   Lymphadenopathy:      Cervical: No cervical adenopathy.   Skin:     General: Skin is warm and dry.      Findings: No rash.   Neurological:      General: No focal deficit present.      Mental Status: He is alert and oriented to person, place, and time.   Psychiatric:         Mood and Affect: Mood normal.         Behavior: Behavior normal.          Labs: Reviewed most recent relevant labs available, notable results highlighted in this note    Imaging: Reviewed most recent relevant imaging studies available, notable results highlighted in this note    Assessment:     "   Problem List Items Addressed This Visit          Renal/    Chronic kidney disease, stage 3a - Primary       GI    RESOLVED: Necrotizing pancreatitis     Other Visit Diagnoses       Bacteremia        Relevant Orders    CBC Auto Differential (Completed)    Comprehensive Metabolic Panel (Completed)    Acute pancreatitis with infected necrosis, unspecified pancreatitis type        Relevant Orders    CBC Auto Differential (Completed)    Comprehensive Metabolic Panel (Completed)               Plan:   -Mr. Macias has completed a course of antibiotics secondary to Enterococcus bacteremia and necrotizing pancreatitis.  Antibiotics were discontinued on 01/23/2024 while inpatient.  -he does complain of night sweats especially upon discharge from hospital however right upon discharge night sweats were happening about 3-4 times nightly and have now decreased to once a night.  He has no documented febrile episodes.   -we will recheck CBC and CMP today  -he does have a follow-up with GI physician in April.  I advised patient and his wife to contact GI physician with any increase in abdominal pain especially with ongoing monitoring of necrotizing pancreatitis.   -I advised patient to check his temperature especially when night sweats a cure.  If he does run fever we will consider repeat abdominal imaging.   -continue to monitor for systemic signs and symptoms of infection while off of antibiotics  -we will follow up again in 3 weeks  -I congratulated him on his efforts to continue to quit smoking.  He has been able to completely stop smoking since December 16, 2023!    Follow up in about 3 weeks (around 2/29/2024).    45 minutes of total time spent on the encounter, which includes face to face time and non-face to face time preparing to see the patient (eg, review of tests), Obtaining and/or reviewing separately obtained history, Documenting clinical information in the electronic or other health record, Independently  interpreting results (not separately reported) and communicating results to the patient/family/caregiver, or Care coordination (not separately reported).

## 2024-02-09 ENCOUNTER — PATIENT OUTREACH (OUTPATIENT)
Dept: ADMINISTRATIVE | Facility: HOSPITAL | Age: 69
End: 2024-02-09
Payer: MEDICARE

## 2024-02-14 ENCOUNTER — HOSPITAL ENCOUNTER (EMERGENCY)
Facility: HOSPITAL | Age: 69
Discharge: HOME | End: 2024-02-14
Payer: MEDICARE

## 2024-02-14 ENCOUNTER — TELEPHONE (OUTPATIENT)
Dept: PRIMARY CARE CLINIC | Facility: CLINIC | Age: 69
End: 2024-02-14
Payer: MEDICARE

## 2024-02-14 VITALS
DIASTOLIC BLOOD PRESSURE: 73 MMHG | BODY MASS INDEX: 19.37 KG/M2 | RESPIRATION RATE: 17 BRPM | HEART RATE: 105 BPM | OXYGEN SATURATION: 97 % | SYSTOLIC BLOOD PRESSURE: 104 MMHG | WEIGHT: 120 LBS | TEMPERATURE: 98 F

## 2024-02-14 DIAGNOSIS — R06.6 HICCUPS: ICD-10-CM

## 2024-02-14 LAB
ALBUMIN SERPL-MCNC: 2.5 G/DL (ref 3.4–4.8)
ALBUMIN/GLOB SERPL: 0.6 RATIO (ref 1.1–2)
ALP SERPL-CCNC: 71 UNIT/L (ref 40–150)
ALT SERPL-CCNC: 15 UNIT/L (ref 0–55)
APPEARANCE UR: ABNORMAL
AST SERPL-CCNC: 26 UNIT/L (ref 5–34)
BACTERIA #/AREA URNS AUTO: ABNORMAL /HPF
BASOPHILS # BLD AUTO: 0.08 X10(3)/MCL
BASOPHILS NFR BLD AUTO: 0.6 %
BILIRUB SERPL-MCNC: 1.3 MG/DL
BILIRUB UR QL STRIP.AUTO: NEGATIVE
BUN SERPL-MCNC: 15.4 MG/DL (ref 8.4–25.7)
CALCIUM SERPL-MCNC: 10 MG/DL (ref 8.8–10)
CHLORIDE SERPL-SCNC: 96 MMOL/L (ref 98–107)
CO2 SERPL-SCNC: 26 MMOL/L (ref 23–31)
COLOR UR AUTO: YELLOW
CREAT SERPL-MCNC: 1.12 MG/DL (ref 0.73–1.18)
EOSINOPHIL # BLD AUTO: 0.01 X10(3)/MCL (ref 0–0.9)
EOSINOPHIL NFR BLD AUTO: 0.1 %
ERYTHROCYTE [DISTWIDTH] IN BLOOD BY AUTOMATED COUNT: 14.8 % (ref 11.5–17)
GFR SERPLBLD CREATININE-BSD FMLA CKD-EPI: >60 MLS/MIN/1.73/M2
GLOBULIN SER-MCNC: 4.5 GM/DL (ref 2.4–3.5)
GLUCOSE SERPL-MCNC: 140 MG/DL (ref 82–115)
GLUCOSE UR QL STRIP.AUTO: ABNORMAL
HCT VFR BLD AUTO: 36.9 % (ref 42–52)
HGB BLD-MCNC: 12.2 G/DL (ref 14–18)
HYALINE CASTS #/AREA URNS LPF: >20 /LPF
IMM GRANULOCYTES # BLD AUTO: 0.1 X10(3)/MCL (ref 0–0.04)
IMM GRANULOCYTES NFR BLD AUTO: 0.8 %
INR PPP: 1.2
KETONES UR QL STRIP.AUTO: NEGATIVE
LEUKOCYTE ESTERASE UR QL STRIP.AUTO: NEGATIVE
LIPASE SERPL-CCNC: 34 U/L
LYMPHOCYTES # BLD AUTO: 2.19 X10(3)/MCL (ref 0.6–4.6)
LYMPHOCYTES NFR BLD AUTO: 17.1 %
MCH RBC QN AUTO: 29.5 PG (ref 27–31)
MCHC RBC AUTO-ENTMCNC: 33.1 G/DL (ref 33–36)
MCV RBC AUTO: 89.1 FL (ref 80–94)
MONOCYTES # BLD AUTO: 1.39 X10(3)/MCL (ref 0.1–1.3)
MONOCYTES NFR BLD AUTO: 10.9 %
MUCOUS THREADS URNS QL MICRO: ABNORMAL /LPF
NEUTROPHILS # BLD AUTO: 9.03 X10(3)/MCL (ref 2.1–9.2)
NEUTROPHILS NFR BLD AUTO: 70.5 %
NITRITE UR QL STRIP.AUTO: NEGATIVE
NRBC BLD AUTO-RTO: 0 %
OHS QRS DURATION: 80 MS
OHS QTC CALCULATION: 492 MS
PH UR STRIP.AUTO: 5.5 [PH]
PLATELET # BLD AUTO: 304 X10(3)/MCL (ref 130–400)
PMV BLD AUTO: 9.9 FL (ref 7.4–10.4)
POTASSIUM SERPL-SCNC: 3.8 MMOL/L (ref 3.5–5.1)
PROT SERPL-MCNC: 7 GM/DL (ref 5.8–7.6)
PROT UR QL STRIP.AUTO: ABNORMAL
PROTHROMBIN TIME: 15.5 SECONDS (ref 12.5–14.5)
RBC # BLD AUTO: 4.14 X10(6)/MCL (ref 4.7–6.1)
RBC #/AREA URNS AUTO: ABNORMAL /HPF
RBC UR QL AUTO: ABNORMAL
SODIUM SERPL-SCNC: 131 MMOL/L (ref 136–145)
SP GR UR STRIP.AUTO: 1.02 (ref 1–1.03)
SQUAMOUS #/AREA URNS LPF: ABNORMAL /HPF
TROPONIN I SERPL-MCNC: <0.01 NG/ML (ref 0–0.04)
UROBILINOGEN UR STRIP-ACNC: 2
WBC # SPEC AUTO: 12.8 X10(3)/MCL (ref 4.5–11.5)
WBC #/AREA URNS AUTO: ABNORMAL /HPF

## 2024-02-14 PROCEDURE — 80053 COMPREHEN METABOLIC PANEL: CPT | Performed by: NURSE PRACTITIONER

## 2024-02-14 PROCEDURE — 83690 ASSAY OF LIPASE: CPT | Performed by: NURSE PRACTITIONER

## 2024-02-14 PROCEDURE — 85025 COMPLETE CBC W/AUTO DIFF WBC: CPT | Performed by: NURSE PRACTITIONER

## 2024-02-14 PROCEDURE — 99283 EMERGENCY DEPT VISIT LOW MDM: CPT | Mod: 25

## 2024-02-14 PROCEDURE — 85610 PROTHROMBIN TIME: CPT | Performed by: NURSE PRACTITIONER

## 2024-02-14 PROCEDURE — 84484 ASSAY OF TROPONIN QUANT: CPT | Performed by: NURSE PRACTITIONER

## 2024-02-14 PROCEDURE — 93010 ELECTROCARDIOGRAM REPORT: CPT | Mod: ,,, | Performed by: INTERNAL MEDICINE

## 2024-02-14 PROCEDURE — 81001 URINALYSIS AUTO W/SCOPE: CPT | Performed by: NURSE PRACTITIONER

## 2024-02-14 PROCEDURE — 84484 ASSAY OF TROPONIN QUANT: CPT

## 2024-02-14 PROCEDURE — 93005 ELECTROCARDIOGRAM TRACING: CPT

## 2024-02-14 NOTE — TELEPHONE ENCOUNTER
----- Message from Ronny Paniagua sent at 2/14/2024  2:56 PM CST -----  .Type:  Needs Medical Advice    Who Called: pt's wife Susan  Symptoms (please be specific):    How long has patient had these symptoms:    Pharmacy name and phone #:    Would the patient rather a call back or a response via [facility]? Call back  Best Call Back Number: [phone]  Additional Information: recent ER visit

## 2024-02-14 NOTE — FIRST PROVIDER EVALUATION
Medical screening examination initiated.  I have conducted a focused provider triage encounter, findings are as follows:    Brief history of present illness:  Patient states blood in his urine and dysuria x 2 days. States constant hiccups x 2-3 days. Patient was recently admitted for necrotizing pancreatitis.     There were no vitals filed for this visit.    Pertinent physical exam:  Awake, alert, ambulatory    Brief workup plan:  Labs, EKG    Preliminary workup initiated; this workup will be continued and followed by the physician or advanced practice provider that is assigned to the patient when roomed.

## 2024-02-14 NOTE — TELEPHONE ENCOUNTER
Susan, pt's wife, stated the pt has had hiccups for two days and hematuria. Pt visited ED and left without seeing the provider. Pt is requesting lab results. Susan stated the pt has not eaten since 2- at 8 am. Please advise. Thanks

## 2024-02-15 ENCOUNTER — TELEPHONE (OUTPATIENT)
Dept: INFECTIOUS DISEASES | Facility: CLINIC | Age: 69
End: 2024-02-15
Payer: MEDICARE

## 2024-02-15 ENCOUNTER — PATIENT OUTREACH (OUTPATIENT)
Dept: EMERGENCY MEDICINE | Facility: HOSPITAL | Age: 69
End: 2024-02-15
Payer: MEDICARE

## 2024-02-15 ENCOUNTER — TELEPHONE (OUTPATIENT)
Dept: PRIMARY CARE CLINIC | Facility: CLINIC | Age: 69
End: 2024-02-15
Payer: MEDICARE

## 2024-02-15 NOTE — TELEPHONE ENCOUNTER
----- Message from Samantha Chin sent at 2/15/2024  8:33 AM CST -----  Regarding: return call  Type:  Patient Returning Call    Who Called: pt wife (jessica)    Who Left Message for Patient: isidoro    Does the patient know what this is regarding?: health issues    Would the patient rather a call back or a response via [facility]?  Yes      Best Call Back Number: [phone]    Additional Information:   pt wife says  is still having hiccups issues, and still not having an appetite, please advise, thanks

## 2024-02-15 NOTE — PROGRESS NOTES
Patient eloped from the ED on 2/14/24 for Hematuria. Patient's wife stated the wait was going to be 3-4 hours and her  was in a lot of pain so they left without being seen. A post ED 7-day follow up with Dr Arroyo is scheduled for 2/23/24 at 10:15 am. Ms Hein stated her  is still having blood in his urine, pain and difficulty eating. A staff message was sent to PCP's staff as well as Infectious disease for them to reach out to patient's wife regarding continued issues and to see if an earlier follow up can be made.

## 2024-02-15 NOTE — TELEPHONE ENCOUNTER
Mr. Macias's wife our clinic regarding new onset of hematuria on Monday/Tuesday as well as decrease in appetite beginning on Saturday night.  He did present to the ER yesterday however left AMA secondary to wait time.  He he denies any abdominal pain.  Does have pain and bilateral flank areas.  He is having normal bowel movements in his last 1 was this morning.  He denies any fevers, however wife just took his temperature and it was 100.1.      UA from 2/14 with 0-5 RBCs, 6-10 WBCs, and +1 blood.  PT is 15.5 lipase 34.  CBC shows white blood cell count of 12.8 as well as sodium of 131, creatinine of 1.12, and normal liver enzymes.    At this point I am not sure the underlying etiology of the hematuria or the bilateral flank plain.  Recommended that he report back to the ER for further evaluation especially with CT abdomen/pelvis and repeat labs if indicated.  I discussed this plan with Ms. Macias who reports that she will attempt to persuade her  to report back to the ER for further evaluation.

## 2024-02-15 NOTE — TELEPHONE ENCOUNTER
----- Message from Inna Gibbons MA sent at 2/15/2024 10:28 AM CST -----  Regarding: Blood in urine  Good morning/afternoon, this patient is a part of the [facility] quality work for the system and is a patient with two or more chronic conditions and requires a Post ED 7-day appt. Discharged on 2/14/24. Patient's wife Melvina is requesting a call back regarding her  having continued blood in his urine, pain and difficulty eating. She would like to know if her  can be seen earlier than 2/23/24.

## 2024-02-16 ENCOUNTER — HOSPITAL ENCOUNTER (EMERGENCY)
Facility: HOSPITAL | Age: 69
Discharge: HOME | End: 2024-02-16
Attending: EMERGENCY MEDICINE
Payer: MEDICARE

## 2024-02-16 VITALS
OXYGEN SATURATION: 98 % | HEART RATE: 87 BPM | DIASTOLIC BLOOD PRESSURE: 68 MMHG | RESPIRATION RATE: 19 BRPM | WEIGHT: 120 LBS | HEIGHT: 66 IN | SYSTOLIC BLOOD PRESSURE: 110 MMHG | BODY MASS INDEX: 19.29 KG/M2 | TEMPERATURE: 98 F

## 2024-02-16 DIAGNOSIS — R31.9 HEMATURIA, UNSPECIFIED TYPE: Primary | ICD-10-CM

## 2024-02-16 LAB
ABS NEUT (OLG): 10.05 X10(3)/MCL (ref 2.1–9.2)
ANION GAP SERPL CALC-SCNC: 10 MEQ/L
APPEARANCE UR: CLEAR
BACTERIA #/AREA URNS AUTO: ABNORMAL /HPF
BILIRUB UR QL STRIP.AUTO: NEGATIVE
BUN SERPL-MCNC: 17.9 MG/DL (ref 8.4–25.7)
CALCIUM SERPL-MCNC: 9.7 MG/DL (ref 8.8–10)
CHLORIDE SERPL-SCNC: 95 MMOL/L (ref 98–107)
CK SERPL-CCNC: 7 U/L (ref 30–200)
CO2 SERPL-SCNC: 27 MMOL/L (ref 23–31)
COLOR UR AUTO: YELLOW
CREAT SERPL-MCNC: 1.06 MG/DL (ref 0.73–1.18)
CREAT/UREA NIT SERPL: 17
ERYTHROCYTE [DISTWIDTH] IN BLOOD BY AUTOMATED COUNT: 15.1 % (ref 11.5–17)
FLUAV AG UPPER RESP QL IA.RAPID: NOT DETECTED
FLUBV AG UPPER RESP QL IA.RAPID: NOT DETECTED
GFR SERPLBLD CREATININE-BSD FMLA CKD-EPI: >60 MLS/MIN/1.73/M2
GLUCOSE SERPL-MCNC: 136 MG/DL (ref 82–115)
GLUCOSE UR QL STRIP.AUTO: ABNORMAL
HCT VFR BLD AUTO: 34.5 % (ref 42–52)
HGB BLD-MCNC: 11 G/DL (ref 14–18)
HYALINE CASTS #/AREA URNS LPF: ABNORMAL /LPF
INSTRUMENT WBC (OLG): 11.96 X10(3)/MCL
KETONES UR QL STRIP.AUTO: NEGATIVE
LEUKOCYTE ESTERASE UR QL STRIP.AUTO: NEGATIVE
LYMPHOCYTES NFR BLD MANUAL: 1.32 X10(3)/MCL
LYMPHOCYTES NFR BLD MANUAL: 11 %
MCH RBC QN AUTO: 28.9 PG (ref 27–31)
MCHC RBC AUTO-ENTMCNC: 31.9 G/DL (ref 33–36)
MCV RBC AUTO: 90.6 FL (ref 80–94)
MONOCYTES NFR BLD MANUAL: 0.6 X10(3)/MCL (ref 0.1–1.3)
MONOCYTES NFR BLD MANUAL: 5 %
MUCOUS THREADS URNS QL MICRO: ABNORMAL /LPF
NEUTROPHILS NFR BLD MANUAL: 84 %
NITRITE UR QL STRIP.AUTO: NEGATIVE
NRBC BLD AUTO-RTO: 0 %
PH UR STRIP.AUTO: 5.5 [PH]
PLATELET # BLD AUTO: 314 X10(3)/MCL (ref 130–400)
PLATELET # BLD EST: NORMAL 10*3/UL
PMV BLD AUTO: 10.1 FL (ref 7.4–10.4)
POTASSIUM SERPL-SCNC: 3.8 MMOL/L (ref 3.5–5.1)
PROT UR QL STRIP.AUTO: ABNORMAL
RBC # BLD AUTO: 3.81 X10(6)/MCL (ref 4.7–6.1)
RBC #/AREA URNS AUTO: ABNORMAL /HPF
RBC MORPH BLD: NORMAL
RBC UR QL AUTO: ABNORMAL
SARS-COV-2 RNA RESP QL NAA+PROBE: NOT DETECTED
SODIUM SERPL-SCNC: 132 MMOL/L (ref 136–145)
SP GR UR STRIP.AUTO: 1.02 (ref 1–1.03)
SQUAMOUS #/AREA URNS LPF: ABNORMAL /HPF
UROBILINOGEN UR STRIP-ACNC: 2
WBC # SPEC AUTO: 11.96 X10(3)/MCL (ref 4.5–11.5)
WBC #/AREA URNS AUTO: ABNORMAL /HPF

## 2024-02-16 PROCEDURE — 82550 ASSAY OF CK (CPK): CPT

## 2024-02-16 PROCEDURE — 81001 URINALYSIS AUTO W/SCOPE: CPT | Performed by: EMERGENCY MEDICINE

## 2024-02-16 PROCEDURE — 80048 BASIC METABOLIC PNL TOTAL CA: CPT

## 2024-02-16 PROCEDURE — 63600175 PHARM REV CODE 636 W HCPCS

## 2024-02-16 PROCEDURE — 85027 COMPLETE CBC AUTOMATED: CPT

## 2024-02-16 PROCEDURE — 96360 HYDRATION IV INFUSION INIT: CPT

## 2024-02-16 PROCEDURE — 99284 EMERGENCY DEPT VISIT MOD MDM: CPT | Mod: 25

## 2024-02-16 PROCEDURE — 0240U COVID/FLU A&B PCR: CPT | Performed by: EMERGENCY MEDICINE

## 2024-02-16 RX ORDER — LEVOFLOXACIN 750 MG/1
750 TABLET ORAL DAILY
Qty: 7 TABLET | Refills: 0 | Status: SHIPPED | OUTPATIENT
Start: 2024-02-16 | End: 2024-02-23

## 2024-02-16 RX ADMIN — SODIUM CHLORIDE, POTASSIUM CHLORIDE, SODIUM LACTATE AND CALCIUM CHLORIDE 500 ML: 600; 310; 30; 20 INJECTION, SOLUTION INTRAVENOUS at 10:02

## 2024-02-16 NOTE — ED PROVIDER NOTES
Encounter Date: 2/16/2024       History     Chief Complaint   Patient presents with    Hematuria     C/o hematuria x2-3 days. Denies abd pain/n/v. C.o intermittent episodes of fever [id number]. Also reports decreased appetite x2 weeks.      69 year old make with a hx of necrotizing pancreatitis, choledocholithiasis who presents today with C/C hematuria x 2 days and intermittent fevers of 10 days in duration. Pt endorses decreased appetite and 40Lb weight loss since his recent hospitalization in December, 2023. 80+ pack/years smoking, quit about 3 months ago. He denies vomiting, abdominal pain, back pain, and dysuria    The history is provided by the patient and the spouse.     Review of patient's allergies indicates:   Allergen Reactions    Gabapentin Hallucinations     Past Medical History:   Diagnosis Date    SHA (acute kidney injury) 12/2023    Choledocholithiasis 12/2023    Chronic right shoulder pain 08/01/2022    Continue otc tylenol for pain management with short course hydrocodone only PRN for BT pain for short course    DM (diabetes mellitus)     GERD (gastroesophageal reflux disease)     HLD (hyperlipidemia)     HTN (hypertension)     Hydrocele of testis 12/16/2023    Hypoxic respiratory failure 12/2023    Necrotizing pancreatitis 12/16/2023 12/2023    Sepsis due to Enterococcus faecalis 12/2023     Past Surgical History:   Procedure Laterality Date    APPENDECTOMY      CHOLECYSTECTOMY      ERCP N/A 12/17/2023    Procedure: ERCP (ENDOSCOPIC RETROGRADE CHOLANGIOPANCREATOGRAPHY);  Surgeon: Flako Kerns MD;  Location: [facility];  Service: Gastroenterology;  Laterality: N/A;    ERCP W/ SPHICTEROTOMY  12/17/2023    Procedure: ERCP, WITH SPHINCTEROTOMY;  Surgeon: Flako Kerns MD;  Location: [facility] OR;  Service: Gastroenterology;;    ERCP, WITH CALCULUS REMOVAL  12/17/2023    Procedure: ERCP, WITH CALCULUS REMOVAL;  Surgeon: Flako Kerns MD;  Location: [facility] OR;  Service:  Gastroenterology;;     History reviewed. No pertinent family history.  Social History     Tobacco Use    Smoking status: Every Day     Current packs/day: 1.00     Types: Cigarettes    Smokeless tobacco: Never   Substance Use Topics    Alcohol use: Never    Drug use: Never     Review of Systems   Constitutional:  Positive for appetite change and fever. Negative for activity change and chills.   HENT:  Negative for congestion.    Respiratory:  Negative for chest tightness and shortness of breath.    Cardiovascular:  Negative for chest pain and palpitations.   Gastrointestinal:  Negative for abdominal distention, blood in stool, diarrhea and vomiting.   Genitourinary:  Positive for hematuria and scrotal swelling. Negative for dysuria, flank pain and urgency.   Skin:  Negative for rash.   Neurological:  Negative for dizziness, weakness and light-headedness.       Physical Exam     Initial Vitals [02/16/24 0630]   BP Pulse Resp Temp SpO2   101/63 98 18 97.2 °F (36.2 °C) 100 %      MAP       --         Physical Exam    Constitutional: He is not diaphoretic. No distress.   Looks frail   Eyes: Pupils are equal, round, and reactive to light.   Neck: Neck supple.   Cardiovascular:  Normal rate, regular rhythm, normal heart sounds and intact distal pulses.     Exam reveals no friction rub.       No murmur heard.  Pulmonary/Chest: Breath sounds normal. No respiratory distress. He has no wheezes. He has no rhonchi. He has no rales.   Abdominal: Abdomen is soft. Bowel sounds are normal. He exhibits no distension. There is no abdominal tenderness. There is no rebound and no guarding.   Genitourinary:    Genitourinary Comments: B/L Hydrocele noted     Musculoskeletal:      Cervical back: Neck supple.     Neurological: He is alert and oriented to person, place, and time. GCS score is 15. GCS eye subscore is 4. GCS verbal subscore is 5. GCS motor subscore is 6.   Skin: Skin is warm.   Psychiatric: His behavior is normal. Judgment  normal.         ED Course   Procedures  Labs Reviewed   URINALYSIS, REFLEX TO URINE CULTURE - Abnormal; Notable for the following components:       Result Value    Protein, UA Trace (*)     Glucose, UA 1+ (*)     Blood, UA 1+ (*)     Urobilinogen, UA 2.0 (*)     Squamous Epithelial Cells, UA Occasional (*)     Mucous, UA Trace (*)     Hyaline Casts, UA 3-5 (*)     All other components within normal limits   BASIC METABOLIC PANEL - Abnormal; Notable for the following components:    Sodium Level 132 (*)     Chloride 95 (*)     Glucose Level 136 (*)     All other components within normal limits   CK - Abnormal; Notable for the following components:    Creatine Kinase 7 (*)     All other components within normal limits   CBC WITH DIFFERENTIAL - Abnormal; Notable for the following components:    WBC 11.96 (*)     RBC 3.81 (*)     Hgb 11.0 (*)     Hct 34.5 (*)     MCHC 31.9 (*)     All other components within normal limits   COVID/FLU A&B PCR - Normal    Narrative:     The Xpert Xpress SARS-CoV-2/FLU/RSV plus is a rapid, multiplexed real-time PCR test intended for the simultaneous qualitative detection and differentiation of SARS-CoV-2, Influenza A, Influenza B, and respiratory syncytial virus (RSV) viral RNA in either nasopharyngeal swab or nasal swab specimens.         CBC W/ AUTO DIFFERENTIAL    Narrative:     The following orders were created for panel order CBC Auto Differential.  Procedure                               Abnormality         Status                     ---------                               -----------         ------                     CBC with Differential[[id number]]       Abnormal            Final result               Manual Differential[[id number]]                             In process                   Please view results for these tests on the individual orders.   MANUAL DIFFERENTIAL          Imaging Results    None          Medications   lactated ringers bolus 500 mL (500 mLs Intravenous New  Bag 2/16/24 1012)     Medical Decision Making  Amount and/or Complexity of Data Reviewed  Labs: ordered.                          Medical Decision Making:   Initial Assessment:   69 year old make with a hx of necrotizing pancreatitis, choledocholithiasis who presents today with C/C hematuria x 2 days and intermittent fevers of 10 days in duration. UA, CBC, bmp, CPK, COVID/flu/RSV ordered  Differential Diagnosis:   UTI, urolithiasis, bladder cancer, nephritic syndrome   Clinical Tests:   Lab Tests: Ordered  ED Management:  Tested negative for covid/flu/rsv. UA with 1+ blood, no rbc's. Review of CT abd/pelvis done in the last 3 months revealed no acute abnormalities. BMP/CBC with mild hyponatremia (132, higher from prior) and mild leukocytosis (11.96), and normocytic anemia (H/H 11/34.5). Patient is currently afebrile, given hematuria and mild leukocytosis, will cover with antibiotics and have him follow with PCP in 3 days and urology outpatient. ED precautions given ( return to ED if recurrent fevers, flank/abdominal pain, vomiting, etc...)  Other:   I have discussed this case with another health care provider.             Clinical Impression:  Final diagnoses:  [R31.9] Hematuria, unspecified type (Primary)          ED Disposition Condition    Discharge Stable          ED Prescriptions       Medication Sig Dispense Start Date End Date Auth. Provider    levoFLOXacin (LEVAQUIN) 750 MG tablet Take 1 tablet (750 mg total) by mouth once daily. for 7 days 7 tablet 2/16/2024 2/23/2024 Gregory Vasquez MD          Follow-up Information       Follow up With Specialties Details Why Contact Info    Snehal Arroyo MD Family Medicine In 1 week  [address] [address]  [phone]      Lula Wiggins DO Urology In 1 week  [address] [facility] [id number] [phone]               Gregory Vasquez MD  Resident  02/16/24 9719

## 2024-02-16 NOTE — ED TRIAGE NOTES
C/o hematuria x2-3 days. Denies abd pain/n/v. C.o intermittent episodes of fever [id number]. Also reports decreased appetite x2 weeks.

## 2024-02-16 NOTE — ED PROVIDER NOTES
Encounter Date: 2/16/2024       History     Chief Complaint   Patient presents with    Hematuria     C/o hematuria x2-3 days. Denies abd pain/n/v. C.o intermittent episodes of fever [id number]. Also reports decreased appetite x2 weeks.      HPI  Review of patient's allergies indicates:   Allergen Reactions    Gabapentin Hallucinations     Past Medical History:   Diagnosis Date    SHA (acute kidney injury) 12/2023    Choledocholithiasis 12/2023    Chronic right shoulder pain 08/01/2022    Continue otc tylenol for pain management with short course hydrocodone only PRN for BT pain for short course    DM (diabetes mellitus)     GERD (gastroesophageal reflux disease)     HLD (hyperlipidemia)     HTN (hypertension)     Hydrocele of testis 12/16/2023    Hypoxic respiratory failure 12/2023    Necrotizing pancreatitis 12/16/2023 12/2023    Sepsis due to Enterococcus faecalis 12/2023     Past Surgical History:   Procedure Laterality Date    APPENDECTOMY      CHOLECYSTECTOMY      ERCP N/A 12/17/2023    Procedure: ERCP (ENDOSCOPIC RETROGRADE CHOLANGIOPANCREATOGRAPHY);  Surgeon: Flako Kerns MD;  Location: [facility] OR;  Service: Gastroenterology;  Laterality: N/A;    ERCP W/ SPHICTEROTOMY  12/17/2023    Procedure: ERCP, WITH SPHINCTEROTOMY;  Surgeon: Flako Kerns MD;  Location: [facility] OR;  Service: Gastroenterology;;    ERCP, WITH CALCULUS REMOVAL  12/17/2023    Procedure: ERCP, WITH CALCULUS REMOVAL;  Surgeon: Flako Kerns MD;  Location: [facility] OR;  Service: Gastroenterology;;     History reviewed. No pertinent family history.  Social History     Tobacco Use    Smoking status: Every Day     Current packs/day: 1.00     Types: Cigarettes    Smokeless tobacco: Never   Substance Use Topics    Alcohol use: Never    Drug use: Never     Review of Systems    Physical Exam     Initial Vitals [02/16/24 0630]   BP Pulse Resp Temp SpO2   101/63 98 18 97.2 °F (36.2 °C) 100 %      MAP       --         Physical  Exam    ED Course   Procedures  Labs Reviewed   URINALYSIS, REFLEX TO URINE CULTURE - Abnormal; Notable for the following components:       Result Value    Protein, UA Trace (*)     Glucose, UA 1+ (*)     Blood, UA 1+ (*)     Urobilinogen, UA 2.0 (*)     Squamous Epithelial Cells, UA Occasional (*)     Mucous, UA Trace (*)     Hyaline Casts, UA 3-5 (*)     All other components within normal limits   COVID/FLU A&B PCR - Normal    Narrative:     The Xpert Xpress SARS-CoV-2/FLU/RSV plus is a rapid, multiplexed real-time PCR test intended for the simultaneous qualitative detection and differentiation of SARS-CoV-2, Influenza A, Influenza B, and respiratory syncytial virus (RSV) viral RNA in either nasopharyngeal swab or nasal swab specimens.         CBC W/ AUTO DIFFERENTIAL    Narrative:     The following orders were created for panel order CBC Auto Differential.  Procedure                               Abnormality         Status                     ---------                               -----------         ------                     CBC with Differential[[id number]]                           In process                   Please view results for these tests on the individual orders.   BASIC METABOLIC PANEL   CK   CBC WITH DIFFERENTIAL          Imaging Results    None          Medications   lactated ringers bolus 500 mL (has no administration in time range)     Medical Decision Making            Attending Attestation:   Physician Attestation Statement for Resident:  As the supervising MD   Physician Attestation Statement: I have personally seen and examined this patient.   I agree with the above history.  -: Patient presents with blood in his urine for several days.  Denies fever, chills, abdominal or back pain.  States it has happened before from time to time.  Denies bruising, bleeding from his gums, in his bowels or nosebleeds   As the supervising MD I agree with the above PE.   -: Frail-appearing male   As the  supervising MD I agree with the above treatment, course, plan, and disposition.   -: Patient with multiple recent CTs in the last 60 days without any bladder or kidney lesions.  Urine without any red blood cells 1+ blood, so will obtain CPK along with lab work and disposition accordingly.   I have reviewed and agree with the residents interpretation of the following: lab data.  I have reviewed the following: old records at this facility and old CTs.                                       Clinical Impression:  Final diagnoses:  [R31.9] Hematuria, unspecified type (Primary)                 Domingo Dee MD  02/16/24 1002

## 2024-02-21 NOTE — PROGRESS NOTES
Date: 02/23/2024  Patient ID: [id number]   Chief Complaint: Follow-up (Medication refills)    HPI:   Franklin Macias is a 69 y.o. male here today for Follow-up (Medication refills)  Last visit Lotrel was stopped due to hypotension; Lasix was placed as needed for edema; glimepiride was stopped since patient was not eating; patient was started on Remeron for depression  Patient has since followed up with ID where they recheck CBC and CMP and for patient to monitor for signs and symptoms of infection; patient to follow up in 3 weeks with them  He had ED visit for hematuria, was given antibiotics Levaquin.  CBC showed elevated WBC, hemoglobin 11, sodium 132, glucose 136, UA with no bacteria.  Patient was to follow up with Urology in 1 week. Patient stopped Levaquin after 3d 2/2 GI upset. However, sx improved    Currently patient feels overall better but has some congestion. Patient is eating more and needs Protonix bid. Has been walking and sitting outside more. Just feels wobbly with walking.   Does have testicular swelling that has been ongoing since 12/2023 but slightly improved, only painful when going to lay in bed. Almost out of of Norco 7.5mg prn and would like refill short course  Has appt with GI and ID soon    Patient Active Problem List   Diagnosis    Type 2 diabetes mellitus with stage 3a chronic kidney disease, without long-term current use of insulin    Chronic kidney disease, stage 3a    Hyperlipidemia    Chronic neck and back pain    Diastolic congestive heart failure    Hospital discharge follow-up    Hypotension    Depression    Gastroesophageal reflux disease    Postnasal drip    Testicular swelling, right     Outpatient Medications Marked as Taking for the 2/23/24 encounter (Office Visit) with Snehal Arroyo MD   Medication Sig Dispense Refill    albuterol (PROVENTIL/VENTOLIN HFA) 90 mcg/actuation inhaler Inhale 2 puffs into the lungs every 4 (four) hours as needed for Wheezing. Rescue       amlodipine-benazepril 10-20mg (LOTREL) 10-20 mg per capsule Take 1 capsule by mouth once daily. 90 capsule 3    aspirin 81 MG Chew Take 81 mg by mouth once daily.      ergocalciferol (ERGOCALCIFEROL) 50,000 unit Cap Take 1 capsule (50,000 Units total) by mouth every 7 days. 30 capsule 0    fenofibrate (TRICOR) 145 MG tablet See Instructions, TAKE 1 TABLET EVERY DAY, # 90 tab(s), 3 Refill(s), Pharmacy[facility], 168, cm, Height/Length Dosing, 11/11/21 9:32:00 CST, 73.75, kg, Weight Dosing, 11/11/21 9:32:00 CST Strength: 145 mg 90 tablet 3    fluticasone propionate (FLONASE) 50 mcg/actuation nasal spray 2 sprays (100 mcg total) by Each Nostril route once daily. 16 g 11    furosemide (LASIX) 40 MG tablet Take 1 tablet (40 mg total) by mouth once daily. 30 tablet 1    gabapentin (NEURONTIN) 300 MG capsule Take 2 capsules (600 mg total) by mouth 3 (three) times daily. 182 capsule 0    glimepiride (AMARYL) 2 MG tablet Take 1 tablet (2 mg total) by mouth once daily. 90 tablet 3    HYDROcodone-acetaminophen (NORCO) 7.5-325 mg per tablet Take 1 tablet by mouth every 6 (six) hours as needed.      levoFLOXacin (LEVAQUIN) 750 MG tablet Take 1 tablet (750 mg total) by mouth once daily. for 7 days 7 tablet 0    metoprolol succinate (TOPROL-XL) 25 MG 24 hr tablet Take 1 tablet (25 mg total) by mouth once daily. 90 tablet 3    mirtazapine (REMERON) 7.5 MG Tab Take 1 tablet (7.5 mg total) by mouth every evening. 30 tablet 11    ondansetron (ZOFRAN) 4 MG tablet Take 1 tablet (4 mg total) by mouth every 8 (eight) hours as needed for Nausea. 12 tablet 0    polyethylene glycol (GLYCOLAX) 17 gram PwPk Take 17 g by mouth 2 (two) times daily as needed for Constipation ((Second Choice)).  0    [DISCONTINUED] pantoprazole (PROTONIX) 40 MG tablet Take 1 tablet (40 mg total) by mouth 2 (two) times daily. 60 tablet 3     Past Medical History:   Diagnosis Date    SHA (acute kidney injury) 12/2023    Choledocholithiasis  12/2023    Chronic right shoulder pain 08/01/2022    Continue otc tylenol for pain management with short course hydrocodone only PRN for BT pain for short course    DM (diabetes mellitus)     GERD (gastroesophageal reflux disease)     HLD (hyperlipidemia)     HTN (hypertension)     Hydrocele of testis 12/16/2023    Hypoxic respiratory failure 12/2023    Necrotizing pancreatitis 12/16/2023 12/2023    Sepsis due to Enterococcus faecalis 12/2023     Past Surgical History:   Procedure Laterality Date    APPENDECTOMY      CHOLECYSTECTOMY      ERCP N/A 12/17/2023    Procedure: ERCP (ENDOSCOPIC RETROGRADE CHOLANGIOPANCREATOGRAPHY);  Surgeon: Flako Kerns MD;  Location: [facility];  Service: Gastroenterology;  Laterality: N/A;    ERCP W/ SPHICTEROTOMY  12/17/2023    Procedure: ERCP, WITH SPHINCTEROTOMY;  Surgeon: Flako Kerns MD;  Location: [facility] OR;  Service: Gastroenterology;;    ERCP, WITH CALCULUS REMOVAL  12/17/2023    Procedure: ERCP, WITH CALCULUS REMOVAL;  Surgeon: Flako Kerns MD;  Location: [facility] OR;  Service: Gastroenterology;;     Review of patient's allergies indicates:   Allergen Reactions    Gabapentin Hallucinations     History reviewed. No pertinent family history.   Social History     Socioeconomic History    Marital status:    Tobacco Use    Smoking status: Every Day     Current packs/day: 1.00     Types: Cigarettes    Smokeless tobacco: Never   Substance and Sexual Activity    Alcohol use: Never    Drug use: Never    Sexual activity: Yes     Social Determinants of Health     Financial Resource Strain: Low Risk  (8/8/2023)    Overall Financial Resource Strain (CARDIA)     Difficulty of Paying Living Expenses: Not hard at all   Food Insecurity: No Food Insecurity (8/8/2023)    Hunger Vital Sign     Worried About Running Out of Food in the Last Year: Never true     Ran Out of Food in the Last Year: Never true   Transportation Needs: No Transportation Needs (12/19/2023)     "PRAPARE - Transportation     Lack of Transportation (Medical): No     Lack of Transportation (Non-Medical): No   Stress: No Stress Concern Present (8/8/2023)    [employer] [employer] - Occupational Stress Questionnaire     Feeling of Stress : Only a little   Social Connections: Unknown (1/25/2024)    Social Connection and Isolation Panel [NHANES]     Marital Status:    Housing Stability: Low Risk  (12/19/2023)    Housing Stability Vital Sign     Unable to Pay for Housing in the Last Year: No     Number of Places Lived in the Last Year: 1     Unstable Housing in the Last Year: No     Patient Care Team:  Snehal Arroyo MD as PCP - General (Family Medicine)  Inna Gibbons MA as ED Navigator   Subjective:   ROS  See HPI for details  All Other ROS: Negative except as stated in HPI.   Objective:   /64   Pulse 90   Temp 97.8 °F (36.6 °C)   Ht 5' 6" (1.676 m)   Wt 51.4 kg (113 lb 4.8 oz)   SpO2 95%   BMI 18.29 kg/m²   Physical Exam  HENT:      Right Ear: Tympanic membrane and external ear normal. There is no impacted cerumen.      Left Ear: Tympanic membrane and external ear normal. There is no impacted cerumen.      Nose: No congestion or rhinorrhea.      Mouth/Throat:      Pharynx: No oropharyngeal exudate or posterior oropharyngeal erythema.      Comments: Mild cobblestoning  Eyes:      General:         Right eye: No discharge.         Left eye: No discharge.      Conjunctiva/sclera: Conjunctivae normal.   Cardiovascular:      Rate and Rhythm: Normal rate and regular rhythm.      Heart sounds: Normal heart sounds.   Pulmonary:      Effort: Pulmonary effort is normal.      Breath sounds: Normal breath sounds.   Genitourinary:     Comments: R testicle 5+ NTTP wthout discoloration or discharge; chaperone in room  Neurological:      Mental Status: He is alert and oriented to person, place, and time.   Psychiatric:         Mood and Affect: Mood normal.         Behavior: Behavior " normal.         Thought Content: Thought content normal.         Judgment: Judgment normal.       Assessment:       ICD-10-CM ICD-9-CM   1. Gastroesophageal reflux disease, unspecified whether esophagitis present  K21.9 530.81   2. Depression, unspecified depression type  F32.A 311   3. Postnasal drip  R09.82 784.91   4. Chronic neck and back pain  M54.2 723.1    M54.9 724.5    G89.29 338.29   5. Testicular swelling, right  N50.89 608.86      Plan:   1. Gastroesophageal reflux disease, unspecified whether esophagitis present  Assessment & Plan:  Stable  Continue current treatment with Protonix bid      Orders:  -     pantoprazole (PROTONIX) 40 MG tablet; Take 1 tablet (40 mg total) by mouth 2 (two) times daily.  Dispense: 60 tablet; Refill: 11    2. Depression, unspecified depression type  Assessment & Plan:  Stable  Continue current treatment        3. Postnasal drip  Assessment & Plan:  Attempt flonase, claritin      4. Chronic neck and back pain  Assessment & Plan:  Reduce Norco to 5-325mg and refilled short course for severe pain prn  AE discussed    Orders:  -     HYDROcodone-acetaminophen (NORCO) 5-325 mg per tablet; Take 1 tablet by mouth every 6 (six) hours as needed for Pain.  Dispense: 28 tablet; Refill: 0    5. Testicular swelling, right  Assessment & Plan:  Obtain US  Refer to urology     Orders:  -     US Scrotum And Testicles; Future; Expected date: 02/23/2024  -     Ambulatory referral/consult to Urology; Future; Expected date: 03/01/2024         Medication List with Changes/Refills   New Medications    HYDROCODONE-ACETAMINOPHEN (NORCO) 5-325 MG PER TABLET    Take 1 tablet by mouth every 6 (six) hours as needed for Pain.       Start Date: 2/23/2024 End Date: --   Current Medications    ALBUTEROL (PROVENTIL/VENTOLIN HFA) 90 MCG/ACTUATION INHALER    Inhale 2 puffs into the lungs every 4 (four) hours as needed for Wheezing. Rescue       Start Date: --        End Date: --    AMLODIPINE-BENAZEPRIL 10-20MG  (LOTREL) 10-20 MG PER CAPSULE    Take 1 capsule by mouth once daily.       Start Date: 10/19/2023End Date: --    ASPIRIN 81 MG CHEW    Take 81 mg by mouth once daily.       Start Date: --        End Date: --    ERGOCALCIFEROL (ERGOCALCIFEROL) 50,000 UNIT CAP    Take 1 capsule (50,000 Units total) by mouth every 7 days.       Start Date: 9/25/2023 End Date: --    FENOFIBRATE (TRICOR) 145 MG TABLET    See Instructions, TAKE 1 TABLET EVERY DAY, # 90 tab(s), 3 Refill(s), Pharmacy: [facility], 168, cm, Height/Length Dosing, 11/11/21 9:32:00 CST, 73.75, kg, Weight Dosing, 11/11/21 9:32:00 CST Strength: 145 mg       Start Date: 10/10/2023End Date: --    FLUTICASONE PROPIONATE (FLONASE) 50 MCG/ACTUATION NASAL SPRAY    2 sprays (100 mcg total) by Each Nostril route once daily.       Start Date: 8/8/2023  End Date: --    FUROSEMIDE (LASIX) 40 MG TABLET    Take 1 tablet (40 mg total) by mouth once daily.       Start Date: 1/4/2024  End Date: --    GABAPENTIN (NEURONTIN) 300 MG CAPSULE    Take 2 capsules (600 mg total) by mouth 3 (three) times daily.       Start Date: 1/20/2024 End Date: --    GLIMEPIRIDE (AMARYL) 2 MG TABLET    Take 1 tablet (2 mg total) by mouth once daily.       Start Date: 10/19/2023End Date: --    HYDROCODONE-ACETAMINOPHEN (NORCO) 7.5-325 MG PER TABLET    Take 1 tablet by mouth every 6 (six) hours as needed.       Start Date: 1/26/2024 End Date: --    LEVOFLOXACIN (LEVAQUIN) 750 MG TABLET    Take 1 tablet (750 mg total) by mouth once daily. for 7 days       Start Date: 2/16/2024 End Date: 2/23/2024    METOPROLOL SUCCINATE (TOPROL-XL) 25 MG 24 HR TABLET    Take 1 tablet (25 mg total) by mouth once daily.       Start Date: 10/19/2023End Date: --    MIRTAZAPINE (REMERON) 7.5 MG TAB    Take 1 tablet (7.5 mg total) by mouth every evening.       Start Date: 2/5/2024  End Date: 2/4/2025    ONDANSETRON (ZOFRAN) 4 MG TABLET    Take 1 tablet (4 mg total) by mouth every 8 (eight) hours as needed  for Nausea.       Start Date: 1/4/2024  End Date: --    POLYETHYLENE GLYCOL (GLYCOLAX) 17 GRAM PWPK    Take 17 g by mouth 2 (two) times daily as needed for Constipation ((Second Choice)).       Start Date: 1/4/2024  End Date: --   Changed and/or Refilled Medications    Modified Medication Previous Medication    PANTOPRAZOLE (PROTONIX) 40 MG TABLET pantoprazole (PROTONIX) 40 MG tablet       Take 1 tablet (40 mg total) by mouth 2 (two) times daily.    Take 1 tablet (40 mg total) by mouth 2 (two) times daily.       Start Date: 2/23/2024 End Date: --    Start Date: 1/4/2024  End Date: 2/23/2024        Follow up in about 2 weeks (around 3/8/2024) for Chronic Conditions. In addition to their scheduled follow up, the patient has also been instructed to follow up on as needed basis.

## 2024-02-22 ENCOUNTER — PATIENT OUTREACH (OUTPATIENT)
Dept: EMERGENCY MEDICINE | Facility: HOSPITAL | Age: 69
End: 2024-02-22
Payer: MEDICARE

## 2024-02-22 NOTE — PROGRESS NOTES
ED Navigator reminded the patient's wife Melvina of scheduled appointment with Dr Arroyo on 2/23/24 at 10:15 am.  Patient agreed to appointment date and time. Patient is scheduled to follow up with PETERSON Rausch in Infectious disease on 2/28/24 at 9 am, patient will receive an appointment reminder. Follow up encounter closed.

## 2024-02-23 ENCOUNTER — OFFICE VISIT (OUTPATIENT)
Dept: PRIMARY CARE CLINIC | Facility: CLINIC | Age: 69
End: 2024-02-23
Payer: MEDICARE

## 2024-02-23 VITALS
OXYGEN SATURATION: 95 % | BODY MASS INDEX: 18.21 KG/M2 | TEMPERATURE: 98 F | SYSTOLIC BLOOD PRESSURE: 101 MMHG | DIASTOLIC BLOOD PRESSURE: 64 MMHG | WEIGHT: 113.31 LBS | HEART RATE: 90 BPM | HEIGHT: 66 IN

## 2024-02-23 DIAGNOSIS — G89.29 CHRONIC NECK AND BACK PAIN: ICD-10-CM

## 2024-02-23 DIAGNOSIS — K21.9 GASTROESOPHAGEAL REFLUX DISEASE, UNSPECIFIED WHETHER ESOPHAGITIS PRESENT: Primary | ICD-10-CM

## 2024-02-23 DIAGNOSIS — N50.89 TESTICULAR SWELLING, RIGHT: ICD-10-CM

## 2024-02-23 DIAGNOSIS — F32.A DEPRESSION, UNSPECIFIED DEPRESSION TYPE: ICD-10-CM

## 2024-02-23 DIAGNOSIS — M54.9 CHRONIC NECK AND BACK PAIN: ICD-10-CM

## 2024-02-23 DIAGNOSIS — M54.2 CHRONIC NECK AND BACK PAIN: ICD-10-CM

## 2024-02-23 DIAGNOSIS — R09.82 POSTNASAL DRIP: ICD-10-CM

## 2024-02-23 PROCEDURE — 3008F BODY MASS INDEX DOCD: CPT | Mod: CPTII,,, | Performed by: STUDENT IN AN ORGANIZED HEALTH CARE EDUCATION/TRAINING PROGRAM

## 2024-02-23 PROCEDURE — 1101F PT FALLS ASSESS-DOCD LE1/YR: CPT | Mod: CPTII,,, | Performed by: STUDENT IN AN ORGANIZED HEALTH CARE EDUCATION/TRAINING PROGRAM

## 2024-02-23 PROCEDURE — 3074F SYST BP LT 130 MM HG: CPT | Mod: CPTII,,, | Performed by: STUDENT IN AN ORGANIZED HEALTH CARE EDUCATION/TRAINING PROGRAM

## 2024-02-23 PROCEDURE — 1111F DSCHRG MED/CURRENT MED MERGE: CPT | Mod: CPTII,,, | Performed by: STUDENT IN AN ORGANIZED HEALTH CARE EDUCATION/TRAINING PROGRAM

## 2024-02-23 PROCEDURE — 3288F FALL RISK ASSESSMENT DOCD: CPT | Mod: CPTII,,, | Performed by: STUDENT IN AN ORGANIZED HEALTH CARE EDUCATION/TRAINING PROGRAM

## 2024-02-23 PROCEDURE — 1160F RVW MEDS BY RX/DR IN RCRD: CPT | Mod: CPTII,,, | Performed by: STUDENT IN AN ORGANIZED HEALTH CARE EDUCATION/TRAINING PROGRAM

## 2024-02-23 PROCEDURE — 1126F AMNT PAIN NOTED NONE PRSNT: CPT | Mod: CPTII,,, | Performed by: STUDENT IN AN ORGANIZED HEALTH CARE EDUCATION/TRAINING PROGRAM

## 2024-02-23 PROCEDURE — 3078F DIAST BP <80 MM HG: CPT | Mod: CPTII,,, | Performed by: STUDENT IN AN ORGANIZED HEALTH CARE EDUCATION/TRAINING PROGRAM

## 2024-02-23 PROCEDURE — 99214 OFFICE O/P EST MOD 30 MIN: CPT | Mod: ,,, | Performed by: STUDENT IN AN ORGANIZED HEALTH CARE EDUCATION/TRAINING PROGRAM

## 2024-02-23 PROCEDURE — 1159F MED LIST DOCD IN RCRD: CPT | Mod: CPTII,,, | Performed by: STUDENT IN AN ORGANIZED HEALTH CARE EDUCATION/TRAINING PROGRAM

## 2024-02-23 RX ORDER — PANTOPRAZOLE SODIUM 40 MG/1
40 TABLET, DELAYED RELEASE ORAL 2 TIMES DAILY
Qty: 60 TABLET | Refills: 11 | Status: SHIPPED | OUTPATIENT
Start: 2024-02-23 | End: 2024-05-23

## 2024-02-23 RX ORDER — HYDROCODONE BITARTRATE AND ACETAMINOPHEN 5; 325 MG/1; MG/1
1 TABLET ORAL EVERY 6 HOURS PRN
Qty: 28 TABLET | Refills: 0 | Status: SHIPPED | OUTPATIENT
Start: 2024-02-23 | End: 2024-02-26 | Stop reason: SDUPTHER

## 2024-02-26 DIAGNOSIS — M54.2 CHRONIC NECK AND BACK PAIN: ICD-10-CM

## 2024-02-26 DIAGNOSIS — M54.9 CHRONIC NECK AND BACK PAIN: ICD-10-CM

## 2024-02-26 DIAGNOSIS — G89.29 CHRONIC NECK AND BACK PAIN: ICD-10-CM

## 2024-02-26 NOTE — TELEPHONE ENCOUNTER
----- Message from Ilene Wong sent at 2/26/2024  9:12 AM CST -----  .Type:  Needs Medical Advice    Who Called: Gale  Symptoms (please be specific):   How long has patient had these symptoms:    Pharmacy name and phone #:    Would the patient rather a call back or a response via [facility]?   Best Call Back Number: [phone]  Additional Information: HYDROcodone-acetaminophen (NORCO) 5-325 mg per tablet please resend to [facility]  pharmacy on file don't have it

## 2024-02-27 ENCOUNTER — PATIENT OUTREACH (OUTPATIENT)
Dept: EMERGENCY MEDICINE | Facility: HOSPITAL | Age: 69
End: 2024-02-27
Payer: MEDICARE

## 2024-02-27 RX ORDER — HYDROCODONE BITARTRATE AND ACETAMINOPHEN 5; 325 MG/1; MG/1
1 TABLET ORAL EVERY 6 HOURS PRN
Qty: 28 TABLET | Refills: 0 | Status: SHIPPED | OUTPATIENT
Start: 2024-02-27

## 2024-02-27 NOTE — PROGRESS NOTES
Patient's wife Melvina stated that her  is stating to eat more food but he is still afraid to eat at night, concerned about how his stomach will react. Patient had his ED follow up with Dr Arroyo on 2/23/24 and they have placed a referral to Urology, Ms Hein is waiting on a call back for scheduling. Patient will also follow up with Gastro on Friday 3/1/24.The patient is agreeable to a follow-up call the week of 3/13/24. Patient will receive an appointment reminder of next follow up with PCP.

## 2024-02-28 ENCOUNTER — OFFICE VISIT (OUTPATIENT)
Dept: INFECTIOUS DISEASES | Facility: CLINIC | Age: 69
End: 2024-02-28
Payer: MEDICARE

## 2024-02-28 VITALS
RESPIRATION RATE: 18 BRPM | HEART RATE: 103 BPM | BODY MASS INDEX: 18.26 KG/M2 | TEMPERATURE: 98 F | WEIGHT: 113.63 LBS | DIASTOLIC BLOOD PRESSURE: 69 MMHG | SYSTOLIC BLOOD PRESSURE: 100 MMHG | HEIGHT: 66 IN

## 2024-02-28 DIAGNOSIS — N50.89 TESTICULAR SWELLING, RIGHT: ICD-10-CM

## 2024-02-28 DIAGNOSIS — K85.91 NECROTIZING PANCREATITIS: ICD-10-CM

## 2024-02-28 DIAGNOSIS — N18.31 CHRONIC KIDNEY DISEASE, STAGE 3A: ICD-10-CM

## 2024-02-28 DIAGNOSIS — E11.22 TYPE 2 DIABETES MELLITUS WITH STAGE 3A CHRONIC KIDNEY DISEASE, WITHOUT LONG-TERM CURRENT USE OF INSULIN: Primary | ICD-10-CM

## 2024-02-28 DIAGNOSIS — N18.31 TYPE 2 DIABETES MELLITUS WITH STAGE 3A CHRONIC KIDNEY DISEASE, WITHOUT LONG-TERM CURRENT USE OF INSULIN: Primary | ICD-10-CM

## 2024-02-28 DIAGNOSIS — I10 HYPERTENSION, UNSPECIFIED TYPE: ICD-10-CM

## 2024-02-28 DIAGNOSIS — I95.9 HYPOTENSION, UNSPECIFIED HYPOTENSION TYPE: ICD-10-CM

## 2024-02-28 PROCEDURE — 3008F BODY MASS INDEX DOCD: CPT | Mod: CPTII,S$GLB,,

## 2024-02-28 PROCEDURE — 1101F PT FALLS ASSESS-DOCD LE1/YR: CPT | Mod: CPTII,S$GLB,,

## 2024-02-28 PROCEDURE — 3078F DIAST BP <80 MM HG: CPT | Mod: CPTII,S$GLB,,

## 2024-02-28 PROCEDURE — 99999 PR PBB SHADOW E&M-EST. PATIENT-LVL III: CPT | Mod: PBBFAC,,,

## 2024-02-28 PROCEDURE — 3288F FALL RISK ASSESSMENT DOCD: CPT | Mod: CPTII,S$GLB,,

## 2024-02-28 PROCEDURE — 3074F SYST BP LT 130 MM HG: CPT | Mod: CPTII,S$GLB,,

## 2024-02-28 PROCEDURE — 99215 OFFICE O/P EST HI 40 MIN: CPT | Mod: S$GLB,,,

## 2024-02-28 NOTE — PROGRESS NOTES
Subjective:       Patient ID: Franklin Macias 69 y.o.     Chief Complaint:   Chief Complaint   Patient presents with    Bacteremia    Follow-up        HPI:  01/25/2024 hospital evaluation:   He is a 68-year-old male with a past medical history of diabetes mellitus, hypertension, CHF, and more recently necrotizing pancreatitis secondary to gallstone choledocholithiasis who we had evaluated during his last hospitalization at the beginning of this month.  Please see our last note from 01/19 for full history and course of prior hospitalizations, however patient was discharged on 1/20 on oral ciprofloxacin and Flagyl with plan to continue through 2/2.  GI was planning a possible endoscopy in a few weeks.  He presented back on 01/22 secondary to lightheadedness and weakness as well as patient's attention.  Noted to be hypotensive on admit and with an SHA.  Renal function and blood pressure improved with IV fluids.  Current CT with IV contrast showed persistent peripancreatic inflammation although fluid collections are slightly smaller, however new nonspecific gas within the largest peripancreatic fluid collection is noted.  He remains on ciprofloxacin and Flagyl.  Feeling much better overall, no significant complaints.  Wife is at bedside.  She does report she has recently spread out his daily medications throughout the day secondary to a large amount of oral medications in the morning, this includes antihypertensives.  Medications currently on hold, blood pressures now normalized.  We have been consulted for assistance with antimicrobials.     Hypotension, suspect noninfectious - med related vs SHA s/t IVVD vs combination  SHA, resolved  Necrotizing pancreatitis, fluid collections  Recent Enterococcus bacteremia s/t above, s/p treatment / resolution  Recent gallstone pancreatitis, s/p ERCP and sphincterotomy  H/o CHF / DM2 / HTN     PLAN:  Stable off abx. Antibitoics discontinued on 1/23/24  Has done well off  antibiotics, no evidence of fevers or hypotension. Continue holding off and follow up with us as scheduled outpatient.   Will sign off.  Please call if need arises.     02/08/2024 office visit:   Mr. Macias presents for follow-up today.  He has been off of antibiotics for almost 2 weeks now.  He has experienced night sweats up to 3-4 times nightly in which he had to change his clothes.  They are still occurring nightly however down to once a night.  He did have 1 episode of nausea however denies any vomiting or diarrhea.  He denies any fevers, but he has not checked his temperature.  He denies any chills.  He does have some left upper quadrant abdominal pain at times. Has quit smoking since December 16th!!!    02/28/2024 office visit:   Mr. Macias presents for follow-up today.  He has been off of antibiotics since 01/23/2024.  He continues with some nausea and abdominal pain.  He does continue to have sweats however they are improving.  He does report that he sometimes wakes up drenched during the day after that.  He denies any fever except on 2/16/24 when his temp was 100.4° and he was also experiencing hematuria in which he presented to the ER.  He was given 7 day course of levofloxacin 750 mg p.o. daily.  He was referred to [facility] by his primary care for testicular swelling.  He does report that he is having cramping on his bilateral upper abdominal quadrants at night.  He denies any vomiting or diarrhea.  Does report that he has a follow up with Dr. Willard with GI this upcoming Friday 03/01/2024.    Only taking   metoprolol ER succinate 25mg tab daily   Fenofibrate 145mg tab daily   Protonix 40mg tab BID   Asa 81MG PO daily   norco 7.5 q6 hours ==> changed to 5mg on Friday       Past Medical History:   Diagnosis Date    SHA (acute kidney injury) 12/2023    Choledocholithiasis 12/2023    Chronic right shoulder pain 08/01/2022    Continue otc tylenol for pain management with short course hydrocodone  only PRN for BT pain for short course    DM (diabetes mellitus)     GERD (gastroesophageal reflux disease)     HLD (hyperlipidemia)     HTN (hypertension)     Hydrocele of testis 12/16/2023    Hypoxic respiratory failure 12/2023    Necrotizing pancreatitis 12/16/2023 12/2023    Sepsis due to Enterococcus faecalis 12/2023        Past Surgical History:   Procedure Laterality Date    APPENDECTOMY      CHOLECYSTECTOMY      ERCP N/A 12/17/2023    Procedure: ERCP (ENDOSCOPIC RETROGRADE CHOLANGIOPANCREATOGRAPHY);  Surgeon: Flako Kerns MD;  Location: [facility] OR;  Service: Gastroenterology;  Laterality: N/A;    ERCP W/ SPHICTEROTOMY  12/17/2023    Procedure: ERCP, WITH SPHINCTEROTOMY;  Surgeon: Flako Kerns MD;  Location: [facility] OR;  Service: Gastroenterology;;    ERCP, WITH CALCULUS REMOVAL  12/17/2023    Procedure: ERCP, WITH CALCULUS REMOVAL;  Surgeon: Flako Kerns MD;  Location: [facility] OR;  Service: Gastroenterology;;        Social History     Socioeconomic History    Marital status:    Tobacco Use    Smoking status: Every Day     Current packs/day: 1.00     Types: Cigarettes    Smokeless tobacco: Never   Substance and Sexual Activity    Alcohol use: Never    Drug use: Never    Sexual activity: Yes     Social Determinants of Health     Financial Resource Strain: Low Risk  (8/8/2023)    Overall Financial Resource Strain (CARDIA)     Difficulty of Paying Living Expenses: Not hard at all   Food Insecurity: No Food Insecurity (8/8/2023)    Hunger Vital Sign     Worried About Running Out of Food in the Last Year: Never true     Ran Out of Food in the Last Year: Never true   Transportation Needs: No Transportation Needs (12/19/2023)    PRAPARE - Transportation     Lack of Transportation (Medical): No     Lack of Transportation (Non-Medical): No   Stress: No Stress Concern Present (8/8/2023)    [employer] - Occupational Stress Questionnaire     Feeling of Stress :  "Only a little   Social Connections: Unknown (1/25/2024)    Social Connection and Isolation Panel [NHANES]     Marital Status:    Housing Stability: Low Risk  (12/19/2023)    Housing Stability Vital Sign     Unable to Pay for Housing in the Last Year: No     Number of Places Lived in the Last Year: 1     Unstable Housing in the Last Year: No        History reviewed. No pertinent family history.     Review of patient's allergies indicates:   Allergen Reactions    Gabapentin Hallucinations        Immunization History   Administered Date(s) Administered    COVID-19, MRNA, LN-S, PF (Pfizer) (Purple Cap) 03/05/2021, 03/26/2021    Influenza (FLUAD) - Quadrivalent - Adjuvanted - PF *Preferred* (65+) 10/13/2022, 10/04/2023    Influenza - Quadrivalent - MDCK - PF 10/03/2017    Influenza - Quadrivalent - PF *Preferred* (6 months and older) 10/21/2019, 09/18/2020, 09/21/2021    Influenza - Trivalent - PF (ADULT) 09/29/2014, 10/01/2015, 10/15/2018, 10/15/2018, 10/21/2019    Pneumococcal Conjugate - 13 Valent 08/15/2017    Pneumococcal Polysaccharide - 23 Valent 11/11/2021        Review of Systems   All other systems reviewed and are negative.         Objective:      /69 (BP Location: Left arm)   Pulse 103   Temp 97.9 °F (36.6 °C) (Oral)   Resp 18   Ht 5' 6" (1.676 m)   Wt 51.5 kg (113 lb 9.6 oz)   BMI 18.34 kg/m²      Physical Exam  Vitals reviewed.   Constitutional:       General: He is not in acute distress.     Appearance: Normal appearance. He is ill-appearing. He is not toxic-appearing.   HENT:      Mouth/Throat:      Mouth: Mucous membranes are moist.      Pharynx: No oropharyngeal exudate or posterior oropharyngeal erythema.   Eyes:      General: No scleral icterus.     Conjunctiva/sclera: Conjunctivae normal.      Pupils: Pupils are equal, round, and reactive to light.   Cardiovascular:      Rate and Rhythm: Normal rate and regular rhythm.      Pulses: Normal pulses.      Heart sounds: No murmur " heard.  Pulmonary:      Effort: Pulmonary effort is normal. No respiratory distress.      Breath sounds: Normal breath sounds.   Abdominal:      General: Abdomen is flat. Bowel sounds are normal.      Palpations: Abdomen is soft.      Tenderness: There is no abdominal tenderness.      Comments: No tenderness to palpation on my exam   Musculoskeletal:         General: No swelling.      Cervical back: Normal range of motion and neck supple.   Lymphadenopathy:      Cervical: No cervical adenopathy.   Skin:     General: Skin is warm and dry.      Findings: No rash.   Neurological:      General: No focal deficit present.      Mental Status: He is alert and oriented to person, place, and time.   Psychiatric:         Mood and Affect: Mood normal.         Behavior: Behavior normal.          Labs: Reviewed most recent relevant labs available, notable results highlighted in this note    Imaging: Reviewed most recent relevant imaging studies available, notable results highlighted in this note    Assessment:       Problem List Items Addressed This Visit          Cardiac/Vascular    Hypotension       Renal/    Chronic kidney disease, stage 3a    Testicular swelling, right       Endocrine    Type 2 diabetes mellitus with stage 3a chronic kidney disease, without long-term current use of insulin - Primary       GI    Necrotizing pancreatitis     Other Visit Diagnoses       Hypertension, unspecified type                     Plan:   -Mr. Macias has completed a course of antibiotics secondary to Enterococcus bacteremia and necrotizing pancreatitis.  Antibiotics were discontinued on 01/23/2024 while inpatient.  -he does complain of night sweats especially upon discharge from hospital however right upon discharge night sweats were happening about 3-4 times nightly and have now decreased to once a night.  He has no documented febrile episodes.   -we will recheck CBC and CMP today  -he does have a follow-up with GI physician in April.   I advised patient and his wife to contact GI physician with any increase in abdominal pain especially with ongoing monitoring of necrotizing pancreatitis.   -I advised patient to check his temperature especially when night sweats a cure.  If he does run fever we will consider repeat abdominal imaging.   -continue to monitor for systemic signs and symptoms of infection while off of antibiotics  -we will follow up again in 3 weeks  -I congratulated him on his efforts to continue to quit smoking.  He has been able to completely stop smoking since December 16, 2023!    Follow up in about 4 weeks (around 3/27/2024).    45 minutes of total time spent on the encounter, which includes face to face time and non-face to face time preparing to see the patient (eg, review of tests), Obtaining and/or reviewing separately obtained history, Documenting clinical information in the electronic or other health record, Independently interpreting results (not separately reported) and communicating results to the patient/family/caregiver, or Care coordination (not separately reported).

## 2024-03-01 ENCOUNTER — HOSPITAL ENCOUNTER (OUTPATIENT)
Dept: RADIOLOGY | Facility: HOSPITAL | Age: 69
Discharge: HOME | End: 2024-03-01
Attending: STUDENT IN AN ORGANIZED HEALTH CARE EDUCATION/TRAINING PROGRAM
Payer: MEDICARE

## 2024-03-01 DIAGNOSIS — N50.89 TESTICULAR SWELLING, RIGHT: ICD-10-CM

## 2024-03-01 PROCEDURE — 76870 US EXAM SCROTUM: CPT | Mod: TC

## 2024-03-04 ENCOUNTER — TELEPHONE (OUTPATIENT)
Dept: PRIMARY CARE CLINIC | Facility: CLINIC | Age: 69
End: 2024-03-04
Payer: MEDICARE

## 2024-03-04 NOTE — TELEPHONE ENCOUNTER
----- Message from [facility] sent at 3/1/2024 11:39 AM CST -----  .Type:  Needs Medical Advice    Who Called:  Dr. Jose Martin with [facility] ( [facility])    Symptoms (please be specific):  no     How long has patient had these symptoms:   no    Pharmacy name and phone #:   no    Would the patient rather a call back or a response via [facility]?      Best Call Back Number:  [phone] or fax#  [phone]    Additional Information:  nurse can't open the ultrasound results please advise thanks

## 2024-03-04 NOTE — DISCHARGE SUMMARY
[facility] Medicine Discharge Summary    Admit Date: 1/22/2024  Discharge Date and Time: 1/26/24  Admitting Physician:  Team  Discharging Physician: Alban Obando MD.  Primary Care Physician: Snehal Arroyo MD  Consults: Gastroenterology and Infectious Disease    Discharge Diagnoses:  Symptomatic Orthostatic hypotension / dizziness secondary to medications versus and/or ivvd w acute renal insufficiency  Hypomagnesemia- corrected    Lactic acidosis  Chronic necrotizing pancreatitis w peripancreatic fluid collections   Hx Gallstone pancreatitis had a CBD stone requiring removal in December of 2023  Diabetes mellitus type 2  CKD 3A   Heart failure with preserved EF with diastolic dysfunction grade 1 -compensated  GERD   Hyperlipidemia  Prolonged QT            Hospital Course:   68 yr old male whose history includes HTN, DM, CKD 3A and gallstone pancreatitis. Presented with c/o feeling disoriented which started yesterday. B/P at the time of symptoms was 90/50 according to patient. At the time of my exam he is AAO x 3. Denies any fever, syncope, abdominal pain, vomiting or diarrhea. Since his d/c home on 1/20 he has been feeling well overall. Appetite has been good.      Prior to today he was discharged from here on 1/4 following a 19 day admission during which time he was treated for acute necrotizing pancreatitis secondary to choledocholithiasis, severe sepsis (only one of two blood cultures grew E. Faecalis and repeat blood cultures negative), SHA on CKD, and acute hypoxemic respiratory failure. Gallbladder removed approximately 1.5 years ago. Underwent ERCP 12/17 with stone and sludge removal and sphincterotomy. Choledocholithiasis was resolved.  CT abdomen repeated 12/23 which showed  worsened appearance pancreatitis with possible collection developing and small volume ascites, small bilateral pleural effusions. Responded favorably to diuresis.   Again admitted 1/9  -1/20  for persistent abdominal pain. CT of the abdomen and pelvis showed similar findings with persistent fluid collection and necrotizing pancreatitis.  He was initiated on meropenem empirically.  Blood cultures and stool studies have remained negative. Seen by ID and discharged home on PO Cipro and Flagyl with plans to continue for 2 weeks to complete 4 weeks of therapy.      VS on arrival: T 97.2, P 98, R 16, B/P 94/60, Sats 96% on room air. Initial labs: WBC 8.54, creatinine 1.19, glucose 145, Mg 1.5, AST 43, lipase 57, lactic acid 2.7. CT abdomen/pelvis with contrast shows persistent peripancreatic inflammation with improved peripancreatic fluid collections and decreased ascites compared to CT on 1/14/23. Chest x-ray negative for acute findings.      Pt hydrated overnight then became  hypertensive. Will dc iv fluids and start lisinopril 5 mgs po daily in addition  to metoprolol succinate 25 mgs po daily. Pt was seen by GI, case reviewed , recs to continue monitor and to allow maturation of pseudocysts before  drainage. Seen by ID no further recs. We all agree there is no signs of sepsis and that ivvd w acute renal insufficiency and /or  meds brought her back To ET, Anyway symptoms completely resolved . Abx's were discontinued and pt remained asymptomatic. Pt discharged with follow up w luis alberto haro/DOMI and ID as scheduled.      Pt was seen and examined on the day of discharge  Vitals:  VITAL SIGNS: 24 HRS MIN & MAX LAST   No data recorded 98.3 °F (36.8 °C)   No data recorded 135/83   No data recorded  90   No data recorded (!) 21   No data recorded 96 %       Physical Exam:  General: In no acute distress, afebrile  Chest: Clear to auscultation bilaterally  Heart: RRR, +S1, S2, no appreciable murmur  Abdomen: Soft, nontender, BS +  MSK: Warm, no lower extremity edema, no clubbing or cyanosis  Neurologic: Alert and oriented x4,        Explained in detail to the patient about the discharge plan, medications, and  follow-up visits. Pt understands and agrees with the treatment plan  Discharge Disposition:home   Discharged Condition: stable  Diet-  cardiac  Medications Per DC med rec  Activities as tolerated    For further questions contact hospitalist office    Discharge time 33 minutes    For worsening symptoms, chest pain, shortness of breath, increased abdominal pain, high grade fever, stroke or stroke like symptoms, immediately go to the nearest Emergency Room or call 911 as soon as possible.      Alban Solomon M.D,.

## 2024-03-11 NOTE — PROGRESS NOTES
Date: 03/14/2024  Patient ID: [id number]   Chief Complaint: Follow-up    HPI:   Franklin Macias is a 69 y.o. male here today for Follow-up  Patient started new GERD medication since Protonix did not help as much. Saw GI and placed pt on Famotidine 20mg qd. Used to be on Omeprazole, which helped but did not want to take since it might cause kidney disease. Has gained 4lbs  Ultrasound showed increased swelling of the right testicle with increased blood flow that could indicate infection.  Patient was to follow up with urologist but appt not until 2mos from now. Would like another referral to see if he can get appt. Swelling has slightly decreased but denies consistent pain.   Also would like handicap decal.   Patient Active Problem List   Diagnosis    Type 2 diabetes mellitus with stage 3a chronic kidney disease, without long-term current use of insulin    Chronic kidney disease, stage 3a    Hyperlipidemia    Necrotizing pancreatitis    Chronic neck and back pain    Diastolic congestive heart failure    Hospital discharge follow-up    Hypotension    Depression    Gastroesophageal reflux disease    Postnasal drip    Testicular swelling, right    Debility     Outpatient Medications Marked as Taking for the 3/14/24 encounter (Office Visit) with Snehal Arroyo MD   Medication Sig Dispense Refill    albuterol (PROVENTIL/VENTOLIN HFA) 90 mcg/actuation inhaler Inhale 2 puffs into the lungs every 4 (four) hours as needed for Wheezing. Rescue      aspirin 81 MG Chew Take 81 mg by mouth once daily.      famotidine (PEPCID) 20 MG tablet Take 60 tablets by mouth 2 (two) times daily.      fenofibrate (TRICOR) 145 MG tablet See Instructions, TAKE 1 TABLET EVERY DAY, # 90 tab(s), 3 Refill(s), Pharmacy: [facility], 168, cm, Height/Length Dosing, 11/11/21 9:32:00 CST, 73.75, kg, Weight Dosing, 11/11/21 9:32:00 CST Strength: 145 mg 90 tablet 3    HYDROcodone-acetaminophen (NORCO) 5-325 mg per tablet Take 1 tablet by  mouth every 6 (six) hours as needed for Pain. 28 tablet 0    HYDROcodone-acetaminophen (NORCO) 7.5-325 mg per tablet Take 1 tablet by mouth every 6 (six) hours as needed.      metoprolol succinate (TOPROL-XL) 25 MG 24 hr tablet Take 1 tablet (25 mg total) by mouth once daily. 90 tablet 3    pantoprazole (PROTONIX) 40 MG tablet Take 1 tablet (40 mg total) by mouth 2 (two) times daily. 60 tablet 11     Past Medical History:   Diagnosis Date    SHA (acute kidney injury) 12/2023    Choledocholithiasis 12/2023    Chronic right shoulder pain 08/01/2022    Continue otc tylenol for pain management with short course hydrocodone only PRN for BT pain for short course    DM (diabetes mellitus)     GERD (gastroesophageal reflux disease)     HLD (hyperlipidemia)     HTN (hypertension)     Hydrocele of testis 12/16/2023    Hypoxic respiratory failure 12/2023    Necrotizing pancreatitis 12/16/2023 12/2023    Sepsis due to Enterococcus faecalis 12/2023     Past Surgical History:   Procedure Laterality Date    APPENDECTOMY      CHOLECYSTECTOMY      ERCP N/A 12/17/2023    Procedure: ERCP (ENDOSCOPIC RETROGRADE CHOLANGIOPANCREATOGRAPHY);  Surgeon: Flako Kerns MD;  Location: [facility] OR;  Service: Gastroenterology;  Laterality: N/A;    ERCP W/ SPHICTEROTOMY  12/17/2023    Procedure: ERCP, WITH SPHINCTEROTOMY;  Surgeon: Flako Kerns MD;  Location: [facility] OR;  Service: Gastroenterology;;    ERCP, WITH CALCULUS REMOVAL  12/17/2023    Procedure: ERCP, WITH CALCULUS REMOVAL;  Surgeon: Flako Kerns MD;  Location: [facility] OR;  Service: Gastroenterology;;     Review of patient's allergies indicates:   Allergen Reactions    Gabapentin Hallucinations     History reviewed. No pertinent family history.   Social History     Socioeconomic History    Marital status:    Tobacco Use    Smoking status: Every Day     Current packs/day: 1.00     Types: Cigarettes    Smokeless tobacco: Never   Substance and Sexual Activity     "Alcohol use: Never    Drug use: Never    Sexual activity: Yes     Social Determinants of Health     Financial Resource Strain: Low Risk  (8/8/2023)    Overall Financial Resource Strain (CARDIA)     Difficulty of Paying Living Expenses: Not hard at all   Food Insecurity: No Food Insecurity (8/8/2023)    Hunger Vital Sign     Worried About Running Out of Food in the Last Year: Never true     Ran Out of Food in the Last Year: Never true   Transportation Needs: No Transportation Needs (12/19/2023)    PRAPARE - Transportation     Lack of Transportation (Medical): No     Lack of Transportation (Non-Medical): No   Stress: No Stress Concern Present (8/8/2023)    [employer] [employer] - Occupational Stress Questionnaire     Feeling of Stress : Only a little   Social Connections: Unknown (1/25/2024)    Social Connection and Isolation Panel [NHANES]     Marital Status:    Housing Stability: Low Risk  (12/19/2023)    Housing Stability Vital Sign     Unable to Pay for Housing in the Last Year: No     Number of Places Lived in the Last Year: 1     Unstable Housing in the Last Year: No     Patient Care Team:  Snehal Arroyo MD as PCP - General (Family Medicine)  Inna Gibbons MA as ED Navigator   Subjective:   ROS  See HPI for details  All Other ROS: Negative except as stated in HPI.   Objective:   /74   Pulse 89   Ht 5' 6" (1.676 m)   Wt 53.4 kg (117 lb 12.8 oz)   SpO2 97%   BMI 19.01 kg/m²   Physical Exam  General: NAD  Eye: EOMI  HENT: no nasal discharge  Respiratory: non-labored breathing  Musculoskeletal: ambulates independently. Slowly walks  Integumentary: no apparent discoloration  Neurologic: Alert, oriented to person and situation  Cognition and Speech: Speech clear and coherent.   Psychiatric: Cooperative    Assessment:       ICD-10-CM ICD-9-CM   1. Testicular swelling, right  N50.89 608.86   2. Gastroesophageal reflux disease, unspecified whether esophagitis present  K21.9 " 530.81   3. Debility  R53.81 799.3      Plan:   1. Testicular swelling, right  Assessment & Plan:  Referral to another urology  Close ED precautions      Orders:  -     Cancel: Ambulatory referral/consult to Urology; Future; Expected date: 03/21/2024  -     Ambulatory referral/consult to Urology; Future; Expected date: 03/21/2024    2. Gastroesophageal reflux disease, unspecified whether esophagitis present  Assessment & Plan:  Continue Pepcid  Continue f/u with GI      3. Debility  Assessment & Plan:  Handicap sign for pt           Medication List with Changes/Refills   Current Medications    ALBUTEROL (PROVENTIL/VENTOLIN HFA) 90 MCG/ACTUATION INHALER    Inhale 2 puffs into the lungs every 4 (four) hours as needed for Wheezing. Rescue       Start Date: --        End Date: --    ASPIRIN 81 MG CHEW    Take 81 mg by mouth once daily.       Start Date: --        End Date: --    FAMOTIDINE (PEPCID) 20 MG TABLET    Take 60 tablets by mouth 2 (two) times daily.       Start Date: 3/7/2024  End Date: --    FENOFIBRATE (TRICOR) 145 MG TABLET    See Instructions, TAKE 1 TABLET EVERY DAY, # 90 tab(s), 3 Refill(s), Pharmacy: [facility], 168, cm, Height/Length Dosing, 11/11/21 9:32:00 CST, 73.75, kg, Weight Dosing, 11/11/21 9:32:00 CST Strength: 145 mg       Start Date: 10/10/2023End Date: --    HYDROCODONE-ACETAMINOPHEN (NORCO) 5-325 MG PER TABLET    Take 1 tablet by mouth every 6 (six) hours as needed for Pain.       Start Date: 2/27/2024 End Date: --    HYDROCODONE-ACETAMINOPHEN (NORCO) 7.5-325 MG PER TABLET    Take 1 tablet by mouth every 6 (six) hours as needed.       Start Date: 1/26/2024 End Date: --    METOPROLOL SUCCINATE (TOPROL-XL) 25 MG 24 HR TABLET    Take 1 tablet (25 mg total) by mouth once daily.       Start Date: 10/19/2023End Date: --    PANTOPRAZOLE (PROTONIX) 40 MG TABLET    Take 1 tablet (40 mg total) by mouth 2 (two) times daily.       Start Date: 2/23/2024 End Date: --        Follow up  in about 4 weeks (around 4/11/2024) for Chronic Conditions. In addition to their scheduled follow up, the patient has also been instructed to follow up on as needed basis.

## 2024-03-13 ENCOUNTER — TELEPHONE (OUTPATIENT)
Dept: PRIMARY CARE CLINIC | Facility: CLINIC | Age: 69
End: 2024-03-13
Payer: MEDICARE

## 2024-03-13 NOTE — TELEPHONE ENCOUNTER
Pt stated that he could not get in to Dr. Martin until April.    ----- Message from Snehal Arroyo MD sent at 3/4/2024 11:54 AM CST -----  Please inform patient of results:    1. Ultrasound showed increased swelling of the right testicle with increased blood flow that could indicate infection. Please ask if pt has followed up with urology       All other labwork within acceptable ranges.       Thank you for all you do,    Dr. Arroyo

## 2024-03-14 ENCOUNTER — OFFICE VISIT (OUTPATIENT)
Dept: PRIMARY CARE CLINIC | Facility: CLINIC | Age: 69
End: 2024-03-14
Payer: MEDICARE

## 2024-03-14 VITALS
SYSTOLIC BLOOD PRESSURE: 137 MMHG | BODY MASS INDEX: 18.93 KG/M2 | HEIGHT: 66 IN | HEART RATE: 89 BPM | DIASTOLIC BLOOD PRESSURE: 74 MMHG | OXYGEN SATURATION: 97 % | WEIGHT: 117.81 LBS

## 2024-03-14 DIAGNOSIS — K21.9 GASTROESOPHAGEAL REFLUX DISEASE, UNSPECIFIED WHETHER ESOPHAGITIS PRESENT: ICD-10-CM

## 2024-03-14 DIAGNOSIS — N50.89 TESTICULAR SWELLING, RIGHT: Primary | ICD-10-CM

## 2024-03-14 DIAGNOSIS — R53.81 DEBILITY: ICD-10-CM

## 2024-03-14 PROCEDURE — 99214 OFFICE O/P EST MOD 30 MIN: CPT | Mod: ,,, | Performed by: STUDENT IN AN ORGANIZED HEALTH CARE EDUCATION/TRAINING PROGRAM

## 2024-03-14 PROCEDURE — 3075F SYST BP GE 130 - 139MM HG: CPT | Mod: CPTII,,, | Performed by: STUDENT IN AN ORGANIZED HEALTH CARE EDUCATION/TRAINING PROGRAM

## 2024-03-14 PROCEDURE — 3288F FALL RISK ASSESSMENT DOCD: CPT | Mod: CPTII,,, | Performed by: STUDENT IN AN ORGANIZED HEALTH CARE EDUCATION/TRAINING PROGRAM

## 2024-03-14 PROCEDURE — 3008F BODY MASS INDEX DOCD: CPT | Mod: CPTII,,, | Performed by: STUDENT IN AN ORGANIZED HEALTH CARE EDUCATION/TRAINING PROGRAM

## 2024-03-14 PROCEDURE — 1101F PT FALLS ASSESS-DOCD LE1/YR: CPT | Mod: CPTII,,, | Performed by: STUDENT IN AN ORGANIZED HEALTH CARE EDUCATION/TRAINING PROGRAM

## 2024-03-14 PROCEDURE — 3078F DIAST BP <80 MM HG: CPT | Mod: CPTII,,, | Performed by: STUDENT IN AN ORGANIZED HEALTH CARE EDUCATION/TRAINING PROGRAM

## 2024-03-14 PROCEDURE — 1159F MED LIST DOCD IN RCRD: CPT | Mod: CPTII,,, | Performed by: STUDENT IN AN ORGANIZED HEALTH CARE EDUCATION/TRAINING PROGRAM

## 2024-03-14 PROCEDURE — 1126F AMNT PAIN NOTED NONE PRSNT: CPT | Mod: CPTII,,, | Performed by: STUDENT IN AN ORGANIZED HEALTH CARE EDUCATION/TRAINING PROGRAM

## 2024-03-14 PROCEDURE — 1160F RVW MEDS BY RX/DR IN RCRD: CPT | Mod: CPTII,,, | Performed by: STUDENT IN AN ORGANIZED HEALTH CARE EDUCATION/TRAINING PROGRAM

## 2024-03-14 RX ORDER — FAMOTIDINE 20 MG/1
60 TABLET, FILM COATED ORAL 2 TIMES DAILY
COMMUNITY
Start: 2024-03-07

## 2024-03-14 NOTE — LETTER
I certify that (Name) Franklin Macias meets the requirements as outlined in # 1,6(shown on reverse side) and qualifies for a mobility impaired license plate/hang-tag. I further understand that willful and false certification shall subject me to fines/imprisonment as outlined in R.S. 47:463.4 (G) (4). The applicant's information is as follows:    YOB: 1955            Race:White        Gender:Male    Address:  [address] [place] [address]    City:[place]                               State:[place]     Zip Code:[address]     [x]Permanently Impaired - Applicant has a total or lifelong condition of mobility impairment from which little or no improvement or recovery can reasonably be expected. A medical examiners certification is required on initial application only.      [] Temporarily Impaired - Applicant has a temporary condition of mobility impairment of which improvement or recovery can reasonably be expected. Applicant is entitled to a hangtag, which will be valid for one (1) year. A medical examiners certification is required for the renewal of the hangtag      [] Unable to appear in person at the Office of Motor Vehicles - Applicant must bring facial photo        Medical Examiner's Signature________________________________________ Date:3/14/24_________________________    Printed Name:__Snehal Arroyo_________________________________________________    State License #____[id number]________________________    [facility]  [address]                                     Phone Number: [phone]                                                                                                                                                                                                                  City: [place]__________________________________ State: [place] __Zip Code: [address] _______________    TO BE COMPLETED BY MOTOR VEHICLE ANALYST ONLY    LAUREN   Lic. Plate #      Hangtag Control #    Milly ID #      Date Issued:    #:   Office #:

## 2024-04-11 ENCOUNTER — OFFICE VISIT (OUTPATIENT)
Dept: PRIMARY CARE CLINIC | Facility: CLINIC | Age: 69
End: 2024-04-11
Payer: MEDICARE

## 2024-04-11 VITALS
HEIGHT: 66 IN | BODY MASS INDEX: 20.23 KG/M2 | DIASTOLIC BLOOD PRESSURE: 71 MMHG | HEART RATE: 85 BPM | SYSTOLIC BLOOD PRESSURE: 129 MMHG | WEIGHT: 125.88 LBS | OXYGEN SATURATION: 97 %

## 2024-04-11 DIAGNOSIS — Z12.5 SCREENING FOR MALIGNANT NEOPLASM OF PROSTATE: ICD-10-CM

## 2024-04-11 DIAGNOSIS — E55.9 VITAMIN D DEFICIENCY: ICD-10-CM

## 2024-04-11 DIAGNOSIS — N18.31 TYPE 2 DIABETES MELLITUS WITH STAGE 3A CHRONIC KIDNEY DISEASE, WITHOUT LONG-TERM CURRENT USE OF INSULIN: Primary | ICD-10-CM

## 2024-04-11 DIAGNOSIS — E11.22 TYPE 2 DIABETES MELLITUS WITH STAGE 3A CHRONIC KIDNEY DISEASE, WITHOUT LONG-TERM CURRENT USE OF INSULIN: Primary | ICD-10-CM

## 2024-04-11 DIAGNOSIS — E78.5 HYPERLIPIDEMIA, UNSPECIFIED HYPERLIPIDEMIA TYPE: ICD-10-CM

## 2024-04-11 DIAGNOSIS — N18.31 CHRONIC KIDNEY DISEASE, STAGE 3A: ICD-10-CM

## 2024-04-11 PROBLEM — K85.91 NECROTIZING PANCREATITIS: Status: RESOLVED | Noted: 2023-12-16 | Resolved: 2024-04-11

## 2024-04-11 PROBLEM — Z09 HOSPITAL DISCHARGE FOLLOW-UP: Status: RESOLVED | Noted: 2024-02-01 | Resolved: 2024-04-11

## 2024-04-11 PROBLEM — F32.A DEPRESSION: Status: RESOLVED | Noted: 2024-02-01 | Resolved: 2024-04-11

## 2024-04-11 PROBLEM — I95.9 HYPOTENSION: Status: RESOLVED | Noted: 2024-02-01 | Resolved: 2024-04-11

## 2024-04-11 PROCEDURE — 3008F BODY MASS INDEX DOCD: CPT | Mod: CPTII,,, | Performed by: STUDENT IN AN ORGANIZED HEALTH CARE EDUCATION/TRAINING PROGRAM

## 2024-04-11 PROCEDURE — 3074F SYST BP LT 130 MM HG: CPT | Mod: CPTII,,, | Performed by: STUDENT IN AN ORGANIZED HEALTH CARE EDUCATION/TRAINING PROGRAM

## 2024-04-11 PROCEDURE — 3288F FALL RISK ASSESSMENT DOCD: CPT | Mod: CPTII,,, | Performed by: STUDENT IN AN ORGANIZED HEALTH CARE EDUCATION/TRAINING PROGRAM

## 2024-04-11 PROCEDURE — 1126F AMNT PAIN NOTED NONE PRSNT: CPT | Mod: CPTII,,, | Performed by: STUDENT IN AN ORGANIZED HEALTH CARE EDUCATION/TRAINING PROGRAM

## 2024-04-11 PROCEDURE — 1159F MED LIST DOCD IN RCRD: CPT | Mod: CPTII,,, | Performed by: STUDENT IN AN ORGANIZED HEALTH CARE EDUCATION/TRAINING PROGRAM

## 2024-04-11 PROCEDURE — 3078F DIAST BP <80 MM HG: CPT | Mod: CPTII,,, | Performed by: STUDENT IN AN ORGANIZED HEALTH CARE EDUCATION/TRAINING PROGRAM

## 2024-04-11 PROCEDURE — 99214 OFFICE O/P EST MOD 30 MIN: CPT | Mod: ,,, | Performed by: STUDENT IN AN ORGANIZED HEALTH CARE EDUCATION/TRAINING PROGRAM

## 2024-04-11 PROCEDURE — 1101F PT FALLS ASSESS-DOCD LE1/YR: CPT | Mod: CPTII,,, | Performed by: STUDENT IN AN ORGANIZED HEALTH CARE EDUCATION/TRAINING PROGRAM

## 2024-04-11 NOTE — PROGRESS NOTES
Date: 04/11/2024  Patient ID: [id number]   Chief Complaint: Follow-up (Doing better)    HPI:   Franklin Macias is a 69 y.o. male here today for Follow-up (Doing better)  Patient was hospitalized 1/22-1/26/2024 for symptomatic orthostatic hypotension, which resolved with IV fluids.  Patient was discharged home and was to GI and ID. Pt still awaiting possibility of endoscopy.  Patient was to follow up with Urology for testicular swelling, which he has not heard from yet. Swelling has significantly decreased. Patient also sleeping and eating better and cleaning around the house. Denies sob. Did restart smoking again-likes to smoke while eating.     Patient Active Problem List   Diagnosis    Type 2 diabetes mellitus with stage 3a chronic kidney disease, without long-term current use of insulin    Chronic kidney disease, stage 3a    Hyperlipidemia    Chronic neck and back pain    Diastolic congestive heart failure    Gastroesophageal reflux disease    Postnasal drip    Testicular swelling, right    Debility     Outpatient Medications Marked as Taking for the 4/11/24 encounter (Office Visit) with Snehal Arroyo MD   Medication Sig Dispense Refill    albuterol (PROVENTIL/VENTOLIN HFA) 90 mcg/actuation inhaler Inhale 2 puffs into the lungs every 4 (four) hours as needed for Wheezing. Rescue      aspirin 81 MG Chew Take 81 mg by mouth once daily.      atorvastatin (LIPITOR) 20 MG tablet Take 20 mg by mouth.      famotidine (PEPCID) 20 MG tablet Take 60 tablets by mouth 2 (two) times daily.      fenofibrate (TRICOR) 145 MG tablet See Instructions, TAKE 1 TABLET EVERY DAY, # 90 tab(s), 3 Refill(s), Pharmacy: [facility], 168, cm, Height/Length Dosing, 11/11/21 9:32:00 CST, 73.75, kg, Weight Dosing, 11/11/21 9:32:00 CST Strength: 145 mg 90 tablet 3    fluticasone propionate (FLONASE) 50 mcg/actuation nasal spray by Each Nostril route.      HYDROcodone-acetaminophen (NORCO) 5-325 mg per tablet Take 1 tablet by  mouth every 6 (six) hours as needed for Pain. 28 tablet 0    HYDROcodone-acetaminophen (NORCO) 7.5-325 mg per tablet Take 1 tablet by mouth every 6 (six) hours as needed.      metoprolol succinate (TOPROL-XL) 25 MG 24 hr tablet Take 1 tablet (25 mg total) by mouth once daily. 90 tablet 3    pantoprazole (PROTONIX) 40 MG tablet Take 1 tablet (40 mg total) by mouth 2 (two) times daily. 60 tablet 11     Past Medical History:   Diagnosis Date    SHA (acute kidney injury) 12/2023    Choledocholithiasis 12/2023    Chronic right shoulder pain 08/01/2022    Continue otc tylenol for pain management with short course hydrocodone only PRN for BT pain for short course    Depression 02/01/2024    DM (diabetes mellitus)     GERD (gastroesophageal reflux disease)     HLD (hyperlipidemia)     HTN (hypertension)     Hydrocele of testis 12/16/2023    Hypotension 02/01/2024    Hypoxic respiratory failure 12/2023    Necrotizing pancreatitis 12/16/2023 12/2023    Necrotizing pancreatitis 12/16/2023    Sepsis due to Enterococcus faecalis 12/2023     Past Surgical History:   Procedure Laterality Date    APPENDECTOMY      CHOLECYSTECTOMY      ERCP N/A 12/17/2023    Procedure: ERCP (ENDOSCOPIC RETROGRADE CHOLANGIOPANCREATOGRAPHY);  Surgeon: Flako Kerns MD;  Location: [facility];  Service: Gastroenterology;  Laterality: N/A;    ERCP W/ SPHICTEROTOMY  12/17/2023    Procedure: ERCP, WITH SPHINCTEROTOMY;  Surgeon: Flako Kerns MD;  Location: [facility] OR;  Service: Gastroenterology;;    ERCP, WITH CALCULUS REMOVAL  12/17/2023    Procedure: ERCP, WITH CALCULUS REMOVAL;  Surgeon: Flako Kerns MD;  Location: [facility] OR;  Service: Gastroenterology;;     Review of patient's allergies indicates:   Allergen Reactions    Gabapentin Hallucinations     History reviewed. No pertinent family history.   Social History     Socioeconomic History    Marital status:    Tobacco Use    Smoking status: Every Day     Current packs/day:  "1.00     Types: Cigarettes    Smokeless tobacco: Never   Substance and Sexual Activity    Alcohol use: Never    Drug use: Never    Sexual activity: Yes     Social Determinants of Health     Financial Resource Strain: Low Risk  (8/8/2023)    Overall Financial Resource Strain (CARDIA)     Difficulty of Paying Living Expenses: Not hard at all   Food Insecurity: No Food Insecurity (8/8/2023)    Hunger Vital Sign     Worried About Running Out of Food in the Last Year: Never true     Ran Out of Food in the Last Year: Never true   Transportation Needs: No Transportation Needs (12/19/2023)    PRAPARE - Transportation     Lack of Transportation (Medical): No     Lack of Transportation (Non-Medical): No   Stress: No Stress Concern Present (8/8/2023)    [place] [employer] - Occupational Stress Questionnaire     Feeling of Stress : Only a little   Social Connections: Unknown (1/25/2024)    Social Connection and Isolation Panel [NHANES]     Marital Status:    Housing Stability: Low Risk  (12/19/2023)    Housing Stability Vital Sign     Unable to Pay for Housing in the Last Year: No     Number of Places Lived in the Last Year: 1     Unstable Housing in the Last Year: No     Patient Care Team:  Snehal Arroyo MD as PCP - General (Family Medicine)  Inna Gibbons MA as ED Navigator   Subjective:   ROS  See HPI for details  All Other ROS: Negative except as stated in HPI.   Objective:   /71   Pulse 85   Ht 5' 6" (1.676 m)   Wt 57.1 kg (125 lb 14.4 oz)   SpO2 97%   BMI 20.32 kg/m²   Physical Exam  Constitutional:       General: He is not in acute distress.  Cardiovascular:      Rate and Rhythm: Normal rate and regular rhythm.      Heart sounds: Normal heart sounds.   Pulmonary:      Effort: Pulmonary effort is normal.      Breath sounds: No wheezing, rhonchi or rales.   Neurological:      Mental Status: He is alert and oriented to person, place, and time.   Psychiatric:         Mood and " Affect: Mood normal.         Behavior: Behavior normal.         Thought Content: Thought content normal.         Judgment: Judgment normal.       Assessment:       ICD-10-CM ICD-9-CM   1. Type 2 diabetes mellitus with stage 3a chronic kidney disease, without long-term current use of insulin  E11.22 250.40    N18.31 585.3   2. Hyperlipidemia, unspecified hyperlipidemia type  E78.5 272.4   3. Chronic kidney disease, stage 3a  N18.31 585.3   4. Vitamin D deficiency  E55.9 268.9   5. Screening for malignant neoplasm of prostate  Z12.5 V76.44      Plan:   1. Type 2 diabetes mellitus with stage 3a chronic kidney disease, without long-term current use of insulin  Assessment & Plan:  Unmedicated  Continue to monitor CGS    Orders:  -     CBC Auto Differential; Future; Expected date: 10/11/2024  -     Comprehensive Metabolic Panel; Future; Expected date: 10/11/2024  -     TSH; Future; Expected date: 10/11/2024  -     Hemoglobin A1C; Future; Expected date: 10/11/2024  -     Urinalysis; Future; Expected date: 10/11/2024    2. Hyperlipidemia, unspecified hyperlipidemia type  Overview:  Recommend continuation of dyslipidemia diet with statin medication and repeat FLP at routine interval.    Orders:  -     Lipid Panel; Future; Expected date: 10/11/2024    3. Chronic kidney disease, stage 3a  Overview:  Consider starting Farxiga, Kerendia  CAD prevention with Asa, statin, BP control as applicable  Control IGT/DM with goal A1C <7. BP goal <130/80. LDL goal < 100.  Follow renoprotective measures including Renal Diet (reduce intake of nuts, peanut butter, milk, cheese, dried beans, peas) and Low Sodium Diet (less than 2 grams per day).  Avoid NSAIDs (I.e., Aleve, Mobic, Celebrex, Ibuprofen, Advil, Toradol and Diclofenac). May take Tylenol as needed for headache/pain.  Stay well hydrated. Avoid alcohol and soda. Limit tea and coffee.  Smoking avoidance/cessation recommended.        4. Vitamin D deficiency  -     Vitamin D; Future;  Expected date: 10/11/2024    5. Screening for malignant neoplasm of prostate  -     PSA, Screening; Future; Expected date: 10/11/2024         Medication List with Changes/Refills   Current Medications    ALBUTEROL (PROVENTIL/VENTOLIN HFA) 90 MCG/ACTUATION INHALER    Inhale 2 puffs into the lungs every 4 (four) hours as needed for Wheezing. Rescue       Start Date: --        End Date: --    ASPIRIN 81 MG CHEW    Take 81 mg by mouth once daily.       Start Date: --        End Date: --    ATORVASTATIN (LIPITOR) 20 MG TABLET    Take 20 mg by mouth.       Start Date: 3/6/2024  End Date: --    FAMOTIDINE (PEPCID) 20 MG TABLET    Take 60 tablets by mouth 2 (two) times daily.       Start Date: 3/7/2024  End Date: --    FENOFIBRATE (TRICOR) 145 MG TABLET    See Instructions, TAKE 1 TABLET EVERY DAY, # 90 tab(s), 3 Refill(s), Pharmacy: [facility], 168, cm, Height/Length Dosing, 11/11/21 9:32:00 CST, 73.75, kg, Weight Dosing, 11/11/21 9:32:00 CST Strength: 145 mg       Start Date: 10/10/2023End Date: --    FLUTICASONE PROPIONATE (FLONASE) 50 MCG/ACTUATION NASAL SPRAY    by Each Nostril route.       Start Date: 3/6/2024  End Date: --    HYDROCODONE-ACETAMINOPHEN (NORCO) 5-325 MG PER TABLET    Take 1 tablet by mouth every 6 (six) hours as needed for Pain.       Start Date: 2/27/2024 End Date: --    HYDROCODONE-ACETAMINOPHEN (NORCO) 7.5-325 MG PER TABLET    Take 1 tablet by mouth every 6 (six) hours as needed.       Start Date: 1/26/2024 End Date: --    METOPROLOL SUCCINATE (TOPROL-XL) 25 MG 24 HR TABLET    Take 1 tablet (25 mg total) by mouth once daily.       Start Date: 10/19/2023End Date: --    PANTOPRAZOLE (PROTONIX) 40 MG TABLET    Take 1 tablet (40 mg total) by mouth 2 (two) times daily.       Start Date: 2/23/2024 End Date: --        Follow up in about 4 months (around 8/11/2024) for Medicare Wellness, With Labs here; staff f/u on urology referral. In addition to their scheduled follow up, the patient  has also been instructed to follow up on as needed basis.

## 2024-04-19 ENCOUNTER — HOSPITAL ENCOUNTER (EMERGENCY)
Facility: HOSPITAL | Age: 69
Discharge: HOME | End: 2024-04-19
Attending: EMERGENCY MEDICINE
Payer: MEDICARE

## 2024-04-19 VITALS
HEART RATE: 94 BPM | SYSTOLIC BLOOD PRESSURE: 146 MMHG | WEIGHT: 124 LBS | TEMPERATURE: 98 F | DIASTOLIC BLOOD PRESSURE: 85 MMHG | OXYGEN SATURATION: 95 % | HEIGHT: 65 IN | BODY MASS INDEX: 20.66 KG/M2 | RESPIRATION RATE: 20 BRPM

## 2024-04-19 DIAGNOSIS — I10 HYPERTENSION, UNSPECIFIED TYPE: ICD-10-CM

## 2024-04-19 DIAGNOSIS — K86.1 CHRONIC PANCREATITIS, UNSPECIFIED PANCREATITIS TYPE: ICD-10-CM

## 2024-04-19 DIAGNOSIS — R11.14 BILIOUS VOMITING WITH NAUSEA: ICD-10-CM

## 2024-04-19 DIAGNOSIS — K29.00 ACUTE GASTRITIS WITHOUT HEMORRHAGE, UNSPECIFIED GASTRITIS TYPE: Primary | ICD-10-CM

## 2024-04-19 LAB
ALBUMIN SERPL-MCNC: 3.6 G/DL (ref 3.4–4.8)
ALBUMIN/GLOB SERPL: 0.7 RATIO (ref 1.1–2)
ALP SERPL-CCNC: 51 UNIT/L (ref 40–150)
ALT SERPL-CCNC: 14 UNIT/L (ref 0–55)
APPEARANCE UR: CLEAR
AST SERPL-CCNC: 27 UNIT/L (ref 5–34)
BACTERIA #/AREA URNS AUTO: ABNORMAL /HPF
BASOPHILS # BLD AUTO: 0.05 X10(3)/MCL
BASOPHILS NFR BLD AUTO: 0.4 %
BILIRUB SERPL-MCNC: 0.8 MG/DL
BILIRUB UR QL STRIP.AUTO: NEGATIVE
BILIRUBIN DIRECT+TOT PNL SERPL-MCNC: 0.3 MG/DL (ref 0–?)
BUN SERPL-MCNC: 18.7 MG/DL (ref 8.4–25.7)
CALCIUM SERPL-MCNC: 10.1 MG/DL (ref 8.8–10)
CHLORIDE SERPL-SCNC: 102 MMOL/L (ref 98–107)
CO2 SERPL-SCNC: 25 MMOL/L (ref 23–31)
COLOR UR AUTO: YELLOW
CREAT SERPL-MCNC: 1.26 MG/DL (ref 0.73–1.18)
EOSINOPHIL # BLD AUTO: 0.06 X10(3)/MCL (ref 0–0.9)
EOSINOPHIL NFR BLD AUTO: 0.5 %
ERYTHROCYTE [DISTWIDTH] IN BLOOD BY AUTOMATED COUNT: 16 % (ref 11.5–17)
GFR SERPLBLD CREATININE-BSD FMLA CKD-EPI: >60 MLS/MIN/1.73/M2
GLOBULIN SER-MCNC: 5 GM/DL (ref 2.4–3.5)
GLUCOSE SERPL-MCNC: 127 MG/DL (ref 82–115)
GLUCOSE UR QL STRIP.AUTO: ABNORMAL
HCT VFR BLD AUTO: 50.9 % (ref 42–52)
HGB BLD-MCNC: 16.4 G/DL (ref 14–18)
HYALINE CASTS #/AREA URNS LPF: ABNORMAL /LPF
IMM GRANULOCYTES # BLD AUTO: 0.04 X10(3)/MCL (ref 0–0.04)
IMM GRANULOCYTES NFR BLD AUTO: 0.3 %
KETONES UR QL STRIP.AUTO: NEGATIVE
LEUKOCYTE ESTERASE UR QL STRIP.AUTO: NEGATIVE
LIPASE SERPL-CCNC: 231 U/L
LYMPHOCYTES # BLD AUTO: 2.42 X10(3)/MCL (ref 0.6–4.6)
LYMPHOCYTES NFR BLD AUTO: 18.5 %
MCH RBC QN AUTO: 30.9 PG (ref 27–31)
MCHC RBC AUTO-ENTMCNC: 32.2 G/DL (ref 33–36)
MCV RBC AUTO: 95.9 FL (ref 80–94)
MONOCYTES # BLD AUTO: 1.14 X10(3)/MCL (ref 0.1–1.3)
MONOCYTES NFR BLD AUTO: 8.7 %
MUCOUS THREADS URNS QL MICRO: ABNORMAL /LPF
NEUTROPHILS # BLD AUTO: 9.35 X10(3)/MCL (ref 2.1–9.2)
NEUTROPHILS NFR BLD AUTO: 71.6 %
NITRITE UR QL STRIP.AUTO: NEGATIVE
NRBC BLD AUTO-RTO: 0 %
PH UR STRIP.AUTO: 5.5 [PH]
PLATELET # BLD AUTO: 291 X10(3)/MCL (ref 130–400)
PMV BLD AUTO: 9 FL (ref 7.4–10.4)
POTASSIUM SERPL-SCNC: 4 MMOL/L (ref 3.5–5.1)
PROT SERPL-MCNC: 8.6 GM/DL (ref 5.8–7.6)
PROT UR QL STRIP.AUTO: ABNORMAL
RBC # BLD AUTO: 5.31 X10(6)/MCL (ref 4.7–6.1)
RBC #/AREA URNS AUTO: ABNORMAL /HPF
RBC UR QL AUTO: ABNORMAL
SODIUM SERPL-SCNC: 139 MMOL/L (ref 136–145)
SP GR UR STRIP.AUTO: 1.04 (ref 1–1.03)
SQUAMOUS #/AREA URNS LPF: ABNORMAL /HPF
UROBILINOGEN UR STRIP-ACNC: 2
WBC # SPEC AUTO: 13.06 X10(3)/MCL (ref 4.5–11.5)
WBC #/AREA URNS AUTO: ABNORMAL /HPF

## 2024-04-19 PROCEDURE — 82248 BILIRUBIN DIRECT: CPT

## 2024-04-19 PROCEDURE — 96375 TX/PRO/DX INJ NEW DRUG ADDON: CPT

## 2024-04-19 PROCEDURE — 96361 HYDRATE IV INFUSION ADD-ON: CPT

## 2024-04-19 PROCEDURE — 83690 ASSAY OF LIPASE: CPT

## 2024-04-19 PROCEDURE — 81001 URINALYSIS AUTO W/SCOPE: CPT

## 2024-04-19 PROCEDURE — 80053 COMPREHEN METABOLIC PANEL: CPT

## 2024-04-19 PROCEDURE — 25000003 PHARM REV CODE 250: Performed by: PHYSICIAN ASSISTANT

## 2024-04-19 PROCEDURE — 63600175 PHARM REV CODE 636 W HCPCS: Performed by: PHYSICIAN ASSISTANT

## 2024-04-19 PROCEDURE — 96374 THER/PROPH/DIAG INJ IV PUSH: CPT | Mod: 59

## 2024-04-19 PROCEDURE — 99285 EMERGENCY DEPT VISIT HI MDM: CPT | Mod: 25

## 2024-04-19 PROCEDURE — 85025 COMPLETE CBC W/AUTO DIFF WBC: CPT

## 2024-04-19 PROCEDURE — 63600175 PHARM REV CODE 636 W HCPCS

## 2024-04-19 PROCEDURE — 25500020 PHARM REV CODE 255: Performed by: PHYSICIAN ASSISTANT

## 2024-04-19 RX ORDER — LIDOCAINE HYDROCHLORIDE 20 MG/ML
15 SOLUTION OROPHARYNGEAL ONCE
Status: COMPLETED | OUTPATIENT
Start: 2024-04-19 | End: 2024-04-19

## 2024-04-19 RX ORDER — ALUMINUM HYDROXIDE, MAGNESIUM HYDROXIDE, AND SIMETHICONE 1200; 120; 1200 MG/30ML; MG/30ML; MG/30ML
30 SUSPENSION ORAL ONCE
Status: COMPLETED | OUTPATIENT
Start: 2024-04-19 | End: 2024-04-19

## 2024-04-19 RX ORDER — ONDANSETRON HYDROCHLORIDE 2 MG/ML
4 INJECTION, SOLUTION INTRAVENOUS
Status: COMPLETED | OUTPATIENT
Start: 2024-04-19 | End: 2024-04-19

## 2024-04-19 RX ORDER — METOPROLOL TARTRATE 50 MG/1
50 TABLET ORAL DAILY
Qty: 30 TABLET | Refills: 0 | Status: SHIPPED | OUTPATIENT
Start: 2024-04-19

## 2024-04-19 RX ORDER — MORPHINE SULFATE 4 MG/ML
4 INJECTION, SOLUTION INTRAMUSCULAR; INTRAVENOUS
Status: COMPLETED | OUTPATIENT
Start: 2024-04-19 | End: 2024-04-19

## 2024-04-19 RX ORDER — HYDRALAZINE HYDROCHLORIDE 20 MG/ML
10 INJECTION INTRAMUSCULAR; INTRAVENOUS
Status: COMPLETED | OUTPATIENT
Start: 2024-04-19 | End: 2024-04-19

## 2024-04-19 RX ORDER — METOCLOPRAMIDE 10 MG/1
10 TABLET ORAL EVERY 6 HOURS
Qty: 20 TABLET | Refills: 0 | Status: SHIPPED | OUTPATIENT
Start: 2024-04-19 | End: 2024-04-24

## 2024-04-19 RX ORDER — METOCLOPRAMIDE HYDROCHLORIDE 5 MG/ML
10 INJECTION INTRAMUSCULAR; INTRAVENOUS
Status: COMPLETED | OUTPATIENT
Start: 2024-04-19 | End: 2024-04-19

## 2024-04-19 RX ORDER — SUCRALFATE 1 G/1
1 TABLET ORAL 4 TIMES DAILY
Qty: 20 TABLET | Refills: 0 | Status: SHIPPED | OUTPATIENT
Start: 2024-04-19 | End: 2024-04-24

## 2024-04-19 RX ORDER — PANTOPRAZOLE SODIUM 40 MG/1
40 TABLET, DELAYED RELEASE ORAL DAILY
Qty: 30 TABLET | Refills: 0 | Status: SHIPPED | OUTPATIENT
Start: 2024-04-19 | End: 2024-05-19

## 2024-04-19 RX ADMIN — HYDRALAZINE HYDROCHLORIDE 10 MG: 20 INJECTION INTRAMUSCULAR; INTRAVENOUS at 07:04

## 2024-04-19 RX ADMIN — LIDOCAINE HYDROCHLORIDE 15 ML: 20 SOLUTION ORAL at 08:04

## 2024-04-19 RX ADMIN — ALUMINUM HYDROXIDE, MAGNESIUM HYDROXIDE, AND SIMETHICONE 30 ML: 200; 200; 20 SUSPENSION ORAL at 08:04

## 2024-04-19 RX ADMIN — ONDANSETRON 4 MG: 2 INJECTION INTRAMUSCULAR; INTRAVENOUS at 04:04

## 2024-04-19 RX ADMIN — IOHEXOL 100 ML: 350 INJECTION, SOLUTION INTRAVENOUS at 05:04

## 2024-04-19 RX ADMIN — MORPHINE SULFATE 4 MG: 4 INJECTION, SOLUTION INTRAMUSCULAR; INTRAVENOUS at 04:04

## 2024-04-19 RX ADMIN — METOCLOPRAMIDE 10 MG: 5 INJECTION, SOLUTION INTRAMUSCULAR; INTRAVENOUS at 07:04

## 2024-04-19 RX ADMIN — SODIUM CHLORIDE, POTASSIUM CHLORIDE, SODIUM LACTATE AND CALCIUM CHLORIDE 1000 ML: 600; 310; 30; 20 INJECTION, SOLUTION INTRAVENOUS at 05:04

## 2024-04-19 NOTE — FIRST PROVIDER EVALUATION
"Medical screening examination initiated.  I have conducted a focused provider triage encounter, findings are as follows:    Brief history of present illness:  arrived to ED due to n/v and abdominal pain. Hx of pancreatitis. Last flare Dec 2023.    Vitals:    04/19/24 1603   BP: (!) 176/91   Pulse: 86   Resp: 18   Temp: 97.7 °F (36.5 °C)   TempSrc: Oral   SpO2: 96%   Weight: 56.2 kg (124 lb)   Height: 5' 5" (1.651 m)       Pertinent physical exam:  awake, alert, has non-labored breathing, and follows commands    Brief workup plan:  labs and medication    Preliminary workup initiated; this workup will be continued and followed by the physician or advanced practice provider that is assigned to the patient when roomed.  "

## 2024-04-19 NOTE — ED PROVIDER NOTES
Encounter Date: 4/19/2024       History     Chief Complaint   Patient presents with    Emesis     Pt c/o abdominal pain and vomiting. Hx of pancreatitis, states symptoms feels the same as last flare up in December. Denies fever, chills.       Expand All Collapse All    Encounter Date: 2/16/2024          History       Chief Complaint  Patient presents with  · Hematuria      C/o hematuria x2-3 days. Denies abd pain/n/v. C.o intermittent episodes of fever [id number]. Also reports decreased appetite x2 weeks.      69 year old make with a hx of necrotizing pancreatitis, choledocholithiasis who presents today with C/C of diffused abdominal pain science 1 week ago.  Symptoms worsened 2 nights ago and is associated with nausea and vomiting.. Pt endorses decreased appetite and 40Lb weight loss since his recent hospitalization in December, 2023. 80+ pack/years smoking, quit about 6 months ago.    The history is provided by the patient and the spouse            Abdominal Pain  The current episode started several days ago. The onset of the illness was gradual. The abdominal pain is located in the epigastric region. The abdominal pain is exacerbated by vomiting. The other symptoms of the illness include nausea and vomiting. The other symptoms of the illness do not include fever, fatigue, shortness of breath, dysuria or hematemesis.   Nausea began 3 to 5 days ago. The nausea is associated with eating (Worsened with crawfish).   The emesis contains stomach contents.   The patient has not had a change in bowel habit. Symptoms associated with the illness do not include back pain.     Review of patient's allergies indicates:   Allergen Reactions    Gabapentin Hallucinations     Past Medical History:   Diagnosis Date    SHA (acute kidney injury) 12/2023    Choledocholithiasis 12/2023    Chronic right shoulder pain 08/01/2022    Continue otc tylenol for pain management with short course hydrocodone only PRN for BT pain for short course     Depression 02/01/2024    DM (diabetes mellitus)     GERD (gastroesophageal reflux disease)     HLD (hyperlipidemia)     HTN (hypertension)     Hydrocele of testis 12/16/2023    Hypotension 02/01/2024    Hypoxic respiratory failure 12/2023    Necrotizing pancreatitis 12/16/2023 12/2023    Necrotizing pancreatitis 12/16/2023    Sepsis due to Enterococcus faecalis 12/2023     Past Surgical History:   Procedure Laterality Date    APPENDECTOMY      CHOLECYSTECTOMY      ERCP N/A 12/17/2023    Procedure: ERCP (ENDOSCOPIC RETROGRADE CHOLANGIOPANCREATOGRAPHY);  Surgeon: Flako Kerns MD;  Location: [facility] OR;  Service: Gastroenterology;  Laterality: N/A;    ERCP W/ SPHICTEROTOMY  12/17/2023    Procedure: ERCP, WITH SPHINCTEROTOMY;  Surgeon: Flako Kerns MD;  Location: [facility] OR;  Service: Gastroenterology;;    ERCP, WITH CALCULUS REMOVAL  12/17/2023    Procedure: ERCP, WITH CALCULUS REMOVAL;  Surgeon: Flako Kerns MD;  Location: [facility] OR;  Service: Gastroenterology;;     No family history on file.  Social History     Tobacco Use    Smoking status: Every Day     Current packs/day: 1.00     Types: Cigarettes    Smokeless tobacco: Never   Substance Use Topics    Alcohol use: Never    Drug use: Never     Review of Systems   Constitutional:  Negative for fatigue and fever.   HENT:  Negative for sore throat.    Respiratory:  Negative for shortness of breath.    Cardiovascular:  Negative for chest pain.   Gastrointestinal:  Positive for abdominal pain, nausea and vomiting. Negative for hematemesis.   Genitourinary:  Negative for dysuria.   Musculoskeletal:  Negative for back pain.   Skin:  Negative for rash.   Neurological:  Negative for weakness.   Hematological:  Does not bruise/bleed easily.       Physical Exam     Initial Vitals [04/19/24 1603]   BP Pulse Resp Temp SpO2   (!) 176/91 86 18 97.7 °F (36.5 °C) 96 %      MAP       --         Physical Exam    Constitutional: He appears well-developed.    Cardiovascular:  Normal rate.           Pulmonary/Chest: Breath sounds normal.   Abdominal: Bowel sounds are normal. There is abdominal tenderness in the right upper quadrant and epigastric area.   No right CVA tenderness.  No left CVA tenderness. There is negative Baker's sign.     Neurological: He is alert and oriented to person, place, and time. He has normal strength.   Skin: Skin is warm.   Psychiatric: He has a normal mood and affect. His behavior is normal. Thought content normal.         ED Course   Procedures  Labs Reviewed   COMPREHENSIVE METABOLIC PANEL - Abnormal; Notable for the following components:       Result Value    Glucose Level 127 (*)     Creatinine 1.26 (*)     Calcium Level Total 10.1 (*)     Protein Total 8.6 (*)     Globulin 5.0 (*)     Albumin/Globulin Ratio 0.7 (*)     All other components within normal limits   LIPASE - Abnormal; Notable for the following components:    Lipase Level 231 (*)     All other components within normal limits   URINALYSIS, REFLEX TO URINE CULTURE - Abnormal; Notable for the following components:    Specific Gravity, UA 1.037 (*)     Protein, UA 1+ (*)     Glucose, UA Trace (*)     Blood, UA 1+ (*)     Urobilinogen, UA 2.0 (*)     Mucous, UA Few (*)     Hyaline Casts, UA 6-10 (*)     RBC, UA 6-10 (*)     All other components within normal limits   CBC WITH DIFFERENTIAL - Abnormal; Notable for the following components:    WBC 13.06 (*)     MCV 95.9 (*)     MCHC 32.2 (*)     Neut # 9.35 (*)     All other components within normal limits   BILIRUBIN, DIRECT - Normal   CBC W/ AUTO DIFFERENTIAL    Narrative:     The following orders were created for panel order CBC W/ AUTO DIFFERENTIAL.  Procedure                               Abnormality         Status                     ---------                               -----------         ------                     CBC with Differential[[id number]]       Abnormal            Final result                 Please view results  for these tests on the individual orders.          Imaging Results              CT Abdomen Pelvis With IV Contrast NO Oral Contrast (Final result)  Result time 04/19/24 17:45:31      Final result by Osiel Whitehead MD (04/19/24 17:45:31)                   Impression:      Continued findings of pancreatitis though the organized collection seen in January has near completely resolved replaced by phlegmon between the pancreas and stomach.  There is increased gastric wall thickening suggestive of gastritis.      Electronically signed by: Osiel Whitehead  Date:    04/19/2024  Time:    17:45               Narrative:    EXAMINATION:  CT ABDOMEN PELVIS WITH IV CONTRAST    CLINICAL HISTORY:  Abdominal pain, acute, nonlocalized;hx of pancreatitis;    TECHNIQUE:  Helical acquisition through the abdomen and pelvis with IV contrast.  Three plane reconstructions were provided for review.  mGycm. Automatic exposure control, adjustment of mA/kV or iterative reconstruction technique was used to reduce radiation.    COMPARISON:  22 January 2024    FINDINGS:  Mild chronic changes at the lung bases.    No acute findings liver.  Patent portal vein.  Similar pneumobilia.  The spleen, adrenals and kidneys are within normal limits.    There are peripancreatic inflammatory changes with the previously visualized organized collection is near completely resolved.  A portion of the pancreatic body does not enhance likely the sequela of prior necrosis.  Suspect some phlegmon between the pancreas and stomach with associated gastric wall thickening.  There is also some esophageal wall thickening which could be seen with esophagitis.    There is no bowel obstruction.  No significant inflammatory changes of the small or large bowel.  There is colonic diverticulosis.  No free air.    Urinary bladder is not well distended.  Small volume ascites.  Abdominal aorta mildly ectatic with dense atherosclerotic disease.  No retroperitoneal  adenopathy.    No acute osseous findings.                                       Medications   morphine injection 4 mg (4 mg Intravenous Given 4/19/24 1638)   ondansetron injection 4 mg (4 mg Intravenous Given 4/19/24 1638)   lactated ringers bolus 1,000 mL (0 mLs Intravenous Stopped 4/19/24 1910)   iohexoL (OMNIPAQUE 350) injection 100 mL (100 mLs Intravenous Given 4/19/24 1729)   hydrALAZINE injection 10 mg (10 mg Intravenous Given 4/19/24 1951)   aluminum-magnesium hydroxide-simethicone 200-200-20 mg/5 mL suspension 30 mL (30 mLs Oral Given 4/19/24 2018)     And   LIDOcaine viscous HCl 2% oral solution 15 mL (15 mLs Oral Given 4/19/24 2018)   metoclopramide injection 10 mg (10 mg Intravenous Given 4/19/24 1952)     Medical Decision Making  Patient presents with:  Emesis: Pt c/o abdominal pain and vomiting. Hx of pancreatitis, states symptoms feels the same as last flare up in December. Denies fever, chills.        Amount and/or Complexity of Data Reviewed  Labs: ordered. Decision-making details documented in ED Course.     Details: Reviewed blood work with the patient, patient voiced understanding white blood cell 13.06 last lipase 231  Creatinine 1.26 no changes from previous creatinine.  Radiology: ordered.     Details: Continued findings of pancreatitis though the organized collection seen in January has near completely resolved replaced by phlegmon between the pancreas and stomach.  There is increased gastric wall thickening suggestive of gastritis.       Discussion of management or test interpretation with external provider(s): We discussed labs in detail, as per patient he had stopped taking his blood pressure medicines he did notice that he has a headache within the last few days, he had a blood pressure log and his blood pressure was elevated within the last 7-10 days.  I advised patient to resume to his blood pressure medicines as previously prescribed.  To continue monitoring his blood pressure and  follow up with PCP with a hypertension log.  Specific diet modifications with a new diagnosis of gastritis.  I gave him specific instructions and to avoid NSAIDs, caffeine, fried foods crawfish.     Risk  OTC drugs.  Prescription drug management.              Attending Attestation:     Physician Attestation Statement for NP/PA:       Other NP/PA Attestation Additions:    History of Present Illness: I did not see the patient. I was available for consultation if needed but plan and management by NP/PA               ED Course as of 04/19/24 2312 Fri Apr 19, 2024   2228 Specific Gravity,UA(!): 1.037 [YG]   2228 pH, UA: 5.5 [YG]   2228 Glucose, UA(!): Trace [YG]   2228 Blood, UA(!): 1+ [YG]   2228 Urobilinogen, UA(!): 2.0 [YG]   2228 Hyaline Casts, UA(!): 6-10 [YG]   2228 RBC, UA(!): 6-10 [YG]   2228 Glucose(!): 127 [YG]   2228 Creatinine(!): 1.26 [YG]   2228 Calcium(!): 10.1 [YG]   2228 AST: 27 [YG]   2228 WBC(!): 13.06 [YG]   2228 Lipase(!): 231 [YG]   2228 Neut #(!): 9.35 [YG]   2307 Patient is tolerating fluids, it has not vomiting he is comfortably resting in the room.  Abdominal pain and epigastric pain improved with cocktail, and Protonix [YG]      ED Course User Index  [YG] Annika Briseno PA          Judging by the patient's chief complaint and pertinent history, the patient has the following possible differential diagnoses, including but not limited to the following.  Some of these are deemed to be lower likelihood and some more likely based on my physical exam and history combined with possible lab work and/or imaging studies.   Please see the pertinent studies, and refer to the HPI.  Some of these diagnoses will take further evaluation to fully rule out, perhaps as an outpatient and the patient was encouraged to follow up when discharged for more comprehensive evaluation.    appendicitis, biliary disease, diverticulitis,  AAA, ACS, mesenteric ischemia, intraabdominal abcess, retroperitoneal abcess,  gastritis, gastroenteritis, hepatitis, hernia, pancreatitis, inflammatory bowel disease, PUD, SBP, nephrolithiasis, DKA, sickle cell crisis, consitpation, GERD, IBS            The patient is resting comfortably in no acute distress.  He is hemodynamically stable and is without objective evidence for acute process requiring urgent intervention or hospitalization. I provided counseling to patient with regard to condition, the treatment plan, specific conditions for return, and the importance of follow up. Detailed written and verbal instructions provided to patient and he expressed a verbal understanding of the discharge instructions and overall management plan. Reiterated the importance of medication administration and safety and advised patient to follow up with primary care provider in 3-5 days or sooner if needed.  Answered questions at this time. The patient is stable for discharge.            Clinical Impression:  Final diagnoses:  [K29.00] Acute gastritis without hemorrhage, unspecified gastritis type (Primary)  [K86.1] Chronic pancreatitis, unspecified pancreatitis type  [I10] Hypertension, unspecified type  [R11.14] Bilious vomiting with nausea          ED Disposition Condition    Discharge Stable          ED Prescriptions       Medication Sig Dispense Start Date End Date Auth. Provider    metoprolol tartrate (LOPRESSOR) 50 MG tablet Take 1 tablet (50 mg total) by mouth Daily. 30 tablet 4/19/2024 -- Annika Briseno PA    pantoprazole (PROTONIX) 40 MG tablet Take 1 tablet (40 mg total) by mouth once daily. 30 tablet 4/19/2024 5/19/2024 Annika Briseno PA    sucralfate (CARAFATE) 1 gram tablet Take 1 tablet (1 g total) by mouth 4 (four) times daily. for 5 days 20 tablet 4/19/2024 4/24/2024 Annika Briseno PA    metoclopramide HCl (REGLAN) 10 MG tablet Take 1 tablet (10 mg total) by mouth every 6 (six) hours. for 5 days 20 tablet 4/19/2024 4/24/2024 Annika Briseno PA          Follow-up Information       Follow  up With Specialties Details Why Contact Info    [facility] - Emergency Dept Emergency Medicine  If symptoms worsen [address] [facility] [id number] [phone]    Snehal Arroyo MD Family Medicine In 1 week If symptoms worsen [address] [address]  [phone]               Annika Briseno PA  04/19/24 2316       Lily Daly MD  04/20/24 [id number]

## 2024-04-22 ENCOUNTER — PATIENT OUTREACH (OUTPATIENT)
Dept: EMERGENCY MEDICINE | Facility: HOSPITAL | Age: 69
End: 2024-04-22
Payer: MEDICARE

## 2024-04-22 NOTE — PROGRESS NOTES
ED Navigator reminded the patient's wife Melvina of scheduled appointment with Dr Arroyo on 4/23/24 at 2:45 pm. Patient's wife agreed to appointment date and time. Patient's wife declined additional services. Follow up encounter closed.

## 2024-04-22 NOTE — PROGRESS NOTES
Per chart Dr Arroyo's staff have scheduled a post ED 7-day follow up for 4/23/24 at 2:45 pm. Patient's wife Melvina will be notified of this follow up.

## 2024-04-22 NOTE — PROGRESS NOTES
Spoke with wife Melvina regarding scheduling a post ED 7-day follow up with Dr Arroyo. Ms Hein stated that she was unable to schedule a follow up, a staff message was sent to Dr Arroyo for scheduling assistance since I was unable to find an appointment within 7-days. I will follow up with Ms Hein on scheduling assistance.

## 2024-04-23 ENCOUNTER — OFFICE VISIT (OUTPATIENT)
Dept: PRIMARY CARE CLINIC | Facility: CLINIC | Age: 69
End: 2024-04-23
Payer: MEDICARE

## 2024-04-23 VITALS
BODY MASS INDEX: 20.26 KG/M2 | HEIGHT: 65 IN | HEART RATE: 73 BPM | SYSTOLIC BLOOD PRESSURE: 137 MMHG | WEIGHT: 121.63 LBS | DIASTOLIC BLOOD PRESSURE: 71 MMHG | OXYGEN SATURATION: 96 %

## 2024-04-23 DIAGNOSIS — I77.9 CAROTID ARTERY DISEASE, UNSPECIFIED LATERALITY, UNSPECIFIED TYPE: ICD-10-CM

## 2024-04-23 DIAGNOSIS — I10 HYPERTENSION, UNSPECIFIED TYPE: Primary | ICD-10-CM

## 2024-04-23 DIAGNOSIS — K29.70 GASTRITIS, PRESENCE OF BLEEDING UNSPECIFIED, UNSPECIFIED CHRONICITY, UNSPECIFIED GASTRITIS TYPE: ICD-10-CM

## 2024-04-23 PROCEDURE — 99214 OFFICE O/P EST MOD 30 MIN: CPT | Mod: ,,, | Performed by: STUDENT IN AN ORGANIZED HEALTH CARE EDUCATION/TRAINING PROGRAM

## 2024-04-23 PROCEDURE — 1159F MED LIST DOCD IN RCRD: CPT | Mod: CPTII,,, | Performed by: STUDENT IN AN ORGANIZED HEALTH CARE EDUCATION/TRAINING PROGRAM

## 2024-04-23 PROCEDURE — 3078F DIAST BP <80 MM HG: CPT | Mod: CPTII,,, | Performed by: STUDENT IN AN ORGANIZED HEALTH CARE EDUCATION/TRAINING PROGRAM

## 2024-04-23 PROCEDURE — 3075F SYST BP GE 130 - 139MM HG: CPT | Mod: CPTII,,, | Performed by: STUDENT IN AN ORGANIZED HEALTH CARE EDUCATION/TRAINING PROGRAM

## 2024-04-23 PROCEDURE — 1160F RVW MEDS BY RX/DR IN RCRD: CPT | Mod: CPTII,,, | Performed by: STUDENT IN AN ORGANIZED HEALTH CARE EDUCATION/TRAINING PROGRAM

## 2024-04-23 PROCEDURE — 3008F BODY MASS INDEX DOCD: CPT | Mod: CPTII,,, | Performed by: STUDENT IN AN ORGANIZED HEALTH CARE EDUCATION/TRAINING PROGRAM

## 2024-04-23 RX ORDER — LISINOPRIL 10 MG/1
10 TABLET ORAL DAILY
Qty: 90 TABLET | Refills: 3 | Status: SHIPPED | OUTPATIENT
Start: 2024-04-23 | End: 2024-05-08 | Stop reason: SDUPTHER

## 2024-04-23 NOTE — ASSESSMENT & PLAN NOTE
Improving   We will switch metoprolol to lisinopril 10 mg daily for cardioprotective and renal effects  Continue to monitor BP at home and bring log at next visit

## 2024-04-23 NOTE — PROGRESS NOTES
Date: 04/23/2024  Patient ID: [id number]   Chief Complaint: Follow-up (Hospital follow up for HTN)    HPI:   Franklin Macias is a 69 y.o. male here today for Follow-up (Hospital follow up for HTN)  Had ER visit for/19 for abdominal pain and vomiting, fever, decreased appetite. CT abdomen showed no acute finding but did show remnants of chronic pancreatitis and gastritis.  Labs WNL.  BP time elevated 176/91. Patient was discharged as gastritis, chronic pancreatitis, hypertension.  Patient was given on discharge metoprolol 50 mg daily, Protonix 40 mg daily, Carafate, Reglan 10 mg q.6h p.r.n.. BP at home 170/80s.   Of note, did have crawfish around that time. Abdominal pain resolved. Abdominal complaints improved and eating well without issues.      Patient Active Problem List   Diagnosis    Type 2 diabetes mellitus with stage 3a chronic kidney disease, without long-term current use of insulin    Chronic kidney disease, stage 3a    Hyperlipidemia    Chronic neck and back pain    Diastolic congestive heart failure    Gastroesophageal reflux disease    Postnasal drip    Testicular swelling, right    Debility    Hypertension    Carotid artery disease, unspecified laterality, unspecified type    Gastritis     Current Outpatient Medications   Medication Sig Dispense Refill    aspirin 81 MG Chew Take 81 mg by mouth once daily.      metoclopramide HCl (REGLAN) 10 MG tablet Take 1 tablet (10 mg total) by mouth every 6 (six) hours. for 5 days 20 tablet 0    metoprolol tartrate (LOPRESSOR) 50 MG tablet Take 1 tablet (50 mg total) by mouth Daily. 30 tablet 0    pantoprazole (PROTONIX) 40 MG tablet Take 1 tablet (40 mg total) by mouth once daily. 30 tablet 0    sucralfate (CARAFATE) 1 gram tablet Take 1 tablet (1 g total) by mouth 4 (four) times daily. for 5 days 20 tablet 0    albuterol (PROVENTIL/VENTOLIN HFA) 90 mcg/actuation inhaler Inhale 2 puffs into the lungs every 4 (four) hours as needed for Wheezing. Rescue (Patient not  taking: Reported on 4/23/2024)      atorvastatin (LIPITOR) 20 MG tablet Take 20 mg by mouth. (Patient not taking: Reported on 4/23/2024)      famotidine (PEPCID) 20 MG tablet Take 60 tablets by mouth 2 (two) times daily. (Patient not taking: Reported on 4/23/2024)      fenofibrate (TRICOR) 145 MG tablet See Instructions, TAKE 1 TABLET EVERY DAY, # 90 tab(s), 3 Refill(s), Pharmacy: [facility], 168, cm, Height/Length Dosing, 11/11/21 9:32:00 CST, 73.75, kg, Weight Dosing, 11/11/21 9:32:00 CST Strength: 145 mg (Patient not taking: Reported on 4/23/2024) 90 tablet 3    fluticasone propionate (FLONASE) 50 mcg/actuation nasal spray by Each Nostril route. (Patient not taking: Reported on 4/23/2024)      HYDROcodone-acetaminophen (NORCO) 5-325 mg per tablet Take 1 tablet by mouth every 6 (six) hours as needed for Pain. (Patient not taking: Reported on 4/23/2024) 28 tablet 0    HYDROcodone-acetaminophen (NORCO) 7.5-325 mg per tablet Take 1 tablet by mouth every 6 (six) hours as needed. (Patient not taking: Reported on 4/23/2024)      lisinopriL 10 MG tablet Take 1 tablet (10 mg total) by mouth once daily. 90 tablet 3    metoprolol succinate (TOPROL-XL) 25 MG 24 hr tablet Take 1 tablet (25 mg total) by mouth once daily. (Patient not taking: Reported on 4/23/2024) 90 tablet 3    pantoprazole (PROTONIX) 40 MG tablet Take 1 tablet (40 mg total) by mouth 2 (two) times daily. 60 tablet 11     No current facility-administered medications for this visit.     Past Medical History:   Diagnosis Date    SHA (acute kidney injury) 12/2023    Choledocholithiasis 12/2023    Chronic right shoulder pain 08/01/2022    Continue otc tylenol for pain management with short course hydrocodone only PRN for BT pain for short course    Depression 02/01/2024    DM (diabetes mellitus)     GERD (gastroesophageal reflux disease)     HLD (hyperlipidemia)     HTN (hypertension)     Hydrocele of testis 12/16/2023    Hypotension 02/01/2024     Hypoxic respiratory failure 12/2023    Necrotizing pancreatitis 12/16/2023 12/2023    Necrotizing pancreatitis 12/16/2023    Sepsis due to Enterococcus faecalis 12/2023     Past Surgical History:   Procedure Laterality Date    APPENDECTOMY      CHOLECYSTECTOMY      ERCP N/A 12/17/2023    Procedure: ERCP (ENDOSCOPIC RETROGRADE CHOLANGIOPANCREATOGRAPHY);  Surgeon: Flako Kerns MD;  Location: [facility];  Service: Gastroenterology;  Laterality: N/A;    ERCP W/ SPHICTEROTOMY  12/17/2023    Procedure: ERCP, WITH SPHINCTEROTOMY;  Surgeon: Flako Kerns MD;  Location: [facility] OR;  Service: Gastroenterology;;    ERCP, WITH CALCULUS REMOVAL  12/17/2023    Procedure: ERCP, WITH CALCULUS REMOVAL;  Surgeon: Flako Kerns MD;  Location: [facility];  Service: Gastroenterology;;     Review of patient's allergies indicates:   Allergen Reactions    Gabapentin Hallucinations     No family history on file.   Social History     Socioeconomic History    Marital status:    Tobacco Use    Smoking status: Every Day     Current packs/day: 1.00     Types: Cigarettes    Smokeless tobacco: Never   Substance and Sexual Activity    Alcohol use: Never    Drug use: Never    Sexual activity: Yes     Social Determinants of Health     Financial Resource Strain: Low Risk  (8/8/2023)    Overall Financial Resource Strain (CARDIA)     Difficulty of Paying Living Expenses: Not hard at all   Food Insecurity: No Food Insecurity (8/8/2023)    Hunger Vital Sign     Worried About Running Out of Food in the Last Year: Never true     Ran Out of Food in the Last Year: Never true   Transportation Needs: No Transportation Needs (12/19/2023)    PRAPARE - Transportation     Lack of Transportation (Medical): No     Lack of Transportation (Non-Medical): No   Stress: No Stress Concern Present (8/8/2023)    [employer] [place] [employer] - Occupational Stress Questionnaire     Feeling of Stress : Only a little   Social  "Connections: Unknown (1/25/2024)    Social Connection and Isolation Panel [NHANES]     Marital Status:    Housing Stability: Low Risk  (12/19/2023)    Housing Stability Vital Sign     Unable to Pay for Housing in the Last Year: No     Number of Places Lived in the Last Year: 1     Unstable Housing in the Last Year: No     Patient Care Team:  Snehal Arroyo MD as PCP - General (Family Medicine)  Inna Gibbons MA as ED Navigator   Subjective:   ROS  See HPI for details  All Other ROS: Negative except as stated in HPI.   Objective:   /71   Pulse 73   Ht 5' 5" (1.651 m)   Wt 55.2 kg (121 lb 9.6 oz)   SpO2 96%   BMI 20.24 kg/m²   Physical Exam  Cardiovascular:      Rate and Rhythm: Normal rate and regular rhythm.      Heart sounds: Normal heart sounds.   Pulmonary:      Effort: Pulmonary effort is normal.   Abdominal:      General: Abdomen is flat. Bowel sounds are normal.      Palpations: Abdomen is soft.      Tenderness: There is no abdominal tenderness.   Neurological:      Mental Status: He is alert.       Assessment:       ICD-10-CM ICD-9-CM   1. Hypertension, unspecified type  I10 401.9   2. Gastritis, presence of bleeding unspecified, unspecified chronicity, unspecified gastritis type  K29.70 535.50   3. Carotid artery disease, unspecified laterality, unspecified type  I77.9 447.9      Plan:   1. Hypertension, unspecified type  Assessment & Plan:  Improving   We will switch metoprolol to lisinopril 10 mg daily for cardioprotective and renal effects  Continue to monitor BP at home and bring log at next visit    Orders:  -     lisinopriL 10 MG tablet; Take 1 tablet (10 mg total) by mouth once daily.  Dispense: 90 tablet; Refill: 3    2. Gastritis, presence of bleeding unspecified, unspecified chronicity, unspecified gastritis type  Assessment & Plan:  Improving  Continue Reglan and Carafate prn  Continue Protonix 40mg qd      3. Carotid artery disease, unspecified laterality, unspecified " type  Assessment & Plan:  Stable, continue Asa 81mg qd  Continue to monitor             Medication List with Changes/Refills   New Medications    LISINOPRIL 10 MG TABLET    Take 1 tablet (10 mg total) by mouth once daily.       Start Date: 4/23/2024 End Date: 4/23/2025   Current Medications    ALBUTEROL (PROVENTIL/VENTOLIN HFA) 90 MCG/ACTUATION INHALER    Inhale 2 puffs into the lungs every 4 (four) hours as needed for Wheezing. Rescue       Start Date: --        End Date: --    ASPIRIN 81 MG CHEW    Take 81 mg by mouth once daily.       Start Date: --        End Date: --    ATORVASTATIN (LIPITOR) 20 MG TABLET    Take 20 mg by mouth.       Start Date: 3/6/2024  End Date: --    FAMOTIDINE (PEPCID) 20 MG TABLET    Take 60 tablets by mouth 2 (two) times daily.       Start Date: 3/7/2024  End Date: --    FENOFIBRATE (TRICOR) 145 MG TABLET    See Instructions, TAKE 1 TABLET EVERY DAY, # 90 tab(s), 3 Refill(s), Pharmacy: [facility], 168, cm, Height/Length Dosing, 11/11/21 9:32:00 CST, 73.75, kg, Weight Dosing, 11/11/21 9:32:00 CST Strength: 145 mg       Start Date: 10/10/2023End Date: --    FLUTICASONE PROPIONATE (FLONASE) 50 MCG/ACTUATION NASAL SPRAY    by Each Nostril route.       Start Date: 3/6/2024  End Date: --    HYDROCODONE-ACETAMINOPHEN (NORCO) 5-325 MG PER TABLET    Take 1 tablet by mouth every 6 (six) hours as needed for Pain.       Start Date: 2/27/2024 End Date: --    HYDROCODONE-ACETAMINOPHEN (NORCO) 7.5-325 MG PER TABLET    Take 1 tablet by mouth every 6 (six) hours as needed.       Start Date: 1/26/2024 End Date: --    METOCLOPRAMIDE HCL (REGLAN) 10 MG TABLET    Take 1 tablet (10 mg total) by mouth every 6 (six) hours. for 5 days       Start Date: 4/19/2024 End Date: 4/24/2024    METOPROLOL SUCCINATE (TOPROL-XL) 25 MG 24 HR TABLET    Take 1 tablet (25 mg total) by mouth once daily.       Start Date: 10/19/2023End Date: --    METOPROLOL TARTRATE (LOPRESSOR) 50 MG TABLET    Take 1 tablet  (50 mg total) by mouth Daily.       Start Date: 4/19/2024 End Date: --    PANTOPRAZOLE (PROTONIX) 40 MG TABLET    Take 1 tablet (40 mg total) by mouth 2 (two) times daily.       Start Date: 2/23/2024 End Date: --    PANTOPRAZOLE (PROTONIX) 40 MG TABLET    Take 1 tablet (40 mg total) by mouth once daily.       Start Date: 4/19/2024 End Date: 5/19/2024    SUCRALFATE (CARAFATE) 1 GRAM TABLET    Take 1 tablet (1 g total) by mouth 4 (four) times daily. for 5 days       Start Date: 4/19/2024 End Date: 4/24/2024        Follow up in about 4 weeks (around 5/21/2024) for HTN. In addition to their scheduled follow up, the patient has also been instructed to follow up on as needed basis.

## 2024-05-08 ENCOUNTER — TELEPHONE (OUTPATIENT)
Dept: PRIMARY CARE CLINIC | Facility: CLINIC | Age: 69
End: 2024-05-08
Payer: MEDICARE

## 2024-05-08 ENCOUNTER — HOSPITAL ENCOUNTER (EMERGENCY)
Facility: HOSPITAL | Age: 69
Discharge: HOME | End: 2024-05-08
Attending: STUDENT IN AN ORGANIZED HEALTH CARE EDUCATION/TRAINING PROGRAM
Payer: MEDICARE

## 2024-05-08 VITALS
HEIGHT: 65 IN | BODY MASS INDEX: 20.49 KG/M2 | DIASTOLIC BLOOD PRESSURE: 99 MMHG | OXYGEN SATURATION: 96 % | SYSTOLIC BLOOD PRESSURE: 156 MMHG | WEIGHT: 123 LBS | RESPIRATION RATE: 20 BRPM | TEMPERATURE: 98 F | HEART RATE: 74 BPM

## 2024-05-08 DIAGNOSIS — I10 HYPERTENSION, UNSPECIFIED TYPE: ICD-10-CM

## 2024-05-08 DIAGNOSIS — I10 HYPERTENSION, UNSPECIFIED TYPE: Primary | ICD-10-CM

## 2024-05-08 LAB
ALBUMIN SERPL-MCNC: 3.4 G/DL (ref 3.4–4.8)
ALBUMIN/GLOB SERPL: 0.8 RATIO (ref 1.1–2)
ALP SERPL-CCNC: 78 UNIT/L (ref 40–150)
ALT SERPL-CCNC: 21 UNIT/L (ref 0–55)
APPEARANCE UR: CLEAR
AST SERPL-CCNC: 30 UNIT/L (ref 5–34)
BACTERIA #/AREA URNS AUTO: ABNORMAL /HPF
BASOPHILS # BLD AUTO: 0.04 X10(3)/MCL
BASOPHILS NFR BLD AUTO: 0.3 %
BILIRUB SERPL-MCNC: 0.9 MG/DL
BILIRUB UR QL STRIP.AUTO: NEGATIVE
BUN SERPL-MCNC: 15.2 MG/DL (ref 8.4–25.7)
CALCIUM SERPL-MCNC: 9.2 MG/DL (ref 8.8–10)
CHLORIDE SERPL-SCNC: 102 MMOL/L (ref 98–107)
CO2 SERPL-SCNC: 28 MMOL/L (ref 23–31)
COLOR UR AUTO: YELLOW
CREAT SERPL-MCNC: 0.97 MG/DL (ref 0.73–1.18)
EOSINOPHIL # BLD AUTO: 0.03 X10(3)/MCL (ref 0–0.9)
EOSINOPHIL NFR BLD AUTO: 0.2 %
ERYTHROCYTE [DISTWIDTH] IN BLOOD BY AUTOMATED COUNT: 14.1 % (ref 11.5–17)
GFR SERPLBLD CREATININE-BSD FMLA CKD-EPI: >60 ML/MIN/1.73/M2
GLOBULIN SER-MCNC: 4.2 GM/DL (ref 2.4–3.5)
GLUCOSE SERPL-MCNC: 127 MG/DL (ref 82–115)
GLUCOSE UR QL STRIP.AUTO: ABNORMAL
HCT VFR BLD AUTO: 45.7 % (ref 42–52)
HGB BLD-MCNC: 14.7 G/DL (ref 14–18)
HYALINE CASTS #/AREA URNS LPF: ABNORMAL /LPF
IMM GRANULOCYTES # BLD AUTO: 0.08 X10(3)/MCL (ref 0–0.04)
IMM GRANULOCYTES NFR BLD AUTO: 0.6 %
KETONES UR QL STRIP.AUTO: NEGATIVE
LEUKOCYTE ESTERASE UR QL STRIP.AUTO: NEGATIVE
LYMPHOCYTES # BLD AUTO: 1.85 X10(3)/MCL (ref 0.6–4.6)
LYMPHOCYTES NFR BLD AUTO: 13.5 %
MCH RBC QN AUTO: 30.4 PG (ref 27–31)
MCHC RBC AUTO-ENTMCNC: 32.2 G/DL (ref 33–36)
MCV RBC AUTO: 94.4 FL (ref 80–94)
MONOCYTES # BLD AUTO: 1.24 X10(3)/MCL (ref 0.1–1.3)
MONOCYTES NFR BLD AUTO: 9 %
MUCOUS THREADS URNS QL MICRO: ABNORMAL /LPF
NEUTROPHILS # BLD AUTO: 10.49 X10(3)/MCL (ref 2.1–9.2)
NEUTROPHILS NFR BLD AUTO: 76.4 %
NITRITE UR QL STRIP.AUTO: NEGATIVE
NRBC BLD AUTO-RTO: 0 %
PH UR STRIP.AUTO: 5.5 [PH]
PLATELET # BLD AUTO: 171 X10(3)/MCL (ref 130–400)
PMV BLD AUTO: 9.3 FL (ref 7.4–10.4)
POTASSIUM SERPL-SCNC: 3.7 MMOL/L (ref 3.5–5.1)
PROT SERPL-MCNC: 7.6 GM/DL (ref 5.8–7.6)
PROT UR QL STRIP.AUTO: ABNORMAL
RBC # BLD AUTO: 4.84 X10(6)/MCL (ref 4.7–6.1)
RBC #/AREA URNS AUTO: ABNORMAL /HPF
RBC UR QL AUTO: ABNORMAL
SODIUM SERPL-SCNC: 137 MMOL/L (ref 136–145)
SP GR UR STRIP.AUTO: 1.02 (ref 1–1.03)
SQUAMOUS #/AREA URNS LPF: ABNORMAL /HPF
UROBILINOGEN UR STRIP-ACNC: NORMAL
WBC # SPEC AUTO: 13.73 X10(3)/MCL (ref 4.5–11.5)
WBC #/AREA URNS AUTO: ABNORMAL /HPF

## 2024-05-08 PROCEDURE — 25000003 PHARM REV CODE 250: Performed by: NURSE PRACTITIONER

## 2024-05-08 PROCEDURE — 80053 COMPREHEN METABOLIC PANEL: CPT | Performed by: NURSE PRACTITIONER

## 2024-05-08 PROCEDURE — 85025 COMPLETE CBC W/AUTO DIFF WBC: CPT | Performed by: NURSE PRACTITIONER

## 2024-05-08 PROCEDURE — 81001 URINALYSIS AUTO W/SCOPE: CPT | Performed by: NURSE PRACTITIONER

## 2024-05-08 PROCEDURE — 99284 EMERGENCY DEPT VISIT MOD MDM: CPT | Mod: 25

## 2024-05-08 RX ORDER — CLONIDINE HYDROCHLORIDE 0.2 MG/1
0.2 TABLET ORAL
Status: COMPLETED | OUTPATIENT
Start: 2024-05-08 | End: 2024-05-08

## 2024-05-08 RX ADMIN — CLONIDINE HYDROCHLORIDE 0.2 MG: 0.2 TABLET ORAL at 11:05

## 2024-05-08 NOTE — TELEPHONE ENCOUNTER
----- Message from Florencio Ahn sent at 5/8/2024  3:44 PM CDT -----  .Type:  Patient Returning Call    Who Called:pt   Who Left Message for Patient:  Does the patient know what this is regarding?:wants to know what to do about his bp medication. Pts spouse states that they just left the ER and the bp is not going down.   Would the patient rather a call back or a response via [facility]? Call back   Best Call Back Number:[phone]  Additional Information:

## 2024-05-08 NOTE — FIRST PROVIDER EVALUATION
"Medical screening examination initiated.  I have conducted a focused provider triage encounter, findings are as follows:    Brief history of present illness:  Patient states recent change in blood pressure medication. States elevated BP, headache, and abdominal pain.     Vitals:    05/08/24 0915   BP: (!) 166/100   Pulse: 102   Resp: 18   Temp: 98.4 °F (36.9 °C)   TempSrc: Oral   SpO2: 97%   Weight: 55.8 kg (123 lb)   Height: 5' 5" (1.651 m)       Pertinent physical exam:  Awake, alert, ambulatory      Brief workup plan:  Labs    Preliminary workup initiated; this workup will be continued and followed by the physician or advanced practice provider that is assigned to the patient when roomed.  "

## 2024-05-08 NOTE — TELEPHONE ENCOUNTER
----- Message from Samantha Chin sent at 5/8/2024  8:12 AM CDT -----  Who Called: Franklin Macias    Caller is requesting assistance/information from provider's office.    Symptoms (please be specific):  BP running  high 176/85 (106) and constant stomach pain   How long has patient had these symptoms:   last visit 4/23/24  List of preferred pharmacies on file (remove unneeded): [unfilled]  If different, enter pharmacy into here including location and phone number:  [facility]      Preferred Method of Contact: Phone Call  Patient's Preferred Phone Number on File: [phone]   Best Call Back Number, if different:  Additional Information:  pt stated his medication was changed (lower  dosage) at last visit, and is not helping to keep BP down, please advise, thanks

## 2024-05-09 ENCOUNTER — PATIENT OUTREACH (OUTPATIENT)
Dept: EMERGENCY MEDICINE | Facility: HOSPITAL | Age: 69
End: 2024-05-09
Payer: MEDICARE

## 2024-05-09 NOTE — PROGRESS NOTES
Pt is unreachable for F/U. Encounter will be closed.    Irlanda García  ED Navigator  [phone]

## 2024-05-09 NOTE — ED PROVIDER NOTES
Encounter Date: 5/8/2024       History     Chief Complaint   Patient presents with    Hypertension     Recent change in blood pressure medicine, states htn at home 170/120, c/o headache and abd pain     69-year-old male presents to ED for evaluation elevated blood pressure with headache since last night. patient reports that his PCP has been adjusting his blood pressure medicine and is currently on lisinopril 10 mg.  Patient denies any shortness of breath or chest pain.  Denies any abdominal pain nausea or vomiting.  Denies any urinary complaints.    The history is provided by the patient. No  was used.     Review of patient's allergies indicates:   Allergen Reactions    Gabapentin Hallucinations     Past Medical History:   Diagnosis Date    SHA (acute kidney injury) 12/2023    Choledocholithiasis 12/2023    Chronic right shoulder pain 08/01/2022    Continue otc tylenol for pain management with short course hydrocodone only PRN for BT pain for short course    Depression 02/01/2024    DM (diabetes mellitus)     GERD (gastroesophageal reflux disease)     HLD (hyperlipidemia)     HTN (hypertension)     Hydrocele of testis 12/16/2023    Hypotension 02/01/2024    Hypoxic respiratory failure 12/2023    Necrotizing pancreatitis 12/16/2023 12/2023    Necrotizing pancreatitis 12/16/2023    Sepsis due to Enterococcus faecalis 12/2023     Past Surgical History:   Procedure Laterality Date    APPENDECTOMY      CHOLECYSTECTOMY      ERCP N/A 12/17/2023    Procedure: ERCP (ENDOSCOPIC RETROGRADE CHOLANGIOPANCREATOGRAPHY);  Surgeon: Flako Kerns MD;  Location: [facility];  Service: Gastroenterology;  Laterality: N/A;    ERCP W/ SPHICTEROTOMY  12/17/2023    Procedure: ERCP, WITH SPHINCTEROTOMY;  Surgeon: Flako Kerns MD;  Location: [facility] OR;  Service: Gastroenterology;;    ERCP, WITH CALCULUS REMOVAL  12/17/2023    Procedure: ERCP, WITH CALCULUS REMOVAL;  Surgeon: Flako Kerns MD;   Location: [facility] OR;  Service: Gastroenterology;;     No family history on file.  Social History     Tobacco Use    Smoking status: Every Day     Current packs/day: 1.00     Types: Cigarettes    Smokeless tobacco: Never   Substance Use Topics    Alcohol use: Never    Drug use: Never     Review of Systems   Constitutional:  Negative for chills, fatigue and fever.   HENT:  Negative for sore throat.    Respiratory:  Negative for cough and shortness of breath.    Cardiovascular:  Negative for chest pain.   Gastrointestinal:  Negative for abdominal pain, nausea and vomiting.   Genitourinary:  Negative for dysuria and frequency.   Musculoskeletal:  Negative for back pain, myalgias and neck pain.   Skin:  Negative for rash.   Neurological:  Positive for headaches. Negative for dizziness and weakness.   Hematological:  Does not bruise/bleed easily.   All other systems reviewed and are negative.      Physical Exam     Initial Vitals [05/08/24 0915]   BP Pulse Resp Temp SpO2   (!) 166/100 102 18 98.4 °F (36.9 °C) 97 %      MAP       --         Physical Exam    Nursing note and vitals reviewed.  Constitutional: He appears well-developed. He is cooperative.   HENT:   Head: Normocephalic and atraumatic.   Right Ear: Tympanic membrane and external ear normal.   Left Ear: Tympanic membrane and external ear normal.   Mouth/Throat: Uvula is midline, oropharynx is clear and moist and mucous membranes are normal. No trismus in the jaw. No uvula swelling.   Eyes: Conjunctivae are normal. Pupils are equal, round, and reactive to light.   Neck: Neck supple.   Normal range of motion.  Cardiovascular:  Normal rate, regular rhythm and normal heart sounds.           Pulmonary/Chest: Breath sounds normal. No respiratory distress. He has no wheezes. He has no rhonchi. He has no rales.   Abdominal: Abdomen is soft. Bowel sounds are normal. There is no abdominal tenderness.   Musculoskeletal:         General: Normal range of motion.      Cervical  back: Normal range of motion and neck supple.     Neurological: He is alert and oriented to person, place, and time. He has normal strength. No cranial nerve deficit or sensory deficit. GCS score is 15. GCS eye subscore is 4. GCS verbal subscore is 5. GCS motor subscore is 6.   Skin: Skin is warm and dry. Capillary refill takes less than 2 seconds.   Psychiatric: He has a normal mood and affect.         ED Course   Procedures  Labs Reviewed   COMPREHENSIVE METABOLIC PANEL - Abnormal; Notable for the following components:       Result Value    Glucose 127 (*)     Globulin 4.2 (*)     Albumin/Globulin Ratio 0.8 (*)     All other components within normal limits   URINALYSIS, REFLEX TO URINE CULTURE - Abnormal; Notable for the following components:    Protein, UA 1+ (*)     Glucose, UA Trace (*)     Blood, UA 2+ (*)     Mucous, UA Trace (*)     Hyaline Casts, UA 3-5 (*)     All other components within normal limits   CBC WITH DIFFERENTIAL - Abnormal; Notable for the following components:    WBC 13.73 (*)     MCV 94.4 (*)     MCHC 32.2 (*)     Neut # 10.49 (*)     IG# 0.08 (*)     All other components within normal limits   CBC W/ AUTO DIFFERENTIAL    Narrative:     The following orders were created for panel order CBC Auto Differential.  Procedure                               Abnormality         Status                     ---------                               -----------         ------                     CBC with Differential[[id number]]       Abnormal            Final result                 Please view results for these tests on the individual orders.          Imaging Results              CT Head Without Contrast (Final result)  Result time 05/08/24 15:01:40      Final result by Osiel Whitehead MD (05/08/24 15:01:40)                   Impression:      No acute intracranial findings.      Electronically signed by: Osiel Whitehead  Date:    05/08/2024  Time:    15:01               Narrative:    EXAMINATION:  CT HEAD  WITHOUT CONTRAST    CLINICAL HISTORY:  Headache, new or worsening (Age >= 50y);    TECHNIQUE:  CT imaging of the head performed from the skull base to the vertex without intravenous contrast.  mGycm. Automatic exposure control, adjustment of mA/kV or iterative reconstruction technique was used to reduce radiation.    COMPARISON:  None Available.    FINDINGS:  There is no acute cortical infarct, hemorrhage or mass lesion.  There is mild patchy hypoattenuation in the cerebral white matter which is nonspecific but most commonly associated with chronic small vessel ischemic changes.  There are some basal ganglia calcifications.  The ventricles are not significantly enlarged.  There are vascular calcifications.    Mild mucosal thickening right maxillary sinus.  Mastoid air cells are clear.                                       Medications   cloNIDine tablet 0.2 mg (0.2 mg Oral Given 5/8/24 1103)     Medical Decision Making  69-year-old male presents to ED for evaluation elevated blood pressure with headache since last night. patient reports that his PCP has been adjusting his blood pressure medicine and is currently on lisinopril 10 mg.  Patient denies any shortness of breath or chest pain.  Denies any abdominal pain nausea or vomiting.  Denies any urinary complaints.    Differential diagnosis includes but isn't limited to hypertension, noncompliance, CVA    Amount and/or Complexity of Data Reviewed  Radiology: ordered.  Discussion of management or test interpretation with external provider(s): Patient GCS 15 and neuro intact.  Ambulatory with steady gait.  Presents to ED for evaluation of elevated blood pressure for the last several days.  States his PCP has been adjusting his blood pressure.  Currently on lisinopril 10 mg.  Patient given clonidine here with improvement of pressure.  Reports headache has improved has gone however due to severe headache will get CT.  CT showing no acute findings.  Discussed need  for follow up with his PCP for further adjustment of his blood pressure medications.  Labs unremarkable.  Discussed return ED precautions.  Patient verbalizes understanding and agrees with plan of care                                      Clinical Impression:  Final diagnoses:  [I10] Hypertension, unspecified type (Primary)          ED Disposition Condition    Discharge Stable          ED Prescriptions    None       Follow-up Information       Follow up With Specialties Details Why Contact Info    Snehal Arroyo MD Family Medicine   [address] [place] [id number]  [phone]               Bernadette Shaikh PA  05/08/24 2052

## 2024-05-13 ENCOUNTER — CLINICAL SUPPORT (OUTPATIENT)
Dept: PRIMARY CARE CLINIC | Facility: CLINIC | Age: 69
End: 2024-05-13
Payer: MEDICARE

## 2024-05-13 VITALS — DIASTOLIC BLOOD PRESSURE: 77 MMHG | SYSTOLIC BLOOD PRESSURE: 169 MMHG

## 2024-05-13 DIAGNOSIS — I10 HYPERTENSION, UNSPECIFIED TYPE: Primary | ICD-10-CM

## 2024-05-16 NOTE — PROGRESS NOTES
Date: 05/23/2024  Patient ID: [id number]   Chief Complaint: Follow-up (Follow up on BP)    HPI:   Franklin Macias is a 69 y.o. male here today for Follow-up (Follow up on BP)  Pt had ED visit for uncontrolled htn and abdominal pain. CT head benign. Given Clonidine with sx improvement. At interval Lisinopril was increased to 20mg qd. BP at home 150/90 this am but does not keep log. He does get h/a back to front in afternoon and lasts all night. Denies bv, chest pain, sob. Also needs Protonix refill  Patient Active Problem List   Diagnosis    Type 2 diabetes mellitus with stage 3a chronic kidney disease, without long-term current use of insulin    Chronic kidney disease, stage 3a    Hyperlipidemia    Chronic neck and back pain    Diastolic congestive heart failure    Gastroesophageal reflux disease    Postnasal drip    Testicular swelling, right    Debility    Hypertension    Carotid artery disease, unspecified laterality, unspecified type    Gastritis     Outpatient Medications Marked as Taking for the 5/23/24 encounter (Office Visit) with Snehal Arroyo MD   Medication Sig Dispense Refill    aspirin 81 MG Chew Take 81 mg by mouth once daily.      lisinopriL (PRINIVIL,ZESTRIL) 20 MG tablet Take 1 tablet (20 mg total) by mouth once daily. 90 tablet 3    metoprolol tartrate (LOPRESSOR) 50 MG tablet Take 1 tablet (50 mg total) by mouth Daily. 30 tablet 0    [DISCONTINUED] pantoprazole (PROTONIX) 20 MG tablet Take 1 tablet (20 mg total) by mouth 2 (two) times a day. 180 tablet 3    [DISCONTINUED] pantoprazole (PROTONIX) 40 MG tablet Take 1 tablet (40 mg total) by mouth 2 (two) times daily. 60 tablet 11     Past Medical History:   Diagnosis Date    SHA (acute kidney injury) 12/2023    Choledocholithiasis 12/2023    Chronic right shoulder pain 08/01/2022    Continue otc tylenol for pain management with short course hydrocodone only PRN for BT pain for short course    Depression 02/01/2024    DM (diabetes mellitus)      GERD (gastroesophageal reflux disease)     HLD (hyperlipidemia)     HTN (hypertension)     Hydrocele of testis 12/16/2023    Hypotension 02/01/2024    Hypoxic respiratory failure 12/2023    Necrotizing pancreatitis 12/16/2023 12/2023    Necrotizing pancreatitis 12/16/2023    Sepsis due to Enterococcus faecalis 12/2023     Past Surgical History:   Procedure Laterality Date    APPENDECTOMY      CHOLECYSTECTOMY      ERCP N/A 12/17/2023    Procedure: ERCP (ENDOSCOPIC RETROGRADE CHOLANGIOPANCREATOGRAPHY);  Surgeon: Flako Kerns MD;  Location: [facility] OR;  Service: Gastroenterology;  Laterality: N/A;    ERCP W/ SPHICTEROTOMY  12/17/2023    Procedure: ERCP, WITH SPHINCTEROTOMY;  Surgeon: Flako Kerns MD;  Location: [facility] OR;  Service: Gastroenterology;;    ERCP, WITH CALCULUS REMOVAL  12/17/2023    Procedure: ERCP, WITH CALCULUS REMOVAL;  Surgeon: Flako Kerns MD;  Location: [facility] OR;  Service: Gastroenterology;;     Review of patient's allergies indicates:   Allergen Reactions    Gabapentin Hallucinations     No family history on file.   Social History     Socioeconomic History    Marital status:    Tobacco Use    Smoking status: Every Day     Current packs/day: 1.00     Types: Cigarettes    Smokeless tobacco: Never   Substance and Sexual Activity    Alcohol use: Never    Drug use: Never    Sexual activity: Yes     Social Determinants of Health     Financial Resource Strain: Low Risk  (8/8/2023)    Overall Financial Resource Strain (CARDIA)     Difficulty of Paying Living Expenses: Not hard at all   Food Insecurity: No Food Insecurity (8/8/2023)    Hunger Vital Sign     Worried About Running Out of Food in the Last Year: Never true     Ran Out of Food in the Last Year: Never true   Transportation Needs: No Transportation Needs (12/19/2023)    PRAPARE - Transportation     Lack of Transportation (Medical): No     Lack of Transportation (Non-Medical): No   Stress: No Stress Concern Present  "(8/8/2023)    [employer] - Occupational Stress Questionnaire     Feeling of Stress : Only a little   Housing Stability: Low Risk  (12/19/2023)    Housing Stability Vital Sign     Unable to Pay for Housing in the Last Year: No     Number of Places Lived in the Last Year: 1     Unstable Housing in the Last Year: No     Patient Care Team:  Snehal Arroyo MD as PCP - General (Family Medicine)  Inna Gibbons MA as ED Navigator   Subjective:   ROS  See HPI for details  All Other ROS: Negative except as stated in HPI.   Objective:   /79   Pulse 92   Ht 5' 5" (1.651 m)   Wt 57.7 kg (127 lb 4.8 oz)   SpO2 95%   BMI 21.18 kg/m²   Physical Exam  Constitutional:       General: He is not in acute distress.     Appearance: Normal appearance.   Cardiovascular:      Rate and Rhythm: Normal rate and regular rhythm.      Heart sounds: Normal heart sounds.   Pulmonary:      Effort: Pulmonary effort is normal.      Breath sounds: No wheezing or rhonchi.   Neurological:      Mental Status: He is alert.       Assessment:       ICD-10-CM ICD-9-CM   1. Primary hypertension  I10 401.9   2. Gastroesophageal reflux disease, unspecified whether esophagitis present  K21.9 530.81      Plan:   1. Primary hypertension  Assessment & Plan:  Stable  Continue current medication regimen: Lisinopril 20mg qd  Blood pressure at goal <140/90 (<130/80 if otherwise noted)  Recommend DASH diet  Avoid tobacco use  Record BP at home daily and bring log to next office visit, assure that home cuff is calibrated at minimum every 12 months  If BP still >140 /90 at home, pt told to increase Lisinopril       2. Gastroesophageal reflux disease, unspecified whether esophagitis present  Assessment & Plan:  Continue Protonix 20mg bid  Continue f/u with GI    Orders:  -     pantoprazole (PROTONIX) 20 MG tablet; Take 1 tablet (20 mg total) by mouth 2 (two) times a day.  Dispense: 180 tablet; Refill: 3         Medication List " with Changes/Refills   Current Medications    ALBUTEROL (PROVENTIL/VENTOLIN HFA) 90 MCG/ACTUATION INHALER    Inhale 2 puffs into the lungs every 4 (four) hours as needed for Wheezing. Rescue       Start Date: --        End Date: --    ASPIRIN 81 MG CHEW    Take 81 mg by mouth once daily.       Start Date: --        End Date: --    ATORVASTATIN (LIPITOR) 20 MG TABLET    Take 20 mg by mouth.       Start Date: 3/6/2024  End Date: --    FAMOTIDINE (PEPCID) 20 MG TABLET    Take 60 tablets by mouth 2 (two) times daily.       Start Date: 3/7/2024  End Date: --    FENOFIBRATE (TRICOR) 145 MG TABLET    See Instructions, TAKE 1 TABLET EVERY DAY, # 90 tab(s), 3 Refill(s), Pharmacy: [facility], 168, cm, Height/Length Dosing, 11/11/21 9:32:00 CST, 73.75, kg, Weight Dosing, 11/11/21 9:32:00 CST Strength: 145 mg       Start Date: 10/10/2023End Date: --    FLUTICASONE PROPIONATE (FLONASE) 50 MCG/ACTUATION NASAL SPRAY    by Each Nostril route.       Start Date: 3/6/2024  End Date: --    HYDROCODONE-ACETAMINOPHEN (NORCO) 5-325 MG PER TABLET    Take 1 tablet by mouth every 6 (six) hours as needed for Pain.       Start Date: 2/27/2024 End Date: --    HYDROCODONE-ACETAMINOPHEN (NORCO) 7.5-325 MG PER TABLET    Take 1 tablet by mouth every 6 (six) hours as needed.       Start Date: 1/26/2024 End Date: --    LISINOPRIL (PRINIVIL,ZESTRIL) 20 MG TABLET    Take 1 tablet (20 mg total) by mouth once daily.       Start Date: 5/20/2024 End Date: --    METOPROLOL SUCCINATE (TOPROL-XL) 25 MG 24 HR TABLET    Take 1 tablet (25 mg total) by mouth once daily.       Start Date: 10/19/2023End Date: --    METOPROLOL TARTRATE (LOPRESSOR) 50 MG TABLET    Take 1 tablet (50 mg total) by mouth Daily.       Start Date: 4/19/2024 End Date: --    PANTOPRAZOLE (PROTONIX) 40 MG TABLET    Take 1 tablet (40 mg total) by mouth once daily.       Start Date: 4/19/2024 End Date: 5/19/2024   Changed and/or Refilled Medications    Modified Medication  Previous Medication    PANTOPRAZOLE (PROTONIX) 20 MG TABLET pantoprazole (PROTONIX) 20 MG tablet       Take 1 tablet (20 mg total) by mouth 2 (two) times a day.    Take 1 tablet (20 mg total) by mouth 2 (two) times a day.       Start Date: 5/23/2024 End Date: --    Start Date: 5/20/2024 End Date: 5/23/2024   Discontinued Medications    PANTOPRAZOLE (PROTONIX) 40 MG TABLET    Take 1 tablet (40 mg total) by mouth 2 (two) times daily.       Start Date: 2/23/2024 End Date: 5/23/2024        Follow up in about 4 weeks (around 6/20/2024) for Print out AVS, HTN. In addition to their scheduled follow up, the patient has also been instructed to follow up on as needed basis.

## 2024-05-18 DIAGNOSIS — I10 HYPERTENSION, UNSPECIFIED TYPE: ICD-10-CM

## 2024-05-20 RX ORDER — PANTOPRAZOLE SODIUM 20 MG/1
20 TABLET, DELAYED RELEASE ORAL 2 TIMES DAILY
Qty: 180 TABLET | Refills: 3 | Status: SHIPPED | OUTPATIENT
Start: 2024-05-20 | End: 2024-05-23

## 2024-05-20 RX ORDER — LISINOPRIL 20 MG/1
20 TABLET ORAL DAILY
Qty: 90 TABLET | Refills: 3 | Status: SHIPPED | OUTPATIENT
Start: 2024-05-20

## 2024-05-23 ENCOUNTER — OFFICE VISIT (OUTPATIENT)
Dept: PRIMARY CARE CLINIC | Facility: CLINIC | Age: 69
End: 2024-05-23
Payer: MEDICARE

## 2024-05-23 VITALS
DIASTOLIC BLOOD PRESSURE: 79 MMHG | OXYGEN SATURATION: 95 % | BODY MASS INDEX: 21.21 KG/M2 | WEIGHT: 127.31 LBS | HEART RATE: 92 BPM | SYSTOLIC BLOOD PRESSURE: 139 MMHG | HEIGHT: 65 IN

## 2024-05-23 DIAGNOSIS — K21.9 GASTROESOPHAGEAL REFLUX DISEASE, UNSPECIFIED WHETHER ESOPHAGITIS PRESENT: ICD-10-CM

## 2024-05-23 DIAGNOSIS — I10 PRIMARY HYPERTENSION: Primary | ICD-10-CM

## 2024-05-23 PROCEDURE — 3078F DIAST BP <80 MM HG: CPT | Mod: CPTII,,, | Performed by: STUDENT IN AN ORGANIZED HEALTH CARE EDUCATION/TRAINING PROGRAM

## 2024-05-23 PROCEDURE — 1125F AMNT PAIN NOTED PAIN PRSNT: CPT | Mod: CPTII,,, | Performed by: STUDENT IN AN ORGANIZED HEALTH CARE EDUCATION/TRAINING PROGRAM

## 2024-05-23 PROCEDURE — 4010F ACE/ARB THERAPY RXD/TAKEN: CPT | Mod: CPTII,,, | Performed by: STUDENT IN AN ORGANIZED HEALTH CARE EDUCATION/TRAINING PROGRAM

## 2024-05-23 PROCEDURE — 1159F MED LIST DOCD IN RCRD: CPT | Mod: CPTII,,, | Performed by: STUDENT IN AN ORGANIZED HEALTH CARE EDUCATION/TRAINING PROGRAM

## 2024-05-23 PROCEDURE — 99214 OFFICE O/P EST MOD 30 MIN: CPT | Mod: ,,, | Performed by: STUDENT IN AN ORGANIZED HEALTH CARE EDUCATION/TRAINING PROGRAM

## 2024-05-23 PROCEDURE — 3008F BODY MASS INDEX DOCD: CPT | Mod: CPTII,,, | Performed by: STUDENT IN AN ORGANIZED HEALTH CARE EDUCATION/TRAINING PROGRAM

## 2024-05-23 PROCEDURE — 1160F RVW MEDS BY RX/DR IN RCRD: CPT | Mod: CPTII,,, | Performed by: STUDENT IN AN ORGANIZED HEALTH CARE EDUCATION/TRAINING PROGRAM

## 2024-05-23 PROCEDURE — 3075F SYST BP GE 130 - 139MM HG: CPT | Mod: CPTII,,, | Performed by: STUDENT IN AN ORGANIZED HEALTH CARE EDUCATION/TRAINING PROGRAM

## 2024-05-23 PROCEDURE — 1101F PT FALLS ASSESS-DOCD LE1/YR: CPT | Mod: CPTII,,, | Performed by: STUDENT IN AN ORGANIZED HEALTH CARE EDUCATION/TRAINING PROGRAM

## 2024-05-23 PROCEDURE — 3288F FALL RISK ASSESSMENT DOCD: CPT | Mod: CPTII,,, | Performed by: STUDENT IN AN ORGANIZED HEALTH CARE EDUCATION/TRAINING PROGRAM

## 2024-05-23 RX ORDER — PANTOPRAZOLE SODIUM 20 MG/1
20 TABLET, DELAYED RELEASE ORAL 2 TIMES DAILY
Qty: 180 TABLET | Refills: 3 | Status: SHIPPED | OUTPATIENT
Start: 2024-05-23

## 2024-05-23 NOTE — ASSESSMENT & PLAN NOTE
Stable  Continue current medication regimen: Lisinopril 20mg qd  Blood pressure at goal <140/90 (<130/80 if otherwise noted)  Recommend DASH diet  Avoid tobacco use  Record BP at home daily and bring log to next office visit, assure that home cuff is calibrated at minimum every 12 months  If BP still >140 /90 at home, pt told to increase Lisinopril

## 2024-05-29 ENCOUNTER — PATIENT OUTREACH (OUTPATIENT)
Facility: CLINIC | Age: 69
End: 2024-05-29
Payer: MEDICARE

## 2024-05-29 NOTE — LETTER
AUTHORIZATION FOR RELEASE OF   CONFIDENTIAL INFORMATION    Dear Dr Justin Cary,  Fax: [phone]    We are seeing Franklin Macias, date of birth 1955, in the clinic at [facility]. Snehal Arroyo MD is the patient's PCP. Franklin Macias has an outstanding lab/procedure at the time we reviewed his chart. In order to help keep his health information updated, he has authorized us to request the following medical record(s):        (  )  MAMMOGRAM                                      (  )  COLONOSCOPY      (  )  PAP SMEAR                                          (  )  OUTSIDE LAB RESULTS     (  )  DEXA SCAN                                          ( X )  EYE EXAM            (  )  FOOT EXAM                                          (  )  ENTIRE RECORD     (  )  OUTSIDE IMMUNIZATIONS                 (  )  _______________         Please fax records to [facility], Acosta, Stefanie, MD, [phone]     If you have any question or concerns. Please call Fran HARRISON [facility] @ [phone]          Patient Name: Franklin Macias  : 1955  Patient Phone #: [phone]

## 2024-05-30 NOTE — PROGRESS NOTES
I received a fax from Dr. Cary's office with eye exam dated 8/21/23, diabetic eye exam not done, record up loaded to media.

## 2024-06-18 NOTE — PROGRESS NOTES
Date: 06/26/2024  Patient ID: [id number]   Chief Complaint: Follow-up    HPI:   Franklin Macias is a 69 y.o. male here today for Follow-up  Last visit pt continued on Lisinopril for htn. Pt was to bring BP monitor and log. BP at home 140-158/70-90s and headaches have improved. Last BP was 130/74. He has been decreasing salt intake.   Patient Active Problem List   Diagnosis    Type 2 diabetes mellitus with stage 3a chronic kidney disease, without long-term current use of insulin    Chronic kidney disease, stage 3a    Hyperlipidemia    Chronic neck and back pain    Diastolic congestive heart failure    Gastroesophageal reflux disease    Postnasal drip    Testicular swelling, right    Debility    Hypertension    Carotid artery disease, unspecified laterality, unspecified type    Gastritis    Frontal headache     Outpatient Medications Marked as Taking for the 6/26/24 encounter (Office Visit) with Snehal Arroyo MD   Medication Sig Dispense Refill    aspirin 81 MG Chew Take 81 mg by mouth once daily.      lisinopriL (PRINIVIL,ZESTRIL) 20 MG tablet Take 1 tablet (20 mg total) by mouth once daily. 90 tablet 3    metoprolol tartrate (LOPRESSOR) 50 MG tablet Take 1 tablet (50 mg total) by mouth Daily. 30 tablet 0    pantoprazole (PROTONIX) 20 MG tablet Take 1 tablet (20 mg total) by mouth 2 (two) times a day. 180 tablet 3     Past Medical History:   Diagnosis Date    SHA (acute kidney injury) 12/2023    Choledocholithiasis 12/2023    Chronic right shoulder pain 08/01/2022    Continue otc tylenol for pain management with short course hydrocodone only PRN for BT pain for short course    Depression 02/01/2024    DM (diabetes mellitus)     GERD (gastroesophageal reflux disease)     HLD (hyperlipidemia)     HTN (hypertension)     Hydrocele of testis 12/16/2023    Hypotension 02/01/2024    Hypoxic respiratory failure 12/2023    Necrotizing pancreatitis 12/16/2023 12/2023    Necrotizing pancreatitis 12/16/2023    Sepsis due to  Enterococcus faecalis 12/2023     Past Surgical History:   Procedure Laterality Date    APPENDECTOMY      CHOLECYSTECTOMY      ERCP N/A 12/17/2023    Procedure: ERCP (ENDOSCOPIC RETROGRADE CHOLANGIOPANCREATOGRAPHY);  Surgeon: Flako Kerns MD;  Location: [facility] OR;  Service: Gastroenterology;  Laterality: N/A;    ERCP W/ SPHICTEROTOMY  12/17/2023    Procedure: ERCP, WITH SPHINCTEROTOMY;  Surgeon: Flako Kerns MD;  Location: [facility] OR;  Service: Gastroenterology;;    ERCP, WITH CALCULUS REMOVAL  12/17/2023    Procedure: ERCP, WITH CALCULUS REMOVAL;  Surgeon: Flako Kerns MD;  Location: [facility] OR;  Service: Gastroenterology;;     Review of patient's allergies indicates:   Allergen Reactions    Gabapentin Hallucinations     No family history on file.   Social History     Socioeconomic History    Marital status:    Tobacco Use    Smoking status: Every Day     Current packs/day: 1.00     Types: Cigarettes    Smokeless tobacco: Never   Substance and Sexual Activity    Alcohol use: Never    Drug use: Never    Sexual activity: Yes     Social Determinants of Health     Financial Resource Strain: Low Risk  (8/8/2023)    Overall Financial Resource Strain (CARDIA)     Difficulty of Paying Living Expenses: Not hard at all   Food Insecurity: No Food Insecurity (8/8/2023)    Hunger Vital Sign     Worried About Running Out of Food in the Last Year: Never true     Ran Out of Food in the Last Year: Never true   Transportation Needs: No Transportation Needs (12/19/2023)    PRAPARE - Transportation     Lack of Transportation (Medical): No     Lack of Transportation (Non-Medical): No   Stress: No Stress Concern Present (8/8/2023)    [employer] - Occupational Stress Questionnaire     Feeling of Stress : Only a little   Housing Stability: Low Risk  (12/19/2023)    Housing Stability Vital Sign     Unable to Pay for Housing in the Last Year: No     Number of Places Lived in the  "Last Year: 1     Unstable Housing in the Last Year: No     Patient Care Team:  Snehal Arroyo MD as PCP - General (Family Medicine)   Subjective:   ROS  See HPI for details  All Other ROS: Negative except as stated in HPI.   Objective:   BP (!) 143/72   Pulse 87   Ht 5' 5" (1.651 m)   Wt 59.2 kg (130 lb 8 oz)   SpO2 95%   BMI 21.72 kg/m²   Physical Exam  Constitutional:       General: He is not in acute distress.  Cardiovascular:      Rate and Rhythm: Normal rate and regular rhythm.   Neurological:      Mental Status: He is alert.       Assessment:       ICD-10-CM ICD-9-CM   1. Primary hypertension  I10 401.9   2. Frontal headache  R51.9 784.0      Plan:   1. Primary hypertension  Assessment & Plan:  Improving  Continue current medication regimen: Lisinopril 20mg qd  Blood pressure at goal <140/90 (<130/80 if otherwise noted)  Recommend DASH diet  Avoid tobacco use  Record BP at home daily and bring log to next office visit, assure that home cuff is calibrated at minimum every 12 months  If BP still >140 /90 at home, pt told to increase Lisinopril       2. Frontal headache  Assessment & Plan:  Improving  Continue to monitor  Consider MRI/other workup if still persistent           Medication List with Changes/Refills   Current Medications    ALBUTEROL (PROVENTIL/VENTOLIN HFA) 90 MCG/ACTUATION INHALER    Inhale 2 puffs into the lungs every 4 (four) hours as needed for Wheezing. Rescue       Start Date: --        End Date: --    ASPIRIN 81 MG CHEW    Take 81 mg by mouth once daily.       Start Date: --        End Date: --    ATORVASTATIN (LIPITOR) 20 MG TABLET    Take 20 mg by mouth.       Start Date: 3/6/2024  End Date: --    FAMOTIDINE (PEPCID) 20 MG TABLET    Take 60 tablets by mouth 2 (two) times daily.       Start Date: 3/7/2024  End Date: --    FENOFIBRATE (TRICOR) 145 MG TABLET    See Instructions, TAKE 1 TABLET EVERY DAY, # 90 tab(s), 3 Refill(s), Pharmacy: [facility], 168, cm, " Height/Length Dosing, 11/11/21 9:32:00 CST, 73.75, kg, Weight Dosing, 11/11/21 9:32:00 CST Strength: 145 mg       Start Date: 10/10/2023End Date: --    FLUTICASONE PROPIONATE (FLONASE) 50 MCG/ACTUATION NASAL SPRAY    by Each Nostril route.       Start Date: 3/6/2024  End Date: --    HYDROCODONE-ACETAMINOPHEN (NORCO) 5-325 MG PER TABLET    Take 1 tablet by mouth every 6 (six) hours as needed for Pain.       Start Date: 2/27/2024 End Date: --    HYDROCODONE-ACETAMINOPHEN (NORCO) 7.5-325 MG PER TABLET    Take 1 tablet by mouth every 6 (six) hours as needed.       Start Date: 1/26/2024 End Date: --    LISINOPRIL (PRINIVIL,ZESTRIL) 20 MG TABLET    Take 1 tablet (20 mg total) by mouth once daily.       Start Date: 5/20/2024 End Date: --    METOPROLOL SUCCINATE (TOPROL-XL) 25 MG 24 HR TABLET    Take 1 tablet (25 mg total) by mouth once daily.       Start Date: 10/19/2023End Date: --    METOPROLOL TARTRATE (LOPRESSOR) 50 MG TABLET    Take 1 tablet (50 mg total) by mouth Daily.       Start Date: 4/19/2024 End Date: --    PANTOPRAZOLE (PROTONIX) 20 MG TABLET    Take 1 tablet (20 mg total) by mouth 2 (two) times a day.       Start Date: 5/23/2024 End Date: --        Follow up in about 4 weeks (around 7/24/2024) for HTN, headache. In addition to their scheduled follow up, the patient has also been instructed to follow up on as needed basis.

## 2024-06-26 ENCOUNTER — OFFICE VISIT (OUTPATIENT)
Dept: PRIMARY CARE CLINIC | Facility: CLINIC | Age: 69
End: 2024-06-26
Payer: MEDICARE

## 2024-06-26 VITALS
WEIGHT: 130.5 LBS | HEIGHT: 65 IN | BODY MASS INDEX: 21.74 KG/M2 | HEART RATE: 87 BPM | OXYGEN SATURATION: 95 % | SYSTOLIC BLOOD PRESSURE: 143 MMHG | DIASTOLIC BLOOD PRESSURE: 72 MMHG

## 2024-06-26 DIAGNOSIS — I10 PRIMARY HYPERTENSION: Primary | ICD-10-CM

## 2024-06-26 DIAGNOSIS — R51.9 FRONTAL HEADACHE: ICD-10-CM

## 2024-06-26 PROCEDURE — 1125F AMNT PAIN NOTED PAIN PRSNT: CPT | Mod: CPTII,,, | Performed by: STUDENT IN AN ORGANIZED HEALTH CARE EDUCATION/TRAINING PROGRAM

## 2024-06-26 PROCEDURE — 3288F FALL RISK ASSESSMENT DOCD: CPT | Mod: CPTII,,, | Performed by: STUDENT IN AN ORGANIZED HEALTH CARE EDUCATION/TRAINING PROGRAM

## 2024-06-26 PROCEDURE — 1101F PT FALLS ASSESS-DOCD LE1/YR: CPT | Mod: CPTII,,, | Performed by: STUDENT IN AN ORGANIZED HEALTH CARE EDUCATION/TRAINING PROGRAM

## 2024-06-26 PROCEDURE — 3008F BODY MASS INDEX DOCD: CPT | Mod: CPTII,,, | Performed by: STUDENT IN AN ORGANIZED HEALTH CARE EDUCATION/TRAINING PROGRAM

## 2024-06-26 PROCEDURE — 1160F RVW MEDS BY RX/DR IN RCRD: CPT | Mod: CPTII,,, | Performed by: STUDENT IN AN ORGANIZED HEALTH CARE EDUCATION/TRAINING PROGRAM

## 2024-06-26 PROCEDURE — 4010F ACE/ARB THERAPY RXD/TAKEN: CPT | Mod: CPTII,,, | Performed by: STUDENT IN AN ORGANIZED HEALTH CARE EDUCATION/TRAINING PROGRAM

## 2024-06-26 PROCEDURE — 99214 OFFICE O/P EST MOD 30 MIN: CPT | Mod: ,,, | Performed by: STUDENT IN AN ORGANIZED HEALTH CARE EDUCATION/TRAINING PROGRAM

## 2024-06-26 PROCEDURE — 3077F SYST BP >= 140 MM HG: CPT | Mod: CPTII,,, | Performed by: STUDENT IN AN ORGANIZED HEALTH CARE EDUCATION/TRAINING PROGRAM

## 2024-06-26 PROCEDURE — 3078F DIAST BP <80 MM HG: CPT | Mod: CPTII,,, | Performed by: STUDENT IN AN ORGANIZED HEALTH CARE EDUCATION/TRAINING PROGRAM

## 2024-06-26 PROCEDURE — 1159F MED LIST DOCD IN RCRD: CPT | Mod: CPTII,,, | Performed by: STUDENT IN AN ORGANIZED HEALTH CARE EDUCATION/TRAINING PROGRAM

## 2024-06-26 NOTE — ASSESSMENT & PLAN NOTE
Improving  Continue current medication regimen: Lisinopril 20mg qd  Blood pressure at goal <140/90 (<130/80 if otherwise noted)  Recommend DASH diet  Avoid tobacco use  Record BP at home daily and bring log to next office visit, assure that home cuff is calibrated at minimum every 12 months  If BP still >140 /90 at home, pt told to increase Lisinopril

## 2024-07-08 DIAGNOSIS — K21.9 GASTROESOPHAGEAL REFLUX DISEASE, UNSPECIFIED WHETHER ESOPHAGITIS PRESENT: Primary | ICD-10-CM

## 2024-07-08 RX ORDER — PANTOPRAZOLE SODIUM 40 MG/1
40 TABLET, DELAYED RELEASE ORAL DAILY
Qty: 90 TABLET | Refills: 3 | Status: SHIPPED | OUTPATIENT
Start: 2024-07-08

## 2024-07-22 ENCOUNTER — PATIENT OUTREACH (OUTPATIENT)
Facility: CLINIC | Age: 69
End: 2024-07-22
Payer: MEDICARE

## 2024-07-22 DIAGNOSIS — Z13.1 SCREENING FOR DIABETES MELLITUS: Primary | ICD-10-CM

## 2024-07-22 NOTE — LETTER
"       AUTHORIZATION FOR RELEASE OF   CONFIDENTIAL INFORMATION    Dear Staff,    We are seeing Franklin Macias, date of birth 1955, in the clinic at [facility]. Snehal Arroyo MD is the patient's PCP. Franklin Macias has an outstanding lab/procedure at the time we reviewed his chart. In order to help keep his health information updated, he has authorized us to request the following medical record(s):        (  )  MAMMOGRAM                                      (  )  COLONOSCOPY      (  )  PAP SMEAR                                          (  )  OUTSIDE LAB RESULTS     (  )  DEXA SCAN                                          (  xx)  DM EYE EXAM            (  )  FOOT EXAM                                          (  )  ENTIRE RECORD     (  )  OUTSIDE IMMUNIZATIONS                 (  )  _______________         Please fax records to [facility], Acosta, Stefanie, MD,  [phone]  Attn: [place]        If you have any questions, please contact Kirsten Caba" [facility] Coordinator @ [phone]      Patient Name: Franklin Macias  : 1955  Patient Phone #: [phone]     "

## 2024-07-22 NOTE — PROGRESS NOTES
Health Maintenance Topic(s) Outreach Outcomes & Actions Taken:  Chart review    Eye Exam - Outreach Outcomes & Actions Taken  : Requested   DM Eye screening    Lab(s) - Outreach Outcomes & Actions Taken  :   Ordered Urine micro        Additional Notes:           Care Management, Digital Medicine, and/or Education Referrals  Eligible for Digital Medicine  Eligible for Diabetic Education

## 2024-07-24 ENCOUNTER — HOSPITAL ENCOUNTER (OUTPATIENT)
Dept: RADIOLOGY | Facility: HOSPITAL | Age: 69
Discharge: HOME | End: 2024-07-24
Payer: MEDICARE

## 2024-07-24 DIAGNOSIS — K85.91 NECROTIZING PANCREATITIS: ICD-10-CM

## 2024-07-24 DIAGNOSIS — Z87.19 HX OF CHOLELITHIASIS: ICD-10-CM

## 2024-07-24 DIAGNOSIS — K86.89 FLUID COLLECTION OF PANCREAS: ICD-10-CM

## 2024-07-24 PROCEDURE — 74178 CT ABD&PLV WO CNTR FLWD CNTR: CPT | Mod: TC

## 2024-07-24 PROCEDURE — 25500020 PHARM REV CODE 255

## 2024-07-24 RX ADMIN — IOHEXOL 75 ML: 350 INJECTION, SOLUTION INTRAVENOUS at 01:07

## 2024-07-25 NOTE — PROGRESS NOTES
I received a fax back from [facility] with eye exam record dated 8/21/23, DM exam was not done. Record loaded to media.

## 2024-08-07 ENCOUNTER — OFFICE VISIT (OUTPATIENT)
Dept: PRIMARY CARE CLINIC | Facility: CLINIC | Age: 69
End: 2024-08-07
Payer: MEDICARE

## 2024-08-07 VITALS
BODY MASS INDEX: 21.52 KG/M2 | HEART RATE: 84 BPM | SYSTOLIC BLOOD PRESSURE: 116 MMHG | OXYGEN SATURATION: 95 % | HEIGHT: 65 IN | DIASTOLIC BLOOD PRESSURE: 71 MMHG | WEIGHT: 129.19 LBS

## 2024-08-07 DIAGNOSIS — A08.4 VIRAL GASTROENTERITIS: ICD-10-CM

## 2024-08-07 DIAGNOSIS — I10 PRIMARY HYPERTENSION: Primary | ICD-10-CM

## 2024-08-07 PROCEDURE — 1160F RVW MEDS BY RX/DR IN RCRD: CPT | Mod: CPTII,,, | Performed by: STUDENT IN AN ORGANIZED HEALTH CARE EDUCATION/TRAINING PROGRAM

## 2024-08-07 PROCEDURE — 99213 OFFICE O/P EST LOW 20 MIN: CPT | Mod: ,,, | Performed by: STUDENT IN AN ORGANIZED HEALTH CARE EDUCATION/TRAINING PROGRAM

## 2024-08-07 PROCEDURE — 1159F MED LIST DOCD IN RCRD: CPT | Mod: CPTII,,, | Performed by: STUDENT IN AN ORGANIZED HEALTH CARE EDUCATION/TRAINING PROGRAM

## 2024-08-07 PROCEDURE — 3288F FALL RISK ASSESSMENT DOCD: CPT | Mod: CPTII,,, | Performed by: STUDENT IN AN ORGANIZED HEALTH CARE EDUCATION/TRAINING PROGRAM

## 2024-08-07 PROCEDURE — 3078F DIAST BP <80 MM HG: CPT | Mod: CPTII,,, | Performed by: STUDENT IN AN ORGANIZED HEALTH CARE EDUCATION/TRAINING PROGRAM

## 2024-08-07 PROCEDURE — 1126F AMNT PAIN NOTED NONE PRSNT: CPT | Mod: CPTII,,, | Performed by: STUDENT IN AN ORGANIZED HEALTH CARE EDUCATION/TRAINING PROGRAM

## 2024-08-07 PROCEDURE — 1101F PT FALLS ASSESS-DOCD LE1/YR: CPT | Mod: CPTII,,, | Performed by: STUDENT IN AN ORGANIZED HEALTH CARE EDUCATION/TRAINING PROGRAM

## 2024-08-07 PROCEDURE — 3074F SYST BP LT 130 MM HG: CPT | Mod: CPTII,,, | Performed by: STUDENT IN AN ORGANIZED HEALTH CARE EDUCATION/TRAINING PROGRAM

## 2024-08-07 PROCEDURE — 3008F BODY MASS INDEX DOCD: CPT | Mod: CPTII,,, | Performed by: STUDENT IN AN ORGANIZED HEALTH CARE EDUCATION/TRAINING PROGRAM

## 2024-08-07 PROCEDURE — 4010F ACE/ARB THERAPY RXD/TAKEN: CPT | Mod: CPTII,,, | Performed by: STUDENT IN AN ORGANIZED HEALTH CARE EDUCATION/TRAINING PROGRAM

## 2024-09-19 ENCOUNTER — DOCUMENTATION ONLY (OUTPATIENT)
Facility: CLINIC | Age: 69
End: 2024-09-19
Payer: MEDICARE

## 2024-10-31 ENCOUNTER — DOCUMENTATION ONLY (OUTPATIENT)
Facility: CLINIC | Age: 69
End: 2024-10-31
Payer: MEDICARE

## 2024-11-05 ENCOUNTER — LAB VISIT (OUTPATIENT)
Dept: LAB | Facility: HOSPITAL | Age: 69
End: 2024-11-05
Attending: STUDENT IN AN ORGANIZED HEALTH CARE EDUCATION/TRAINING PROGRAM
Payer: MEDICARE

## 2024-11-05 ENCOUNTER — PATIENT OUTREACH (OUTPATIENT)
Facility: CLINIC | Age: 69
End: 2024-11-05
Payer: MEDICARE

## 2024-11-05 DIAGNOSIS — Z12.5 SCREENING FOR MALIGNANT NEOPLASM OF PROSTATE: ICD-10-CM

## 2024-11-05 DIAGNOSIS — E11.22 TYPE 2 DIABETES MELLITUS WITH STAGE 3A CHRONIC KIDNEY DISEASE, WITHOUT LONG-TERM CURRENT USE OF INSULIN: ICD-10-CM

## 2024-11-05 DIAGNOSIS — E78.5 HYPERLIPIDEMIA, UNSPECIFIED HYPERLIPIDEMIA TYPE: ICD-10-CM

## 2024-11-05 DIAGNOSIS — E55.9 VITAMIN D DEFICIENCY: ICD-10-CM

## 2024-11-05 DIAGNOSIS — Z13.1 SCREENING FOR DIABETES MELLITUS: ICD-10-CM

## 2024-11-05 DIAGNOSIS — N18.31 TYPE 2 DIABETES MELLITUS WITH STAGE 3A CHRONIC KIDNEY DISEASE, WITHOUT LONG-TERM CURRENT USE OF INSULIN: ICD-10-CM

## 2024-11-05 LAB
25(OH)D3+25(OH)D2 SERPL-MCNC: 25 NG/ML (ref 30–80)
ALBUMIN SERPL-MCNC: 3.8 G/DL (ref 3.4–4.8)
ALBUMIN/GLOB SERPL: 1 RATIO (ref 1.1–2)
ALP SERPL-CCNC: 92 UNIT/L (ref 40–150)
ALT SERPL-CCNC: 17 UNIT/L (ref 0–55)
ANION GAP SERPL CALC-SCNC: 9 MEQ/L
AST SERPL-CCNC: 21 UNIT/L (ref 5–34)
BASOPHILS # BLD AUTO: 0.05 X10(3)/MCL
BASOPHILS NFR BLD AUTO: 1 %
BILIRUB SERPL-MCNC: 0.9 MG/DL
BUN SERPL-MCNC: 15.2 MG/DL (ref 8.4–25.7)
CALCIUM SERPL-MCNC: 9.6 MG/DL (ref 8.8–10)
CHLORIDE SERPL-SCNC: 102 MMOL/L (ref 98–107)
CHOLEST SERPL-MCNC: 204 MG/DL
CHOLEST/HDLC SERPL: 4 {RATIO} (ref 0–5)
CO2 SERPL-SCNC: 29 MMOL/L (ref 23–31)
CREAT SERPL-MCNC: 1.12 MG/DL (ref 0.72–1.25)
CREAT UR-MCNC: 159.7 MG/DL (ref 63–166)
CREAT/UREA NIT SERPL: 14
EOSINOPHIL # BLD AUTO: 0.3 X10(3)/MCL (ref 0–0.9)
EOSINOPHIL NFR BLD AUTO: 5.8 %
ERYTHROCYTE [DISTWIDTH] IN BLOOD BY AUTOMATED COUNT: 12.3 % (ref 11.5–17)
EST. AVERAGE GLUCOSE BLD GHB EST-MCNC: 228.8 MG/DL
GFR SERPLBLD CREATININE-BSD FMLA CKD-EPI: >60 ML/MIN/1.73/M2
GLOBULIN SER-MCNC: 3.8 GM/DL (ref 2.4–3.5)
GLUCOSE SERPL-MCNC: 248 MG/DL (ref 82–115)
HBA1C MFR BLD: 9.6 %
HCT VFR BLD AUTO: 47.4 % (ref 42–52)
HDLC SERPL-MCNC: 47 MG/DL (ref 35–60)
HGB BLD-MCNC: 16 G/DL (ref 14–18)
IMM GRANULOCYTES # BLD AUTO: 0.02 X10(3)/MCL (ref 0–0.04)
IMM GRANULOCYTES NFR BLD AUTO: 0.4 %
LDLC SERPL CALC-MCNC: 136 MG/DL (ref 50–140)
LYMPHOCYTES # BLD AUTO: 1.7 X10(3)/MCL (ref 0.6–4.6)
LYMPHOCYTES NFR BLD AUTO: 32.7 %
MCH RBC QN AUTO: 31.2 PG (ref 27–31)
MCHC RBC AUTO-ENTMCNC: 33.8 G/DL (ref 33–36)
MCV RBC AUTO: 92.4 FL (ref 80–94)
MICROALBUMIN UR-MCNC: 84 UG/ML
MICROALBUMIN/CREAT RATIO PNL UR: 52.6 MG/GM CR (ref 0–30)
MONOCYTES # BLD AUTO: 0.5 X10(3)/MCL (ref 0.1–1.3)
MONOCYTES NFR BLD AUTO: 9.6 %
NEUTROPHILS # BLD AUTO: 2.63 X10(3)/MCL (ref 2.1–9.2)
NEUTROPHILS NFR BLD AUTO: 50.5 %
NRBC BLD AUTO-RTO: 0 %
PLATELET # BLD AUTO: 133 X10(3)/MCL (ref 130–400)
PLATELETS.RETICULATED NFR BLD AUTO: 2.8 % (ref 0.9–11.2)
PMV BLD AUTO: 9.7 FL (ref 7.4–10.4)
POTASSIUM SERPL-SCNC: 4 MMOL/L (ref 3.5–5.1)
PROT SERPL-MCNC: 7.6 GM/DL (ref 5.8–7.6)
PSA SERPL-MCNC: 0.66 NG/ML
RBC # BLD AUTO: 5.13 X10(6)/MCL (ref 4.7–6.1)
SODIUM SERPL-SCNC: 140 MMOL/L (ref 136–145)
TRIGL SERPL-MCNC: 104 MG/DL (ref 34–140)
TSH SERPL-ACNC: 0.83 UIU/ML (ref 0.35–4.94)
VLDLC SERPL CALC-MCNC: 21 MG/DL
WBC # BLD AUTO: 5.2 X10(3)/MCL (ref 4.5–11.5)

## 2024-11-05 PROCEDURE — 85025 COMPLETE CBC W/AUTO DIFF WBC: CPT

## 2024-11-05 PROCEDURE — 84443 ASSAY THYROID STIM HORMONE: CPT

## 2024-11-05 PROCEDURE — 83036 HEMOGLOBIN GLYCOSYLATED A1C: CPT

## 2024-11-05 PROCEDURE — 82043 UR ALBUMIN QUANTITATIVE: CPT

## 2024-11-05 PROCEDURE — 80053 COMPREHEN METABOLIC PANEL: CPT

## 2024-11-05 PROCEDURE — 80061 LIPID PANEL: CPT

## 2024-11-05 PROCEDURE — 84153 ASSAY OF PSA TOTAL: CPT

## 2024-11-05 PROCEDURE — 36415 COLL VENOUS BLD VENIPUNCTURE: CPT

## 2024-11-05 PROCEDURE — 82306 VITAMIN D 25 HYDROXY: CPT

## 2024-11-05 NOTE — PROGRESS NOTES
Value base Outreach  Spoke with the patient's wife, stated she will schedule DM Eye with Dr. Cary.  Informed Foot exam due.

## 2024-11-11 NOTE — PROGRESS NOTES
Date: 11/14/2024  Patient ID: [id number]   Chief Complaint: Medicare AWV    HPI:   Franklin Macias is a 69 y.o. male here today for a Medicare Wellness.     Opioid Screening: Patient medication list reviewed, patient is taking prescription opioids. Patient is not using additional opioids than prescribed. Patient is at low risk of substance abuse based on this opioid use history. He does have chronic pain and would like refill on Prompton prn    Recent labs showed low vitD, elevated CBGs and A1c 9.6, MCR 52.6, elevated total chol; PSA .66, TSH .826. He does not check CBGs since he has been unmedicated for DM, since it has been controlled until now    Diet: not monitoring lately  Activity level: trying to do more painting, but limited 2/2 chronic pain  Cognition: answering questions appropriately and following conversation without issues. No sign of cognitive decline during this exam  PSA: .66  CRC: cologuard neg 8/22/2025  CT chest: ordered  AAA US(65-74yo): ordered      Patient Active Problem List   Diagnosis    Type 2 diabetes mellitus with stage 3a chronic kidney disease, without long-term current use of insulin    Chronic kidney disease, stage 3a    Wellness examination    Hyperlipidemia    Chronic neck and back pain    Diastolic congestive heart failure    Gastroesophageal reflux disease    Postnasal drip    Testicular swelling, right    Debility    Hypertension    Carotid artery disease, unspecified laterality, unspecified type    Gastritis    Frontal headache    Viral gastroenteritis    Vitamin D deficiency    Personal history of tobacco use, presenting hazards to health     Outpatient Medications Marked as Taking for the 11/14/24 encounter (Office Visit) with Snehal Arroyo MD   Medication Sig Dispense Refill    aspirin 81 MG Chew Take 81 mg by mouth once daily.      lisinopriL (PRINIVIL,ZESTRIL) 20 MG tablet Take 1 tablet (20 mg total) by mouth once daily. 90 tablet 3    pantoprazole (PROTONIX) 40 MG tablet  Take 1 tablet (40 mg total) by mouth once daily. 90 tablet 3    [DISCONTINUED] HYDROcodone-acetaminophen (NORCO) 5-325 mg per tablet Take 1 tablet by mouth every 6 (six) hours as needed for Pain. 28 tablet 0    [DISCONTINUED] HYDROcodone-acetaminophen (NORCO) 7.5-325 mg per tablet Take 1 tablet by mouth every 6 (six) hours as needed.       Past Medical History:   Diagnosis Date    SHA (acute kidney injury) 12/2023    Choledocholithiasis 12/2023    Chronic right shoulder pain 08/01/2022    Continue otc tylenol for pain management with short course hydrocodone only PRN for BT pain for short course    Depression 02/01/2024    DM (diabetes mellitus)     GERD (gastroesophageal reflux disease)     HLD (hyperlipidemia)     HTN (hypertension)     Hydrocele of testis 12/16/2023    Hypotension 02/01/2024    Hypoxic respiratory failure 12/2023    Necrotizing pancreatitis 12/16/2023 12/2023    Necrotizing pancreatitis 12/16/2023    Sepsis due to Enterococcus faecalis 12/2023     Past Surgical History:   Procedure Laterality Date    APPENDECTOMY      CHOLECYSTECTOMY      ERCP N/A 12/17/2023    Procedure: ERCP (ENDOSCOPIC RETROGRADE CHOLANGIOPANCREATOGRAPHY);  Surgeon: Flako Kerns MD;  Location: [facility] OR;  Service: Gastroenterology;  Laterality: N/A;    ERCP W/ SPHICTEROTOMY  12/17/2023    Procedure: ERCP, WITH SPHINCTEROTOMY;  Surgeon: Flako Kerns MD;  Location: [facility] OR;  Service: Gastroenterology;;    ERCP, WITH CALCULUS REMOVAL  12/17/2023    Procedure: ERCP, WITH CALCULUS REMOVAL;  Surgeon: Flako Kerns MD;  Location: [facility] OR;  Service: Gastroenterology;;     Review of patient's allergies indicates:   Allergen Reactions    Gabapentin Hallucinations     Family History   Problem Relation Name Age of Onset    Other Sister          h. pylori      Social History     Socioeconomic History    Marital status:    Tobacco Use    Smoking status: Every Day     Current packs/day: 1.00     Types:  Cigarettes    Smokeless tobacco: Never   Substance and Sexual Activity    Alcohol use: Never    Drug use: Never    Sexual activity: Yes     Social Drivers of Health     Financial Resource Strain: High Risk (11/14/2024)    Overall Financial Resource Strain (CARDIA)     Difficulty of Paying Living Expenses: Hard   Food Insecurity: No Food Insecurity (11/14/2024)    Hunger Vital Sign     Worried About Running Out of Food in the Last Year: Never true     Ran Out of Food in the Last Year: Never true   Transportation Needs: No Transportation Needs (11/14/2024)    TRANSPORTATION NEEDS     Transportation : No   Physical Activity: Sufficiently Active (11/14/2024)    Exercise Vital Sign     Days of Exercise per Week: 3 days     Minutes of Exercise per Session: 60 min   Stress: Stress Concern Present (11/14/2024)    [employer] - Occupational Stress Questionnaire     Feeling of Stress : To some extent   Housing Stability: Low Risk  (11/14/2024)    Housing Stability Vital Sign     Unable to Pay for Housing in the Last Year: No     Homeless in the Last Year: No     Patient Care Team:  Snehal Arroyo MD as PCP - General (Family Medicine)  Justin Cary MD as Consulting Physician (Ophthalmology)  Phil Oseguera MD as Consulting Physician (Gastroenterology)  Kirsten Tiwari LPN as Care Coordinator   Subjective:   Review of Systems   Constitutional:  Negative for fever and weight loss.   HENT:  Negative for congestion, hearing loss and sore throat.    Eyes:  Negative for blurred vision and double vision.   Respiratory:  Negative for cough and shortness of breath.    Cardiovascular:  Negative for chest pain and palpitations.   Gastrointestinal:  Negative for abdominal pain and diarrhea.   Genitourinary:  Negative for dysuria, frequency, hematuria and urgency.   Musculoskeletal:  Positive for back pain. Negative for falls.   Skin:  Negative for rash.   Psychiatric/Behavioral:  Negative for  depression and memory loss. The patient is not nervous/anxious and does not have insomnia.      See HPI for details  All Other ROS: Negative except as stated in HPI  Patient Reported Health Risk Assessment  What is your age?: 65-69  Are you male or female?: Male  During the past four weeks, how much have you been bothered by emotional problems such as feeling anxious, depressed, irritable, sad, or downhearted and blue?: Not at all  During the past five weeks, has your physical and/or emotional health limited your social activities with family, friends, neighbors, or groups?: Not at all  During the past four weeks, how much bodily pain have you generally had?: Mild pain  During the past four weeks, was someone available to help if you needed and wanted help?: Yes, as much as I wanted  During the past four weeks, what was the hardest physical activity you could do for at least two minutes?: Light  Can you get to places out of walking distance without help?  (For example, can you travel alone on buses or taxis, or drive your own car?): Yes  Can you go shopping for groceries or clothes without someone's help?: Yes  Can you prepare your own meals?: Yes  Can you do your own housework without help?: Yes  Because of any health problems, do you need the help of another person with your personal care needs such as eating, bathing, dressing, or getting around the house?: No  Can you handle your own money without help?: Yes  During the past four weeks, how would you rate your health in general?: Fair  How have things been going for you during the past four weeks?: Pretty well  Are you having difficulties driving your car?: No  Do you always fasten your seat belt when you are in a car?: Yes, usually  How often in the past four weeks have you been bothered by falling or dizzy when standing up?: Never  How often in the past four weeks have you been bothered by sexual problems?: Never  How often in the past four weeks have you been  "bothered by trouble eating well?: Never  How often in the past four weeks have you been bothered by teeth or denture problems?: Never  How often in the past four weeks have you been bothered with problems using the telephone?: Never  How often in the past four weeks have you been bothered by tiredness or fatigue?: Never  Have you fallen two or more times in the past year?: No  Are you afraid of falling?: No  Are you a smoker?: No  During the past four weeks, how many drinks of wine, beer, or other alcoholic beverages did you have?: No alcohol at all  Do you exercise for about 20 minutes three or more days a week?: Yes, most of the time  Have you been given any information to help you with hazards in your house that might hurt you?: Yes  Have you been given any information to help you with keeping track of your medications?: Yes  How often do you have trouble taking medicines the way you've been told to take them?: I always take them as prescribed  How confident are you that you can control and manage most of your health problems?: Very confident  What is your race? (Check all that apply.):   Objective:   /85   Pulse 86   Ht 5' 5" (1.651 m)   Wt 59.1 kg (130 lb 4.8 oz)   SpO2 98%   BMI 21.68 kg/m²   Physical Exam  Constitutional:       Appearance: Normal appearance.   HENT:      Head: Normocephalic and atraumatic.      Right Ear: Tympanic membrane, ear canal and external ear normal.      Left Ear: Tympanic membrane, ear canal and external ear normal.      Nose: No congestion or rhinorrhea.      Mouth/Throat:      Mouth: Mucous membranes are moist.      Pharynx: No oropharyngeal exudate or posterior oropharyngeal erythema.   Eyes:      General: No scleral icterus.        Right eye: No discharge.         Left eye: No discharge.      Extraocular Movements: Extraocular movements intact.      Conjunctiva/sclera: Conjunctivae normal.   Cardiovascular:      Rate and Rhythm: Normal rate and regular " rhythm.      Pulses: Normal pulses.      Heart sounds: Normal heart sounds.   Pulmonary:      Effort: Pulmonary effort is normal.      Breath sounds: Normal breath sounds.   Abdominal:      General: Abdomen is flat. Bowel sounds are normal.      Palpations: Abdomen is soft.   Musculoskeletal:         General: No deformity. Normal range of motion.      Cervical back: Normal range of motion and neck supple.   Skin:     General: Skin is warm and dry.   Neurological:      Mental Status: He is alert and oriented to person, place, and time.   Psychiatric:         Mood and Affect: Mood normal.         Behavior: Behavior normal.         Thought Content: Thought content normal.         Judgment: Judgment normal.            No data to display                  11/14/2024    10:00 AM 8/7/2024     1:45 PM 6/26/2024     2:45 PM 5/23/2024     2:45 PM 4/11/2024     2:45 PM 3/14/2024    10:00 AM 2/28/2024     9:00 AM   Fall Risk Assessment - Outpatient   Mobility Status Ambulatory Ambulatory Ambulatory Ambulatory Ambulatory Ambulatory Ambulatory   Number of falls 0 0 0 1 0 0 0   Identified as fall risk False False False False False False False           Depression Screening  Over the past two weeks, has the patient felt down, depressed, or hopeless?: No  Over the past two weeks, has the patient felt little interest or pleasure in doing things?: No  Functional Ability/Safety Screening  Was the patient's timed Up & Go test unsteady or longer than 30 seconds?: No  Does the patient need help with phone, transportation, shopping, preparing meals, housework, laundry, meds, or managing money?: No  Does the patient's home have rugs in the hallway, lack grab bars in the bathroom, lack handrails on the stairs or have poor lighting?: No  Have you noticed any hearing difficulties?: No  Cognitive Function (Assessed through direct observation with due consideration of information obtained by way of patient reports and/or concerns raised by  family, friends, caretakers, or others)    Does the patient repeat questions/statements in the same day?: No  Does the patient have trouble remembering the date, year, and time?: No  Does the patient have difficulty managing finances?: No  Does the patient have a decreased sense of direction?: No  Assessment:       ICD-10-CM ICD-9-CM   1. Wellness examination  Z00.00 V70.0   2. Type 2 diabetes mellitus with stage 3a chronic kidney disease, without long-term current use of insulin  E11.22 250.40    N18.31 585.3   3. Pure hypercholesterolemia  E78.00 272.0   4. Vitamin D deficiency  E55.9 268.9   5. Personal history of tobacco use, presenting hazards to health  Z87.891 V15.82   6. Chronic neck and back pain  M54.2 723.1    M54.9 724.5    G89.29 338.29   7. Chronic kidney disease, stage 3a  N18.31 585.3   8. Primary hypertension  I10 401.9      Plan:   1. Wellness examination  Assessment & Plan:  Provided Franklin Macias with a 5-10 year written screening schedule and personal prevention plan. Recommendations were developed using the USPSTF age appropriate recommendations. Education, counseling, and referrals were provided as needed. After Visit Summary printed and given to patient which includes a list of additional screenings\tests needed.         2. Type 2 diabetes mellitus with stage 3a chronic kidney disease, without long-term current use of insulin  Assessment & Plan:  Uncontrolled  Start Jardiance 25mg qam. AE discussed  ACEi/ARB according to guidelines.  Follow ADA Diet. Avoid soda, simple sweets, and limit rice/pasta/breads/starches (no more than 45-50 grams per meal).  Continue CBG monitoring and keep log to bring to next visit  Maintain healthy weight with goal BMI <30.  Exercise at least 5 times per week for 30 minutes per day.  Annual foot exams and close monitoring/hygiene at home.  Annual dilated eye exam.     Orders:  -     Hemoglobin A1C; Future; Expected date: 11/14/2024  -     Comprehensive Metabolic  Panel; Future; Expected date: 11/14/2024    3. Pure hypercholesterolemia  Overview:  Recommend continuation of dyslipidemia diet with Atorvastatin  20mg qd medication and repeat FLP at routine interval.      4. Vitamin D deficiency  Assessment & Plan:  Start Vitamin D 50,000u po qwk x 12wks, then 1,000u po qd thereafter  Recheck level after completed supplementation at routine interval       Orders:  -     cholecalciferol, vitamin D3, 1,250 mcg (50,000 unit) capsule; Take 1 capsule (50,000 Units total) by mouth every 7 days. for 12 doses  Dispense: 12 capsule; Refill: 0  -     Vitamin D; Future; Expected date: 11/14/2024    5. Personal history of tobacco use, presenting hazards to health  Assessment & Plan:  Shared decision making on the importance of lung cancer screening through LDCT based upon the patient's risk factors which consist of being in the age range of 50-80, having a smoking history of 20 pack years or greater and either continuing to smoke or having not quit smoking within the past 15 years. Patient will be referred for screening imaging studies at this time.  AA US    Orders:  -     US Abdominal Aorta; Future; Expected date: 11/14/2024  -     CT Chest Lung Screening Low Dose; Future; Expected date: 11/14/2024    6. Chronic neck and back pain  Assessment & Plan:  Rx Norco 10-325mg and refilled short course for severe pain prn  AE discussed    Orders:  -     HYDROcodone-acetaminophen (NORCO) 7.5-325 mg per tablet; Take 1 tablet by mouth every 6 (six) hours as needed for Pain.  Dispense: 28 tablet; Refill: 0    7. Chronic kidney disease, stage 3a  Overview:  Start Jardiance  CAD prevention with Asa, statin, BP control as applicable  Control IGT/DM with goal A1C <7. BP goal <130/80. LDL goal < 100.  Follow renoprotective measures including Renal Diet (reduce intake of nuts, peanut butter, milk, cheese, dried beans, peas) and Low Sodium Diet (less than 2 grams per day).  Avoid NSAIDs (I.e., Aleve, Mobic,  Celebrex, Ibuprofen, Advil, Toradol and Diclofenac). May take Tylenol as needed for headache/pain.  Stay well hydrated. Avoid alcohol and soda. Limit tea and coffee.  Smoking avoidance/cessation recommended.        8. Primary hypertension  Assessment & Plan:  Stable  Continue current medication regimen: Lisinopril 20mg qd  Blood pressure at goal <140/90 (<130/80 if otherwise noted)  Recommend DASH diet  Avoid tobacco use  Record BP at home daily and bring log to next office visit, assure that home cuff is calibrated at minimum every 12 months  If BP still >140 /90 at home, pt told to increase Lisinopril       Other orders  -     empagliflozin (JARDIANCE) 25 mg tablet; Take 1 tablet (25 mg total) by mouth once daily.  Dispense: 90 tablet; Refill: 3         Medicare Annual Wellness and Personalized Prevention Plan:   Fall Risk + Home Safety + Hearing Impairment + Depression Screen + Opioid and Substance Abuse Screening + Cognitive Impairment Screen + Health Risk Assessment all reviewed.     Health Maintenance Topics with due status: Not Due       Topic Last Completion Date    Colorectal Cancer Screening 08/20/2022    Low Dose Statin 04/11/2024    Diabetes Urine Screening 11/05/2024    Lipid Panel 11/05/2024    Hemoglobin A1c 11/05/2024      The patient's Health Maintenance was reviewed and the following appears to be due at this time:   Health Maintenance Due   Topic Date Due    Hepatitis C Screening  Never done    Foot Exam  Never done    TETANUS VACCINE  Never done    Shingles Vaccine (1 of 2) Never done    RSV Vaccine (Age 60+ and Pregnant patients) (1 - Risk 60-74 years 1-dose series) Never done    Eye Exam  08/21/2024    COVID-19 Vaccine (3 - 2024-25 season) 09/01/2024       Advance Care Planning     Date: 11/11/2024    Living Will  During this visit, I engaged the patient  in the voluntary advance care planning process.  The patient and I reviewed the role for advance directives and their purpose in directing  future healthcare if the patient's unable to speak for him/herself.  At this point in time, the patient does have full decision-making capacity.  We discussed different extreme health states that he could experience, and reviewed what kind of medical care he would want in those situations.  Has discussed with wife         A separate E/M code has been provided to evaluate additional concerns addressed during the dedicated Wellness Exam.    Follow up in about 3 months (around 2/14/2025) for DM, Vit D, With Labs. In addition to their scheduled follow up, the patient has also been instructed to follow up on as needed basis.

## 2024-11-11 NOTE — ASSESSMENT & PLAN NOTE
Provided Franklin Macias with a 5-10 year written screening schedule and personal prevention plan. Recommendations were developed using the USPSTF age appropriate recommendations. Education, counseling, and referrals were provided as needed. After Visit Summary printed and given to patient which includes a list of additional screenings\tests needed.

## 2024-11-14 ENCOUNTER — CLINICAL SUPPORT (OUTPATIENT)
Dept: PRIMARY CARE CLINIC | Facility: CLINIC | Age: 69
End: 2024-11-14
Payer: MEDICARE

## 2024-11-14 ENCOUNTER — OFFICE VISIT (OUTPATIENT)
Dept: PRIMARY CARE CLINIC | Facility: CLINIC | Age: 69
End: 2024-11-14
Payer: MEDICARE

## 2024-11-14 VITALS
DIASTOLIC BLOOD PRESSURE: 85 MMHG | HEART RATE: 86 BPM | SYSTOLIC BLOOD PRESSURE: 137 MMHG | BODY MASS INDEX: 21.71 KG/M2 | OXYGEN SATURATION: 98 % | HEIGHT: 65 IN | WEIGHT: 130.31 LBS

## 2024-11-14 DIAGNOSIS — Z00.00 WELLNESS EXAMINATION: Primary | ICD-10-CM

## 2024-11-14 DIAGNOSIS — N18.31 CHRONIC KIDNEY DISEASE, STAGE 3A: ICD-10-CM

## 2024-11-14 DIAGNOSIS — I10 PRIMARY HYPERTENSION: ICD-10-CM

## 2024-11-14 DIAGNOSIS — M54.2 CHRONIC NECK AND BACK PAIN: ICD-10-CM

## 2024-11-14 DIAGNOSIS — E11.22 TYPE 2 DIABETES MELLITUS WITH STAGE 3A CHRONIC KIDNEY DISEASE, WITHOUT LONG-TERM CURRENT USE OF INSULIN: Primary | ICD-10-CM

## 2024-11-14 DIAGNOSIS — E55.9 VITAMIN D DEFICIENCY: ICD-10-CM

## 2024-11-14 DIAGNOSIS — M54.9 CHRONIC NECK AND BACK PAIN: ICD-10-CM

## 2024-11-14 DIAGNOSIS — E11.22 TYPE 2 DIABETES MELLITUS WITH STAGE 3A CHRONIC KIDNEY DISEASE, WITHOUT LONG-TERM CURRENT USE OF INSULIN: ICD-10-CM

## 2024-11-14 DIAGNOSIS — E78.00 PURE HYPERCHOLESTEROLEMIA: ICD-10-CM

## 2024-11-14 DIAGNOSIS — N18.31 TYPE 2 DIABETES MELLITUS WITH STAGE 3A CHRONIC KIDNEY DISEASE, WITHOUT LONG-TERM CURRENT USE OF INSULIN: Primary | ICD-10-CM

## 2024-11-14 DIAGNOSIS — N18.31 TYPE 2 DIABETES MELLITUS WITH STAGE 3A CHRONIC KIDNEY DISEASE, WITHOUT LONG-TERM CURRENT USE OF INSULIN: ICD-10-CM

## 2024-11-14 DIAGNOSIS — G89.29 CHRONIC NECK AND BACK PAIN: ICD-10-CM

## 2024-11-14 DIAGNOSIS — Z87.891 PERSONAL HISTORY OF TOBACCO USE, PRESENTING HAZARDS TO HEALTH: ICD-10-CM

## 2024-11-14 RX ORDER — ASPIRIN 325 MG
50000 TABLET, DELAYED RELEASE (ENTERIC COATED) ORAL
Qty: 12 CAPSULE | Refills: 0 | Status: SHIPPED | OUTPATIENT
Start: 2024-11-14 | End: 2025-01-31

## 2024-11-14 RX ORDER — HYDROCODONE BITARTRATE AND ACETAMINOPHEN 7.5; 325 MG/1; MG/1
1 TABLET ORAL EVERY 6 HOURS PRN
Qty: 28 TABLET | Refills: 0 | Status: SHIPPED | OUTPATIENT
Start: 2024-11-14

## 2024-11-14 NOTE — Clinical Note
Mod/severe NPDR OU, but ALSO has large asymmetric CDs--should get eye exam in next few months to check retinopathy and rule out glaucoma

## 2024-11-14 NOTE — ASSESSMENT & PLAN NOTE
Start Vitamin D 50,000u po qwk x 12wks, then 1,000u po qd thereafter  Recheck level after completed supplementation at routine interval

## 2024-11-14 NOTE — ASSESSMENT & PLAN NOTE
Uncontrolled  Start Jardiance 25mg qam. AE discussed  ACEi/ARB according to guidelines.  Follow ADA Diet. Avoid soda, simple sweets, and limit rice/pasta/breads/starches (no more than 45-50 grams per meal).  Continue CBG monitoring and keep log to bring to next visit  Maintain healthy weight with goal BMI <30.  Exercise at least 5 times per week for 30 minutes per day.  Annual foot exams and close monitoring/hygiene at home.  Annual dilated eye exam.

## 2024-11-14 NOTE — PROGRESS NOTES
Franklin Macias is a 69 y.o. male here for a diabetic eye screening with non-dilated fundus photos per dr lehman.    Patient cooperative?: Yes  Small pupils?: Yes  Last eye exam: unknown    For exam results, see Encounter Report.

## 2024-11-14 NOTE — ASSESSMENT & PLAN NOTE
Shared decision making on the importance of lung cancer screening through LDCT based upon the patient's risk factors which consist of being in the age range of 50-80, having a smoking history of 20 pack years or greater and either continuing to smoke or having not quit smoking within the past 15 years. Patient will be referred for screening imaging studies at this time.  ELOISE TATUM

## 2024-12-04 ENCOUNTER — HOSPITAL ENCOUNTER (OUTPATIENT)
Dept: RADIOLOGY | Facility: HOSPITAL | Age: 69
Discharge: HOME | End: 2024-12-04
Attending: STUDENT IN AN ORGANIZED HEALTH CARE EDUCATION/TRAINING PROGRAM
Payer: MEDICARE

## 2024-12-04 DIAGNOSIS — Z87.891 PERSONAL HISTORY OF TOBACCO USE, PRESENTING HAZARDS TO HEALTH: ICD-10-CM

## 2024-12-04 PROCEDURE — 76775 US EXAM ABDO BACK WALL LIM: CPT | Mod: TC

## 2024-12-30 PROBLEM — J43.8 OTHER EMPHYSEMA: Status: ACTIVE | Noted: 2024-12-30

## 2025-01-24 ENCOUNTER — TELEPHONE (OUTPATIENT)
Dept: PRIMARY CARE CLINIC | Facility: CLINIC | Age: 70
End: 2025-01-24
Payer: MEDICARE

## 2025-01-24 DIAGNOSIS — K21.9 GASTROESOPHAGEAL REFLUX DISEASE, UNSPECIFIED WHETHER ESOPHAGITIS PRESENT: ICD-10-CM

## 2025-01-24 DIAGNOSIS — N18.31 TYPE 2 DIABETES MELLITUS WITH STAGE 3A CHRONIC KIDNEY DISEASE, WITHOUT LONG-TERM CURRENT USE OF INSULIN: Primary | ICD-10-CM

## 2025-01-24 DIAGNOSIS — E11.22 TYPE 2 DIABETES MELLITUS WITH STAGE 3A CHRONIC KIDNEY DISEASE, WITHOUT LONG-TERM CURRENT USE OF INSULIN: Primary | ICD-10-CM

## 2025-01-24 DIAGNOSIS — I10 HYPERTENSION, UNSPECIFIED TYPE: ICD-10-CM

## 2025-01-24 DIAGNOSIS — E55.9 VITAMIN D DEFICIENCY: ICD-10-CM

## 2025-01-24 RX ORDER — LISINOPRIL 20 MG/1
20 TABLET ORAL DAILY
Qty: 90 TABLET | Refills: 0 | Status: SHIPPED | OUTPATIENT
Start: 2025-01-24

## 2025-01-24 RX ORDER — PANTOPRAZOLE SODIUM 40 MG/1
40 TABLET, DELAYED RELEASE ORAL DAILY
Qty: 90 TABLET | Refills: 0 | Status: SHIPPED | OUTPATIENT
Start: 2025-01-24

## 2025-01-24 RX ORDER — ASPIRIN 325 MG
50000 TABLET, DELAYED RELEASE (ENTERIC COATED) ORAL
Qty: 12 CAPSULE | Refills: 0 | Status: SHIPPED | OUTPATIENT
Start: 2025-01-24 | End: 2025-04-12

## 2025-01-24 NOTE — TELEPHONE ENCOUNTER
----- Message from Ilene sent at 1/24/2025  3:28 PM CST -----  .Who Called: Franklin Macias    Refill or New Rx:Refill  RX Name and Strength:empagliflozin (JARDIANCE) 25 mg tablet  How is the patient currently taking it? (ex. 1XDay):1x day  Is this a 30 day or 90 day RX:90  Local or Mail Order:local  List of preferred pharmacies on file (remove unneeded): [unfilled]  If different Pharmacy is requested, enter Pharmacy information here including location and phone number: [facility]   Ordering Provider:Cruz      Preferred Method of Contact: Phone Call  Patient's Preferred Phone Number on File: [phone]   Best Call Back Number, if different:  Additional Information: refill   .Who Called: Franklin Macias    Refill or New Rx:Refill  RX Name and Strength:pantoprazole (PROTONIX) 40 MG tablet  How is the patient currently taking it? (ex. 1XDay):1x day  Is this a 30 day or 90 day RX:90  Local or Mail Order:local  List of preferred pharmacies on file (remove unneeded): [unfilled]  If different Pharmacy is requested, enter Pharmacy information here including location and phone number: [facility]   Ordering Provider:Cruz      Preferred Method of Contact: Phone Call  Patient's Preferred Phone Number on File: [phone]   Best Call Back Number, if different:  Additional Information: refill   ..Who Called: Franklin Macias    Refill or New Rx:Refill  RX Name and Strength:lisinopriL (PRINIVIL,ZESTRIL) 20 MG tablet  How is the patient currently taking it? (ex. 1XDay):1x day  Is this a 30 day or 90 day RX:90  Local or Mail Order:local  List of preferred pharmacies on file (remove unneeded): [unfilled]  If different Pharmacy is requested, enter Pharmacy information here including location and phone number: [facility]   Ordering Provider:Cruz      Preferred Method of Contact: Phone Call  Patient's Preferred Phone Number on File: [phone]   Best Call Back Number, if different:  Additional Information: refills     .Who  Called: Franklin Macias    Refill or New Rx:Refill  RX Name and Strength:cholecalciferol, vitamin D3, 1,250 mcg (50,000 unit) capsule  How is the patient currently taking it? (ex. 1XDay):1 weekly  Is this a 30 day or 90 day RX:  Local or Mail Order:local  List of preferred pharmacies on file (remove unneeded): [unfilled]  If different Pharmacy is requested, enter Pharmacy information here including location and phone number: [facility]   Ordering Provider:Cruz      Preferred Method of Contact: Phone Call  Patient's Preferred Phone Number on File: [phone]   Best Call Back Number, if different:  Additional Information: refill

## 2025-02-12 ENCOUNTER — TELEPHONE (OUTPATIENT)
Dept: PRIMARY CARE CLINIC | Facility: CLINIC | Age: 70
End: 2025-02-12
Payer: MEDICARE

## 2025-02-12 NOTE — TELEPHONE ENCOUNTER
----- Message from Janet sent at 2/12/2025  1:51 PM CST -----  Who Called: Franklin Grantman    Caller is requesting assistance/information from provider's office.    Symptoms (please be specific):    How long has patient had these symptoms:    List of preferred pharmacies on file (remove unneeded): [unfilled]  If different, enter pharmacy into here including location and phone number:       Preferred Method of Contact: Phone Call  Patient's Preferred Phone Number on File: [phone]   Best Call Back Number, if different:  Additional Information: Pts wife called requesting to speak to nurse about 's future blood work. Wants to know if there is a location in [place] they can go to to get blood work done before appt 2/18/25

## 2025-02-13 ENCOUNTER — LAB VISIT (OUTPATIENT)
Dept: LAB | Facility: HOSPITAL | Age: 70
End: 2025-02-13
Attending: STUDENT IN AN ORGANIZED HEALTH CARE EDUCATION/TRAINING PROGRAM
Payer: MEDICARE

## 2025-02-13 DIAGNOSIS — E11.22 TYPE 2 DIABETES MELLITUS WITH STAGE 3A CHRONIC KIDNEY DISEASE, WITHOUT LONG-TERM CURRENT USE OF INSULIN: ICD-10-CM

## 2025-02-13 DIAGNOSIS — N17.9 ACUTE RENAL FAILURE, UNSPECIFIED ACUTE RENAL FAILURE TYPE: ICD-10-CM

## 2025-02-13 DIAGNOSIS — N18.31 TYPE 2 DIABETES MELLITUS WITH STAGE 3A CHRONIC KIDNEY DISEASE, WITHOUT LONG-TERM CURRENT USE OF INSULIN: ICD-10-CM

## 2025-02-13 DIAGNOSIS — E55.9 VITAMIN D DEFICIENCY: ICD-10-CM

## 2025-02-13 LAB
25(OH)D3+25(OH)D2 SERPL-MCNC: 77 NG/ML (ref 30–80)
ALBUMIN SERPL-MCNC: 4.1 G/DL (ref 3.4–4.8)
ALBUMIN/GLOB SERPL: 1.1 RATIO (ref 1.1–2)
ALP SERPL-CCNC: 95 UNIT/L (ref 40–150)
ALT SERPL-CCNC: 20 UNIT/L (ref 0–55)
ANION GAP SERPL CALC-SCNC: 11 MEQ/L
AST SERPL-CCNC: 18 UNIT/L (ref 5–34)
BILIRUB SERPL-MCNC: 0.6 MG/DL
BUN SERPL-MCNC: 17.6 MG/DL (ref 8.4–25.7)
CALCIUM SERPL-MCNC: 9.9 MG/DL (ref 8.8–10)
CHLORIDE SERPL-SCNC: 102 MMOL/L (ref 98–107)
CO2 SERPL-SCNC: 29 MMOL/L (ref 23–31)
CREAT SERPL-MCNC: 1.26 MG/DL (ref 0.72–1.25)
CREAT/UREA NIT SERPL: 14
EST. AVERAGE GLUCOSE BLD GHB EST-MCNC: 231.7 MG/DL
GFR SERPLBLD CREATININE-BSD FMLA CKD-EPI: >60 ML/MIN/1.73/M2
GLOBULIN SER-MCNC: 3.7 GM/DL (ref 2.4–3.5)
GLUCOSE SERPL-MCNC: 204 MG/DL (ref 82–115)
HBA1C MFR BLD: 9.7 %
POTASSIUM SERPL-SCNC: 4 MMOL/L (ref 3.5–5.1)
PROT SERPL-MCNC: 7.8 GM/DL (ref 5.8–7.6)
SODIUM SERPL-SCNC: 142 MMOL/L (ref 136–145)

## 2025-02-13 PROCEDURE — 83036 HEMOGLOBIN GLYCOSYLATED A1C: CPT

## 2025-02-13 PROCEDURE — 82306 VITAMIN D 25 HYDROXY: CPT

## 2025-02-13 PROCEDURE — 80053 COMPREHEN METABOLIC PANEL: CPT

## 2025-02-13 PROCEDURE — 36415 COLL VENOUS BLD VENIPUNCTURE: CPT

## 2025-02-13 RX ORDER — METFORMIN HYDROCHLORIDE 500 MG/1
500 TABLET ORAL 2 TIMES DAILY WITH MEALS
Qty: 180 TABLET | Refills: 3 | Status: SHIPPED | OUTPATIENT
Start: 2025-02-13 | End: 2025-02-14 | Stop reason: SDUPTHER

## 2025-02-13 NOTE — PROGRESS NOTES
Blood work results demonstrates the following  Please call the patient to update regarding the following    Acute kidney injury   SHA recs  Increase Hydration  Avoid Nephrotoxic medications like NSAIDS,   Please confirm if the patient took new antibiotics or recreational drugs ,dehydration, diarrhea, vomiting, acute illness, hospitalization, recent angiograms or exposure to IV radiocontrast.  Hold lisinopril as of now until the renal function are back to baseline  Monitor BP and CBG closely   Please get checked in 3 days of increasing hydration and  holding ACEI/  NSAIDS    Diabetes uncontrolled  I prefer the patient to be on once a week shot like Ozempic  Since the patient is not familiar with me I hesitate to order it  I recommend the patient to start with metformin 500 mg morning and evening and linagliptin 5 mg every day  Start diabetic diet with 35 minutes of brisk walking  Referral for diabetic education sent   Please schedule a visit with Dr. Arroyo to discuss about GLP 1 inhibitors like Ozempic

## 2025-02-14 ENCOUNTER — TELEPHONE (OUTPATIENT)
Dept: PRIMARY CARE CLINIC | Facility: CLINIC | Age: 70
End: 2025-02-14
Payer: MEDICARE

## 2025-02-14 DIAGNOSIS — E11.22 TYPE 2 DIABETES MELLITUS WITH STAGE 3A CHRONIC KIDNEY DISEASE, WITHOUT LONG-TERM CURRENT USE OF INSULIN: ICD-10-CM

## 2025-02-14 DIAGNOSIS — N18.31 TYPE 2 DIABETES MELLITUS WITH STAGE 3A CHRONIC KIDNEY DISEASE, WITHOUT LONG-TERM CURRENT USE OF INSULIN: ICD-10-CM

## 2025-02-14 RX ORDER — METFORMIN HYDROCHLORIDE 500 MG/1
500 TABLET ORAL 2 TIMES DAILY WITH MEALS
Qty: 180 TABLET | Refills: 0 | Status: SHIPPED | OUTPATIENT
Start: 2025-02-14 | End: 2026-02-14

## 2025-02-14 NOTE — TELEPHONE ENCOUNTER
----- Message from Janet sent at 2/14/2025 11:20 AM CST -----  Who Called: Franklin Macias    Caller is requesting assistance/information from provider's office.    Symptoms (please be specific):    How long has patient had these symptoms:    List of preferred pharmacies on file (remove unneeded): [unfilled]  If different, enter pharmacy into here including location and phone number:       Preferred Method of Contact: Phone Call  Patient's Preferred Phone Number on File: [phone]   Best Call Back Number, if different:  Additional Information: Pt sates his meds was sent to wrong pharmacy on yesterday and needs to be sent to  [facility] - [place], [place] - [address]      linaGLIPtin (TRADJENTA) 5 mg Tab tablet    metFORMIN (GLUCOPHAGE) 500 MG tablet

## 2025-02-17 ENCOUNTER — TELEPHONE (OUTPATIENT)
Dept: PRIMARY CARE CLINIC | Facility: CLINIC | Age: 70
End: 2025-02-17
Payer: MEDICARE

## 2025-02-17 NOTE — TELEPHONE ENCOUNTER
----- Message from Saeed sent at 2/14/2025 11:56 AM CST -----  .Who Called: Melvina Macias    Caller is requesting assistance/information from provider's office.    Symptoms (please be specific): na   How long has patient had these symptoms:  na  List of preferred pharmacies on file (remove unneeded): [unfilled]  If different, enter pharmacy into here including location and phone number: na      Preferred Method of Contact: Phone Call  Patient's Preferred Phone Number on File: [phone]   Best Call Back Number, if different:  Additional Information: pt wants Hope to give her a call

## 2025-02-18 ENCOUNTER — OFFICE VISIT (OUTPATIENT)
Dept: PRIMARY CARE CLINIC | Facility: CLINIC | Age: 70
End: 2025-02-18
Payer: MEDICARE

## 2025-02-18 VITALS
WEIGHT: 128.5 LBS | DIASTOLIC BLOOD PRESSURE: 75 MMHG | BODY MASS INDEX: 21.41 KG/M2 | HEART RATE: 93 BPM | SYSTOLIC BLOOD PRESSURE: 157 MMHG | OXYGEN SATURATION: 95 % | HEIGHT: 65 IN

## 2025-02-18 DIAGNOSIS — E11.22 TYPE 2 DIABETES MELLITUS WITH STAGE 3A CHRONIC KIDNEY DISEASE, WITHOUT LONG-TERM CURRENT USE OF INSULIN: ICD-10-CM

## 2025-02-18 DIAGNOSIS — E78.00 PURE HYPERCHOLESTEROLEMIA: ICD-10-CM

## 2025-02-18 DIAGNOSIS — N18.31 CHRONIC KIDNEY DISEASE, STAGE 3A: Primary | ICD-10-CM

## 2025-02-18 DIAGNOSIS — I10 HYPERTENSION, UNSPECIFIED TYPE: ICD-10-CM

## 2025-02-18 DIAGNOSIS — N18.31 TYPE 2 DIABETES MELLITUS WITH STAGE 3A CHRONIC KIDNEY DISEASE, WITHOUT LONG-TERM CURRENT USE OF INSULIN: ICD-10-CM

## 2025-02-18 DIAGNOSIS — I10 PRIMARY HYPERTENSION: ICD-10-CM

## 2025-02-18 PROBLEM — A08.4 VIRAL GASTROENTERITIS: Status: RESOLVED | Noted: 2024-08-07 | Resolved: 2025-02-18

## 2025-02-18 RX ORDER — INSULIN PUMP SYRINGE, 3 ML
EACH MISCELLANEOUS
Qty: 1 EACH | Refills: 0 | Status: SHIPPED | OUTPATIENT
Start: 2025-02-18

## 2025-02-18 RX ORDER — TIRZEPATIDE 2.5 MG/.5ML
2.5 INJECTION, SOLUTION SUBCUTANEOUS
Qty: 2 ML | Refills: 0 | Status: SHIPPED | OUTPATIENT
Start: 2025-02-18

## 2025-02-18 RX ORDER — ATORVASTATIN CALCIUM 20 MG/1
20 TABLET, FILM COATED ORAL DAILY
Qty: 90 TABLET | Refills: 3 | Status: SHIPPED | OUTPATIENT
Start: 2025-02-18

## 2025-02-18 RX ORDER — LANCETS
EACH MISCELLANEOUS
Qty: 120 EACH | Refills: 11 | Status: SHIPPED | OUTPATIENT
Start: 2025-02-18

## 2025-02-18 RX ORDER — LISINOPRIL 40 MG/1
40 TABLET ORAL DAILY
Qty: 90 TABLET | Refills: 1 | Status: SHIPPED | OUTPATIENT
Start: 2025-02-18

## 2025-02-18 NOTE — ASSESSMENT & PLAN NOTE
Poorly controlled.   Hypertension Medications              lisinopriL (PRINIVIL,ZESTRIL) 20 MG tablet Take 1 tablet (20 mg total) by mouth once daily.             Increase Lisinopril 40mg daily  Low Sodium Diet (DASH Diet - Less than 2 grams of sodium per day).  Monitor blood pressure daily and log. Report consistent numbers greater than 140/90.  Maintain healthy weight with goal BMI <30. Exercise 30 minutes per day, 5 days per week.

## 2025-02-18 NOTE — ASSESSMENT & PLAN NOTE
Lab Results   Component Value Date    HGBA1C 9.7 (H) 02/13/2025    HGBA1C 9.6 (H) 11/05/2024    .00 11/05/2024    CREATININE 1.26 (H) 02/13/2025     Diabetes Medications              empagliflozin (JARDIANCE) 25 mg tablet Take 1 tablet (25 mg total) by mouth once daily.         tirzepatide (MOUNJARO) 2.5 mg/0.5 mL PnIj Inject 2.5 mg into the skin every 7 days.   Start Mounjaro 2.5mg weekly x 4 weeks then increase to 5mg weekly  On ACE and Statin according to guidelines.  Follow ADA Diet. Avoid soda, simple sweets, and limit rice/pasta/breads/starches (no more than 45-50 grams per meal).  Maintain healthy weight with goal BMI <30.  Exercise 5 times per week for 30 minutes per day.  Stressed importance of daily foot exams.  Stressed importance of annual dilated eye exam.

## 2025-02-18 NOTE — PROGRESS NOTES
Internal Medicine    Patient ID: [id number]     Chief Complaint: Follow-up (Lab review)    HPI:     Franklin Macias is a 70 y.o. male here today for a follow up. Started on jardiance at last visit. Tolerating well without adverse effects. Reviewed and discussed lab results. HA1C not well controlled. Diet has been poor.  No other complaints today.     Past Medical History:   Diagnosis Date    SHA (acute kidney injury) 12/2023    Choledocholithiasis 12/2023    Chronic right shoulder pain 08/01/2022    Continue otc tylenol for pain management with short course hydrocodone only PRN for BT pain for short course    Depression 02/01/2024    DM (diabetes mellitus)     GERD (gastroesophageal reflux disease)     HLD (hyperlipidemia)     HTN (hypertension)     Hydrocele of testis 12/16/2023    Hypotension 02/01/2024    Hypoxic respiratory failure 12/2023    Necrotizing pancreatitis 12/16/2023 12/2023    Necrotizing pancreatitis 12/16/2023    Sepsis due to Enterococcus faecalis 12/2023        Past Surgical History:   Procedure Laterality Date    APPENDECTOMY      CHOLECYSTECTOMY      ERCP N/A 12/17/2023    Procedure: ERCP (ENDOSCOPIC RETROGRADE CHOLANGIOPANCREATOGRAPHY);  Surgeon: Flako Kerns MD;  Location: [facility];  Service: Gastroenterology;  Laterality: N/A;    ERCP W/ SPHICTEROTOMY  12/17/2023    Procedure: ERCP, WITH SPHINCTEROTOMY;  Surgeon: Flako Kerns MD;  Location: [facility] OR;  Service: Gastroenterology;;    ERCP, WITH CALCULUS REMOVAL  12/17/2023    Procedure: ERCP, WITH CALCULUS REMOVAL;  Surgeon: Flako Kerns MD;  Location: [facility];  Service: Gastroenterology;;        Social History     Tobacco Use    Smoking status: Every Day     Current packs/day: 1.00     Average packs/day: 1 pack/day for 50.2 years (50.2 ttl pk-yrs)     Types: Cigarettes     Start date: 12/13/1974    Smokeless tobacco: Never   Substance and Sexual Activity    Alcohol use: Never    Drug use: Never    Sexual  "activity: Yes        Current Outpatient Medications   Medication Instructions    albuterol (PROVENTIL/VENTOLIN HFA) 90 mcg/actuation inhaler 2 puffs, Every 4 hours PRN    aspirin 81 mg, Daily    atorvastatin (LIPITOR) 20 mg, Oral, Daily    cholecalciferol (vitamin D3) 50,000 Units, Oral, Every 7 days    empagliflozin (JARDIANCE) 25 mg, Oral, Daily    famotidine (PEPCID) 20 MG tablet 60 tablets, 2 times daily    HYDROcodone-acetaminophen (NORCO) 7.5-325 mg per tablet 1 tablet, Oral, Every 6 hours PRN    lisinopriL (PRINIVIL,ZESTRIL) 40 mg, Oral, Daily    MOUNJARO 2.5 mg, Subcutaneous, Every 7 days    pantoprazole (PROTONIX) 40 mg, Oral, Daily       Review of patient's allergies indicates:   Allergen Reactions    Gabapentin Hallucinations        Patient Care Team:  Snehal Arroyo MD as PCP - General (Family Medicine)  Justin Cary MD as Consulting Physician (Ophthalmology)  Phil Oseguera MD as Consulting Physician (Gastroenterology)  Kirsten Tiwari LPN as Care Coordinator     Subjective:     Review of Systems    12 point review of systems conducted, negative except as stated in the history of present illness. See HPI for details.    Objective:     Visit Vitals  BP (!) 157/75   Pulse 93   Ht 5' 5" (1.651 m)   Wt 58.3 kg (128 lb 8 oz)   SpO2 95%   BMI 21.38 kg/m²       Physical Exam  Vitals and nursing note reviewed.   Constitutional:       General: He is not in acute distress.  HENT:      Mouth/Throat:      Mouth: Mucous membranes are moist.   Eyes:      Conjunctiva/sclera: Conjunctivae normal.   Cardiovascular:      Rate and Rhythm: Normal rate and regular rhythm.   Pulmonary:      Effort: Pulmonary effort is normal. No respiratory distress.      Breath sounds: Normal breath sounds.   Skin:     General: Skin is warm and dry.   Neurological:      General: No focal deficit present.      Mental Status: He is alert.   Psychiatric:         Mood and Affect: Mood normal.         Labs Reviewed: "     Chemistry:  Lab Results   Component Value Date     02/13/2025    K 4.0 02/13/2025    BUN 17.6 02/13/2025    CREATININE 1.26 (H) 02/13/2025    EGFRNORACEVR >60 02/13/2025    GLUCOSE 204 (H) 02/13/2025    CALCIUM 9.9 02/13/2025    ALKPHOS 95 02/13/2025    LABPROT 7.8 (H) 02/13/2025    ALBUMIN 4.1 02/13/2025    BILIDIR 0.3 04/19/2024    IBILI 0.50 05/03/2022    AST 18 02/13/2025    ALT 20 02/13/2025    MG 1.50 (L) 01/22/2024    PHOS 2.7 01/20/2024    RTETOPGG74FA 77 02/13/2025    TSH 0.826 11/05/2024    PSA 0.66 11/05/2024        Lab Results   Component Value Date    HGBA1C 9.7 (H) 02/13/2025        Hematology:  Lab Results   Component Value Date    WBC 5.20 11/05/2024    HGB 16.0 11/05/2024    HCT 47.4 11/05/2024     11/05/2024       Lipid Panel:  Lab Results   Component Value Date    CHOL 204 (H) 11/05/2024    HDL 47 11/05/2024    .00 11/05/2024    TRIG 104 11/05/2024    TOTALCHOLEST 4 11/05/2024        Urine:  Lab Results   Component Value Date    APPEARANCEUA Clear 05/08/2024    SGUA 1.025 05/08/2024    PROTEINUA 1+ (A) 05/08/2024    KETONESUA Negative 05/08/2024    LEUKOCYTESUR Negative 05/08/2024    RBCUA 0-5 05/08/2024    WBCUA 0-5 05/08/2024    BACTERIA None Seen 05/08/2024    SQEPUA Trace 05/08/2024    HYALINECASTS 3-5 (A) 05/08/2024    CREATRANDUR 159.7 11/05/2024        Assessment:       ICD-10-CM ICD-9-CM   1. Chronic kidney disease, stage 3a  N18.31 585.3   2. Type 2 diabetes mellitus with stage 3a chronic kidney disease, without long-term current use of insulin  E11.22 250.40    N18.31 585.3   3. Primary hypertension  I10 401.9   4. Pure hypercholesterolemia  E78.00 272.0   5. Hypertension, unspecified type  I10 401.9        Plan:     1. Chronic kidney disease, stage 3a  Overview:        Assessment & Plan:  Lab Results   Component Value Date    EGFRNORACEVR >60 02/13/2025    EGFRNORACEVR >60 11/05/2024   Continue Jardiance 25mg daily  Stable from renal standpoint.  Follow  renoprotective measures including Renal Diet (reduce intake of nuts, peanut butter, milk, cheese, dried beans, peas) and Low Sodium Diet (less than 2 grams per day).  Avoid NSAIDs (Aleve, Mobic, Celebrex, Ibuprofen, Advil, Toradol and Diclofenac). May take Tylenol as needed for headache/pain.  Control DM with goal A1C <7. BP goal <130/80. LDL goal < 100.  Stay well hydrated. Avoid alcohol and soda. Limit tea and coffee.  Smoking Cessation recommended.        2. Type 2 diabetes mellitus with stage 3a chronic kidney disease, without long-term current use of insulin  Assessment & Plan:  Lab Results   Component Value Date    HGBA1C 9.7 (H) 02/13/2025    HGBA1C 9.6 (H) 11/05/2024    .00 11/05/2024    CREATININE 1.26 (H) 02/13/2025     Diabetes Medications              empagliflozin (JARDIANCE) 25 mg tablet Take 1 tablet (25 mg total) by mouth once daily.         tirzepatide (MOUNJARO) 2.5 mg/0.5 mL PnIj Inject 2.5 mg into the skin every 7 days.   Start Mounjaro 2.5mg weekly x 4 weeks then increase to 5mg weekly  On ACE and Statin according to guidelines.  Follow ADA Diet. Avoid soda, simple sweets, and limit rice/pasta/breads/starches (no more than 45-50 grams per meal).  Maintain healthy weight with goal BMI <30.  Exercise 5 times per week for 30 minutes per day.  Stressed importance of daily foot exams.  Stressed importance of annual dilated eye exam.    Orders:  -     tirzepatide (MOUNJARO) 2.5 mg/0.5 mL PnIj; Inject 2.5 mg into the skin every 7 days.  Dispense: 2 mL; Refill: 0  -     Ambulatory referral/consult to Diabetes Education; Future; Expected date: 02/25/2025    3. Primary hypertension  Assessment & Plan:  Poorly controlled.   Hypertension Medications              lisinopriL (PRINIVIL,ZESTRIL) 20 MG tablet Take 1 tablet (20 mg total) by mouth once daily.             Increase Lisinopril 40mg daily  Low Sodium Diet (DASH Diet - Less than 2 grams of sodium per day).  Monitor blood pressure daily and log.  Report consistent numbers greater than 140/90.  Maintain healthy weight with goal BMI <30. Exercise 30 minutes per day, 5 days per week.      4. Pure hypercholesterolemia  Assessment & Plan:  Lab Results   Component Value Date    .00 11/05/2024    TRIG 104 11/05/2024    HDL 47 11/05/2024    TOTALCHOLEST 4 11/05/2024     Hyperlipidemia Medications              atorvastatin (LIPITOR) 20 MG tablet Take 20 mg by mouth.    fenofibrate (TRICOR) 145 MG tablet See Instructions, TAKE 1 TABLET EVERY DAY, # 90 tab(s), 3 Refill(s), Pharmacy: [facility], 168, cm, Height/Length Dosing, 11/11/21 9:32:00 CST, 73.75, kg, Weight Dosing, 11/11/21 9:32:00 CST Strength: 145 mg   Resume atorvastatin. Repeat CMP + Lipid for next visit.  Stressed importance of dietary modifications. Follow a low cholesterol, low saturated fat diet with less that 200mg of cholesterol a day.  Avoid fried foods and high saturated fats (high saturated fats less than 7% of calories).  Add Flax Seed/Fish Oil supplements to diet. Increase dietary fiber.  Regular exercise can reduce LDL and raise HDL. Stressed importance of physical activity 5 times per week for 30 minutes per day.     Orders:  -     Lipid Panel; Future; Expected date: 02/18/2025  -     Comprehensive Metabolic Panel; Future; Expected date: 02/18/2025    5. Hypertension, unspecified type  Assessment & Plan:  Poorly controlled.   Hypertension Medications              lisinopriL (PRINIVIL,ZESTRIL) 20 MG tablet Take 1 tablet (20 mg total) by mouth once daily.             Increase Lisinopril 40mg daily  Low Sodium Diet (DASH Diet - Less than 2 grams of sodium per day).  Monitor blood pressure daily and log. Report consistent numbers greater than 140/90.  Maintain healthy weight with goal BMI <30. Exercise 30 minutes per day, 5 days per week.    Orders:  -     lisinopriL (PRINIVIL,ZESTRIL) 40 MG tablet; Take 1 tablet (40 mg total) by mouth once daily.  Dispense: 90 tablet;  Refill: 1    Other orders  -     atorvastatin (LIPITOR) 20 MG tablet; Take 1 tablet (20 mg total) by mouth once daily.  Dispense: 90 tablet; Refill: 3         Follow up in about 4 weeks (around 3/18/2025) for Routine Follow Up. In addition to their scheduled follow up, the patient has also been instructed to follow up on as needed basis.     No future appointments.     MALI Adams

## 2025-02-18 NOTE — ASSESSMENT & PLAN NOTE
Lab Results   Component Value Date    EGFRNORACEVR >60 02/13/2025    EGFRNORACEVR >60 11/05/2024   Continue Jardiance 25mg daily  Stable from renal standpoint.  Follow renoprotective measures including Renal Diet (reduce intake of nuts, peanut butter, milk, cheese, dried beans, peas) and Low Sodium Diet (less than 2 grams per day).  Avoid NSAIDs (Aleve, Mobic, Celebrex, Ibuprofen, Advil, Toradol and Diclofenac). May take Tylenol as needed for headache/pain.  Control DM with goal A1C <7. BP goal <130/80. LDL goal < 100.  Stay well hydrated. Avoid alcohol and soda. Limit tea and coffee.  Smoking Cessation recommended.

## 2025-02-18 NOTE — ASSESSMENT & PLAN NOTE
Lab Results   Component Value Date    .00 11/05/2024    TRIG 104 11/05/2024    HDL 47 11/05/2024    TOTALCHOLEST 4 11/05/2024     Hyperlipidemia Medications              atorvastatin (LIPITOR) 20 MG tablet Take 20 mg by mouth.    fenofibrate (TRICOR) 145 MG tablet See Instructions, TAKE 1 TABLET EVERY DAY, # 90 tab(s), 3 Refill(s), Pharmacy: [facility], 168, cm, Height/Length Dosing, 11/11/21 9:32:00 CST, 73.75, kg, Weight Dosing, 11/11/21 9:32:00 CST Strength: 145 mg   Resume atorvastatin. Repeat CMP + Lipid for next visit.  Stressed importance of dietary modifications. Follow a low cholesterol, low saturated fat diet with less that 200mg of cholesterol a day.  Avoid fried foods and high saturated fats (high saturated fats less than 7% of calories).  Add Flax Seed/Fish Oil supplements to diet. Increase dietary fiber.  Regular exercise can reduce LDL and raise HDL. Stressed importance of physical activity 5 times per week for 30 minutes per day.

## 2025-02-20 ENCOUNTER — TELEPHONE (OUTPATIENT)
Dept: PRIMARY CARE CLINIC | Facility: CLINIC | Age: 70
End: 2025-02-20
Payer: MEDICARE

## 2025-02-20 RX ORDER — CLONIDINE HYDROCHLORIDE 0.1 MG/1
0.1 TABLET ORAL EVERY 12 HOURS PRN
Qty: 30 TABLET | Refills: 0 | Status: SHIPPED | OUTPATIENT
Start: 2025-02-20 | End: 2025-03-22

## 2025-02-20 NOTE — TELEPHONE ENCOUNTER
----- Message from Nurse Lizarraga sent at 2/20/2025  1:18 PM CST -----  Yes can you add the clonidine to the pharmacy please  ----- Message -----  From: Moshe Huntley FNP  Sent: 2/20/2025   1:10 PM CST  To: Elham Newman LPN    I increased his existing lisinopril from 20 mg to 40 mg. Symptomatic? We can add clonidine 0.1mg Q12 hours PRN for SBP >180 or DBP >100.  ----- Message -----  From: Elham Newman LPN  Sent: 2/20/2025  12:46 PM CST  To: MALI Kim      ----- Message -----  From: Ilene Wong  Sent: 2/20/2025  11:52 AM CST  To: Cruz Brian Staff    .Who Called: Franklin MaciasPreferred Method of Contact: Phone CallPatient's Preferred Phone Number on File: [phone] Best Call Back Number, if different:Additional Information: pt seen Moshe and put on new bp meds and it's high 210/113 191/106

## 2025-02-20 NOTE — TELEPHONE ENCOUNTER
----- Message from Joanna sent at 2/20/2025  2:12 PM CST -----  Who Called: Franklin MaciasPatient is returning phone callWho Left Message for Patient:Does the patient know what this is regarding?:Preferred Method of Contact: Phone CallPatient's Preferred Phone Number on File: [phone] Best Call Back Number, if different:Additional Information: pt wife Susan called to speak with Hope about pt med list and bp pls advise

## 2025-03-06 ENCOUNTER — TELEPHONE (OUTPATIENT)
Dept: PRIMARY CARE CLINIC | Facility: CLINIC | Age: 70
End: 2025-03-06
Payer: MEDICARE

## 2025-03-06 DIAGNOSIS — N18.31 TYPE 2 DIABETES MELLITUS WITH STAGE 3A CHRONIC KIDNEY DISEASE, WITHOUT LONG-TERM CURRENT USE OF INSULIN: ICD-10-CM

## 2025-03-06 DIAGNOSIS — E11.22 TYPE 2 DIABETES MELLITUS WITH STAGE 3A CHRONIC KIDNEY DISEASE, WITHOUT LONG-TERM CURRENT USE OF INSULIN: ICD-10-CM

## 2025-03-06 RX ORDER — TIRZEPATIDE 2.5 MG/.5ML
2.5 INJECTION, SOLUTION SUBCUTANEOUS
Qty: 12 PEN | Refills: 0 | Status: SHIPPED | OUTPATIENT
Start: 2025-03-06

## 2025-03-06 NOTE — TELEPHONE ENCOUNTER
----- Message from Ilene sent at 3/6/2025  2:33 PM CST -----  .Who Called: Franklin Macias Patient's Preferred Phone Number on File: [phone] Best Call Back Number, if different:Additional Information: tirzepatide (MOUNJARO) 2.5 mg/0.5 mL PnIj takes last shot net Wednesday

## 2025-03-12 ENCOUNTER — TELEPHONE (OUTPATIENT)
Dept: PRIMARY CARE CLINIC | Facility: CLINIC | Age: 70
End: 2025-03-12
Payer: MEDICARE

## 2025-03-12 DIAGNOSIS — N18.31 TYPE 2 DIABETES MELLITUS WITH STAGE 3A CHRONIC KIDNEY DISEASE, WITHOUT LONG-TERM CURRENT USE OF INSULIN: ICD-10-CM

## 2025-03-12 DIAGNOSIS — E11.22 TYPE 2 DIABETES MELLITUS WITH STAGE 3A CHRONIC KIDNEY DISEASE, WITHOUT LONG-TERM CURRENT USE OF INSULIN: ICD-10-CM

## 2025-03-12 RX ORDER — TIRZEPATIDE 2.5 MG/.5ML
2.5 INJECTION, SOLUTION SUBCUTANEOUS
Qty: 4 PEN | Refills: 3 | Status: SHIPPED | OUTPATIENT
Start: 2025-03-12

## 2025-03-12 NOTE — TELEPHONE ENCOUNTER
----- Message from Ilene sent at 3/12/2025  1:26 PM CDT -----  .Who Called: Franklin MaciasRefill or New Rx:RefillRX Name and Strength:tirzepatide (MOUNJARO) 2.5 mg/0.5 mL PnIjHow is the patient currently taking it? (ex. 1XDay):weeklyIs this a 30 day or 90 day RX:Local or Mail Order:localList of preferred pharmacies on file (remove unneeded): [unfilled]If different Pharmacy is requested, enter Pharmacy information here including location and phone number: [facility] Ordering Provider:AcostaPreferrdonita Method of Contact: Phone CallPatient's Preferred Phone Number on File: [phone] Best Call Back Number, if different:Additional Information: refills please

## 2025-04-04 NOTE — PROGRESS NOTES
Date: 04/10/2025  Patient ID: [id number]   Chief Complaint: Follow-up (Follow up on medication, needs medication refills)    HPI:   Franklin Macias is a 70 y.o. male here today for Follow-up (Follow up on medication, needs medication refills)  Last visit Mounjaro was started for uncontrolled diabetes, lisinopril was increased to 40 mg daily for uncontrolled hypertension, and clonidine was added p.r.n. BP at home <140/90. CBGs at home 130s. Patient also would like ophtho referral and refill on Norco for severe intermittent neck pain.    Problem List[1]  Outpatient Medications Marked as Taking for the 4/10/25 encounter (Office Visit) with Snehal Arroyo MD   Medication Sig Dispense Refill    aspirin 81 MG Chew Take 81 mg by mouth once daily.      atorvastatin (LIPITOR) 20 MG tablet Take 1 tablet (20 mg total) by mouth once daily. 90 tablet 3    blood sugar diagnostic Strp To check BG four times daily, to use with insurance preferred meter 120 each 11    blood-glucose meter kit To check BG four times daily, to use with insurance preferred meter 1 each 0    cloNIDine (CATAPRES) 0.1 MG tablet Take 1 tablet (0.1 mg total) by mouth every 12 (twelve) hours as needed (SBP > 180 or DBP > 100). 30 tablet 0    famotidine (PEPCID) 20 MG tablet Take 60 tablets by mouth 2 (two) times daily.      lancets Misc To check BG four times daily, to use with insurance preferred meter 120 each 11    [DISCONTINUED] cholecalciferol, vitamin D3, 1,250 mcg (50,000 unit) capsule Take 1 capsule (50,000 Units total) by mouth every 7 days. for 12 doses 12 capsule 0    [DISCONTINUED] empagliflozin (JARDIANCE) 25 mg tablet Take 1 tablet (25 mg total) by mouth once daily. 90 tablet 0    [DISCONTINUED] HYDROcodone-acetaminophen (NORCO) 7.5-325 mg per tablet Take 1 tablet by mouth every 6 (six) hours as needed for Pain. 28 tablet 0    [DISCONTINUED] lisinopriL (PRINIVIL,ZESTRIL) 40 MG tablet Take 1 tablet (40 mg total) by mouth once daily. 90 tablet 1     [DISCONTINUED] pantoprazole (PROTONIX) 40 MG tablet Take 1 tablet (40 mg total) by mouth once daily. 90 tablet 0    [DISCONTINUED] tirzepatide (MOUNJARO) 2.5 mg/0.5 mL PnIj Inject 2.5 mg into the skin every 7 days. 4 Pen 3     Past Medical History:   Diagnosis Date    SAH (acute kidney injury) 12/2023    Choledocholithiasis 12/2023    Chronic right shoulder pain 08/01/2022    Continue otc tylenol for pain management with short course hydrocodone only PRN for BT pain for short course    Depression 02/01/2024    DM (diabetes mellitus)     GERD (gastroesophageal reflux disease)     HLD (hyperlipidemia)     HTN (hypertension)     Hydrocele of testis 12/16/2023    Hypotension 02/01/2024    Hypoxic respiratory failure 12/2023    Necrotizing pancreatitis 12/16/2023 12/2023    Necrotizing pancreatitis 12/16/2023    Sepsis due to Enterococcus faecalis 12/2023    Vitamin D deficiency 11/14/2024     Past Surgical History:   Procedure Laterality Date    APPENDECTOMY      CHOLECYSTECTOMY      ERCP N/A 12/17/2023    Procedure: ERCP (ENDOSCOPIC RETROGRADE CHOLANGIOPANCREATOGRAPHY);  Surgeon: Flako Kerns MD;  Location: [facility] OR;  Service: Gastroenterology;  Laterality: N/A;    ERCP W/ SPHICTEROTOMY  12/17/2023    Procedure: ERCP, WITH SPHINCTEROTOMY;  Surgeon: Flako Kerns MD;  Location: [facility] OR;  Service: Gastroenterology;;    ERCP, WITH CALCULUS REMOVAL  12/17/2023    Procedure: ERCP, WITH CALCULUS REMOVAL;  Surgeon: Flako Kerns MD;  Location: [facility] OR;  Service: Gastroenterology;;     Review of patient's allergies indicates:   Allergen Reactions    Gabapentin Hallucinations     Family History   Problem Relation Name Age of Onset    Other Sister          h. pylori      Social History[2]  Patient Care Team:  Snehal Arroyo MD as PCP - General (Family Medicine)  Justin Cary MD as Consulting Physician (Ophthalmology)  Phil Oseguera MD as Consulting Physician  "(Gastroenterology)  Kirsten Tiwari LPN as Care Coordinator   Subjective:   ROS  See HPI for details  All Other ROS: Negative except as stated in HPI.   Objective:   /77   Pulse 96   Ht 5' 5" (1.651 m)   Wt 57.2 kg (126 lb)   SpO2 98%   BMI 20.97 kg/m²   Physical Exam  General: NAD  Eye: EOMI  HENT: no nasal discharge  CV: RRR  Respiratory: non-labored breathing  Musculoskeletal: ambulates independently. No obvious deformity  Integumentary: no apparent discoloration  Neurologic: Alert, oriented to person and situation  Cognition and Speech: Speech clear and coherent.   Psychiatric: Cooperative    Assessment:       ICD-10-CM ICD-9-CM   1. Type 2 diabetes mellitus with stage 3a chronic kidney disease, without long-term current use of insulin  E11.22 250.40    N18.31 585.3   2. Hypertension, unspecified type  I10 401.9   3. Other emphysema  J43.8 492.8   4. Gastroesophageal reflux disease, unspecified whether esophagitis present  K21.9 530.81   5. Chronic neck and back pain  M54.2 723.1    M54.9 724.5    G89.29 338.29      Plan:   1. Type 2 diabetes mellitus with stage 3a chronic kidney disease, without long-term current use of insulin  Assessment & Plan:  CBGS at home decreasing   Obtain A1c   Continue Jardiance 25 mg daily, Mounjaro 2.5 mg Q weekly and monitor weight changes closely    Orders:  -     Hemoglobin A1C; Future; Expected date: 04/10/2025  -     Comprehensive Metabolic Panel; Future; Expected date: 04/10/2025  -     Ambulatory referral/consult to Ophthalmology; Future; Expected date: 04/17/2025  -     Lipid Panel; Future; Expected date: 04/10/2025  -     empagliflozin (JARDIANCE) 25 mg tablet; Take 1 tablet (25 mg total) by mouth once daily.  Dispense: 90 tablet; Refill: 3  -     tirzepatide (MOUNJARO) 2.5 mg/0.5 mL PnIj; Inject 2.5 mg into the skin every 7 days.  Dispense: 12 Pen; Refill: 3    2. Hypertension, unspecified type  Assessment & Plan:  Stable  Continue current medication regimen: "  Lisinopril 40 mg daily, Jardiance 25 mg daily, clonidine 0.1 mg q.12 hours p.r.n.  Blood pressure at goal <140/90 (<130/80 if otherwise noted)  Recommend DASH diet  Avoid tobacco use  Record BP at home daily and bring log to next office visit, assure that home cuff is calibrated at minimum every 12 months      Orders:  -     lisinopriL (PRINIVIL,ZESTRIL) 40 MG tablet; Take 1 tablet (40 mg total) by mouth once daily.  Dispense: 90 tablet; Refill: 3    3. Other emphysema  Assessment & Plan:  Stable   Continue to monitor      4. Gastroesophageal reflux disease, unspecified whether esophagitis present  Assessment & Plan:  Continue Protonix 20mg bid  Continue f/u with GI    Orders:  -     pantoprazole (PROTONIX) 40 MG tablet; Take 1 tablet (40 mg total) by mouth once daily.  Dispense: 90 tablet; Refill: 3    5. Chronic neck and back pain  Assessment & Plan:  Rx Norco 10-325mg and refilled short course for severe pain prn  AE discussed  If needs further refills, patient will have to sign pain contract    Orders:  -     HYDROcodone-acetaminophen (NORCO) 7.5-325 mg per tablet; Take 1 tablet by mouth every 6 (six) hours as needed for Pain.  Dispense: 28 tablet; Refill: 0         Medication List with Changes/Refills   Current Medications    ALBUTEROL (PROVENTIL/VENTOLIN HFA) 90 MCG/ACTUATION INHALER    Inhale 2 puffs into the lungs every 4 (four) hours as needed for Wheezing. Rescue       Start Date: --        End Date: --    ASPIRIN 81 MG CHEW    Take 81 mg by mouth once daily.       Start Date: --        End Date: --    ATORVASTATIN (LIPITOR) 20 MG TABLET    Take 1 tablet (20 mg total) by mouth once daily.       Start Date: 2/18/2025 End Date: --    BLOOD SUGAR DIAGNOSTIC STRP    To check BG four times daily, to use with insurance preferred meter       Start Date: 2/18/2025 End Date: --    BLOOD-GLUCOSE METER KIT    To check BG four times daily, to use with insurance preferred meter       Start Date: 2/18/2025 End Date: --     CLONIDINE (CATAPRES) 0.1 MG TABLET    Take 1 tablet (0.1 mg total) by mouth every 12 (twelve) hours as needed (SBP > 180 or DBP > 100).       Start Date: 2/20/2025 End Date: 4/10/2025    FAMOTIDINE (PEPCID) 20 MG TABLET    Take 60 tablets by mouth 2 (two) times daily.       Start Date: 3/7/2024  End Date: --    LANCETS MISC    To check BG four times daily, to use with insurance preferred meter       Start Date: 2/18/2025 End Date: --   Changed and/or Refilled Medications    Modified Medication Previous Medication    EMPAGLIFLOZIN (JARDIANCE) 25 MG TABLET empagliflozin (JARDIANCE) 25 mg tablet       Take 1 tablet (25 mg total) by mouth once daily.    Take 1 tablet (25 mg total) by mouth once daily.       Start Date: 4/10/2025 End Date: --    Start Date: 1/24/2025 End Date: 4/10/2025    HYDROCODONE-ACETAMINOPHEN (NORCO) 7.5-325 MG PER TABLET HYDROcodone-acetaminophen (NORCO) 7.5-325 mg per tablet       Take 1 tablet by mouth every 6 (six) hours as needed for Pain.    Take 1 tablet by mouth every 6 (six) hours as needed for Pain.       Start Date: 4/10/2025 End Date: --    Start Date: 11/14/2024End Date: 4/10/2025    LISINOPRIL (PRINIVIL,ZESTRIL) 40 MG TABLET lisinopriL (PRINIVIL,ZESTRIL) 40 MG tablet       Take 1 tablet (40 mg total) by mouth once daily.    Take 1 tablet (40 mg total) by mouth once daily.       Start Date: 4/10/2025 End Date: --    Start Date: 2/18/2025 End Date: 4/10/2025    PANTOPRAZOLE (PROTONIX) 40 MG TABLET pantoprazole (PROTONIX) 40 MG tablet       Take 1 tablet (40 mg total) by mouth once daily.    Take 1 tablet (40 mg total) by mouth once daily.       Start Date: 4/10/2025 End Date: --    Start Date: 1/24/2025 End Date: 4/10/2025    TIRZEPATIDE (MOUNJARO) 2.5 MG/0.5 ML PNIJ tirzepatide (MOUNJARO) 2.5 mg/0.5 mL PnIj       Inject 2.5 mg into the skin every 7 days.    Inject 2.5 mg into the skin every 7 days.       Start Date: 4/10/2025 End Date: --    Start Date: 3/12/2025 End Date: 4/10/2025    Discontinued Medications    CHOLECALCIFEROL, VITAMIN D3, 1,250 MCG (50,000 UNIT) CAPSULE    Take 1 capsule (50,000 Units total) by mouth every 7 days. for 12 doses       Start Date: 1/24/2025 End Date: 4/10/2025        Follow up in about 3 months (around 7/10/2025) for DM, HTN, HLD, With Labs. In addition to their scheduled follow up, the patient has also been instructed to follow up on as needed basis.          [1]   Patient Active Problem List  Diagnosis    Type 2 diabetes mellitus with stage 3a chronic kidney disease, without long-term current use of insulin    Chronic kidney disease, stage 3a    Wellness examination    Hyperlipidemia    Chronic neck and back pain    Gastroesophageal reflux disease    Postnasal drip    Testicular swelling, right    Debility    Hypertension    Carotid artery disease, unspecified laterality, unspecified type    Gastritis    Frontal headache    Personal history of tobacco use, presenting hazards to health    Other emphysema   [2]   Social History  Socioeconomic History    Marital status:    Tobacco Use    Smoking status: Every Day     Current packs/day: 1.00     Average packs/day: 1 pack/day for 50.3 years (50.3 ttl pk-yrs)     Types: Cigarettes     Start date: 12/13/1974    Smokeless tobacco: Never   Substance and Sexual Activity    Alcohol use: Never    Drug use: Never    Sexual activity: Yes     Social Drivers of Health     Financial Resource Strain: High Risk (11/14/2024)    Overall Financial Resource Strain (CARDIA)     Difficulty of Paying Living Expenses: Hard   Food Insecurity: No Food Insecurity (11/14/2024)    Hunger Vital Sign     Worried About Running Out of Food in the Last Year: Never true     Ran Out of Food in the Last Year: Never true   Transportation Needs: No Transportation Needs (11/14/2024)    TRANSPORTATION NEEDS     Transportation : No   Physical Activity: Sufficiently Active (11/14/2024)    Exercise Vital Sign     Days of Exercise per Week: 3 days      Minutes of Exercise per Session: 60 min   Stress: Stress Concern Present (11/14/2024)    [employer] - Occupational Stress Questionnaire     Feeling of Stress : To some extent   Housing Stability: Unknown (11/14/2024)    Housing Stability Vital Sign     Unable to Pay for Housing in the Last Year: No     Homeless in the Last Year: No

## 2025-04-10 ENCOUNTER — OFFICE VISIT (OUTPATIENT)
Dept: PRIMARY CARE CLINIC | Facility: CLINIC | Age: 70
End: 2025-04-10
Payer: MEDICARE

## 2025-04-10 VITALS
HEIGHT: 65 IN | DIASTOLIC BLOOD PRESSURE: 77 MMHG | HEART RATE: 96 BPM | BODY MASS INDEX: 20.99 KG/M2 | OXYGEN SATURATION: 98 % | SYSTOLIC BLOOD PRESSURE: 135 MMHG | WEIGHT: 126 LBS

## 2025-04-10 DIAGNOSIS — K21.9 GASTROESOPHAGEAL REFLUX DISEASE, UNSPECIFIED WHETHER ESOPHAGITIS PRESENT: ICD-10-CM

## 2025-04-10 DIAGNOSIS — I10 HYPERTENSION, UNSPECIFIED TYPE: ICD-10-CM

## 2025-04-10 DIAGNOSIS — J43.8 OTHER EMPHYSEMA: ICD-10-CM

## 2025-04-10 DIAGNOSIS — E11.22 TYPE 2 DIABETES MELLITUS WITH STAGE 3A CHRONIC KIDNEY DISEASE, WITHOUT LONG-TERM CURRENT USE OF INSULIN: Primary | ICD-10-CM

## 2025-04-10 DIAGNOSIS — M54.9 CHRONIC NECK AND BACK PAIN: ICD-10-CM

## 2025-04-10 DIAGNOSIS — G89.29 CHRONIC NECK AND BACK PAIN: ICD-10-CM

## 2025-04-10 DIAGNOSIS — N18.31 TYPE 2 DIABETES MELLITUS WITH STAGE 3A CHRONIC KIDNEY DISEASE, WITHOUT LONG-TERM CURRENT USE OF INSULIN: Primary | ICD-10-CM

## 2025-04-10 DIAGNOSIS — M54.2 CHRONIC NECK AND BACK PAIN: ICD-10-CM

## 2025-04-10 PROBLEM — E55.9 VITAMIN D DEFICIENCY: Status: RESOLVED | Noted: 2024-11-14 | Resolved: 2025-04-10

## 2025-04-10 PROCEDURE — 1125F AMNT PAIN NOTED PAIN PRSNT: CPT | Mod: CPTII,,, | Performed by: STUDENT IN AN ORGANIZED HEALTH CARE EDUCATION/TRAINING PROGRAM

## 2025-04-10 PROCEDURE — 3288F FALL RISK ASSESSMENT DOCD: CPT | Mod: CPTII,,, | Performed by: STUDENT IN AN ORGANIZED HEALTH CARE EDUCATION/TRAINING PROGRAM

## 2025-04-10 PROCEDURE — 3078F DIAST BP <80 MM HG: CPT | Mod: CPTII,,, | Performed by: STUDENT IN AN ORGANIZED HEALTH CARE EDUCATION/TRAINING PROGRAM

## 2025-04-10 PROCEDURE — 99214 OFFICE O/P EST MOD 30 MIN: CPT | Mod: ,,, | Performed by: STUDENT IN AN ORGANIZED HEALTH CARE EDUCATION/TRAINING PROGRAM

## 2025-04-10 PROCEDURE — 4010F ACE/ARB THERAPY RXD/TAKEN: CPT | Mod: CPTII,,, | Performed by: STUDENT IN AN ORGANIZED HEALTH CARE EDUCATION/TRAINING PROGRAM

## 2025-04-10 PROCEDURE — 1160F RVW MEDS BY RX/DR IN RCRD: CPT | Mod: CPTII,,, | Performed by: STUDENT IN AN ORGANIZED HEALTH CARE EDUCATION/TRAINING PROGRAM

## 2025-04-10 PROCEDURE — 3075F SYST BP GE 130 - 139MM HG: CPT | Mod: CPTII,,, | Performed by: STUDENT IN AN ORGANIZED HEALTH CARE EDUCATION/TRAINING PROGRAM

## 2025-04-10 PROCEDURE — 1101F PT FALLS ASSESS-DOCD LE1/YR: CPT | Mod: CPTII,,, | Performed by: STUDENT IN AN ORGANIZED HEALTH CARE EDUCATION/TRAINING PROGRAM

## 2025-04-10 PROCEDURE — 1159F MED LIST DOCD IN RCRD: CPT | Mod: CPTII,,, | Performed by: STUDENT IN AN ORGANIZED HEALTH CARE EDUCATION/TRAINING PROGRAM

## 2025-04-10 PROCEDURE — 3046F HEMOGLOBIN A1C LEVEL >9.0%: CPT | Mod: CPTII,,, | Performed by: STUDENT IN AN ORGANIZED HEALTH CARE EDUCATION/TRAINING PROGRAM

## 2025-04-10 PROCEDURE — 3008F BODY MASS INDEX DOCD: CPT | Mod: CPTII,,, | Performed by: STUDENT IN AN ORGANIZED HEALTH CARE EDUCATION/TRAINING PROGRAM

## 2025-04-10 RX ORDER — HYDROCODONE BITARTRATE AND ACETAMINOPHEN 7.5; 325 MG/1; MG/1
1 TABLET ORAL EVERY 6 HOURS PRN
Qty: 28 TABLET | Refills: 0 | Status: SHIPPED | OUTPATIENT
Start: 2025-04-10

## 2025-04-10 RX ORDER — TIRZEPATIDE 2.5 MG/.5ML
2.5 INJECTION, SOLUTION SUBCUTANEOUS
Qty: 12 PEN | Refills: 3 | Status: SHIPPED | OUTPATIENT
Start: 2025-04-10

## 2025-04-10 RX ORDER — PANTOPRAZOLE SODIUM 40 MG/1
40 TABLET, DELAYED RELEASE ORAL DAILY
Qty: 90 TABLET | Refills: 3 | Status: SHIPPED | OUTPATIENT
Start: 2025-04-10

## 2025-04-10 RX ORDER — LISINOPRIL 40 MG/1
40 TABLET ORAL DAILY
Qty: 90 TABLET | Refills: 3 | Status: SHIPPED | OUTPATIENT
Start: 2025-04-10

## 2025-04-10 NOTE — ASSESSMENT & PLAN NOTE
Rx Norco 10-325mg and refilled short course for severe pain prn  AE discussed  If needs further refills, patient will have to sign pain contract

## 2025-04-10 NOTE — ASSESSMENT & PLAN NOTE
Stable  Continue current medication regimen:  Lisinopril 40 mg daily, Jardiance 25 mg daily, clonidine 0.1 mg q.12 hours p.r.n.  Blood pressure at goal <140/90 (<130/80 if otherwise noted)  Recommend DASH diet  Avoid tobacco use  Record BP at home daily and bring log to next office visit, assure that home cuff is calibrated at minimum every 12 months

## 2025-04-10 NOTE — ASSESSMENT & PLAN NOTE
CBGS at home decreasing   Obtain A1c   Continue Jardiance 25 mg daily, Mounjaro 2.5 mg Q weekly and monitor weight changes closely

## 2025-04-16 ENCOUNTER — DOCUMENTATION ONLY (OUTPATIENT)
Dept: PRIMARY CARE CLINIC | Facility: CLINIC | Age: 70
End: 2025-04-16
Payer: MEDICARE

## 2025-05-01 DIAGNOSIS — N18.31 TYPE 2 DIABETES MELLITUS WITH STAGE 3A CHRONIC KIDNEY DISEASE, WITHOUT LONG-TERM CURRENT USE OF INSULIN: Primary | ICD-10-CM

## 2025-05-01 DIAGNOSIS — E11.22 TYPE 2 DIABETES MELLITUS WITH STAGE 3A CHRONIC KIDNEY DISEASE, WITHOUT LONG-TERM CURRENT USE OF INSULIN: Primary | ICD-10-CM

## 2025-05-07 ENCOUNTER — DOCUMENTATION ONLY (OUTPATIENT)
Dept: PRIMARY CARE CLINIC | Facility: CLINIC | Age: 70
End: 2025-05-07
Payer: MEDICARE

## 2025-05-08 ENCOUNTER — TELEPHONE (OUTPATIENT)
Dept: PRIMARY CARE CLINIC | Facility: CLINIC | Age: 70
End: 2025-05-08
Payer: MEDICARE

## 2025-05-08 DIAGNOSIS — I10 HYPERTENSION, UNSPECIFIED TYPE: ICD-10-CM

## 2025-05-08 DIAGNOSIS — K21.9 GASTROESOPHAGEAL REFLUX DISEASE, UNSPECIFIED WHETHER ESOPHAGITIS PRESENT: ICD-10-CM

## 2025-05-08 RX ORDER — LISINOPRIL 40 MG/1
40 TABLET ORAL DAILY
Qty: 90 TABLET | Refills: 3 | Status: SHIPPED | OUTPATIENT
Start: 2025-05-08

## 2025-05-08 RX ORDER — PANTOPRAZOLE SODIUM 40 MG/1
40 TABLET, DELAYED RELEASE ORAL DAILY
Qty: 90 TABLET | Refills: 3 | Status: SHIPPED | OUTPATIENT
Start: 2025-05-08

## 2025-05-08 NOTE — TELEPHONE ENCOUNTER
Copied from CRM #[id number]. Topic: Medications - Medication Refill  >> May 8, 2025 11:23 AM Tabitha wrote:  Who Called: Franklin Macias    Refill or New Rx:Refill  RX Name and Strength:lisinopriL (PRINIVIL,ZESTRIL) 40 MG tablet  How is the patient currently taking it? (ex. 1XDay):1xday  Is this a 30 day or 90 day RX:90 day  Local or Mail Order:local  List of preferred pharmacies on file (remove unneeded): [facility] [address] FLO[address] 1      If different Pharmacy is requested, enter Pharmacy information here including location and phone number:    Ordering Provider:PCP      Preferred Method of Contact: Phone Call  Patient's Preferred Phone Number on File: [phone]   Best Call Back Number, if different:  Additional Information:      Who Called: Franklin Macias    Refill or New Rx:Refill  RX Name and Strength:pantoprazole (PROTONIX) 40 MG tablet  How is the patient currently taking it? (ex. 1XDay):1xday  Is this a 30 day or 90 day RX:90 day  Local or Mail Order:local  List of preferred pharmacies on file (remove unneeded): [facility] [address] FLO[address] 1      If different Pharmacy is requested, enter Pharmacy information here including location and phone number:    Ordering Provider: PCP      Preferred Method of Contact: Phone Call  Patient's Preferred Phone Number on File: [phone]   Best Call Back Number, if different:  Additional Information:

## 2025-05-09 ENCOUNTER — PATIENT OUTREACH (OUTPATIENT)
Facility: CLINIC | Age: 70
End: 2025-05-09
Payer: MEDICARE

## 2025-06-18 ENCOUNTER — TELEPHONE (OUTPATIENT)
Dept: PRIMARY CARE CLINIC | Facility: CLINIC | Age: 70
End: 2025-06-18
Payer: MEDICARE

## 2025-06-18 ENCOUNTER — DOCUMENTATION ONLY (OUTPATIENT)
Dept: PRIMARY CARE CLINIC | Facility: CLINIC | Age: 70
End: 2025-06-18
Payer: MEDICARE

## 2025-06-18 NOTE — TELEPHONE ENCOUNTER
----- Message from Tech Fidelina sent at 6/18/2025  1:49 PM CDT -----  .Type:  Needs Medical Advice    Who Called:  Dr. Reyna Castelan    Symptoms (please be specific):  no     How long has patient had these symptoms:   no    Pharmacy name and phone #:   no    Would the patient rather a call back or a response via [facility]?  Cb    Best Call Back Number:  [phone]    Additional Information:  please refax pt's referral to see the Ophthalmologist FAX# [phone] thanks

## 2025-06-18 NOTE — TELEPHONE ENCOUNTER
Resent through epic   Sinusitis, Adult  Sinusitis is redness, soreness, and inflammation of the paranasal sinuses. Paranasal sinuses are air pockets within the bones of your face. They are located beneath your eyes, in the middle of your forehead, and above your eyes. In healthy paranasal sinuses, mucus is able to drain out, and air is able to circulate through them by way of your nose. However, when your paranasal sinuses are inflamed, mucus and air can become trapped. This can allow bacteria and other germs to grow and cause infection.  Sinusitis can develop quickly and last only a short time (acute) or continue over a long period (chronic). Sinusitis that lasts for more than 12 weeks is considered chronic.  CAUSES  Causes of sinusitis include:  · Allergies.  · Structural abnormalities, such as displacement of the cartilage that separates your nostrils (deviated septum), which can decrease the air flow through your nose and sinuses and affect sinus drainage.  · Functional abnormalities, such as when the small hairs (cilia) that line your sinuses and help remove mucus do not work properly or are not present.  SIGNS AND SYMPTOMS  Symptoms of acute and chronic sinusitis are the same. The primary symptoms are pain and pressure around the affected sinuses. Other symptoms include:  · Upper toothache.  · Earache.  · Headache.  · Bad breath.  · Decreased sense of smell and taste.  · A cough, which worsens when you are lying flat.  · Fatigue.  · Fever.  · Thick drainage from your nose, which often is green and may contain pus (purulent).  · Swelling and warmth over the affected sinuses.  DIAGNOSIS  Your health care provider will perform a physical exam. During your exam, your health care provider may perform any of the following to help determine if you have acute sinusitis or chronic sinusitis:  · Look in your nose for signs of abnormal growths in your nostrils (nasal polyps).  · Tap over the affected sinus to check for signs of  infection.  · View the inside of your sinuses using an imaging device that has a light attached (endoscope).  If your health care provider suspects that you have chronic sinusitis, one or more of the following tests may be recommended:  · Allergy tests.  · Nasal culture. A sample of mucus is taken from your nose, sent to a lab, and screened for bacteria.  · Nasal cytology. A sample of mucus is taken from your nose and examined by your health care provider to determine if your sinusitis is related to an allergy.  TREATMENT  Most cases of acute sinusitis are related to a viral infection and will resolve on their own within 10 days. Sometimes, medicines are prescribed to help relieve symptoms of both acute and chronic sinusitis. These may include pain medicines, decongestants, nasal steroid sprays, or saline sprays.  However, for sinusitis related to a bacterial infection, your health care provider will prescribe antibiotic medicines. These are medicines that will help kill the bacteria causing the infection.  Rarely, sinusitis is caused by a fungal infection. In these cases, your health care provider will prescribe antifungal medicine.  For some cases of chronic sinusitis, surgery is needed. Generally, these are cases in which sinusitis recurs more than 3 times per year, despite other treatments.  HOME CARE INSTRUCTIONS  · Drink plenty of water. Water helps thin the mucus so your sinuses can drain more easily.  · Use a humidifier.  · Inhale steam 3-4 times a day (for example, sit in the bathroom with the shower running).  · Apply a warm, moist washcloth to your face 3-4 times a day, or as directed by your health care provider.  · Use saline nasal sprays to help moisten and clean your sinuses.  · Take medicines only as directed by your health care provider.  · If you were prescribed either an antibiotic or antifungal medicine, finish it all even if you start to feel better.  SEEK IMMEDIATE MEDICAL CARE IF:  · You have  increasing pain or severe headaches.  · You have nausea, vomiting, or drowsiness.  · You have swelling around your face.  · You have vision problems.  · You have a stiff neck.  · You have difficulty breathing.     This information is not intended to replace advice given to you by your health care provider. Make sure you discuss any questions you have with your health care provider.     Document Released: 12/18/2006 Document Revised: 01/08/2016 Document Reviewed: 01/01/2013  [employer] Interactive Patient Education ©2016 [employer]

## 2025-07-05 NOTE — ADDENDUM NOTE
Addended by: ARIA FLYNN on: 8/1/2022 02:23 PM     Modules accepted: Orders    
Addended by: ARIA FLYNN on: 8/1/2022 02:26 PM     Modules accepted: Orders    
Addended by: ARIA FLYNN on: 8/4/2022 12:09 PM     Modules accepted: Orders    
REQUIRED- Click to run Sepsis Recognition Calculator

## 2025-07-07 ENCOUNTER — RESULTS FOLLOW-UP (OUTPATIENT)
Dept: INTERNAL MEDICINE | Facility: CLINIC | Age: 70
End: 2025-07-07

## 2025-07-07 ENCOUNTER — LAB VISIT (OUTPATIENT)
Dept: LAB | Facility: HOSPITAL | Age: 70
End: 2025-07-07
Attending: PEDIATRICS
Payer: MEDICARE

## 2025-07-07 DIAGNOSIS — N18.31 TYPE 2 DIABETES MELLITUS WITH STAGE 3A CHRONIC KIDNEY DISEASE, WITHOUT LONG-TERM CURRENT USE OF INSULIN: ICD-10-CM

## 2025-07-07 DIAGNOSIS — E11.22 TYPE 2 DIABETES MELLITUS WITH STAGE 3A CHRONIC KIDNEY DISEASE, WITHOUT LONG-TERM CURRENT USE OF INSULIN: ICD-10-CM

## 2025-07-07 LAB
ALBUMIN SERPL-MCNC: 3.8 G/DL (ref 3.4–4.8)
ALBUMIN/GLOB SERPL: 1.1 RATIO (ref 1.1–2)
ALP SERPL-CCNC: 312 UNIT/L (ref 40–150)
ALT SERPL-CCNC: 16 UNIT/L (ref 0–55)
ANION GAP SERPL CALC-SCNC: 8 MEQ/L
AST SERPL-CCNC: 20 UNIT/L (ref 11–45)
BILIRUB SERPL-MCNC: 0.5 MG/DL
BUN SERPL-MCNC: 16.8 MG/DL (ref 8.4–25.7)
CALCIUM SERPL-MCNC: 8.9 MG/DL (ref 8.8–10)
CHLORIDE SERPL-SCNC: 104 MMOL/L (ref 98–107)
CHOLEST SERPL-MCNC: 124 MG/DL
CHOLEST/HDLC SERPL: 3 {RATIO} (ref 0–5)
CO2 SERPL-SCNC: 32 MMOL/L (ref 23–31)
CREAT SERPL-MCNC: 1.12 MG/DL (ref 0.72–1.25)
CREAT/UREA NIT SERPL: 15
EST. AVERAGE GLUCOSE BLD GHB EST-MCNC: 128.4 MG/DL
GFR SERPLBLD CREATININE-BSD FMLA CKD-EPI: >60 ML/MIN/1.73/M2
GLOBULIN SER-MCNC: 3.4 GM/DL (ref 2.4–3.5)
GLUCOSE SERPL-MCNC: 129 MG/DL (ref 82–115)
HBA1C MFR BLD: 6.1 %
HDLC SERPL-MCNC: 39 MG/DL (ref 35–60)
LDLC SERPL CALC-MCNC: 64 MG/DL (ref 50–140)
POTASSIUM SERPL-SCNC: 4.1 MMOL/L (ref 3.5–5.1)
PROT SERPL-MCNC: 7.2 GM/DL (ref 5.8–7.6)
SODIUM SERPL-SCNC: 144 MMOL/L (ref 136–145)
TRIGL SERPL-MCNC: 106 MG/DL (ref 34–140)
VLDLC SERPL CALC-MCNC: 21 MG/DL

## 2025-07-07 PROCEDURE — 83036 HEMOGLOBIN GLYCOSYLATED A1C: CPT

## 2025-07-07 PROCEDURE — 80061 LIPID PANEL: CPT

## 2025-07-07 PROCEDURE — 80053 COMPREHEN METABOLIC PANEL: CPT

## 2025-07-07 PROCEDURE — 36415 COLL VENOUS BLD VENIPUNCTURE: CPT

## 2025-07-09 ENCOUNTER — TELEPHONE (OUTPATIENT)
Dept: FAMILY MEDICINE | Facility: CLINIC | Age: 70
End: 2025-07-09
Payer: MEDICARE

## 2025-07-09 NOTE — TELEPHONE ENCOUNTER
----- Message from Valerie Valladares MD sent at 7/7/2025 12:05 PM CDT -----  Please reach out to this patient and ask if he is experiencing any abdominal pain. One of his lab testing came back high and could point to issues with his liver or pancreas. If he is having no pain,   encourage him to keep his next appt with Dr. Arroyo to discuss this in more detail. If he is having pain, let me know, and instruct him to stop his mounjaro until he sees Dr. ALEXIS.   For severe belly pain with or without vomiting he should seek immediate care at an ED.    -Dr. Valerie Valladares      ----- Message -----  From: Lab, Background User  Sent: 7/7/2025   6:33 AM CDT  To: Snehal Arroyo MD

## 2025-07-10 ENCOUNTER — OFFICE VISIT (OUTPATIENT)
Dept: PRIMARY CARE CLINIC | Facility: CLINIC | Age: 70
End: 2025-07-10
Payer: MEDICARE

## 2025-07-10 ENCOUNTER — TELEPHONE (OUTPATIENT)
Dept: FAMILY MEDICINE | Facility: CLINIC | Age: 70
End: 2025-07-10
Payer: MEDICARE

## 2025-07-10 VITALS
BODY MASS INDEX: 19.99 KG/M2 | HEART RATE: 78 BPM | WEIGHT: 120 LBS | SYSTOLIC BLOOD PRESSURE: 170 MMHG | OXYGEN SATURATION: 98 % | HEIGHT: 65 IN | DIASTOLIC BLOOD PRESSURE: 98 MMHG | RESPIRATION RATE: 18 BRPM

## 2025-07-10 DIAGNOSIS — E11.22 TYPE 2 DIABETES MELLITUS WITH STAGE 3A CHRONIC KIDNEY DISEASE, WITHOUT LONG-TERM CURRENT USE OF INSULIN: ICD-10-CM

## 2025-07-10 DIAGNOSIS — E78.00 PURE HYPERCHOLESTEROLEMIA: ICD-10-CM

## 2025-07-10 DIAGNOSIS — N18.31 TYPE 2 DIABETES MELLITUS WITH STAGE 3A CHRONIC KIDNEY DISEASE, WITHOUT LONG-TERM CURRENT USE OF INSULIN: ICD-10-CM

## 2025-07-10 DIAGNOSIS — I10 PRIMARY HYPERTENSION: Primary | ICD-10-CM

## 2025-07-10 DIAGNOSIS — R74.8 ELEVATED ALKALINE PHOSPHATASE LEVEL: ICD-10-CM

## 2025-07-10 DIAGNOSIS — E55.9 VITAMIN D DEFICIENCY: ICD-10-CM

## 2025-07-10 PROCEDURE — 3077F SYST BP >= 140 MM HG: CPT | Mod: CPTII,,, | Performed by: STUDENT IN AN ORGANIZED HEALTH CARE EDUCATION/TRAINING PROGRAM

## 2025-07-10 PROCEDURE — 4010F ACE/ARB THERAPY RXD/TAKEN: CPT | Mod: CPTII,,, | Performed by: STUDENT IN AN ORGANIZED HEALTH CARE EDUCATION/TRAINING PROGRAM

## 2025-07-10 PROCEDURE — 3080F DIAST BP >= 90 MM HG: CPT | Mod: CPTII,,, | Performed by: STUDENT IN AN ORGANIZED HEALTH CARE EDUCATION/TRAINING PROGRAM

## 2025-07-10 PROCEDURE — 99214 OFFICE O/P EST MOD 30 MIN: CPT | Mod: ,,, | Performed by: STUDENT IN AN ORGANIZED HEALTH CARE EDUCATION/TRAINING PROGRAM

## 2025-07-10 PROCEDURE — 3044F HG A1C LEVEL LT 7.0%: CPT | Mod: CPTII,,, | Performed by: STUDENT IN AN ORGANIZED HEALTH CARE EDUCATION/TRAINING PROGRAM

## 2025-07-10 PROCEDURE — 1101F PT FALLS ASSESS-DOCD LE1/YR: CPT | Mod: CPTII,,, | Performed by: STUDENT IN AN ORGANIZED HEALTH CARE EDUCATION/TRAINING PROGRAM

## 2025-07-10 PROCEDURE — 3008F BODY MASS INDEX DOCD: CPT | Mod: CPTII,,, | Performed by: STUDENT IN AN ORGANIZED HEALTH CARE EDUCATION/TRAINING PROGRAM

## 2025-07-10 PROCEDURE — 1159F MED LIST DOCD IN RCRD: CPT | Mod: CPTII,,, | Performed by: STUDENT IN AN ORGANIZED HEALTH CARE EDUCATION/TRAINING PROGRAM

## 2025-07-10 PROCEDURE — 3288F FALL RISK ASSESSMENT DOCD: CPT | Mod: CPTII,,, | Performed by: STUDENT IN AN ORGANIZED HEALTH CARE EDUCATION/TRAINING PROGRAM

## 2025-07-10 RX ORDER — BLOOD-GLUCOSE METER
EACH MISCELLANEOUS 4 TIMES DAILY
COMMUNITY
Start: 2025-02-18

## 2025-07-10 RX ORDER — AMLODIPINE BESYLATE 5 MG/1
5 TABLET ORAL DAILY
Qty: 90 TABLET | Refills: 3 | Status: SHIPPED | OUTPATIENT
Start: 2025-07-10 | End: 2026-07-10

## 2025-07-10 NOTE — ASSESSMENT & PLAN NOTE
Uncontrolled  Continue current medication regimen:  Lisinopril 40 mg daily, Jardiance 25 mg daily  Stop clonidine.  Start amlodipine 5 mg daily.  AE discussed  Blood pressure at goal <140/90 (<130/80 if otherwise noted)  Recommend DASH diet  Avoid tobacco use  Record BP at home daily and bring log to next office visit, assure that home cuff is calibrated at minimum every 12 months

## 2025-07-10 NOTE — ASSESSMENT & PLAN NOTE
Improving  Continue Rx:  Atorvastatin 20 mg daily   The ASCVD Risk score (Gilma HERNÁNDEZ, et al., 2019) failed to calculate for the following reasons:    The valid total cholesterol range is 130 to 320 mg/dL   Can try omega-3 fatty acids (total,LDL,TG), niacin (TG), CoQ10 (total), Red yeast rice (LDL,TG,inc HDL)  Encourage weight loss by least 5% and to increase aerobic exercise and resistance training  Limit simple sugars and simple carbohydrate intake-focus on low glycemic foods  Optimize blood sugar control

## 2025-07-10 NOTE — PROGRESS NOTES
Date: 07/10/2025  Patient ID: [id number]   Chief Complaint: lab results    HPI:   Franklin Macias is a 70 y.o. male here today for lab results  Labs showed improved glucose and A1c at 6.1, improved lipid panel, but elevated alkaline phosphatase  History of Present Illness    Mr. Macias presents today for follow up regarding liver concerns. He denies alcohol use as a potential cause of elevated alkaline phosphatase. He understands that alkaline phosphatase can originate from liver or bone sources and is not a specific diagnostic marker. He reports home blood pressure readings fluctuating between 140s-160s systolic and 70s-90s diastolic from April to June. Blood pressure varies throughout the day, with inconsistent readings between daytime and nighttime. He states his blood pressure is consistently elevated but denies significant concern about the fluctuations. He previously maintained a more regular blood pressure management routine following hospital discharge. He reports significant improvement in diabetes management with A1C reduction from 9.7 to 6.1. Currently taking Mounjaro, which has notably decreased his appetite, typically consuming only a few bites before feeling full. Diet includes watermelon, though he remains mindful of sugar content. He reports feeling better compared to previous periods of high blood sugar and denies hypoglycemic episodes or other diabetes-related symptoms. He reports recent eye exam with multiple tests performed. He currently experiences dry eyes and has a history of watery eyes at night, which he does not consider problematic. He expresses uncertainty about need for follow-up eye exam. He takes Jardiance for diabetes and blood pressure management. He is not currently taking Clonidine. Vitamin D 50,000 units was prescribed but never taken.      ROS:  General: +fatigue, +loss of energy, +decreased energy levels  Eyes: +dry eyes  Cardiovascular: -chest pain  Gastrointestinal: +loss of  appetite  Neurological: +headache  Head: +head pain           Problem List[1]  Outpatient Medications Marked as Taking for the 7/10/25 encounter (Office Visit) with Snehal Arroyo MD   Medication Sig Dispense Refill    ACCU-CHEK GUIDE GLUCOSE METER Misc 4 (four) times daily.      aspirin 81 MG Chew Take 81 mg by mouth once daily.      atorvastatin (LIPITOR) 20 MG tablet Take 1 tablet (20 mg total) by mouth once daily. 90 tablet 3    blood sugar diagnostic Strp To check BG four times daily, to use with insurance preferred meter 120 each 11    blood-glucose meter kit To check BG four times daily, to use with insurance preferred meter 1 each 0    empagliflozin (JARDIANCE) 25 mg tablet Take 1 tablet (25 mg total) by mouth once daily. 90 tablet 3    HYDROcodone-acetaminophen (NORCO) 7.5-325 mg per tablet Take 1 tablet by mouth every 6 (six) hours as needed for Pain. 28 tablet 0    lancets Misc To check BG four times daily, to use with insurance preferred meter 120 each 11    lisinopriL (PRINIVIL,ZESTRIL) 40 MG tablet Take 1 tablet (40 mg total) by mouth once daily. 90 tablet 3    pantoprazole (PROTONIX) 40 MG tablet Take 1 tablet (40 mg total) by mouth once daily. 90 tablet 3    tirzepatide (MOUNJARO) 2.5 mg/0.5 mL PnIj Inject 2.5 mg into the skin every 7 days. 12 Pen 3     Past Medical History:   Diagnosis Date    SHA (acute kidney injury) 12/2023    Choledocholithiasis 12/2023    Chronic right shoulder pain 08/01/2022    Continue otc tylenol for pain management with short course hydrocodone only PRN for BT pain for short course    Depression 02/01/2024    DM (diabetes mellitus)     GERD (gastroesophageal reflux disease)     HLD (hyperlipidemia)     HTN (hypertension)     Hydrocele of testis 12/16/2023    Hypotension 02/01/2024    Hypoxic respiratory failure 12/2023    Necrotizing pancreatitis 12/16/2023 12/2023    Necrotizing pancreatitis 12/16/2023    Sepsis due to Enterococcus faecalis 12/2023    Vitamin D  "deficiency 11/14/2024     Past Surgical History:   Procedure Laterality Date    APPENDECTOMY      CHOLECYSTECTOMY      ERCP N/A 12/17/2023    Procedure: ERCP (ENDOSCOPIC RETROGRADE CHOLANGIOPANCREATOGRAPHY);  Surgeon: Flako Kerns MD;  Location: [facility] OR;  Service: Gastroenterology;  Laterality: N/A;    ERCP W/ SPHICTEROTOMY  12/17/2023    Procedure: ERCP, WITH SPHINCTEROTOMY;  Surgeon: Flako Kerns MD;  Location: [facility] OR;  Service: Gastroenterology;;    ERCP, WITH CALCULUS REMOVAL  12/17/2023    Procedure: ERCP, WITH CALCULUS REMOVAL;  Surgeon: Flako Kerns MD;  Location: [facility] OR;  Service: Gastroenterology;;     Review of patient's allergies indicates:   Allergen Reactions    Gabapentin Hallucinations     Family History   Problem Relation Name Age of Onset    Other Sister          h. pylori      Social History[2]  Patient Care Team:  Snehal Arroyo MD as PCP - General (Family Medicine)  Justin Cary MD as Consulting Physician (Ophthalmology)  Phil Oseguera MD as Consulting Physician (Gastroenterology)  Domo Foster II, MD as Consulting Physician (Ophthalmology)  Reyna Castelan MD as Consulting Physician (Ophthalmology)   Subjective:   ROS  See HPI for details  All Other ROS: Negative except as stated in HPI.   Objective:   BP (!) 170/98   Pulse 78   Resp 18   Ht 5' 5" (1.651 m)   Wt 54.4 kg (120 lb)   SpO2 98%   BMI 19.97 kg/m²   Physical Exam  Constitutional:       General: He is not in acute distress.  Cardiovascular:      Rate and Rhythm: Normal rate and regular rhythm.      Heart sounds: Normal heart sounds.   Pulmonary:      Effort: Pulmonary effort is normal.      Breath sounds: No wheezing or rhonchi.   Neurological:      Mental Status: He is alert and oriented to person, place, and time.       Assessment:       ICD-10-CM ICD-9-CM   1. Primary hypertension  I10 401.9   2. Type 2 diabetes mellitus with stage 3a chronic kidney disease, without long-term " current use of insulin  E11.22 250.40    N18.31 585.3   3. Pure hypercholesterolemia  E78.00 272.0   4. Elevated alkaline phosphatase level  R74.8 790.5   5. Vitamin D deficiency  E55.9 268.9      Plan:   1. Primary hypertension  Assessment & Plan:  Uncontrolled  Continue current medication regimen:  Lisinopril 40 mg daily, Jardiance 25 mg daily  Stop clonidine.  Start amlodipine 5 mg daily.  AE discussed  Blood pressure at goal <140/90 (<130/80 if otherwise noted)  Recommend DASH diet  Avoid tobacco use  Record BP at home daily and bring log to next office visit, assure that home cuff is calibrated at minimum every 12 months      Orders:  -     amLODIPine (NORVASC) 5 MG tablet; Take 1 tablet (5 mg total) by mouth once daily.  Dispense: 90 tablet; Refill: 3  -     Comprehensive Metabolic Panel; Future; Expected date: 07/10/2025    2. Type 2 diabetes mellitus with stage 3a chronic kidney disease, without long-term current use of insulin  Assessment & Plan:  Improving  Continue current treatment:  Jardiance 25 mg daily, Mounjaro 2.5 mg every 7 days  ACEi/ARB according to guidelines.  Follow ADA Diet. Avoid soda, simple sweets, and limit rice/pasta/breads/starches (no more than 45-50 grams per meal).  Continue CBG monitoring and keep log to bring to next visit  Maintain healthy weight with goal BMI <30.  Exercise at least 5 times per week for 30 minutes per day.  Annual foot exams and close monitoring/hygiene at home.  Annual dilated eye exam.        Orders:  -     Comprehensive Metabolic Panel; Future; Expected date: 07/10/2025    3. Pure hypercholesterolemia  Assessment & Plan:  Improving  Continue Rx:  Atorvastatin 20 mg daily   The ASCVD Risk score (Gilma HERNÁNDEZ, et al., 2019) failed to calculate for the following reasons:    The valid total cholesterol range is 130 to 320 mg/dL   Can try omega-3 fatty acids (total,LDL,TG), niacin (TG), CoQ10 (total), Red yeast rice (LDL,TG,inc HDL)  Encourage weight loss by least 5%  and to increase aerobic exercise and resistance training  Limit simple sugars and simple carbohydrate intake-focus on low glycemic foods  Optimize blood sugar control            4. Elevated alkaline phosphatase level  Assessment & Plan:  Repeat level and obtain GGT    Orders:  -     Comprehensive Metabolic Panel; Future; Expected date: 07/10/2025  -     Gamma GT; Future; Expected date: 07/10/2025    5. Vitamin D deficiency  Assessment & Plan:  Obtain vitamin-D level.  Patient currently not taking supplement    Orders:  -     Vitamin D; Future; Expected date: 07/10/2025  -     Gamma GT; Future; Expected date: 07/10/2025         Medication List with Changes/Refills   New Medications    AMLODIPINE (NORVASC) 5 MG TABLET    Take 1 tablet (5 mg total) by mouth once daily.       Start Date: 7/10/2025 End Date: 7/10/2026   Current Medications    ACCU-CHEK GUIDE GLUCOSE METER MISC    4 (four) times daily.       Start Date: 2/18/2025 End Date: --    ALBUTEROL (PROVENTIL/VENTOLIN HFA) 90 MCG/ACTUATION INHALER    Inhale 2 puffs into the lungs every 4 (four) hours as needed for Wheezing. Rescue       Start Date: --        End Date: --    ASPIRIN 81 MG CHEW    Take 81 mg by mouth once daily.       Start Date: --        End Date: --    ATORVASTATIN (LIPITOR) 20 MG TABLET    Take 1 tablet (20 mg total) by mouth once daily.       Start Date: 2/18/2025 End Date: --    BLOOD SUGAR DIAGNOSTIC STRP    To check BG four times daily, to use with insurance preferred meter       Start Date: 2/18/2025 End Date: --    BLOOD-GLUCOSE METER KIT    To check BG four times daily, to use with insurance preferred meter       Start Date: 2/18/2025 End Date: --    CLONIDINE (CATAPRES) 0.1 MG TABLET    Take 1 tablet (0.1 mg total) by mouth every 12 (twelve) hours as needed (SBP > 180 or DBP > 100).       Start Date: 2/20/2025 End Date: 4/10/2025    EMPAGLIFLOZIN (JARDIANCE) 25 MG TABLET    Take 1 tablet (25 mg total) by mouth once daily.       Start  Date: 4/10/2025 End Date: --    FAMOTIDINE (PEPCID) 20 MG TABLET    Take 60 tablets by mouth 2 (two) times daily.       Start Date: 3/7/2024  End Date: --    HYDROCODONE-ACETAMINOPHEN (NORCO) 7.5-325 MG PER TABLET    Take 1 tablet by mouth every 6 (six) hours as needed for Pain.       Start Date: 4/10/2025 End Date: --    LANCETS MISC    To check BG four times daily, to use with insurance preferred meter       Start Date: 2/18/2025 End Date: --    LISINOPRIL (PRINIVIL,ZESTRIL) 40 MG TABLET    Take 1 tablet (40 mg total) by mouth once daily.       Start Date: 5/8/2025  End Date: --    PANTOPRAZOLE (PROTONIX) 40 MG TABLET    Take 1 tablet (40 mg total) by mouth once daily.       Start Date: 5/8/2025  End Date: --    TIRZEPATIDE (MOUNJARO) 2.5 MG/0.5 ML PNIJ    Inject 2.5 mg into the skin every 7 days.       Start Date: 4/10/2025 End Date: --        Follow up in about 6 weeks (around 8/21/2025) for Chronic Conditions, With Labs. In addition to their scheduled follow up, the patient has also been instructed to follow up on as needed basis.   This note was created using [employer] voice recognition software that occasionally misinterpreted phrases or words. Please contact me if any questions may rise regarding documentation to clarify verbiage.   This note was generated with the assistance of ambient listening technology. Verbal consent was obtained by the patient and accompanying visitor(s) for the recording of patient appointment to facilitate this note. I attest to having reviewed and edited the generated note for accuracy, though some syntax or spelling errors may persist. Please contact the author of this note for any clarification.             [1]   Patient Active Problem List  Diagnosis    Type 2 diabetes mellitus with stage 3a chronic kidney disease, without long-term current use of insulin    Chronic kidney disease, stage 3a    Wellness examination    Hyperlipidemia    Chronic neck and back pain    Gastroesophageal  reflux disease    Postnasal drip    Testicular swelling, right    Debility    Hypertension    Carotid artery disease, unspecified laterality, unspecified type    Gastritis    Frontal headache    Vitamin D deficiency    Personal history of tobacco use, presenting hazards to health    Other emphysema    Elevated alkaline phosphatase level   [2]   Social History  Socioeconomic History    Marital status:    Tobacco Use    Smoking status: Every Day     Current packs/day: 1.00     Average packs/day: 1 pack/day for 50.6 years (50.6 ttl pk-yrs)     Types: Cigarettes     Start date: 12/13/1974    Smokeless tobacco: Never   Substance and Sexual Activity    Alcohol use: Never    Drug use: Never    Sexual activity: Yes     Social Drivers of Health     Financial Resource Strain: High Risk (11/14/2024)    Overall Financial Resource Strain (CARDIA)     Difficulty of Paying Living Expenses: Hard   Food Insecurity: No Food Insecurity (11/14/2024)    Hunger Vital Sign     Worried About Running Out of Food in the Last Year: Never true     Ran Out of Food in the Last Year: Never true   Transportation Needs: No Transportation Needs (11/14/2024)    TRANSPORTATION NEEDS     Transportation : No   Physical Activity: Sufficiently Active (11/14/2024)    Exercise Vital Sign     Days of Exercise per Week: 3 days     Minutes of Exercise per Session: 60 min   Stress: Stress Concern Present (11/14/2024)    [employer] [place] [employer] - Occupational Stress Questionnaire     Feeling of Stress : To some extent   Housing Stability: Unknown (11/14/2024)    Housing Stability Vital Sign     Unable to Pay for Housing in the Last Year: No     Homeless in the Last Year: No

## 2025-07-10 NOTE — ASSESSMENT & PLAN NOTE
Improving  Continue current treatment:  Jardiance 25 mg daily, Mounjaro 2.5 mg every 7 days  ACEi/ARB according to guidelines.  Follow ADA Diet. Avoid soda, simple sweets, and limit rice/pasta/breads/starches (no more than 45-50 grams per meal).  Continue CBG monitoring and keep log to bring to next visit  Maintain healthy weight with goal BMI <30.  Exercise at least 5 times per week for 30 minutes per day.  Annual foot exams and close monitoring/hygiene at home.  Annual dilated eye exam.

## 2025-07-10 NOTE — TELEPHONE ENCOUNTER
Copied from CRM #[id number]. Topic: General Inquiry - Return Call  >> Jul 9, 2025  8:50 AM Samantha wrote:  Who Called: Franklin Macias    Patient is returning phone call    Who Left Message for Patient:Mary  Does the patient know what this is regarding?:medical status      Preferred Method of Contact: Phone Call  Patient's Preferred Phone Number on File: [phone]   Best Call Back Number, if different:**[phone]** prefer to call this number  Additional Information: return call, please advise, thanks

## 2025-08-18 ENCOUNTER — PATIENT OUTREACH (OUTPATIENT)
Facility: CLINIC | Age: 70
End: 2025-08-18
Payer: MEDICARE

## 2025-08-18 LAB — CRC RECOMMENDATION EXT: NORMAL

## 2025-08-22 ENCOUNTER — LAB VISIT (OUTPATIENT)
Dept: LAB | Facility: HOSPITAL | Age: 70
End: 2025-08-22
Attending: PEDIATRICS
Payer: MEDICARE

## 2025-08-22 DIAGNOSIS — R74.8 ELEVATED ALKALINE PHOSPHATASE LEVEL: ICD-10-CM

## 2025-08-22 DIAGNOSIS — N18.31 TYPE 2 DIABETES MELLITUS WITH STAGE 3A CHRONIC KIDNEY DISEASE, WITHOUT LONG-TERM CURRENT USE OF INSULIN: ICD-10-CM

## 2025-08-22 DIAGNOSIS — I10 PRIMARY HYPERTENSION: ICD-10-CM

## 2025-08-22 DIAGNOSIS — E11.22 TYPE 2 DIABETES MELLITUS WITH STAGE 3A CHRONIC KIDNEY DISEASE, WITHOUT LONG-TERM CURRENT USE OF INSULIN: ICD-10-CM

## 2025-08-22 DIAGNOSIS — E55.9 VITAMIN D DEFICIENCY: ICD-10-CM

## 2025-08-22 LAB
25(OH)D3+25(OH)D2 SERPL-MCNC: 42 NG/ML (ref 30–80)
ALBUMIN SERPL-MCNC: 3.9 G/DL (ref 3.4–4.8)
ALBUMIN/GLOB SERPL: 1 RATIO (ref 1.1–2)
ALP SERPL-CCNC: 80 UNIT/L (ref 40–150)
ALT SERPL-CCNC: 22 UNIT/L (ref 0–55)
ANION GAP SERPL CALC-SCNC: 8 MEQ/L
AST SERPL-CCNC: 20 UNIT/L (ref 11–45)
BILIRUB SERPL-MCNC: 0.7 MG/DL
BUN SERPL-MCNC: 24.7 MG/DL (ref 8.4–25.7)
CALCIUM SERPL-MCNC: 9.6 MG/DL (ref 8.8–10)
CHLORIDE SERPL-SCNC: 103 MMOL/L (ref 98–107)
CO2 SERPL-SCNC: 30 MMOL/L (ref 23–31)
CREAT SERPL-MCNC: 1.16 MG/DL (ref 0.72–1.25)
CREAT/UREA NIT SERPL: 21
GFR SERPLBLD CREATININE-BSD FMLA CKD-EPI: >60 ML/MIN/1.73/M2
GGT SERPL-CCNC: 46 U/L (ref 12–64)
GLOBULIN SER-MCNC: 3.9 GM/DL (ref 2.4–3.5)
GLUCOSE SERPL-MCNC: 143 MG/DL (ref 82–115)
POTASSIUM SERPL-SCNC: 4.7 MMOL/L (ref 3.5–5.1)
PROT SERPL-MCNC: 7.8 GM/DL (ref 5.8–7.6)
SODIUM SERPL-SCNC: 141 MMOL/L (ref 136–145)

## 2025-08-22 PROCEDURE — 36415 COLL VENOUS BLD VENIPUNCTURE: CPT

## 2025-08-22 PROCEDURE — 80053 COMPREHEN METABOLIC PANEL: CPT

## 2025-08-22 PROCEDURE — 82977 ASSAY OF GGT: CPT

## 2025-08-22 PROCEDURE — 82306 VITAMIN D 25 HYDROXY: CPT

## 2025-08-26 ENCOUNTER — OFFICE VISIT (OUTPATIENT)
Dept: PRIMARY CARE CLINIC | Facility: CLINIC | Age: 70
End: 2025-08-26
Payer: MEDICARE

## 2025-08-26 VITALS
HEIGHT: 65 IN | DIASTOLIC BLOOD PRESSURE: 68 MMHG | WEIGHT: 123.69 LBS | HEART RATE: 77 BPM | SYSTOLIC BLOOD PRESSURE: 119 MMHG | BODY MASS INDEX: 20.61 KG/M2 | OXYGEN SATURATION: 96 %

## 2025-08-26 DIAGNOSIS — N18.31 TYPE 2 DIABETES MELLITUS WITH STAGE 3A CHRONIC KIDNEY DISEASE, WITHOUT LONG-TERM CURRENT USE OF INSULIN: ICD-10-CM

## 2025-08-26 DIAGNOSIS — I10 PRIMARY HYPERTENSION: Primary | ICD-10-CM

## 2025-08-26 DIAGNOSIS — N18.31 CHRONIC KIDNEY DISEASE, STAGE 3A: ICD-10-CM

## 2025-08-26 DIAGNOSIS — Z12.5 SCREENING FOR MALIGNANT NEOPLASM OF PROSTATE: ICD-10-CM

## 2025-08-26 DIAGNOSIS — E11.22 TYPE 2 DIABETES MELLITUS WITH STAGE 3A CHRONIC KIDNEY DISEASE, WITHOUT LONG-TERM CURRENT USE OF INSULIN: ICD-10-CM

## 2025-08-26 DIAGNOSIS — E78.00 PURE HYPERCHOLESTEROLEMIA: ICD-10-CM

## 2025-08-26 DIAGNOSIS — I77.9 CAROTID ARTERY DISEASE, UNSPECIFIED LATERALITY, UNSPECIFIED TYPE: ICD-10-CM

## 2025-08-26 DIAGNOSIS — E55.9 VITAMIN D DEFICIENCY: ICD-10-CM

## 2025-08-26 PROCEDURE — 3008F BODY MASS INDEX DOCD: CPT | Mod: CPTII,,, | Performed by: STUDENT IN AN ORGANIZED HEALTH CARE EDUCATION/TRAINING PROGRAM

## 2025-08-26 PROCEDURE — 3044F HG A1C LEVEL LT 7.0%: CPT | Mod: CPTII,,, | Performed by: STUDENT IN AN ORGANIZED HEALTH CARE EDUCATION/TRAINING PROGRAM

## 2025-08-26 PROCEDURE — 1159F MED LIST DOCD IN RCRD: CPT | Mod: CPTII,,, | Performed by: STUDENT IN AN ORGANIZED HEALTH CARE EDUCATION/TRAINING PROGRAM

## 2025-08-26 PROCEDURE — 3288F FALL RISK ASSESSMENT DOCD: CPT | Mod: CPTII,,, | Performed by: STUDENT IN AN ORGANIZED HEALTH CARE EDUCATION/TRAINING PROGRAM

## 2025-08-26 PROCEDURE — 3074F SYST BP LT 130 MM HG: CPT | Mod: CPTII,,, | Performed by: STUDENT IN AN ORGANIZED HEALTH CARE EDUCATION/TRAINING PROGRAM

## 2025-08-26 PROCEDURE — 4010F ACE/ARB THERAPY RXD/TAKEN: CPT | Mod: CPTII,,, | Performed by: STUDENT IN AN ORGANIZED HEALTH CARE EDUCATION/TRAINING PROGRAM

## 2025-08-26 PROCEDURE — 3078F DIAST BP <80 MM HG: CPT | Mod: CPTII,,, | Performed by: STUDENT IN AN ORGANIZED HEALTH CARE EDUCATION/TRAINING PROGRAM

## 2025-08-26 PROCEDURE — 1101F PT FALLS ASSESS-DOCD LE1/YR: CPT | Mod: CPTII,,, | Performed by: STUDENT IN AN ORGANIZED HEALTH CARE EDUCATION/TRAINING PROGRAM

## 2025-08-26 PROCEDURE — 1126F AMNT PAIN NOTED NONE PRSNT: CPT | Mod: CPTII,,, | Performed by: STUDENT IN AN ORGANIZED HEALTH CARE EDUCATION/TRAINING PROGRAM

## 2025-08-26 PROCEDURE — 99213 OFFICE O/P EST LOW 20 MIN: CPT | Mod: ,,, | Performed by: STUDENT IN AN ORGANIZED HEALTH CARE EDUCATION/TRAINING PROGRAM

## (undated) DEVICE — SCOPE EXALT DUODSCP DISP 4.2MM

## (undated) DEVICE — ELECTRODE REM PLYHSV RETURN 9

## (undated) DEVICE — KIT SURGICAL COLON .25 1.1OZ

## (undated) DEVICE — SPHINCTEROTOME ST 5MMX.035IN

## (undated) DEVICE — DEVICE LOCKING RX - OLYMPUS

## (undated) DEVICE — SUPPORT ULNA NERVE PROTECTOR

## (undated) DEVICE — COLLECTION SPECIMEN NEPTUNE

## (undated) DEVICE — EXTRACTOR PRO 9-12MM ABOVE

## (undated) DEVICE — TIP SUCTION YANKAUER

## (undated) DEVICE — SOL IRRI STRL WATER 1000ML

## (undated) DEVICE — BLOCK BLOX BITE DENT RIM 54FR